# Patient Record
Sex: FEMALE | Race: BLACK OR AFRICAN AMERICAN | Employment: OTHER | URBAN - METROPOLITAN AREA
[De-identification: names, ages, dates, MRNs, and addresses within clinical notes are randomized per-mention and may not be internally consistent; named-entity substitution may affect disease eponyms.]

---

## 2017-01-24 ENCOUNTER — HOSPITAL ENCOUNTER (OUTPATIENT)
Dept: ONCOLOGY | Age: 52
Discharge: HOME | End: 2017-01-24

## 2017-01-24 ENCOUNTER — HOSPITAL ENCOUNTER (OUTPATIENT)
Dept: LAB | Age: 52
Discharge: HOME | End: 2017-01-24
Payer: MEDICARE

## 2017-01-24 ENCOUNTER — OFFICE VISIT (OUTPATIENT)
Dept: ONCOLOGY | Age: 52
End: 2017-01-24

## 2017-01-24 VITALS
HEART RATE: 112 BPM | SYSTOLIC BLOOD PRESSURE: 110 MMHG | HEIGHT: 71 IN | DIASTOLIC BLOOD PRESSURE: 78 MMHG | WEIGHT: 226 LBS | BODY MASS INDEX: 31.64 KG/M2 | TEMPERATURE: 98.9 F

## 2017-01-24 DIAGNOSIS — D75.839 THROMBOCYTOSIS: ICD-10-CM

## 2017-01-24 DIAGNOSIS — D72.829 LEUKOCYTOSIS, UNSPECIFIED TYPE: ICD-10-CM

## 2017-01-24 DIAGNOSIS — M06.9 RHEUMATOID ARTHRITIS INVOLVING MULTIPLE JOINTS (HCC): ICD-10-CM

## 2017-01-24 DIAGNOSIS — D75.839 THROMBOCYTOSIS: Primary | ICD-10-CM

## 2017-01-24 DIAGNOSIS — D64.9 CHRONIC ANEMIA: ICD-10-CM

## 2017-01-24 DIAGNOSIS — M79.7 FIBROMYALGIA: ICD-10-CM

## 2017-01-24 DIAGNOSIS — R52 PAIN: ICD-10-CM

## 2017-01-24 LAB
ALBUMIN SERPL BCP-MCNC: 3.7 G/DL (ref 3.4–5)
ALBUMIN/GLOB SERPL: 0.9 {RATIO} (ref 0.8–1.7)
ALP SERPL-CCNC: 106 U/L (ref 45–117)
ALT SERPL-CCNC: 24 U/L (ref 13–56)
ANION GAP BLD CALC-SCNC: 8 MMOL/L (ref 3–18)
AST SERPL W P-5'-P-CCNC: 7 U/L (ref 15–37)
BASO+EOS+MONOS # BLD AUTO: 0.5 K/UL (ref 0–2.3)
BASO+EOS+MONOS # BLD AUTO: 4 % (ref 0.1–17)
BILIRUB SERPL-MCNC: 0.2 MG/DL (ref 0.2–1)
BUN SERPL-MCNC: 12 MG/DL (ref 7–18)
BUN/CREAT SERPL: 15 (ref 12–20)
CALCIUM SERPL-MCNC: 9.6 MG/DL (ref 8.5–10.1)
CHLORIDE SERPL-SCNC: 104 MMOL/L (ref 100–108)
CO2 SERPL-SCNC: 27 MMOL/L (ref 21–32)
CREAT SERPL-MCNC: 0.82 MG/DL (ref 0.6–1.3)
DIFFERENTIAL METHOD BLD: NORMAL
ERYTHROCYTE [DISTWIDTH] IN BLOOD BY AUTOMATED COUNT: 13.1 % (ref 11.5–14.5)
FERRITIN SERPL-MCNC: 147 NG/ML (ref 8–388)
GLOBULIN SER CALC-MCNC: 4.1 G/DL (ref 2–4)
GLUCOSE SERPL-MCNC: 207 MG/DL (ref 74–99)
HCT VFR BLD AUTO: 39.6 % (ref 36–48)
HGB BLD-MCNC: 13.2 G/DL (ref 12–16)
IRON SATN MFR SERPL: 18 %
IRON SERPL-MCNC: 55 UG/DL (ref 50–175)
LYMPHOCYTES # BLD AUTO: 31 % (ref 14–44)
LYMPHOCYTES # BLD: 3.3 K/UL (ref 1.1–5.9)
MCH RBC QN AUTO: 27 PG (ref 25–35)
MCHC RBC AUTO-ENTMCNC: 33.3 G/DL (ref 31–37)
MCV RBC AUTO: 81 FL (ref 78–102)
NEUTS SEG # BLD: 6.9 K/UL (ref 1.8–9.5)
NEUTS SEG NFR BLD AUTO: 65 % (ref 40–70)
PLATELET # BLD AUTO: 432 K/UL (ref 140–440)
POTASSIUM SERPL-SCNC: 4.2 MMOL/L (ref 3.5–5.5)
PROT SERPL-MCNC: 7.8 G/DL (ref 6.4–8.2)
RBC # BLD AUTO: 4.89 M/UL (ref 4.1–5.1)
SODIUM SERPL-SCNC: 139 MMOL/L (ref 136–145)
TIBC SERPL-MCNC: 299 UG/DL (ref 250–450)
WBC # BLD AUTO: 10.7 K/UL (ref 4.5–13)

## 2017-01-24 PROCEDURE — 82728 ASSAY OF FERRITIN: CPT | Performed by: NURSE PRACTITIONER

## 2017-01-24 PROCEDURE — 80053 COMPREHEN METABOLIC PANEL: CPT | Performed by: NURSE PRACTITIONER

## 2017-01-24 PROCEDURE — 36415 COLL VENOUS BLD VENIPUNCTURE: CPT | Performed by: NURSE PRACTITIONER

## 2017-01-24 PROCEDURE — 83540 ASSAY OF IRON: CPT | Performed by: NURSE PRACTITIONER

## 2017-01-24 RX ORDER — OXYCODONE AND ACETAMINOPHEN 10; 325 MG/1; MG/1
1-2 TABLET ORAL
Qty: 240 TAB | Refills: 0 | Status: SHIPPED | OUTPATIENT
Start: 2017-01-24 | End: 2017-02-28 | Stop reason: SDUPTHER

## 2017-01-24 RX ORDER — ANAGRELIDE 0.5 MG/1
0.5 CAPSULE ORAL 2 TIMES DAILY
Qty: 60 CAP | Refills: 4 | Status: SHIPPED | OUTPATIENT
Start: 2017-01-24 | End: 2017-04-28 | Stop reason: SDUPTHER

## 2017-01-24 NOTE — MR AVS SNAPSHOT
Visit Information Date & Time Provider Department Dept. Phone Encounter #  
 1/24/2017 12:00 PM Izabella Saenz 69 Office [phone] [id number] Follow-up Instructions Return in about 6 weeks (around 3/7/2017). Your Appointments 3/7/2017 12:00 PM  
Office Visit with Fiona Vila MD  
[facility]) Appt Note: OV  
 [facility] [address] [facility] [address] [facility] [address]  
[phone]  
  
   
 [facility] [address] [place] Upcoming Health Maintenance Date Due Hepatitis C Screening 1965 Pneumococcal 19-64 Highest Risk (1 of 3 - PCV13) 12/19/1982 DTaP/Tdap/Td series (1 - Tdap) 12/19/1984 PAP AKA CERVICAL CYTOLOGY 12/19/1984 BREAST CANCER SCRN MAMMOGRAM 12/19/2013 FOBT Q 1 YEAR AGE 50-75 12/19/2013 Allergies as of 1/24/2017  Review Complete On: 12/15/2016 By: Edelmira Almodovar RN Severity Noted Reaction Type Reactions Levaquin [Levofloxacin] High  Systemic Anaphylaxis Pollen Extracts    Unable to Obtain Current Immunizations  Reviewed on 12/15/2016 Name Date Influenza Vaccine 10/21/2016, 11/3/2015, 11/10/2014, 10/25/2013 Influenza Vaccine Whole 9/10/2012 Not reviewed this visit You Were Diagnosed With   
  
 Codes Comments Thrombocytosis ([facility] [address]    -  Primary ICD-10-CM: D47.3 ICD-9-CM: 238.71 Leukocytosis, unspecified type     ICD-10-CM: D72.829 ICD-9-CM: 288.60 Rheumatoid arthritis involving multiple joints (HCC)     ICD-10-CM: M06.9 ICD-9-CM: 714.0 Chronic anemia     ICD-10-CM: D64.9 ICD-9-CM: 285.9 Fibromyalgia     ICD-10-CM: M79.7 ICD-9-CM: 729.1 Pain     ICD-10-CM: R52 ICD-9-CM: 780.96 Vitals BP Pulse Temp Height(growth percentile) Weight(growth percentile) BMI  
 110/78 (!) 112 98.9 °F (37.2 °C) 5' 11\" (1.803 m) 226 lb (102.5 kg) 31.52 kg/m2 OB Status Smoking Status Hysterectomy Former Smoker BMI and BSA Data Body Mass Index Body Surface Area  
 31.52 kg/m 2 2.27 m 2 Preferred Pharmacy Pharmacy Name Phone [facility] A[address]n [address]. [phone] Your Updated Medication List  
  
   
This list is accurate as of: 1/24/17 12:35 PM.  Always use your most recent med list.  
  
  
  
  
 albuterol 90 mcg/actuation inhaler Commonly known as:  PROAIR HFA Take 1 Puff by inhalation every six (6) hours as needed for Wheezing. amitriptyline 150 mg tablet Commonly known as:  ELAVIL Take 10 mg by mouth nightly. Indications: DEPRESSION  
  
 anagrelide 0.5 mg capsule Commonly known as:  Corry Jasbir Take 1 Cap by mouth two (2) times a day. Or as directed by a MD.  Indications: ESSENTIAL THROMBOCYTOSIS  
  
 atenolol 25 mg tablet Commonly known as:  TENORMIN Take 25 mg by mouth daily. Indications: HYPERTENSION  
  
 cholecalciferol (VITAMIN D3) 5,000 unit Tab tablet Commonly known as:  VITAMIN D3 Take 5,000 Units by mouth every seven (7) days. DULoxetine 20 mg capsule Commonly known as:  CYMBALTA Take 20 mg by mouth daily. folic acid 413 mcg tablet Take 400 mcg by mouth daily. furosemide 40 mg tablet Commonly known as:  LASIX Take  by mouth daily. haloperidol 5 mg tablet Commonly known as:  HALDOL Take 2.5 mg by mouth nightly. HYZAAR 50-12.5 mg per tablet Generic drug:  losartan-hydroCHLOROthiazide Take 1 Tab by mouth daily. KlonoPIN 2 mg tablet Generic drug:  clonazePAM  
Take 2 mg by mouth daily. Indications: PANIC DISORDER  
  
 LANTUS SC  
5 Units by SubCUTAneous route daily. metFORMIN 1,000 mg tablet Commonly known as:  GLUCOPHAGE Take 500 mg by mouth two (2) times a day. methotrexate 2.5 mg tablet Commonly known as:  Skip Shantal Take 5 mg by mouth daily. nitroglycerin 400 mcg/spray spray Commonly known as:  NITROLINGUAL  
1 [place] by SubLINGual route every five (5) minutes as needed. NovoLOG Mix 70-30 100 unit/mL (70-30) injection Generic drug:  insulin aspart protamine/insulin aspart 10 Units by SubCUTAneous route two (2) times a day. ondansetron hcl 4 mg tablet Commonly known as:  ZOFRAN (AS HYDROCHLORIDE) Take 1 Tab by mouth every eight (8) hours as needed for Nausea. oxyCODONE-acetaminophen  mg per tablet Commonly known as:  PERCOCET 10 Take 1-2 Tabs by mouth every six (6) hours as needed for Pain. Max Daily Amount: 8 Tabs. Indications: PAIN  
  
 PLAQUENIL 200 mg tablet Generic drug:  hydroxychloroquine Take 200 mg by mouth two (2) times a day. QUEtiapine 100 mg tablet Commonly known as:  SEROquel Take 25 mg by mouth. Takes 25 mg Q AM, and 100 mg, Q PM  
  
 REMICADE IV  
344 mg by IntraVENous route once as needed. remicade will be every 8 weeks Prescriptions Printed Refills  
 oxyCODONE-acetaminophen (PERCOCET 10)  mg per tablet 0 Sig: Take 1-2 Tabs by mouth every six (6) hours as needed for Pain. Max Daily Amount: 8 Tabs. Indications: PAIN Class: Print Route: Oral  
  
We Performed the Following COMPLETE CBC & AUTO DIFF WBC [23652 CPT(R)] Follow-up Instructions Return in about 6 weeks (around 3/7/2017). To-Do List   
 01/24/2017 Lab:  CBC WITH 3 PART DIFF   
  
 01/25/2017 Lab:  FERRITIN   
  
 01/25/2017 Lab:  IRON PROFILE   
  
 01/25/2017 Lab:  METABOLIC PANEL, COMPREHENSIVE   
  
 01/26/2017 10:00 AM  
  Appointment with [address] [place] [address] ([phone])  
  
 03/09/2017 10:00 AM  
  Appointment with [address] [place] [address] ([phone])  
  
 04/20/2017 10:00 AM  
  Appointment with [address] [place] [address] ([phone]) Introducing [address]! Bessy Scanlon introduces [employer] patient portal. Now you can access parts of your medical record, email your doctor's office, and request medication refills online. 1. In your internet browser, go to [email or web address] 2. Click on the First Time User? Click Here link in the Sign In box. You will see the New Member Sign Up page. 3. Enter your [employer] Access Code exactly as it appears below. You will not need to use this code after youve completed the sign-up process. If you do not sign up before the expiration date, you must request a new code. · [employer] Access Code: [email or web address] Expires: 3/13/2017  1:57 PM 
 
4. Enter the last four digits of your Social Security Number (xxxx) and Date of Birth (mm/dd/yyyy) as indicated and click Submit. You will be taken to the next sign-up page. 5. Create a Profig ID. This will be your Pro[employer] login ID and cannot be changed, so think of one that is secure and easy to remember. 6. Create a [employer] password. You can change your password at any time. 7. Enter your Password Reset Question and Answer. This can be used at a later time if you forget your password. 8. Enter your e-mail address. You will receive e-mail notification when new information is available in [address] 9. Click Sign Up. You can now view and download portions of your medical record. 10. Click the Download Summary menu link to download a portable copy of your medical information. If you have questions, please visit the Frequently Asked Questions section of the [employer] website. Remember, [employer] is NOT to be used for urgent needs. For medical emergencies, dial 911. Now available from your iPhone and Android! Please provide this summary of care documentation to your next provider. Your primary care clinician is listed as Jordy Perez. If you have any questions after today's visit, please call [phone].

## 2017-01-24 NOTE — PROGRESS NOTES
Hematology/Oncology  Progress Note    Name: Celia Murdock  Date: 2017  : 1965    PCP: Jeni Romero MD     Ms. Garry Nunez is a 46year old female who was seen for management of her rheumatoid arthritis, leukocytosis, and thrombocytosis. Current therapy: Remicade 4 mg/kg bodyweight ever 6 weeks intravenously    Subjective:     Ms. Garry Nunez is a 46year-old [facility] American woman who has severe rheumatoid arthritis. She receives Remicade every 6 weeks. She also suffers from fibromyalgia and had an elevated WBC count and platelet count of undefined etiology. Today she has no new complaints to report. Her pain is well controlled with her current pain medication regimen. She reports that the Remicade has provided a significant degree of relief from the severe rheumatoid arthritis symptoms. The patient is continuing to use her cane for mobility support. Her next treatment is scheduled for 2017. She denies any recent fevers or recurrent infections. The patient reports that her appetite has continued to improve. She also states her energy level is continuing to improve as well. At this point she is beginning to gain some weight due to the improved appetite.     Past Medical History   Diagnosis Date    Abdominal pain, unspecified site     Acute otitis media     Acute pharyngitis     Anxiety     Arthritis     Autoimmune disease ([facility] [address]     Back injury     Backache      herniated disc in lower back    Blurred vision     Chest pain, unspecified      abnormal EKG    Chronic airway obstruction, not elsewhere classified (HCC)     Chronic back pain     Chronic pain     COPD     Depression     Diabetes (HCC)     Dizziness     Dizziness and giddiness      possible orthostatic changes, vasovagal, autonomic dysfunction from diabetic neuropathy    Encounters for unspecified administrative purpose     Feeling anxious     Fibromyalgia     Headache(784.0)     Hemorrhoid     Herniated disc      at L5    Hypercholesterolemia     Hyperglycemia     Hypertension     Joint pain     Leukocytosis, unspecified     Major depressive disorder, single episode, mild (HCC)     Mental disorder      depression, anxiety, bipolar and PTSD    Neuropathy     Obesity, unspecified     Other chest pain     Phobic disorders     Rheumatoid arthritis (HCC)     Seasonal allergies     Shortness of breath      normal EF    Streptococcal sore throat     Type II or unspecified type diabetes mellitus with unspecified complication, uncontrolled     Unspecified hereditary and idiopathic peripheral neuropathy     Vitamin D deficiency      Past Surgical History   Procedure Laterality Date    Hx gyn  2005     partial Hysterectomy    Hx tubal ligation       1995    Hx cholecystectomy  1994    Hx other surgical  12-1-15     had ingrown toenail removed from big toe, both feet     Social History     Social History    Marital status:      Spouse name: N/A    Number of children: N/A    Years of education: N/A     Occupational History    Not on file.      Social History Main Topics    Smoking status: Former Smoker    Smokeless tobacco: Not on file    Alcohol use Yes      Comment: socially    Drug use: No    Sexual activity: Yes     Partners: Male     Birth control/ protection: Condom     Other Topics Concern    Not on file     Social History Narrative     Family History   Problem Relation Age of Onset    Diabetes Other     Alcohol abuse Mother     Arthritis-osteo Mother     Diabetes Mother     Elevated Lipids Mother     Headache Mother     Heart Disease Mother    [facility] Migraines Mother     Psychiatric Disorder Mother     Alcohol abuse Father    Osmel Nolan Father     Cancer Father     Headache Father     Heart Disease Father     Lung Disease Father     Migraines Father     Alcohol abuse Sister     Headache Sister     Diabetes Sister     Heart Disease Sister     Migraines Sister     Psychiatric Disorder Sister     Headache Brother     Diabetes Brother     Elevated Lipids Brother     Heart Disease Brother     Migraines Brother     Psychiatric Disorder Brother     Cancer Maternal Aunt     Alcohol abuse Maternal Aunt     Diabetes Maternal Aunt     Headache Maternal Aunt     Lung Disease Maternal Aunt     Migraines Maternal Aunt     Headache Maternal Uncle     Migraines Maternal Uncle     Stroke Maternal Uncle     Psychiatric Disorder Maternal Uncle     Headache Paternal Aunt     Migraines Paternal Aunt     Headache Paternal Uncle     Hypertension Paternal Uncle     Migraines Paternal Uncle     Stroke Paternal Uncle     Headache Maternal Grandmother     Migraines Maternal Grandmother     Stroke Maternal Grandmother     Headache Maternal Grandfather     Migraines Maternal Grandfather     Stroke Maternal Grandfather     Headache Paternal Grandmother     Hypertension Paternal Grandmother     Lung Disease Paternal Grandmother     Migraines Paternal Grandmother     Headache Paternal Grandfather     Cancer Paternal Grandfather     Migraines Paternal Grandfather      Current Outpatient Prescriptions   Medication Sig Dispense Refill    oxyCODONE-acetaminophen (PERCOCET 10)  mg per tablet Take 1-2 Tabs by mouth every six (6) hours as needed for Pain. Max Daily Amount: 8 Tabs. Indications: PAIN 240 Tab 0    insulin aspart protamine/insulin aspart (NOVOLOG MIX 70-30) 100 unit/mL (70-30) injection 10 Units by SubCUTAneous route two (2) times a day.  anagrelide (AGRYLIN) 0.5 mg capsule Take 1 Cap by mouth two (2) times a day. Or as directed by a MD.  Indications: ESSENTIAL THROMBOCYTOSIS 60 Cap 4    clonazePAM (KLONOPIN) 2 mg tablet Take 2 mg by mouth daily. Indications: PANIC DISORDER      folic acid 892 mcg tablet Take 400 mcg by mouth daily.  haloperidol (HALDOL) 5 mg tablet Take 2.5 mg by mouth nightly.       QUEtiapine (SEROQUEL) 100 mg tablet Take 25 mg by mouth. Takes 25 mg Q AM, and 100 mg, Q PM      DULoxetine (CYMBALTA) 20 mg capsule Take 20 mg by mouth daily.  albuterol (PROAIR HFA) 90 mcg/actuation inhaler Take 1 Puff by inhalation every six (6) hours as needed for Wheezing. 1 Inhaler 0    ondansetron hcl (ZOFRAN, AS HYDROCHLORIDE,) 4 mg tablet Take 1 Tab by mouth every eight (8) hours as needed for Nausea. 12 Tab 0    methotrexate (RHEUMATREX) 2.5 mg tablet Take 5 mg by mouth daily.  metFORMIN (GLUCOPHAGE) 1,000 mg tablet Take 500 mg by mouth two (2) times a day.  Cholecalciferol, Vitamin D3, 5,000 unit Tab Take 5,000 Units by mouth every seven (7) days.  atenolol (TENORMIN) 25 mg tablet Take 25 mg by mouth daily. Indications: HYPERTENSION      hydroxychloroquine (PLAQUENIL) 200 mg tablet Take 200 mg by mouth two (2) times a day.  furosemide (LASIX) 40 mg tablet Take  by mouth daily.  INFLIXIMAB (REMICADE IV) 344 mg by IntraVENous route once as needed. remicade will be every 8 weeks       amitriptyline (ELAVIL) 150 mg tablet Take 10 mg by mouth nightly. Indications: DEPRESSION      nitroglycerin (NITROLINGUAL) 0.4 mg/dose spray 1 Spray by SubLINGual route every five (5) minutes as needed.  losartan-hydrochlorothiazide (HYZAAR) 50-12.5 mg per tablet Take 1 Tab by mouth daily.  INSULIN GLARGINE,HUM. REC. ANLOG (LANTUS SC) 5 Units by SubCUTAneous route daily. Facility-Administered Medications Ordered in Other Visits   Medication Dose Route Frequency Provider Last Rate Last Dose    [START ON 1/26/2017] inFLIXimab (REMICADE) 400 mg in 0.9% sodium chloride 250 mL infusion  400 mg IntraVENous Samantha Bennett MD           Review of Systems  Constitutional: The patient has no acute distress or discomfort.   HEENT: The patient denies recent head trauma, eye pain, blurred vision,  hearing deficit, oropharyngeal mucosal pain or lesions, and the patient denies throat pain or discomfort. Lymphatics: The patient denies palpable peripheral lymphadenopathy. Hematologic: The patient denies having bruising, bleeding, or progressive fatigue. Respiratory: Patient denies having shortness of breath, cough, sputum production, fever, or dyspnea on exertion. Cardiovascular: The patient denies having leg pain, leg swelling, heart palpitations, chest permit, chest pain, or lightheadedness. The patient denies having dyspnea on exertion. Gastrointestinal: The patient denies having nausea, emesis, or diarrhea. The patient denies having any hematemesis or blood in the stool. Genitourinary: Patient denies having urinary urgency, frequency, or dysuria. The patient denies having blood in the urine. Psychological: The patient denies having symptoms of nervousness, anxiety, depression, or thoughts of harming himself some of this. Skin: Patient denies having skin rashes, skin, ulcerations, or unexplained itching or pruritus. Musculoskeletal: The patient denies muscle,bone, or joint pains at this time. Objective:     Visit Vitals    /78    Pulse (!) 112    Temp 98.9 °F (37.2 °C)    Ht 5' 11\" (1.803 m)    Wt 102.5 kg (226 lb)    BMI 31.52 kg/m2     ECOG PS=0 Pain score 2/10     Physical Exam:   Gen. Appearance: The patient is in no acute distress. Skin: There is no bruise or rash. HEENT: The exam is unremarkable. Neck: Supple without lymphadenopathy or thyromegaly. Lungs: Clear to auscultation and percussion; there are no wheezes or rhonchi. Heart: Regular rate and rhythm; there are no murmurs, gallops, or rubs. Abdomen: Bowel sounds are present and normal.  There is no guarding, tenderness, or hepatosplenomegaly. Extremities: There is no clubbing, cyanosis, or edema. Neurologic: There are no focal neurologic deficits. Lymphatics: There is no palpable peripheral lymphadenopathy. Musculoskeletal: The patient has full range of motion at all joints.  However, she complains of pain on ambulation due to the severe rheumatoid arthritis. There is  evidence of joint deformity or effusions in the hands and knees. There is no focal joint tenderness. She is using a cane for support and mobility. Psychological/psychiatric: There is no clinical evidence of anxiety, depression, or melancholy. Lab data:      Results for orders placed or performed during the hospital encounter of 01/24/17   CBC WITH 3 PART DIFF     Status: None   Result Value Ref Range Status    WBC 10.7 4.5 - 13.0 K/uL Final    RBC 4.89 4. 10 - 5.10 M/uL Final    HGB 13.2 12.0 - 16.0 g/dL Final    HCT 39.6 36 - 48 % Final    MCV 81.0 78 - 102 FL Final    MCH 27.0 25.0 - 35.0 PG Final    MCHC 33.3 31 - 37 g/dL Final    RDW 13.1 11.5 - 14.5 % Final    PLATELET 616 028 - 693 K/uL Final    NEUTROPHILS 65 40 - 70 % Final    MIXED CELLS 4 0.1 - 17 % Final    LYMPHOCYTES 31 14 - 44 % Final    ABS. NEUTROPHILS 6.9 1.8 - 9.5 K/UL Final    ABS. MIXED CELLS 0.5 0.0 - 2.3 K/uL Final    ABS. LYMPHOCYTES 3.3 1.1 - 5.9 K/UL Final     Comment: Test performed at [address]. Results Reviewed by Medical Director. DF AUTOMATED   Final           Assessment:     1. Thrombocytosis (HCC)    2. Leukocytosis, unspecified type    3. Rheumatoid arthritis involving multiple joints (HCC)    4. Chronic anemia    5. Fibromyalgia    6. Pain      Plan:   Leukocytosis: I have explained to the patient that the etiology of her leukocytosis remains undefined. A previous flow cytometry did not reveal any  evidence of an immunophenotypic abnormality such as an evolving chronic lymphoproliferative disorder or myeloproliferative disorder. The CBCs will continue to be monitored every 6 weeks. The CBC from today shows her WBC count is currently 10.7 The absolute neutrophil count is 6.9 with an absolute lymphocyte count of 3.3. Essential thrombocytosis: The current CBC shows that the platelet count is now 432,000.   The platelet count is being monitored every 6 weeks. The patient was previously started on anagrelide 0.5 mg one tablet twice daily. This medication will be continued. I have reenforced to the patient the importance of being complaint with the treatment plan. Rheumatoid arthritis: the patient will continue to receive Remicade as a primary treatment modality for her severe rheumatoid arthritis every 6 weeks. The dose of Remicade will be 344 mg given intravenously. Her next dose is scheduled for 1/26/2017. The patient has continued to take  Methotrexate 5 mg p.o. daily and Plaquenil 200 mg twice daily. Fibromyalgia: The patient  continues to have generalized pain at times related to her underlying fibromyalgia. At this time, her pain is well controlled. The patient receives her Percocet medication on a monthly basis as needed. I will renew this Rx today. Chronic anemia: I have explained to the patient the CBC from today shows her hemoglobin has remained normal at 13.2g/dL with a hematocrit of 39.6%. I have recommended that she continue to take ferrous sulfate 325 mg by mouth once daily. We will see the patient back in 6 weeks for a complete reassessment. Orders Placed This Encounter    COMPLETE CBC & AUTO DIFF WBC    InHouse CBC ([facility])     Standing Status:   Future     Number of Occurrences:   1     Standing Expiration Date:   9/26/8980    METABOLIC PANEL, COMPREHENSIVE     Standing Status:   Future     Standing Expiration Date:   1/25/2018    IRON PROFILE     Standing Status:   Future     Standing Expiration Date:   1/25/2018    FERRITIN     Standing Status:   Future     Standing Expiration Date:   1/25/2018    oxyCODONE-acetaminophen (PERCOCET 10)  mg per tablet     Sig: Take 1-2 Tabs by mouth every six (6) hours as needed for Pain. Max Daily Amount: 8 Tabs.  Indications: PAIN     Dispense:  240 Tab     Refill:  0       Fang Kumar NP  1/24/2017

## 2017-01-26 ENCOUNTER — HOSPITAL ENCOUNTER (OUTPATIENT)
Dept: INFUSION THERAPY | Age: 52
Discharge: HOME | End: 2017-01-26
Payer: MEDICARE

## 2017-01-26 VITALS
TEMPERATURE: 99.2 F | HEART RATE: 92 BPM | DIASTOLIC BLOOD PRESSURE: 75 MMHG | WEIGHT: 226 LBS | RESPIRATION RATE: 16 BRPM | SYSTOLIC BLOOD PRESSURE: 127 MMHG | BODY MASS INDEX: 31.64 KG/M2 | HEIGHT: 71 IN

## 2017-01-26 PROCEDURE — 96375 TX/PRO/DX INJ NEW DRUG ADDON: CPT

## 2017-01-26 PROCEDURE — 96415 CHEMO IV INFUSION ADDL HR: CPT

## 2017-01-26 PROCEDURE — 96413 CHEMO IV INFUSION 1 HR: CPT

## 2017-01-26 PROCEDURE — 74011250636 HC RX REV CODE- 250/636: Performed by: INTERNAL MEDICINE

## 2017-01-26 RX ORDER — SODIUM CHLORIDE 9 MG/ML
250 INJECTION, SOLUTION INTRAVENOUS ONCE
Status: COMPLETED | OUTPATIENT
Start: 2017-01-26 | End: 2017-01-26

## 2017-01-26 RX ORDER — DIPHENHYDRAMINE HYDROCHLORIDE 50 MG/ML
25 INJECTION, SOLUTION INTRAMUSCULAR; INTRAVENOUS ONCE
Status: COMPLETED | OUTPATIENT
Start: 2017-01-26 | End: 2017-01-26

## 2017-01-26 RX ORDER — SODIUM CHLORIDE 0.9 % (FLUSH) 0.9 %
10-40 SYRINGE (ML) INJECTION AS NEEDED
Status: DISCONTINUED | OUTPATIENT
Start: 2017-01-26 | End: 2017-01-30 | Stop reason: HOSPADM

## 2017-01-26 RX ADMIN — INFLIXIMAB 400 MG: 100 INJECTION, POWDER, LYOPHILIZED, FOR SOLUTION INTRAVENOUS at 11:30

## 2017-01-26 RX ADMIN — SODIUM CHLORIDE 250 ML: 9 INJECTION, SOLUTION INTRAVENOUS at 10:50

## 2017-01-26 RX ADMIN — Medication 10 ML: at 10:45

## 2017-01-26 RX ADMIN — DIPHENHYDRAMINE HYDROCHLORIDE 25 MG: 50 INJECTION INTRAMUSCULAR; INTRAVENOUS at 11:00

## 2017-01-26 NOTE — PROGRESS NOTES
[facility] Progress Note    Date: 2017    Name: Patric Clarke    MRN: [id number]         : 1965      Ms. Ally Paige arrived in the [facility] today, at 1030, in stable condition, here for Q 6 Week, IV Remicade Infusion. She was assessed and education was provided. Ms. Altaf Lehman vitals were reviewed. Visit Vitals    /78 (BP 1 Location: Right arm, BP Patient Position: At rest;Sitting)    Pulse 95    Temp 98.9 °F (37.2 °C)    Resp 16    Ht 5' 11\" (1.803 m)    Wt 102.5 kg (226 lb)    Breastfeeding No    BMI 31.52 kg/m2             Lab results were reviewed. (She had a CBC drawn during her office visit with Dr. Dandre Larkin, on Tuesday, 17, and the results were as follows) :             Collected: 2017 1200               2017 12:03 PM - [place], Lab In [facility]       Component Results      Component Value Flag Ref Range Units Status     WBC 10.7  4.5 - 13.0 K/uL Final     RBC 4.89  4. 10 - 5.10 M/uL Final     HGB 13.2  12.0 - 16.0 g/dL Final     HCT 39.6  36 - 48 % Final     MCV 81.0  78 - 102 FL Final     MCH 27.0  25.0 - 35.0 PG Final     MCHC 33.3  31 - 37 g/dL Final     RDW 13.1  11.5 - 14.5 % Final     PLATELET 387  907 - 257 K/uL Final     NEUTROPHILS 65  40 - 70 % Final     MIXED CELLS 4  0.1 - 17 % Final     LYMPHOCYTES 31  14 - 44 % Final     ABS. NEUTROPHILS 6.9  1.8 - 9.5 K/UL Final     ABS. MIXED CELLS 0.5  0.0 - 2.3 K/uL Final     ABS. LYMPHOCYTES 3.3  1.1 - 5.9 K/UL Final     Comment:     Test performed at [facility]. Results Reviewed by Medical Director.     DF AUTOMATED     Final             PIV was established in her dorsal right forearm, at 1045, without incident. Benadryl 25 mg IVP, was administered pre-Remicade, per order, and without incident. Remicade 400 mg IV (Dose rounded, per pharmacy), was administered per order, and without incident.          After completion of the IV Remicade, the PIV was flushed very well per protocol, and then removed, and guaze/bandaid was applied. Ms. Amilcar Maldonado tolerated well, and had no complaints. Ms. Amilcar Maldonado was discharged from [place] [address] in stable condition at [phone]. She is to return in 6 weeks, on Thursday, 3-9-17, at 1000,  for her next appointment, for her next dose of IV Remicade.      Yves Troy RN  January 26, 2017  11:12 AM

## 2017-02-12 PROCEDURE — 74011250636 HC RX REV CODE- 250/636

## 2017-02-12 RX ORDER — DIPHENHYDRAMINE HYDROCHLORIDE 50 MG/ML
INJECTION, SOLUTION INTRAMUSCULAR; INTRAVENOUS
Status: DISCONTINUED
Start: 2017-02-12 | End: 2017-01-28 | Stop reason: HOSPADM

## 2017-02-27 ENCOUNTER — HOSPITAL ENCOUNTER (OUTPATIENT)
Dept: LAB | Age: 52
Discharge: HOME | End: 2017-02-27
Payer: MEDICARE

## 2017-02-27 DIAGNOSIS — M06.9 ATROPHIC ARTHRITIS (HCC): ICD-10-CM

## 2017-02-27 DIAGNOSIS — R52 PAIN: ICD-10-CM

## 2017-02-27 DIAGNOSIS — M13.0 UNSPECIFIED POLYARTHROPATHY OR POLYARTHRITIS, SITE UNSPECIFIED: ICD-10-CM

## 2017-02-27 LAB
ALBUMIN SERPL BCP-MCNC: 3.5 G/DL (ref 3.4–5)
ALBUMIN/GLOB SERPL: 0.9 {RATIO} (ref 0.8–1.7)
ALP SERPL-CCNC: 97 U/L (ref 45–117)
ALT SERPL-CCNC: 25 U/L (ref 13–56)
ANION GAP BLD CALC-SCNC: 8 MMOL/L (ref 3–18)
APPEARANCE UR: CLEAR
AST SERPL W P-5'-P-CCNC: 15 U/L (ref 15–37)
BASOPHILS # BLD AUTO: 0 K/UL (ref 0–0.06)
BASOPHILS # BLD: 0 % (ref 0–2)
BILIRUB SERPL-MCNC: 0.3 MG/DL (ref 0.2–1)
BILIRUB UR QL: NEGATIVE
BUN SERPL-MCNC: 15 MG/DL (ref 7–18)
BUN/CREAT SERPL: 16 (ref 12–20)
CALCIUM SERPL-MCNC: 9.2 MG/DL (ref 8.5–10.1)
CHLORIDE SERPL-SCNC: 103 MMOL/L (ref 100–108)
CO2 SERPL-SCNC: 30 MMOL/L (ref 21–32)
COLOR UR: YELLOW
CREAT SERPL-MCNC: 0.91 MG/DL (ref 0.6–1.3)
CRP SERPL-MCNC: 1.1 MG/DL (ref 0–0.3)
DIFFERENTIAL METHOD BLD: ABNORMAL
EOSINOPHIL # BLD: 0.5 K/UL (ref 0–0.4)
EOSINOPHIL NFR BLD: 4 % (ref 0–5)
ERYTHROCYTE [DISTWIDTH] IN BLOOD BY AUTOMATED COUNT: 14.1 % (ref 11.6–14.5)
ERYTHROCYTE [SEDIMENTATION RATE] IN BLOOD: 28 MM/HR (ref 0–30)
GLOBULIN SER CALC-MCNC: 4.1 G/DL (ref 2–4)
GLUCOSE SERPL-MCNC: 154 MG/DL (ref 74–99)
GLUCOSE UR STRIP.AUTO-MCNC: NEGATIVE MG/DL
HCT VFR BLD AUTO: 40.3 % (ref 35–45)
HGB BLD-MCNC: 13.4 G/DL (ref 12–16)
HGB UR QL STRIP: NEGATIVE
KETONES UR QL STRIP.AUTO: NEGATIVE MG/DL
LEUKOCYTE ESTERASE UR QL STRIP.AUTO: NEGATIVE
LYMPHOCYTES # BLD AUTO: 26 % (ref 21–52)
LYMPHOCYTES # BLD: 3.2 K/UL (ref 0.9–3.6)
MCH RBC QN AUTO: 27.1 PG (ref 24–34)
MCHC RBC AUTO-ENTMCNC: 33.3 G/DL (ref 31–37)
MCV RBC AUTO: 81.4 FL (ref 74–97)
MONOCYTES # BLD: 0.8 K/UL (ref 0.05–1.2)
MONOCYTES NFR BLD AUTO: 6 % (ref 3–10)
NEUTS SEG # BLD: 8 K/UL (ref 1.8–8)
NEUTS SEG NFR BLD AUTO: 64 % (ref 40–73)
NITRITE UR QL STRIP.AUTO: NEGATIVE
PH UR STRIP: 5.5 [PH] (ref 5–8)
PLATELET # BLD AUTO: 468 K/UL (ref 135–420)
PMV BLD AUTO: 10.7 FL (ref 9.2–11.8)
POTASSIUM SERPL-SCNC: 4.2 MMOL/L (ref 3.5–5.5)
PROT SERPL-MCNC: 7.6 G/DL (ref 6.4–8.2)
PROT UR STRIP-MCNC: NEGATIVE MG/DL
RBC # BLD AUTO: 4.95 M/UL (ref 4.2–5.3)
SODIUM SERPL-SCNC: 141 MMOL/L (ref 136–145)
SP GR UR REFRACTOMETRY: 1.02 (ref 1–1.03)
UROBILINOGEN UR QL STRIP.AUTO: 0.2 EU/DL (ref 0.2–1)
WBC # BLD AUTO: 12.5 K/UL (ref 4.6–13.2)

## 2017-02-27 PROCEDURE — 36415 COLL VENOUS BLD VENIPUNCTURE: CPT | Performed by: SPECIALIST

## 2017-02-27 PROCEDURE — 81003 URINALYSIS AUTO W/O SCOPE: CPT | Performed by: SPECIALIST

## 2017-02-27 PROCEDURE — 85025 COMPLETE CBC W/AUTO DIFF WBC: CPT | Performed by: SPECIALIST

## 2017-02-27 PROCEDURE — 86140 C-REACTIVE PROTEIN: CPT | Performed by: SPECIALIST

## 2017-02-27 PROCEDURE — 85652 RBC SED RATE AUTOMATED: CPT | Performed by: SPECIALIST

## 2017-02-27 PROCEDURE — 80053 COMPREHEN METABOLIC PANEL: CPT | Performed by: SPECIALIST

## 2017-02-27 RX ORDER — OXYCODONE AND ACETAMINOPHEN 10; 325 MG/1; MG/1
1-2 TABLET ORAL
Qty: 240 TAB | Refills: 0 | Status: CANCELLED | OUTPATIENT
Start: 2017-02-27

## 2017-02-28 DIAGNOSIS — R52 PAIN: ICD-10-CM

## 2017-02-28 RX ORDER — OXYCODONE AND ACETAMINOPHEN 10; 325 MG/1; MG/1
1-2 TABLET ORAL
Qty: 240 TAB | Refills: 0 | Status: SHIPPED | OUTPATIENT
Start: 2017-02-28 | End: 2017-03-27 | Stop reason: SDUPTHER

## 2017-03-07 ENCOUNTER — HOSPITAL ENCOUNTER (OUTPATIENT)
Dept: LAB | Age: 52
Discharge: HOME | End: 2017-03-07
Payer: MEDICARE

## 2017-03-07 ENCOUNTER — HOSPITAL ENCOUNTER (OUTPATIENT)
Dept: ONCOLOGY | Age: 52
Discharge: HOME | End: 2017-03-07

## 2017-03-07 ENCOUNTER — OFFICE VISIT (OUTPATIENT)
Dept: ONCOLOGY | Age: 52
End: 2017-03-07

## 2017-03-07 VITALS
DIASTOLIC BLOOD PRESSURE: 73 MMHG | TEMPERATURE: 98.4 F | BODY MASS INDEX: 30.94 KG/M2 | SYSTOLIC BLOOD PRESSURE: 115 MMHG | HEART RATE: 86 BPM | WEIGHT: 221 LBS | HEIGHT: 71 IN

## 2017-03-07 DIAGNOSIS — M79.7 FIBROMYALGIA: ICD-10-CM

## 2017-03-07 DIAGNOSIS — D64.9 CHRONIC ANEMIA: ICD-10-CM

## 2017-03-07 DIAGNOSIS — D72.829 LEUKOCYTOSIS, UNSPECIFIED TYPE: ICD-10-CM

## 2017-03-07 DIAGNOSIS — D75.839 THROMBOCYTOSIS: Primary | ICD-10-CM

## 2017-03-07 DIAGNOSIS — D75.839 THROMBOCYTOSIS: ICD-10-CM

## 2017-03-07 DIAGNOSIS — D47.3 ESSENTIAL THROMBOCYTOSIS (HCC): ICD-10-CM

## 2017-03-07 DIAGNOSIS — M06.9 RHEUMATOID ARTHRITIS INVOLVING MULTIPLE JOINTS (HCC): ICD-10-CM

## 2017-03-07 LAB
ALBUMIN SERPL BCP-MCNC: 3.7 G/DL (ref 3.4–5)
ALBUMIN/GLOB SERPL: 0.9 {RATIO} (ref 0.8–1.7)
ALP SERPL-CCNC: 102 U/L (ref 45–117)
ALT SERPL-CCNC: 19 U/L (ref 13–56)
ANION GAP BLD CALC-SCNC: 10 MMOL/L (ref 3–18)
AST SERPL W P-5'-P-CCNC: 9 U/L (ref 15–37)
BASO+EOS+MONOS # BLD AUTO: 0.7 K/UL (ref 0–2.3)
BASO+EOS+MONOS # BLD AUTO: 6 % (ref 0.1–17)
BILIRUB SERPL-MCNC: 0.2 MG/DL (ref 0.2–1)
BUN SERPL-MCNC: 14 MG/DL (ref 7–18)
BUN/CREAT SERPL: 16 (ref 12–20)
CALCIUM SERPL-MCNC: 9.6 MG/DL (ref 8.5–10.1)
CHLORIDE SERPL-SCNC: 104 MMOL/L (ref 100–108)
CO2 SERPL-SCNC: 25 MMOL/L (ref 21–32)
CREAT SERPL-MCNC: 0.88 MG/DL (ref 0.6–1.3)
DIFFERENTIAL METHOD BLD: NORMAL
ERYTHROCYTE [DISTWIDTH] IN BLOOD BY AUTOMATED COUNT: 13.7 % (ref 11.5–14.5)
FERRITIN SERPL-MCNC: 129 NG/ML (ref 8–388)
GLOBULIN SER CALC-MCNC: 4.1 G/DL (ref 2–4)
GLUCOSE SERPL-MCNC: 130 MG/DL (ref 74–99)
HCT VFR BLD AUTO: 40.1 % (ref 36–48)
HGB BLD-MCNC: 13.3 G/DL (ref 12–16)
IRON SATN MFR SERPL: 13 %
IRON SERPL-MCNC: 40 UG/DL (ref 50–175)
LYMPHOCYTES # BLD AUTO: 26 % (ref 14–44)
LYMPHOCYTES # BLD: 3.2 K/UL (ref 1.1–5.9)
MCH RBC QN AUTO: 26.8 PG (ref 25–35)
MCHC RBC AUTO-ENTMCNC: 33.2 G/DL (ref 31–37)
MCV RBC AUTO: 80.7 FL (ref 78–102)
NEUTS SEG # BLD: 8.5 K/UL (ref 1.8–9.5)
NEUTS SEG NFR BLD AUTO: 69 % (ref 40–70)
PLATELET # BLD AUTO: 417 K/UL (ref 140–440)
POTASSIUM SERPL-SCNC: 4.2 MMOL/L (ref 3.5–5.5)
PROT SERPL-MCNC: 7.8 G/DL (ref 6.4–8.2)
RBC # BLD AUTO: 4.97 M/UL (ref 4.1–5.1)
SODIUM SERPL-SCNC: 139 MMOL/L (ref 136–145)
TIBC SERPL-MCNC: 318 UG/DL (ref 250–450)
WBC # BLD AUTO: 12.4 K/UL (ref 4.5–13)

## 2017-03-07 PROCEDURE — 36415 COLL VENOUS BLD VENIPUNCTURE: CPT | Performed by: INTERNAL MEDICINE

## 2017-03-07 PROCEDURE — 83540 ASSAY OF IRON: CPT | Performed by: INTERNAL MEDICINE

## 2017-03-07 PROCEDURE — 82728 ASSAY OF FERRITIN: CPT | Performed by: INTERNAL MEDICINE

## 2017-03-07 PROCEDURE — 80053 COMPREHEN METABOLIC PANEL: CPT | Performed by: INTERNAL MEDICINE

## 2017-03-07 NOTE — PROGRESS NOTES
Hematology/Oncology  Progress Note    Name: Tabitha Ek  Date: 3/7/2017  : 1965    PCP: Brody Recinos MD     Ms. Yvette Morin is a 46year old female who was seen for management of her rheumatoid arthritis, leukocytosis, and thrombocytosis. Current therapy: Remicade 4 mg/kg bodyweight ever 6 weeks intravenously    Subjective:     Ms. Yvette Morin is a 46year-old [facility] American woman who has severe rheumatoid arthritis. She receives Remicade every 6 weeks. She also suffers from fibromyalgia and had an elevated WBC count and platelet count of undefined etiology. Today she has no new complaints to report. Her pain is well controlled with her current pain medication regimen. She reports that the Remicade has provided a significant degree of relief from the severe rheumatoid arthritis symptoms. The patient is continuing to use her cane for mobility support. Her next treatment is scheduled for 3/9/2017. She denies any recent fevers or recurrent infections. The patient reports that her appetite has continued to improve. She also states her energy level is continuing to improve as well. At this point she is continuing to gain some weight due to the improved appetite.     Past Medical History:   Diagnosis Date    Abdominal pain, unspecified site     Acute otitis media     Acute pharyngitis     Anxiety     Arthritis     Autoimmune disease ([facility] [address]     Back injury     Backache     herniated disc in lower back    Blurred vision     Chest pain, unspecified     abnormal EKG    Chronic airway obstruction, not elsewhere classified (HCC)     Chronic back pain     Chronic pain     COPD     Depression     Diabetes (HCC)     Dizziness     Dizziness and giddiness     possible orthostatic changes, vasovagal, autonomic dysfunction from diabetic neuropathy    Encounters for unspecified administrative purpose     Feeling anxious     Fibromyalgia     Headache(784.0)     Hemorrhoid     Herniated disc     at L5    Hypercholesterolemia     Hyperglycemia     Hypertension     Joint pain     Leukocytosis, unspecified     Major depressive disorder, single episode, mild (HCC)     Mental disorder     depression, anxiety, bipolar and PTSD    Neuropathy     Obesity, unspecified     Other chest pain     Phobic disorders     Rheumatoid arthritis (HCC)     Seasonal allergies     Shortness of breath     normal EF    Streptococcal sore throat     Type II or unspecified type diabetes mellitus with unspecified complication, uncontrolled     Unspecified hereditary and idiopathic peripheral neuropathy     Vitamin D deficiency      Past Surgical History:   Procedure Laterality Date    HX CHOLECYSTECTOMY  1994    HX GYN  2005    partial Hysterectomy    HX OTHER SURGICAL  12-1-15    had ingrown toenail removed from big toe, both feet    HX TUBAL LIGATION      1995     Social History     Social History    Marital status:      Spouse name: N/A    Number of children: N/A    Years of education: N/A     Occupational History    Not on file.      Social History Main Topics    Smoking status: Former Smoker    Smokeless tobacco: Not on file    Alcohol use Yes      Comment: socially    Drug use: No    Sexual activity: Yes     Partners: Male     Birth control/ protection: Condom     Other Topics Concern    Not on file     Social History Narrative     Family History   Problem Relation Age of Onset    Diabetes Other     Alcohol abuse Mother     Arthritis-osteo Mother     Diabetes Mother     Elevated Lipids Mother     Headache Mother     Heart Disease Mother    Christopher Abiola Migraines Mother     Psychiatric Disorder Mother     Alcohol abuse Father    Lanis Putt Father     Cancer Father     Headache Father     Heart Disease Father     Lung Disease Father     Migraines Father     Alcohol abuse Sister     Headache Sister     Diabetes Sister     Heart Disease Sister     Migraines Sister    Christopher Abiola Psychiatric Disorder Sister     Headache Brother     Diabetes Brother     Elevated Lipids Brother     Heart Disease Brother     Migraines Brother     Psychiatric Disorder Brother     Cancer Maternal Aunt     Alcohol abuse Maternal Aunt     Diabetes Maternal Aunt     Headache Maternal Aunt     Lung Disease Maternal Aunt     Migraines Maternal Aunt     Headache Maternal Uncle     Migraines Maternal Uncle     Stroke Maternal Uncle     Psychiatric Disorder Maternal Uncle     Headache Paternal Aunt     Migraines Paternal Aunt     Headache Paternal Uncle     Hypertension Paternal Uncle     Migraines Paternal Uncle     Stroke Paternal Uncle     Headache Maternal Grandmother     Migraines Maternal Grandmother     Stroke Maternal Grandmother     Headache Maternal Grandfather     Migraines Maternal Grandfather     Stroke Maternal Grandfather     Headache Paternal Grandmother     Hypertension Paternal Grandmother     Lung Disease Paternal Grandmother     Migraines Paternal Grandmother     Headache Paternal Grandfather     Cancer Paternal Grandfather     Migraines Paternal Grandfather      Current Outpatient Prescriptions   Medication Sig Dispense Refill    oxyCODONE-acetaminophen (PERCOCET 10)  mg per tablet Take 1-2 Tabs by mouth every six (6) hours as needed for Pain. Max Daily Amount: 8 Tabs. Indications: Pain 240 Tab 0    anagrelide (AGRYLIN) 0.5 mg capsule Take 1 Cap by mouth two (2) times a day. Indications: ESSENTIAL THROMBOCYTOSIS 60 Cap 4    insulin aspart protamine/insulin aspart (NOVOLOG MIX 70-30) 100 unit/mL (70-30) injection 10 Units by SubCUTAneous route two (2) times a day.  clonazePAM (KLONOPIN) 2 mg tablet Take 2 mg by mouth daily. Indications: PANIC DISORDER      folic acid 882 mcg tablet Take 400 mcg by mouth daily.  haloperidol (HALDOL) 5 mg tablet Take 2.5 mg by mouth nightly.  QUEtiapine (SEROQUEL) 100 mg tablet Take 25 mg by mouth.  Takes 25 mg Q AM, and 100 mg, Q PM      DULoxetine (CYMBALTA) 20 mg capsule Take 20 mg by mouth daily.  albuterol (PROAIR HFA) 90 mcg/actuation inhaler Take 1 Puff by inhalation every six (6) hours as needed for Wheezing. 1 Inhaler 0    ondansetron hcl (ZOFRAN, AS HYDROCHLORIDE,) 4 mg tablet Take 1 Tab by mouth every eight (8) hours as needed for Nausea. 12 Tab 0    methotrexate (RHEUMATREX) 2.5 mg tablet Take 5 mg by mouth daily.  metFORMIN (GLUCOPHAGE) 1,000 mg tablet Take 500 mg by mouth two (2) times a day.  Cholecalciferol, Vitamin D3, 5,000 unit Tab Take 5,000 Units by mouth every seven (7) days.  atenolol (TENORMIN) 25 mg tablet Take 25 mg by mouth daily. Indications: HYPERTENSION      hydroxychloroquine (PLAQUENIL) 200 mg tablet Take 200 mg by mouth two (2) times a day.  furosemide (LASIX) 40 mg tablet Take  by mouth daily.  INFLIXIMAB (REMICADE IV) 344 mg by IntraVENous route once as needed. remicade will be every 8 weeks       amitriptyline (ELAVIL) 150 mg tablet Take 10 mg by mouth nightly. Indications: DEPRESSION      nitroglycerin (NITROLINGUAL) 0.4 mg/dose spray 1 Spray by SubLINGual route every five (5) minutes as needed.  losartan-hydrochlorothiazide (HYZAAR) 50-12.5 mg per tablet Take 1 Tab by mouth daily.  INSULIN GLARGINE,HUM. REC. ANLOG (LANTUS SC) 5 Units by SubCUTAneous route daily. Facility-Administered Medications Ordered in Other Visits   Medication Dose Route Frequency Provider Last Rate Last Dose    [START ON 3/9/2017] inFLIXimab (REMICADE) 400 mg in 0.9% sodium chloride 250 mL infusion  400 mg IntraVENous Zacarias Walker MD           Review of Systems  Constitutional: The patient has no acute distress or discomfort. HEENT: The patient denies recent head trauma, eye pain, blurred vision,  hearing deficit, oropharyngeal mucosal pain or lesions, and the patient denies throat pain or discomfort. Lymphatics:  The patient denies palpable peripheral lymphadenopathy. Hematologic: The patient denies having bruising, bleeding, or progressive fatigue. Respiratory: Patient denies having shortness of breath, cough, sputum production, fever, or dyspnea on exertion. Cardiovascular: The patient denies having leg pain, leg swelling, heart palpitations, chest permit, chest pain, or lightheadedness. The patient denies having dyspnea on exertion. Gastrointestinal: The patient denies having nausea, emesis, or diarrhea. The patient denies having any hematemesis or blood in the stool. Genitourinary: Patient denies having urinary urgency, frequency, or dysuria. The patient denies having blood in the urine. Psychological: The patient denies having symptoms of nervousness, anxiety, depression, or thoughts of harming himself some of this. Skin: Patient denies having skin rashes, skin, ulcerations, or unexplained itching or pruritus. Musculoskeletal: The patient denies muscle,bone, or joint pains at this time. Objective:     Visit Vitals    /73    Pulse 86    Temp 98.4 °F (36.9 °C)    Ht 5' 11\" (1.803 m)    Wt 100.2 kg (221 lb)    BMI 30.82 kg/m2     ECOG PS=0 Pain score 2/10     Physical Exam:   Gen. Appearance: The patient is in no acute distress. Skin: There is no bruise or rash. HEENT: The exam is unremarkable. Neck: Supple without lymphadenopathy or thyromegaly. Lungs: Clear to auscultation and percussion; there are no wheezes or rhonchi. Heart: Regular rate and rhythm; there are no murmurs, gallops, or rubs. Abdomen: Bowel sounds are present and normal.  There is no guarding, tenderness, or hepatosplenomegaly. Extremities: There is no clubbing, cyanosis, or edema. Neurologic: There are no focal neurologic deficits. Lymphatics: There is no palpable peripheral lymphadenopathy. Musculoskeletal: The patient has full range of motion at all joints.  However, she complains of pain on ambulation due to the severe rheumatoid arthritis. There is  evidence of joint deformity or effusions in the hands and knees. There is no focal joint tenderness. She is using a cane for support and mobility. Psychological/psychiatric: There is no clinical evidence of anxiety, depression, or melancholy. Lab data:      Results for orders placed or performed during the hospital encounter of 01/24/17   CBC WITH 3 PART DIFF     Status: None   Result Value Ref Range Status    WBC 10.7 4.5 - 13.0 K/uL Final    RBC 4.89 4. 10 - 5.10 M/uL Final    HGB 13.2 12.0 - 16.0 g/dL Final    HCT 39.6 36 - 48 % Final    MCV 81.0 78 - 102 FL Final    MCH 27.0 25.0 - 35.0 PG Final    MCHC 33.3 31 - 37 g/dL Final    RDW 13.1 11.5 - 14.5 % Final    PLATELET 235 353 - 140 K/uL Final    NEUTROPHILS 65 40 - 70 % Final    MIXED CELLS 4 0.1 - 17 % Final    LYMPHOCYTES 31 14 - 44 % Final    ABS. NEUTROPHILS 6.9 1.8 - 9.5 K/UL Final    ABS. MIXED CELLS 0.5 0.0 - 2.3 K/uL Final    ABS. LYMPHOCYTES 3.3 1.1 - 5.9 K/UL Final     Comment: Test performed at [place] [address] Location. Results Reviewed by Medical Director. DF AUTOMATED   Final           Assessment:     1. Thrombocytosis ([facility] [address]    2. Chronic anemia    3. Essential thrombocytosis ([facility] [address]    4. Leukocytosis, unspecified type    5. Rheumatoid arthritis involving multiple joints (HCC)    6. Fibromyalgia      Plan:   Leukocytosis (resolved): I have explained to the patient that the etiology of her leukocytosis remains undefined. A previous flow cytometry did not reveal any  evidence of an immunophenotypic abnormality such as an evolving chronic lymphoproliferative disorder or myeloproliferative disorder. The CBCs will continue to be monitored every 6 weeks. The CBC from today shows her WBC count is currently 12.4 the absolute neutrophil count is 8.5 with an absolute lymphocyte count of 3.2. Essential thrombocytosis/thrombocytosis: The current CBC shows that the platelet count is now 417,000.   The platelet count is being monitored every 6 weeks. The patient was previously started on anagrelide 0.5 mg one tablet twice daily. This medication will be continued. I have reenforced to the patient the importance of being complaint with the treatment plan. Rheumatoid arthritis: the patient will continue to receive Remicade as a primary treatment modality for her severe rheumatoid arthritis every 6 weeks. The dose of Remicade will be 344 mg given intravenously. Her next dose is scheduled for 3/9/2017. The patient has continued to take  Methotrexate 5 mg p.o. daily and Plaquenil 200 mg twice daily. Fibromyalgia: The patient  continues to have generalized pain at times related to her underlying fibromyalgia. At this time, her pain is well controlled. The patient receives her Percocet medication on a monthly basis as needed. Chronic anemia: I have explained to the patient the CBC from today shows her hemoglobin has remained normal at 13.2g/dL with a hematocrit of 39.6%. I have recommended that she continue to take ferrous sulfate 325 mg by mouth once daily. We will see the patient back in 6 weeks for a complete reassessment.     Orders Placed This Encounter    COMPLETE CBC & AUTO DIFF WBC    InHouse CBC ([place])     Standing Status:   Future     Number of Occurrences:   1     Standing Expiration Date:   4/75/6352    METABOLIC PANEL, COMPREHENSIVE     Standing Status:   Future     Standing Expiration Date:   3/8/2018    IRON PROFILE     Standing Status:   Future     Standing Expiration Date:   3/8/2018    FERRITIN     Standing Status:   Future     Standing Expiration Date:   3/8/2018       Olga Pearce MD  3/7/2017

## 2017-03-07 NOTE — MR AVS SNAPSHOT
Visit Information Date & Time Provider Department Dept. Phone Encounter #  
 3/7/2017 12:00 PM Marcelina Hurst [facility] [address] Office [phone] [id number] Follow-up Instructions Return in about 4 weeks (around 4/4/2017). Your Appointments 4/11/2017  1:00 PM  
Office Visit with MD Sebastian Ramon [address] [place] [address]) Appt Note: [address] [place] [address]  
[phone]  
  
   
 [address] Upcoming Health Maintenance Date Due Hepatitis C Screening 1965 Pneumococcal 19-64 Highest Risk (1 of 3 - PCV13) 12/19/1982 DTaP/Tdap/Td series (1 - Tdap) 12/19/1984 PAP AKA CERVICAL CYTOLOGY 12/19/1984 BREAST CANCER SCRN MAMMOGRAM 12/19/2013 FOBT Q 1 YEAR AGE 50-75 12/19/2013 Allergies as of 3/7/2017  Review Complete On: 1/26/2017 By: Willard Hardin RN Severity Noted Reaction Type Reactions Levaquin [Levofloxacin] High  Systemic Anaphylaxis Pollen Extracts    Unable to Obtain Current Immunizations  Reviewed on 1/26/2017 Name Date Influenza Vaccine 10/21/2016, 11/3/2015, 11/10/2014, 10/25/2013 Influenza Vaccine Whole 9/10/2012 Not reviewed this visit You Were Diagnosed With   
  
 Codes Comments Thrombocytosis ([facility] [address]    -  Primary ICD-10-CM: D47.3 ICD-9-CM: 238.71 Chronic anemia     ICD-10-CM: D64.9 ICD-9-CM: 285.9 Essential thrombocytosis (HCC)     ICD-10-CM: D47.3 ICD-9-CM: 238.71 Leukocytosis, unspecified type     ICD-10-CM: D72.829 ICD-9-CM: 288.60 Rheumatoid arthritis involving multiple joints (HCC)     ICD-10-CM: M06.9 ICD-9-CM: 714.0 Fibromyalgia     ICD-10-CM: M79.7 ICD-9-CM: 729.1 Vitals BP Pulse Temp Height(growth percentile) Weight(growth percentile) BMI  
 115/73 86 98.4 °F (36.9 °C) 5' 11\" (1.803 m) 221 lb (100.2 kg) 30.82 kg/m2 OB Status Smoking Status Hysterectomy Former Smoker BMI and BSA Data Body Mass Index Body Surface Area  
 30.82 kg/m 2 2.24 m 2 Preferred Pharmacy Pharmacy Name Phone [facility] [address] [phone] Your Updated Medication List  
  
   
This list is accurate as of: 3/7/17  1:28 PM.  Always use your most recent med list.  
  
  
  
  
 albuterol 90 mcg/actuation inhaler Commonly known as:  PROAIR HFA Take 1 Puff by inhalation every six (6) hours as needed for Wheezing. amitriptyline 150 mg tablet Commonly known as:  ELAVIL Take 10 mg by mouth nightly. Indications: DEPRESSION  
  
 anagrelide 0.5 mg capsule Commonly known as:  Lynne Hungarian Take 1 Cap by mouth two (2) times a day. Indications: ESSENTIAL THROMBOCYTOSIS  
  
 atenolol 25 mg tablet Commonly known as:  TENORMIN Take 25 mg by mouth daily. Indications: HYPERTENSION  
  
 cholecalciferol (VITAMIN D3) 5,000 unit Tab tablet Commonly known as:  VITAMIN D3 Take 5,000 Units by mouth every seven (7) days. DULoxetine 20 mg capsule Commonly known as:  CYMBALTA Take 20 mg by mouth daily. folic acid 906 mcg tablet Take 400 mcg by mouth daily. furosemide 40 mg tablet Commonly known as:  LASIX Take  by mouth daily. haloperidol 5 mg tablet Commonly known as:  HALDOL Take 2.5 mg by mouth nightly. HYZAAR 50-12.5 mg per tablet Generic drug:  losartan-hydroCHLOROthiazide Take 1 Tab by mouth daily. KlonoPIN 2 mg tablet Generic drug:  clonazePAM  
Take 2 mg by mouth daily. Indications: PANIC DISORDER  
  
 LANTUS SC  
5 Units by SubCUTAneous route daily. metFORMIN 1,000 mg tablet Commonly known as:  GLUCOPHAGE Take 500 mg by mouth two (2) times a day. methotrexate 2.5 mg tablet Commonly known as:  Corazon Shaver Take 5 mg by mouth daily. nitroglycerin 400 mcg/spray spray Commonly known as:  Pavel Orn 1 Spray by SubLINGual route every five (5) minutes as needed. NovoLOG Mix 70-30 100 unit/mL (70-30) injection Generic drug:  insulin aspart protamine/insulin aspart 10 Units by SubCUTAneous route two (2) times a day. ondansetron hcl 4 mg tablet Commonly known as:  ZOFRAN (AS HYDROCHLORIDE) Take 1 Tab by mouth every eight (8) hours as needed for Nausea. oxyCODONE-acetaminophen  mg per tablet Commonly known as:  PERCOCET 10 Take 1-2 Tabs by mouth every six (6) hours as needed for Pain. Max Daily Amount: 8 Tabs. Indications: Pain PLAQUENIL 200 mg tablet Generic drug:  hydroxychloroquine Take 200 mg by mouth two (2) times a day. QUEtiapine 100 mg tablet Commonly known as:  SEROquel Take 25 mg by mouth. Takes 25 mg Q AM, and 100 mg, Q PM  
  
 REMICADE IV  
344 mg by IntraVENous route once as needed. remicade will be every 8 weeks We Performed the Following COMPLETE CBC & AUTO DIFF WBC [70614 CPT(R)] Follow-up Instructions Return in about 4 weeks (around 4/4/2017). To-Do List   
 03/07/2017 Lab:  CBC WITH 3 PART DIFF   
  
 03/09/2017 10:00 AM  
  Appointment with [address] at [place] [address] ([phone])  
  
 04/20/2017 10:00 AM  
  Appointment with [address] at [place] [address] ([phone]) 06/01/2017 10:00 AM  
  Appointment with [address] at [place] [address] ([phone]) Please provide this summary of care documentation to your next provider. Your primary care clinician is listed as Didi Crowe. If you have any questions after today's visit, please call [phone].

## 2017-03-09 ENCOUNTER — HOSPITAL ENCOUNTER (OUTPATIENT)
Dept: INFUSION THERAPY | Age: 52
Discharge: HOME | End: 2017-03-09
Payer: MEDICARE

## 2017-03-09 VITALS
HEIGHT: 71 IN | TEMPERATURE: 98.8 F | RESPIRATION RATE: 16 BRPM | BODY MASS INDEX: 30.8 KG/M2 | DIASTOLIC BLOOD PRESSURE: 64 MMHG | SYSTOLIC BLOOD PRESSURE: 108 MMHG | WEIGHT: 220 LBS | HEART RATE: 93 BPM

## 2017-03-09 PROCEDURE — 96413 CHEMO IV INFUSION 1 HR: CPT

## 2017-03-09 PROCEDURE — 96415 CHEMO IV INFUSION ADDL HR: CPT

## 2017-03-09 PROCEDURE — 96375 TX/PRO/DX INJ NEW DRUG ADDON: CPT

## 2017-03-09 PROCEDURE — 74011250636 HC RX REV CODE- 250/636: Performed by: INTERNAL MEDICINE

## 2017-03-09 RX ORDER — DIPHENHYDRAMINE HYDROCHLORIDE 50 MG/ML
25 INJECTION, SOLUTION INTRAMUSCULAR; INTRAVENOUS ONCE
Status: COMPLETED | OUTPATIENT
Start: 2017-03-09 | End: 2017-03-09

## 2017-03-09 RX ORDER — SODIUM CHLORIDE 0.9 % (FLUSH) 0.9 %
10-40 SYRINGE (ML) INJECTION AS NEEDED
Status: DISPENSED | OUTPATIENT
Start: 2017-03-09 | End: 2017-03-09

## 2017-03-09 RX ORDER — SODIUM CHLORIDE 9 MG/ML
250 INJECTION, SOLUTION INTRAVENOUS ONCE
Status: COMPLETED | OUTPATIENT
Start: 2017-03-09 | End: 2017-03-09

## 2017-03-09 RX ADMIN — DIPHENHYDRAMINE HYDROCHLORIDE 25 MG: 50 INJECTION, SOLUTION INTRAMUSCULAR; INTRAVENOUS at 10:45

## 2017-03-09 RX ADMIN — SODIUM CHLORIDE 250 ML: 9 INJECTION, SOLUTION INTRAVENOUS at 10:40

## 2017-03-09 RX ADMIN — INFLIXIMAB 400 MG: 100 INJECTION, POWDER, LYOPHILIZED, FOR SOLUTION INTRAVENOUS at 11:15

## 2017-03-09 RX ADMIN — Medication 10 ML: at 10:30

## 2017-03-09 NOTE — PROGRESS NOTES
[facility] Progress Note    Date: 2017    Name: Myron Smith    MRN: [id number]         : 1965      Ms. Annalise Taylor arrived in the [facility] today, at 19 , in stable condition, here for Q 6 Week, IV Remicade Infusion. She was assessed and education was provided. Ms. Caty Ross vitals were reviewed. Visit Vitals    /69 (BP 1 Location: Left arm, BP Patient Position: At rest;Sitting)    Pulse 89    Temp 98.3 °F (36.8 °C)    Resp 16    Ht 5' 11\" (1.803 m)    Wt 99.8 kg (220 lb)    Breastfeeding No    BMI 30.68 kg/m2               Lab results were reviewed. (She had a CBC drawn on Tuesday, 3-7-17, during her office visit with Dr. Juanita Cano, and the results were as follows:)                 Notes Recorded by Tiffany Del Real NP on 3/7/2017 at 2:17 PM  Results reviewed and noted                  3/7/2017  1:37 PM - [place], Lab In [facility]       Component Results      Component Value Flag Ref Range Units Status     WBC 12.4  4.5 - 13.0 K/uL Final     RBC 4.97  4.10 - 5.10 M/uL Final     HGB 13.3  12.0 - 16.0 g/dL Final     HCT 40.1  36 - 48 % Final     MCV 80.7  78 - 102 FL Final     MCH 26.8  25.0 - 35.0 PG Final     MCHC 33.2  31 - 37 g/dL Final     RDW 13.7  11.5 - 14.5 % Final     PLATELET 625  312 - 281 K/uL Final     NEUTROPHILS 69  40 - 70 % Final     MIXED CELLS 6  0.1 - 17 % Final     LYMPHOCYTES 26  14 - 44 % Final     ABS. NEUTROPHILS 8.5  1.8 - 9.5 K/UL Final     ABS. MIXED CELLS 0.7  0.0 - 2.3 K/uL Final     ABS. LYMPHOCYTES 3.2  1.1 - 5.9 K/UL Final     Comment:     Test performed at [facility].  Results Reviewed by Medical Director.     DF AUTOMATED     Final       Lab and Collection      CBC WITH 3 PART DIFF (Order #[id number]) on 3/7/2017 - Lab and Collection Information       Result History      CBC WITH 3 PART DIFF (Order #[id number]) on 3/7/2017 - Order Result History Report       Specimen Information      Whole Blood                  PIV was established in her dorsal right forearm, at 1030, without incident. Benadryl 25 mg IV, was administered per order, and without incident, pre-Remicade. Remicade 400 mg IV (Dose was rounded per pharmacy), was administered via the PIV, per order, and without incident. After completion of the IV Remicade, the PIV was flushed very well per protocol, and then removed and gauze/bandaid applied. Ms. Cody Wilkinson tolerated well, and had no complaints. Ms. Cody Wilkinson was discharged from [place] [address] in stable condition at 1400. She is to return in 6 weeks, on Thursday, 4-20-17, at 1000,  for her next appointment, for her next IV Remicade Infusion.      Mikki Garibay RN  March 9, 2017  11:01 AM

## 2017-03-16 ENCOUNTER — HOSPITAL ENCOUNTER (EMERGENCY)
Age: 52
Discharge: HOME | End: 2017-03-16
Attending: EMERGENCY MEDICINE
Payer: MEDICARE

## 2017-03-16 ENCOUNTER — APPOINTMENT (OUTPATIENT)
Dept: GENERAL RADIOLOGY | Age: 52
End: 2017-03-16
Attending: EMERGENCY MEDICINE
Payer: MEDICARE

## 2017-03-16 VITALS
WEIGHT: 198 LBS | OXYGEN SATURATION: 100 % | SYSTOLIC BLOOD PRESSURE: 158 MMHG | BODY MASS INDEX: 27.62 KG/M2 | DIASTOLIC BLOOD PRESSURE: 75 MMHG | RESPIRATION RATE: 10 BRPM | HEART RATE: 96 BPM | TEMPERATURE: 98.2 F

## 2017-03-16 DIAGNOSIS — R73.9 HYPERGLYCEMIA: ICD-10-CM

## 2017-03-16 DIAGNOSIS — E86.0 DEHYDRATION: ICD-10-CM

## 2017-03-16 DIAGNOSIS — R55 NEAR SYNCOPE: Primary | ICD-10-CM

## 2017-03-16 LAB
ANION GAP BLD CALC-SCNC: 9 MMOL/L (ref 3–18)
ATRIAL RATE: 102 BPM
BASOPHILS # BLD AUTO: 0 K/UL (ref 0–0.1)
BASOPHILS # BLD: 0 % (ref 0–2)
BUN SERPL-MCNC: 18 MG/DL (ref 7–18)
BUN/CREAT SERPL: 17 (ref 12–20)
CALCIUM SERPL-MCNC: 8.5 MG/DL (ref 8.5–10.1)
CALCULATED P AXIS, ECG09: 41 DEGREES
CALCULATED R AXIS, ECG10: 34 DEGREES
CALCULATED T AXIS, ECG11: 15 DEGREES
CHLORIDE SERPL-SCNC: 103 MMOL/L (ref 100–108)
CO2 SERPL-SCNC: 28 MMOL/L (ref 21–32)
CREAT SERPL-MCNC: 1.04 MG/DL (ref 0.6–1.3)
DIAGNOSIS, 93000: NORMAL
DIFFERENTIAL METHOD BLD: ABNORMAL
EOSINOPHIL # BLD: 0.2 K/UL (ref 0–0.4)
EOSINOPHIL NFR BLD: 1 % (ref 0–5)
ERYTHROCYTE [DISTWIDTH] IN BLOOD BY AUTOMATED COUNT: 14.2 % (ref 11.6–14.5)
GLUCOSE SERPL-MCNC: 231 MG/DL (ref 74–99)
HCT VFR BLD AUTO: 41.5 % (ref 35–45)
HGB BLD-MCNC: 13.7 G/DL (ref 12–16)
LYMPHOCYTES # BLD AUTO: 25 % (ref 21–52)
LYMPHOCYTES # BLD: 2.7 K/UL (ref 0.9–3.6)
MCH RBC QN AUTO: 26.8 PG (ref 24–34)
MCHC RBC AUTO-ENTMCNC: 33 G/DL (ref 31–37)
MCV RBC AUTO: 81.2 FL (ref 74–97)
MONOCYTES # BLD: 1.3 K/UL (ref 0.05–1.2)
MONOCYTES NFR BLD AUTO: 12 % (ref 3–10)
NEUTS SEG # BLD: 6.8 K/UL (ref 1.8–8)
NEUTS SEG NFR BLD AUTO: 62 % (ref 40–73)
P-R INTERVAL, ECG05: 174 MS
PLATELET # BLD AUTO: 314 K/UL (ref 135–420)
PMV BLD AUTO: 9.8 FL (ref 9.2–11.8)
POTASSIUM SERPL-SCNC: 3.4 MMOL/L (ref 3.5–5.5)
Q-T INTERVAL, ECG07: 370 MS
QRS DURATION, ECG06: 96 MS
QTC CALCULATION (BEZET), ECG08: 482 MS
RBC # BLD AUTO: 5.11 M/UL (ref 4.2–5.3)
SODIUM SERPL-SCNC: 140 MMOL/L (ref 136–145)
VENTRICULAR RATE, ECG03: 102 BPM
WBC # BLD AUTO: 10.9 K/UL (ref 4.6–13.2)

## 2017-03-16 PROCEDURE — 85025 COMPLETE CBC W/AUTO DIFF WBC: CPT | Performed by: EMERGENCY MEDICINE

## 2017-03-16 PROCEDURE — 71020 XR CHEST PA LAT: CPT

## 2017-03-16 PROCEDURE — 80048 BASIC METABOLIC PNL TOTAL CA: CPT | Performed by: EMERGENCY MEDICINE

## 2017-03-16 PROCEDURE — 74011250636 HC RX REV CODE- 250/636: Performed by: EMERGENCY MEDICINE

## 2017-03-16 PROCEDURE — 93005 ELECTROCARDIOGRAM TRACING: CPT

## 2017-03-16 PROCEDURE — 96360 HYDRATION IV INFUSION INIT: CPT

## 2017-03-16 PROCEDURE — 99285 EMERGENCY DEPT VISIT HI MDM: CPT

## 2017-03-16 PROCEDURE — 96361 HYDRATE IV INFUSION ADD-ON: CPT

## 2017-03-16 RX ORDER — SODIUM CHLORIDE 9 MG/ML
1000 INJECTION, SOLUTION INTRAVENOUS ONCE
Status: COMPLETED | OUTPATIENT
Start: 2017-03-16 | End: 2017-03-16

## 2017-03-16 RX ADMIN — SODIUM CHLORIDE 1000 ML: 900 INJECTION, SOLUTION INTRAVENOUS at 07:22

## 2017-03-16 NOTE — DISCHARGE INSTRUCTIONS
Dehydration: Care Instructions  Your Care Instructions  Dehydration happens when your body loses too much fluid. This might happen when you do not drink enough water or you lose large amounts of fluids from your body because of diarrhea, vomiting, or sweating. Severe dehydration can be life-threatening. Water and minerals called electrolytes help put your body fluids back in balance. Learn the early signs of fluid loss, and drink more fluids to prevent dehydration. Follow-up care is a key part of your treatment and safety. Be sure to make and go to all appointments, and call your doctor if you are having problems. It's also a good idea to know your test results and keep a list of the medicines you take. How can you care for yourself at home? · To prevent dehydration, drink plenty of fluids, enough so that your urine is light yellow or clear like water. Choose water and other caffeine-free clear liquids until you feel better. If you have kidney, heart, or liver disease and have to limit fluids, talk with your doctor before you increase the amount of fluids you drink. · If you do not feel like eating or drinking, try taking small sips of water, sports drinks, or other rehydration drinks. · Get plenty of rest.  To prevent dehydration  · Add more fluids to your diet and daily routine, unless your doctor has told you not to. · During hot weather, drink more fluids. Drink even more fluids if you exercise a lot. Stay away from drinks with alcohol or caffeine. · Watch for the symptoms of dehydration. These include:  ¨ A dry, sticky mouth. ¨ Dark yellow urine, and not much of it. ¨ Dry and sunken eyes. ¨ Feeling very tired. · Learn what problems can lead to dehydration. These include:  ¨ Diarrhea, fever, and vomiting. ¨ Any illness with a fever, such as pneumonia or the flu. ¨ Activities that cause heavy sweating, such as endurance races and heavy outdoor work in hot or humid weather.   ¨ Alcohol or drug abuse or withdrawal.  ¨ Certain medicines, such as cold and allergy pills (antihistamines), diet pills (diuretics), and laxatives. ¨ Certain diseases, such as diabetes, cancer, and heart or kidney disease. When should you call for help? Call 911 anytime you think you may need emergency care. For example, call if:  · You passed out (lost consciousness). Call your doctor now or seek immediate medical care if:  · You are confused and cannot think clearly. · You are dizzy or lightheaded, or you feel like you may faint. · You have signs of needing more fluids. You have sunken eyes and a dry mouth, and you pass only a little dark urine. · You cannot keep fluids down. Watch closely for changes in your health, and be sure to contact your doctor if:  · You are not making tears. · Your skin is very dry and sags slowly back into place after you pinch it. · Your mouth and eyes are very dry. Where can you learn more? Go to http://swapnil-speedy.info/. Enter Y059 in the search box to learn more about \"Dehydration: Care Instructions. \"  Current as of: May 27, 2016  Content Version: 11.1  © 2297-2027 [employer]. Care instructions adapted under license by [employer] (which disclaims liability or warranty for this information). If you have questions about a medical condition or this instruction, always ask your healthcare professional. [place] [address] any warranty or liability for your use of this information. Lightheadedness or Faintness: Care Instructions  Your Care Instructions  Lightheadedness is a feeling that you are about to faint or \"pass out. \" You do not feel as if you or your surroundings are moving. It is different from vertigo, which is the feeling that you or things around you are spinning or tilting. Lightheadedness usually goes away or gets better when you lie down. If lightheadedness gets worse, it can lead to a fainting spell.   It is common to feel lightheaded from time to time. Lightheadedness usually is not caused by a serious problem. It often is caused by a short-lasting drop in blood pressure and blood flow to your head that occurs when you get up too quickly from a seated or lying position. Follow-up care is a key part of your treatment and safety. Be sure to make and go to all appointments, and call your doctor if you are having problems. It's also a good idea to know your test results and keep a list of the medicines you take. How can you care for yourself at home? · Lie down for 1 or 2 minutes when you feel lightheaded. After lying down, sit up slowly and remain sitting for 1 to 2 minutes before slowly standing up. · Avoid movements, positions, or activities that have made you lightheaded in the past.  · Get plenty of rest, especially if you have a cold or flu, which can cause lightheadedness. · Make sure you drink plenty of fluids, especially if you have a fever or have been sweating. · Do not drive or put yourself and others in danger while you feel lightheaded. When should you call for help? Call 911 anytime you think you may need emergency care. For example, call if:  · You have symptoms of a stroke. These may include:  ¨ Sudden numbness, tingling, weakness, or loss of movement in your face, arm, or leg, especially on only one side of your body. ¨ Sudden vision changes. ¨ Sudden trouble speaking. ¨ Sudden confusion or trouble understanding simple statements. ¨ Sudden problems with walking or balance. ¨ A sudden, severe headache that is different from past headaches. · You have symptoms of a heart attack. These may include:  ¨ Chest pain or pressure, or a strange feeling in the chest.  ¨ Sweating. ¨ Shortness of breath. ¨ Nausea or vomiting. ¨ Pain, pressure, or a strange feeling in the back, neck, jaw, or upper belly or in one or both shoulders or arms. ¨ Lightheadedness or sudden weakness.   ¨ A fast or irregular heartbeat. After you call 911, the  may tell you to chew 1 adult-strength or 2 to 4 low-dose aspirin. Wait for an ambulance. Do not try to drive yourself. Watch closely for changes in your health, and be sure to contact your doctor if:  · Your lightheadedness gets worse or does not get better with home care. Where can you learn more? Go to http://swapnil-speedy.info/. Enter B166 in the search box to learn more about \"Lightheadedness or Faintness: Care Instructions. \"  Current as of: May 27, 2016  Content Version: 11.1  © 5209-0678 [employer]. Care instructions adapted under license by [employer] (which disclaims liability or warranty for this information). If you have questions about a medical condition or this instruction, always ask your healthcare professional. [address] any warranty or liability for your use of this information.

## 2017-03-16 NOTE — ED PROVIDER NOTES
HPI Comments: 7:21 AM Ammy Velasquez is a 46 y.o. female with a history of HTN, DM, COPD, RA and Hypercholesterolemia who presents to the emergency department c/o an episode lightheadedness just prior to arrival in ED with associated symptoms including SOB, dizziness and \"disorientation\". Pt states that she began to feel lightheaded while walking from the bathroom this morning after waking up diaphoretic. She denies any previous episodes. The patient explains that she has been sick for the past 3 days with a \"cold\", symptoms including: fever, dry cough, sneezing and headache. Pt denies neck pain, abdominal pain, nausea, vomiting, diarrhea, appetite changes or any other symptoms at this time. No other concerns at this time. PCP: Doris Goins MD      The history is provided by the patient.         Past Medical History:   Diagnosis Date    Abdominal pain, unspecified site     Acute otitis media     Acute pharyngitis     Anxiety     Arthritis     Autoimmune disease ([facility] [address]     Back injury     Backache     herniated disc in lower back    Blurred vision     Chest pain, unspecified     abnormal EKG    Chronic airway obstruction, not elsewhere classified (HCC)     Chronic back pain     Chronic pain     COPD     Depression     Diabetes (HCC)     Dizziness     Dizziness and giddiness     possible orthostatic changes, vasovagal, autonomic dysfunction from diabetic neuropathy    Encounters for unspecified administrative purpose     Feeling anxious     Fibromyalgia     Headache(784.0)     Hemorrhoid     Herniated disc     at L5    Hypercholesterolemia     Hyperglycemia     Hypertension     Joint pain     Leukocytosis, unspecified     Major depressive disorder, single episode, mild (HCC)     Mental disorder     depression, anxiety, bipolar and PTSD    Neuropathy     Obesity, unspecified     Other chest pain     Phobic disorders     Rheumatoid arthritis (HCC)     Seasonal allergies  Shortness of breath     normal EF    Streptococcal sore throat     Type II or unspecified type diabetes mellitus with unspecified complication, uncontrolled     Unspecified hereditary and idiopathic peripheral neuropathy     Vitamin D deficiency        Past Surgical History:   Procedure Laterality Date    HX CHOLECYSTECTOMY  12    HX GYN  2005    partial Hysterectomy    HX OTHER SURGICAL  12-1-15    had ingrown toenail removed from big toe, both feet    HX TUBAL LIGATION      1995         Family History:   Problem Relation Age of Onset    Diabetes Other     Alcohol abuse Mother     Arthritis-osteo Mother     Diabetes Mother     Elevated Lipids Mother     Headache Mother     Heart Disease Mother    [facility] Migraines Mother     Psychiatric Disorder Mother     Alcohol abuse Father     Arthritis-osteo Father     Cancer Father     Headache Father     Heart Disease Father     Lung Disease Father    [facility] Migraines Father     Alcohol abuse Sister     Headache Sister     Diabetes Sister     Heart Disease Sister     Migraines Sister     Psychiatric Disorder Sister     Headache Brother     Diabetes Brother     Elevated Lipids Brother     Heart Disease Brother     Migraines Brother     Psychiatric Disorder Brother     Cancer Maternal Aunt     Alcohol abuse Maternal Aunt     Diabetes Maternal Aunt     Headache Maternal Aunt     Lung Disease Maternal Aunt     Migraines Maternal Aunt     Headache Maternal Uncle     Migraines Maternal Uncle     Stroke Maternal Uncle     Psychiatric Disorder Maternal Uncle     Headache Paternal Aunt     Migraines Paternal Aunt     Headache Paternal Uncle     Hypertension Paternal Uncle     Migraines Paternal Uncle     Stroke Paternal Uncle     Headache Maternal Grandmother     Migraines Maternal Grandmother     Stroke Maternal Grandmother     Headache Maternal Grandfather     Migraines Maternal Grandfather     Stroke Maternal Grandfather     Headache Paternal Grandmother     Hypertension Paternal Grandmother     Lung Disease Paternal Grandmother     Migraines Paternal Grandmother     Headache Paternal Grandfather     Cancer Paternal Grandfather     Migraines Paternal Grandfather        Social History     Social History    Marital status:      Spouse name: N/A    Number of children: N/A    Years of education: N/A     Occupational History    Not on file. Social History Main Topics    Smoking status: Former Smoker    Smokeless tobacco: Not on file    Alcohol use Yes      Comment: socially    Drug use: No    Sexual activity: Yes     Partners: Male     Birth control/ protection: Condom     Other Topics Concern    Not on file     Social History Narrative         ALLERGIES: Levaquin [levofloxacin] and Pollen extracts    Review of Systems   Constitutional: Positive for fever. Negative for appetite change and chills. HENT: Positive for sneezing. Negative for congestion. Eyes: Negative for visual disturbance. Respiratory: Positive for cough (non productive ). Negative for shortness of breath. Cardiovascular: Negative for chest pain. Gastrointestinal: Negative for abdominal pain, diarrhea, nausea and vomiting. Genitourinary: Negative for difficulty urinating and dysuria. Musculoskeletal: Negative for back pain and neck pain. Skin: Negative for rash. Neurological: Positive for headaches. Negative for weakness. All other systems reviewed and are negative. Vitals:    03/16/17 0730 03/16/17 0745 03/16/17 0800 03/16/17 0815   BP: 121/79 142/78 124/69 158/75   Pulse: 100 95 98 96   Resp: 18 13 14 10   Temp:       SpO2: 98% 100% 99% 100%   Weight:                Physical Exam   Constitutional: She is oriented to person, place, and time. She appears well-developed and well-nourished. HENT:   Head: Normocephalic and atraumatic. Tacky oral mucosa  Cracked lips   Neck: Normal range of motion. Neck supple.  No JVD present. No nuchal rigidity   Cardiovascular: Regular rhythm. Tachycardia present. Pulmonary/Chest: Effort normal and breath sounds normal. No respiratory distress. Abdominal: Soft. She exhibits no distension. There is no tenderness. There is no rebound and no guarding. Musculoskeletal: She exhibits no edema. No calf tenderness   Neurological: She is alert and oriented to person, place, and time. Skin: Skin is warm and dry. No erythema. Psychiatric: Judgment normal.        MDM  Number of Diagnoses or Management Options  Near syncope:   Diagnosis management comments: 47 y/o female presents with near syncope  sxs of cough, fatigue for a few days, subjective fevers and chills  Pt afebrile at this time, screen for pna  Possible flu, but sxs for 3 days so no indication for treatment so testing not indicated  Appears dry, will give NS bolus  No meningeal signs. Amount and/or Complexity of Data Reviewed  Clinical lab tests: ordered and reviewed  Tests in the radiology section of CPT®: ordered and reviewed  Tests in the medicine section of CPT®: ordered and reviewed      ED Course       Procedures    Vitals:  Patient Vitals for the past 12 hrs:   Temp Pulse Resp BP SpO2   03/16/17 0815 - 96 10 158/75 100 %   03/16/17 0800 - 98 14 124/69 99 %   03/16/17 0745 - 95 13 142/78 100 %   03/16/17 0730 - 100 18 121/79 98 %   03/16/17 0715 - (!) 105 17 113/61 99 %   03/16/17 0703 98.2 °F (36.8 °C) (!) 102 18 115/56 97 %   97 %. Percentage is within normal limits.        Medications ordered:   Medications   0.9% sodium chloride infusion 1,000 mL (1,000 mL IntraVENous New Bag 3/16/17 0722)         Lab findings:  Recent Results (from the past 12 hour(s))   EKG, 12 LEAD, INITIAL    Collection Time: 03/16/17  6:59 AM   Result Value Ref Range    Ventricular Rate 102 BPM    Atrial Rate 102 BPM    P-R Interval 174 ms    QRS Duration 96 ms    Q-T Interval 370 ms    QTC Calculation (Bezet) 482 ms    Calculated P Axis 41 degrees    Calculated R Axis 34 degrees    Calculated T Axis 15 degrees    Diagnosis       Sinus tachycardia  Inferior infarct (cited on or before 28-MAR-2012)  Abnormal ECG  When compared with ECG of 06-FEB-2016 09:40,  No significant change was found     CBC WITH AUTOMATED DIFF    Collection Time: 03/16/17  7:05 AM   Result Value Ref Range    WBC 10.9 4.6 - 13.2 K/uL    RBC 5.11 4.20 - 5.30 M/uL    HGB 13.7 12.0 - 16.0 g/dL    HCT 41.5 35.0 - 45.0 %    MCV 81.2 74.0 - 97.0 FL    MCH 26.8 24.0 - 34.0 PG    MCHC 33.0 31.0 - 37.0 g/dL    RDW 14.2 11.6 - 14.5 %    PLATELET 320 008 - 275 K/uL    MPV 9.8 9.2 - 11.8 FL    NEUTROPHILS 62 40 - 73 %    LYMPHOCYTES 25 21 - 52 %    MONOCYTES 12 (H) 3 - 10 %    EOSINOPHILS 1 0 - 5 %    BASOPHILS 0 0 - 2 %    ABS. NEUTROPHILS 6.8 1.8 - 8.0 K/UL    ABS. LYMPHOCYTES 2.7 0.9 - 3.6 K/UL    ABS. MONOCYTES 1.3 (H) 0.05 - 1.2 K/UL    ABS. EOSINOPHILS 0.2 0.0 - 0.4 K/UL    ABS. BASOPHILS 0.0 0.0 - 0.1 K/UL    DF AUTOMATED     METABOLIC PANEL, BASIC    Collection Time: 03/16/17  7:05 AM   Result Value Ref Range    Sodium 140 136 - 145 mmol/L    Potassium 3.4 (L) 3.5 - 5.5 mmol/L    Chloride 103 100 - 108 mmol/L    CO2 28 21 - 32 mmol/L    Anion gap 9 3.0 - 18 mmol/L    Glucose 231 (H) 74 - 99 mg/dL    BUN 18 7.0 - 18 MG/DL    Creatinine 1.04 0.6 - 1.3 MG/DL    BUN/Creatinine ratio 17 12 - 20      GFR est AA >60 >60 ml/min/1.73m2    GFR est non-AA 55 (L) >60 ml/min/1.73m2    Calcium 8.5 8.5 - 10.1 MG/DL       EKG interpretation by ED Physician:    659, rate 102, normal axis, no ST changes, ,     X-Ray, CT or other radiology findings or impressions:  XR CHEST PA LAT    (Results Pending)   no acute process      Progress notes, Consult notes or additional Procedure notes:  849 AM. Pt feeling better, sxs improved. Stable for dc home. Discussed return precautions, advised increased fluid intake. Disposition:  Diagnosis:   1. Near syncope    2. Hyperglycemia    3.  Dehydration Disposition: discharged      Scribe Attestation:     I, [address] [place] [address] for and in the presence of Concepcion Mejia DO March 16, 2017 at 8:49 AM     Physician Attestation:   I personally performed the services described in this documentation, reviewed and edited the documentation which was dictated to the scribe in my presence, and it accurately records my words and actions.  Concepcion Mejia DO  March 16, 2017   Signed by: Elliot Zamora March 16, 2017, 8:49 AM

## 2017-03-27 DIAGNOSIS — R52 PAIN: ICD-10-CM

## 2017-03-27 RX ORDER — OXYCODONE AND ACETAMINOPHEN 10; 325 MG/1; MG/1
1-2 TABLET ORAL
Qty: 240 TAB | Refills: 0 | Status: SHIPPED | OUTPATIENT
Start: 2017-03-27 | End: 2017-04-28 | Stop reason: SDUPTHER

## 2017-04-18 ENCOUNTER — HOSPITAL ENCOUNTER (OUTPATIENT)
Dept: ONCOLOGY | Age: 52
Discharge: HOME | End: 2017-04-18

## 2017-04-18 ENCOUNTER — OFFICE VISIT (OUTPATIENT)
Dept: ONCOLOGY | Age: 52
End: 2017-04-18

## 2017-04-18 VITALS
HEIGHT: 71 IN | HEART RATE: 87 BPM | WEIGHT: 221 LBS | DIASTOLIC BLOOD PRESSURE: 66 MMHG | BODY MASS INDEX: 30.94 KG/M2 | TEMPERATURE: 98.8 F | SYSTOLIC BLOOD PRESSURE: 108 MMHG

## 2017-04-18 DIAGNOSIS — D64.9 CHRONIC ANEMIA: ICD-10-CM

## 2017-04-18 DIAGNOSIS — M06.9 RHEUMATOID ARTHRITIS INVOLVING MULTIPLE JOINTS (HCC): ICD-10-CM

## 2017-04-18 DIAGNOSIS — D75.839 THROMBOCYTOSIS: ICD-10-CM

## 2017-04-18 DIAGNOSIS — D75.839 THROMBOCYTOSIS: Primary | ICD-10-CM

## 2017-04-18 DIAGNOSIS — D47.3 ESSENTIAL THROMBOCYTOSIS (HCC): ICD-10-CM

## 2017-04-18 LAB
BASOPHILS # BLD AUTO: 0 K/UL (ref 0–0.1)
BASOPHILS # BLD: 0 % (ref 0–2)
DIFFERENTIAL METHOD BLD: ABNORMAL
EOSINOPHIL # BLD: 0.5 K/UL (ref 0–0.4)
EOSINOPHIL NFR BLD: 4 % (ref 0–5)
ERYTHROCYTE [DISTWIDTH] IN BLOOD BY AUTOMATED COUNT: 15.4 % (ref 11.6–14.5)
HCT VFR BLD AUTO: 38.5 % (ref 35–45)
HGB BLD-MCNC: 12.8 G/DL (ref 12–16)
LYMPHOCYTES # BLD AUTO: 28 % (ref 21–52)
LYMPHOCYTES # BLD: 3.3 K/UL (ref 0.9–3.6)
MCH RBC QN AUTO: 27.1 PG (ref 24–34)
MCHC RBC AUTO-ENTMCNC: 33.2 G/DL (ref 31–37)
MCV RBC AUTO: 81.6 FL (ref 74–97)
MONOCYTES # BLD: 0.7 K/UL (ref 0.05–1.2)
MONOCYTES NFR BLD AUTO: 6 % (ref 3–10)
NEUTS SEG # BLD: 7.2 K/UL (ref 1.8–8)
NEUTS SEG NFR BLD AUTO: 62 % (ref 40–73)
PLATELET # BLD AUTO: 342 K/UL (ref 135–420)
PMV BLD AUTO: 10.3 FL (ref 9.2–11.8)
RBC # BLD AUTO: 4.72 M/UL (ref 4.2–5.3)
WBC # BLD AUTO: 11.7 K/UL (ref 4.6–13.2)

## 2017-04-18 NOTE — PROGRESS NOTES
Hematology/Oncology  Progress Note    Name: Jase Dopp  Date: 2017  : 1965    PCP: Shaun Molina MD     Ms. Ambar Yeager is a 46year old female who was seen for management of her rheumatoid arthritis, leukocytosis, and thrombocytosis. Current therapy: Remicade 4 mg/kg bodyweight ever 6 weeks intravenously    Subjective:     Ms. Ambar Yeager is a 46year-old [facility] [employer] woman who has severe rheumatoid arthritis. She receives Remicade every 6 weeks. She also suffers from fibromyalgia and had an elevated WBC count and platelet count of undefined etiology. Today she has no new complaints to report. Her pain is well controlled with her current pain medication regimen. She reports that the Remicade has provided a significant degree of relief from the severe rheumatoid arthritis symptoms. The patient is continuing to use her cane for mobility support. Her next treatment is scheduled for 2017. She denies any recent fevers or recurrent infections. The patient reports that her appetite has continued to improve. She also states her energy level is continuing to improve as well. At this point she is continuing to gain some weight due to the improved appetite.     Past Medical History:   Diagnosis Date    Abdominal pain, unspecified site     Acute otitis media     Acute pharyngitis     Anxiety     Arthritis     Autoimmune disease ([facility] [address]     Back injury     Backache     herniated disc in lower back    Blurred vision     Chest pain, unspecified     abnormal EKG    Chronic airway obstruction, not elsewhere classified (HCC)     Chronic back pain     Chronic pain     COPD     Depression     Diabetes (HCC)     Dizziness     Dizziness and giddiness     possible orthostatic changes, vasovagal, autonomic dysfunction from diabetic neuropathy    Encounters for unspecified administrative purpose     Feeling anxious     Fibromyalgia     Headache(784.0)     Hemorrhoid     Herniated disc     at L5    Hypercholesterolemia     Hyperglycemia     Hypertension     Joint pain     Leukocytosis, unspecified     Major depressive disorder, single episode, mild (HCC)     Mental disorder     depression, anxiety, bipolar and PTSD    Neuropathy     Obesity, unspecified     Other chest pain     Phobic disorders     Rheumatoid arthritis (HCC)     Seasonal allergies     Shortness of breath     normal EF    Streptococcal sore throat     Type II or unspecified type diabetes mellitus with unspecified complication, uncontrolled     Unspecified hereditary and idiopathic peripheral neuropathy     Vitamin D deficiency      Past Surgical History:   Procedure Laterality Date    HX CHOLECYSTECTOMY  1994    HX GYN  2005    partial Hysterectomy    HX OTHER SURGICAL  12-1-15    had ingrown toenail removed from big toe, both feet    HX TUBAL LIGATION      1995     Social History     Social History    Marital status:      Spouse name: N/A    Number of children: N/A    Years of education: N/A     Occupational History    Not on file.      Social History Main Topics    Smoking status: Former Smoker    Smokeless tobacco: Not on file    Alcohol use Yes      Comment: socially    Drug use: No    Sexual activity: Yes     Partners: Male     Birth control/ protection: Condom     Other Topics Concern    Not on file     Social History Narrative     Family History   Problem Relation Age of Onset    Diabetes Other     Alcohol abuse Mother     Arthritis-osteo Mother     Diabetes Mother     Elevated Lipids Mother     Headache Mother     Heart Disease Mother    Kahlil Mart Migraines Mother     Psychiatric Disorder Mother     Alcohol abuse Father    [place] D[place] Father     Cancer Father     Headache Father     Heart Disease Father     Lung Disease Father     Migraines Father     Alcohol abuse Sister     Headache Sister     Diabetes Sister     Heart Disease Sister     Migraines Sister    Kahlil Mart Psychiatric Disorder Sister     Headache Brother     Diabetes Brother     Elevated Lipids Brother     Heart Disease Brother     Migraines Brother     Psychiatric Disorder Brother     Cancer Maternal Aunt     Alcohol abuse Maternal Aunt     Diabetes Maternal Aunt     Headache Maternal Aunt     Lung Disease Maternal Aunt     Migraines Maternal Aunt     Headache Maternal Uncle     Migraines Maternal Uncle     Stroke Maternal Uncle     Psychiatric Disorder Maternal Uncle     Headache Paternal Aunt     Migraines Paternal Aunt     Headache Paternal Uncle     Hypertension Paternal Uncle     Migraines Paternal Uncle     Stroke Paternal Uncle     Headache Maternal Grandmother     Migraines Maternal Grandmother     Stroke Maternal Grandmother     Headache Maternal Grandfather     Migraines Maternal Grandfather     Stroke Maternal Grandfather     Headache Paternal Grandmother     Hypertension Paternal Grandmother     Lung Disease Paternal Grandmother     Migraines Paternal Grandmother     Headache Paternal Grandfather     Cancer Paternal Grandfather     Migraines Paternal Grandfather      Current Outpatient Prescriptions   Medication Sig Dispense Refill    oxyCODONE-acetaminophen (PERCOCET 10)  mg per tablet Take 1-2 Tabs by mouth every six (6) hours as needed for Pain. Max Daily Amount: 8 Tabs. Indications: Pain 240 Tab 0    anagrelide (AGRYLIN) 0.5 mg capsule Take 1 Cap by mouth two (2) times a day. Indications: ESSENTIAL THROMBOCYTOSIS 60 Cap 4    insulin aspart protamine/insulin aspart (NOVOLOG MIX 70-30) 100 unit/mL (70-30) injection 10 Units by SubCUTAneous route two (2) times a day.  clonazePAM (KLONOPIN) 2 mg tablet Take 2 mg by mouth daily. Indications: PANIC DISORDER      folic acid 389 mcg tablet Take 400 mcg by mouth daily.  haloperidol (HALDOL) 5 mg tablet Take 2.5 mg by mouth nightly.  QUEtiapine (SEROQUEL) 100 mg tablet Take 25 mg by mouth.  Takes 25 mg Q AM, and 100 mg, Q PM      DULoxetine (CYMBALTA) 20 mg capsule Take 20 mg by mouth daily.  albuterol (PROAIR HFA) 90 mcg/actuation inhaler Take 1 Puff by inhalation every six (6) hours as needed for Wheezing. 1 Inhaler 0    ondansetron hcl (ZOFRAN, AS HYDROCHLORIDE,) 4 mg tablet Take 1 Tab by mouth every eight (8) hours as needed for Nausea. 12 Tab 0    methotrexate (RHEUMATREX) 2.5 mg tablet Take 5 mg by mouth daily.  metFORMIN (GLUCOPHAGE) 1,000 mg tablet Take 500 mg by mouth two (2) times a day.  Cholecalciferol, Vitamin D3, 5,000 unit Tab Take 5,000 Units by mouth every seven (7) days.  atenolol (TENORMIN) 25 mg tablet Take 25 mg by mouth daily. Indications: HYPERTENSION      hydroxychloroquine (PLAQUENIL) 200 mg tablet Take 200 mg by mouth two (2) times a day.  furosemide (LASIX) 40 mg tablet Take  by mouth daily.  INFLIXIMAB (REMICADE IV) 344 mg by IntraVENous route once as needed. remicade will be every 8 weeks       amitriptyline (ELAVIL) 150 mg tablet Take 10 mg by mouth nightly. Indications: DEPRESSION      nitroglycerin (NITROLINGUAL) 0.4 mg/dose spray 1 Spray by SubLINGual route every five (5) minutes as needed.  losartan-hydrochlorothiazide (HYZAAR) 50-12.5 mg per tablet Take 1 Tab by mouth daily.  INSULIN GLARGINE,HUM. REC. ANLOG (LANTUS SC) 5 Units by SubCUTAneous route daily. Facility-Administered Medications Ordered in Other Visits   Medication Dose Route Frequency Provider Last Rate Last Dose    [START ON 4/20/2017] inFLIXimab (REMICADE) 400 mg in 0.9% sodium chloride 250 mL infusion  400 mg IntraVENous Letha Thomson MD           Review of Systems  Constitutional: The patient has no acute distress or discomfort. HEENT: The patient denies recent head trauma, eye pain, blurred vision,  hearing deficit, oropharyngeal mucosal pain or lesions, and the patient denies throat pain or discomfort. Lymphatics:  The patient denies palpable peripheral lymphadenopathy. Hematologic: The patient denies having bruising, bleeding, or progressive fatigue. Respiratory: Patient denies having shortness of breath, cough, sputum production, fever, or dyspnea on exertion. Cardiovascular: The patient denies having leg pain, leg swelling, heart palpitations, chest permit, chest pain, or lightheadedness. The patient denies having dyspnea on exertion. Gastrointestinal: The patient denies having nausea, emesis, or diarrhea. The patient denies having any hematemesis or blood in the stool. Genitourinary: Patient denies having urinary urgency, frequency, or dysuria. The patient denies having blood in the urine. Psychological: The patient denies having symptoms of nervousness, anxiety, depression, or thoughts of harming himself some of this. Skin: Patient denies having skin rashes, skin, ulcerations, or unexplained itching or pruritus. Musculoskeletal: The patient denies muscle,bone, or joint pains at this time. Objective:     Visit Vitals    /66    Pulse 87    Temp 98.8 °F (37.1 °C)    Ht 5' 11\" (1.803 m)    Wt 100.2 kg (221 lb)    BMI 30.82 kg/m2     ECOG PS=0 Pain score 2/10     Physical Exam:   Gen. Appearance: The patient is in no acute distress. Skin: There is no bruise or rash. HEENT: The exam is unremarkable. Neck: Supple without lymphadenopathy or thyromegaly. Lungs: Clear to auscultation and percussion; there are no wheezes or rhonchi. Heart: Regular rate and rhythm; there are no murmurs, gallops, or rubs. Abdomen: Bowel sounds are present and normal.  There is no guarding, tenderness, or hepatosplenomegaly. Extremities: There is no clubbing, cyanosis, or edema. Neurologic: There are no focal neurologic deficits. Lymphatics: There is no palpable peripheral lymphadenopathy. Musculoskeletal: The patient has full range of motion at all joints.  However, she complains of pain on ambulation due to the severe rheumatoid arthritis. There is  evidence of joint deformity or effusions in the hands and knees. There is no focal joint tenderness. She is using a cane for support and mobility. Psychological/psychiatric: There is no clinical evidence of anxiety, depression, or melancholy. Lab data:      Results for orders placed or performed during the hospital encounter of 03/07/17   CBC WITH 3 PART DIFF     Status: None   Result Value Ref Range Status    WBC 12.4 4.5 - 13.0 K/uL Final    RBC 4.97 4.10 - 5.10 M/uL Final    HGB 13.3 12.0 - 16.0 g/dL Final    HCT 40.1 36 - 48 % Final    MCV 80.7 78 - 102 FL Final    MCH 26.8 25.0 - 35.0 PG Final    MCHC 33.2 31 - 37 g/dL Final    RDW 13.7 11.5 - 14.5 % Final    PLATELET 594 689 - 472 K/uL Final    NEUTROPHILS 69 40 - 70 % Final    MIXED CELLS 6 0.1 - 17 % Final    LYMPHOCYTES 26 14 - 44 % Final    ABS. NEUTROPHILS 8.5 1.8 - 9.5 K/UL Final    ABS. MIXED CELLS 0.7 0.0 - 2.3 K/uL Final    ABS. LYMPHOCYTES 3.2 1.1 - 5.9 K/UL Final     Comment: Test performed at [place] [id number] Location. Results Reviewed by Medical Director. DF AUTOMATED   Final           Assessment:     1. Thrombocytosis ([address]    2. Essential thrombocytosis (HCC)    3. Rheumatoid arthritis involving multiple joints (HCC)    4. Chronic anemia      Plan:   Leukocytosis (resolved): I have explained to the patient that the etiology of her leukocytosis remains undefined. A previous flow cytometry did not reveal any  evidence of an immunophenotypic abnormality such as an evolving chronic lymphoproliferative disorder or myeloproliferative disorder. The CBCs will continue to be monitored every 6 weeks. The CBC from today shows her WBC count is currently 12; the absolute neutrophil count is 8.5 with an absolute lymphocyte count of 3.2. Essential thrombocytosis/thrombocytosis: The current CBC shows that the platelet count is now 327,000. The platelet count is being monitored every 6 weeks.   The patient was previously started on anagrelide 0.5 mg one tablet twice daily. This medication will be continued, at the current dose. I have reenforced to the patient the importance of being complaint with the treatment plan. Rheumatoid arthritis: the patient will continue to receive Remicade as a primary treatment modality for her severe rheumatoid arthritis every 6 weeks. The dose of Remicade will be 344 mg given intravenously. Her next dose is scheduled for 4/20/2017. The patient has continued to take  Methotrexate 5 mg p.o. daily and Plaquenil 200 mg twice daily. Fibromyalgia: The patient  continues to have generalized pain at times related to her underlying fibromyalgia. At this time, her pain is well controlled. The patient receives her Percocet medication on a monthly basis as needed. Chronic anemia: I have explained to the patient the CBC from today shows her hemoglobin has remained normal at 12.7 g/dL with hematocrit of 39%. I have recommended that she continue to take ferrous sulfate 325 mg by mouth once daily. We will see the patient back in 6 weeks for a complete reassessment.     Orders Placed This Encounter    COMPLETE CBC & AUTO DIFF WBC    InHouse CBC ([place])     Standing Status:   Future     Number of Occurrences:   1     Standing Expiration Date:   4/25/2017       Kyle Dillard MD  4/18/2017

## 2017-04-18 NOTE — PATIENT INSTRUCTIONS
Rheumatoid Arthritis: Care Instructions  Your Care Instructions    Arthritis is a common health problem in which the joints are inflamed. There are many types of arthritis. In rheumatoid arthritis, the body's own immune system attacks the joints. This causes pain, stiffness, and swelling in the joints, especially in the hands and feet. It can become hard to open jars, write, and do other daily tasks. Sometimes rheumatoid arthritis can also cause bumps to form under the skin. Over time, rheumatoid arthritis can damage and deform joints. Early treatment with medicines may reduce your chances of having a lasting disability. Follow-up care is a key part of your treatment and safety. Be sure to make and go to all appointments, and call your doctor if you are having problems. Its also a good idea to know your test results and keep a list of the medicines you take. How can you care for yourself at home? · If your doctor recommends it, get more exercise. Walking is a good choice. If your knees or ankles hurt, try riding a stationary bike or swimming. · Move each joint gently through its full range of motion once or twice a day. · Rest joints when they are sore or overworked. Short rest breaks may help more than staying in bed. · Reach and stay at a healthy weight. Regular exercise and a healthy diet will help you do this. Extra weight can strain the joints, especially the knees and hips, and make the pain worse. Losing even a few pounds may help. · Get enough calcium and vitamin D to help prevent osteoporosis, which causes thin bones. Talk to your doctor about how much you should take. · Protect your joints from injury. Do not overuse them. Try to limit or avoid activities that cause joint pain or swelling. Use special kitchen tools and other self-help devices as well as walkers, splints, or canes if needed. · Use heat to ease pain. Take warm showers or baths. Use hot packs or a heating pad set on low.  Sleep under a warm electric blanket. · Put ice or a cold pack on the area for 10 to 20 minutes at a time. Put a thin cloth between the ice and your skin. · Take pain medicines exactly as directed. ¨ If the doctor gave you a prescription medicine for pain, take it as prescribed. ¨ If you are not taking a prescription pain medicine, ask your doctor if you can take an over-the-counter medicine. · Take an active role in managing your condition. Set up a treatment plan with your doctor, and learn as much as you can about rheumatoid arthritis. This will help you control pain and stay active. When should you call for help? Call your doctor now or seek immediate medical care if:  · You have a fever or a rash along with joint pain. · You have joint pain that is so severe that you cannot use the joint at all. · You have sudden swelling, redness, or pain in one or more joints, and you do not know why. · You have back or neck pain along with weakness in your arms or legs. · You have a loss of bowel or bladder control. Watch closely for changes in your health, and be sure to contact your doctor if:  · You have joint pain that lasts for more than 6 weeks. · You have side effects from your arthritis medicines, such as stomach pain, nausea, heartburn, or dark and tarlike stools. Where can you learn more? Go to http://swapnil-speedy.info/. Enter K205 in the search box to learn more about \"Rheumatoid Arthritis: Care Instructions. \"  Current as of: October 31, 2016  Content Version: 11.2  © 2325-1691 [employer]. Care instructions adapted under license by [employer] (which disclaims liability or warranty for this information). If you have questions about a medical condition or this instruction, always ask your healthcare professional. [address] any warranty or liability for your use of this information.

## 2017-04-18 NOTE — MR AVS SNAPSHOT
Visit Information Date & Time Provider Department Dept. Phone Encounter #  
 4/18/2017  4:15 PM Izabella [facility] Office [phone] [id number] Follow-up Instructions Return in about 4 weeks (around 5/16/2017). Your Appointments 5/16/2017  3:15 PM  
Office Visit with Daphney Dinh MD  
Joellaly Chapin ([address] [facility]) Appt Note: OV  
 [facility] [address] [facility] [address] [facility] [address]  
[phone]  
  
   
 [facility] [address] [place] Upcoming Health Maintenance Date Due Hepatitis C Screening 1965 Pneumococcal 19-64 Highest Risk (1 of 3 - PCV13) 12/19/1982 DTaP/Tdap/Td series (1 - Tdap) 12/19/1984 PAP AKA CERVICAL CYTOLOGY 12/19/1984 BREAST CANCER SCRN MAMMOGRAM 12/19/2013 FOBT Q 1 YEAR AGE 50-75 12/19/2013 Allergies as of 4/18/2017  Review Complete On: 4/18/2017 By: Daphney Dinh MD  
  
 Severity Noted Reaction Type Reactions Levaquin [Levofloxacin] High  Systemic Anaphylaxis Pollen Extracts    Unable to Obtain Current Immunizations  Reviewed on 3/9/2017 Name Date Influenza Vaccine 10/21/2016, 11/3/2015, 11/10/2014, 10/25/2013 Influenza Vaccine Whole 9/10/2012 Not reviewed this visit You Were Diagnosed With   
  
 Codes Comments Thrombocytosis ([facility] [address]    -  Primary ICD-10-CM: D47.3 ICD-9-CM: 238.71 Essential thrombocytosis (HCC)     ICD-10-CM: D47.3 ICD-9-CM: 238.71 Rheumatoid arthritis involving multiple joints (HCC)     ICD-10-CM: M06.9 ICD-9-CM: 714.0 Chronic anemia     ICD-10-CM: D64.9 ICD-9-CM: 457. 9 Vitals BP Pulse Temp Height(growth percentile) Weight(growth percentile) BMI  
 108/66 87 98.8 °F (37.1 °C) 5' 11\" (1.803 m) 221 lb (100.2 kg) 30.82 kg/m2 OB Status Smoking Status Hysterectomy Former Smoker BMI and BSA Data  Body Mass Index Body Surface Area  
 30.82 kg/m 2 2.24 m 2  
  
  
 Preferred Pharmacy Pharmacy Name Phone [facility] [phone] Your Updated Medication List  
  
   
This list is accurate as of: 4/18/17  5:03 PM.  Always use your most recent med list.  
  
  
  
  
 albuterol 90 mcg/actuation inhaler Commonly known as:  PROAIR HFA Take 1 Puff by inhalation every six (6) hours as needed for Wheezing. amitriptyline 150 mg tablet Commonly known as:  ELAVIL Take 10 mg by mouth nightly. Indications: DEPRESSION  
  
 anagrelide 0.5 mg capsule Commonly known as:  Franceen Dominguez Take 1 Cap by mouth two (2) times a day. Indications: ESSENTIAL THROMBOCYTOSIS  
  
 atenolol 25 mg tablet Commonly known as:  TENORMIN Take 25 mg by mouth daily. Indications: HYPERTENSION  
  
 cholecalciferol (VITAMIN D3) 5,000 unit Tab tablet Commonly known as:  VITAMIN D3 Take 5,000 Units by mouth every seven (7) days. DULoxetine 20 mg capsule Commonly known as:  CYMBALTA Take 20 mg by mouth daily. folic acid 997 mcg tablet Take 400 mcg by mouth daily. furosemide 40 mg tablet Commonly known as:  LASIX Take  by mouth daily. haloperidol 5 mg tablet Commonly known as:  HALDOL Take 2.5 mg by mouth nightly. HYZAAR 50-12.5 mg per tablet Generic drug:  losartan-hydroCHLOROthiazide Take 1 Tab by mouth daily. KlonoPIN 2 mg tablet Generic drug:  clonazePAM  
Take 2 mg by mouth daily. Indications: PANIC DISORDER  
  
 LANTUS SC  
5 Units by SubCUTAneous route daily. metFORMIN 1,000 mg tablet Commonly known as:  GLUCOPHAGE Take 500 mg by mouth two (2) times a day. methotrexate 2.5 mg tablet Commonly known as:  Vicci Reek Take 5 mg by mouth daily. nitroglycerin 400 mcg/spray spray Commonly known as:  NITROLINGUAL  
1 [place] by SubLINGual route every five (5) minutes as needed. NovoLOG Mix 70-30 100 unit/mL (70-30) injection Generic drug:  insulin aspart protamine/insulin aspart 10 Units by SubCUTAneous route two (2) times a day. ondansetron hcl 4 mg tablet Commonly known as:  ZOFRAN (AS HYDROCHLORIDE) Take 1 Tab by mouth every eight (8) hours as needed for Nausea. oxyCODONE-acetaminophen  mg per tablet Commonly known as:  PERCOCET 10 Take 1-2 Tabs by mouth every six (6) hours as needed for Pain. Max Daily Amount: 8 Tabs. Indications: Pain PLAQUENIL 200 mg tablet Generic drug:  hydroxychloroquine Take 200 mg by mouth two (2) times a day. QUEtiapine 100 mg tablet Commonly known as:  SEROquel Take 25 mg by mouth. Takes 25 mg Q AM, and 100 mg, Q PM  
  
 REMICADE IV  
344 mg by IntraVENous route once as needed. remicade will be every 8 weeks We Performed the Following COMPLETE CBC & AUTO DIFF WBC [74298 CPT(R)] Follow-up Instructions Return in about 4 weeks (around 5/16/2017). To-Do List   
 04/18/2017 Lab:  CBC WITH 3 PART DIFF   
  
 04/20/2017 10:00 AM  
  [facility] [address] [facility] ([phone]) 06/01/2017 10:00 AM  
  Appointment with [address] at [place] [address] ([phone]) Patient Instructions Rheumatoid Arthritis: Care Instructions Your Care Instructions Arthritis is a common health problem in which the joints are inflamed. There are many types of arthritis. In rheumatoid arthritis, the body's own immune system attacks the joints. This causes pain, stiffness, and swelling in the joints, especially in the hands and feet. It can become hard to open jars, write, and do other daily tasks. Sometimes rheumatoid arthritis can also cause bumps to form under the skin. Over time, rheumatoid arthritis can damage and deform joints. Early treatment with medicines may reduce your chances of having a lasting disability. Follow-up care is a key part of your treatment and safety. Be sure to make and go to all appointments, and call your doctor if you are having problems. Its also a good idea to know your test results and keep a list of the medicines you take. How can you care for yourself at home? · If your doctor recommends it, get more exercise. Walking is a good choice. If your knees or ankles hurt, try riding a stationary bike or swimming. · Move each joint gently through its full range of motion once or twice a day. · Rest joints when they are sore or overworked. Short rest breaks may help more than staying in bed. · Reach and stay at a healthy weight. Regular exercise and a healthy diet will help you do this. Extra weight can strain the joints, especially the knees and hips, and make the pain worse. Losing even a few pounds may help. · Get enough calcium and vitamin D to help prevent osteoporosis, which causes thin bones. Talk to your doctor about how much you should take. · Protect your joints from injury. Do not overuse them. Try to limit or avoid activities that cause joint pain or swelling. Use special kitchen tools and other self-help devices as well as walkers, splints, or canes if needed. · Use heat to ease pain. Take warm showers or baths. Use hot packs or a heating pad set on low. Sleep under a warm electric blanket. · Put ice or a cold pack on the area for 10 to 20 minutes at a time. Put a thin cloth between the ice and your skin. · Take pain medicines exactly as directed. ¨ If the doctor gave you a prescription medicine for pain, take it as prescribed. ¨ If you are not taking a prescription pain medicine, ask your doctor if you can take an over-the-counter medicine. · Take an active role in managing your condition. Set up a treatment plan with your doctor, and learn as much as you can about rheumatoid arthritis. This will help you control pain and stay active. When should you call for help? Call your doctor now or seek immediate medical care if: 
· You have a fever or a rash along with joint pain. · You have joint pain that is so severe that you cannot use the joint at all. · You have sudden swelling, redness, or pain in one or more joints, and you do not know why. · You have back or neck pain along with weakness in your arms or legs. · You have a loss of bowel or bladder control. Watch closely for changes in your health, and be sure to contact your doctor if: 
· You have joint pain that lasts for more than 6 weeks. · You have side effects from your arthritis medicines, such as stomach pain, nausea, heartburn, or dark and tarlike stools. Where can you learn more? Go to http://swapnil-speedy.info/. Enter K205 in the search box to learn more about \"Rheumatoid Arthritis: Care Instructions. \" Current as of: October 31, 2016 Content Version: 11.2 © 7261-9547 [place]. Care instructions adapted under license by [employer]Â® (which disclaims liability or warranty for this information). If you have questions about a medical condition or this instruction, always ask your healthcare professional. [address] any warranty or liability for your use of this information. Please provide this summary of care documentation to your next provider. Your primary care clinician is listed as Gordon Shi. If you have any questions after today's visit, please call [phone].

## 2017-04-20 ENCOUNTER — HOSPITAL ENCOUNTER (OUTPATIENT)
Dept: INFUSION THERAPY | Age: 52
Discharge: HOME | End: 2017-04-20
Payer: MEDICARE

## 2017-04-20 VITALS
WEIGHT: 219 LBS | TEMPERATURE: 99.3 F | BODY MASS INDEX: 30.66 KG/M2 | DIASTOLIC BLOOD PRESSURE: 64 MMHG | HEIGHT: 71 IN | SYSTOLIC BLOOD PRESSURE: 110 MMHG | HEART RATE: 99 BPM | RESPIRATION RATE: 20 BRPM

## 2017-04-20 PROCEDURE — 96375 TX/PRO/DX INJ NEW DRUG ADDON: CPT

## 2017-04-20 PROCEDURE — 74011250636 HC RX REV CODE- 250/636: Performed by: INTERNAL MEDICINE

## 2017-04-20 PROCEDURE — 96415 CHEMO IV INFUSION ADDL HR: CPT

## 2017-04-20 PROCEDURE — 96413 CHEMO IV INFUSION 1 HR: CPT

## 2017-04-20 RX ORDER — SODIUM CHLORIDE 0.9 % (FLUSH) 0.9 %
10-40 SYRINGE (ML) INJECTION AS NEEDED
Status: DISCONTINUED | OUTPATIENT
Start: 2017-04-20 | End: 2017-04-24 | Stop reason: HOSPADM

## 2017-04-20 RX ORDER — SODIUM CHLORIDE 9 MG/ML
250 INJECTION, SOLUTION INTRAVENOUS ONCE
Status: COMPLETED | OUTPATIENT
Start: 2017-04-20 | End: 2017-04-20

## 2017-04-20 RX ORDER — DIPHENHYDRAMINE HYDROCHLORIDE 50 MG/ML
25 INJECTION, SOLUTION INTRAMUSCULAR; INTRAVENOUS ONCE
Status: COMPLETED | OUTPATIENT
Start: 2017-04-20 | End: 2017-04-20

## 2017-04-20 RX ADMIN — INFLIXIMAB 400 MG: 100 INJECTION, POWDER, LYOPHILIZED, FOR SOLUTION INTRAVENOUS at 11:40

## 2017-04-20 RX ADMIN — SODIUM CHLORIDE 250 ML: 9 INJECTION, SOLUTION INTRAVENOUS at 10:50

## 2017-04-20 RX ADMIN — Medication 10 ML: at 10:40

## 2017-04-20 RX ADMIN — DIPHENHYDRAMINE HYDROCHLORIDE 25 MG: 50 INJECTION, SOLUTION INTRAMUSCULAR; INTRAVENOUS at 10:55

## 2017-04-28 DIAGNOSIS — R52 PAIN: ICD-10-CM

## 2017-04-28 DIAGNOSIS — D75.839 THROMBOCYTOSIS: ICD-10-CM

## 2017-04-28 RX ORDER — OXYCODONE AND ACETAMINOPHEN 10; 325 MG/1; MG/1
1-2 TABLET ORAL
Qty: 240 TAB | Refills: 0 | OUTPATIENT
Start: 2017-04-28

## 2017-04-28 RX ORDER — OXYCODONE AND ACETAMINOPHEN 10; 325 MG/1; MG/1
1-2 TABLET ORAL
Qty: 240 TAB | Refills: 0 | Status: SHIPPED | OUTPATIENT
Start: 2017-04-28 | End: 2017-04-28 | Stop reason: SDUPTHER

## 2017-04-28 RX ORDER — OXYCODONE AND ACETAMINOPHEN 10; 325 MG/1; MG/1
1-2 TABLET ORAL
Qty: 240 TAB | Refills: 0 | Status: SHIPPED | OUTPATIENT
Start: 2017-04-28 | End: 2017-05-02

## 2017-04-28 RX ORDER — ANAGRELIDE 0.5 MG/1
0.5 CAPSULE ORAL 2 TIMES DAILY
Qty: 60 CAP | Refills: 4 | Status: SHIPPED | OUTPATIENT
Start: 2017-04-28 | End: 2017-06-09 | Stop reason: SDUPTHER

## 2017-05-02 ENCOUNTER — HOSPITAL ENCOUNTER (EMERGENCY)
Age: 52
Discharge: HOME | End: 2017-05-02
Attending: EMERGENCY MEDICINE
Payer: MEDICARE

## 2017-05-02 VITALS
HEIGHT: 71 IN | BODY MASS INDEX: 30.8 KG/M2 | WEIGHT: 220 LBS | RESPIRATION RATE: 16 BRPM | SYSTOLIC BLOOD PRESSURE: 128 MMHG | DIASTOLIC BLOOD PRESSURE: 87 MMHG | OXYGEN SATURATION: 99 % | HEART RATE: 96 BPM | TEMPERATURE: 98.3 F

## 2017-05-02 DIAGNOSIS — M54.50 ACUTE LEFT-SIDED LOW BACK PAIN WITHOUT SCIATICA: Primary | ICD-10-CM

## 2017-05-02 LAB
APPEARANCE UR: CLEAR
BACTERIA URNS QL MICRO: ABNORMAL /HPF
BILIRUB UR QL: NEGATIVE
COLOR UR: YELLOW
EPITH CASTS URNS QL MICRO: ABNORMAL /LPF (ref 0–5)
GLUCOSE UR STRIP.AUTO-MCNC: >1000 MG/DL
HGB UR QL STRIP: NEGATIVE
KETONES UR QL STRIP.AUTO: NEGATIVE MG/DL
LEUKOCYTE ESTERASE UR QL STRIP.AUTO: NEGATIVE
MUCOUS THREADS URNS QL MICRO: ABNORMAL /LPF
NITRITE UR QL STRIP.AUTO: NEGATIVE
PH UR STRIP: 5 [PH] (ref 5–8)
PROT UR STRIP-MCNC: ABNORMAL MG/DL
RBC #/AREA URNS HPF: NEGATIVE /HPF (ref 0–5)
SP GR UR REFRACTOMETRY: 1.02 (ref 1–1.03)
UROBILINOGEN UR QL STRIP.AUTO: 1 EU/DL (ref 0.2–1)
WBC URNS QL MICRO: ABNORMAL /HPF (ref 0–4)

## 2017-05-02 PROCEDURE — 99283 EMERGENCY DEPT VISIT LOW MDM: CPT

## 2017-05-02 PROCEDURE — 81001 URINALYSIS AUTO W/SCOPE: CPT | Performed by: PHYSICIAN ASSISTANT

## 2017-05-02 RX ORDER — IBUPROFEN 600 MG/1
600 TABLET ORAL
Qty: 21 TAB | Refills: 0 | Status: SHIPPED | OUTPATIENT
Start: 2017-05-02 | End: 2017-05-09

## 2017-05-02 RX ORDER — SULFAMETHOXAZOLE AND TRIMETHOPRIM 800; 160 MG/1; MG/1
1 TABLET ORAL 2 TIMES DAILY
Qty: 6 TAB | Refills: 0 | Status: SHIPPED | OUTPATIENT
Start: 2017-05-02 | End: 2017-05-05

## 2017-05-02 RX ORDER — METHOCARBAMOL 750 MG/1
750 TABLET, FILM COATED ORAL 3 TIMES DAILY
Qty: 15 TAB | Refills: 0 | Status: SHIPPED | OUTPATIENT
Start: 2017-05-02 | End: 2018-07-11

## 2017-05-02 NOTE — ED TRIAGE NOTES
Left flank pain and urinary urgency x 4 days. No history of kidney stones. Rating her pain a 10 but is texting on her phone.

## 2017-05-03 NOTE — ED PROVIDER NOTES
HPI Comments: 48yo female presents to ER complaining of left flank discomfort with mild urinary discomfort and hesitancy x 4 days. No fever/chills. No nausea or vomiting. Patient is a 46 y.o. female presenting with flank pain and urgency. Flank Pain    Pertinent negatives include no chest pain, no fever, no numbness, no abdominal pain, no dysuria, no pelvic pain and no weakness. Urgency   Pertinent negatives include no chest pain, no abdominal pain and no shortness of breath.         Past Medical History:   Diagnosis Date    Abdominal pain, unspecified site     Acute otitis media     Acute pharyngitis     Anxiety     Arthritis     Autoimmune disease ([facility] [address]     Back injury     Backache     herniated disc in lower back    Blurred vision     Chest pain, unspecified     abnormal EKG    Chronic airway obstruction, not elsewhere classified     Chronic back pain     Chronic pain     COPD     Depression     Diabetes (HCC)     Dizziness     Dizziness and giddiness     possible orthostatic changes, vasovagal, autonomic dysfunction from diabetic neuropathy    Encounters for unspecified administrative purpose     Feeling anxious     Fibromyalgia     Headache     Hemorrhoid     Herniated disc     at L5    Hypercholesterolemia     Hyperglycemia     Hypertension     Joint pain     Leukocytosis, unspecified     Major depressive disorder, single episode, mild (HCC)     Mental disorder     depression, anxiety, bipolar and PTSD    Neuropathy     Obesity, unspecified     Other chest pain     Phobic disorders     Rheumatoid arthritis (HCC)     Seasonal allergies     Shortness of breath     normal EF    Streptococcal sore throat     Type II or unspecified type diabetes mellitus with unspecified complication, uncontrolled     Unspecified hereditary and idiopathic peripheral neuropathy     Vitamin D deficiency        Past Surgical History:   Procedure Laterality Date    HX CHOLECYSTECTOMY [address]  2005    partial Hysterectomy    HX OTHER SURGICAL  12-1-15    had ingrown toenail removed from big toe, both feet    HX TUBAL LIGATION      1995         Family History:   Problem Relation Age of Onset    Diabetes Other     Alcohol abuse Mother     Arthritis-osteo Mother     Diabetes Mother     Elevated Lipids Mother     Headache Mother     Heart Disease Mother    [facility] Migra[facility] Mother     Psychiatric Disorder Mother     Alcohol abuse Father    [place] Father     Cancer Father     Headache Father     Heart Disease Father     Lung Disease Father    [facility] Migra[facility] Father     Alcohol abuse Sister     Headache Sister     Diabetes Sister     Heart Disease Sister     Migraines Sister     Psychiatric Disorder Sister     Headache Brother     Diabetes Brother     Elevated Lipids Brother     Heart Disease Brother     Migraines Brother     Psychiatric Disorder Brother     Cancer Maternal Aunt     Alcohol abuse Maternal Aunt     Diabetes Maternal Aunt     Headache Maternal Aunt     Lung Disease Maternal Aunt     Migraines Maternal Aunt     Headache Maternal Uncle     Migraines Maternal Uncle     Stroke Maternal Uncle     Psychiatric Disorder Maternal Uncle     Headache Paternal Aunt     Migraines Paternal Aunt     Headache Paternal Uncle     Hypertension Paternal Uncle     Migraines Paternal Uncle     Stroke Paternal Uncle     Headache Maternal Grandmother     Migraines Maternal Grandmother     Stroke Maternal Grandmother     Headache Maternal Grandfather     Migraines Maternal Grandfather     Stroke Maternal Grandfather     Headache Paternal Grandmother     Hypertension Paternal Grandmother     Lung Disease Paternal Grandmother     Migraines Paternal Grandmother     Headache Paternal Grandfather     Cancer Paternal Grandfather     Migraines Paternal Grandfather        Social History     Social History    Marital status:      Spouse name: N/A  Number of children: N/A    Years of education: N/A     Occupational History    Not on file. Social History Main Topics    Smoking status: Former Smoker    Smokeless tobacco: Not on file    Alcohol use Yes      Comment: socially    Drug use: No    Sexual activity: Yes     Partners: Male     Birth control/ protection: Condom     Other Topics Concern    Not on file     Social History Narrative         ALLERGIES: Levaquin [levofloxacin] and Pollen extracts    Review of Systems   Constitutional: Negative for activity change, appetite change, chills, fatigue and fever. HENT: Negative. Respiratory: Negative for cough and shortness of breath. Cardiovascular: Negative for chest pain. Gastrointestinal: Negative for abdominal pain, nausea and vomiting. Genitourinary: Positive for flank pain and urgency. Negative for dysuria, pelvic pain, vaginal bleeding, vaginal discharge and vaginal pain. Musculoskeletal: Positive for back pain. Negative for gait problem. Skin: Negative. Neurological: Negative. Negative for weakness and numbness. All other systems reviewed and are negative. Vitals:    05/02/17 1839   BP: 128/87   Pulse: (!) 116   Resp: 16   Temp: 98.3 °F (36.8 °C)   SpO2: 99%   Weight: 99.8 kg (220 lb)   Height: 5' 11\" (1.803 m)            Physical Exam   Constitutional: She appears well-developed. No distress. Obese   HENT:   Mouth/Throat: Oropharynx is clear and moist.   Eyes: No scleral icterus. Neck: Normal range of motion. Cardiovascular: Normal rate, regular rhythm and normal heart sounds. Pulmonary/Chest: Effort normal and breath sounds normal.   Abdominal: Soft. Bowel sounds are normal. She exhibits no distension. There is tenderness. There is CVA tenderness. There is no rebound and no guarding. Mild left CVA tenderness   Musculoskeletal: Normal range of motion. Left lumbar musculature mildly TTP.   Pain in left lumbar area with torso rotation, flexion and extension. Neurological: She is alert. Skin: Skin is warm and dry. She is not diaphoretic. Psychiatric: She has a normal mood and affect. Nursing note and vitals reviewed. MDM  Number of Diagnoses or Management Options  Diagnosis management comments: 48yo female with left lower back and flank discomfort also complain of urinary urgency and slight discomfort. Afebrile, vitals WNL. + CVA tenderness and left lumbar musculature TTP and pain reproduced with movement. UA is not all that impressive for UTI.    Rx for Motrin, Robaxin and Bactrim    ED Course       Procedures

## 2017-05-16 ENCOUNTER — OFFICE VISIT (OUTPATIENT)
Dept: ONCOLOGY | Age: 52
End: 2017-05-16

## 2017-05-16 ENCOUNTER — HOSPITAL ENCOUNTER (OUTPATIENT)
Dept: ONCOLOGY | Age: 52
Discharge: HOME | End: 2017-05-16

## 2017-05-16 ENCOUNTER — HOSPITAL ENCOUNTER (OUTPATIENT)
Dept: LAB | Age: 52
Discharge: HOME | End: 2017-05-16
Payer: MEDICARE

## 2017-05-16 VITALS
TEMPERATURE: 98.6 F | HEART RATE: 98 BPM | BODY MASS INDEX: 30.66 KG/M2 | HEIGHT: 71 IN | SYSTOLIC BLOOD PRESSURE: 125 MMHG | DIASTOLIC BLOOD PRESSURE: 77 MMHG | WEIGHT: 219 LBS

## 2017-05-16 DIAGNOSIS — D64.9 CHRONIC ANEMIA: ICD-10-CM

## 2017-05-16 DIAGNOSIS — D75.839 THROMBOCYTOSIS: ICD-10-CM

## 2017-05-16 DIAGNOSIS — M06.9 RHEUMATOID ARTHRITIS INVOLVING MULTIPLE JOINTS (HCC): ICD-10-CM

## 2017-05-16 DIAGNOSIS — M79.7 FIBROMYALGIA: ICD-10-CM

## 2017-05-16 DIAGNOSIS — D72.829 LEUKOCYTOSIS, UNSPECIFIED TYPE: ICD-10-CM

## 2017-05-16 DIAGNOSIS — D75.839 THROMBOCYTOSIS: Primary | ICD-10-CM

## 2017-05-16 DIAGNOSIS — D47.3 ESSENTIAL THROMBOCYTOSIS (HCC): ICD-10-CM

## 2017-05-16 LAB
ALBUMIN SERPL BCP-MCNC: 3.4 G/DL (ref 3.4–5)
ALBUMIN/GLOB SERPL: 0.8 {RATIO} (ref 0.8–1.7)
ALP SERPL-CCNC: 101 U/L (ref 45–117)
ALT SERPL-CCNC: 18 U/L (ref 13–56)
ANION GAP BLD CALC-SCNC: 10 MMOL/L (ref 3–18)
AST SERPL W P-5'-P-CCNC: 7 U/L (ref 15–37)
BASO+EOS+MONOS # BLD AUTO: 0.6 K/UL (ref 0–2.3)
BASO+EOS+MONOS # BLD AUTO: 5 % (ref 0.1–17)
BILIRUB SERPL-MCNC: 0.2 MG/DL (ref 0.2–1)
BUN SERPL-MCNC: 21 MG/DL (ref 7–18)
BUN/CREAT SERPL: 22 (ref 12–20)
CALCIUM SERPL-MCNC: 9.5 MG/DL (ref 8.5–10.1)
CHLORIDE SERPL-SCNC: 101 MMOL/L (ref 100–108)
CO2 SERPL-SCNC: 24 MMOL/L (ref 21–32)
CREAT SERPL-MCNC: 0.94 MG/DL (ref 0.6–1.3)
DIFFERENTIAL METHOD BLD: NORMAL
ERYTHROCYTE [DISTWIDTH] IN BLOOD BY AUTOMATED COUNT: 14.2 % (ref 11.5–14.5)
FERRITIN SERPL-MCNC: 144 NG/ML (ref 8–388)
GLOBULIN SER CALC-MCNC: 4.1 G/DL (ref 2–4)
GLUCOSE SERPL-MCNC: 312 MG/DL (ref 74–99)
HCT VFR BLD AUTO: 39.4 % (ref 36–48)
HGB BLD-MCNC: 12.9 G/DL (ref 12–16)
IRON SATN MFR SERPL: 22 %
IRON SERPL-MCNC: 63 UG/DL (ref 50–175)
LYMPHOCYTES # BLD AUTO: 29 % (ref 14–44)
LYMPHOCYTES # BLD: 3.4 K/UL (ref 1.1–5.9)
MCH RBC QN AUTO: 26.5 PG (ref 25–35)
MCHC RBC AUTO-ENTMCNC: 32.7 G/DL (ref 31–37)
MCV RBC AUTO: 80.9 FL (ref 78–102)
NEUTS SEG # BLD: 7.5 K/UL (ref 1.8–9.5)
NEUTS SEG NFR BLD AUTO: 65 % (ref 40–70)
PLATELET # BLD AUTO: 347 K/UL (ref 140–440)
POTASSIUM SERPL-SCNC: 4 MMOL/L (ref 3.5–5.5)
PROT SERPL-MCNC: 7.5 G/DL (ref 6.4–8.2)
RBC # BLD AUTO: 4.87 M/UL (ref 4.1–5.1)
SODIUM SERPL-SCNC: 135 MMOL/L (ref 136–145)
TIBC SERPL-MCNC: 286 UG/DL (ref 250–450)
WBC # BLD AUTO: 11.5 K/UL (ref 4.5–13)

## 2017-05-16 PROCEDURE — 36415 COLL VENOUS BLD VENIPUNCTURE: CPT | Performed by: INTERNAL MEDICINE

## 2017-05-16 PROCEDURE — 83540 ASSAY OF IRON: CPT | Performed by: INTERNAL MEDICINE

## 2017-05-16 PROCEDURE — 82728 ASSAY OF FERRITIN: CPT | Performed by: INTERNAL MEDICINE

## 2017-05-16 PROCEDURE — 80053 COMPREHEN METABOLIC PANEL: CPT | Performed by: INTERNAL MEDICINE

## 2017-05-16 NOTE — PATIENT INSTRUCTIONS
Rheumatoid Arthritis: Care Instructions  Your Care Instructions    Arthritis is a common health problem in which the joints are inflamed. There are many types of arthritis. In rheumatoid arthritis, the body's own immune system attacks the joints. This causes pain, stiffness, and swelling in the joints, especially in the hands and feet. It can become hard to open jars, write, and do other daily tasks. Sometimes rheumatoid arthritis can also cause bumps to form under the skin. Over time, rheumatoid arthritis can damage and deform joints. Early treatment with medicines may reduce your chances of having a lasting disability. Follow-up care is a key part of your treatment and safety. Be sure to make and go to all appointments, and call your doctor if you are having problems. Its also a good idea to know your test results and keep a list of the medicines you take. How can you care for yourself at home? · If your doctor recommends it, get more exercise. Walking is a good choice. If your knees or ankles hurt, try riding a stationary bike or swimming. · Move each joint gently through its full range of motion once or twice a day. · Rest joints when they are sore or overworked. Short rest breaks may help more than staying in bed. · Reach and stay at a healthy weight. Regular exercise and a healthy diet will help you do this. Extra weight can strain the joints, especially the knees and hips, and make the pain worse. Losing even a few pounds may help. · Get enough calcium and vitamin D to help prevent osteoporosis, which causes thin bones. Talk to your doctor about how much you should take. · Protect your joints from injury. Do not overuse them. Try to limit or avoid activities that cause joint pain or swelling. Use special kitchen tools and other self-help devices as well as walkers, splints, or canes if needed. · Use heat to ease pain. Take warm showers or baths. Use hot packs or a heating pad set on low.  Sleep under a warm electric blanket. · Put ice or a cold pack on the area for 10 to 20 minutes at a time. Put a thin cloth between the ice and your skin. · Take pain medicines exactly as directed. ¨ If the doctor gave you a prescription medicine for pain, take it as prescribed. ¨ If you are not taking a prescription pain medicine, ask your doctor if you can take an over-the-counter medicine. · Take an active role in managing your condition. Set up a treatment plan with your doctor, and learn as much as you can about rheumatoid arthritis. This will help you control pain and stay active. When should you call for help? Call your doctor now or seek immediate medical care if:  · You have a fever or a rash along with joint pain. · You have joint pain that is so severe that you cannot use the joint at all. · You have sudden swelling, redness, or pain in one or more joints, and you do not know why. · You have back or neck pain along with weakness in your arms or legs. · You have a loss of bowel or bladder control. Watch closely for changes in your health, and be sure to contact your doctor if:  · You have joint pain that lasts for more than 6 weeks. · You have side effects from your arthritis medicines, such as stomach pain, nausea, heartburn, or dark and tarlike stools. Where can you learn more? Go to http://swapnil-speedy.info/. Enter K205 in the search box to learn more about \"Rheumatoid Arthritis: Care Instructions. \"  Current as of: October 31, 2016  Content Version: 11.2  © 6720-3762 [employer]. Care instructions adapted under license by [employer] (which disclaims liability or warranty for this information). If you have questions about a medical condition or this instruction, always ask your healthcare professional. [address] any warranty or liability for your use of this information.        Anemia: Care Instructions  Your Care Instructions    Anemia is a low level of red blood cells, which carry oxygen throughout your body. Many things can cause anemia. Lack of iron is one of the most common causes. Your body needs iron to make hemoglobin, a substance in red blood cells that carries oxygen from the lungs to your body's cells. Without enough iron, the body produces fewer and smaller red blood cells. As a result, your body's cells do not get enough oxygen, and you feel tired and weak. And you may have trouble concentrating. Bleeding is the most common cause of a lack of iron. You may have heavy menstrual bleeding or bleeding caused by conditions such as ulcers, hemorrhoids, or cancer. Regular use of aspirin or other anti-inflammatory medicines (such as ibuprofen) also can cause bleeding in some people. A lack of iron in your diet also can cause anemia, especially at times when the body needs more iron, such as during pregnancy, infancy, and the teen years. Your doctor may have prescribed iron pills. It may take several months of treatment for your iron levels to return to normal. Your doctor also may suggest that you eat foods that are rich in iron, such as meat and beans. There are many other causes of anemia. It is not always due to a lack of iron. Finding the specific cause of your anemia will help your doctor find the right treatment for you. Follow-up care is a key part of your treatment and safety. Be sure to make and go to all appointments, and call your doctor if you are having problems. It's also a good idea to know your test results and keep a list of the medicines you take. How can you care for yourself at home? · Take your medicines exactly as prescribed. Call your doctor if you think you are having a problem with your medicine. · If your doctor recommends iron pills, take them as directed:  ¨ Try to take the pills on an empty stomach about 1 hour before or 2 hours after meals.  But you may need to take iron with food to avoid an upset stomach. ¨ Do not take antacids or drink milk or caffeine drinks (such as coffee, tea, or cola) at the same time or within 2 hours of the time that you take your iron. They can make it hard for your body to absorb the iron. ¨ Vitamin C (from food or supplements) helps your body absorb iron. Try taking iron pills with a glass of orange juice or some other food that is high in vitamin C, such as citrus fruits. ¨ Iron pills may cause stomach problems, such as heartburn, nausea, diarrhea, constipation, and cramps. Be sure to drink plenty of fluids, and include fruits, vegetables, and fiber in your diet each day. Iron pills often make your bowel movements dark or green. ¨ If you forget to take an iron pill, do not take a double dose of iron the next time you take a pill. ¨ Keep iron pills out of the reach of small children. An overdose of iron can be very dangerous. · Follow your doctor's advice about eating iron-rich foods. These include red meat, shellfish, poultry, eggs, beans, raisins, whole-grain bread, and leafy green vegetables. · Steam vegetables to help them keep their iron content. When should you call for help? Call 911 anytime you think you may need emergency care. For example, call if:  · You have symptoms of a heart attack. These may include:  ¨ Chest pain or pressure, or a strange feeling in the chest.  ¨ Sweating. ¨ Shortness of breath. ¨ Nausea or vomiting. ¨ Pain, pressure, or a strange feeling in the back, neck, jaw, or upper belly or in one or both shoulders or arms. ¨ Lightheadedness or sudden weakness. ¨ A fast or irregular heartbeat. After you call 911, the  may tell you to chew 1 adult-strength or 2 to 4 low-dose aspirin. Wait for an ambulance. Do not try to drive yourself. · You passed out (lost consciousness). Call your doctor now or seek immediate medical care if:  · You have new or increased shortness of breath.   · You are dizzy or lightheaded, or you feel like you may faint. · Your fatigue and weakness continue or get worse. · You have any abnormal bleeding, such as:  ¨ Nosebleeds. ¨ Vaginal bleeding that is different (heavier, more frequent, at a different time of the month) than what you are used to. ¨ Bloody or black stools, or rectal bleeding. ¨ Bloody or pink urine. Watch closely for changes in your health, and be sure to contact your doctor if:  · You do not get better as expected. Where can you learn more? Go to http://swapnli-speedy.info/. Enter R301 in the search box to learn more about \"Anemia: Care Instructions. \"  Current as of: October 13, 2016  Content Version: 11.2  © 7736-1037 [employer]. Care instructions adapted under license by [employer] (which disclaims liability or warranty for this information). If you have questions about a medical condition or this instruction, always ask your healthcare professional. [place] [address] any warranty or liability for your use of this information.

## 2017-05-16 NOTE — PROGRESS NOTES
Hematology/Oncology  Progress Note    Name: Courtney Dobson  Date: 2017  : 1965    PCP: Chris Stein MD     Ms. Maritza Cabrera is a 46year old female who was seen for management of her rheumatoid arthritis, leukocytosis, and thrombocytosis. Current therapy: Remicade 4 mg/kg bodyweight ever 6 weeks intravenously    Subjective:     Ms. Maritza Cabrera is a 46year-old [facility] American woman who has severe rheumatoid arthritis. She receives Remicade every 6 weeks. She also suffers from fibromyalgia and had an elevated WBC count and platelet count of undefined etiology. Today she has no new complaints to report. Her pain is well controlled with her current pain medication regimen. She reports that the Remicade has provided a significant degree of relief from the severe rheumatoid arthritis symptoms. The patient is continuing to use her cane for mobility support. Her next treatment is scheduled for 2017. She denies any recent fevers or recurrent infections. The patient reports that her appetite has continued to improve. She also states her energy level is continuing to improve as well. At this point she is continuing to gain some weight due to the improved appetite.     Past Medical History:   Diagnosis Date    Abdominal pain, unspecified site     Acute otitis media     Acute pharyngitis     Anxiety     Arthritis     Autoimmune disease ([facility] [address]     Back injury     Backache     herniated disc in lower back    Blurred vision     Chest pain, unspecified     abnormal EKG    Chronic airway obstruction, not elsewhere classified     Chronic back pain     Chronic pain     COPD     Depression     Diabetes (HCC)     Dizziness     Dizziness and giddiness     possible orthostatic changes, vasovagal, autonomic dysfunction from diabetic neuropathy    Encounters for unspecified administrative purpose     Feeling anxious     Fibromyalgia     Headache     Hemorrhoid     Herniated disc     at L5    Hypercholesterolemia     Hyperglycemia     Hypertension     Joint pain     Leukocytosis, unspecified     Major depressive disorder, single episode, mild (HCC)     Mental disorder     depression, anxiety, bipolar and PTSD    Neuropathy     Obesity, unspecified     Other chest pain     Phobic disorders     Rheumatoid arthritis (HCC)     Seasonal allergies     Shortness of breath     normal EF    Streptococcal sore throat     Type II or unspecified type diabetes mellitus with unspecified complication, uncontrolled     Unspecified hereditary and idiopathic peripheral neuropathy     Vitamin D deficiency      Past Surgical History:   Procedure Laterality Date    HX CHOLECYSTECTOMY  1994    HX GYN  2005    partial Hysterectomy    HX OTHER SURGICAL  12-1-15    had ingrown toenail removed from big toe, both feet    HX TUBAL LIGATION      1995     Social History     Social History    Marital status:      Spouse name: N/A    Number of children: N/A    Years of education: N/A     Occupational History    Not on file.      Social History Main Topics    Smoking status: Former Smoker    Smokeless tobacco: Not on file    Alcohol use Yes      Comment: socially    Drug use: No    Sexual activity: Yes     Partners: Male     Birth control/ protection: Condom     Other Topics Concern    Not on file     Social History Narrative     Family History   Problem Relation Age of Onset    Diabetes Other     Alcohol abuse Mother     Arthritis-osteo Mother     Diabetes Mother     Elevated Lipids Mother     Headache Mother     Heart Disease Mother    Juan Eans Migraines Mother     Psychiatric Disorder Mother     Alcohol abuse Father    Hyla Lav Father     Cancer Father     Headache Father     Heart Disease Father     Lung Disease Father     Migraines Father     Alcohol abuse Sister     Headache Sister     Diabetes Sister     Heart Disease Sister     Migraines Sister     Psychiatric Disorder Sister     Headache Brother     Diabetes Brother     Elevated Lipids Brother     Heart Disease Brother     Migraines Brother     Psychiatric Disorder Brother     Cancer Maternal Aunt     Alcohol abuse Maternal Aunt     Diabetes Maternal Aunt     Headache Maternal Aunt     Lung Disease Maternal Aunt     Migraines Maternal Aunt     Headache Maternal Uncle     Migraines Maternal Uncle     Stroke Maternal Uncle     Psychiatric Disorder Maternal Uncle     Headache Paternal Aunt     Migraines Paternal Aunt     Headache Paternal Uncle     Hypertension Paternal Uncle     Migraines Paternal Uncle     Stroke Paternal Uncle     Headache Maternal Grandmother     Migraines Maternal Grandmother     Stroke Maternal Grandmother     Headache Maternal Grandfather     Migraines Maternal Grandfather     Stroke Maternal Grandfather     Headache Paternal Grandmother     Hypertension Paternal Grandmother     Lung Disease Paternal Grandmother     Migraines Paternal Grandmother     Headache Paternal Grandfather     Cancer Paternal Grandfather     Migraines Paternal Grandfather      Current Outpatient Prescriptions   Medication Sig Dispense Refill    methocarbamol (ROBAXIN-750) 750 mg tablet Take 1 Tab by mouth three (3) times daily. 15 Tab 0    anagrelide (AGRYLIN) 0.5 mg capsule Take 1 Cap by mouth two (2) times a day. Indications: ESSENTIAL THROMBOCYTOSIS 60 Cap 4    insulin aspart protamine/insulin aspart (NOVOLOG MIX 70-30) 100 unit/mL (70-30) injection 10 Units by SubCUTAneous route two (2) times a day.  clonazePAM (KLONOPIN) 2 mg tablet Take 2 mg by mouth daily. Indications: PANIC DISORDER      folic acid 974 mcg tablet Take 400 mcg by mouth daily.  haloperidol (HALDOL) 5 mg tablet Take 2.5 mg by mouth nightly.  QUEtiapine (SEROQUEL) 100 mg tablet Take 25 mg by mouth. Takes 25 mg Q AM, and 100 mg, Q PM      DULoxetine (CYMBALTA) 20 mg capsule Take 20 mg by mouth daily.  albuterol (PROAIR HFA) 90 mcg/actuation inhaler Take 1 Puff by inhalation every six (6) hours as needed for Wheezing. 1 Inhaler 0    ondansetron hcl (ZOFRAN, AS HYDROCHLORIDE,) 4 mg tablet Take 1 Tab by mouth every eight (8) hours as needed for Nausea. 12 Tab 0    methotrexate (RHEUMATREX) 2.5 mg tablet Take 5 mg by mouth daily.  metFORMIN (GLUCOPHAGE) 1,000 mg tablet Take 500 mg by mouth two (2) times a day.  Cholecalciferol, Vitamin D3, 5,000 unit Tab Take 5,000 Units by mouth every seven (7) days.  atenolol (TENORMIN) 25 mg tablet Take 25 mg by mouth daily. Indications: HYPERTENSION      hydroxychloroquine (PLAQUENIL) 200 mg tablet Take 200 mg by mouth two (2) times a day.  furosemide (LASIX) 40 mg tablet Take  by mouth daily.  INFLIXIMAB (REMICADE IV) 344 mg by IntraVENous route once as needed. remicade will be every 8 weeks       amitriptyline (ELAVIL) 150 mg tablet Take 10 mg by mouth nightly. Indications: DEPRESSION      nitroglycerin (NITROLINGUAL) 0.4 mg/dose spray 1 Spray by SubLINGual route every five (5) minutes as needed.  losartan-hydrochlorothiazide (HYZAAR) 50-12.5 mg per tablet Take 1 Tab by mouth daily.  INSULIN GLARGINE,HUM. REC. ANLOG (LANTUS SC) 5 Units by SubCUTAneous route daily. Review of Systems  Constitutional: The patient has no acute distress or discomfort. HEENT: The patient denies recent head trauma, eye pain, blurred vision,  hearing deficit, oropharyngeal mucosal pain or lesions, and the patient denies throat pain or discomfort. Lymphatics: The patient denies palpable peripheral lymphadenopathy. Hematologic: The patient denies having bruising, bleeding, or progressive fatigue. Respiratory: Patient denies having shortness of breath, cough, sputum production, fever, or dyspnea on exertion. Cardiovascular: The patient denies having leg pain, leg swelling, heart palpitations, chest permit, chest pain, or lightheadedness. The patient denies having dyspnea on exertion. Gastrointestinal: The patient denies having nausea, emesis, or diarrhea. The patient denies having any hematemesis or blood in the stool. Genitourinary: Patient denies having urinary urgency, frequency, or dysuria. The patient denies having blood in the urine. Psychological: The patient denies having symptoms of nervousness, anxiety, depression, or thoughts of harming himself some of this. Skin: Patient denies having skin rashes, skin, ulcerations, or unexplained itching or pruritus. Musculoskeletal: The patient denies muscle,bone, or joint pains at this time. Objective:     Visit Vitals    /77    Pulse 98    Temp 98.6 °F (37 °C)    Ht 5' 11\" (1.803 m)    Wt 99.3 kg (219 lb)    BMI 30.54 kg/m2     ECOG PS=0 Pain score 2/10     Physical Exam:   Gen. Appearance: The patient is in no acute distress. Skin: There is no bruise or rash. HEENT: The exam is unremarkable. Neck: Supple without lymphadenopathy or thyromegaly. Lungs: Clear to auscultation and percussion; there are no wheezes or rhonchi. Heart: Regular rate and rhythm; there are no murmurs, gallops, or rubs. Abdomen: Bowel sounds are present and normal.  There is no guarding, tenderness, or hepatosplenomegaly. Extremities: There is no clubbing, cyanosis, or edema. Neurologic: There are no focal neurologic deficits. Lymphatics: There is no palpable peripheral lymphadenopathy. Musculoskeletal: The patient has full range of motion at all joints. However, she complains of pain on ambulation due to the severe rheumatoid arthritis. There is  evidence of joint deformity or effusions in the hands and knees. There is no focal joint tenderness. She is using a cane for support and mobility. Psychological/psychiatric: There is no clinical evidence of anxiety, depression, or melancholy.     Lab data:      Results for orders placed or performed during the hospital encounter of 05/16/17   CBC WITH 3 PART DIFF     Status: None   Result Value Ref Range Status    WBC 11.5 4.5 - 13.0 K/uL Final    RBC 4.87 4.10 - 5.10 M/uL Final    HGB 12.9 12.0 - 16.0 g/dL Final    HCT 39.4 36 - 48 % Final    MCV 80.9 78 - 102 FL Final    MCH 26.5 25.0 - 35.0 PG Final    MCHC 32.7 31 - 37 g/dL Final    RDW 14.2 11.5 - 14.5 % Final    PLATELET 515 655 - 581 K/uL Final    NEUTROPHILS 65 40 - 70 % Final    MIXED CELLS 5 0.1 - 17 % Final    LYMPHOCYTES 29 14 - 44 % Final    ABS. NEUTROPHILS 7.5 1.8 - 9.5 K/UL Final    ABS. MIXED CELLS 0.6 0.0 - 2.3 K/uL Final    ABS. LYMPHOCYTES 3.4 1.1 - 5.9 K/UL Final     Comment: Test performed at [address]. Results Reviewed by Medical Director. DF AUTOMATED   Final           Assessment:     1. Thrombocytosis ([facility] [address]    2. Rheumatoid arthritis involving multiple joints (HCC)    3. Essential thrombocytosis ([facility] [address]    4. Chronic anemia    5. Leukocytosis, unspecified type    6. Fibromyalgia      Plan:   Leukocytosis (resolved): I have explained to the patient that the etiology of her leukocytosis remains undefined. A previous flow cytometry did not reveal any  evidence of an immunophenotypic abnormality such as an evolving chronic lymphoproliferative disorder or myeloproliferative disorder. The CBCs will continue to be monitored every 6 weeks. The CBC from today shows her WBC count is currently 11.5 the absolute neutrophil count is 7.5 with an absolute lymphocyte count of 3.4. Essential thrombocytosis/thrombocytosis: The current CBC shows that the platelet count is now 347,000. The platelet count is being monitored every 6 weeks. The patient was previously started on anagrelide 0.5 mg one tablet twice daily. This medication will be continued, at the current dose. I have reenforced to the patient the importance of being complaint with the treatment plan.      Rheumatoid arthritis: the patient will continue to receive Remicade as a primary treatment modality for her severe rheumatoid arthritis every 6 weeks. The dose of Remicade will be 344 mg given intravenously. Her next dose is scheduled for 6/1/2017. The patient has continued to take  Methotrexate 5 mg p.o. daily and Plaquenil 200 mg twice daily. Fibromyalgia: The patient  continues to have generalized pain at times related to her underlying fibromyalgia. At this time, her pain is well controlled. The patient receives her Percocet medication on a monthly basis as needed. Chronic anemia: I have explained to the patient the CBC from today shows her hemoglobin has remained normal at 12.9 g/dL with hematocrit of 39.4%. I have recommended that she continue to take ferrous sulfate 325 mg by mouth once daily. We will see the patient back in 6 weeks for a complete reassessment.     Orders Placed This Encounter    COMPLETE CBC & AUTO DIFF WBC    InHouse CBC ([place])     Standing Status:   Future     Number of Occurrences:   1     Standing Expiration Date:   9/35/9204    METABOLIC PANEL, COMPREHENSIVE     Standing Status:   Future     Standing Expiration Date:   5/17/2018    IRON PROFILE     Standing Status:   Future     Standing Expiration Date:   5/17/2018    FERRITIN     Standing Status:   Future     Standing Expiration Date:   5/17/2018       Cheyanne Howell MD  5/16/2017

## 2017-05-16 NOTE — MR AVS SNAPSHOT
Visit Information Date & Time Provider Department Dept. Phone Encounter #  
 5/16/2017  3:15 PM Noman Caputo [address] Office [phone] [id number] Follow-up Instructions Return in about 4 weeks (around 6/13/2017). Your Appointments 6/13/2017  3:15 PM  
Office Visit with Noman Caputo MD  
Sebastian Chapin ([facility]) Appt Note: OV  
 [facility] [address] [place] [address]  
[phone]  
  
   
 [facility] [address] Upcoming Health Maintenance Date Due Hepatitis C Screening 1965 Pneumococcal 19-64 Highest Risk (1 of 3 - PCV13) 12/19/1982 DTaP/Tdap/Td series (1 - Tdap) 12/19/1984 PAP AKA CERVICAL CYTOLOGY 12/19/1984 BREAST CANCER SCRN MAMMOGRAM 12/19/2013 FOBT Q 1 YEAR AGE 50-75 12/19/2013 INFLUENZA AGE 9 TO ADULT 8/1/2017 Allergies as of 5/16/2017  Review Complete On: 5/16/2017 By: Noman Caputo MD  
  
 Severity Noted Reaction Type Reactions Levaquin [Levofloxacin] High  Systemic Anaphylaxis Pollen Extracts    Unable to Obtain Current Immunizations  Reviewed on 4/20/2017 Name Date Influenza Vaccine 10/21/2016, 11/3/2015, 11/10/2014, 10/25/2013 Influenza Vaccine Whole 9/10/2012 Not reviewed this visit You Were Diagnosed With   
  
 Codes Comments Thrombocytosis ([facility]    -  Primary ICD-10-CM: D47.3 ICD-9-CM: 238.71 Rheumatoid arthritis involving multiple joints (HCC)     ICD-10-CM: M06.9 ICD-9-CM: 714.0 Essential thrombocytosis (HCC)     ICD-10-CM: D47.3 ICD-9-CM: 238.71 Chronic anemia     ICD-10-CM: D64.9 ICD-9-CM: 285.9 Leukocytosis, unspecified type     ICD-10-CM: D72.829 ICD-9-CM: 288.60 Vitals BP Pulse Temp Height(growth percentile) Weight(growth percentile) BMI  
 125/77 98 98.6 °F (37 °C) 5' 11\" (1.803 m) 219 lb (99.3 kg) 30.54 kg/m2 OB Status Smoking Status Hysterectomy Former Smoker BMI and BSA Data Body Mass Index Body Surface Area 30.54 kg/m 2 2.23 m 2 Preferred Pharmacy Pharmacy Name Phone [facility] [address]. [phone] Your Updated Medication List  
  
   
This list is accurate as of: 5/16/17  3:59 PM.  Always use your most recent med list.  
  
  
  
  
 albuterol 90 mcg/actuation inhaler Commonly known as:  PROAIR HFA Take 1 Puff by inhalation every six (6) hours as needed for Wheezing. amitriptyline 150 mg tablet Commonly known as:  ELAVIL Take 10 mg by mouth nightly. Indications: DEPRESSION  
  
 anagrelide 0.5 mg capsule Commonly known as:  Francejonathan Dominguez Take 1 Cap by mouth two (2) times a day. Indications: ESSENTIAL THROMBOCYTOSIS  
  
 atenolol 25 mg tablet Commonly known as:  TENORMIN Take 25 mg by mouth daily. Indications: HYPERTENSION  
  
 cholecalciferol (VITAMIN D3) 5,000 unit Tab tablet Commonly known as:  VITAMIN D3 Take 5,000 Units by mouth every seven (7) days. DULoxetine 20 mg capsule Commonly known as:  CYMBALTA Take 20 mg by mouth daily. folic acid 094 mcg tablet Take 400 mcg by mouth daily. furosemide 40 mg tablet Commonly known as:  LASIX Take  by mouth daily. haloperidol 5 mg tablet Commonly known as:  HALDOL Take 2.5 mg by mouth nightly. HYZAAR 50-12.5 mg per tablet Generic drug:  losartan-hydroCHLOROthiazide Take 1 Tab by mouth daily. KlonoPIN 2 mg tablet Generic drug:  clonazePAM  
Take 2 mg by mouth daily. Indications: PANIC DISORDER  
  
 LANTUS SC  
5 Units by SubCUTAneous route daily. metFORMIN 1,000 mg tablet Commonly known as:  GLUCOPHAGE Take 500 mg by mouth two (2) times a day. methocarbamol 750 mg tablet Commonly known as:  ANDZYGQ-369 Take 1 Tab by mouth three (3) times daily. methotrexate 2.5 mg tablet Commonly known as:  Vicci Reek Take 5 mg by mouth daily. nitroglycerin 400 mcg/spray spray Commonly known as:  NITROLINGUAL  
1 [place] by SubLINGual route every five (5) minutes as needed. NovoLOG Mix 70-30 100 unit/mL (70-30) injection Generic drug:  insulin aspart protamine/insulin aspart 10 Units by SubCUTAneous route two (2) times a day. ondansetron hcl 4 mg tablet Commonly known as:  ZOFRAN (AS HYDROCHLORIDE) Take 1 Tab by mouth every eight (8) hours as needed for Nausea. PLAQUENIL 200 mg tablet Generic drug:  hydroxychloroquine Take 200 mg by mouth two (2) times a day. QUEtiapine 100 mg tablet Commonly known as:  SEROquel Take 25 mg by mouth. Takes 25 mg Q AM, and 100 mg, Q PM  
  
 REMICADE IV  
344 mg by IntraVENous route once as needed. remicade will be every 8 weeks We Performed the Following COMPLETE CBC & AUTO DIFF WBC [90562 CPT(R)] Follow-up Instructions Return in about 4 weeks (around 6/13/2017). To-Do List   
 05/16/2017 Lab:  CBC WITH 3 PART DIFF   
  
 06/01/2017 10:00 AM  
  Appointment with [address] at [facility] ([phone])  
  
 07/13/2017 10:00 AM  
  Appointment with [address] at [place] [address] ([phone]) Patient Instructions Rheumatoid Arthritis: Care Instructions Your Care Instructions Arthritis is a common health problem in which the joints are inflamed. There are many types of arthritis. In rheumatoid arthritis, the body's own immune system attacks the joints. This causes pain, stiffness, and swelling in the joints, especially in the hands and feet. It can become hard to open jars, write, and do other daily tasks. Sometimes rheumatoid arthritis can also cause bumps to form under the skin. Over time, rheumatoid arthritis can damage and deform joints. Early treatment with medicines may reduce your chances of having a lasting disability. Follow-up care is a key part of your treatment and safety. Be sure to make and go to all appointments, and call your doctor if you are having problems. Its also a good idea to know your test results and keep a list of the medicines you take. How can you care for yourself at home? · If your doctor recommends it, get more exercise. Walking is a good choice. If your knees or ankles hurt, try riding a stationary bike or swimming. · Move each joint gently through its full range of motion once or twice a day. · Rest joints when they are sore or overworked. Short rest breaks may help more than staying in bed. · Reach and stay at a healthy weight. Regular exercise and a healthy diet will help you do this. Extra weight can strain the joints, especially the knees and hips, and make the pain worse. Losing even a few pounds may help. · Get enough calcium and vitamin D to help prevent osteoporosis, which causes thin bones. Talk to your doctor about how much you should take. · Protect your joints from injury. Do not overuse them. Try to limit or avoid activities that cause joint pain or swelling. Use special kitchen tools and other self-help devices as well as walkers, splints, or canes if needed. · Use heat to ease pain. Take warm showers or baths. Use hot packs or a heating pad set on low. Sleep under a warm electric blanket. · Put ice or a cold pack on the area for 10 to 20 minutes at a time. Put a thin cloth between the ice and your skin. · Take pain medicines exactly as directed. ¨ If the doctor gave you a prescription medicine for pain, take it as prescribed. ¨ If you are not taking a prescription pain medicine, ask your doctor if you can take an over-the-counter medicine. · Take an active role in managing your condition. Set up a treatment plan with your doctor, and learn as much as you can about rheumatoid arthritis. This will help you control pain and stay active. When should you call for help? Call your doctor now or seek immediate medical care if: 
· You have a fever or a rash along with joint pain. · You have joint pain that is so severe that you cannot use the joint at all. · You have sudden swelling, redness, or pain in one or more joints, and you do not know why. · You have back or neck pain along with weakness in your arms or legs. · You have a loss of bowel or bladder control. Watch closely for changes in your health, and be sure to contact your doctor if: 
· You have joint pain that lasts for more than 6 weeks. · You have side effects from your arthritis medicines, such as stomach pain, nausea, heartburn, or dark and tarlike stools. Where can you learn more? Go to http://swapnil-speedy.info/. Enter K205 in the search box to learn more about \"Rheumatoid Arthritis: Care Instructions. \" Current as of: October 31, 2016 Content Version: 11.2 © 0717-5687 [employer]. Care instructions adapted under license by [employer] (which disclaims liability or warranty for this information). If you have questions about a medical condition or this instruction, always ask your healthcare professional. [place] [address] any warranty or liability for your use of this information. Anemia: Care Instructions Your Care Instructions Anemia is a low level of red blood cells, which carry oxygen throughout your body. Many things can cause anemia. Lack of iron is one of the most common causes. Your body needs iron to make hemoglobin, a substance in red blood cells that carries oxygen from the lungs to your body's cells. Without enough iron, the body produces fewer and smaller red blood cells. As a result, your body's cells do not get enough oxygen, and you feel tired and weak. And you may have trouble concentrating. Bleeding is the most common cause of a lack of iron.  You may have heavy menstrual bleeding or bleeding caused by conditions such as ulcers, hemorrhoids, or cancer. Regular use of aspirin or other anti-inflammatory medicines (such as ibuprofen) also can cause bleeding in some people. A lack of iron in your diet also can cause anemia, especially at times when the body needs more iron, such as during pregnancy, infancy, and the teen years. Your doctor may have prescribed iron pills. It may take several months of treatment for your iron levels to return to normal. Your doctor also may suggest that you eat foods that are rich in iron, such as meat and beans. There are many other causes of anemia. It is not always due to a lack of iron. Finding the specific cause of your anemia will help your doctor find the right treatment for you. Follow-up care is a key part of your treatment and safety. Be sure to make and go to all appointments, and call your doctor if you are having problems. It's also a good idea to know your test results and keep a list of the medicines you take. How can you care for yourself at home? · Take your medicines exactly as prescribed. Call your doctor if you think you are having a problem with your medicine. · If your doctor recommends iron pills, take them as directed: ¨ Try to take the pills on an empty stomach about 1 hour before or 2 hours after meals. But you may need to take iron with food to avoid an upset stomach. ¨ Do not take antacids or drink milk or caffeine drinks (such as coffee, tea, or cola) at the same time or within 2 hours of the time that you take your iron. They can make it hard for your body to absorb the iron. ¨ Vitamin C (from food or supplements) helps your body absorb iron. Try taking iron pills with a glass of orange juice or some other food that is high in vitamin C, such as citrus fruits. ¨ Iron pills may cause stomach problems, such as heartburn, nausea, diarrhea, constipation, and cramps.  Be sure to drink plenty of fluids, and include fruits, vegetables, and fiber in your diet each day. Iron pills often make your bowel movements dark or green. ¨ If you forget to take an iron pill, do not take a double dose of iron the next time you take a pill. ¨ Keep iron pills out of the reach of small children. An overdose of iron can be very dangerous. · Follow your doctor's advice about eating iron-rich foods. These include red meat, shellfish, poultry, eggs, beans, raisins, whole-grain bread, and leafy green vegetables. · Steam vegetables to help them keep their iron content. When should you call for help? Call 911 anytime you think you may need emergency care. For example, call if: 
· You have symptoms of a heart attack. These may include: ¨ Chest pain or pressure, or a strange feeling in the chest. 
¨ Sweating. ¨ Shortness of breath. ¨ Nausea or vomiting. ¨ Pain, pressure, or a strange feeling in the back, neck, jaw, or upper belly or in one or both shoulders or arms. ¨ Lightheadedness or sudden weakness. ¨ A fast or irregular heartbeat. After you call 911, the  may tell you to chew 1 adult-strength or 2 to 4 low-dose aspirin. Wait for an ambulance. Do not try to drive yourself. · You passed out (lost consciousness). Call your doctor now or seek immediate medical care if: 
· You have new or increased shortness of breath. · You are dizzy or lightheaded, or you feel like you may faint. · Your fatigue and weakness continue or get worse. · You have any abnormal bleeding, such as: 
¨ Nosebleeds. ¨ Vaginal bleeding that is different (heavier, more frequent, at a different time of the month) than what you are used to. ¨ Bloody or black stools, or rectal bleeding. ¨ Bloody or pink urine. Watch closely for changes in your health, and be sure to contact your doctor if: 
· You do not get better as expected. Where can you learn more? Go to http://robby.info/. Enter R301 in the search box to learn more about \"Anemia: Care Instructions. \" Current as of: October 13, 2016 Content Version: 11.2 © 9606-2861 [employer]. Care instructions adapted under license by [employer] (which disclaims liability or warranty for this information). If you have questions about a medical condition or this instruction, always ask your healthcare professional. [address] any warranty or liability for your use of this information. Please provide this summary of care documentation to your next provider. Your [facility] care clinician [facility]. If you have any questions after today's visit, please call [phone].

## 2017-05-17 ENCOUNTER — HOSPITAL ENCOUNTER (OUTPATIENT)
Dept: LAB | Age: 52
Discharge: HOME | End: 2017-05-17
Payer: MEDICARE

## 2017-05-17 DIAGNOSIS — N76.0 VAGINITIS AND VULVOVAGINITIS, UNSPECIFIED: ICD-10-CM

## 2017-05-17 DIAGNOSIS — M13.0 UNSPECIFIED POLYARTHROPATHY OR POLYARTHRITIS, SITE UNSPECIFIED: ICD-10-CM

## 2017-05-17 DIAGNOSIS — F33.1 MAJOR DEPRESSIVE DISORDER, RECURRENT EPISODE, MODERATE (HCC): ICD-10-CM

## 2017-05-17 DIAGNOSIS — E11.40 DIABETIC NEUROPATHY (HCC): ICD-10-CM

## 2017-05-17 DIAGNOSIS — E55.9 VITAMIN D DEFICIENCY: ICD-10-CM

## 2017-05-17 DIAGNOSIS — M06.9 ATROPHIC ARTHRITIS (HCC): ICD-10-CM

## 2017-05-17 DIAGNOSIS — E11.9 DIABETES MELLITUS (HCC): ICD-10-CM

## 2017-05-17 DIAGNOSIS — E78.00 PURE HYPERCHOLESTEROLEMIA: ICD-10-CM

## 2017-05-17 DIAGNOSIS — I10 ESSENTIAL HYPERTENSION, BENIGN: ICD-10-CM

## 2017-05-17 LAB
25(OH)D3 SERPL-MCNC: 17.4 NG/ML (ref 30–100)
ALBUMIN SERPL BCP-MCNC: 3.5 G/DL (ref 3.4–5)
ALBUMIN SERPL BCP-MCNC: 3.7 G/DL (ref 3.4–5)
ALBUMIN/GLOB SERPL: 0.8 {RATIO} (ref 0.8–1.7)
ALBUMIN/GLOB SERPL: 0.9 {RATIO} (ref 0.8–1.7)
ALP SERPL-CCNC: 100 U/L (ref 45–117)
ALP SERPL-CCNC: 101 U/L (ref 45–117)
ALT SERPL-CCNC: 18 U/L (ref 13–56)
ALT SERPL-CCNC: 19 U/L (ref 13–56)
ANION GAP BLD CALC-SCNC: 9 MMOL/L (ref 3–18)
ANION GAP BLD CALC-SCNC: 9 MMOL/L (ref 3–18)
APPEARANCE UR: CLEAR
AST SERPL W P-5'-P-CCNC: 7 U/L (ref 15–37)
AST SERPL W P-5'-P-CCNC: 7 U/L (ref 15–37)
BACTERIA URNS QL MICRO: ABNORMAL /HPF
BASOPHILS # BLD AUTO: 0 K/UL (ref 0–0.1)
BASOPHILS # BLD AUTO: 0 K/UL (ref 0–0.1)
BASOPHILS # BLD: 0 % (ref 0–2)
BASOPHILS # BLD: 0 % (ref 0–2)
BILIRUB SERPL-MCNC: 0.2 MG/DL (ref 0.2–1)
BILIRUB SERPL-MCNC: 0.3 MG/DL (ref 0.2–1)
BILIRUB UR QL: NEGATIVE
BUN SERPL-MCNC: 17 MG/DL (ref 7–18)
BUN SERPL-MCNC: 17 MG/DL (ref 7–18)
BUN/CREAT SERPL: 25 (ref 12–20)
BUN/CREAT SERPL: 25 (ref 12–20)
CALCIUM SERPL-MCNC: 10 MG/DL (ref 8.5–10.1)
CALCIUM SERPL-MCNC: 9.9 MG/DL (ref 8.5–10.1)
CHLORIDE SERPL-SCNC: 105 MMOL/L (ref 100–108)
CHLORIDE SERPL-SCNC: 105 MMOL/L (ref 100–108)
CHOLEST SERPL-MCNC: 160 MG/DL
CO2 SERPL-SCNC: 24 MMOL/L (ref 21–32)
CO2 SERPL-SCNC: 26 MMOL/L (ref 21–32)
COLOR UR: YELLOW
CREAT SERPL-MCNC: 0.69 MG/DL (ref 0.6–1.3)
CREAT SERPL-MCNC: 0.69 MG/DL (ref 0.6–1.3)
CRP SERPL-MCNC: 1.1 MG/DL (ref 0–0.3)
DIFFERENTIAL METHOD BLD: ABNORMAL
DIFFERENTIAL METHOD BLD: ABNORMAL
EOSINOPHIL # BLD: 0.4 K/UL (ref 0–0.4)
EOSINOPHIL # BLD: 0.5 K/UL (ref 0–0.4)
EOSINOPHIL NFR BLD: 4 % (ref 0–5)
EOSINOPHIL NFR BLD: 4 % (ref 0–5)
EPITH CASTS URNS QL MICRO: ABNORMAL /LPF (ref 0–5)
ERYTHROCYTE [DISTWIDTH] IN BLOOD BY AUTOMATED COUNT: 14.9 % (ref 11.6–14.5)
ERYTHROCYTE [DISTWIDTH] IN BLOOD BY AUTOMATED COUNT: 15 % (ref 11.6–14.5)
ERYTHROCYTE [SEDIMENTATION RATE] IN BLOOD: 40 MM/HR (ref 0–30)
EST. AVERAGE GLUCOSE BLD GHB EST-MCNC: 192 MG/DL
GLOBULIN SER CALC-MCNC: 4.2 G/DL (ref 2–4)
GLOBULIN SER CALC-MCNC: 4.4 G/DL (ref 2–4)
GLUCOSE SERPL-MCNC: 190 MG/DL (ref 74–99)
GLUCOSE SERPL-MCNC: 196 MG/DL (ref 74–99)
GLUCOSE UR STRIP.AUTO-MCNC: NEGATIVE MG/DL
HBA1C MFR BLD: 8.3 % (ref 4.2–5.6)
HCT VFR BLD AUTO: 39.3 % (ref 35–45)
HCT VFR BLD AUTO: 39.5 % (ref 35–45)
HDLC SERPL-MCNC: 41 MG/DL (ref 40–60)
HDLC SERPL: 3.9 {RATIO} (ref 0–5)
HGB BLD-MCNC: 13.3 G/DL (ref 12–16)
HGB BLD-MCNC: 13.3 G/DL (ref 12–16)
HGB UR QL STRIP: NEGATIVE
HYALINE CASTS URNS QL MICRO: ABNORMAL /LPF (ref 0–2)
KETONES UR QL STRIP.AUTO: NEGATIVE MG/DL
LDLC SERPL CALC-MCNC: 84.4 MG/DL (ref 0–100)
LEUKOCYTE ESTERASE UR QL STRIP.AUTO: NEGATIVE
LIPID PROFILE,FLP: ABNORMAL
LYMPHOCYTES # BLD AUTO: 30 % (ref 21–52)
LYMPHOCYTES # BLD AUTO: 30 % (ref 21–52)
LYMPHOCYTES # BLD: 3.3 K/UL (ref 0.9–3.6)
LYMPHOCYTES # BLD: 3.3 K/UL (ref 0.9–3.6)
MCH RBC QN AUTO: 27.2 PG (ref 24–34)
MCH RBC QN AUTO: 27.3 PG (ref 24–34)
MCHC RBC AUTO-ENTMCNC: 33.7 G/DL (ref 31–37)
MCHC RBC AUTO-ENTMCNC: 33.8 G/DL (ref 31–37)
MCV RBC AUTO: 80.5 FL (ref 74–97)
MCV RBC AUTO: 80.8 FL (ref 74–97)
MONOCYTES # BLD: 0.7 K/UL (ref 0.05–1.2)
MONOCYTES # BLD: 0.8 K/UL (ref 0.05–1.2)
MONOCYTES NFR BLD AUTO: 6 % (ref 3–10)
MONOCYTES NFR BLD AUTO: 7 % (ref 3–10)
MUCOUS THREADS URNS QL MICRO: ABNORMAL /LPF
NEUTS SEG # BLD: 6.4 K/UL (ref 1.8–8)
NEUTS SEG # BLD: 6.5 K/UL (ref 1.8–8)
NEUTS SEG NFR BLD AUTO: 59 % (ref 40–73)
NEUTS SEG NFR BLD AUTO: 60 % (ref 40–73)
NITRITE UR QL STRIP.AUTO: NEGATIVE
PH UR STRIP: 5 [PH] (ref 5–8)
PLATELET # BLD AUTO: 368 K/UL (ref 135–420)
PLATELET # BLD AUTO: 369 K/UL (ref 135–420)
PMV BLD AUTO: 10.1 FL (ref 9.2–11.8)
PMV BLD AUTO: 10.2 FL (ref 9.2–11.8)
POTASSIUM SERPL-SCNC: 4.2 MMOL/L (ref 3.5–5.5)
POTASSIUM SERPL-SCNC: 4.2 MMOL/L (ref 3.5–5.5)
PROT SERPL-MCNC: 7.9 G/DL (ref 6.4–8.2)
PROT SERPL-MCNC: 7.9 G/DL (ref 6.4–8.2)
PROT UR STRIP-MCNC: 30 MG/DL
RBC # BLD AUTO: 4.88 M/UL (ref 4.2–5.3)
RBC # BLD AUTO: 4.89 M/UL (ref 4.2–5.3)
RBC #/AREA URNS HPF: NEGATIVE /HPF (ref 0–5)
SODIUM SERPL-SCNC: 138 MMOL/L (ref 136–145)
SODIUM SERPL-SCNC: 140 MMOL/L (ref 136–145)
SP GR UR REFRACTOMETRY: 1.02 (ref 1–1.03)
T4 SERPL-MCNC: 9.9 UG/DL (ref 4.5–10.9)
TRIGL SERPL-MCNC: 173 MG/DL (ref ?–150)
TSH SERPL DL<=0.05 MIU/L-ACNC: 2.13 UIU/ML (ref 0.36–3.74)
UROBILINOGEN UR QL STRIP.AUTO: 1 EU/DL (ref 0.2–1)
VLDLC SERPL CALC-MCNC: 34.6 MG/DL
WBC # BLD AUTO: 10.9 K/UL (ref 4.6–13.2)
WBC # BLD AUTO: 11 K/UL (ref 4.6–13.2)
WBC URNS QL MICRO: ABNORMAL /HPF (ref 0–4)

## 2017-05-17 PROCEDURE — 80053 COMPREHEN METABOLIC PANEL: CPT | Performed by: INTERNAL MEDICINE

## 2017-05-17 PROCEDURE — 83036 HEMOGLOBIN GLYCOSYLATED A1C: CPT | Performed by: INTERNAL MEDICINE

## 2017-05-17 PROCEDURE — 36415 COLL VENOUS BLD VENIPUNCTURE: CPT | Performed by: INTERNAL MEDICINE

## 2017-05-17 PROCEDURE — 80061 LIPID PANEL: CPT | Performed by: INTERNAL MEDICINE

## 2017-05-17 PROCEDURE — 84436 ASSAY OF TOTAL THYROXINE: CPT | Performed by: INTERNAL MEDICINE

## 2017-05-17 PROCEDURE — 84443 ASSAY THYROID STIM HORMONE: CPT | Performed by: INTERNAL MEDICINE

## 2017-05-17 PROCEDURE — 82306 VITAMIN D 25 HYDROXY: CPT | Performed by: INTERNAL MEDICINE

## 2017-05-22 NOTE — TELEPHONE ENCOUNTER
PATIENT requested refill of PERCOCET 10/325, however I do not see this on her med list, so I am sending her request thru this message. 5/24/17 PATIENT called again to see if her refill is ready. It is not, she said please send another message, and asked if there was a problem. Adv her this med isn't on her med list, she said she's been taking this for a while.     Re-sending encounter

## 2017-05-25 RX ORDER — OXYCODONE AND ACETAMINOPHEN 10; 325 MG/1; MG/1
1 TABLET ORAL
Qty: 120 TAB | Refills: 0 | Status: SHIPPED | OUTPATIENT
Start: 2017-05-25 | End: 2017-05-25 | Stop reason: SDUPTHER

## 2017-05-25 RX ORDER — OXYCODONE AND ACETAMINOPHEN 10; 325 MG/1; MG/1
1 TABLET ORAL
Qty: 120 TAB | Refills: 0 | Status: SHIPPED | OUTPATIENT
Start: 2017-05-25 | End: 2017-05-26 | Stop reason: SDUPTHER

## 2017-05-26 RX ORDER — OXYCODONE AND ACETAMINOPHEN 10; 325 MG/1; MG/1
1 TABLET ORAL
Qty: 120 TAB | Refills: 0 | Status: SHIPPED | OUTPATIENT
Start: 2017-05-26 | End: 2017-06-26 | Stop reason: SDUPTHER

## 2017-05-30 RX ORDER — DIPHENHYDRAMINE HYDROCHLORIDE 50 MG/ML
25 INJECTION, SOLUTION INTRAMUSCULAR; INTRAVENOUS ONCE
Status: CANCELLED | OUTPATIENT
Start: 2017-06-01 | End: 2017-06-01

## 2017-05-30 NOTE — PROGRESS NOTES
Ms. Arnaud Adhikari called [facility] today, Tuesday, 5-30-17, and stated that she just came from her PCP's office (Dr. Josiane Silvestre), having been diagnosed with a bad sinus injection. She also stated that she had a fever this morning of 102, and Dr. Josiane Silvestre prescribed a Z-Pack for her to take. Dain Goldmann, NP, was made aware, and gave order to HOLD the Remicade Infusion that was scheduled for this Thursday, 6-1-17, and reschedule in 1 week. Ms. Arnaud Adhikari was therefore made aware, that her Remicade Infusion that was scheduled for Thursday, 6-1-17, has been cancelled, and rescheduled 1 week later per order, to Friday, 6-9-17, at 1100. Ms. Arnaud Adhikari verbalized understanding, and agreed to the rescheduled appointment. [facility] was also made aware.

## 2017-06-01 ENCOUNTER — HOSPITAL ENCOUNTER (OUTPATIENT)
Dept: INFUSION THERAPY | Age: 52
Discharge: HOME | End: 2017-06-01
Payer: MEDICARE

## 2017-06-06 ENCOUNTER — DOCUMENTATION ONLY (OUTPATIENT)
Dept: ONCOLOGY | Age: 52
End: 2017-06-06

## 2017-06-09 ENCOUNTER — DOCUMENTATION ONLY (OUTPATIENT)
Dept: ONCOLOGY | Age: 52
End: 2017-06-09

## 2017-06-09 ENCOUNTER — CLINICAL SUPPORT (OUTPATIENT)
Dept: ONCOLOGY | Age: 52
End: 2017-06-09

## 2017-06-09 ENCOUNTER — HOSPITAL ENCOUNTER (OUTPATIENT)
Dept: ONCOLOGY | Age: 52
Discharge: HOME | End: 2017-06-09

## 2017-06-09 ENCOUNTER — HOSPITAL ENCOUNTER (OUTPATIENT)
Dept: INFUSION THERAPY | Age: 52
Discharge: HOME | End: 2017-06-09
Payer: MEDICARE

## 2017-06-09 VITALS
SYSTOLIC BLOOD PRESSURE: 126 MMHG | HEIGHT: 71 IN | WEIGHT: 216 LBS | BODY MASS INDEX: 30.24 KG/M2 | DIASTOLIC BLOOD PRESSURE: 74 MMHG | TEMPERATURE: 99 F | RESPIRATION RATE: 20 BRPM | HEART RATE: 95 BPM

## 2017-06-09 DIAGNOSIS — M06.00 SERONEGATIVE RHEUMATOID ARTHRITIS (HCC): ICD-10-CM

## 2017-06-09 DIAGNOSIS — D75.839 THROMBOCYTOSIS: ICD-10-CM

## 2017-06-09 DIAGNOSIS — M06.00 SERONEGATIVE RHEUMATOID ARTHRITIS (HCC): Primary | ICD-10-CM

## 2017-06-09 LAB
BASO+EOS+MONOS # BLD AUTO: 0.4 K/UL (ref 0–2.3)
BASO+EOS+MONOS # BLD AUTO: 4 % (ref 0.1–17)
DIFFERENTIAL METHOD BLD: NORMAL
ERYTHROCYTE [DISTWIDTH] IN BLOOD BY AUTOMATED COUNT: 13.6 % (ref 11.5–14.5)
HCT VFR BLD AUTO: 38.1 % (ref 36–48)
HGB BLD-MCNC: 12.5 G/DL (ref 12–16)
LYMPHOCYTES # BLD AUTO: 33 % (ref 14–44)
LYMPHOCYTES # BLD: 3.5 K/UL (ref 1.1–5.9)
MCH RBC QN AUTO: 26.5 PG (ref 25–35)
MCHC RBC AUTO-ENTMCNC: 32.8 G/DL (ref 31–37)
MCV RBC AUTO: 80.7 FL (ref 78–102)
NEUTS SEG # BLD: 6.7 K/UL (ref 1.8–9.5)
NEUTS SEG NFR BLD AUTO: 63 % (ref 40–70)
PLATELET # BLD AUTO: 553 K/UL (ref 135–420)
RBC # BLD AUTO: 4.72 M/UL (ref 4.1–5.1)
WBC # BLD AUTO: 10.6 K/UL (ref 4.5–13)

## 2017-06-09 PROCEDURE — 74011250636 HC RX REV CODE- 250/636: Performed by: INTERNAL MEDICINE

## 2017-06-09 PROCEDURE — 99211 OFF/OP EST MAY X REQ PHY/QHP: CPT

## 2017-06-09 PROCEDURE — 96375 TX/PRO/DX INJ NEW DRUG ADDON: CPT

## 2017-06-09 PROCEDURE — 96415 CHEMO IV INFUSION ADDL HR: CPT

## 2017-06-09 PROCEDURE — 96413 CHEMO IV INFUSION 1 HR: CPT

## 2017-06-09 RX ORDER — SODIUM CHLORIDE 9 MG/ML
250 INJECTION, SOLUTION INTRAVENOUS ONCE
Status: COMPLETED | OUTPATIENT
Start: 2017-06-09 | End: 2017-06-09

## 2017-06-09 RX ORDER — SODIUM CHLORIDE 0.9 % (FLUSH) 0.9 %
10-40 SYRINGE (ML) INJECTION AS NEEDED
Status: DISCONTINUED | OUTPATIENT
Start: 2017-06-09 | End: 2017-06-13 | Stop reason: HOSPADM

## 2017-06-09 RX ORDER — DIPHENHYDRAMINE HYDROCHLORIDE 50 MG/ML
25 INJECTION, SOLUTION INTRAMUSCULAR; INTRAVENOUS ONCE
Status: COMPLETED | OUTPATIENT
Start: 2017-06-09 | End: 2017-06-09

## 2017-06-09 RX ORDER — ANAGRELIDE 0.5 MG/1
0.5 CAPSULE ORAL 2 TIMES DAILY
Qty: 60 CAP | Refills: 4 | OUTPATIENT
Start: 2017-06-09 | End: 2017-07-11 | Stop reason: SDUPTHER

## 2017-06-09 RX ADMIN — INFLIXIMAB 400 MG: 100 INJECTION, POWDER, LYOPHILIZED, FOR SOLUTION INTRAVENOUS at 13:00

## 2017-06-09 RX ADMIN — SODIUM CHLORIDE 250 ML: 9 INJECTION, SOLUTION INTRAVENOUS at 12:20

## 2017-06-09 RX ADMIN — Medication 10 ML: at 11:40

## 2017-06-09 RX ADMIN — DIPHENHYDRAMINE HYDROCHLORIDE 25 MG: 50 INJECTION, SOLUTION INTRAMUSCULAR; INTRAVENOUS at 12:22

## 2017-06-09 NOTE — PROGRESS NOTES
[facility] Progress Note    Date: 2017    Name: Miranda Snow    MRN: [id number]         : 1965      Ms. Pritesh Chandra arrived in the [facility] today, at [phone], in stable condition, here for Q 6 Week, Remicade Infusion. She was assessed and education was provided. Upon assessment today, Ms. Pritesh Chandra stated that she felt much better than she did last week, but still had a residual cough, productive with \"light greenish sputum\". Also, she was noted to have a low grade fever, as listed below. She also stated that she did complete the Z-Gurjit that was prescribed for her by her PCP. Lillian Ayala NP was made aware of Ms. Simmons's low grade fever today, and other stated findings, and an order was received from [place], to proceed with the Remicade Infusion today, as planned. Ms. López Hand vitals were reviewed. Visit Vitals    /74 (BP 1 Location: Left arm, BP Patient Position: At rest;Sitting)    Pulse (!) 106    Temp 100 °F (37.8 °C)    Resp 20    Ht 5' 11\" (1.803 m)    Wt 98 kg (216 lb)    Breastfeeding No    BMI 30.13 kg/m2             PIV was established in her dorsal left forearm, at 1140, without incident, and a CBC was drawn, per order. Lab results were obtained and reviewed, and the preliminary platelet count was noted to be 552,000. Recent Results (from the past 12 hour(s))   CBC WITH 3 PART DIFF    Collection Time: 17 11:40 AM   Result Value Ref Range    WBC 10.6 4.5 - 13.0 K/uL    RBC 4.72 4.10 - 5.10 M/uL    HGB 12.5 12.0 - 16.0 g/dL    HCT 38.1 36 - 48 %    MCV 80.7 78 - 102 FL    MCH 26.5 25.0 - 35.0 PG    MCHC 32.8 31 - 37 g/dL    RDW 13.6 11.5 - 14.5 %    NEUTROPHILS 63 40 - 70 %    MIXED CELLS 4 0.1 - 17 %    LYMPHOCYTES 33 14 - 44 %    ABS. NEUTROPHILS 6.7 1.8 - 9.5 K/UL    ABS. MIXED CELLS 0.4 0.0 - 2.3 K/uL    ABS.  LYMPHOCYTES 3.5 1.1 - 5.9 K/UL    DF AUTOMATED               Benadryl 25 mg IV, was administered pre-Remicade, per order, and without incident. Remicade 400 mg (4 mg/kg) IV  (Dose rounded per pharmacy), was administered per order, and without incident. After completion of the IV Remicade, the PIV was flushed very well per protocol, and then, the PIV was removed and gauze/bandaid was applied. Ms. Prashanth Brown tolerated well, and had no complaints. Ms. Prashanth Brown was discharged from [place] [address] in stable condition at 1530. Angely Forman She is to return in 6 weeks, on Friday, 7-21-17, at 1100,  for her next appointment, for her next dose of IV Remicade.      Adalberto Mills RN  June 9, 2017  12:02 PM

## 2017-06-13 ENCOUNTER — OFFICE VISIT (OUTPATIENT)
Dept: ONCOLOGY | Age: 52
End: 2017-06-13

## 2017-06-13 ENCOUNTER — HOSPITAL ENCOUNTER (OUTPATIENT)
Dept: LAB | Age: 52
Discharge: HOME | End: 2017-06-13
Payer: MEDICARE

## 2017-06-13 ENCOUNTER — HOSPITAL ENCOUNTER (OUTPATIENT)
Dept: ONCOLOGY | Age: 52
Discharge: HOME | End: 2017-06-13

## 2017-06-13 VITALS
DIASTOLIC BLOOD PRESSURE: 63 MMHG | HEIGHT: 71 IN | HEART RATE: 97 BPM | SYSTOLIC BLOOD PRESSURE: 111 MMHG | TEMPERATURE: 98.6 F | WEIGHT: 218.2 LBS | BODY MASS INDEX: 30.55 KG/M2

## 2017-06-13 DIAGNOSIS — D47.3 ESSENTIAL THROMBOCYTOSIS (HCC): Primary | ICD-10-CM

## 2017-06-13 DIAGNOSIS — D64.9 CHRONIC ANEMIA: ICD-10-CM

## 2017-06-13 DIAGNOSIS — D72.829 LEUKOCYTOSIS, UNSPECIFIED TYPE: ICD-10-CM

## 2017-06-13 DIAGNOSIS — D75.839 THROMBOCYTOSIS: ICD-10-CM

## 2017-06-13 DIAGNOSIS — M79.7 FIBROMYALGIA: ICD-10-CM

## 2017-06-13 DIAGNOSIS — M06.9 RHEUMATOID ARTHRITIS INVOLVING MULTIPLE JOINTS (HCC): ICD-10-CM

## 2017-06-13 LAB
ALBUMIN SERPL BCP-MCNC: 3.4 G/DL (ref 3.4–5)
ALBUMIN/GLOB SERPL: 0.9 {RATIO} (ref 0.8–1.7)
ALP SERPL-CCNC: 79 U/L (ref 45–117)
ALT SERPL-CCNC: 17 U/L (ref 13–56)
ANION GAP BLD CALC-SCNC: 8 MMOL/L (ref 3–18)
AST SERPL W P-5'-P-CCNC: 9 U/L (ref 15–37)
BASO+EOS+MONOS # BLD AUTO: 1 K/UL (ref 0–2.3)
BASO+EOS+MONOS # BLD AUTO: 10 % (ref 0.1–17)
BILIRUB SERPL-MCNC: 0.2 MG/DL (ref 0.2–1)
BUN SERPL-MCNC: 10 MG/DL (ref 7–18)
BUN/CREAT SERPL: 12 (ref 12–20)
CALCIUM SERPL-MCNC: 9.3 MG/DL (ref 8.5–10.1)
CHLORIDE SERPL-SCNC: 102 MMOL/L (ref 100–108)
CO2 SERPL-SCNC: 28 MMOL/L (ref 21–32)
CREAT SERPL-MCNC: 0.83 MG/DL (ref 0.6–1.3)
DIFFERENTIAL METHOD BLD: ABNORMAL
ERYTHROCYTE [DISTWIDTH] IN BLOOD BY AUTOMATED COUNT: 14.2 % (ref 11.5–14.5)
FERRITIN SERPL-MCNC: 145 NG/ML (ref 8–388)
GLOBULIN SER CALC-MCNC: 3.9 G/DL (ref 2–4)
GLUCOSE SERPL-MCNC: 185 MG/DL (ref 74–99)
HCT VFR BLD AUTO: 37.6 % (ref 36–48)
HGB BLD-MCNC: 12.3 G/DL (ref 12–16)
IRON SATN MFR SERPL: 19 %
IRON SERPL-MCNC: 49 UG/DL (ref 50–175)
LYMPHOCYTES # BLD AUTO: 32 % (ref 14–44)
LYMPHOCYTES # BLD: 3.2 K/UL (ref 1.1–5.9)
MCH RBC QN AUTO: 26.5 PG (ref 25–35)
MCHC RBC AUTO-ENTMCNC: 32.7 G/DL (ref 31–37)
MCV RBC AUTO: 81 FL (ref 78–102)
NEUTS SEG # BLD: 5.8 K/UL (ref 1.8–9.5)
NEUTS SEG NFR BLD AUTO: 57 % (ref 40–70)
PLATELET # BLD AUTO: 637 K/UL (ref 140–440)
POTASSIUM SERPL-SCNC: 4.1 MMOL/L (ref 3.5–5.5)
PROT SERPL-MCNC: 7.3 G/DL (ref 6.4–8.2)
RBC # BLD AUTO: 4.64 M/UL (ref 4.1–5.1)
SODIUM SERPL-SCNC: 138 MMOL/L (ref 136–145)
TIBC SERPL-MCNC: 256 UG/DL (ref 250–450)
WBC # BLD AUTO: 10 K/UL (ref 4.5–13)

## 2017-06-13 PROCEDURE — 80053 COMPREHEN METABOLIC PANEL: CPT | Performed by: INTERNAL MEDICINE

## 2017-06-13 PROCEDURE — 36415 COLL VENOUS BLD VENIPUNCTURE: CPT | Performed by: INTERNAL MEDICINE

## 2017-06-13 PROCEDURE — 82728 ASSAY OF FERRITIN: CPT | Performed by: INTERNAL MEDICINE

## 2017-06-13 PROCEDURE — 83540 ASSAY OF IRON: CPT | Performed by: INTERNAL MEDICINE

## 2017-06-13 NOTE — MR AVS SNAPSHOT
Visit Information Date & Time Provider Department Dept. Phone Encounter #  
 6/13/2017  3:15 PM Anum Ayala [address] Office [phone] [id number] Follow-up Instructions Return in about 4 weeks (around 7/11/2017). Your Appointments 7/11/2017  4:15 PM  
Office Visit with MD Sebastian Nichols Dari ([address]) Appt Note: OV  
 [place] [address] [facility] [address] [facility] [address]  
[phone]  
  
   
 [facility] [address] Upcoming Health Maintenance Date Due Hepatitis C Screening 1965 Pneumococcal 19-64 Highest Risk (1 of 3 - PCV13) 12/19/1982 DTaP/Tdap/Td series (1 - Tdap) 12/19/1984 PAP AKA CERVICAL CYTOLOGY 12/19/1984 BREAST CANCER SCRN MAMMOGRAM 12/19/2013 FOBT Q 1 YEAR AGE 50-75 12/19/2013 INFLUENZA AGE 9 TO ADULT 8/1/2017 Allergies as of 6/13/2017  Review Complete On: 6/9/2017 By: Yordan Kennedy, SHAKIRA Severity Noted Reaction Type Reactions Levaquin [Levofloxacin] High  Systemic Anaphylaxis Pollen Extracts    Unable to Obtain Current Immunizations  Reviewed on 6/9/2017 Name Date Influenza Vaccine 10/21/2016, 11/3/2015, 11/10/2014, 10/25/2013 Influenza Vaccine Whole 9/10/2012 Not reviewed this visit You Were Diagnosed With   
  
 Codes Comments [facility]    -  Primary ICD-10-CM: D47.3 ICD-9-CM: 238.71 Vitals BP Pulse Temp Height(growth percentile) Weight(growth percentile) BMI  
 111/63 97 98.6 °F (37 °C) 5' 11\" (1.803 m) 218 lb 3.2 oz (99 kg) 30.43 kg/m2 OB Status Smoking Status Hysterectomy Former Smoker BMI and BSA Data Body Mass Index Body Surface Area  
 30.43 kg/m 2 2.23 m 2 Preferred Pharmacy Pharmacy Name Phone [phone] - [address] [phone] Your Updated Medication List  
  
   
 This list is accurate as of: 6/13/17  4:13 PM.  Always use your most recent med list.  
  
  
  
  
 albuterol 90 mcg/actuation inhaler Commonly known as:  PROAIR HFA Take 1 Puff by inhalation every six (6) hours as needed for Wheezing. amitriptyline 150 mg tablet Commonly known as:  ELAVIL Take 10 mg by mouth nightly. Indications: DEPRESSION  
  
 anagrelide 0.5 mg capsule Commonly known as:  Alba Fragmin Take 1 Cap by mouth two (2) times a day. Indications: ESSENTIAL THROMBOCYTOSIS  
  
 atenolol 25 mg tablet Commonly known as:  TENORMIN Take 25 mg by mouth daily. Indications: HYPERTENSION  
  
 cholecalciferol (VITAMIN D3) 5,000 unit Tab tablet Commonly known as:  VITAMIN D3 Take 5,000 Units by mouth every seven (7) days. DULoxetine 20 mg capsule Commonly known as:  CYMBALTA Take 20 mg by mouth daily. folic acid 287 mcg tablet Take 400 mcg by mouth daily. furosemide 40 mg tablet Commonly known as:  LASIX Take  by mouth daily. haloperidol 5 mg tablet Commonly known as:  HALDOL Take 2.5 mg by mouth nightly. HYZAAR 50-12.5 mg per tablet Generic drug:  losartan-hydroCHLOROthiazide Take 1 Tab by mouth daily. KlonoPIN 2 mg tablet Generic drug:  clonazePAM  
Take 2 mg by mouth daily. Indications: PANIC DISORDER  
  
 LANTUS SC  
5 Units by SubCUTAneous route daily. metFORMIN 1,000 mg tablet Commonly known as:  GLUCOPHAGE Take 500 mg by mouth two (2) times a day. methocarbamol 750 mg tablet Commonly known as:  RBRSXQS-164 Take 1 Tab by mouth three (3) times daily. methotrexate 2.5 mg tablet Commonly known as:  Jinny Portillo Take 5 mg by mouth daily. nitroglycerin 400 mcg/spray spray Commonly known as:  NITROLINGUAL  
1 [place] by SubLINGual route every five (5) minutes as needed. NovoLOG Mix 70-30 100 unit/mL (70-30) injection Generic drug:  insulin aspart protamine/insulin aspart 10 Units by SubCUTAneous route two (2) times a day. ondansetron hcl 4 mg tablet Commonly known as:  ZOFRAN (AS HYDROCHLORIDE) Take 1 Tab by mouth every eight (8) hours as needed for Nausea. oxyCODONE-acetaminophen  mg per tablet Commonly known as:  PERCOCET 10 Take 1 Tab by mouth every six (6) hours as needed for Pain. Max Daily Amount: 4 Tabs. PLAQUENIL 200 mg tablet Generic drug:  hydroxychloroquine Take 200 mg by mouth two (2) times a day. QUEtiapine 100 mg tablet Commonly known as:  SEROquel Take 25 mg by mouth. Takes 25 mg Q AM, and 100 mg, Q PM  
  
 REMICADE IV  
344 mg by IntraVENous route once as needed. remicade will be every 8 weeks We Performed the Following COMPLETE CBC & AUTO DIFF WBC [53320 CPT(R)] Follow-up Instructions Return in about 4 weeks (around 7/11/2017). To-Do List   
 06/13/2017 Lab:  CBC WITH 3 PART DIFF   
  
 07/21/2017 11:00 AM  
  Appointment with [address] at [place] ([phone]) 09/01/2017 11:00 AM  
  Appoint[place] with [address] [place] [address] ([phone]) Patient Instructions Rheumatoid Arthritis: Care Instructions Your Care Instructions Arthritis is a common health problem in which the joints are inflamed. There are many types of arthritis. In rheumatoid arthritis, the body's own immune system attacks the joints. This causes pain, stiffness, and swelling in the joints, especially in the hands and feet. It can become hard to open jars, write, and do other daily tasks. Sometimes rheumatoid arthritis can also cause bumps to form under the skin. Over time, rheumatoid arthritis can damage and deform joints. Early treatment with medicines may reduce your chances of having a lasting disability. Follow-up care is a key part of your treatment and safety.  Be sure to make and go to all appointments, and call your doctor if you are having problems. Its also a good idea to know your test results and keep a list of the medicines you take. How can you care for yourself at home? · If your doctor recommends it, get more exercise. Walking is a good choice. If your knees or ankles hurt, try riding a stationary bike or swimming. · Move each joint gently through its full range of motion once or twice a day. · Rest joints when they are sore or overworked. Short rest breaks may help more than staying in bed. · Reach and stay at a healthy weight. Regular exercise and a healthy diet will help you do this. Extra weight can strain the joints, especially the knees and hips, and make the pain worse. Losing even a few pounds may help. · Get enough calcium and vitamin D to help prevent osteoporosis, which causes thin bones. Talk to your doctor about how much you should take. · Protect your joints from injury. Do not overuse them. Try to limit or avoid activities that cause joint pain or swelling. Use special kitchen tools and other self-help devices as well as walkers, splints, or canes if needed. · Use heat to ease pain. Take warm showers or baths. Use hot packs or a heating pad set on low. Sleep under a warm electric blanket. · Put ice or a cold pack on the area for 10 to 20 minutes at a time. Put a thin cloth between the ice and your skin. · Take pain medicines exactly as directed. ¨ If the doctor gave you a prescription medicine for pain, take it as prescribed. ¨ If you are not taking a prescription pain medicine, ask your doctor if you can take an over-the-counter medicine. · Take an active role in managing your condition. Set up a treatment plan with your doctor, and learn as much as you can about rheumatoid arthritis. This will help you control pain and stay active. When should you call for help? Call your doctor now or seek immediate medical care if: · You have a fever or a rash along with joint pain. · You have joint pain that is so severe that you cannot use the joint at all. · You have sudden swelling, redness, or pain in one or more joints, and you do not know why. · You have back or neck pain along with weakness in your arms or legs. · You have a loss of bowel or bladder control. Watch closely for changes in your health, and be sure to contact your doctor if: 
· You have joint pain that lasts for more than 6 weeks. · You have side effects from your arthritis medicines, such as stomach pain, nausea, heartburn, or dark and tarlike stools. Where can you learn more? Go to http://swapnil-speedy.info/. Enter K205 in the search box to learn more about \"Rheumatoid Arthritis: Care Instructions. \" Current as of: October 31, 2016 Content Version: 11.2 © 7102-0648 [employer]. Care instructions adapted under license by [employer] (which disclaims liability or warranty for this information). If you have questions about a medical condition or this instruction, always ask your healthcare professional. [place] [address] any warranty or liability for your use of this information. Anemia: Care Instructions Your Care Instructions Anemia is a low level of red blood cells, which carry oxygen throughout your body. Many things can cause anemia. Lack of iron is one of the most common causes. Your body needs iron to make hemoglobin, a substance in red blood cells that carries oxygen from the lungs to your body's cells. Without enough iron, the body produces fewer and smaller red blood cells. As a result, your body's cells do not get enough oxygen, and you feel tired and weak. And you may have trouble concentrating. Bleeding is the most common cause of a lack of iron.  You may have heavy menstrual bleeding or bleeding caused by conditions such as ulcers, hemorrhoids, or cancer. Regular use of aspirin or other anti-inflammatory medicines (such as ibuprofen) also can cause bleeding in some people. A lack of iron in your diet also can cause anemia, especially at times when the body needs more iron, such as during pregnancy, infancy, and the teen years. Your doctor may have prescribed iron pills. It may take several months of treatment for your iron levels to return to normal. Your doctor also may suggest that you eat foods that are rich in iron, such as meat and beans. There are many other causes of anemia. It is not always due to a lack of iron. Finding the specific cause of your anemia will help your doctor find the right treatment for you. Follow-up care is a key part of your treatment and safety. Be sure to make and go to all appointments, and call your doctor if you are having problems. It's also a good idea to know your test results and keep a list of the medicines you take. How can you care for yourself at home? · Take your medicines exactly as prescribed. Call your doctor if you think you are having a problem with your medicine. · If your doctor recommends iron pills, take them as directed: ¨ Try to take the pills on an empty stomach about 1 hour before or 2 hours after meals. But you may need to take iron with food to avoid an upset stomach. ¨ Do not take antacids or drink milk or caffeine drinks (such as coffee, tea, or cola) at the same time or within 2 hours of the time that you take your iron. They can make it hard for your body to absorb the iron. ¨ Vitamin C (from food or supplements) helps your body absorb iron. Try taking iron pills with a glass of orange juice or some other food that is high in vitamin C, such as citrus fruits. ¨ Iron pills may cause stomach problems, such as heartburn, nausea, diarrhea, constipation, and cramps. Be sure to drink plenty of fluids, and include fruits, vegetables, and fiber in your diet each day.  Iron pills often make your bowel movements dark or green. ¨ If you forget to take an iron pill, do not take a double dose of iron the next time you take a pill. ¨ Keep iron pills out of the reach of small children. An overdose of iron can be very dangerous. · Follow your doctor's advice about eating iron-rich foods. These include red meat, shellfish, poultry, eggs, beans, raisins, whole-grain bread, and leafy green vegetables. · Steam vegetables to help them keep their iron content. When should you call for help? Call 911 anytime you think you may need emergency care. For example, call if: 
· You have symptoms of a heart attack. These may include: ¨ Chest pain or pressure, or a strange feeling in the chest. 
¨ Sweating. ¨ Shortness of breath. ¨ Nausea or vomiting. ¨ Pain, pressure, or a strange feeling in the back, neck, jaw, or upper belly or in one or both shoulders or arms. ¨ Lightheadedness or sudden weakness. ¨ A fast or irregular heartbeat. After you call 911, the  may tell you to chew 1 adult-strength or 2 to 4 low-dose aspirin. Wait for an ambulance. Do not try to drive yourself. · You passed out (lost consciousness). Call your doctor now or seek immediate medical care if: 
· You have new or increased shortness of breath. · You are dizzy or lightheaded, or you feel like you may faint. · Your fatigue and weakness continue or get worse. · You have any abnormal bleeding, such as: 
¨ Nosebleeds. ¨ Vaginal bleeding that is different (heavier, more frequent, at a different time of the month) than what you are used to. ¨ Bloody or black stools, or rectal bleeding. ¨ Bloody or pink urine. Watch closely for changes in your health, and be sure to contact your doctor if: 
· You do not get better as expected. Where can you learn more? Go to http://swapnil-speedy.info/. Enter R301 in the search box to learn more about \"Anemia: Care Instructions. \" Current as of: October 13, 2016 Content Version: 11.2 © 2163-1282 [employer]. Care instructions adapted under license by [employer] (which disclaims liability or warranty for this information). If you have questions about a medical condition or this instruction, always ask your healthcare professional. [address] any warranty or liability for your use of this information. Please provide this summary of care documentation to your next provider. Your primary care clinician is listed as Chris Stein. If you have any questions after today's visit, please call [phone].

## 2017-06-13 NOTE — PATIENT INSTRUCTIONS
Rheumatoid Arthritis: Care Instructions  Your Care Instructions    Arthritis is a common health problem in which the joints are inflamed. There are many types of arthritis. In rheumatoid arthritis, the body's own immune system attacks the joints. This causes pain, stiffness, and swelling in the joints, especially in the hands and feet. It can become hard to open jars, write, and do other daily tasks. Sometimes rheumatoid arthritis can also cause bumps to form under the skin. Over time, rheumatoid arthritis can damage and deform joints. Early treatment with medicines may reduce your chances of having a lasting disability. Follow-up care is a key part of your treatment and safety. Be sure to make and go to all appointments, and call your doctor if you are having problems. Its also a good idea to know your test results and keep a list of the medicines you take. How can you care for yourself at home? · If your doctor recommends it, get more exercise. Walking is a good choice. If your knees or ankles hurt, try riding a stationary bike or swimming. · Move each joint gently through its full range of motion once or twice a day. · Rest joints when they are sore or overworked. Short rest breaks may help more than staying in bed. · Reach and stay at a healthy weight. Regular exercise and a healthy diet will help you do this. Extra weight can strain the joints, especially the knees and hips, and make the pain worse. Losing even a few pounds may help. · Get enough calcium and vitamin D to help prevent osteoporosis, which causes thin bones. Talk to your doctor about how much you should take. · Protect your joints from injury. Do not overuse them. Try to limit or avoid activities that cause joint pain or swelling. Use special kitchen tools and other self-help devices as well as walkers, splints, or canes if needed. · Use heat to ease pain. Take warm showers or baths. Use hot packs or a heating pad set on low.  Sleep under a warm electric blanket. · Put ice or a cold pack on the area for 10 to 20 minutes at a time. Put a thin cloth between the ice and your skin. · Take pain medicines exactly as directed. ¨ If the doctor gave you a prescription medicine for pain, take it as prescribed. ¨ If you are not taking a prescription pain medicine, ask your doctor if you can take an over-the-counter medicine. · Take an active role in managing your condition. Set up a treatment plan with your doctor, and learn as much as you can about rheumatoid arthritis. This will help you control pain and stay active. When should you call for help? Call your doctor now or seek immediate medical care if:  · You have a fever or a rash along with joint pain. · You have joint pain that is so severe that you cannot use the joint at all. · You have sudden swelling, redness, or pain in one or more joints, and you do not know why. · You have back or neck pain along with weakness in your arms or legs. · You have a loss of bowel or bladder control. Watch closely for changes in your health, and be sure to contact your doctor if:  · You have joint pain that lasts for more than 6 weeks. · You have side effects from your arthritis medicines, such as stomach pain, nausea, heartburn, or dark and tarlike stools. Where can you learn more? Go to http://swapnil-speedy.info/. Enter K205 in the search box to learn more about \"Rheumatoid Arthritis: Care Instructions. \"  Current as of: October 31, 2016  Content Version: 11.2  © 0915-4998 [employer]. Care instructions adapted under license by [employer] (which disclaims liability or warranty for this information). If you have questions about a medical condition or this instruction, always ask your healthcare professional. [address] any warranty or liability for your use of this information.        Anemia: Care Instructions  Your Care Instructions    Anemia is a low level of red blood cells, which carry oxygen throughout your body. Many things can cause anemia. Lack of iron is one of the most common causes. Your body needs iron to make hemoglobin, a substance in red blood cells that carries oxygen from the lungs to your body's cells. Without enough iron, the body produces fewer and smaller red blood cells. As a result, your body's cells do not get enough oxygen, and you feel tired and weak. And you may have trouble concentrating. Bleeding is the most common cause of a lack of iron. You may have heavy menstrual bleeding or bleeding caused by conditions such as ulcers, hemorrhoids, or cancer. Regular use of aspirin or other anti-inflammatory medicines (such as ibuprofen) also can cause bleeding in some people. A lack of iron in your diet also can cause anemia, especially at times when the body needs more iron, such as during pregnancy, infancy, and the teen years. Your doctor may have prescribed iron pills. It may take several months of treatment for your iron levels to return to normal. Your doctor also may suggest that you eat foods that are rich in iron, such as meat and beans. There are many other causes of anemia. It is not always due to a lack of iron. Finding the specific cause of your anemia will help your doctor find the right treatment for you. Follow-up care is a key part of your treatment and safety. Be sure to make and go to all appointments, and call your doctor if you are having problems. It's also a good idea to know your test results and keep a list of the medicines you take. How can you care for yourself at home? · Take your medicines exactly as prescribed. Call your doctor if you think you are having a problem with your medicine. · If your doctor recommends iron pills, take them as directed:  ¨ Try to take the pills on an empty stomach about 1 hour before or 2 hours after meals.  But you may need to take iron with food to avoid an upset stomach. ¨ Do not take antacids or drink milk or caffeine drinks (such as coffee, tea, or cola) at the same time or within 2 hours of the time that you take your iron. They can make it hard for your body to absorb the iron. ¨ Vitamin C (from food or supplements) helps your body absorb iron. Try taking iron pills with a glass of orange juice or some other food that is high in vitamin C, such as citrus fruits. ¨ Iron pills may cause stomach problems, such as heartburn, nausea, diarrhea, constipation, and cramps. Be sure to drink plenty of fluids, and include fruits, vegetables, and fiber in your diet each day. Iron pills often make your bowel movements dark or green. ¨ If you forget to take an iron pill, do not take a double dose of iron the next time you take a pill. ¨ Keep iron pills out of the reach of small children. An overdose of iron can be very dangerous. · Follow your doctor's advice about eating iron-rich foods. These include red meat, shellfish, poultry, eggs, beans, raisins, whole-grain bread, and leafy green vegetables. · Steam vegetables to help them keep their iron content. When should you call for help? Call 911 anytime you think you may need emergency care. For example, call if:  · You have symptoms of a heart attack. These may include:  ¨ Chest pain or pressure, or a strange feeling in the chest.  ¨ Sweating. ¨ Shortness of breath. ¨ Nausea or vomiting. ¨ Pain, pressure, or a strange feeling in the back, neck, jaw, or upper belly or in one or both shoulders or arms. ¨ Lightheadedness or sudden weakness. ¨ A fast or irregular heartbeat. After you call 911, the  may tell you to chew 1 adult-strength or 2 to 4 low-dose aspirin. Wait for an ambulance. Do not try to drive yourself. · You passed out (lost consciousness). Call your doctor now or seek immediate medical care if:  · You have new or increased shortness of breath.   · You are dizzy or lightheaded, or you feel like you may faint. · Your fatigue and weakness continue or get worse. · You have any abnormal bleeding, such as:  ¨ Nosebleeds. ¨ Vaginal bleeding that is different (heavier, more frequent, at a different time of the month) than what you are used to. ¨ Bloody or black stools, or rectal bleeding. ¨ Bloody or pink urine. Watch closely for changes in your health, and be sure to contact your doctor if:  · You do not get better as expected. Where can you learn more? Go to http://swapnil-speedy.info/. Enter R301 in the search box to learn more about \"Anemia: Care Instructions. \"  Current as of: October 13, 2016  Content Version: 11.2  © 0000-1390 [employer]. Care instructions adapted under license by [employer] (which disclaims liability or warranty for this information). If you have questions about a medical condition or this instruction, always ask your healthcare professional. [place] [address] any warranty or liability for your use of this information.

## 2017-06-13 NOTE — PROGRESS NOTES
Hematology/Oncology  Progress Note    Name: Jase Dopp  Date: 2016  : 1965    PCP: Shaun Molina MD     Ms. Ambar Yeager is a 46year old female who was seen for management of her rheumatoid arthritis, leukocytosis, and thrombocytosis. Current therapy: Remicade 4 mg/kg bodyweight ever 6 weeks intravenously    Subjective:     Ms. Ambar Yeager is a 46year-old [facility] American woman who has severe rheumatoid arthritis. She receives Remicade every 6 weeks. She also suffers from fibromyalgia and had an elevated WBC count and platelet count of undefined etiology. Today she has no new complaints to report. Her pain is well controlled with her current pain medication regimen. She reports that the Remicade has provided a significant degree of relief from the severe rheumatoid arthritis symptoms. The patient is continuing to use her cane for mobility support. Her next treatment is scheduled for 2017. She denies any recent fevers or recurrent infections. The patient reports that her appetite has continued to improve. She also states her energy level is continuing to improve as well. At this point she is continuing to gain some weight due to the improved appetite.     Past Medical History:   Diagnosis Date    Abdominal pain, unspecified site     Acute otitis media     Acute pharyngitis     Anxiety     Arthritis     Autoimmune disease ([facility] [address]     Back injury     Backache     herniated disc in lower back    Blurred vision     Chest pain, unspecified     abnormal EKG    Chronic airway obstruction, not elsewhere classified     Chronic back pain     Chronic pain     COPD     Depression     Diabetes (HCC)     Dizziness     Dizziness and giddiness     possible orthostatic changes, vasovagal, autonomic dysfunction from diabetic neuropathy    Encounters for unspecified administrative purpose     Feeling anxious     Fibromyalgia     Headache     Hemorrhoid     Herniated disc     at L5    Hypercholesterolemia     Hyperglycemia     Hypertension     Joint pain     Leukocytosis, unspecified     Major depressive disorder, single episode, mild (HCC)     Mental disorder     depression, anxiety, bipolar and PTSD    Neuropathy     Obesity, unspecified     Other chest pain     Phobic disorders     Rheumatoid arthritis (HCC)     Seasonal allergies     Shortness of breath     normal EF    Streptococcal sore throat     Type II or unspecified type diabetes mellitus with unspecified complication, uncontrolled     Unspecified hereditary and idiopathic peripheral neuropathy     Vitamin D deficiency      Past Surgical History:   Procedure Laterality Date    HX CHOLECYSTECTOMY  1994    HX GYN  2005    partial Hysterectomy    HX OTHER SURGICAL  12-1-15    had ingrown toenail removed from big toe, both feet    HX TUBAL LIGATION      1995     Social History     Social History    Marital status:      Spouse name: N/A    Number of children: N/A    Years of education: N/A     Occupational History    Not on file.      Social History Main Topics    Smoking status: Former Smoker    Smokeless tobacco: Not on file    Alcohol use Yes      Comment: socially    Drug use: No    Sexual activity: Yes     Partners: Male     Birth control/ protection: Condom     Other Topics Concern    Not on file     Social History Narrative     Family History   Problem Relation Age of Onset    Diabetes Other     Alcohol abuse Mother     Arthritis-osteo Mother     Diabetes Mother     Elevated Lipids Mother     Headache Mother     Heart Disease Mother    Pio Wilkinson Migraines Mother     Psychiatric Disorder Mother     Alcohol abuse Father    Dixon Pare Father     Cancer Father     Headache Father     Heart Disease Father     Lung Disease Father     Migraines Father     Alcohol abuse Sister     Headache Sister     Diabetes Sister     Heart Disease Sister     Migraines Sister     Psychiatric Disorder Sister     Headache Brother     Diabetes Brother     Elevated Lipids Brother     Heart Disease Brother     Migraines Brother     Psychiatric Disorder Brother     Cancer Maternal Aunt     Alcohol abuse Maternal Aunt     Diabetes Maternal Aunt     Headache Maternal Aunt     Lung Disease Maternal Aunt     Migraines Maternal Aunt     Headache Maternal Uncle     Migraines Maternal Uncle     Stroke Maternal Uncle     Psychiatric Disorder Maternal Uncle     Headache Paternal Aunt     Migraines Paternal Aunt     Headache Paternal Uncle     Hypertension Paternal Uncle     Migraines Paternal Uncle     Stroke Paternal Uncle     Headache Maternal Grandmother     Migraines Maternal Grandmother     Stroke Maternal Grandmother     Headache Maternal Grandfather     Migraines Maternal Grandfather     Stroke Maternal Grandfather     Headache Paternal Grandmother     Hypertension Paternal Grandmother     Lung Disease Paternal Grandmother     Migraines Paternal Grandmother     Headache Paternal Grandfather     Cancer Paternal Grandfather     Migraines Paternal Grandfather      Current Outpatient Prescriptions   Medication Sig Dispense Refill    anagrelide (AGRYLIN) 0.5 mg capsule Take 1 Cap by mouth two (2) times a day. Indications: ESSENTIAL THROMBOCYTOSIS 60 Cap 4    oxyCODONE-acetaminophen (PERCOCET 10)  mg per tablet Take 1 Tab by mouth every six (6) hours as needed for Pain. Max Daily Amount: 4 Tabs. 120 Tab 0    methocarbamol (ROBAXIN-750) 750 mg tablet Take 1 Tab by mouth three (3) times daily. 15 Tab 0    insulin aspart protamine/insulin aspart (NOVOLOG MIX 70-30) 100 unit/mL (70-30) injection 10 Units by SubCUTAneous route two (2) times a day.  clonazePAM (KLONOPIN) 2 mg tablet Take 2 mg by mouth daily. Indications: PANIC DISORDER      folic acid 795 mcg tablet Take 400 mcg by mouth daily.  haloperidol (HALDOL) 5 mg tablet Take 2.5 mg by mouth nightly.       QUEtiapine (SEROQUEL) 100 mg tablet Take 25 mg by mouth. Takes 25 mg Q AM, and 100 mg, Q PM      DULoxetine (CYMBALTA) 20 mg capsule Take 20 mg by mouth daily.  albuterol (PROAIR HFA) 90 mcg/actuation inhaler Take 1 Puff by inhalation every six (6) hours as needed for Wheezing. 1 Inhaler 0    ondansetron hcl (ZOFRAN, AS HYDROCHLORIDE,) 4 mg tablet Take 1 Tab by mouth every eight (8) hours as needed for Nausea. 12 Tab 0    methotrexate (RHEUMATREX) 2.5 mg tablet Take 5 mg by mouth daily.  metFORMIN (GLUCOPHAGE) 1,000 mg tablet Take 500 mg by mouth two (2) times a day.  Cholecalciferol, Vitamin D3, 5,000 unit Tab Take 5,000 Units by mouth every seven (7) days.  atenolol (TENORMIN) 25 mg tablet Take 25 mg by mouth daily. Indications: HYPERTENSION      hydroxychloroquine (PLAQUENIL) 200 mg tablet Take 200 mg by mouth two (2) times a day.  furosemide (LASIX) 40 mg tablet Take  by mouth daily.  INFLIXIMAB (REMICADE IV) 344 mg by IntraVENous route once as needed. remicade will be every 8 weeks       amitriptyline (ELAVIL) 150 mg tablet Take 10 mg by mouth nightly. Indications: DEPRESSION      nitroglycerin (NITROLINGUAL) 0.4 mg/dose spray 1 Spray by SubLINGual route every five (5) minutes as needed.  losartan-hydrochlorothiazide (HYZAAR) 50-12.5 mg per tablet Take 1 Tab by mouth daily.  INSULIN GLARGINE,HUM. REC. ANLOG (LANTUS SC) 5 Units by SubCUTAneous route daily. Review of Systems  Constitutional: The patient has no acute distress or discomfort. HEENT: The patient denies recent head trauma, eye pain, blurred vision,  hearing deficit, oropharyngeal mucosal pain or lesions, and the patient denies throat pain or discomfort. Lymphatics: The patient denies palpable peripheral lymphadenopathy. Hematologic: The patient denies having bruising, bleeding, or progressive fatigue.   Respiratory: Patient denies having shortness of breath, cough, sputum production, fever, or dyspnea on exertion. Cardiovascular: The patient denies having leg pain, leg swelling, heart palpitations, chest permit, chest pain, or lightheadedness. The patient denies having dyspnea on exertion. Gastrointestinal: The patient denies having nausea, emesis, or diarrhea. The patient denies having any hematemesis or blood in the stool. Genitourinary: Patient denies having urinary urgency, frequency, or dysuria. The patient denies having blood in the urine. Psychological: The patient denies having symptoms of nervousness, anxiety, depression, or thoughts of harming himself some of this. Skin: Patient denies having skin rashes, skin, ulcerations, or unexplained itching or pruritus. Musculoskeletal: The patient denies muscle,bone, or joint pains at this time. Objective:     Visit Vitals    /63    Pulse 97    Temp 98.6 °F (37 °C)    Ht 5' 11\" (1.803 m)    Wt 99 kg (218 lb 3.2 oz)    BMI 30.43 kg/m2     ECOG PS=0 Pain score 2/10     Physical Exam:   Gen. Appearance: The patient is in no acute distress. Skin: There is no bruise or rash. HEENT: The exam is unremarkable. Neck: Supple without lymphadenopathy or thyromegaly. Lungs: Clear to auscultation and percussion; there are no wheezes or rhonchi. Heart: Regular rate and rhythm; there are no murmurs, gallops, or rubs. Abdomen: Bowel sounds are present and normal.  There is no guarding, tenderness, or hepatosplenomegaly. Extremities: There is no clubbing, cyanosis, or edema. Neurologic: There are no focal neurologic deficits. Lymphatics: There is no palpable peripheral lymphadenopathy. Musculoskeletal: The patient has full range of motion at all joints. However, she complains of pain on ambulation due to the severe rheumatoid arthritis. There is  evidence of joint deformity or effusions in the hands and knees. There is no focal joint tenderness. She is using a cane for support and mobility.    Psychological/psychiatric: There is no clinical evidence of anxiety, depression, or melancholy. Lab data:      Results for orders placed or performed during the hospital encounter of 06/13/17   CBC WITH 3 PART DIFF     Status: Abnormal   Result Value Ref Range Status    WBC 10.0 4.5 - 13.0 K/uL Final    RBC 4.64 4.10 - 5.10 M/uL Final    HGB 12.3 12.0 - 16.0 g/dL Final    HCT 37.6 36 - 48 % Final    MCV 81.0 78 - 102 FL Final    MCH 26.5 25.0 - 35.0 PG Final    MCHC 32.7 31 - 37 g/dL Final    RDW 14.2 11.5 - 14.5 % Final    PLATELET 308 (H) 503 - 440 K/uL Final    NEUTROPHILS 57 40 - 70 % Final    MIXED CELLS 10 0.1 - 17 % Final    LYMPHOCYTES 32 14 - 44 % Final    ABS. NEUTROPHILS 5.8 1.8 - 9.5 K/UL Final    ABS. MIXED CELLS 1.0 0.0 - 2.3 K/uL Final    ABS. LYMPHOCYTES 3.2 1.1 - 5.9 K/UL Final     Comment: Test performed at [address] Results Reviewed by Medical Director. DF AUTOMATED   Final           Assessment:     1. Essential thrombocytosis ([facility] [address]    2. Thrombocytosis ([facility] [address]    3. Rheumatoid arthritis involving multiple joints (HCC)    4. Fibromyalgia    5. Chronic anemia    6. Leukocytosis, unspecified type      Plan:   Leukocytosis (resolved): I have explained to the patient that the etiology of her leukocytosis remains undefined. A previous flow cytometry did not reveal any  evidence of an immunophenotypic abnormality such as an evolving chronic lymphoproliferative disorder or myeloproliferative disorder. The CBCs will continue to be monitored every 6 weeks. The CBC from today shows her WBC count is currently 10, the absolute neutrophil count is 5.8 and the absolute lymphocyte count is 3.2. Essential thrombocytosis/thrombocytosis: The current CBC shows that the platelet count is now 637,000. The platelet count is being monitored every 6 weeks. The patient was previously started on anagrelide 0.5 mg one tablet twice daily. This medication will be continued, at the current dose.  I have reenforced to the patient the importance of being complaint with the treatment plan. Rheumatoid arthritis: the patient will continue to receive Remicade as a primary treatment modality for her severe rheumatoid arthritis every 6 weeks. The dose of Remicade will be 344 mg given intravenously. Her next dose is scheduled for 7/21/2017. The patient has continued to take  Methotrexate 5 mg p.o. daily and Plaquenil 200 mg twice daily. Fibromyalgia: The patient  continues to have generalized pain at times related to her underlying fibromyalgia. At this time, her pain is well controlled. The patient receives her Percocet medication on a monthly basis as needed. Chronic anemia: I have explained to the patient the CBC from today shows her hemoglobin has remained normal at 12.3 g/dL with hematocrit of 37.6% %. I have recommended that she continue to take ferrous sulfate 325 mg by mouth once daily. We will see the patient back in 6 weeks for a complete reassessment.     Orders Placed This Encounter    COMPLETE CBC & AUTO DIFF WBC    InHouse CBC ([employer])     Standing Status:   Future     Number of Occurrences:   1     Standing Expiration Date:   5/85/7977    METABOLIC PANEL, COMPREHENSIVE     Standing Status:   Future     Standing Expiration Date:   6/14/2018    IRON PROFILE     Standing Status:   Future     Standing Expiration Date:   6/14/2018    FERRITIN     Standing Status:   Future     Standing Expiration Date:   6/14/2018       Rosalind Velázquez MD  6/13/2016

## 2017-06-27 RX ORDER — OXYCODONE AND ACETAMINOPHEN 10; 325 MG/1; MG/1
1 TABLET ORAL
Qty: 60 TAB | Refills: 0 | Status: SHIPPED | OUTPATIENT
Start: 2017-06-27 | End: 2017-07-11 | Stop reason: SDUPTHER

## 2017-07-11 ENCOUNTER — OFFICE VISIT (OUTPATIENT)
Dept: ONCOLOGY | Age: 52
End: 2017-07-11

## 2017-07-11 ENCOUNTER — HOSPITAL ENCOUNTER (OUTPATIENT)
Dept: LAB | Age: 52
Discharge: HOME | End: 2017-07-11
Payer: MEDICARE

## 2017-07-11 ENCOUNTER — HOSPITAL ENCOUNTER (OUTPATIENT)
Dept: ONCOLOGY | Age: 52
Discharge: HOME | End: 2017-07-11

## 2017-07-11 VITALS
SYSTOLIC BLOOD PRESSURE: 99 MMHG | HEART RATE: 83 BPM | TEMPERATURE: 98.2 F | DIASTOLIC BLOOD PRESSURE: 69 MMHG | BODY MASS INDEX: 29.43 KG/M2 | WEIGHT: 211 LBS

## 2017-07-11 DIAGNOSIS — D64.9 CHRONIC ANEMIA: ICD-10-CM

## 2017-07-11 DIAGNOSIS — D72.829 LEUKOCYTOSIS, UNSPECIFIED TYPE: ICD-10-CM

## 2017-07-11 DIAGNOSIS — D47.3 ESSENTIAL THROMBOCYTOSIS (HCC): Primary | ICD-10-CM

## 2017-07-11 DIAGNOSIS — M06.9 RHEUMATOID ARTHRITIS INVOLVING MULTIPLE JOINTS (HCC): ICD-10-CM

## 2017-07-11 DIAGNOSIS — D47.3 ESSENTIAL THROMBOCYTOSIS (HCC): ICD-10-CM

## 2017-07-11 DIAGNOSIS — D75.839 THROMBOCYTOSIS: ICD-10-CM

## 2017-07-11 LAB
ALBUMIN SERPL BCP-MCNC: 3.5 G/DL (ref 3.4–5)
ALBUMIN/GLOB SERPL: 0.8 {RATIO} (ref 0.8–1.7)
ALP SERPL-CCNC: 93 U/L (ref 45–117)
ALT SERPL-CCNC: 17 U/L (ref 13–56)
ANION GAP BLD CALC-SCNC: 9 MMOL/L (ref 3–18)
AST SERPL W P-5'-P-CCNC: 8 U/L (ref 15–37)
BASOPHILS # BLD AUTO: 0 K/UL (ref 0–0.06)
BASOPHILS # BLD: 0 % (ref 0–2)
BILIRUB SERPL-MCNC: 0.3 MG/DL (ref 0.2–1)
BUN SERPL-MCNC: 14 MG/DL (ref 7–18)
BUN/CREAT SERPL: 19 (ref 12–20)
CALCIUM SERPL-MCNC: 9.5 MG/DL (ref 8.5–10.1)
CHLORIDE SERPL-SCNC: 103 MMOL/L (ref 100–108)
CO2 SERPL-SCNC: 27 MMOL/L (ref 21–32)
CREAT SERPL-MCNC: 0.75 MG/DL (ref 0.6–1.3)
DIFFERENTIAL METHOD BLD: ABNORMAL
EOSINOPHIL # BLD: 0.4 K/UL (ref 0–0.4)
EOSINOPHIL NFR BLD: 4 % (ref 0–5)
ERYTHROCYTE [DISTWIDTH] IN BLOOD BY AUTOMATED COUNT: 15.2 % (ref 11.6–14.5)
FERRITIN SERPL-MCNC: 122 NG/ML (ref 8–388)
GLOBULIN SER CALC-MCNC: 4.2 G/DL (ref 2–4)
GLUCOSE SERPL-MCNC: 88 MG/DL (ref 74–99)
HCT VFR BLD AUTO: 39.8 % (ref 35–45)
HGB BLD-MCNC: 13.3 G/DL (ref 12–16)
IRON SATN MFR SERPL: 17 %
IRON SERPL-MCNC: 51 UG/DL (ref 50–175)
LYMPHOCYTES # BLD AUTO: 34 % (ref 21–52)
LYMPHOCYTES # BLD: 4 K/UL (ref 0.9–3.6)
MCH RBC QN AUTO: 27.1 PG (ref 24–34)
MCHC RBC AUTO-ENTMCNC: 33.4 G/DL (ref 31–37)
MCV RBC AUTO: 81.1 FL (ref 74–97)
MONOCYTES # BLD: 0.8 K/UL (ref 0.05–1.2)
MONOCYTES NFR BLD AUTO: 7 % (ref 3–10)
NEUTS SEG # BLD: 6.5 K/UL (ref 1.8–8)
NEUTS SEG NFR BLD AUTO: 55 % (ref 40–73)
PLATELET # BLD AUTO: 352 K/UL (ref 135–420)
PMV BLD AUTO: 9.8 FL (ref 9.2–11.8)
POTASSIUM SERPL-SCNC: 4.3 MMOL/L (ref 3.5–5.5)
PROT SERPL-MCNC: 7.7 G/DL (ref 6.4–8.2)
RBC # BLD AUTO: 4.91 M/UL (ref 4.2–5.3)
SODIUM SERPL-SCNC: 139 MMOL/L (ref 136–145)
TIBC SERPL-MCNC: 305 UG/DL (ref 250–450)
WBC # BLD AUTO: 11.7 K/UL (ref 4.6–13.2)

## 2017-07-11 PROCEDURE — 36415 COLL VENOUS BLD VENIPUNCTURE: CPT | Performed by: INTERNAL MEDICINE

## 2017-07-11 PROCEDURE — 83540 ASSAY OF IRON: CPT | Performed by: INTERNAL MEDICINE

## 2017-07-11 PROCEDURE — 80053 COMPREHEN METABOLIC PANEL: CPT | Performed by: INTERNAL MEDICINE

## 2017-07-11 PROCEDURE — 82728 ASSAY OF FERRITIN: CPT | Performed by: INTERNAL MEDICINE

## 2017-07-11 RX ORDER — OXYCODONE AND ACETAMINOPHEN 10; 325 MG/1; MG/1
1 TABLET ORAL
Qty: 60 TAB | Refills: 0 | Status: SHIPPED | OUTPATIENT
Start: 2017-07-11 | End: 2017-07-21 | Stop reason: SDUPTHER

## 2017-07-11 RX ORDER — ANAGRELIDE 0.5 MG/1
0.5 CAPSULE ORAL 2 TIMES DAILY
Qty: 60 CAP | Refills: 6 | Status: SHIPPED | OUTPATIENT
Start: 2017-07-11 | End: 2017-11-14 | Stop reason: SDUPTHER

## 2017-07-11 NOTE — PROGRESS NOTES
Hematology/Oncology  Progress Note    Name: Camacho Fisher  Date: 2017  : 1965    PCP: Burgess Aníbal MD     Ms. Yony Tompkins is a 46year old female who was seen for management of her rheumatoid arthritis, leukocytosis, and thrombocytosis. Current therapy: Remicade 4 mg/kg bodyweight ever 6 weeks intravenously    Subjective:     Ms. Yony Tompkins is a 46year-old [facility] American woman who has severe rheumatoid arthritis. She receives Remicade every 6 weeks. She also suffers from fibromyalgia and had an elevated WBC count and platelet count of undefined etiology. Today she has no new complaints to report. Her pain is well controlled with her current pain medication regimen. She reports that the Remicade has provided a significant degree of relief from the severe rheumatoid arthritis symptoms. The patient is continuing to use her cane for mobility support. Her next treatment is scheduled for 2017. She denies any recent fevers or recurrent infections. The patient reports that her appetite has continued to improve. She also states her energy level is continuing to improve as well. At this point she is continuing to gain some weight due to the improved appetite.     Past Medical History:   Diagnosis Date    Abdominal pain, unspecified site     Acute otitis media     Acute pharyngitis     Anxiety     Arthritis     Autoimmune disease ([facility] [address]     Back injury     Backache     herniated disc in lower back    Blurred vision     Chest pain, unspecified     abnormal EKG    Chronic airway obstruction, not elsewhere classified     Chronic back pain     Chronic pain     COPD     Depression     Diabetes (HCC)     Dizziness     Dizziness and giddiness     possible orthostatic changes, vasovagal, autonomic dysfunction from diabetic neuropathy    Encounters for unspecified administrative purpose     Feeling anxious     Fibromyalgia     Headache     Hemorrhoid     Herniated disc     at L5    Hypercholesterolemia     Hyperglycemia     Hypertension     Joint pain     Leukocytosis, unspecified     Major depressive disorder, single episode, mild (HCC)     Mental disorder     depression, anxiety, bipolar and PTSD    Neuropathy     Obesity, unspecified     Other chest pain     Phobic disorders     Rheumatoid arthritis (HCC)     Seasonal allergies     Shortness of breath     normal EF    Streptococcal sore throat     Type II or unspecified type diabetes mellitus with unspecified complication, uncontrolled     Unspecified hereditary and idiopathic peripheral neuropathy     Vitamin D deficiency      Past Surgical History:   Procedure Laterality Date    HX CHOLECYSTECTOMY  1994    HX GYN  2005    partial Hysterectomy    HX OTHER SURGICAL  12-1-15    had ingrown toenail removed from big toe, both feet    HX TUBAL LIGATION      1995     Social History     Social History    Marital status:      Spouse name: N/A    Number of children: N/A    Years of education: N/A     Occupational History    Not on file.      Social History Main Topics    Smoking status: Former Smoker    Smokeless tobacco: Not on file    Alcohol use Yes      Comment: socially    Drug use: No    Sexual activity: Yes     Partners: Male     Birth control/ protection: Condom     Other Topics Concern    Not on file     Social History Narrative     Family History   Problem Relation Age of Onset    Diabetes Other     Alcohol abuse Mother     Arthritis-osteo Mother     Diabetes Mother     Elevated Lipids Mother     Headache Mother     Heart Disease Mother    Ernesto Mano Migraines Mother     Psychiatric Disorder Mother     Alcohol abuse Father    Therese Altman Father     Cancer Father     Headache Father     Heart Disease Father     Lung Disease Father     Migraines Father     Alcohol abuse Sister     Headache Sister     Diabetes Sister     Heart Disease Sister     Migraines Sister     Psychiatric Disorder Sister     Headache Brother     Diabetes Brother     Elevated Lipids Brother     Heart Disease Brother     Migraines Brother     Psychiatric Disorder Brother     Cancer Maternal Aunt     Alcohol abuse Maternal Aunt     Diabetes Maternal Aunt     Headache Maternal Aunt     Lung Disease Maternal Aunt     Migraines Maternal Aunt     Headache Maternal Uncle     Migraines Maternal Uncle     Stroke Maternal Uncle     Psychiatric Disorder Maternal Uncle     Headache Paternal Aunt     Migraines Paternal Aunt     Headache Paternal Uncle     Hypertension Paternal Uncle     Migraines Paternal Uncle     Stroke Paternal Uncle     Headache Maternal Grandmother     Migraines Maternal Grandmother     Stroke Maternal Grandmother     Headache Maternal Grandfather     Migraines Maternal Grandfather     Stroke Maternal Grandfather     Headache Paternal Grandmother     Hypertension Paternal Grandmother     Lung Disease Paternal Grandmother     Migraines Paternal Grandmother     Headache Paternal Grandfather     Cancer Paternal Grandfather     Migraines Paternal Grandfather      Current Outpatient Prescriptions   Medication Sig Dispense Refill    oxyCODONE-acetaminophen (PERCOCET 10)  mg per tablet Take 1 Tab by mouth every six (6) hours as needed for Pain for up to 15 days. Max Daily Amount: 4 Tabs. 60 Tab 0    anagrelide (AGRYLIN) 0.5 mg capsule Take 1 Cap by mouth two (2) times a day. Indications: ESSENTIAL THROMBOCYTOSIS 60 Cap 6    methocarbamol (ROBAXIN-750) 750 mg tablet Take 1 Tab by mouth three (3) times daily. 15 Tab 0    insulin aspart protamine/insulin aspart (NOVOLOG MIX 70-30) 100 unit/mL (70-30) injection 10 Units by SubCUTAneous route two (2) times a day.  clonazePAM (KLONOPIN) 2 mg tablet Take 2 mg by mouth daily. Indications: PANIC DISORDER      folic acid 264 mcg tablet Take 400 mcg by mouth daily.       haloperidol (HALDOL) 5 mg tablet Take 2.5 mg by mouth nightly.  QUEtiapine (SEROQUEL) 100 mg tablet Take 25 mg by mouth. Takes 25 mg Q AM, and 100 mg, Q PM      DULoxetine (CYMBALTA) 20 mg capsule Take 20 mg by mouth daily.  albuterol (PROAIR HFA) 90 mcg/actuation inhaler Take 1 Puff by inhalation every six (6) hours as needed for Wheezing. 1 Inhaler 0    ondansetron hcl (ZOFRAN, AS HYDROCHLORIDE,) 4 mg tablet Take 1 Tab by mouth every eight (8) hours as needed for Nausea. 12 Tab 0    methotrexate (RHEUMATREX) 2.5 mg tablet Take 5 mg by mouth daily.  metFORMIN (GLUCOPHAGE) 1,000 mg tablet Take 500 mg by mouth two (2) times a day.  Cholecalciferol, Vitamin D3, 5,000 unit Tab Take 5,000 Units by mouth every seven (7) days.  atenolol (TENORMIN) 25 mg tablet Take 25 mg by mouth daily. Indications: HYPERTENSION      hydroxychloroquine (PLAQUENIL) 200 mg tablet Take 200 mg by mouth two (2) times a day.  furosemide (LASIX) 40 mg tablet Take  by mouth daily.  INFLIXIMAB (REMICADE IV) 344 mg by IntraVENous route once as needed. remicade will be every 8 weeks       amitriptyline (ELAVIL) 150 mg tablet Take 10 mg by mouth nightly. Indications: DEPRESSION      nitroglycerin (NITROLINGUAL) 0.4 mg/dose spray 1 Spray by SubLINGual route every five (5) minutes as needed.  losartan-hydrochlorothiazide (HYZAAR) 50-12.5 mg per tablet Take 1 Tab by mouth daily.  INSULIN GLARGINE,HUM. REC. ANLOG (LANTUS SC) 5 Units by SubCUTAneous route daily. Review of Systems  Constitutional: The patient has no acute distress or discomfort. HEENT: The patient denies recent head trauma, eye pain, blurred vision,  hearing deficit, oropharyngeal mucosal pain or lesions, and the patient denies throat pain or discomfort. Lymphatics: The patient denies palpable peripheral lymphadenopathy. Hematologic: The patient denies having bruising, bleeding, or progressive fatigue.   Respiratory: Patient denies having shortness of breath, cough, sputum production, fever, or dyspnea on exertion. Cardiovascular: The patient denies having leg pain, leg swelling, heart palpitations, chest permit, chest pain, or lightheadedness. The patient denies having dyspnea on exertion. Gastrointestinal: The patient denies having nausea, emesis, or diarrhea. The patient denies having any hematemesis or blood in the stool. Genitourinary: Patient denies having urinary urgency, frequency, or dysuria. The patient denies having blood in the urine. Psychological: The patient denies having symptoms of nervousness, anxiety, depression, or thoughts of harming himself some of this. Skin: Patient denies having skin rashes, skin, ulcerations, or unexplained itching or pruritus. Musculoskeletal: The patient denies muscle,bone, or joint pains at this time. Objective: There were no vitals taken for this visit. ECOG PS=0 Pain score 2/10     Physical Exam:   Gen. Appearance: The patient is in no acute distress. Skin: There is no bruise or rash. HEENT: The exam is unremarkable. Neck: Supple without lymphadenopathy or thyromegaly. Lungs: Clear to auscultation and percussion; there are no wheezes or rhonchi. Heart: Regular rate and rhythm; there are no murmurs, gallops, or rubs. Abdomen: Bowel sounds are present and normal.  There is no guarding, tenderness, or hepatosplenomegaly. Extremities: There is no clubbing, cyanosis, or edema. Neurologic: There are no focal neurologic deficits. Lymphatics: There is no palpable peripheral lymphadenopathy. Musculoskeletal: The patient has full range of motion at all joints. However, she complains of pain on ambulation due to the severe rheumatoid arthritis. There is  evidence of joint deformity or effusions in the hands and knees. There is no focal joint tenderness. She is using a cane for support and mobility. Psychological/psychiatric: There is no clinical evidence of anxiety, depression, or melancholy.     Lab data:      Results for orders placed or performed during the hospital encounter of 06/13/17   CBC WITH 3 PART DIFF     Status: Abnormal   Result Value Ref Range Status    WBC 10.0 4.5 - 13.0 K/uL Final    RBC 4.64 4.10 - 5.10 M/uL Final    HGB 12.3 12.0 - 16.0 g/dL Final    HCT 37.6 36 - 48 % Final    MCV 81.0 78 - 102 FL Final    MCH 26.5 25.0 - 35.0 PG Final    MCHC 32.7 31 - 37 g/dL Final    RDW 14.2 11.5 - 14.5 % Final    PLATELET 516 (H) 056 - 440 K/uL Final    NEUTROPHILS 57 40 - 70 % Final    MIXED CELLS 10 0.1 - 17 % Final    LYMPHOCYTES 32 14 - 44 % Final    ABS. NEUTROPHILS 5.8 1.8 - 9.5 K/UL Final    ABS. MIXED CELLS 1.0 0.0 - 2.3 K/uL Final    ABS. LYMPHOCYTES 3.2 1.1 - 5.9 K/UL Final     Comment: Test performed at [address] Results Reviewed by Medical Director. DF AUTOMATED   Final           Assessment:     1. Essential thrombocytosis ([facility] [address]    2. Thrombocytosis ([facility] [address]    3. Rheumatoid arthritis involving multiple joints (HCC)    4. Chronic anemia    5. Leukocytosis, unspecified type      Plan:   Leukocytosis (resolved): I have explained to the patient that the etiology of her leukocytosis remains undefined. A previous flow cytometry did not reveal any  evidence of an immunophenotypic abnormality such as an evolving chronic lymphoproliferative disorder or myeloproliferative disorder. The CBCs will continue to be monitored every 6 weeks. The CBC from today shows her WBC count is currently 11, the absolute neutrophil count is 6.3 and the absolute lymphocyte count is 3.8. Essential thrombocytosis/thrombocytosis: The current CBC shows that the platelet count is now 366,000. The platelet count is being monitored every 6 weeks. The patient was previously started on anagrelide 0.5 mg one tablet twice daily. This medication will be continued, at the current dose. I have reenforced to the patient the importance of being complaint with the treatment plan.   A new prescription was provided with 6 refills. Rheumatoid arthritis: the patient will continue to receive Remicade as a primary treatment modality for her severe rheumatoid arthritis every 6 weeks. The dose of Remicade will be 344 mg given intravenously. Her next dose is scheduled for 7/21/2017. The patient has continued to take  Methotrexate 5 mg p.o. daily and Plaquenil 200 mg twice daily. Fibromyalgia: The patient  continues to have generalized pain at times related to her underlying fibromyalgia. At this time, her pain is well controlled. The patient receives her Percocet medication on a monthly basis as needed. Chronic anemia: I have explained to the patient the CBC from today shows her hemoglobin has remained normal at 13.3 g/dL with hematocrit of 40.5% %. I have recommended that she continue to take ferrous sulfate 325 mg by mouth once daily. We will see the patient back in 6 weeks for a complete reassessment. Orders Placed This Encounter    COMPLETE CBC & AUTO DIFF WBC    InHouse CBC ([employer])     Standing Status:   Future     Number of Occurrences:   1     Standing Expiration Date:   7/18/2017    FERRITIN     Standing Status:   Future     Standing Expiration Date:   7/12/2018    IRON PROFILE     Standing Status:   Future     Standing Expiration Date:   6/47/9709    METABOLIC PANEL, COMPREHENSIVE     Standing Status:   Future     Standing Expiration Date:   7/12/2018    oxyCODONE-acetaminophen (PERCOCET 10)  mg per tablet     Sig: Take 1 Tab by mouth every six (6) hours as needed for Pain for up to 15 days. Max Daily Amount: 4 Tabs. Dispense:  60 Tab     Refill:  0    anagrelide (AGRYLIN) 0.5 mg capsule     Sig: Take 1 Cap by mouth two (2) times a day.  Indications: ESSENTIAL THROMBOCYTOSIS     Dispense:  60 Cap     Refill:  6       Mimi Apodaca MD  7/11/2017

## 2017-07-11 NOTE — PATIENT INSTRUCTIONS
Anemia: Care Instructions  Your Care Instructions    Anemia is a low level of red blood cells, which carry oxygen throughout your body. Many things can cause anemia. Lack of iron is one of the most common causes. Your body needs iron to make hemoglobin, a substance in red blood cells that carries oxygen from the lungs to your body's cells. Without enough iron, the body produces fewer and smaller red blood cells. As a result, your body's cells do not get enough oxygen, and you feel tired and weak. And you may have trouble concentrating. Bleeding is the most common cause of a lack of iron. You may have heavy menstrual bleeding or bleeding caused by conditions such as ulcers, hemorrhoids, or cancer. Regular use of aspirin or other anti-inflammatory medicines (such as ibuprofen) also can cause bleeding in some people. A lack of iron in your diet also can cause anemia, especially at times when the body needs more iron, such as during pregnancy, infancy, and the teen years. Your doctor may have prescribed iron pills. It may take several months of treatment for your iron levels to return to normal. Your doctor also may suggest that you eat foods that are rich in iron, such as meat and beans. There are many other causes of anemia. It is not always due to a lack of iron. Finding the specific cause of your anemia will help your doctor find the right treatment for you. Follow-up care is a key part of your treatment and safety. Be sure to make and go to all appointments, and call your doctor if you are having problems. It's also a good idea to know your test results and keep a list of the medicines you take. How can you care for yourself at home? · Take your medicines exactly as prescribed. Call your doctor if you think you are having a problem with your medicine. · If your doctor recommends iron pills, take them as directed:  ¨ Try to take the pills on an empty stomach about 1 hour before or 2 hours after meals. But you may need to take iron with food to avoid an upset stomach. ¨ Do not take antacids or drink milk or caffeine drinks (such as coffee, tea, or cola) at the same time or within 2 hours of the time that you take your iron. They can make it hard for your body to absorb the iron. ¨ Vitamin C (from food or supplements) helps your body absorb iron. Try taking iron pills with a glass of orange juice or some other food that is high in vitamin C, such as citrus fruits. ¨ Iron pills may cause stomach problems, such as heartburn, nausea, diarrhea, constipation, and cramps. Be sure to drink plenty of fluids, and include fruits, vegetables, and fiber in your diet each day. Iron pills often make your bowel movements dark or green. ¨ If you forget to take an iron pill, do not take a double dose of iron the next time you take a pill. ¨ Keep iron pills out of the reach of small children. An overdose of iron can be very dangerous. · Follow your doctor's advice about eating iron-rich foods. These include red meat, shellfish, poultry, eggs, beans, raisins, whole-grain bread, and leafy green vegetables. · Steam vegetables to help them keep their iron content. When should you call for help? Call 911 anytime you think you may need emergency care. For example, call if:  · You have symptoms of a heart attack. These may include:  ¨ Chest pain or pressure, or a strange feeling in the chest.  ¨ Sweating. ¨ Shortness of breath. ¨ Nausea or vomiting. ¨ Pain, pressure, or a strange feeling in the back, neck, jaw, or upper belly or in one or both shoulders or arms. ¨ Lightheadedness or sudden weakness. ¨ A fast or irregular heartbeat. After you call 911, the  may tell you to chew 1 adult-strength or 2 to 4 low-dose aspirin. Wait for an ambulance. Do not try to drive yourself. · You passed out (lost consciousness).   Call your doctor now or seek immediate medical care if:  · You have new or increased shortness of breath. · You are dizzy or lightheaded, or you feel like you may faint. · Your fatigue and weakness continue or get worse. · You have any abnormal bleeding, such as:  ¨ Nosebleeds. ¨ Vaginal bleeding that is different (heavier, more frequent, at a different time of the month) than what you are used to. ¨ Bloody or black stools, or rectal bleeding. ¨ Bloody or pink urine. Watch closely for changes in your health, and be sure to contact your doctor if:  · You do not get better as expected. Where can you learn more? Go to http://swapnil-speedy.info/. Enter R301 in the search box to learn more about \"Anemia: Care Instructions. \"  Current as of: October 13, 2016  Content Version: 11.3  © 4656-2487 [employer]. Care instructions adapted under license by [employer] (which disclaims liability or warranty for this information). If you have questions about a medical condition or this instruction, always ask your healthcare professional. [place] [address] any warranty or liability for your use of this information. Rheumatoid Arthritis: Care Instructions  Your Care Instructions    Arthritis is a common health problem in which the joints are inflamed. There are many types of arthritis. In rheumatoid arthritis, the body's own immune system attacks the joints. This causes pain, stiffness, and swelling in the joints, especially in the hands and feet. It can become hard to open jars, write, and do other daily tasks. Sometimes rheumatoid arthritis can also cause bumps to form under the skin. Over time, rheumatoid arthritis can damage and deform joints. Early treatment with medicines may reduce your chances of having a lasting disability. Follow-up care is a key part of your treatment and safety. Be sure to make and go to all appointments, and call your doctor if you are having problems.  Its also a good idea to know your test results and keep a list of the medicines you take. How can you care for yourself at home? · If your doctor recommends it, get more exercise. Walking is a good choice. If your knees or ankles hurt, try riding a stationary bike or swimming. · Move each joint gently through its full range of motion once or twice a day. · Rest joints when they are sore or overworked. Short rest breaks may help more than staying in bed. · Reach and stay at a healthy weight. Regular exercise and a healthy diet will help you do this. Extra weight can strain the joints, especially the knees and hips, and make the pain worse. Losing even a few pounds may help. · Get enough calcium and vitamin D to help prevent osteoporosis, which causes thin bones. Talk to your doctor about how much you should take. · Protect your joints from injury. Do not overuse them. Try to limit or avoid activities that cause joint pain or swelling. Use special kitchen tools and other self-help devices as well as walkers, splints, or canes if needed. · Use heat to ease pain. Take warm showers or baths. Use hot packs or a heating pad set on low. Sleep under a warm electric blanket. · Put ice or a cold pack on the area for 10 to 20 minutes at a time. Put a thin cloth between the ice and your skin. · Take pain medicines exactly as directed. ¨ If the doctor gave you a prescription medicine for pain, take it as prescribed. ¨ If you are not taking a prescription pain medicine, ask your doctor if you can take an over-the-counter medicine. · Take an active role in managing your condition. Set up a treatment plan with your doctor, and learn as much as you can about rheumatoid arthritis. This will help you control pain and stay active. When should you call for help? Call your doctor now or seek immediate medical care if:  · You have a fever or a rash along with joint pain. · You have joint pain that is so severe that you cannot use the joint at all.   · You have sudden swelling, redness, or pain in one or more joints, and you do not know why. · You have back or neck pain along with weakness in your arms or legs. · You have a loss of bowel or bladder control. Watch closely for changes in your health, and be sure to contact your doctor if:  · You have joint pain that lasts for more than 6 weeks. · You have side effects from your arthritis medicines, such as stomach pain, nausea, heartburn, or dark and tarlike stools. Where can you learn more? Go to http://swapnil-speedy.info/. Enter K205 in the search box to learn more about \"Rheumatoid Arthritis: Care Instructions. \"  Current as of: October 31, 2016  Content Version: 11.3  © 8659-4118 [employer]. Care instructions adapted under license by [employer] (which disclaims liability or warranty for this information). If you have questions about a medical condition or this instruction, always ask your healthcare professional. [place] [address] any warranty or liability for your use of this information.

## 2017-07-11 NOTE — MR AVS SNAPSHOT
Visit Information Date & Time Provider Department Dept. Phone Encounter #  
 7/11/2017  1:30 PM Mehreen [facility] Office [phone] [id number] Follow-up Instructions Return in about 6 weeks (around 8/22/2017). Your Appointments 8/22/2017 12:00 PM  
Office Visit with MD Sebastian Fuller [address] [place] [address]) Appt Note: OV  
 [facility] 99[address] [facility] [address] [facility] [address]  
[phone]  
  
   
 [facility] [address] Upcoming Health Maintenance Date Due Hepatitis C Screening 1965 Pneumococcal 19-64 Highest Risk (1 of 3 - PCV13) 12/19/1982 DTaP/Tdap/Td series (1 - Tdap) 12/19/1984 PAP AKA CERVICAL CYTOLOGY 12/19/1984 BREAST CANCER SCRN MAMMOGRAM 12/19/2013 FOBT Q 1 YEAR AGE 50-75 12/19/2013 INFLUENZA AGE 9 TO ADULT 8/1/2017 Allergies as of 7/11/2017  Review Complete On: 6/9/2017 By: Adilene Linton, SHAKIRA Severity Noted Reaction Type Reactions Levaquin [Levofloxacin] High  Systemic Anaphylaxis Pollen Extracts    Unable to Obtain Current Immunizations  Reviewed on 6/9/2017 Name Date Influenza Vaccine 10/21/2016, 11/3/2015, 11/10/2014, 10/25/2013 Influenza Vaccine Whole 9/10/2012 Not reviewed this visit You Were Diagnosed With   
  
 Codes Comments Essential thrombocytosis (HCC)    -  Primary ICD-10-CM: D47.3 ICD-9-CM: 238.71 Thrombocytosis ([facility] [address]     ICD-10-CM: D47.3 ICD-9-CM: 238.71 Rheumatoid arthritis involving multiple joints (HCC)     ICD-10-CM: M06.9 ICD-9-CM: 714.0 Chronic anemia     ICD-10-CM: D64.9 ICD-9-CM: 285.9 Leukocytosis, unspecified type     ICD-10-CM: D72.829 ICD-9-CM: 288.60 Vitals OB Status Smoking Status Hysterectomy Former Smoker Preferred Pharmacy Pharmacy Name Phone [facility] [address] [phone] Your Updated Medication List  
  
   
This list is accurate as of: 7/11/17  2:34 PM.  Always use your most recent med list.  
  
  
  
  
 albuterol 90 mcg/actuation inhaler Commonly known as:  PROAIR HFA Take 1 Puff by inhalation every six (6) hours as needed for Wheezing. amitriptyline 150 mg tablet Commonly known as:  ELAVIL Take 10 mg by mouth nightly. Indications: DEPRESSION  
  
 anagrelide 0.5 mg capsule Commonly known as:  Anice [place] Take 1 Cap by mouth two (2) times a day. Indications: ESSENTIAL THROMBOCYTOSIS  
  
 atenolol 25 mg tablet Commonly known as:  TENORMIN Take 25 mg by mouth daily. Indications: HYPERTENSION  
  
 cholecalciferol (VITAMIN D3) 5,000 unit Tab tablet Commonly known as:  VITAMIN D3 Take 5,000 Units by mouth every seven (7) days. DULoxetine 20 mg capsule Commonly known as:  CYMBALTA Take 20 mg by mouth daily. folic acid 732 mcg tablet Take 400 mcg by mouth daily. furosemide 40 mg tablet Commonly known as:  LASIX Take  by mouth daily. haloperidol 5 mg tablet Commonly known as:  HALDOL Take 2.5 mg by mouth nightly. HYZAAR 50-12.5 mg per tablet Generic drug:  losartan-hydroCHLOROthiazide Take 1 Tab by mouth daily. KlonoPIN 2 mg tablet Generic drug:  clonazePAM  
Take 2 mg by mouth daily. Indications: PANIC DISORDER  
  
 LANTUS SC  
5 Units by SubCUTAneous route daily. metFORMIN 1,000 mg tablet Commonly known as:  GLUCOPHAGE Take 500 mg by mouth two (2) times a day. methocarbamol 750 mg tablet Commonly known as:  [id number] Take 1 Tab by mouth three (3) times daily. methotrexate 2.5 mg tablet Commonly known as:  Thelbert Dux Take 5 mg by mouth daily. nitroglycerin 400 mcg/spray spray Commonly known as:  NITROLINGUAL  
1 [place] by SubLINGual route every five (5) minutes as needed. NovoLOG Mix 70-30 100 unit/mL (70-30) injection Generic drug:  insulin aspart protamine/insulin aspart 10 Units by SubCUTAneous route two (2) times a day. ondansetron hcl 4 mg tablet Commonly known as:  ZOFRAN (AS HYDROCHLORIDE) Take 1 Tab by mouth every eight (8) hours as needed for Nausea. oxyCODONE-acetaminophen  mg per tablet Commonly known as:  PERCOCET 10 Take 1 Tab by mouth every six (6) hours as needed for Pain for up to 15 days. Max Daily Amount: 4 Tabs. PLAQUENIL 200 mg tablet Generic drug:  hydroxychloroquine Take 200 mg by mouth two (2) times a day. QUEtiapine 100 mg tablet Commonly known as:  SEROquel Take 25 mg by mouth. Takes 25 mg Q AM, and 100 mg, Q PM  
  
 REMICADE IV  
344 mg by IntraVENous route once as needed. remicade will be every 8 weeks Prescriptions Printed Refills  
 oxyCODONE-acetaminophen (PERCOCET 10)  mg per tablet 0 Sig: Take 1 Tab by mouth every six (6) hours as needed for Pain for up to 15 days. Max Daily Amount: 4 Tabs. Class: Print Route: Oral  
 anagrelide (AGRYLIN) 0.5 mg capsule 6 Sig: Take 1 Cap by mouth two (2) times a day. Indications: ESSENTIAL THROMBOCYTOSIS Class: Print Route: Oral  
  
We Performed the Following COMPLETE CBC & AUTO DIFF WBC [92408 CPT(R)] Follow-up Instructions Return in about 6 weeks (around 8/22/2017). To-Do List   
 07/11/2017 Lab:  CBC WITH 3 PART DIFF   
  
 07/11/2017 Lab:  FERRITIN   
  
 07/11/2017 Lab:  IRON PROFILE   
  
 07/11/2017 Lab:  METABOLIC PANEL, COMPREHENSIVE   
  
 07/21/2017 11:00 AM  
  [facility] [address] [facility] ([phone]) 09/01/2017 11:00 AM  
  App[place] with [address] [place] [address] ([phone]) Patient Instructions Anemia: Care Instructions Your Care Instructions Anemia is a low level of red blood cells, which carry oxygen throughout your body. Many things can cause anemia. Lack of iron is one of the most common causes. Your body needs iron to make hemoglobin, a substance in red blood cells that carries oxygen from the lungs to your body's cells. Without enough iron, the body produces fewer and smaller red blood cells. As a result, your body's cells do not get enough oxygen, and you feel tired and weak. And you may have trouble concentrating. Bleeding is the most common cause of a lack of iron. You may have heavy menstrual bleeding or bleeding caused by conditions such as ulcers, hemorrhoids, or cancer. Regular use of aspirin or other anti-inflammatory medicines (such as ibuprofen) also can cause bleeding in some people. A lack of iron in your diet also can cause anemia, especially at times when the body needs more iron, such as during pregnancy, infancy, and the teen years. Your doctor may have prescribed iron pills. It may take several months of treatment for your iron levels to return to normal. Your doctor also may suggest that you eat foods that are rich in iron, such as meat and beans. There are many other causes of anemia. It is not always due to a lack of iron. Finding the specific cause of your anemia will help your doctor find the right treatment for you. Follow-up care is a key part of your treatment and safety. Be sure to make and go to all appointments, and call your doctor if you are having problems. It's also a good idea to know your test results and keep a list of the medicines you take. How can you care for yourself at home? · Take your medicines exactly as prescribed. Call your doctor if you think you are having a problem with your medicine. · If your doctor recommends iron pills, take them as directed: ¨ Try to take the pills on an empty stomach about 1 hour before or 2 hours after meals. But you may need to take iron with food to avoid an upset stomach. ¨ Do not take antacids or drink milk or caffeine drinks (such as coffee, tea, or cola) at the same time or within 2 hours of the time that you take your iron. They can make it hard for your body to absorb the iron. ¨ Vitamin C (from food or supplements) helps your body absorb iron. Try taking iron pills with a glass of orange juice or some other food that is high in vitamin C, such as citrus fruits. ¨ Iron pills may cause stomach problems, such as heartburn, nausea, diarrhea, constipation, and cramps. Be sure to drink plenty of fluids, and include fruits, vegetables, and fiber in your diet each day. Iron pills often make your bowel movements dark or green. ¨ If you forget to take an iron pill, do not take a double dose of iron the next time you take a pill. ¨ Keep iron pills out of the reach of small children. An overdose of iron can be very dangerous. · Follow your doctor's advice about eating iron-rich foods. These include red meat, shellfish, poultry, eggs, beans, raisins, whole-grain bread, and leafy green vegetables. · Steam vegetables to help them keep their iron content. When should you call for help? Call 911 anytime you think you may need emergency care. For example, call if: 
· You have symptoms of a heart attack. These may include: ¨ Chest pain or pressure, or a strange feeling in the chest. 
¨ Sweating. ¨ Shortness of breath. ¨ Nausea or vomiting. ¨ Pain, pressure, or a strange feeling in the back, neck, jaw, or upper belly or in one or both shoulders or arms. ¨ Lightheadedness or sudden weakness. ¨ A fast or irregular heartbeat. After you call 911, the  may tell you to chew 1 adult-strength or 2 to 4 low-dose aspirin. Wait for an ambulance. Do not try to drive yourself. · You passed out (lost consciousness). Call your doctor now or seek immediate medical care if: 
· You have new or increased shortness of breath. · You are dizzy or lightheaded, or you feel like you may faint. · Your fatigue and weakness continue or get worse. · You have any abnormal bleeding, such as: 
¨ Nosebleeds. ¨ Vaginal bleeding that is different (heavier, more frequent, at a different time of the month) than what you are used to. ¨ Bloody or black stools, or rectal bleeding. ¨ Bloody or pink urine. Watch closely for changes in your health, and be sure to contact your doctor if: 
· You do not get better as expected. Where can you learn more? Go to http://swapnil-speedy.info/. Enter R301 in the search box to learn more about \"Anemia: Care Instructions. \" Current as of: October 13, 2016 Content Version: 11.3 © 2258-3855 [employer]. Care instructions adapted under license by [employer] (which disclaims liability or warranty for this information). If you have questions about a medical condition or this instruction, always ask your healthcare professional. [place] [address] any warranty or liability for your use of this information. Rheumatoid Arthritis: Care Instructions Your Care Instructions Arthritis is a common health problem in which the joints are inflamed. There are many types of arthritis. In rheumatoid arthritis, the body's own immune system attacks the joints. This causes pain, stiffness, and swelling in the joints, especially in the hands and feet. It can become hard to open jars, write, and do other daily tasks. Sometimes rheumatoid arthritis can also cause bumps to form under the skin. Over time, rheumatoid arthritis can damage and deform joints. Early treatment with medicines may reduce your chances of having a lasting disability. Follow-up care is a key part of your treatment and safety. Be sure to make and go to all appointments, and call your doctor if you are having problems. Its also a good idea to know your test results and keep a list of the medicines you take. How can you care for yourself at home? · If your doctor recommends it, get more exercise. Walking is a good choice. If your knees or ankles hurt, try riding a stationary bike or swimming. · Move each joint gently through its full range of motion once or twice a day. · Rest joints when they are sore or overworked. Short rest breaks may help more than staying in bed. · Reach and stay at a healthy weight. Regular exercise and a healthy diet will help you do this. Extra weight can strain the joints, especially the knees and hips, and make the pain worse. Losing even a few pounds may help. · Get enough calcium and vitamin D to help prevent osteoporosis, which causes thin bones. Talk to your doctor about how much you should take. · Protect your joints from injury. Do not overuse them. Try to limit or avoid activities that cause joint pain or swelling. Use special kitchen tools and other self-help devices as well as walkers, splints, or canes if needed. · Use heat to ease pain. Take warm showers or baths. Use hot packs or a heating pad set on low. Sleep under a warm electric blanket. · Put ice or a cold pack on the area for 10 to 20 minutes at a time. Put a thin cloth between the ice and your skin. · Take pain medicines exactly as directed. ¨ If the doctor gave you a prescription medicine for pain, take it as prescribed. ¨ If you are not taking a prescription pain medicine, ask your doctor if you can take an over-the-counter medicine. · Take an active role in managing your condition. Set up a treatment plan with your doctor, and learn as much as you can about rheumatoid arthritis. This will help you control pain and stay active. When should you call for help? Call your doctor now or seek immediate medical care if: 
· You have a fever or a rash along with joint pain. · You have joint pain that is so severe that you cannot use the joint at all. · You have sudden swelling, redness, or pain in one or more joints, and you do not know why. · You have back or neck pain along with weakness in your arms or legs. · You have a loss of bowel or bladder control. Watch closely for changes in your health, and be sure to contact your doctor if: 
· You have joint pain that lasts for more than 6 weeks. · You have side effects from your arthritis medicines, such as stomach pain, nausea, heartburn, or dark and tarlike stools. Where can you learn more? Go to http://swapnil-speedy.info/. Enter K205 in the search box to learn more about \"Rheumatoid Arthritis: Care Instructions. \" Current as of: October 31, 2016 Content Version: 11.3 © 7666-5763 [employer]. Care instructions adapted under license by [employer] (which disclaims liability or warranty for this information). If you have questions about a medical condition or this instruction, always ask your healthcare professional. [address] any warranty or liability for your use of this information. Please provide this summary of care documentation to your next provider. Your primary care clinician is listed as Tabatha Gunn. If you have any questions after today's visit, please call [phone].

## 2017-07-13 ENCOUNTER — APPOINTMENT (OUTPATIENT)
Dept: INFUSION THERAPY | Age: 52
End: 2017-07-13
Payer: MEDICARE

## 2017-07-21 ENCOUNTER — HOSPITAL ENCOUNTER (OUTPATIENT)
Dept: INFUSION THERAPY | Age: 52
Discharge: HOME | End: 2017-07-21
Payer: MEDICARE

## 2017-07-21 ENCOUNTER — CLINICAL SUPPORT (OUTPATIENT)
Dept: ONCOLOGY | Age: 52
End: 2017-07-21

## 2017-07-21 ENCOUNTER — HOSPITAL ENCOUNTER (OUTPATIENT)
Dept: ONCOLOGY | Age: 52
Discharge: HOME | End: 2017-07-21

## 2017-07-21 VITALS
BODY MASS INDEX: 30.24 KG/M2 | SYSTOLIC BLOOD PRESSURE: 103 MMHG | WEIGHT: 216 LBS | HEART RATE: 83 BPM | DIASTOLIC BLOOD PRESSURE: 61 MMHG | TEMPERATURE: 98.5 F | HEIGHT: 71 IN | RESPIRATION RATE: 16 BRPM

## 2017-07-21 DIAGNOSIS — M06.00 SERONEGATIVE RHEUMATOID ARTHRITIS (HCC): ICD-10-CM

## 2017-07-21 DIAGNOSIS — M06.00 SERONEGATIVE RHEUMATOID ARTHRITIS (HCC): Primary | ICD-10-CM

## 2017-07-21 LAB
BASO+EOS+MONOS # BLD AUTO: 0.5 K/UL (ref 0–2.3)
BASO+EOS+MONOS # BLD AUTO: 5 % (ref 0.1–17)
DIFFERENTIAL METHOD BLD: NORMAL
ERYTHROCYTE [DISTWIDTH] IN BLOOD BY AUTOMATED COUNT: 14.3 % (ref 11.5–14.5)
HCT VFR BLD AUTO: 37.9 % (ref 36–48)
HGB BLD-MCNC: 12.4 G/DL (ref 12–16)
LYMPHOCYTES # BLD AUTO: 33 % (ref 14–44)
LYMPHOCYTES # BLD: 3.4 K/UL (ref 1.1–5.9)
MCH RBC QN AUTO: 26.8 PG (ref 25–35)
MCHC RBC AUTO-ENTMCNC: 32.7 G/DL (ref 31–37)
MCV RBC AUTO: 82 FL (ref 78–102)
NEUTS SEG # BLD: 6.4 K/UL (ref 1.8–9.5)
NEUTS SEG NFR BLD AUTO: 63 % (ref 40–70)
PLATELET # BLD AUTO: 398 K/UL (ref 140–440)
RBC # BLD AUTO: 4.62 M/UL (ref 4.1–5.1)
WBC # BLD AUTO: 10.3 K/UL (ref 4.5–13)

## 2017-07-21 PROCEDURE — 96413 CHEMO IV INFUSION 1 HR: CPT

## 2017-07-21 PROCEDURE — 96375 TX/PRO/DX INJ NEW DRUG ADDON: CPT

## 2017-07-21 PROCEDURE — 96415 CHEMO IV INFUSION ADDL HR: CPT

## 2017-07-21 PROCEDURE — 74011250636 HC RX REV CODE- 250/636: Performed by: INTERNAL MEDICINE

## 2017-07-21 RX ORDER — SODIUM CHLORIDE 0.9 % (FLUSH) 0.9 %
10-40 SYRINGE (ML) INJECTION AS NEEDED
Status: DISCONTINUED | OUTPATIENT
Start: 2017-07-21 | End: 2017-07-25 | Stop reason: HOSPADM

## 2017-07-21 RX ORDER — DIPHENHYDRAMINE HYDROCHLORIDE 50 MG/ML
25 INJECTION, SOLUTION INTRAMUSCULAR; INTRAVENOUS ONCE
Status: COMPLETED | OUTPATIENT
Start: 2017-07-21 | End: 2017-07-21

## 2017-07-21 RX ORDER — OXYCODONE AND ACETAMINOPHEN 10; 325 MG/1; MG/1
1 TABLET ORAL
Qty: 60 TAB | Refills: 0 | Status: SHIPPED | OUTPATIENT
Start: 2017-07-21 | End: 2017-08-08 | Stop reason: SDUPTHER

## 2017-07-21 RX ORDER — SODIUM CHLORIDE 9 MG/ML
250 INJECTION, SOLUTION INTRAVENOUS ONCE
Status: COMPLETED | OUTPATIENT
Start: 2017-07-21 | End: 2017-07-21

## 2017-07-21 RX ADMIN — INFLIXIMAB 400 MG: 100 INJECTION, POWDER, LYOPHILIZED, FOR SOLUTION INTRAVENOUS at 12:50

## 2017-07-21 RX ADMIN — Medication 10 ML: at 11:40

## 2017-07-21 RX ADMIN — SODIUM CHLORIDE 250 ML: 0.9 INJECTION, SOLUTION INTRAVENOUS at 12:15

## 2017-07-21 RX ADMIN — DIPHENHYDRAMINE HYDROCHLORIDE 25 MG: 50 INJECTION INTRAMUSCULAR; INTRAVENOUS at 12:18

## 2017-07-21 NOTE — PROGRESS NOTES
[address] \[facility]\"" Progress Note    Date: 2017    Name: Sarthak Sosa    MRN: [id number]         : 1965      Ms. Nayely Negrete arrived in the [facility] today, at [phone], in stable condition, here for Q 6 Week, IV Remicade Infusion. She was assessed and education was provided. Ms. Linares Arms vitals were reviewed. Visit Vitals    /71 (BP 1 Location: Left arm, BP Patient Position: At rest;Sitting)    Pulse 85    Temp 99.3 °F (37.4 °C)    Resp 16    Ht 5' 11\" (1.803 m)    Wt 98 kg (216 lb)    Breastfeeding No    BMI 30.13 kg/m2           PIV was established in her dorsal left forearm, at 1140, and a CBC was drawn, per order, and without incident. Lab results were obtained and reviewed. Recent Results (from the past 12 hour(s))   CBC WITH 3 PART DIFF    Collection Time: 17 11:40 AM   Result Value Ref Range    WBC 10.3 4.5 - 13.0 K/uL    RBC 4.62 4.10 - 5.10 M/uL    HGB 12.4 12.0 - 16.0 g/dL    HCT 37.9 36 - 48 %    MCV 82.0 78 - 102 FL    MCH 26.8 25.0 - 35.0 PG    MCHC 32.7 31 - 37 g/dL    RDW 14.3 11.5 - 14.5 %    PLATELET 839 379 - 347 K/uL    NEUTROPHILS 63 40 - 70 %    MIXED CELLS 5 0.1 - 17 %    LYMPHOCYTES 33 14 - 44 %    ABS. NEUTROPHILS 6.4 1.8 - 9.5 K/UL    ABS. MIXED CELLS 0.5 0.0 - 2.3 K/uL    ABS. LYMPHOCYTES 3.4 1.1 - 5.9 K/UL    DF AUTOMATED               Benadryl 25 mg IV, was administered pre-Remicade, per order, and without incident.            Remicade 400 mg (4 mg/kg) IV  (Dose rounded per pharmacy), was administered per order, and without incident.            After completion of the IV Remicade, the PIV was flushed very well per protocol, and then, the PIV was removed and gauze/bandaid was applied. Ms. Nayely Penelope tolerated well, and had no complaints. Ms. Nayely Negrete was discharged from [place] [address] in stable condition at 1525. Kosta Mcintosh  She is to return in 7 weeks, on Friday, 17, at 1000,  for her next appointment, for her next IV Remicade Infusion.  (Please note that this appointment is 1 week delayed, per patient request, due to another personal obligation the previous week.)    Salvatore Steinberg RN  July 21, 2017  12:01 PM

## 2017-08-08 ENCOUNTER — TELEPHONE (OUTPATIENT)
Dept: ONCOLOGY | Age: 52
End: 2017-08-08

## 2017-08-08 RX ORDER — OXYCODONE AND ACETAMINOPHEN 10; 325 MG/1; MG/1
1 TABLET ORAL
Qty: 60 TAB | Refills: 0 | Status: SHIPPED | OUTPATIENT
Start: 2017-08-08 | End: 2017-08-22 | Stop reason: SDUPTHER

## 2017-08-08 NOTE — TELEPHONE ENCOUNTER
PATIENT called to see if her OXYCODONE  refill was completed,  Told her the prescription is not ready yet.   Please call her if there is any problem, I noted the med has been crossed off current med list.

## 2017-08-14 ENCOUNTER — HOSPITAL ENCOUNTER (OUTPATIENT)
Dept: LAB | Age: 52
Discharge: HOME | End: 2017-08-14
Payer: MEDICARE

## 2017-08-14 DIAGNOSIS — M13.0 POLYARTHROPATHY: ICD-10-CM

## 2017-08-14 DIAGNOSIS — M06.9 ATROPHIC ARTHRITIS (HCC): ICD-10-CM

## 2017-08-14 LAB
ALBUMIN SERPL BCP-MCNC: 3.5 G/DL (ref 3.4–5)
ALBUMIN/GLOB SERPL: 0.9 {RATIO} (ref 0.8–1.7)
ALP SERPL-CCNC: 129 U/L (ref 45–117)
ALT SERPL-CCNC: 19 U/L (ref 13–56)
ANION GAP BLD CALC-SCNC: 7 MMOL/L (ref 3–18)
APPEARANCE UR: CLEAR
AST SERPL W P-5'-P-CCNC: 7 U/L (ref 15–37)
BACTERIA URNS QL MICRO: ABNORMAL /HPF
BASOPHILS # BLD AUTO: 0 K/UL (ref 0–0.06)
BASOPHILS # BLD: 0 % (ref 0–2)
BILIRUB SERPL-MCNC: 0.2 MG/DL (ref 0.2–1)
BILIRUB UR QL: NEGATIVE
BUN SERPL-MCNC: 15 MG/DL (ref 7–18)
BUN/CREAT SERPL: 19 (ref 12–20)
CALCIUM SERPL-MCNC: 8.9 MG/DL (ref 8.5–10.1)
CHLORIDE SERPL-SCNC: 104 MMOL/L (ref 100–108)
CO2 SERPL-SCNC: 26 MMOL/L (ref 21–32)
COLOR UR: YELLOW
CREAT SERPL-MCNC: 0.8 MG/DL (ref 0.6–1.3)
CRP SERPL-MCNC: 1.4 MG/DL (ref 0–0.3)
DIFFERENTIAL METHOD BLD: ABNORMAL
EOSINOPHIL # BLD: 0.6 K/UL (ref 0–0.4)
EOSINOPHIL NFR BLD: 6 % (ref 0–5)
EPITH CASTS URNS QL MICRO: ABNORMAL /LPF (ref 0–5)
ERYTHROCYTE [DISTWIDTH] IN BLOOD BY AUTOMATED COUNT: 14.7 % (ref 11.6–14.5)
ERYTHROCYTE [SEDIMENTATION RATE] IN BLOOD: 27 MM/HR (ref 0–30)
GLOBULIN SER CALC-MCNC: 4 G/DL (ref 2–4)
GLUCOSE SERPL-MCNC: 220 MG/DL (ref 74–99)
GLUCOSE UR STRIP.AUTO-MCNC: 100 MG/DL
HCT VFR BLD AUTO: 40.6 % (ref 35–45)
HGB BLD-MCNC: 13.2 G/DL (ref 12–16)
HGB UR QL STRIP: NEGATIVE
KETONES UR QL STRIP.AUTO: NEGATIVE MG/DL
LEUKOCYTE ESTERASE UR QL STRIP.AUTO: NEGATIVE
LYMPHOCYTES # BLD AUTO: 29 % (ref 21–52)
LYMPHOCYTES # BLD: 2.9 K/UL (ref 0.9–3.6)
MCH RBC QN AUTO: 26.5 PG (ref 24–34)
MCHC RBC AUTO-ENTMCNC: 32.5 G/DL (ref 31–37)
MCV RBC AUTO: 81.5 FL (ref 74–97)
MONOCYTES # BLD: 0.7 K/UL (ref 0.05–1.2)
MONOCYTES NFR BLD AUTO: 7 % (ref 3–10)
NEUTS SEG # BLD: 5.6 K/UL (ref 1.8–8)
NEUTS SEG NFR BLD AUTO: 58 % (ref 40–73)
NITRITE UR QL STRIP.AUTO: NEGATIVE
PH UR STRIP: 5 [PH] (ref 5–8)
PLATELET # BLD AUTO: 315 K/UL (ref 135–420)
PMV BLD AUTO: 9.7 FL (ref 9.2–11.8)
POTASSIUM SERPL-SCNC: 4.2 MMOL/L (ref 3.5–5.5)
PROT SERPL-MCNC: 7.5 G/DL (ref 6.4–8.2)
PROT UR STRIP-MCNC: 30 MG/DL
RBC # BLD AUTO: 4.98 M/UL (ref 4.2–5.3)
SODIUM SERPL-SCNC: 137 MMOL/L (ref 136–145)
SP GR UR REFRACTOMETRY: 1.02 (ref 1–1.03)
UROBILINOGEN UR QL STRIP.AUTO: 0.2 EU/DL (ref 0.2–1)
WBC # BLD AUTO: 9.8 K/UL (ref 4.6–13.2)
WBC URNS QL MICRO: ABNORMAL /HPF (ref 0–4)

## 2017-08-14 PROCEDURE — 86140 C-REACTIVE PROTEIN: CPT | Performed by: SPECIALIST

## 2017-08-14 PROCEDURE — 85025 COMPLETE CBC W/AUTO DIFF WBC: CPT | Performed by: SPECIALIST

## 2017-08-14 PROCEDURE — 85652 RBC SED RATE AUTOMATED: CPT | Performed by: SPECIALIST

## 2017-08-14 PROCEDURE — 36415 COLL VENOUS BLD VENIPUNCTURE: CPT | Performed by: SPECIALIST

## 2017-08-14 PROCEDURE — 81001 URINALYSIS AUTO W/SCOPE: CPT | Performed by: SPECIALIST

## 2017-08-14 PROCEDURE — 80053 COMPREHEN METABOLIC PANEL: CPT | Performed by: SPECIALIST

## 2017-08-17 ENCOUNTER — HOSPITAL ENCOUNTER (EMERGENCY)
Age: 52
Discharge: HOME | End: 2017-08-17
Attending: EMERGENCY MEDICINE
Payer: MEDICARE

## 2017-08-17 ENCOUNTER — APPOINTMENT (OUTPATIENT)
Dept: GENERAL RADIOLOGY | Age: 52
End: 2017-08-17
Attending: EMERGENCY MEDICINE
Payer: MEDICARE

## 2017-08-17 VITALS
OXYGEN SATURATION: 99 % | TEMPERATURE: 98.2 F | HEART RATE: 94 BPM | HEIGHT: 71 IN | BODY MASS INDEX: 29.26 KG/M2 | WEIGHT: 209 LBS | RESPIRATION RATE: 18 BRPM

## 2017-08-17 DIAGNOSIS — J20.9 ACUTE BRONCHITIS, UNSPECIFIED ORGANISM: Primary | ICD-10-CM

## 2017-08-17 DIAGNOSIS — L25.9 CONTACT DERMATITIS AND OTHER ECZEMA, DUE TO UNSPECIFIED CAUSE: ICD-10-CM

## 2017-08-17 DIAGNOSIS — J44.1 COPD EXACERBATION (HCC): ICD-10-CM

## 2017-08-17 LAB — GLUCOSE BLD STRIP.AUTO-MCNC: 272 MG/DL (ref 70–110)

## 2017-08-17 PROCEDURE — 93005 ELECTROCARDIOGRAM TRACING: CPT

## 2017-08-17 PROCEDURE — 74011250637 HC RX REV CODE- 250/637: Performed by: EMERGENCY MEDICINE

## 2017-08-17 PROCEDURE — 74011636637 HC RX REV CODE- 636/637: Performed by: EMERGENCY MEDICINE

## 2017-08-17 PROCEDURE — 94640 AIRWAY INHALATION TREATMENT: CPT

## 2017-08-17 PROCEDURE — 99283 EMERGENCY DEPT VISIT LOW MDM: CPT

## 2017-08-17 PROCEDURE — 77030029684 HC NEB SM VOL KT MONA -A

## 2017-08-17 PROCEDURE — 74011000250 HC RX REV CODE- 250: Performed by: EMERGENCY MEDICINE

## 2017-08-17 PROCEDURE — 71020 XR CHEST PA LAT: CPT

## 2017-08-17 PROCEDURE — A9270 NON-COVERED ITEM OR SERVICE: HCPCS | Performed by: EMERGENCY MEDICINE

## 2017-08-17 PROCEDURE — 82962 GLUCOSE BLOOD TEST: CPT

## 2017-08-17 RX ORDER — IPRATROPIUM BROMIDE AND ALBUTEROL SULFATE 2.5; .5 MG/3ML; MG/3ML
3 SOLUTION RESPIRATORY (INHALATION)
Status: COMPLETED | OUTPATIENT
Start: 2017-08-17 | End: 2017-08-17

## 2017-08-17 RX ORDER — AZITHROMYCIN 250 MG/1
500 TABLET, FILM COATED ORAL DAILY
Status: DISCONTINUED | OUTPATIENT
Start: 2017-08-17 | End: 2017-08-17 | Stop reason: HOSPADM

## 2017-08-17 RX ORDER — PREDNISONE 20 MG/1
20 TABLET ORAL DAILY
Qty: 3 TAB | Refills: 0 | Status: SHIPPED | OUTPATIENT
Start: 2017-08-17 | End: 2017-08-20

## 2017-08-17 RX ORDER — MAG HYDROX/ALUMINUM HYD/SIMETH 200-200-20
SUSPENSION, ORAL (FINAL DOSE FORM) ORAL 2 TIMES DAILY
Qty: 30 G | Refills: 0 | Status: SHIPPED | OUTPATIENT
Start: 2017-08-17 | End: 2017-08-22

## 2017-08-17 RX ORDER — PREDNISONE 20 MG/1
40 TABLET ORAL
Status: COMPLETED | OUTPATIENT
Start: 2017-08-17 | End: 2017-08-17

## 2017-08-17 RX ORDER — AZITHROMYCIN 250 MG/1
250 TABLET, FILM COATED ORAL DAILY
Qty: 4 TAB | Refills: 0 | Status: SHIPPED | OUTPATIENT
Start: 2017-08-17 | End: 2017-08-21

## 2017-08-17 RX ADMIN — IPRATROPIUM BROMIDE AND ALBUTEROL SULFATE 3 ML: .5; 3 SOLUTION RESPIRATORY (INHALATION) at 08:00

## 2017-08-17 RX ADMIN — AZITHROMYCIN 500 MG: 250 TABLET, FILM COATED ORAL at 08:15

## 2017-08-17 RX ADMIN — PREDNISONE 40 MG: 20 TABLET ORAL at 08:15

## 2017-08-17 NOTE — ED PROVIDER NOTES
HPI Comments: 7:42 AM Sima Cheatham is a 46 y.o. female with a history of COPD, diabetes, and HTN presents to ED c/o SOB onset 2 days ago. Pt states that she has a \"pressure feeling\" around her chest area when coughing. Associated symptoms include wheezing and cough. Pt states that she has a lot of congestion in her chest, coughing up more sputum, and the wheezing has worsened which causes her \"ribs to hurt\". Pt states that she had a nebulizer treatment and what may have triggered the SOB was going from outside in the heat to inside in the cool air. Pt states that the symptoms started with a nasal drip and spread through her throat and down to her chest. Pt has a history of COPD and states that she was never hospitalized for the COPD however she was seen at the ED for a COPD flare up. Pt is on a two new medications: Rheumatrex and Insulin. Pt also c/o a rash on her right hand that started at the same time she had the other symptoms. The hand rash is pruritic. Pt states that she was recently outdoors and recently washing a lot of dishes. Denies fever, LE edema, and any other symptoms at the moment. The history is provided by the patient.         Past Medical History:   Diagnosis Date    Abdominal pain, unspecified site     Acute otitis media     Acute pharyngitis     Anxiety     Arthritis     Autoimmune disease ([facility] [address]     Back injury     Backache     herniated disc in lower back    Blurred vision     Chest pain, unspecified     abnormal EKG    Chronic airway obstruction, not elsewhere classified     Chronic back pain     Chronic pain     COPD     Depression     Diabetes (HCC)     Dizziness     Dizziness and giddiness     possible orthostatic changes, vasovagal, autonomic dysfunction from diabetic neuropathy    Encounters for unspecified administrative purpose     Feeling anxious     Fibromyalgia     Headache     Hemorrhoid     Herniated disc     at L5    Hypercholesterolemia     Hyperglycemia     Hypertension     Joint pain     Leukocytosis, unspecified     Major depressive disorder, single episode, mild (HCC)     Mental disorder     depression, anxiety, bipolar and PTSD    Neuropathy ([facility] [address]     Obesity, unspecified     Other chest pain     Phobic disorders     Rheumatoid arthritis ([facility] [address]     Seasonal allergies     Shortness of breath     normal EF    Streptococcal sore throat     Type II or unspecified type diabetes mellitus with unspecified complication, uncontrolled     Unspecified hereditary and idiopathic peripheral neuropathy     Vitamin D deficiency        Past Surgical History:   Procedure Laterality Date    HX CHOLECYSTECTOMY  1994    HX GYN  2005    partial Hysterectomy    HX OTHER SURGICAL  12-1-15    had ingrown toenail removed from big toe, both feet    HX TUBAL LIGATION      1995         Family History:   Problem Relation Age of Onset    Diabetes Other     Alcohol abuse Mother     Arthritis-osteo Mother     Diabetes Mother     Elevated Lipids Mother     Headache Mother     Heart Disease Mother     Migraines Mother     Psychiatric Disorder Mother     Alcohol abuse Father     Arthritis-osteo Father     Cancer Father     Headache Father     Heart Disease Father     Lung Disease Father     Migraines Father     Alcohol abuse Sister     Headache Sister     Diabetes Sister     Heart Disease Sister     Migraines Sister     Psychiatric Disorder Sister     Headache Brother     Diabetes Brother     Elevated Lipids Brother     Heart Disease Brother     Migraines Brother     Psychiatric Disorder Brother     Cancer Maternal Aunt     Alcohol abuse Maternal Aunt     Diabetes Maternal Aunt     Headache Maternal Aunt     Lung Disease Maternal Aunt     Migraines Maternal Aunt     Headache Maternal Uncle     Migraines Maternal Uncle     Stroke Maternal Uncle     Psychiatric Disorder Maternal Uncle     Headache Paternal Aunt     Migraines Paternal Aunt     Headache Paternal Uncle     Hypertension Paternal Uncle     Migraines Paternal Uncle     Stroke Paternal Uncle     Headache Maternal Grandmother     Migraines Maternal Grandmother     Stroke Maternal Grandmother     Headache Maternal Grandfather     Migraines Maternal Grandfather     Stroke Maternal Grandfather     Headache Paternal Grandmother     Hypertension Paternal Grandmother     Lung Disease Paternal Grandmother     Migraines Paternal Grandmother     Headache Paternal Grandfather     Cancer Paternal Grandfather     Migraines Paternal Grandfather        Social History     Social History    Marital status:      Spouse name: N/A    Number of children: N/A    Years of education: N/A     Occupational History    Not on file. Social History Main Topics    Smoking status: Former Smoker    Smokeless tobacco: Never Used    Alcohol use Yes      Comment: socially    Drug use: No    Sexual activity: Yes     Partners: Male     Birth control/ protection: Condom     Other Topics Concern    Not on file     Social History Narrative         ALLERGIES: Levaquin [levofloxacin] and Pollen extracts    Review of Systems   Constitutional: Negative for chills, fatigue and fever. HENT: Negative for congestion, ear pain and sore throat. Eyes: Negative for pain, redness and itching. Respiratory: Positive for cough, shortness of breath and wheezing. Negative for chest tightness. Cardiovascular: Negative for chest pain, palpitations and leg swelling. Gastrointestinal: Negative for abdominal pain, diarrhea, nausea and vomiting. Genitourinary: Negative for dysuria, flank pain, hematuria and pelvic pain. Musculoskeletal: Negative for arthralgias, back pain, joint swelling and myalgias. Skin: Positive for rash. Negative for color change and pallor. Neurological: Negative for dizziness, weakness and headaches. Hematological: Negative for adenopathy.  Does not bruise/bleed easily. Vitals:    08/17/17 0740   Pulse: 94   Resp: 18   Temp: 98.2 °F (36.8 °C)   SpO2: 99%   Weight: 94.8 kg (209 lb)   Height: 5' 10.5\" (1.791 m)            Physical Exam   Constitutional: No distress. HENT:   Head: Normocephalic and atraumatic. Mouth/Throat: Oropharynx is clear and moist.   Eyes: Conjunctivae and EOM are normal. Pupils are equal, round, and reactive to light. Neck: Normal range of motion. Neck supple. Cardiovascular: Normal rate, regular rhythm and normal heart sounds. No murmur heard. Pulmonary/Chest: Effort normal and breath sounds normal. She has no wheezes. She has no rales. Abdominal: Soft. Bowel sounds are normal. She exhibits no distension. There is no tenderness. Musculoskeletal: Normal range of motion. She exhibits no edema or deformity. Lymphadenopathy:     She has no cervical adenopathy. Neurological: She is alert. She exhibits normal muscle tone. Coordination normal.   Skin: Skin is warm and dry. Rash noted. She is not diaphoretic. There is erythema. Positive for an erythemas blanching flat rash on the right hand and forearm     Psychiatric: She has a normal mood and affect. Her behavior is normal.        MDM  Number of Diagnoses or Management Options  Diagnosis management comments: CXR clear, speaking in full sentences, O2 % on RA. No signs of sepsis/pneumonia. Consistent with acute bronchitis. Will rx short course of low dose steroid, abx, f/u with primary care. Advised to monitor glucose. Also has localized dermatitis of right hand.     ED Course       Procedures           Vitals:  Patient Vitals for the past 12 hrs:   Temp Pulse Resp SpO2   08/17/17 0740 98.2 °F (36.8 °C) 94 18 99 %       Medications ordered:   Medications   albuterol-ipratropium (DUO-NEB) 2.5 MG-0.5 MG/3 ML (not administered)         Lab findings:  Recent Results (from the past 12 hour(s))   EKG, 12 LEAD, INITIAL    Collection Time: 08/17/17  7:57 AM   Result Value Ref Range    Ventricular Rate 90 BPM    Atrial Rate 90 BPM    P-R Interval 184 ms    QRS Duration 92 ms    Q-T Interval 384 ms    QTC Calculation (Bezet) 469 ms    Calculated P Axis 38 degrees    Calculated R Axis 2 degrees    Calculated T Axis 8 degrees    Diagnosis       Normal sinus rhythm  Inferior infarct (cited on or before 28-MAR-2012)  Abnormal ECG  When compared with ECG of 16-MAR-2017 06:59,  No significant change was found     GLUCOSE, POC    Collection Time: 08/17/17  7:59 AM   Result Value Ref Range    Glucose (POC) 272 (H) 70 - 110 mg/dL       EKG interpretation by ED Physician:    7:57 AM- NSR, 90 bpm, inferior t-wave flattening, no significant changes compared to March 2017. X-Ray, CT or other radiology findings or impressions:  Xr Chest Pa Lat  IMPRESSION:  No radiographic evidence for acute cardiopulmonary process. .       Progress notes, Consult notes or additional Procedure notes:     Reevaluation of patient:   I have reassessed the patient. Patient is feeling improved    Disposition:  Diagnosis:   1. Acute bronchitis, unspecified organism    2. COPD exacerbation ([facility] [address]    3. Contact dermatitis and other eczema, due to unspecified cause        Disposition: discharge home    Follow-up Information     None           Patient's Medications   Start Taking    No medications on file   Continue Taking    ALBUTEROL (PROAIR HFA) 90 MCG/ACTUATION INHALER    Take 1 Puff by inhalation every six (6) hours as needed for Wheezing. AMITRIPTYLINE (ELAVIL) 150 MG TABLET    Take 10 mg by mouth nightly. Indications: DEPRESSION    ANAGRELIDE (AGRYLIN) 0.5 MG CAPSULE    Take 1 Cap by mouth two (2) times a day. Indications: ESSENTIAL THROMBOCYTOSIS    ATENOLOL (TENORMIN) 25 MG TABLET    Take 25 mg by mouth daily. Indications: HYPERTENSION    CHOLECALCIFEROL, VITAMIN D3, 5,000 UNIT TAB    Take 5,000 Units by mouth every seven (7) days. CLONAZEPAM (KLONOPIN) 2 MG TABLET    Take 2 mg by mouth daily. Indications: PANIC DISORDER    DULOXETINE (CYMBALTA) 20 MG CAPSULE    Take 20 mg by mouth daily. FOLIC ACID 035 MCG TABLET    Take 400 mcg by mouth daily. FUROSEMIDE (LASIX) 40 MG TABLET    Take  by mouth daily. HALOPERIDOL (HALDOL) 5 MG TABLET    Take 2.5 mg by mouth nightly. HYDROXYCHLOROQUINE (PLAQUENIL) 200 MG TABLET    Take 200 mg by mouth two (2) times a day. INFLIXIMAB (REMICADE IV)    344 mg by IntraVENous route once as needed. remicade will be every 8 weeks     INSULIN ASPART PROTAMINE/INSULIN ASPART (NOVOLOG MIX 70-30) 100 UNIT/ML (70-30) INJECTION    10 Units by SubCUTAneous route two (2) times a day. INSULIN GLARGINE,HUM. REC. ANLOG (LANTUS SC)    5 Units by SubCUTAneous route daily. LOSARTAN-HYDROCHLOROTHIAZIDE (HYZAAR) 50-12.5 MG PER TABLET    Take 1 Tab by mouth daily. METFORMIN (GLUCOPHAGE) 1,000 MG TABLET    Take 500 mg by mouth two (2) times a day. METHOCARBAMOL (ROBAXIN-750) 750 MG TABLET    Take 1 Tab by mouth three (3) times daily. METHOTREXATE (RHEUMATREX) 2.5 MG TABLET    Take 5 mg by mouth daily. NITROGLYCERIN (NITROLINGUAL) 0.4 MG/DOSE SPRAY    1 [place] by SubLINGual route every five (5) minutes as needed. ONDANSETRON HCL (ZOFRAN, AS HYDROCHLORIDE,) 4 MG TABLET    Take 1 Tab by mouth every eight (8) hours as needed for Nausea. OXYCODONE-ACETAMINOPHEN (PERCOCET 10)  MG PER TABLET    Take 1 Tab by mouth every six (6) hours as needed for Pain for up to 15 days. Max Daily Amount: 4 Tabs. QUETIAPINE (SEROQUEL) 100 MG TABLET    Take 25 mg by mouth.  Takes 25 mg Q AM, and 100 mg, Q PM   These Medications have changed    No medications on file   Stop Taking    No medications on file         Scribe Attestation      Ramila Carrion (Aj) acting as a scribe for and in the presence of Caty Curtis MD      August 17, 2017 at 8:11 AM       Provider Attestation:      I personally performed the services described in the documentation, reviewed the documentation, as recorded by the scribe in my presence, and it accurately and completely records my words and actions.  August 17, 2017 at 8:11 AM - Alysa Lenz MD

## 2017-08-17 NOTE — DISCHARGE INSTRUCTIONS
IF YOU HAVE NEW OR WORSENING SYMPTOMS, TROUBLE BREATHING, FEVER, OR ANY OTHER WORRYING SIGNS THEN RETURN TO THE ER RIGHT AWAY. Bronchitis: Care Instructions  Your Care Instructions    Bronchitis is inflammation of the bronchial tubes, which carry air to the lungs. The tubes swell and produce mucus, or phlegm. The mucus and inflamed bronchial tubes make you cough. You may have trouble breathing. Most cases of bronchitis are caused by viruses like those that cause colds. Antibiotics usually do not help and they may be harmful. Bronchitis usually develops rapidly and lasts about 2 to 3 weeks in otherwise healthy people. Follow-up care is a key part of your treatment and safety. Be sure to make and go to all appointments, and call your doctor if you are having problems. It's also a good idea to know your test results and keep a list of the medicines you take. How can you care for yourself at home? · Take all medicines exactly as prescribed. Call your doctor if you think you are having a problem with your medicine. · Get some extra rest.  · Take an over-the-counter pain medicine, such as acetaminophen (Tylenol), ibuprofen (Advil, Motrin), or naproxen (Aleve) to reduce fever and relieve body aches. Read and follow all instructions on the label. · Do not take two or more pain medicines at the same time unless the doctor told you to. Many pain medicines have acetaminophen, which is Tylenol. Too much acetaminophen (Tylenol) can be harmful. · Take an over-the-counter cough medicine that contains dextromethorphan to help quiet a dry, hacking cough so that you can sleep. Avoid cough medicines that have more than one active ingredient. Read and follow all instructions on the label. · Breathe moist air from a humidifier, hot shower, or sink filled with hot water. The heat and moisture will thin mucus so you can cough it out. · Do not smoke. Smoking can make bronchitis worse.  If you need help quitting, talk to your doctor about stop-smoking programs and medicines. These can increase your chances of quitting for good. When should you call for help? Call 911 anytime you think you may need emergency care. For example, call if:  · You have severe trouble breathing. Call your doctor now or seek immediate medical care if:  · You have new or worse trouble breathing. · You cough up dark brown or bloody mucus (sputum). · You have a new or higher fever. · You have a new rash. Watch closely for changes in your health, and be sure to contact your doctor if:  · You cough more deeply or more often, especially if you notice more mucus or a change in the color of your mucus. · You are not getting better as expected. Where can you learn more? Go to http://swapnil-speedy.info/. Enter H333 in the search box to learn more about \"Bronchitis: Care Instructions. \"  Current as of: March 25, 2017  Content Version: 11.3  © 9417-3055 [employer]. Care instructions adapted under license by [employer] (which disclaims liability or warranty for this information). If you have questions about a medical condition or this instruction, always ask your healthcare professional. [place] [address] any warranty or liability for your use of this information. Chronic Obstructive Pulmonary Disease (COPD): Care Instructions  Your Care Instructions    Chronic obstructive pulmonary disease (COPD) is a general term for a group of lung diseases, including emphysema and chronic bronchitis. People with COPD have decreased airflow in and out of the lungs, which makes it hard to breathe. The airways also can get clogged with thick mucus. Cigarette smoking is a major cause of COPD. Although there is no cure for COPD, you can slow its progress. Following your treatment plan and taking care of yourself can help you feel better and live longer. Follow-up care is a key part of your treatment and safety. Be sure to make and go to all appointments, and call your doctor if you are having problems. It's also a good idea to know your test results and keep a list of the medicines you take. How can you care for yourself at home? Staying healthy  · Do not smoke. This is the most important step you can take to prevent more damage to your lungs. If you need help quitting, talk to your doctor about stop-smoking programs and medicines. These can increase your chances of quitting for good. · Avoid colds and flu. Get a pneumococcal vaccine shot. If you have had one before, ask your doctor whether you need a second dose. Get the flu vaccine every fall. If you must be around people with colds or the flu, wash your hands often. · Avoid secondhand smoke, air pollution, and high altitudes. Also avoid cold, dry air and hot, humid air. Stay at home with your windows closed when air pollution is bad. Medicines and oxygen therapy  · Take your medicines exactly as prescribed. Call your doctor if you think you are having a problem with your medicine. · You may be taking medicines such as:  ¨ Bronchodilators. These help open your airways and make breathing easier. Bronchodilators are either short-acting (work for 6 to 9 hours) or long-acting (work for 24 hours). You inhale most bronchodilators, so they start to act quickly. Always carry your quick-relief inhaler with you in case you need it while you are away from home. ¨ Corticosteroids (prednisone, budesonide). These reduce airway inflammation. They come in pill or inhaled form. You must take these medicines every day for them to work well. · A spacer may help you get more inhaled medicine to your lungs. Ask your doctor or pharmacist if a spacer is right for you. If it is, ask how to use it properly. · Do not take any vitamins, over-the-counter medicine, or herbal products without talking to your doctor first.  · If your doctor prescribed antibiotics, take them as directed.  Do not stop taking them just because you feel better. You need to take the full course of antibiotics. · Oxygen therapy boosts the amount of oxygen in your blood and helps you breathe easier. Use the flow rate your doctor has recommended, and do not change it without talking to your doctor first.  Activity  · Get regular exercise. Walking is an easy way to get exercise. Start out slowly, and walk a little more each day. · Pay attention to your breathing. You are exercising too hard if you cannot talk while you are exercising. · Take short rest breaks when doing household chores and other activities. · Learn breathing methods--such as breathing through pursed lips--to help you become less short of breath. · If your doctor has not set you up with a pulmonary rehabilitation program, talk to him or her about whether rehab is right for you. Rehab includes exercise programs, education about your disease and how to manage it, help with diet and other changes, and emotional support. Diet  · Eat regular, healthy meals. Use bronchodilators about 1 hour before you eat to make it easier to eat. Eat several small meals instead of three large ones. Drink beverages at the end of the meal. Avoid foods that are hard to chew. · Eat foods that contain protein so that you do not lose muscle mass. · Talk with your doctor if you gain too much weight or if you lose weight without trying. Mental health  · Talk to your family, friends, or a therapist about your feelings. It is normal to feel frightened, angry, hopeless, helpless, and even guilty. Talking openly about bad feelings can help you cope. If these feelings last, talk to your doctor. When should you call for help? Call 911 anytime you think you may need emergency care. For example, call if:  · You have severe trouble breathing. Call your doctor now or seek immediate medical care if:  · You have new or worse trouble breathing. · You cough up blood.   · You have a fever. Watch closely for changes in your health, and be sure to contact your doctor if:  · You cough more deeply or more often, especially if you notice more mucus or a change in the color of your mucus. · You have new or worse swelling in your legs or belly. · You are not getting better as expected. Where can you learn more? Go to http://swapnil-speedy.info/. Silvia Rios in the search box to learn more about \"Chronic Obstructive Pulmonary Disease (COPD): Care Instructions. \"  Current as of: March 25, 2017  Content Version: 11.3  © 7244-9225 [employer]. Care instructions adapted under license by "[employer] (which disclaims liability or warranty for this information). If you have questions about a medical condition or this instruction, always ask your healthcare professional. [address] any warranty or liability for your use of this information. Dermatitis: Care Instructions  Your Care Instructions  Dermatitis is the general name used for any rash or inflammation of the skin. Different kinds of dermatitis cause different kinds of rashes. Common causes of a rash include new medicines, plants (such as poison oak or poison ivy), heat, and stress. Certain illnesses can also cause a rash. An allergic reaction to something that touches your skin, such as latex, nickel, or poison ivy, is called contact dermatitis. Contact dermatitis may also be caused by something that irritates the skin, such as bleach, a chemical, or soap. These types of rashes cannot be spread from person to person. How long your rash will last depends on what caused it. Rashes may last a few days or months. Follow-up care is a key part of your treatment and safety. Be sure to make and go to all appointments, and call your doctor if you are having problems. It's also a good idea to know your test results and keep a list of the medicines you take.   How can you care for yourself at home? · Do not scratch the rash. Cut your nails short, and file them smooth. Or wear gloves if this helps keep you from scratching. · Wash the area with water only. Pat dry. · Put cold, wet cloths on the rash to reduce itching. · Keep cool, and stay out of the sun. · Leave the rash open to the air as much as possible. · If the rash itches, use hydrocortisone cream. Follow the directions on the label. Calamine lotion may help for plant rashes. · Take an over-the-counter antihistamine, such as diphenhydramine (Benadryl) or loratadine (Claritin), to help calm the itching. Read and follow all instructions on the label. · If your doctor prescribed a cream, use it as directed. If your doctor prescribed medicine, take it exactly as directed. When should you call for help? Call your doctor now or seek immediate medical care if:  · You have symptoms of infection, such as:  ¨ Increased pain, swelling, warmth, or redness. ¨ Red streaks leading from the area. ¨ Pus draining from the area. ¨ A fever. · You have joint pain along with the rash. Watch closely for changes in your health, and be sure to contact your doctor if:  · Your rash is changing or getting worse. · You are not getting better as expected. Where can you learn more? Go to http://swapnil-speedy.info/. Enter ([phone] 1744 [id number] in the search box to learn more about \"Dermatitis: Care Instructions. \"  Current as of: October 13, 2016  Content Version: 11.3  © 0976-4538 [employer]. Care instructions adapted under license by [employer] (which disclaims liability or warranty for this information). If you have questions about a medical condition or this instruction, always ask your healthcare professional. [address] any warranty or liability for your use of this information.

## 2017-08-17 NOTE — ED NOTES
Gaby Jung is a 46 y.o. female that was discharged in good condition. The patients diagnosis, condition and treatment were explained to  patient and aftercare instructions were given. The patient verbalized understanding. Patient armband removed and shredded.

## 2017-08-18 LAB
ATRIAL RATE: 90 BPM
CALCULATED P AXIS, ECG09: 38 DEGREES
CALCULATED R AXIS, ECG10: 2 DEGREES
CALCULATED T AXIS, ECG11: 8 DEGREES
DIAGNOSIS, 93000: NORMAL
P-R INTERVAL, ECG05: 184 MS
Q-T INTERVAL, ECG07: 384 MS
QRS DURATION, ECG06: 92 MS
QTC CALCULATION (BEZET), ECG08: 469 MS
VENTRICULAR RATE, ECG03: 90 BPM

## 2017-08-18 RX ORDER — OXYCODONE AND ACETAMINOPHEN 10; 325 MG/1; MG/1
1 TABLET ORAL
Qty: 60 TAB | Refills: 0 | OUTPATIENT
Start: 2017-08-18 | End: 2017-09-02

## 2017-08-22 ENCOUNTER — HOSPITAL ENCOUNTER (OUTPATIENT)
Dept: LAB | Age: 52
Discharge: HOME | End: 2017-08-22
Payer: MEDICARE

## 2017-08-22 ENCOUNTER — OFFICE VISIT (OUTPATIENT)
Dept: ONCOLOGY | Age: 52
End: 2017-08-22

## 2017-08-22 ENCOUNTER — HOSPITAL ENCOUNTER (OUTPATIENT)
Dept: ONCOLOGY | Age: 52
Discharge: HOME | End: 2017-08-22

## 2017-08-22 VITALS
TEMPERATURE: 98.8 F | BODY MASS INDEX: 30.87 KG/M2 | HEART RATE: 116 BPM | DIASTOLIC BLOOD PRESSURE: 79 MMHG | WEIGHT: 218.2 LBS | SYSTOLIC BLOOD PRESSURE: 137 MMHG

## 2017-08-22 DIAGNOSIS — D72.829 LEUKOCYTOSIS, UNSPECIFIED TYPE: ICD-10-CM

## 2017-08-22 DIAGNOSIS — M79.7 FIBROMYALGIA: ICD-10-CM

## 2017-08-22 DIAGNOSIS — D47.3 ESSENTIAL THROMBOCYTOSIS (HCC): Primary | ICD-10-CM

## 2017-08-22 DIAGNOSIS — D64.9 CHRONIC ANEMIA: ICD-10-CM

## 2017-08-22 DIAGNOSIS — D47.3 ESSENTIAL THROMBOCYTOSIS (HCC): ICD-10-CM

## 2017-08-22 DIAGNOSIS — G89.4 CHRONIC PAIN SYNDROME: ICD-10-CM

## 2017-08-22 DIAGNOSIS — M06.9 RHEUMATOID ARTHRITIS INVOLVING MULTIPLE JOINTS (HCC): ICD-10-CM

## 2017-08-22 LAB
ALBUMIN SERPL-MCNC: 3.5 G/DL (ref 3.4–5)
ALBUMIN/GLOB SERPL: 0.9 {RATIO} (ref 0.8–1.7)
ALP SERPL-CCNC: 94 U/L (ref 45–117)
ALT SERPL-CCNC: 18 U/L (ref 13–56)
ANION GAP SERPL CALC-SCNC: 10 MMOL/L (ref 3–18)
AST SERPL-CCNC: 8 U/L (ref 15–37)
BASO+EOS+MONOS # BLD AUTO: 1.1 K/UL (ref 0–2.3)
BASO+EOS+MONOS # BLD AUTO: 9 % (ref 0.1–17)
BILIRUB SERPL-MCNC: 0.4 MG/DL (ref 0.2–1)
BUN SERPL-MCNC: 12 MG/DL (ref 7–18)
BUN/CREAT SERPL: 16 (ref 12–20)
CALCIUM SERPL-MCNC: 9.1 MG/DL (ref 8.5–10.1)
CHLORIDE SERPL-SCNC: 103 MMOL/L (ref 100–108)
CO2 SERPL-SCNC: 26 MMOL/L (ref 21–32)
CREAT SERPL-MCNC: 0.76 MG/DL (ref 0.6–1.3)
DIFFERENTIAL METHOD BLD: ABNORMAL
ERYTHROCYTE [DISTWIDTH] IN BLOOD BY AUTOMATED COUNT: 13.9 % (ref 11.5–14.5)
FERRITIN SERPL-MCNC: 130 NG/ML (ref 8–388)
GLOBULIN SER CALC-MCNC: 4 G/DL (ref 2–4)
GLUCOSE SERPL-MCNC: 144 MG/DL (ref 74–99)
HCT VFR BLD AUTO: 41.1 % (ref 36–48)
HGB BLD-MCNC: 13.5 G/DL (ref 12–16)
IRON SATN MFR SERPL: 19 %
IRON SERPL-MCNC: 52 UG/DL (ref 50–175)
LYMPHOCYTES # BLD: 4.3 K/UL (ref 1.1–5.9)
LYMPHOCYTES NFR BLD: 32 % (ref 14–44)
MCH RBC QN AUTO: 26.4 PG (ref 25–35)
MCHC RBC AUTO-ENTMCNC: 32.8 G/DL (ref 31–37)
MCV RBC AUTO: 80.4 FL (ref 78–102)
NEUTS SEG # BLD: 7.9 K/UL (ref 1.8–9.5)
NEUTS SEG NFR BLD: 59 % (ref 40–70)
PLATELET # BLD AUTO: 352 K/UL (ref 140–440)
POTASSIUM SERPL-SCNC: 3.7 MMOL/L (ref 3.5–5.5)
PROT SERPL-MCNC: 7.5 G/DL (ref 6.4–8.2)
RBC # BLD AUTO: 5.11 M/UL (ref 4.1–5.1)
SODIUM SERPL-SCNC: 139 MMOL/L (ref 136–145)
TIBC SERPL-MCNC: 272 UG/DL (ref 250–450)
WBC # BLD AUTO: 13.3 K/UL (ref 4.5–13)

## 2017-08-22 PROCEDURE — 82728 ASSAY OF FERRITIN: CPT | Performed by: INTERNAL MEDICINE

## 2017-08-22 PROCEDURE — 36415 COLL VENOUS BLD VENIPUNCTURE: CPT | Performed by: INTERNAL MEDICINE

## 2017-08-22 PROCEDURE — 80053 COMPREHEN METABOLIC PANEL: CPT | Performed by: INTERNAL MEDICINE

## 2017-08-22 PROCEDURE — 83540 ASSAY OF IRON: CPT | Performed by: INTERNAL MEDICINE

## 2017-08-22 RX ORDER — OXYCODONE AND ACETAMINOPHEN 10; 325 MG/1; MG/1
1 TABLET ORAL
Qty: 60 TAB | Refills: 0 | Status: SHIPPED | OUTPATIENT
Start: 2017-08-22 | End: 2017-09-05 | Stop reason: SDUPTHER

## 2017-08-22 NOTE — PATIENT INSTRUCTIONS
Anemia: Care Instructions  Your Care Instructions    Anemia is a low level of red blood cells, which carry oxygen throughout your body. Many things can cause anemia. Lack of iron is one of the most common causes. Your body needs iron to make hemoglobin, a substance in red blood cells that carries oxygen from the lungs to your body's cells. Without enough iron, the body produces fewer and smaller red blood cells. As a result, your body's cells do not get enough oxygen, and you feel tired and weak. And you may have trouble concentrating. Bleeding is the most common cause of a lack of iron. You may have heavy menstrual bleeding or bleeding caused by conditions such as ulcers, hemorrhoids, or cancer. Regular use of aspirin or other anti-inflammatory medicines (such as ibuprofen) also can cause bleeding in some people. A lack of iron in your diet also can cause anemia, especially at times when the body needs more iron, such as during pregnancy, infancy, and the teen years. Your doctor may have prescribed iron pills. It may take several months of treatment for your iron levels to return to normal. Your doctor also may suggest that you eat foods that are rich in iron, such as meat and beans. There are many other causes of anemia. It is not always due to a lack of iron. Finding the specific cause of your anemia will help your doctor find the right treatment for you. Follow-up care is a key part of your treatment and safety. Be sure to make and go to all appointments, and call your doctor if you are having problems. It's also a good idea to know your test results and keep a list of the medicines you take. How can you care for yourself at home? · Take your medicines exactly as prescribed. Call your doctor if you think you are having a problem with your medicine. · If your doctor recommends iron pills, take them as directed:  ¨ Try to take the pills on an empty stomach about 1 hour before or 2 hours after meals. But you may need to take iron with food to avoid an upset stomach. ¨ Do not take antacids or drink milk or caffeine drinks (such as coffee, tea, or cola) at the same time or within 2 hours of the time that you take your iron. They can make it hard for your body to absorb the iron. ¨ Vitamin C (from food or supplements) helps your body absorb iron. Try taking iron pills with a glass of orange juice or some other food that is high in vitamin C, such as citrus fruits. ¨ Iron pills may cause stomach problems, such as heartburn, nausea, diarrhea, constipation, and cramps. Be sure to drink plenty of fluids, and include fruits, vegetables, and fiber in your diet each day. Iron pills often make your bowel movements dark or green. ¨ If you forget to take an iron pill, do not take a double dose of iron the next time you take a pill. ¨ Keep iron pills out of the reach of small children. An overdose of iron can be very dangerous. · Follow your doctor's advice about eating iron-rich foods. These include red meat, shellfish, poultry, eggs, beans, raisins, whole-grain bread, and leafy green vegetables. · Steam vegetables to help them keep their iron content. When should you call for help? Call 911 anytime you think you may need emergency care. For example, call if:  · You have symptoms of a heart attack. These may include:  ¨ Chest pain or pressure, or a strange feeling in the chest.  ¨ Sweating. ¨ Shortness of breath. ¨ Nausea or vomiting. ¨ Pain, pressure, or a strange feeling in the back, neck, jaw, or upper belly or in one or both shoulders or arms. ¨ Lightheadedness or sudden weakness. ¨ A fast or irregular heartbeat. After you call 911, the  may tell you to chew 1 adult-strength or 2 to 4 low-dose aspirin. Wait for an ambulance. Do not try to drive yourself. · You passed out (lost consciousness).   Call your doctor now or seek immediate medical care if:  · You have new or increased shortness of breath. · You are dizzy or lightheaded, or you feel like you may faint. · Your fatigue and weakness continue or get worse. · You have any abnormal bleeding, such as:  ¨ Nosebleeds. ¨ Vaginal bleeding that is different (heavier, more frequent, at a different time of the month) than what you are used to. ¨ Bloody or black stools, or rectal bleeding. ¨ Bloody or pink urine. Watch closely for changes in your health, and be sure to contact your doctor if:  · You do not get better as expected. Where can you learn more? Go to http://swapnil-speedy.info/. Enter R301 in the search box to learn more about \"Anemia: Care Instructions. \"  Current as of: October 13, 2016  Content Version: 11.3  © 0298-9667 [employer]. Care instructions adapted under license by [employer] (which disclaims liability or warranty for this information). If you have questions about a medical condition or this instruction, always ask your healthcare professional. [place] [address] any warranty or liability for your use of this information. Rheumatoid Arthritis: Care Instructions  Your Care Instructions    Arthritis is a common health problem in which the joints are inflamed. There are many types of arthritis. In rheumatoid arthritis, the body's own immune system attacks the joints. This causes pain, stiffness, and swelling in the joints, especially in the hands and feet. It can become hard to open jars, write, and do other daily tasks. Sometimes rheumatoid arthritis can also cause bumps to form under the skin. Over time, rheumatoid arthritis can damage and deform joints. Early treatment with medicines may reduce your chances of having a lasting disability. Follow-up care is a key part of your treatment and safety. Be sure to make and go to all appointments, and call your doctor if you are having problems.  Its also a good idea to know your test results and keep a list of the medicines you take. How can you care for yourself at home? · If your doctor recommends it, get more exercise. Walking is a good choice. If your knees or ankles hurt, try riding a stationary bike or swimming. · Move each joint gently through its full range of motion once or twice a day. · Rest joints when they are sore or overworked. Short rest breaks may help more than staying in bed. · Reach and stay at a healthy weight. Regular exercise and a healthy diet will help you do this. Extra weight can strain the joints, especially the knees and hips, and make the pain worse. Losing even a few pounds may help. · Get enough calcium and vitamin D to help prevent osteoporosis, which causes thin bones. Talk to your doctor about how much you should take. · Protect your joints from injury. Do not overuse them. Try to limit or avoid activities that cause joint pain or swelling. Use special kitchen tools and other self-help devices as well as walkers, splints, or canes if needed. · Use heat to ease pain. Take warm showers or baths. Use hot packs or a heating pad set on low. Sleep under a warm electric blanket. · Put ice or a cold pack on the area for 10 to 20 minutes at a time. Put a thin cloth between the ice and your skin. · Take pain medicines exactly as directed. ¨ If the doctor gave you a prescription medicine for pain, take it as prescribed. ¨ If you are not taking a prescription pain medicine, ask your doctor if you can take an over-the-counter medicine. · Take an active role in managing your condition. Set up a treatment plan with your doctor, and learn as much as you can about rheumatoid arthritis. This will help you control pain and stay active. When should you call for help? Call your doctor now or seek immediate medical care if:  · You have a fever or a rash along with joint pain. · You have joint pain that is so severe that you cannot use the joint at all.   · You have sudden swelling, redness, or pain in one or more joints, and you do not know why. · You have back or neck pain along with weakness in your arms or legs. · You have a loss of bowel or bladder control. Watch closely for changes in your health, and be sure to contact your doctor if:  · You have joint pain that lasts for more than 6 weeks. · You have side effects from your arthritis medicines, such as stomach pain, nausea, heartburn, or dark and tarlike stools. Where can you learn more? Go to http://swapnil-speedy.info/. Enter K205 in the search box to learn more about \"Rheumatoid Arthritis: Care Instructions. \"  Current as of: October 31, 2016  Content Version: 11.3  © 5761-4596 [employer]. Care instructions adapted under license by [employer] (which disclaims liability or warranty for this information). If you have questions about a medical condition or this instruction, always ask your healthcare professional. [place] [address] any warranty or liability for your use of this information.

## 2017-08-22 NOTE — PROGRESS NOTES
Hematology/Oncology  Progress Note    Name: Elizabeth Cohen  Date: 2017  : 1965    PCP: Derek Mclain MD     Ms. Ibeth Hastings is a 46year old female who was seen for management of her rheumatoid arthritis, leukocytosis, and thrombocytosis. Current therapy: Remicade 4 mg/kg bodyweight ever 6 weeks intravenously    Subjective:     Ms. Ibeth Hastings is a 46year-old [facility] American woman who has severe rheumatoid arthritis. She receives Remicade every 6 weeks. She also suffers from fibromyalgia and had an elevated WBC count and platelet count of undefined etiology. Today she has no new complaints to report. Her pain is well controlled with her current pain medication regimen. She reports that the Remicade has provided a significant degree of relief from the severe rheumatoid arthritis symptoms. The patient is continuing to use her cane for mobility support. Her next treatment is scheduled for 2017. She denies any recent fevers or recurrent infections. The patient reports that her appetite has continued to improve. She also states her energy level is continuing to improve as well. Since her last clinic visit she did have a COPD exacerbation and was treated with Proventil nebulizer therapy in the emergency room. Today she reports that her breathing has significantly improved. She has no new complaints or concerns to report, at this time.        Past Medical History:   Diagnosis Date    Abdominal pain, unspecified site     Acute otitis media     Acute pharyngitis     Anxiety     Arthritis     Autoimmune disease ([facility] [address]     Back injury     Backache     herniated disc in lower back    Blurred vision     Chest pain, unspecified     abnormal EKG    Chronic airway obstruction, not elsewhere classified     Chronic back pain     Chronic pain     COPD     Depression     Diabetes (HCC)     Dizziness     Dizziness and giddiness     possible orthostatic changes, vasovagal, autonomic dysfunction from diabetic neuropathy    Encounters for unspecified administrative purpose     Feeling anxious     Fibromyalgia     Headache     Hemorrhoid     Herniated disc     at L5    Hypercholesterolemia     Hyperglycemia     Hypertension     Joint pain     Leukocytosis, unspecified     Major depressive disorder, single episode, mild (HCC)     Mental disorder     depression, anxiety, bipolar and PTSD    Neuropathy (HCC)     Obesity, unspecified     Other chest pain     Phobic disorders     Rheumatoid arthritis (HCC)     Seasonal allergies     Shortness of breath     normal EF    Streptococcal sore throat     Type II or unspecified type diabetes mellitus with unspecified complication, uncontrolled     Unspecified hereditary and idiopathic peripheral neuropathy     Vitamin D deficiency      Past Surgical History:   Procedure Laterality Date    HX CHOLECYSTECTOMY  1994    HX GYN  2005    partial Hysterectomy    HX OTHER SURGICAL  12-1-15    had ingrown toenail removed from big toe, both feet    HX TUBAL LIGATION      1995     Social History     Social History    Marital status:      Spouse name: N/A    Number of children: N/A    Years of education: N/A     Occupational History    Not on file.      Social History Main Topics    Smoking status: Former Smoker    Smokeless tobacco: Never Used    Alcohol use Yes      Comment: socially    Drug use: No    Sexual activity: Yes     Partners: Male     Birth control/ protection: Condom     Other Topics Concern    Not on file     Social History Narrative     Family History   Problem Relation Age of Onset    Diabetes Other     Alcohol abuse Mother     Arthritis-osteo Mother     Diabetes Mother     Elevated Lipids Mother     Headache Mother     Heart Disease Mother    [facility] Migraines Mother     Psychiatric Disorder Mother     Alcohol abuse Father     Arthritis-osteo Father     Cancer Father     Headache Father     Heart Disease Father     Lung Disease Father     Migraines Father     Alcohol abuse Sister     Headache Sister     Diabetes Sister     Heart Disease Sister     Migraines Sister     Psychiatric Disorder Sister     Headache Brother     Diabetes Brother     Elevated Lipids Brother     Heart Disease Brother     Migraines Brother     Psychiatric Disorder Brother     Cancer Maternal Aunt     Alcohol abuse Maternal Aunt     Diabetes Maternal Aunt     Headache Maternal Aunt     Lung Disease Maternal Aunt     Migraines Maternal Aunt     Headache Maternal Uncle     Migraines Maternal Uncle     Stroke Maternal Uncle     Psychiatric Disorder Maternal Uncle     Headache Paternal Aunt     Migraines Paternal Aunt     Headache Paternal Uncle     Hypertension Paternal Uncle     Migraines Paternal Uncle     Stroke Paternal Uncle     Headache Maternal Grandmother     Migraines Maternal Grandmother     Stroke Maternal Grandmother     Headache Maternal Grandfather     Migraines Maternal Grandfather     Stroke Maternal Grandfather     Headache Paternal Grandmother     Hypertension Paternal Grandmother     Lung Disease Paternal Grandmother     Migraines Paternal Grandmother     Headache Paternal Grandfather     Cancer Paternal Grandfather     Migraines Paternal Grandfather      Current Outpatient Prescriptions   Medication Sig Dispense Refill    hydrocortisone (HYCORT) 1 % ointment Apply  to affected area two (2) times a day for 5 days. use thin layer 30 g 0    oxyCODONE-acetaminophen (PERCOCET 10)  mg per tablet Take 1 Tab by mouth every six (6) hours as needed for Pain for up to 15 days. Max Daily Amount: 4 Tabs. 60 Tab 0    anagrelide (AGRYLIN) 0.5 mg capsule Take 1 Cap by mouth two (2) times a day. Indications: ESSENTIAL THROMBOCYTOSIS 60 Cap 6    methocarbamol (ROBAXIN-750) 750 mg tablet Take 1 Tab by mouth three (3) times daily.  15 Tab 0    insulin aspart protamine/insulin aspart (NOVOLOG MIX 70-30) 100 unit/mL (70-30) injection 10 Units by SubCUTAneous route two (2) times a day.  clonazePAM (KLONOPIN) 2 mg tablet Take 2 mg by mouth daily. Indications: PANIC DISORDER      folic acid 242 mcg tablet Take 400 mcg by mouth daily.  haloperidol (HALDOL) 5 mg tablet Take 2.5 mg by mouth nightly.  QUEtiapine (SEROQUEL) 100 mg tablet Take 25 mg by mouth. Takes 25 mg Q AM, and 100 mg, Q PM      DULoxetine (CYMBALTA) 20 mg capsule Take 20 mg by mouth daily.  albuterol (PROAIR HFA) 90 mcg/actuation inhaler Take 1 Puff by inhalation every six (6) hours as needed for Wheezing. 1 Inhaler 0    ondansetron hcl (ZOFRAN, AS HYDROCHLORIDE,) 4 mg tablet Take 1 Tab by mouth every eight (8) hours as needed for Nausea. 12 Tab 0    methotrexate (RHEUMATREX) 2.5 mg tablet Take 5 mg by mouth daily.  metFORMIN (GLUCOPHAGE) 1,000 mg tablet Take 500 mg by mouth two (2) times a day.  Cholecalciferol, Vitamin D3, 5,000 unit Tab Take 5,000 Units by mouth every seven (7) days.  atenolol (TENORMIN) 25 mg tablet Take 25 mg by mouth daily. Indications: HYPERTENSION      hydroxychloroquine (PLAQUENIL) 200 mg tablet Take 200 mg by mouth two (2) times a day.  furosemide (LASIX) 40 mg tablet Take  by mouth daily.  INFLIXIMAB (REMICADE IV) 344 mg by IntraVENous route once as needed. remicade will be every 8 weeks       amitriptyline (ELAVIL) 150 mg tablet Take 10 mg by mouth nightly. Indications: DEPRESSION      nitroglycerin (NITROLINGUAL) 0.4 mg/dose spray 1 Spray by SubLINGual route every five (5) minutes as needed.  losartan-hydrochlorothiazide (HYZAAR) 50-12.5 mg per tablet Take 1 Tab by mouth daily.  INSULIN GLARGINE,HUM. REC. ANLOG (LANTUS SC) 5 Units by SubCUTAneous route daily. Review of Systems  Constitutional: The patient has no acute distress or discomfort.   HEENT: The patient denies recent head trauma, eye pain, blurred vision,  hearing deficit, oropharyngeal mucosal pain or lesions, and the patient denies throat pain or discomfort. Lymphatics: The patient denies palpable peripheral lymphadenopathy. Hematologic: The patient denies having bruising, bleeding, or progressive fatigue. Respiratory: Patient denies having shortness of breath, cough, sputum production, fever, or dyspnea on exertion. Cardiovascular: The patient denies having leg pain, leg swelling, heart palpitations, chest permit, chest pain, or lightheadedness. The patient denies having dyspnea on exertion. Gastrointestinal: The patient denies having nausea, emesis, or diarrhea. The patient denies having any hematemesis or blood in the stool. Genitourinary: Patient denies having urinary urgency, frequency, or dysuria. The patient denies having blood in the urine. Psychological: The patient denies having symptoms of nervousness, anxiety, depression, or thoughts of harming himself some of this. Skin: Patient denies having skin rashes, skin, ulcerations, or unexplained itching or pruritus. Musculoskeletal: The patient denies muscle,bone, or joint pains at this time. Objective: There were no vitals taken for this visit. ECOG PS=0 Pain score 2/10     Physical Exam:   Gen. Appearance: The patient is in no acute distress. Skin: There is no bruise or rash. HEENT: The exam is unremarkable. Neck: Supple without lymphadenopathy or thyromegaly. Lungs: Clear to auscultation and percussion; there are no wheezes or rhonchi. Heart: Regular rate and rhythm; there are no murmurs, gallops, or rubs. Abdomen: Bowel sounds are present and normal.  There is no guarding, tenderness, or hepatosplenomegaly. Extremities: There is no clubbing, cyanosis, or edema. Neurologic: There are no focal neurologic deficits. Lymphatics: There is no palpable peripheral lymphadenopathy. Musculoskeletal: The patient has full range of motion at all joints.  However, she complains of pain on ambulation due to the severe rheumatoid arthritis. There is  evidence of joint deformity or effusions in the hands and knees. There is no focal joint tenderness. She is using a cane for support and mobility. Psychological/psychiatric: There is no clinical evidence of anxiety, depression, or melancholy. Lab data:      Results for orders placed or performed during the hospital encounter of 07/21/17   CBC WITH 3 PART DIFF     Status: None   Result Value Ref Range Status    WBC 10.3 4.5 - 13.0 K/uL Final    RBC 4.62 4.10 - 5.10 M/uL Final    HGB 12.4 12.0 - 16.0 g/dL Final    HCT 37.9 36 - 48 % Final    MCV 82.0 78 - 102 FL Final    MCH 26.8 25.0 - 35.0 PG Final    MCHC 32.7 31 - 37 g/dL Final    RDW 14.3 11.5 - 14.5 % Final    PLATELET 573 316 - 657 K/uL Final    NEUTROPHILS 63 40 - 70 % Final    MIXED CELLS 5 0.1 - 17 % Final    LYMPHOCYTES 33 14 - 44 % Final    ABS. NEUTROPHILS 6.4 1.8 - 9.5 K/UL Final    ABS. MIXED CELLS 0.5 0.0 - 2.3 K/uL Final    ABS. LYMPHOCYTES 3.4 1.1 - 5.9 K/UL Final     Comment: Test performed at [address] Results Reviewed by Medical Director. DF AUTOMATED   Final           Assessment:     1. Essential thrombocytosis ([facility] [address]    2. Rheumatoid arthritis involving multiple joints (HCC)    3. Chronic anemia    4. Fibromyalgia    5. Leukocytosis, unspecified type    6. Chronic pain syndrome      Plan:   Leukocytosis (recurrent and persisting): I have explained to the patient that the etiology of her leukocytosis remains undefined. A previous flow cytometry did not reveal any  evidence of an immunophenotypic abnormality such as an evolving chronic lymphoproliferative disorder or myeloproliferative disorder. The CBCs will continue to be monitored every 6 weeks. The CBC from today shows her WBC count is currently 13.3, the absolute neutrophil count is 7.9 and the absolute lymphocyte count is 4.3.     Essential thrombocytosis/thrombocytosis: The current CBC shows that the platelet count is now 352,000. The platelet count is being monitored every 6 weeks. The patient was previously started on anagrelide 0.5 mg one tablet twice daily. This medication will be continued, at the current dose. I have reenforced to the patient the importance of being complaint with the treatment plan. A new prescription was provided with 6 refills. Rheumatoid arthritis: the patient will continue to receive Remicade as a primary treatment modality for her severe rheumatoid arthritis every 6 weeks. The dose of Remicade will be 344 mg given intravenously. Her next dose is scheduled for 8/31/2017. The patient has continued to take  Methotrexate 5 mg p.o. daily and Plaquenil 200 mg twice daily. Fibromyalgia: The patient  continues to have generalized pain at times related to her underlying fibromyalgia. At this time, her pain is well controlled. The patient receives her Percocet medication on a monthly basis as needed. A new prescription for the Percocet was provided at this time. Chronic anemia: I have explained to the patient the CBC from today shows her hemoglobin has remained normal at 13.3 g/dL with hematocrit of 40.5% %. I have recommended that she continue to take ferrous sulfate 325 mg by mouth once daily. We will see the patient back in 6 weeks for a complete reassessment.     Orders Placed This Encounter    COMPLETE CBC & AUTO DIFF WBC    InHouse CBC ([facility]o Clases)     Standing Status:   Future     Number of Occurrences:   1     Standing Expiration Date:   7/11/4305    METABOLIC PANEL, COMPREHENSIVE     Standing Status:   Future     Standing Expiration Date:   8/23/2018    IRON PROFILE     Standing Status:   Future     Standing Expiration Date:   8/23/2018    FERRITIN     Standing Status:   Future     Standing Expiration Date:   8/23/2018       Dustin Ga MD  8/22/2017

## 2017-08-22 NOTE — MR AVS SNAPSHOT
Visit Information Date & Time Provider Department Dept. Phone Encounter #  
 8/22/2017 12:00 PM Maria M [facility]emay[facility] Office [phone] [id number] Follow-up Instructions Return in about 6 weeks (around 10/3/2017). Your Appointments 10/3/2017 11:15 AM  
Office Visit with MD Sebastian Lebron [address] [place] [address] [facility] App[facility]: OV  
 [facility] [address] [address]  
[phone]  
  
   
 [facility] [address] Upcoming Health Maintenance Date Due Hepatitis C Screening 1965 Pneumococcal 19-64 Highest Risk (1 of 3 - PCV13) 12/19/1982 DTaP/Tdap/Td series (1 - Tdap) 12/19/1984 PAP AKA CERVICAL CYTOLOGY 12/19/1984 BREAST CANCER SCRN MAMMOGRAM 12/19/2013 FOBT Q 1 YEAR AGE 50-75 12/19/2013 INFLUENZA AGE 9 TO ADULT 8/1/2017 Allergies as of 8/22/2017  Review Complete On: 8/17/2017 By: Orin Becker, SHAKIRA Severity Noted Reaction Type Reactions Levaquin [Levofloxacin] High  Systemic Anaphylaxis Pollen Extracts    Unable to Obtain Current Immunizations  Reviewed on 7/21/2017 Name Date Influenza Vaccine 10/21/2016, 11/3/2015, 11/10/2014, 10/25/2013 Influenza Vaccine Whole 9/10/2012 Not reviewed this visit You Were Diagnosed With   
  
 Codes Comments Essential thrombocytosis (HCC)    -  Primary ICD-10-CM: D47.3 ICD-9-CM: 238.71 Rheumatoid arthritis involving multiple joints (HCC)     ICD-10-CM: M06.9 ICD-9-CM: 714.0 Chronic anemia     ICD-10-CM: D64.9 ICD-9-CM: 285.9 Fibromyalgia     ICD-10-CM: M79.7 ICD-9-CM: 729.1 Leukocytosis, unspecified type     ICD-10-CM: D72.829 ICD-9-CM: 288.60 Chronic pain syndrome     ICD-10-CM: G89.4 ICD-9-CM: 338. 4 Vitals OB Status Smoking Status Hysterectomy Former Smoker Preferred Pharmacy Pharmacy Name Phone [facility] - [address] [phone] Your Updated Medication List  
  
   
This list is accurate as of: 8/22/17 12:20 PM.  Always use your most recent med list.  
  
  
  
  
 albuterol 90 mcg/actuation inhaler Commonly known as:  PROAIR HFA Take 1 Puff by inhalation every six (6) hours as needed for Wheezing. amitriptyline 150 mg tablet Commonly known as:  ELAVIL Take 10 mg by mouth nightly. Indications: DEPRESSION  
  
 anagrelide 0.5 mg capsule Commonly known as:  Birtha Rise Take 1 Cap by mouth two (2) times a day. Indications: ESSENTIAL THROMBOCYTOSIS  
  
 atenolol 25 mg tablet Commonly known as:  TENORMIN Take 25 mg by mouth daily. Indications: HYPERTENSION  
  
 cholecalciferol (VITAMIN D3) 5,000 unit Tab tablet Commonly known as:  VITAMIN D3 Take 5,000 Units by mouth every seven (7) days. DULoxetine 20 mg capsule Commonly known as:  CYMBALTA Take 20 mg by mouth daily. folic acid 501 mcg tablet Take 400 mcg by mouth daily. furosemide 40 mg tablet Commonly known as:  LASIX Take  by mouth daily. haloperidol 5 mg tablet Commonly known as:  HALDOL Take 2.5 mg by mouth nightly. hydrocortisone 1 % ointment Commonly known as:  HYCORT Apply  to affected area two (2) times a day for 5 days. use thin layer HYZAAR 50-12.5 mg per tablet Generic drug:  losartan-hydroCHLOROthiazide Take 1 Tab by mouth daily. KlonoPIN 2 mg tablet Generic drug:  clonazePAM  
Take 2 mg by mouth daily. Indications: PANIC DISORDER  
  
 LANTUS SC  
5 Units by SubCUTAneous route daily. metFORMIN 1,000 mg tablet Commonly known as:  GLUCOPHAGE Take 500 mg by mouth two (2) times a day. methocarbamol 750 mg tablet Commonly known as:  MZVUGAN-142 Take 1 Tab by mouth three (3) times daily. methotrexate 2.5 mg tablet Commonly known as:  [place] Take 5 mg by mouth daily. nitroglycerin 400 mcg/spray spray Commonly known as:  NITROLINGUAL  
1 [place] by SubLINGual route every five (5) minutes as needed. NovoLOG Mix 70-30 100 unit/mL (70-30) injection Generic drug:  insulin aspart protamine/insulin aspart 10 Units by SubCUTAneous route two (2) times a day. ondansetron hcl 4 mg tablet Commonly known as:  ZOFRAN (AS HYDROCHLORIDE) Take 1 Tab by mouth every eight (8) hours as needed for Nausea. oxyCODONE-acetaminophen  mg per tablet Commonly known as:  PERCOCET 10 Take 1 Tab by mouth every six (6) hours as needed for Pain for up to 15 days. Max Daily Amount: 4 Tabs. PLAQUENIL 200 mg tablet Generic drug:  hydroxychloroquine Take 200 mg by mouth two (2) times a day. QUEtiapine 100 mg tablet Commonly known as:  SEROquel Take 25 mg by mouth. Takes 25 mg Q AM, and 100 mg, Q PM  
  
 REMICADE IV  
344 mg by IntraVENous route once as needed. remicade will be every 8 weeks Prescriptions Printed Refills  
 oxyCODONE-acetaminophen (PERCOCET 10)  mg per tablet 0 Sig: Take 1 Tab by mouth every six (6) hours as needed for Pain for up to 15 days. Max Daily Amount: 4 Tabs. Class: Print Route: Oral  
  
We Performed the Following COMPLETE CBC & AUTO DIFF WBC [51177 CPT(R)] Follow-up Instructions Return in about 6 weeks (around 10/3/2017). To-Do List   
 08/22/2017 Lab:  CBC WITH 3 PART DIFF   
  
 09/08/2017 10:00 AM  
  Appointment with [address] [place] [address] ([phone])  
  
 10/20/2017 10:00 AM  
  Appointment with [address] [place] [address] ([phone]) 12/01/2017 10:00 AM  
  Appointment with [address] [place] [address] ([phone]) Patient Instructions Anemia: Care Instructions Your Care Instructions Anemia is a low level of red blood cells, which carry oxygen throughout your body. Many things can cause anemia. Lack of iron is one of the most common causes. Your body needs iron to make hemoglobin, a substance in red blood cells that carries oxygen from the lungs to your body's cells. Without enough iron, the body produces fewer and smaller red blood cells. As a result, your body's cells do not get enough oxygen, and you feel tired and weak. And you may have trouble concentrating. Bleeding is the most common cause of a lack of iron. You may have heavy menstrual bleeding or bleeding caused by conditions such as ulcers, hemorrhoids, or cancer. Regular use of aspirin or other anti-inflammatory medicines (such as ibuprofen) also can cause bleeding in some people. A lack of iron in your diet also can cause anemia, especially at times when the body needs more iron, such as during pregnancy, infancy, and the teen years. Your doctor may have prescribed iron pills. It may take several months of treatment for your iron levels to return to normal. Your doctor also may suggest that you eat foods that are rich in iron, such as meat and beans. There are many other causes of anemia. It is not always due to a lack of iron. Finding the specific cause of your anemia will help your doctor find the right treatment for you. Follow-up care is a key part of your treatment and safety. Be sure to make and go to all appointments, and call your doctor if you are having problems. It's also a good idea to know your test results and keep a list of the medicines you take. How can you care for yourself at home? · Take your medicines exactly as prescribed. Call your doctor if you think you are having a problem with your medicine. · If your doctor recommends iron pills, take them as directed: ¨ Try to take the pills on an empty stomach about 1 hour before or 2 hours after meals. But you may need to take iron with food to avoid an upset stomach. ¨ Do not take antacids or drink milk or caffeine drinks (such as coffee, tea, or cola) at the same time or within 2 hours of the time that you take your iron. They can make it hard for your body to absorb the iron. ¨ Vitamin C (from food or supplements) helps your body absorb iron. Try taking iron pills with a glass of orange juice or some other food that is high in vitamin C, such as citrus fruits. ¨ Iron pills may cause stomach problems, such as heartburn, nausea, diarrhea, constipation, and cramps. Be sure to drink plenty of fluids, and include fruits, vegetables, and fiber in your diet each day. Iron pills often make your bowel movements dark or green. ¨ If you forget to take an iron pill, do not take a double dose of iron the next time you take a pill. ¨ Keep iron pills out of the reach of small children. An overdose of iron can be very dangerous. · Follow your doctor's advice about eating iron-rich foods. These include red meat, shellfish, poultry, eggs, beans, raisins, whole-grain bread, and leafy green vegetables. · Steam vegetables to help them keep their iron content. When should you call for help? Call 911 anytime you think you may need emergency care. For example, call if: 
· You have symptoms of a heart attack. These may include: ¨ Chest pain or pressure, or a strange feeling in the chest. 
¨ Sweating. ¨ Shortness of breath. ¨ Nausea or vomiting. ¨ Pain, pressure, or a strange feeling in the back, neck, jaw, or upper belly or in one or both shoulders or arms. ¨ Lightheadedness or sudden weakness. ¨ A fast or irregular heartbeat. After you call 911, the  may tell you to chew 1 adult-strength or 2 to 4 low-dose aspirin. Wait for an ambulance. Do not try to drive yourself. · You passed out (lost consciousness). Call your doctor now or seek immediate medical care if: 
· You have new or increased shortness of breath. · You are dizzy or lightheaded, or you feel like you may faint. · Your fatigue and weakness continue or get worse. · You have any abnormal bleeding, such as: 
¨ Nosebleeds. ¨ Vaginal bleeding that is different (heavier, more frequent, at a different time of the month) than what you are used to. ¨ Bloody or black stools, or rectal bleeding. ¨ Bloody or pink urine. Watch closely for changes in your health, and be sure to contact your doctor if: 
· You do not get better as expected. Where can you learn more? Go to http://swapnil-speedy.info/. Enter R301 in the search box to learn more about \"Anemia: Care Instructions. \" Current as of: October 13, 2016 Content Version: 11.3 © 1879-1680 [employer]. Care instructions adapted under license by [employer] (which disclaims liability or warranty for this information). If you have questions about a medical condition or this instruction, always ask your healthcare professional. [place] [address] any warranty or liability for your use of this information. Rheumatoid Arthritis: Care Instructions Your Care Instructions Arthritis is a common health problem in which the joints are inflamed. There are many types of arthritis. In rheumatoid arthritis, the body's own immune system attacks the joints. This causes pain, stiffness, and swelling in the joints, especially in the hands and feet. It can become hard to open jars, write, and do other daily tasks. Sometimes rheumatoid arthritis can also cause bumps to form under the skin. Over time, rheumatoid arthritis can damage and deform joints. Early treatment with medicines may reduce your chances of having a lasting disability. Follow-up care is a key part of your treatment and safety. Be sure to make and go to all appointments, and call your doctor if you are having problems. Its also a good idea to know your test results and keep a list of the medicines you take. How can you care for yourself at home? · If your doctor recommends it, get more exercise. Walking is a good choice. If your knees or ankles hurt, try riding a stationary bike or swimming. · Move each joint gently through its full range of motion once or twice a day. · Rest joints when they are sore or overworked. Short rest breaks may help more than staying in bed. · Reach and stay at a healthy weight. Regular exercise and a healthy diet will help you do this. Extra weight can strain the joints, especially the knees and hips, and make the pain worse. Losing even a few pounds may help. · Get enough calcium and vitamin D to help prevent osteoporosis, which causes thin bones. Talk to your doctor about how much you should take. · Protect your joints from injury. Do not overuse them. Try to limit or avoid activities that cause joint pain or swelling. Use special kitchen tools and other self-help devices as well as walkers, splints, or canes if needed. · Use heat to ease pain. Take warm showers or baths. Use hot packs or a heating pad set on low. Sleep under a warm electric blanket. · Put ice or a cold pack on the area for 10 to 20 minutes at a time. Put a thin cloth between the ice and your skin. · Take pain medicines exactly as directed. ¨ If the doctor gave you a prescription medicine for pain, take it as prescribed. ¨ If you are not taking a prescription pain medicine, ask your doctor if you can take an over-the-counter medicine. · Take an active role in managing your condition. Set up a treatment plan with your doctor, and learn as much as you can about rheumatoid arthritis. This will help you control pain and stay active. When should you call for help? Call your doctor now or seek immediate medical care if: 
· You have a fever or a rash along with joint pain. · You have joint pain that is so severe that you cannot use the joint at all. · You have sudden swelling, redness, or pain in one or more joints, and you do not know why. · You have back or neck pain along with weakness in your arms or legs. · You have a loss of bowel or bladder control. Watch closely for changes in your health, and be sure to contact your doctor if: 
· You have joint pain that lasts for more than 6 weeks. · You have side effects from your arthritis medicines, such as stomach pain, nausea, heartburn, or dark and tarlike stools. Where can you learn more? Go to http://swapnil-speedy.info/. Enter K205 in the search box to learn more about \"Rheumatoid Arthritis: Care Instructions. \" Current as of: October 31, 2016 Content Version: 11.3 © 1391-8271 [employer]. Care instructions adapted under license by [employer] (which disclaims liability or warranty for this information). If you have questions about a medical condition or this instruction, always ask your healthcare professional. [address] any warranty or liability for your use of this information. Please provide this summary of care documentation to your next provider. Your primary care clinician is listed as Maryjane Kingston. If you have any questions after today's visit, please call [phone].

## 2017-08-29 ENCOUNTER — APPOINTMENT (OUTPATIENT)
Dept: INFUSION THERAPY | Age: 52
End: 2017-08-29
Payer: MEDICARE

## 2017-08-31 ENCOUNTER — HOSPITAL ENCOUNTER (OUTPATIENT)
Dept: INFUSION THERAPY | Age: 52
Discharge: HOME | End: 2017-08-31

## 2017-08-31 ENCOUNTER — HOSPITAL ENCOUNTER (OUTPATIENT)
Dept: LAB | Age: 52
Discharge: HOME | End: 2017-08-31
Payer: MEDICARE

## 2017-08-31 LAB
RHEUMATOID FACT SER QL LA: POSITIVE
RHEUMATOID FACT TITR SER LA: ABNORMAL {TITER}

## 2017-08-31 PROCEDURE — 36415 COLL VENOUS BLD VENIPUNCTURE: CPT | Performed by: SPECIALIST

## 2017-08-31 PROCEDURE — 86431 RHEUMATOID FACTOR QUANT: CPT | Performed by: SPECIALIST

## 2017-08-31 PROCEDURE — 80074 ACUTE HEPATITIS PANEL: CPT | Performed by: SPECIALIST

## 2017-08-31 PROCEDURE — 83520 IMMUNOASSAY QUANT NOS NONAB: CPT | Performed by: SPECIALIST

## 2017-08-31 PROCEDURE — 86480 TB TEST CELL IMMUN MEASURE: CPT | Performed by: SPECIALIST

## 2017-08-31 PROCEDURE — 86430 RHEUMATOID FACTOR TEST QUAL: CPT | Performed by: SPECIALIST

## 2017-08-31 PROCEDURE — 82164 ANGIOTENSIN I ENZYME TEST: CPT | Performed by: SPECIALIST

## 2017-08-31 PROCEDURE — 86200 CCP ANTIBODY: CPT | Performed by: SPECIALIST

## 2017-09-01 ENCOUNTER — APPOINTMENT (OUTPATIENT)
Dept: INFUSION THERAPY | Age: 52
End: 2017-09-01
Payer: MEDICARE

## 2017-09-01 LAB
ACE SERPL-CCNC: 38 U/L (ref 14–82)
CCP IGA+IGG SERPL IA-ACNC: 194 UNITS (ref 0–19)
HAV IGM SER QL: NEGATIVE
HBV CORE IGM SER QL: POSITIVE
HBV SURFACE AG SER QL: <0.1 INDEX
HBV SURFACE AG SER QL: NEGATIVE
HCV AB SER IA-ACNC: 0.09 INDEX
HCV AB SERPL QL IA: NEGATIVE
HCV COMMENT,HCGAC: ABNORMAL
SP1: ABNORMAL
SP2: ABNORMAL
SP3: ABNORMAL

## 2017-09-04 LAB
ANNOTATION COMMENT IMP: NORMAL
C-ANCA TITR SER IF: NORMAL TITER
M TB IFN-G CD4+ BCKGRND COR BLD-ACNC: <0 IU/ML
M TB IFN-G CD4+ T-CELLS BLD-ACNC: 0.05 IU/ML
M TB TUBERC IFN-G BLD QL: NEGATIVE
M TB TUBERC IGNF/MITOGEN IGNF CONTROL: 7.36 IU/ML
MYELOPEROXIDASE AB SER IA-ACNC: <9 U/ML (ref 0–9)
P-ANCA ATYPICAL TITR SER IF: NORMAL TITER
P-ANCA TITR SER IF: NORMAL TITER
PROTEINASE3 AB SER IA-ACNC: <3.5 U/ML (ref 0–3.5)
QUANTIFERON NIL VALUE: 0.06 IU/ML
SERVICE CMNT-IMP: NORMAL

## 2017-09-05 RX ORDER — DIPHENHYDRAMINE HYDROCHLORIDE 50 MG/ML
25 INJECTION, SOLUTION INTRAMUSCULAR; INTRAVENOUS ONCE
Status: CANCELLED | OUTPATIENT
Start: 2017-09-08 | End: 2017-09-08

## 2017-09-05 RX ORDER — OXYCODONE AND ACETAMINOPHEN 10; 325 MG/1; MG/1
1 TABLET ORAL
Qty: 60 TAB | Refills: 0 | Status: SHIPPED | OUTPATIENT
Start: 2017-09-05 | End: 2017-09-18 | Stop reason: SDUPTHER

## 2017-09-08 ENCOUNTER — HOSPITAL ENCOUNTER (OUTPATIENT)
Dept: ONCOLOGY | Age: 52
Discharge: HOME | End: 2017-09-08

## 2017-09-08 ENCOUNTER — CLINICAL SUPPORT (OUTPATIENT)
Dept: ONCOLOGY | Age: 52
End: 2017-09-08

## 2017-09-08 ENCOUNTER — HOSPITAL ENCOUNTER (OUTPATIENT)
Dept: INFUSION THERAPY | Age: 52
Discharge: HOME | End: 2017-09-08
Payer: MEDICARE

## 2017-09-08 VITALS
HEIGHT: 71 IN | TEMPERATURE: 98.5 F | RESPIRATION RATE: 16 BRPM | WEIGHT: 217 LBS | HEART RATE: 107 BPM | DIASTOLIC BLOOD PRESSURE: 73 MMHG | SYSTOLIC BLOOD PRESSURE: 112 MMHG | BODY MASS INDEX: 30.38 KG/M2

## 2017-09-08 DIAGNOSIS — M06.00 SERONEGATIVE RHEUMATOID ARTHRITIS (HCC): ICD-10-CM

## 2017-09-08 DIAGNOSIS — M06.00 SERONEGATIVE RHEUMATOID ARTHRITIS (HCC): Primary | ICD-10-CM

## 2017-09-08 LAB
BASO+EOS+MONOS # BLD AUTO: 0.4 K/UL (ref 0–2.3)
BASO+EOS+MONOS # BLD AUTO: 4 % (ref 0.1–17)
DIFFERENTIAL METHOD BLD: NORMAL
ERYTHROCYTE [DISTWIDTH] IN BLOOD BY AUTOMATED COUNT: 13.9 % (ref 11.5–14.5)
HCT VFR BLD AUTO: 38.3 % (ref 36–48)
HGB BLD-MCNC: 12.7 G/DL (ref 12–16)
LYMPHOCYTES # BLD: 3 K/UL (ref 1.1–5.9)
LYMPHOCYTES NFR BLD: 27 % (ref 14–44)
MCH RBC QN AUTO: 26.7 PG (ref 25–35)
MCHC RBC AUTO-ENTMCNC: 33.2 G/DL (ref 31–37)
MCV RBC AUTO: 80.5 FL (ref 78–102)
NEUTS SEG # BLD: 7.6 K/UL (ref 1.8–9.5)
NEUTS SEG NFR BLD: 70 % (ref 40–70)
PLATELET # BLD AUTO: 323 K/UL (ref 140–440)
RBC # BLD AUTO: 4.76 M/UL (ref 4.1–5.1)
WBC # BLD AUTO: 11 K/UL (ref 4.5–13)

## 2017-09-08 PROCEDURE — 96375 TX/PRO/DX INJ NEW DRUG ADDON: CPT

## 2017-09-08 PROCEDURE — 74011250636 HC RX REV CODE- 250/636: Performed by: INTERNAL MEDICINE

## 2017-09-08 PROCEDURE — 96413 CHEMO IV INFUSION 1 HR: CPT

## 2017-09-08 PROCEDURE — 96415 CHEMO IV INFUSION ADDL HR: CPT

## 2017-09-08 PROCEDURE — 99211 OFF/OP EST MAY X REQ PHY/QHP: CPT

## 2017-09-08 RX ORDER — SODIUM CHLORIDE 0.9 % (FLUSH) 0.9 %
10-40 SYRINGE (ML) INJECTION AS NEEDED
Status: DISCONTINUED | OUTPATIENT
Start: 2017-09-08 | End: 2017-09-12 | Stop reason: HOSPADM

## 2017-09-08 RX ORDER — ALBUTEROL SULFATE 2.5 MG/.5ML
SOLUTION RESPIRATORY (INHALATION) ONCE
COMMUNITY
End: 2019-07-11

## 2017-09-08 RX ORDER — SODIUM CHLORIDE 9 MG/ML
250 INJECTION, SOLUTION INTRAVENOUS ONCE
Status: COMPLETED | OUTPATIENT
Start: 2017-09-08 | End: 2017-09-08

## 2017-09-08 RX ORDER — DIPHENHYDRAMINE HYDROCHLORIDE 50 MG/ML
25 INJECTION, SOLUTION INTRAMUSCULAR; INTRAVENOUS ONCE
Status: COMPLETED | OUTPATIENT
Start: 2017-09-08 | End: 2017-09-08

## 2017-09-08 RX ADMIN — SODIUM CHLORIDE 250 ML: 0.9 INJECTION, SOLUTION INTRAVENOUS at 11:05

## 2017-09-08 RX ADMIN — DIPHENHYDRAMINE HYDROCHLORIDE 25 MG: 50 INJECTION, SOLUTION INTRAMUSCULAR; INTRAVENOUS at 11:12

## 2017-09-08 RX ADMIN — Medication 10 ML: at 10:30

## 2017-09-08 RX ADMIN — INFLIXIMAB 400 MG: 100 INJECTION, POWDER, LYOPHILIZED, FOR SOLUTION INTRAVENOUS at 11:50

## 2017-09-08 NOTE — PROGRESS NOTES
[facility] Progress Note    Date: 2017    Name: Brenden Cool    MRN: [id number]         : 1965      Ms. Mason Vargas arrived in the [facility] today, at 19 , in stable condition, here for Q 6 Week, IV Remicade Infusion. She was assessed and education was provided. Upon assessment today, Ms. Mason Vargas stated that she had just had a \"bronchitis flare up\" about 2 weeks ago, and still had a residual productive cough, as a result. She also stated that she had completed a Z-Pack that was prescribed for her, and had been using an Albuterol Nebulizer at home, and was feeling much better now. Ms. Nadia Manriquez vitals were reviewed. Visit Vitals    /74 (BP 1 Location: Left arm, BP Patient Position: At rest;Sitting)    Pulse (!) 128    Temp 100 °F (37.8 °C)    Resp 20    Ht 5' 11\" (1.803 m)    Wt 98.4 kg (217 lb)    Breastfeeding No    BMI 30.27 kg/m2           PIV was established in her dorsal left forearm at 1030, without incident, and a CBC was drawn, per order. Lab results were obtained and reviewed. Recent Results (from the past 12 hour(s))   CBC WITH 3 PART DIFF    Collection Time: 17 10:30 AM   Result Value Ref Range    WBC 11.0 4.5 - 13.0 K/uL    RBC 4.76 4.10 - 5.10 M/uL    HGB 12.7 12.0 - 16.0 g/dL    HCT 38.3 36 - 48 %    MCV 80.5 78 - 102 FL    MCH 26.7 25.0 - 35.0 PG    MCHC 33.2 31 - 37 g/dL    RDW 13.9 11.5 - 14.5 %    PLATELET 725 277 - 589 K/uL    NEUTROPHILS 70 40 - 70 %    MIXED CELLS 4 0.1 - 17 %    LYMPHOCYTES 27 14 - 44 %    ABS. NEUTROPHILS 7.6 1.8 - 9.5 K/UL    ABS. MIXED CELLS 0.4 0.0 - 2.3 K/uL    ABS. LYMPHOCYTES 3.0 1.1 - 5.9 K/UL    DF AUTOMATED           The stable CBC results listed above, as well as the elevated heart rate, and low grade fever listed above, and the above stated complaints, were all reported to Dr. Faisal Chang. And, an order was received from Dr. Faisal Chang, to continue with the Remicade Infusion today, as planned.        Benadryl 25 mg IV, was administered pre-Remicade, per order, and without incident.               Remicade 400 mg (4 mg/kg) IV  (Dose rounded per pharmacy), was administered per order, and without incident.               After completion of the IV Remicade, the PIV was flushed very well per protocol, and then, the PIV was removed and gauze/bandaid was applied. Ms. Pritesh Chandra tolerated well, and had no complaints. Ms. Pritesh Chandra was discharged from [place] [address] in stable condition at 24. [facility] She is to return in 6 weeks, on Friday, 10-20-17,  at 1000,  for her next appointment, for her next dose of IV Remicade.      Roro Palacios RN  September 8, 2017  10:57 AM

## 2017-09-19 RX ORDER — OXYCODONE AND ACETAMINOPHEN 10; 325 MG/1; MG/1
1 TABLET ORAL
Qty: 60 TAB | Refills: 0 | Status: SHIPPED | OUTPATIENT
Start: 2017-09-19 | End: 2017-10-03 | Stop reason: SDUPTHER

## 2017-10-03 ENCOUNTER — HOSPITAL ENCOUNTER (OUTPATIENT)
Dept: LAB | Age: 52
Discharge: HOME | End: 2017-10-03
Payer: MEDICARE

## 2017-10-03 ENCOUNTER — HOSPITAL ENCOUNTER (OUTPATIENT)
Dept: ONCOLOGY | Age: 52
Discharge: HOME | End: 2017-10-03

## 2017-10-03 ENCOUNTER — OFFICE VISIT (OUTPATIENT)
Dept: ONCOLOGY | Age: 52
End: 2017-10-03

## 2017-10-03 VITALS
WEIGHT: 218.8 LBS | SYSTOLIC BLOOD PRESSURE: 125 MMHG | BODY MASS INDEX: 30.52 KG/M2 | DIASTOLIC BLOOD PRESSURE: 73 MMHG | TEMPERATURE: 98.5 F | HEART RATE: 104 BPM

## 2017-10-03 DIAGNOSIS — D47.3 ESSENTIAL THROMBOCYTOSIS (HCC): ICD-10-CM

## 2017-10-03 DIAGNOSIS — M06.00 RHEUMATOID ARTHRITIS WITH NEGATIVE RHEUMATOID FACTOR, INVOLVING UNSPECIFIED SITE (HCC): ICD-10-CM

## 2017-10-03 DIAGNOSIS — D64.9 ANEMIA, UNSPECIFIED TYPE: ICD-10-CM

## 2017-10-03 DIAGNOSIS — M79.7 FIBROMYALGIA: ICD-10-CM

## 2017-10-03 DIAGNOSIS — G89.4 CHRONIC PAIN SYNDROME: ICD-10-CM

## 2017-10-03 DIAGNOSIS — D47.3 ESSENTIAL THROMBOCYTOSIS (HCC): Primary | ICD-10-CM

## 2017-10-03 DIAGNOSIS — D72.829 LEUKOCYTOSIS, UNSPECIFIED TYPE: ICD-10-CM

## 2017-10-03 LAB
ALBUMIN SERPL-MCNC: 3.7 G/DL (ref 3.4–5)
ALBUMIN/GLOB SERPL: 0.9 {RATIO} (ref 0.8–1.7)
ALP SERPL-CCNC: 105 U/L (ref 45–117)
ALT SERPL-CCNC: 18 U/L (ref 13–56)
ANION GAP SERPL CALC-SCNC: 9 MMOL/L (ref 3–18)
AST SERPL-CCNC: 12 U/L (ref 15–37)
BASO+EOS+MONOS # BLD AUTO: 0.8 K/UL (ref 0–2.3)
BASO+EOS+MONOS # BLD AUTO: 9 % (ref 0.1–17)
BILIRUB SERPL-MCNC: 0.2 MG/DL (ref 0.2–1)
BUN SERPL-MCNC: 11 MG/DL (ref 7–18)
BUN/CREAT SERPL: 15 (ref 12–20)
CALCIUM SERPL-MCNC: 10.1 MG/DL (ref 8.5–10.1)
CHLORIDE SERPL-SCNC: 105 MMOL/L (ref 100–108)
CO2 SERPL-SCNC: 24 MMOL/L (ref 21–32)
CREAT SERPL-MCNC: 0.75 MG/DL (ref 0.6–1.3)
DIFFERENTIAL METHOD BLD: ABNORMAL
ERYTHROCYTE [DISTWIDTH] IN BLOOD BY AUTOMATED COUNT: 14 % (ref 11.5–14.5)
FERRITIN SERPL-MCNC: 120 NG/ML (ref 8–388)
GLOBULIN SER CALC-MCNC: 4.2 G/DL (ref 2–4)
GLUCOSE SERPL-MCNC: 156 MG/DL (ref 74–99)
HCT VFR BLD AUTO: 42.3 % (ref 36–48)
HGB BLD-MCNC: 13.9 G/DL (ref 12–16)
IRON SATN MFR SERPL: 17 %
IRON SERPL-MCNC: 52 UG/DL (ref 50–175)
LYMPHOCYTES # BLD: 3.4 K/UL (ref 1.1–5.9)
LYMPHOCYTES NFR BLD: 36 % (ref 14–44)
MCH RBC QN AUTO: 26.7 PG (ref 25–35)
MCHC RBC AUTO-ENTMCNC: 32.9 G/DL (ref 31–37)
MCV RBC AUTO: 81.3 FL (ref 78–102)
NEUTS SEG # BLD: 5.3 K/UL (ref 1.8–9.5)
NEUTS SEG NFR BLD: 56 % (ref 40–70)
PLATELET # BLD AUTO: 399 K/UL (ref 140–440)
POTASSIUM SERPL-SCNC: 4.1 MMOL/L (ref 3.5–5.5)
PROT SERPL-MCNC: 7.9 G/DL (ref 6.4–8.2)
RBC # BLD AUTO: 5.2 M/UL (ref 4.1–5.1)
SODIUM SERPL-SCNC: 138 MMOL/L (ref 136–145)
TIBC SERPL-MCNC: 315 UG/DL (ref 250–450)
WBC # BLD AUTO: 9.5 K/UL (ref 4.5–13)

## 2017-10-03 PROCEDURE — 80053 COMPREHEN METABOLIC PANEL: CPT | Performed by: NURSE PRACTITIONER

## 2017-10-03 PROCEDURE — 36415 COLL VENOUS BLD VENIPUNCTURE: CPT | Performed by: NURSE PRACTITIONER

## 2017-10-03 PROCEDURE — 83540 ASSAY OF IRON: CPT | Performed by: NURSE PRACTITIONER

## 2017-10-03 PROCEDURE — 82728 ASSAY OF FERRITIN: CPT | Performed by: NURSE PRACTITIONER

## 2017-10-03 RX ORDER — OXYCODONE AND ACETAMINOPHEN 10; 325 MG/1; MG/1
1 TABLET ORAL
Qty: 60 TAB | Refills: 0 | Status: SHIPPED | OUTPATIENT
Start: 2017-10-03 | End: 2017-10-17 | Stop reason: SDUPTHER

## 2017-10-03 NOTE — MR AVS SNAPSHOT
Visit Information Date & Time Provider Department Dept. Phone Encounter #  
 10/3/2017 11:15 AM [facility] [address] Office [phone] [id number] Your Appointments 11/14/2017 11:30 AM  
Office Visit with MD Sebastian Luu [address] [place] [address] [facility]) Appt Note: OV  
 [facility] [address] [place] [address]  
[phone]  
  
   
 [facility] [address] Upcoming Health Maintenance Date Due Pneumococcal 19-64 Highest Risk (1 of 3 - PCV13) 12/19/1982 DTaP/Tdap/Td series (1 - Tdap) 12/19/1984 PAP AKA CERVICAL CYTOLOGY 12/19/1984 BREAST CANCER SCRN MAMMOGRAM 12/19/2013 FOBT Q 1 YEAR AGE 50-75 12/19/2013 INFLUENZA AGE 9 TO ADULT 8/1/2017 Allergies as of 10/3/2017  Review Complete On: 9/8/2017 By: Camila Walker RN Severity Noted Reaction Type Reactions Levaquin [Levofloxacin] High  Systemic Anaphylaxis Pollen Extracts    Unable to Obtain Current Immunizations  Reviewed on 9/8/2017 Name Date Influenza Vaccine 10/21/2016, 11/3/2015, 11/10/2014, 10/25/2013 Influenza Vaccine Whole 9/10/2012 Not reviewed this visit You Were Diagnosed With   
  
 Codes Comments Essential thrombocytosis (HCC)    -  Primary ICD-10-CM: D47.3 ICD-9-CM: 238.71 Rheumatoid arthritis with negative rheumatoid factor, involving unspecified site ([facility]     ICD-10-CM: M06.00 
ICD-9-CM: 714.0 Chronic pain syndrome     ICD-10-CM: G89.4 ICD-9-CM: 338.4 Fibromyalgia     ICD-10-CM: M79.7 ICD-9-CM: 729.1 Leukocytosis, unspecified type     ICD-10-CM: D72.829 ICD-9-CM: 288.60 Anemia, unspecified type     ICD-10-CM: D64.9 ICD-9-CM: 874. 9 Vitals BP Pulse Temp Weight(growth percentile) BMI OB Status 125/73 (BP 1 Location: Left arm, BP Patient Position: Sitting) (!) 104 98.5 °F (36.9 °C) (Oral) 218 lb 12.8 oz (99.2 kg) 30.52 kg/m2 Hysterectomy Smoking Status Former Smoker BMI and BSA Data Body Mass Index Body Surface Area 30.52 kg/m 2 2.23 m 2 Preferred Pharmacy Pharmacy Name Phone [facility] [address]. [phone] Your Updated Medication List  
  
   
This list is accurate as of: 10/3/17 12:27 PM.  Always use your most recent med list.  
  
  
  
  
 * albuterol sulfate 2.5 mg/0.5 mL Nebu nebulizer solution Commonly known as:  PROVENTIL;VENTOLIN  
by Nebulization route once. Is now using the actual nebulizer machine, for current bronchitis episode (Aug/Sept. 2017) * albuterol 90 mcg/actuation inhaler Commonly known as:  PROAIR HFA Take 1 Puff by inhalation every six (6) hours as needed for Wheezing. amitriptyline 150 mg tablet Commonly known as:  ELAVIL Take 10 mg by mouth nightly. Indications: DEPRESSION  
  
 anagrelide 0.5 mg capsule Commonly known as:  Laura Cohens Take 1 Cap by mouth two (2) times a day. Indications: ESSENTIAL THROMBOCYTOSIS  
  
 atenolol 25 mg tablet Commonly known as:  TENORMIN Take 25 mg by mouth daily. Indications: HYPERTENSION  
  
 cholecalciferol (VITAMIN D3) 5,000 unit Tab tablet Commonly known as:  VITAMIN D3 Take 5,000 Units by mouth every seven (7) days. DULoxetine 20 mg capsule Commonly known as:  CYMBALTA Take 20 mg by mouth daily. folic acid 338 mcg tablet Take 400 mcg by mouth daily. furosemide 40 mg tablet Commonly known as:  LASIX Take  by mouth daily. haloperidol 5 mg tablet Commonly known as:  HALDOL Take 2.5 mg by mouth nightly. HYZAAR 50-12.5 mg per tablet Generic drug:  losartan-hydroCHLOROthiazide Take 1 Tab by mouth daily. KlonoPIN 2 mg tablet Generic drug:  clonazePAM  
Take 2 mg by mouth daily. Indications: PANIC DISORDER  
  
 LANTUS SC  
5 Units by SubCUTAneous route daily. metFORMIN 1,000 mg tablet Commonly known as:  GLUCOPHAGE Take 500 mg by mouth two (2) times a day. methocarbamol 750 mg tablet Commonly known as:  IFTFDLD-973 Take 1 Tab by mouth three (3) times daily. methotrexate 2.5 mg tablet Commonly known as:  Bello [place] Take 5 mg by mouth daily. nitroglycerin 400 mcg/spray spray Commonly known as:  NITROLINGUAL  
1 [place] by SubLINGual route every five (5) minutes as needed. NovoLOG Mix 70-30 100 unit/mL (70-30) injection Generic drug:  insulin aspart protamine/insulin aspart 10 Units by SubCUTAneous route two (2) times a day. ondansetron hcl 4 mg tablet Commonly known as:  ZOFRAN (AS HYDROCHLORIDE) Take 1 Tab by mouth every eight (8) hours as needed for Nausea. oxyCODONE-acetaminophen  mg per tablet Commonly known as:  PERCOCET 10 Take 1 Tab by mouth every six (6) hours as needed for Pain for up to 15 days. Max Daily Amount: 4 Tabs. PLAQUENIL 200 mg tablet Generic drug:  hydroxychloroquine Take 200 mg by mouth two (2) times a day. QUEtiapine 100 mg tablet Commonly known as:  SEROquel Take 25 mg by mouth. Takes 25 mg Q AM, and 100 mg, Q PM  
  
 REMICADE IV  
344 mg by IntraVENous route once as needed. remicade will be every 8 weeks * Notice: This list has 2 medication(s) that are the same as other medications prescribed for you. Read the directions carefully, and ask your doctor or other care provider to review them with you. Prescriptions Printed Refills  
 oxyCODONE-acetaminophen (PERCOCET 10)  mg per tablet 0 Sig: Take 1 Tab by mouth every six (6) hours as needed for Pain for up to 15 days. Max Daily Amount: 4 Tabs. Class: Print Route: Oral  
  
We Performed the Following COMPLETE CBC & AUTO DIFF WBC [76437 CPT(R)] To-Do List   
 10/03/2017 Lab:  CBC WITH 3 PART DIFF   
  
 10/04/2017 Lab:  FERRITIN   
  
 10/04/2017   Lab:  IRON PROFILE   
  
 10/04/2017 Lab:  METABOLIC PANEL, COMPREHENSIVE   
  
 10/20/2017 10:00 AM  
  Appointment with [address] [facility] [address] at [place] [address] ([phone]) 12/01/2017 10:00 AM  
  Appointment with [facility] [address] at [place] [address] ([phone]) Patient Instructions Complete Blood Count (CBC): About This Test 
What is it? A complete blood count (CBC) is a blood test that gives important information about your blood cells, especially red blood cells, white blood cells, and platelets. Why is this test done? A CBC may be done as part of a regular physical exam. There are many other reasons that a doctor may want this blood test, including to: · Find the cause of symptoms such as fatigue, weakness, fever, bruising, or weight loss. · Find anemia or an infection. · See how much blood has been lost if there is bleeding. · Diagnose diseases of the blood, such as leukemia or polycythemia. How can you prepare for the test? 
You do not need to do anything before having this test. 
What happens during the test? 
The health professional taking a sample of your blood will: · Wrap an elastic band around your upper arm. This makes the veins below the band larger so it is easier to put a needle into the vein. · Clean the needle site with alcohol. · Put the needle into the vein. · Attach a tube to the needle to fill it with blood. · Remove the band from your arm when enough blood is collected. · Put a gauze pad or cotton ball over the needle site as the needle is removed. · Put pressure on the site and then put on a bandage. If this blood test is done on a baby, a heel stick may be done instead of a blood draw from a vein. What happens after the test? 
· You will probably be able to go home right away. · You can go back to your usual activities right away. Follow-up care is a key part of your treatment and safety.  Be sure to make and go to all appointments, and call your doctor if you are having problems. It's also a good idea to keep a list of the medicines you take. Ask your doctor when you can expect to have your test results. Where can you learn more? Go to http://swapnil-speedy.info/. Enter Y242 in the search box to learn more about \"Complete Blood Count (CBC): About This Test.\" Current as of: October 14, 2016 Content Version: 11.3 © 0827-7565 [employer]. Care instructions adapted under license by [employer] (which disclaims liability or warranty for this information). If you have questions about a medical condition or this instruction, always ask your healthcare professional. [address] any warranty or liability for your use of this information. Please provide this summary of care documentation to your next provider. Your primary care clinician is listed as [employer]. If you have any questions after today's visit, please call [phone].

## 2017-10-03 NOTE — PROGRESS NOTES
Hematology/Oncology  Progress Note    Name: Dee Baptiste  Date: 10/3/2017  : 1965    PCP: Alaina Mejia MD     Ms. Cyndi Matthews is a 46year old female who was seen for management of her rheumatoid arthritis, leukocytosis, and thrombocytosis. Current therapy: Remicade 4 mg/kg bodyweight ever 6 weeks intravenously    Subjective:     Ms. Cyndi Matthews is a 46year-old [facility] American woman who has severe rheumatoid arthritis. She receives Remicade every 6 weeks. She also suffers from fibromyalgia and had an elevated WBC count and platelet count of undefined etiology. Today she has no new complaints to report. She continues to complain of back pain. She is using a narcotic based regimen for pain control. She states this provides some relief, but the patient is gradually worsening. She reports that the Remicade has provided a significant degree of relief from the severe rheumatoid arthritis symptoms. The patient is continuing to use her cane for mobility support. She denies any recent fevers or recurrent infections. The patient reports that her appetite has continued to improve. She also states her energy level is continuing to improve as well. She has no new complaints or concerns to report, at this time.        Past Medical History:   Diagnosis Date    Abdominal pain, unspecified site     Acute otitis media     Acute pharyngitis     Anxiety     Arthritis     Autoimmune disease ([address]     Back injury     Backache     herniated disc in lower back    Blurred vision     Chest pain, unspecified     abnormal EKG    Chronic airway obstruction, not elsewhere classified     Chronic back pain     Chronic pain     COPD     Depression     Diabetes (HCC)     Dizziness     Dizziness and giddiness     possible orthostatic changes, vasovagal, autonomic dysfunction from diabetic neuropathy    Encounters for unspecified administrative purpose     Feeling anxious     Fibromyalgia     Headache  Hemorrhoid     Herniated disc     at L5    Hypercholesterolemia     Hyperglycemia     Hypertension     Joint pain     Leukocytosis, unspecified     Major depressive disorder, single episode, mild (HCC)     Mental disorder     depression, anxiety, bipolar and PTSD    Neuropathy     Obesity, unspecified     Other chest pain     Phobic disorders     Rheumatoid arthritis (HCC)     Seasonal allergies     Shortness of breath     normal EF    Streptococcal sore throat     Type II or unspecified type diabetes mellitus with unspecified complication, uncontrolled     Unspecified hereditary and idiopathic peripheral neuropathy     Vitamin D deficiency      Past Surgical History:   Procedure Laterality Date    HX CHOLECYSTECTOMY  1994    HX GYN  2005    partial Hysterectomy    HX OTHER SURGICAL  12-1-15    had ingrown toenail removed from big toe, both feet    HX TUBAL LIGATION      1995     Social History     Social History    Marital status:      Spouse name: N/A    Number of children: N/A    Years of education: N/A     Occupational History    Not on file.      Social History Main Topics    Smoking status: Former Smoker    Smokeless tobacco: Never Used    Alcohol use Yes      Comment: socially    Drug use: No    Sexual activity: Yes     Partners: Male     Birth control/ protection: Condom     Other Topics Concern    Not on file     Social History Narrative     Family History   Problem Relation Age of Onset    Diabetes Other     Alcohol abuse Mother     Arthritis-osteo Mother     Diabetes Mother     Elevated Lipids Mother     Headache Mother     Heart Disease Mother    Stephen Spell Migraines Mother     Psychiatric Disorder Mother     Alcohol abuse Father    Delta Gin Father     Cancer Father     Headache Father     Heart Disease Father     Lung Disease Father     Migraines Father     Alcohol abuse Sister     Headache Sister     Diabetes Sister     Heart Disease Sister  Migraines Sister     Psychiatric Disorder Sister     Headache Brother     Diabetes Brother     Elevated Lipids Brother     Heart Disease Brother     Migraines Brother     Psychiatric Disorder Brother     Cancer Maternal Aunt     Alcohol abuse Maternal Aunt     Diabetes Maternal Aunt     Headache Maternal Aunt     Lung Disease Maternal Aunt     Migraines Maternal Aunt     Headache Maternal Uncle     Migraines Maternal Uncle     Stroke Maternal Uncle     Psychiatric Disorder Maternal Uncle     Headache Paternal Aunt     Migraines Paternal Aunt     Headache Paternal Uncle     Hypertension Paternal Uncle     Migraines Paternal Uncle     Stroke Paternal Uncle     Headache Maternal Grandmother     Migraines Maternal Grandmother     Stroke Maternal Grandmother     Headache Maternal Grandfather     Migraines Maternal Grandfather     Stroke Maternal Grandfather     Headache Paternal Grandmother     Hypertension Paternal Grandmother     Lung Disease Paternal Grandmother     Migraines Paternal Grandmother     Headache Paternal Grandfather     Cancer Paternal Grandfather     Migraines Paternal Grandfather      Current Outpatient Prescriptions   Medication Sig Dispense Refill    oxyCODONE-acetaminophen (PERCOCET 10)  mg per tablet Take 1 Tab by mouth every six (6) hours as needed for Pain for up to 15 days. Max Daily Amount: 4 Tabs. 60 Tab 0    albuterol sulfate (PROVENTIL;VENTOLIN) 2.5 mg/0.5 mL nebu nebulizer solution by Nebulization route once. Is now using the actual nebulizer machine, for current bronchitis episode (Aug/Sept. 2017)      anagrelide (AGRYLIN) 0.5 mg capsule Take 1 Cap by mouth two (2) times a day. Indications: ESSENTIAL THROMBOCYTOSIS 60 Cap 6    methocarbamol (ROBAXIN-750) 750 mg tablet Take 1 Tab by mouth three (3) times daily.  15 Tab 0    insulin aspart protamine/insulin aspart (NOVOLOG MIX 70-30) 100 unit/mL (70-30) injection 10 Units by SubCUTAneous route two (2) times a day.  clonazePAM (KLONOPIN) 2 mg tablet Take 2 mg by mouth daily. Indications: PANIC DISORDER      folic acid 807 mcg tablet Take 400 mcg by mouth daily.  haloperidol (HALDOL) 5 mg tablet Take 2.5 mg by mouth nightly.  QUEtiapine (SEROQUEL) 100 mg tablet Take 25 mg by mouth. Takes 25 mg Q AM, and 100 mg, Q PM      DULoxetine (CYMBALTA) 20 mg capsule Take 20 mg by mouth daily.  albuterol (PROAIR HFA) 90 mcg/actuation inhaler Take 1 Puff by inhalation every six (6) hours as needed for Wheezing. 1 Inhaler 0    ondansetron hcl (ZOFRAN, AS HYDROCHLORIDE,) 4 mg tablet Take 1 Tab by mouth every eight (8) hours as needed for Nausea. 12 Tab 0    methotrexate (RHEUMATREX) 2.5 mg tablet Take 5 mg by mouth daily.  metFORMIN (GLUCOPHAGE) 1,000 mg tablet Take 500 mg by mouth two (2) times a day.  Cholecalciferol, Vitamin D3, 5,000 unit Tab Take 5,000 Units by mouth every seven (7) days.  atenolol (TENORMIN) 25 mg tablet Take 25 mg by mouth daily. Indications: HYPERTENSION      hydroxychloroquine (PLAQUENIL) 200 mg tablet Take 200 mg by mouth two (2) times a day.  furosemide (LASIX) 40 mg tablet Take  by mouth daily.  INFLIXIMAB (REMICADE IV) 344 mg by IntraVENous route once as needed. remicade will be every 8 weeks       amitriptyline (ELAVIL) 150 mg tablet Take 10 mg by mouth nightly. Indications: DEPRESSION      nitroglycerin (NITROLINGUAL) 0.4 mg/dose spray 1 Spray by SubLINGual route every five (5) minutes as needed.  losartan-hydrochlorothiazide (HYZAAR) 50-12.5 mg per tablet Take 1 Tab by mouth daily.  INSULIN GLARGINE,HUM. REC. ANLOG (LANTUS SC) 5 Units by SubCUTAneous route daily. Review of Systems  Constitutional: The patient has no acute distress or discomfort.   HEENT: The patient denies recent head trauma, eye pain, blurred vision,  hearing deficit, oropharyngeal mucosal pain or lesions, and the patient denies throat pain or discomfort. Lymphatics: The patient denies palpable peripheral lymphadenopathy. Hematologic: The patient denies having bruising, bleeding, or progressive fatigue. Respiratory: Patient denies having shortness of breath, cough, sputum production, fever, or dyspnea on exertion. Cardiovascular: The patient denies having leg pain, leg swelling, heart palpitations, chest permit, chest pain, or lightheadedness. The patient denies having dyspnea on exertion. Gastrointestinal: The patient denies having nausea, emesis, or diarrhea. The patient denies having any hematemesis or blood in the stool. Genitourinary: Patient denies having urinary urgency, frequency, or dysuria. The patient denies having blood in the urine. Psychological: The patient denies having symptoms of nervousness, anxiety, depression, or thoughts of harming himself some of this. Skin: Patient denies having skin rashes, skin, ulcerations, or unexplained itching or pruritus. Musculoskeletal: The patient complains of generalized back pain. Objective:     Visit Vitals    /73 (BP 1 Location: Left arm, BP Patient Position: Sitting)    Pulse (!) 104    Temp 98.5 °F (36.9 °C) (Oral)    Wt 99.2 kg (218 lb 12.8 oz)    BMI 30.52 kg/m2     ECOG PS=0 Pain score 4-5/10     Physical Exam:   Gen. Appearance: The patient is in no acute distress. Skin: There is no bruise or rash. HEENT: The exam is unremarkable. Neck: Supple without lymphadenopathy or thyromegaly. Lungs: Clear to auscultation and percussion; there are no wheezes or rhonchi. Heart: Regular rate and rhythm; there are no murmurs, gallops, or rubs. Abdomen: Bowel sounds are present and normal.  There is no guarding, tenderness, or hepatosplenomegaly. Extremities: There is no clubbing, cyanosis, or edema. Neurologic: There are no focal neurologic deficits. Lymphatics: There is no palpable peripheral lymphadenopathy.  Musculoskeletal: The patient has full range of motion at all joints. However, she complains of pain on ambulation due to the severe rheumatoid arthritis. There is  evidence of joint deformity or effusions in the hands and knees. There is no focal joint tenderness. She is using a cane for support and mobility. Psychological/psychiatric: There is no clinical evidence of anxiety, depression, or melancholy. Lab data:      Results for orders placed or performed during the hospital encounter of 10/03/17   CBC WITH 3 PART DIFF     Status: Abnormal   Result Value Ref Range Status    WBC 9.5 4.5 - 13.0 K/uL Final    RBC 5.20 (H) 4.10 - 5.10 M/uL Final    HGB 13.9 12.0 - 16.0 g/dL Final    HCT 42.3 36 - 48 % Final    MCV 81.3 78 - 102 FL Final    MCH 26.7 25.0 - 35.0 PG Final    MCHC 32.9 31 - 37 g/dL Final    RDW 14.0 11.5 - 14.5 % Final    PLATELET 272 344 - 141 K/uL Final    NEUTROPHILS 56 40 - 70 % Final    MIXED CELLS 9 0.1 - 17 % Final    LYMPHOCYTES 36 14 - 44 % Final    ABS. NEUTROPHILS 5.3 1.8 - 9.5 K/UL Final    ABS. MIXED CELLS 0.8 0.0 - 2.3 K/uL Final    ABS. LYMPHOCYTES 3.4 1.1 - 5.9 K/UL Final     Comment: Test performed at [address] Results Reviewed by Medical Director. DF AUTOMATED   Final           Assessment:     1. Essential thrombocytosis ([facility]    2. Rheumatoid arthritis with negative rheumatoid factor, involving unspecified site ([facility]    3. Chronic pain syndrome    4. Fibromyalgia    5. Leukocytosis, unspecified type    6. Anemia, unspecified type      Plan:   Leukocytosis (recurrent and persisting): I have explained to the patient that the etiology of her leukocytosis remains undefined. A previous flow cytometry did not reveal any  evidence of an immunophenotypic abnormality such as an evolving chronic lymphoproliferative disorder or myeloproliferative disorder. The CBCs will continue to be monitored every 6 weeks.   The CBC from today shows her WBC count is currently 9.5, the absolute neutrophil count is 5.3 and the absolute lymphocyte count is 3.4. Essential thrombocytosis/thrombocytosis: The current CBC shows that the platelet count is now 399,000. The platelet count is being monitored every 6 weeks. The patient was previously started on anagrelide 0.5 mg one tablet twice daily. This medication will be continued, at the current dose. I have reenforced to the patient the importance of being complaint with the treatment plan. Rheumatoid arthritis: the patient will continue to receive Remicade as a primary treatment modality for her severe rheumatoid arthritis every 6 weeks. The dose of Remicade will be 344 mg given intravenously. The patient has continued to take  Methotrexate 5 mg p.o. daily and Plaquenil 200 mg twice daily. Fibromyalgia: The patient  continues to have generalized pain at times related to her underlying fibromyalgia. The patient receives her Percocet medication on a monthly basis as needed. A new prescription for the Percocet was provided at this time. Chronic anemia: I have explained to the patient the CBC from today shows her hemoglobin has remained normal at 13.9 g/dL with hematocrit of 42.3% %. I have recommended that she continue to take ferrous sulfate 325 mg by mouth once daily. We will see the patient back in 6 weeks for a complete reassessment.     Orders Placed This Encounter    COMPLETE CBC & AUTO DIFF WBC    InHouse CBC ([facility])     Standing Status:   Future     Number of Occurrences:   1     Standing Expiration Date:   34/09/0619    METABOLIC PANEL, COMPREHENSIVE     Standing Status:   Future     Number of Occurrences:   1     Standing Expiration Date:   10/4/2018    IRON PROFILE     Standing Status:   Future     Number of Occurrences:   1     Standing Expiration Date:   10/4/2018    FERRITIN     Standing Status:   Future     Number of Occurrences:   1     Standing Expiration Date:   10/4/2018    oxyCODONE-acetaminophen (PERCOCET 10)  mg per tablet     Sig: Take 1 Tab by mouth every six (6) hours as needed for Pain for up to 15 days. Max Daily Amount: 4 Tabs.      Dispense:  60 Tab     Refill:  0       Mayda Avendaño MD  10/3/2017

## 2017-10-03 NOTE — PATIENT INSTRUCTIONS
Complete Blood Count (CBC): About This Test  What is it? A complete blood count (CBC) is a blood test that gives important information about your blood cells, especially red blood cells, white blood cells, and platelets. Why is this test done? A CBC may be done as part of a regular physical exam. There are many other reasons that a doctor may want this blood test, including to:  · Find the cause of symptoms such as fatigue, weakness, fever, bruising, or weight loss. · Find anemia or an infection. · See how much blood has been lost if there is bleeding. · Diagnose diseases of the blood, such as leukemia or polycythemia. How can you prepare for the test?  You do not need to do anything before having this test.  What happens during the test?  The health professional taking a sample of your blood will:  · Wrap an elastic band around your upper arm. This makes the veins below the band larger so it is easier to put a needle into the vein. · Clean the needle site with alcohol. · Put the needle into the vein. · Attach a tube to the needle to fill it with blood. · Remove the band from your arm when enough blood is collected. · Put a gauze pad or cotton ball over the needle site as the needle is removed. · Put pressure on the site and then put on a bandage. If this blood test is done on a baby, a heel stick may be done instead of a blood draw from a vein. What happens after the test?  · You will probably be able to go home right away. · You can go back to your usual activities right away. Follow-up care is a key part of your treatment and safety. Be sure to make and go to all appointments, and call your doctor if you are having problems. It's also a good idea to keep a list of the medicines you take. Ask your doctor when you can expect to have your test results. Where can you learn more? Go to http://swapnil-speedy.info/.   Enter E990 in the search box to learn more about \"Complete Blood Count (CBC): About This Test.\"  Current as of: October 14, 2016  Content Version: 11.3  © 5811-4433 [employer]. Care instructions adapted under license by [employer] (which disclaims liability or warranty for this information). If you have questions about a medical condition or this instruction, always ask your healthcare professional. [place] [address] any warranty or liability for your use of this information.

## 2017-10-16 DIAGNOSIS — D75.839 THROMBOCYTOSIS: ICD-10-CM

## 2017-10-16 RX ORDER — OXYCODONE AND ACETAMINOPHEN 10; 325 MG/1; MG/1
1 TABLET ORAL
Qty: 60 TAB | Refills: 0 | Status: CANCELLED | OUTPATIENT
Start: 2017-10-16 | End: 2017-10-31

## 2017-10-17 RX ORDER — ATENOLOL 50 MG/1
TABLET ORAL
COMMUNITY
Start: 2017-09-22 | End: 2018-05-04 | Stop reason: CLARIF

## 2017-10-17 RX ORDER — AMITRIPTYLINE HYDROCHLORIDE 25 MG/1
25 TABLET, FILM COATED ORAL
COMMUNITY
Start: 2017-08-22 | End: 2020-01-24

## 2017-10-17 RX ORDER — DULOXETIN HYDROCHLORIDE 30 MG/1
CAPSULE, DELAYED RELEASE ORAL 2 TIMES DAILY
COMMUNITY
Start: 2017-09-27 | End: 2020-01-24

## 2017-10-17 RX ORDER — METFORMIN HYDROCHLORIDE 500 MG/1
TABLET ORAL
COMMUNITY
Start: 2017-08-22 | End: 2018-05-04 | Stop reason: CLARIF

## 2017-10-17 RX ORDER — OXYCODONE AND ACETAMINOPHEN 10; 325 MG/1; MG/1
1 TABLET ORAL
Qty: 60 TAB | Refills: 0 | Status: SHIPPED | OUTPATIENT
Start: 2017-10-17 | End: 2017-10-31 | Stop reason: SDUPTHER

## 2017-10-17 RX ORDER — GABAPENTIN 300 MG/1
300 CAPSULE ORAL 2 TIMES DAILY
COMMUNITY
Start: 2017-08-22 | End: 2019-08-10

## 2017-10-17 RX ORDER — QUETIAPINE FUMARATE 25 MG/1
25 TABLET, FILM COATED ORAL 2 TIMES DAILY
COMMUNITY
Start: 2017-09-22 | End: 2020-01-24

## 2017-10-17 RX ORDER — CLOTRIMAZOLE AND BETAMETHASONE DIPROPIONATE 10; .64 MG/G; MG/G
CREAM TOPICAL 2 TIMES DAILY
COMMUNITY
Start: 2017-09-21 | End: 2020-01-24

## 2017-10-17 RX ORDER — INSULIN GLARGINE 100 [IU]/ML
INJECTION, SOLUTION SUBCUTANEOUS
COMMUNITY
Start: 2017-08-28 | End: 2018-07-11 | Stop reason: SDUPTHER

## 2017-10-20 ENCOUNTER — CLINICAL SUPPORT (OUTPATIENT)
Dept: ONCOLOGY | Age: 52
End: 2017-10-20

## 2017-10-20 ENCOUNTER — HOSPITAL ENCOUNTER (OUTPATIENT)
Dept: INFUSION THERAPY | Age: 52
Discharge: HOME | End: 2017-10-20
Payer: MEDICARE

## 2017-10-20 ENCOUNTER — HOSPITAL ENCOUNTER (OUTPATIENT)
Dept: ONCOLOGY | Age: 52
Discharge: HOME | End: 2017-10-20

## 2017-10-20 VITALS
SYSTOLIC BLOOD PRESSURE: 138 MMHG | HEIGHT: 71 IN | WEIGHT: 220 LBS | HEART RATE: 98 BPM | DIASTOLIC BLOOD PRESSURE: 86 MMHG | TEMPERATURE: 99.2 F | BODY MASS INDEX: 30.8 KG/M2 | RESPIRATION RATE: 16 BRPM

## 2017-10-20 DIAGNOSIS — M06.00 SERONEGATIVE RHEUMATOID ARTHRITIS (HCC): ICD-10-CM

## 2017-10-20 DIAGNOSIS — M06.00 SERONEGATIVE RHEUMATOID ARTHRITIS (HCC): Primary | ICD-10-CM

## 2017-10-20 LAB
BASO+EOS+MONOS # BLD AUTO: 1.2 K/UL (ref 0–2.3)
BASO+EOS+MONOS # BLD AUTO: 11 % (ref 0.1–17)
DIFFERENTIAL METHOD BLD: NORMAL
ERYTHROCYTE [DISTWIDTH] IN BLOOD BY AUTOMATED COUNT: 13.5 % (ref 11.5–14.5)
HCT VFR BLD AUTO: 39.1 % (ref 36–48)
HGB BLD-MCNC: 13 G/DL (ref 12–16)
LYMPHOCYTES # BLD: 2.4 K/UL (ref 1.1–5.9)
LYMPHOCYTES NFR BLD: 22 % (ref 14–44)
MCH RBC QN AUTO: 27.3 PG (ref 25–35)
MCHC RBC AUTO-ENTMCNC: 33.2 G/DL (ref 31–37)
MCV RBC AUTO: 82 FL (ref 78–102)
NEUTS SEG # BLD: 7.5 K/UL (ref 1.8–9.5)
NEUTS SEG NFR BLD: 67 % (ref 40–70)
PLATELET # BLD AUTO: 365 K/UL (ref 140–440)
RBC # BLD AUTO: 4.77 M/UL (ref 4.1–5.1)
WBC # BLD AUTO: 11.1 K/UL (ref 4.5–13)

## 2017-10-20 PROCEDURE — 96413 CHEMO IV INFUSION 1 HR: CPT

## 2017-10-20 PROCEDURE — 96415 CHEMO IV INFUSION ADDL HR: CPT

## 2017-10-20 PROCEDURE — 96375 TX/PRO/DX INJ NEW DRUG ADDON: CPT

## 2017-10-20 PROCEDURE — 74011250636 HC RX REV CODE- 250/636: Performed by: INTERNAL MEDICINE

## 2017-10-20 RX ORDER — DIPHENHYDRAMINE HYDROCHLORIDE 50 MG/ML
25 INJECTION, SOLUTION INTRAMUSCULAR; INTRAVENOUS ONCE
Status: COMPLETED | OUTPATIENT
Start: 2017-10-20 | End: 2017-10-20

## 2017-10-20 RX ORDER — SODIUM CHLORIDE 0.9 % (FLUSH) 0.9 %
10-40 SYRINGE (ML) INJECTION AS NEEDED
Status: DISCONTINUED | OUTPATIENT
Start: 2017-10-20 | End: 2017-10-24 | Stop reason: HOSPADM

## 2017-10-20 RX ORDER — SODIUM CHLORIDE 9 MG/ML
250 INJECTION, SOLUTION INTRAVENOUS CONTINUOUS
Status: DISPENSED | OUTPATIENT
Start: 2017-10-20 | End: 2017-10-21

## 2017-10-20 RX ADMIN — Medication 10 ML: at 11:14

## 2017-10-20 RX ADMIN — Medication 10 ML: at 14:34

## 2017-10-20 RX ADMIN — SODIUM CHLORIDE 250 ML: 900 INJECTION, SOLUTION INTRAVENOUS at 11:29

## 2017-10-20 RX ADMIN — INFLIXIMAB 400 MG: 100 INJECTION, POWDER, LYOPHILIZED, FOR SOLUTION INTRAVENOUS at 12:00

## 2017-10-20 RX ADMIN — DIPHENHYDRAMINE HYDROCHLORIDE 25 MG: 50 INJECTION, SOLUTION INTRAMUSCULAR; INTRAVENOUS at 11:30

## 2017-10-20 NOTE — PROGRESS NOTES
[facility] Progress Note    Date: 2017    Name: Andrew Olson    MRN: [id number]         : 1965      Ms. Santi Ewing was assessed and education was provided. Ms. Ermelinda Dexter vitals were reviewed and patient was observed for 5 minutes prior to treatment. Visit Vitals    /86 (BP 1 Location: Right arm, BP Patient Position: At rest;Sitting)    Pulse 98    Temp 99.2 °F (37.3 °C)    Resp 16    Ht 5' 11\" (1.803 m)    Wt 99.8 kg (220 lb)    Breastfeeding No    BMI 30.68 kg/m2       Lab results were obtained and reviewed. Recent Results (from the past 12 hour(s))   CBC WITH 3 PART DIFF    Collection Time: 10/20/17 11:15 AM   Result Value Ref Range    WBC 11.1 4.5 - 13.0 K/uL    RBC 4.77 4.10 - 5.10 M/uL    HGB 13.0 12.0 - 16.0 g/dL    HCT 39.1 36 - 48 %    MCV 82.0 78 - 102 FL    MCH 27.3 25.0 - 35.0 PG    MCHC 33.2 31 - 37 g/dL    RDW 13.5 11.5 - 14.5 %    PLATELET 960 777 - 828 K/uL    NEUTROPHILS 67 40 - 70 %    MIXED CELLS 11 0.1 - 17 %    LYMPHOCYTES 22 14 - 44 %    ABS. NEUTROPHILS 7.5 1.8 - 9.5 K/UL    ABS. MIXED CELLS 1.2 0.0 - 2.3 K/uL    ABS. LYMPHOCYTES 2.4 1.1 - 5.9 K/UL    DF AUTOMATED         Pre-medication benadryl 25 mg IVP given. Remicade 400 mg IV given. Ms. Santi Ewing tolerated the infusion, and had no complaints. Patient armband removed and shredded. Ms. Santi Ewing was discharged from [place] [address] in stable condition at 1440. She is to return on 17 at 1000 for her next appointment for Remicade Q 6 weeks.     Magy Jones RN  2017  3:32 PM

## 2017-10-30 ENCOUNTER — TELEPHONE (OUTPATIENT)
Dept: ONCOLOGY | Age: 52
End: 2017-10-30

## 2017-10-31 RX ORDER — OXYCODONE AND ACETAMINOPHEN 10; 325 MG/1; MG/1
1 TABLET ORAL
Qty: 60 TAB | Refills: 0 | Status: SHIPPED | OUTPATIENT
Start: 2017-10-31 | End: 2017-11-14 | Stop reason: SDUPTHER

## 2017-11-14 ENCOUNTER — OFFICE VISIT (OUTPATIENT)
Dept: ONCOLOGY | Age: 52
End: 2017-11-14

## 2017-11-14 ENCOUNTER — HOSPITAL ENCOUNTER (OUTPATIENT)
Dept: ONCOLOGY | Age: 52
Discharge: HOME | End: 2017-11-14

## 2017-11-14 ENCOUNTER — HOSPITAL ENCOUNTER (OUTPATIENT)
Dept: LAB | Age: 52
Discharge: HOME | End: 2017-11-14
Payer: MEDICARE

## 2017-11-14 VITALS
SYSTOLIC BLOOD PRESSURE: 142 MMHG | BODY MASS INDEX: 31.74 KG/M2 | TEMPERATURE: 98.5 F | DIASTOLIC BLOOD PRESSURE: 74 MMHG | HEART RATE: 116 BPM | WEIGHT: 227.6 LBS

## 2017-11-14 DIAGNOSIS — D47.3 ESSENTIAL THROMBOCYTOSIS (HCC): ICD-10-CM

## 2017-11-14 DIAGNOSIS — D64.9 CHRONIC ANEMIA: ICD-10-CM

## 2017-11-14 DIAGNOSIS — D75.839 THROMBOCYTOSIS: ICD-10-CM

## 2017-11-14 DIAGNOSIS — M06.9 RHEUMATOID ARTHRITIS INVOLVING MULTIPLE JOINTS (HCC): ICD-10-CM

## 2017-11-14 DIAGNOSIS — M79.7 FIBROMYALGIA: ICD-10-CM

## 2017-11-14 DIAGNOSIS — D47.3 ESSENTIAL THROMBOCYTOSIS (HCC): Primary | ICD-10-CM

## 2017-11-14 DIAGNOSIS — G89.4 CHRONIC PAIN SYNDROME: ICD-10-CM

## 2017-11-14 LAB
ALBUMIN SERPL-MCNC: 3.4 G/DL (ref 3.4–5)
ALBUMIN/GLOB SERPL: 0.9 {RATIO} (ref 0.8–1.7)
ALP SERPL-CCNC: 112 U/L (ref 45–117)
ALT SERPL-CCNC: 22 U/L (ref 13–56)
ANION GAP SERPL CALC-SCNC: 8 MMOL/L (ref 3–18)
AST SERPL-CCNC: 9 U/L (ref 15–37)
BASO+EOS+MONOS # BLD AUTO: 1 K/UL (ref 0–2.3)
BASO+EOS+MONOS # BLD AUTO: 9 % (ref 0.1–17)
BILIRUB SERPL-MCNC: 0.2 MG/DL (ref 0.2–1)
BUN SERPL-MCNC: 13 MG/DL (ref 7–18)
BUN/CREAT SERPL: 14 (ref 12–20)
CALCIUM SERPL-MCNC: 9.3 MG/DL (ref 8.5–10.1)
CHLORIDE SERPL-SCNC: 104 MMOL/L (ref 100–108)
CO2 SERPL-SCNC: 24 MMOL/L (ref 21–32)
CREAT SERPL-MCNC: 0.95 MG/DL (ref 0.6–1.3)
DIFFERENTIAL METHOD BLD: NORMAL
ERYTHROCYTE [DISTWIDTH] IN BLOOD BY AUTOMATED COUNT: 14.2 % (ref 11.5–14.5)
FERRITIN SERPL-MCNC: 129 NG/ML (ref 8–388)
GLOBULIN SER CALC-MCNC: 4 G/DL (ref 2–4)
GLUCOSE SERPL-MCNC: 248 MG/DL (ref 74–99)
HCT VFR BLD AUTO: 38.3 % (ref 36–48)
HGB BLD-MCNC: 12.8 G/DL (ref 12–16)
IRON SATN MFR SERPL: 16 %
IRON SERPL-MCNC: 48 UG/DL (ref 50–175)
LYMPHOCYTES # BLD: 3.3 K/UL (ref 1.1–5.9)
LYMPHOCYTES NFR BLD: 31 % (ref 14–44)
MCH RBC QN AUTO: 27.4 PG (ref 25–35)
MCHC RBC AUTO-ENTMCNC: 33.4 G/DL (ref 31–37)
MCV RBC AUTO: 81.8 FL (ref 78–102)
NEUTS SEG # BLD: 6.5 K/UL (ref 1.8–9.5)
NEUTS SEG NFR BLD: 60 % (ref 40–70)
PLATELET # BLD AUTO: 310 K/UL (ref 140–440)
POTASSIUM SERPL-SCNC: 3.9 MMOL/L (ref 3.5–5.5)
PROT SERPL-MCNC: 7.4 G/DL (ref 6.4–8.2)
RBC # BLD AUTO: 4.68 M/UL (ref 4.1–5.1)
SODIUM SERPL-SCNC: 136 MMOL/L (ref 136–145)
TIBC SERPL-MCNC: 291 UG/DL (ref 250–450)
WBC # BLD AUTO: 10.8 K/UL (ref 4.5–13)

## 2017-11-14 PROCEDURE — 83540 ASSAY OF IRON: CPT | Performed by: INTERNAL MEDICINE

## 2017-11-14 PROCEDURE — 36415 COLL VENOUS BLD VENIPUNCTURE: CPT | Performed by: INTERNAL MEDICINE

## 2017-11-14 PROCEDURE — 80053 COMPREHEN METABOLIC PANEL: CPT | Performed by: INTERNAL MEDICINE

## 2017-11-14 PROCEDURE — 82728 ASSAY OF FERRITIN: CPT | Performed by: INTERNAL MEDICINE

## 2017-11-14 RX ORDER — ANAGRELIDE 0.5 MG/1
0.5 CAPSULE ORAL 2 TIMES DAILY
Qty: 60 CAP | Refills: 6 | Status: SHIPPED | OUTPATIENT
Start: 2017-11-14 | End: 2018-05-01 | Stop reason: SDUPTHER

## 2017-11-14 RX ORDER — OXYCODONE AND ACETAMINOPHEN 10; 325 MG/1; MG/1
1 TABLET ORAL
Qty: 60 TAB | Refills: 0 | Status: SHIPPED | OUTPATIENT
Start: 2017-11-14 | End: 2017-11-28 | Stop reason: SDUPTHER

## 2017-11-14 NOTE — PROGRESS NOTES
Hematology/Oncology  Progress Note    Name: Kayode Castro  Date: 2017  : 1965    PCP: Pastor Jason MD     Ms. Jennifer Aguilar is a 46year old female who was seen for management of her rheumatoid arthritis, leukocytosis, and thrombocytosis. Current therapy: Remicade 4 mg/kg bodyweight ever 6 weeks intravenously    Subjective:     Ms. Jennifer Aguilar is a 46year-old [facility] American woman who has severe rheumatoid arthritis. She receives Remicade every 6 weeks. She also suffers from fibromyalgia and had an elevated WBC count and platelet count of undefined etiology. Today she has no new complaints to report. She continues to complain of back pain. She is using a narcotic based regimen for pain control. She states this provides some relief, but the patient is gradually worsening. She reports that the Remicade has provided a significant degree of relief from the severe rheumatoid arthritis symptoms. The patient is continuing to use her cane for mobility support. She denies any recent fevers or recurrent infections. The patient reports that her appetite has continued to improve. She also states her energy level is continuing to improve as well. She has no new complaints or concerns to report, at this time.        Past Medical History:   Diagnosis Date    Abdominal pain, unspecified site     Acute otitis media     Acute pharyngitis     Anxiety     Arthritis     Autoimmune disease ([address]     Back injury     Backache     herniated disc in lower back    Blurred vision     Chest pain, unspecified     abnormal EKG    Chronic airway obstruction, not elsewhere classified     Chronic back pain     Chronic pain     COPD     Depression     Diabetes (HCC)     Dizziness     Dizziness and giddiness     possible orthostatic changes, vasovagal, autonomic dysfunction from diabetic neuropathy    Encounters for unspecified administrative purpose     Feeling anxious     Fibromyalgia     Headache(784.0)     Hemorrhoid     Herniated disc     at L5    Hypercholesterolemia     Hyperglycemia     Hypertension     Joint pain     Leukocytosis, unspecified     Major depressive disorder, single episode, mild (HCC)     Mental disorder     depression, anxiety, bipolar and PTSD    Neuropathy     Obesity, unspecified     Other chest pain     Phobic disorders     Rheumatoid arthritis (HCC)     Seasonal allergies     Shortness of breath     normal EF    Streptococcal sore throat     Type II or unspecified type diabetes mellitus with unspecified complication, uncontrolled     Unspecified hereditary and idiopathic peripheral neuropathy     Vitamin D deficiency      Past Surgical History:   Procedure Laterality Date    HX CHOLECYSTECTOMY  1994    HX GYN  2005    partial Hysterectomy    HX OTHER SURGICAL  12-1-15    had ingrown toenail removed from big toe, both feet    HX TUBAL LIGATION      1995     Social History     Social History    Marital status:      Spouse name: N/A    Number of children: N/A    Years of education: N/A     Occupational History    Not on file.      Social History Main Topics    Smoking status: Former Smoker    Smokeless tobacco: Never Used    Alcohol use Yes      Comment: socially    Drug use: No    Sexual activity: Yes     Partners: Male     Birth control/ protection: Condom     Other Topics Concern    Not on file     Social History Narrative     Family History   Problem Relation Age of Onset    Diabetes Other     Alcohol abuse Mother     Arthritis-osteo Mother     Diabetes Mother     Elevated Lipids Mother     Headache Mother     Heart Disease Mother    [facility] Migraines Mother     Psychiatric Disorder Mother     Alcohol abuse Father    [place] Kat Father     Cancer Father     Headache Father     Heart Disease Father     Lung Disease Father     Migraines Father     Alcohol abuse Sister     Headache Sister     Diabetes Sister    [facility] Heart Disease Sister     Migraines Sister     Psychiatric Disorder Sister     Headache Brother     Diabetes Brother     Elevated Lipids Brother     Heart Disease Brother     Migraines Brother     Psychiatric Disorder Brother     Cancer Maternal Aunt     Alcohol abuse Maternal Aunt     Diabetes Maternal Aunt     Headache Maternal Aunt     Lung Disease Maternal Aunt     Migraines Maternal Aunt     Headache Maternal Uncle     Migraines Maternal Uncle     Stroke Maternal Uncle     Psychiatric Disorder Maternal Uncle     Headache Paternal Aunt     Migraines Paternal Aunt     Headache Paternal Uncle     Hypertension Paternal Uncle     Migraines Paternal Uncle     Stroke Paternal Uncle     Headache Maternal Grandmother     Migraines Maternal Grandmother     Stroke Maternal Grandmother     Headache Maternal Grandfather     Migraines Maternal Grandfather     Stroke Maternal Grandfather     Headache Paternal Grandmother     Hypertension Paternal Grandmother     Lung Disease Paternal Grandmother     Migraines Paternal Grandmother     Headache Paternal Grandfather     Cancer Paternal Grandfather     Migraines Paternal Grandfather      Current Outpatient Prescriptions   Medication Sig Dispense Refill    anagrelide (AGRYLIN) 0.5 mg capsule Take 1 Cap by mouth two (2) times a day. Indications: ESSENTIAL THROMBOCYTOSIS 60 Cap 6    oxyCODONE-acetaminophen (PERCOCET 10)  mg per tablet Take 1 Tab by mouth every six (6) hours as needed for Pain for up to 15 days. Max Daily Amount: 4 Tabs.  60 Tab 0    amitriptyline (ELAVIL) 25 mg tablet       atenolol (TENORMIN) 50 mg tablet       clotrimazole-betamethasone (LOTRISONE) topical cream       DULoxetine (CYMBALTA) 30 mg capsule       gabapentin (NEURONTIN) 300 mg capsule       LANTUS 100 unit/mL injection       metFORMIN (GLUCOPHAGE) 500 mg tablet       QUEtiapine (SEROQUEL) 25 mg tablet       BD INSULIN SYRINGE ULTRA-FINE 1 mL 31 gauge x 15/64\" syrg       albuterol sulfate (PROVENTIL;VENTOLIN) 2.5 mg/0.5 mL nebu nebulizer solution by Nebulization route once. Is now using the actual nebulizer machine, for current bronchitis episode (Aug/Sept. 2017)      methocarbamol (ROBAXIN-750) 750 mg tablet Take 1 Tab by mouth three (3) times daily. 15 Tab 0    insulin aspart protamine/insulin aspart (NOVOLOG MIX 70-30) 100 unit/mL (70-30) injection 10 Units by SubCUTAneous route two (2) times a day.  clonazePAM (KLONOPIN) 2 mg tablet Take 2 mg by mouth daily. Indications: PANIC DISORDER      folic acid 514 mcg tablet Take 400 mcg by mouth daily.  haloperidol (HALDOL) 5 mg tablet Take 2.5 mg by mouth nightly.  QUEtiapine (SEROQUEL) 100 mg tablet Take 25 mg by mouth. Takes 25 mg Q AM, and 100 mg, Q PM      DULoxetine (CYMBALTA) 20 mg capsule Take 20 mg by mouth daily.  albuterol (PROAIR HFA) 90 mcg/actuation inhaler Take 1 Puff by inhalation every six (6) hours as needed for Wheezing. 1 Inhaler 0    ondansetron hcl (ZOFRAN, AS HYDROCHLORIDE,) 4 mg tablet Take 1 Tab by mouth every eight (8) hours as needed for Nausea. 12 Tab 0    methotrexate (RHEUMATREX) 2.5 mg tablet Take 5 mg by mouth daily.  metFORMIN (GLUCOPHAGE) 1,000 mg tablet Take 500 mg by mouth two (2) times a day.  Cholecalciferol, Vitamin D3, 5,000 unit Tab Take 5,000 Units by mouth every seven (7) days.  atenolol (TENORMIN) 25 mg tablet Take 25 mg by mouth daily. Indications: HYPERTENSION      hydroxychloroquine (PLAQUENIL) 200 mg tablet Take 200 mg by mouth two (2) times a day.  furosemide (LASIX) 40 mg tablet Take  by mouth daily.  INFLIXIMAB (REMICADE IV) 344 mg by IntraVENous route once as needed. remicade will be every 8 weeks       amitriptyline (ELAVIL) 150 mg tablet Take 10 mg by mouth nightly.     Indications: DEPRESSION      nitroglycerin (NITROLINGUAL) 0.4 mg/dose spray 1 Spray by SubLINGual route every five (5) minutes as needed.  losartan-hydrochlorothiazide (HYZAAR) 50-12.5 mg per tablet Take 1 Tab by mouth daily.  INSULIN GLARGINE,HUM. REC. ANLOG (LANTUS SC) 5 Units by SubCUTAneous route daily. Review of Systems  Constitutional: The patient has no acute distress or discomfort. HEENT: The patient denies recent head trauma, eye pain, blurred vision,  hearing deficit, oropharyngeal mucosal pain or lesions, and the patient denies throat pain or discomfort. Lymphatics: The patient denies palpable peripheral lymphadenopathy. Hematologic: The patient denies having bruising, bleeding, or progressive fatigue. Respiratory: Patient denies having shortness of breath, cough, sputum production, fever, or dyspnea on exertion. Cardiovascular: The patient denies having leg pain, leg swelling, heart palpitations, chest permit, chest pain, or lightheadedness. The patient denies having dyspnea on exertion. Gastrointestinal: The patient denies having nausea, emesis, or diarrhea. The patient denies having any hematemesis or blood in the stool. Genitourinary: Patient denies having urinary urgency, frequency, or dysuria. The patient denies having blood in the urine. Psychological: The patient denies having symptoms of nervousness, anxiety, depression, or thoughts of harming himself some of this. Skin: Patient denies having skin rashes, skin, ulcerations, or unexplained itching or pruritus. Musculoskeletal: The patient complains of generalized back pain. Objective:     Visit Vitals    /74 (BP 1 Location: Left arm, BP Patient Position: Sitting)    Pulse (!) 116    Temp 98.5 °F (36.9 °C) (Oral)    Wt 103.2 kg (227 lb 9.6 oz)    BMI 31.74 kg/m2     ECOG PS=0 Pain score 4-5/10     Physical Exam:   Gen. Appearance: The patient is in no acute distress. Skin: There is no bruise or rash. HEENT: The exam is unremarkable. Neck: Supple without lymphadenopathy or thyromegaly.   Lungs: Clear to auscultation and percussion; there are no wheezes or rhonchi. Heart: Regular rate and rhythm; there are no murmurs, gallops, or rubs. Abdomen: Bowel sounds are present and normal.  There is no guarding, tenderness, or hepatosplenomegaly. Extremities: There is no clubbing, cyanosis, or edema. Neurologic: There are no focal neurologic deficits. Lymphatics: There is no palpable peripheral lymphadenopathy. Musculoskeletal: The patient has full range of motion at all joints. However, she complains of pain on ambulation due to the severe rheumatoid arthritis. There is  evidence of joint deformity or effusions in the hands and knees. There is no focal joint tenderness. She is using a cane for support and mobility. Psychological/psychiatric: There is no clinical evidence of anxiety, depression, or melancholy. Lab data:      Results for orders placed or performed during the hospital encounter of 11/14/17   CBC WITH 3 PART DIFF     Status: None   Result Value Ref Range Status    WBC 10.8 4.5 - 13.0 K/uL Final    RBC 4.68 4.10 - 5.10 M/uL Final    HGB 12.8 12.0 - 16.0 g/dL Final    HCT 38.3 36 - 48 % Final    MCV 81.8 78 - 102 FL Final    MCH 27.4 25.0 - 35.0 PG Final    MCHC 33.4 31 - 37 g/dL Final    RDW 14.2 11.5 - 14.5 % Final    PLATELET 273 420 - 464 K/uL Final    NEUTROPHILS 60 40 - 70 % Final    MIXED CELLS 9 0.1 - 17 % Final    LYMPHOCYTES 31 14 - 44 % Final    ABS. NEUTROPHILS 6.5 1.8 - 9.5 K/UL Final    ABS. MIXED CELLS 1.0 0.0 - 2.3 K/uL Final    ABS. LYMPHOCYTES 3.3 1.1 - 5.9 K/UL Final     Comment: Test performed at [place] [address] Location. Results Reviewed by Medical Director. DF AUTOMATED   Final           Assessment:     1. Essential thrombocytosis ([address]    2. Thrombocytosis ([address]    3. Chronic pain syndrome    4. Rheumatoid arthritis involving multiple joints (HCC)    5. Chronic anemia    6. Fibromyalgia      Plan:   Leukocytosis (recurrent and persisting):  I have explained to the patient that the etiology of her leukocytosis remains undefined. A previous flow cytometry did not reveal any  evidence of an immunophenotypic abnormality such as an evolving chronic lymphoproliferative disorder or myeloproliferative disorder. The CBCs will continue to be monitored every 6 weeks. The CBC from today shows her WBC count is currently 10.8, the absolute neutrophil count is 6.5 and the absolute lymphocyte count was 3.3. Essential thrombocytosis/thrombocytosis: The current CBC shows that the platelet count is now 310,000. The platelet count is being monitored every 6 weeks. The patient was previously started on anagrelide 0.5 mg one tablet twice daily. This medication will be continued, at the current dose. I have reenforced to the patient the importance of being complaint with the treatment plan. Rheumatoid arthritis: the patient will continue to receive Remicade as a primary treatment modality for her severe rheumatoid arthritis every 6 weeks. The dose of Remicade will be 344 mg given intravenously. The patient has continued to take  Methotrexate 5 mg p.o. daily and Plaquenil 200 mg twice daily. Fibromyalgia: The patient  continues to have generalized pain at times related to her underlying fibromyalgia. The patient receives her Percocet medication on a monthly basis as needed. A new prescription for the Percocet was provided at this time. Chronic anemia: I have explained to the patient the CBC from today shows her hemoglobin has remained normal at 12.8 g/dL with hematocrit of 30.3%. I have recommended that she continue to take ferrous sulfate 325 mg by mouth once daily. We will see the patient back in 6 weeks for a complete reassessment.     Orders Placed This Encounter    COMPLETE CBC & AUTO DIFF WBC    InHouse CBC ([email or web address])     Standing Status:   Future     Number of Occurrences:   1     Standing Expiration Date:   63/59/0197    METABOLIC PANEL, COMPREHENSIVE     Standing Status:   Future Standing Expiration Date:   11/15/2018    IRON PROFILE     Standing Status:   Future     Standing Expiration Date:   11/15/2018    FERRITIN     Standing Status:   Future     Standing Expiration Date:   11/15/2018       Dalila Banuelos MD  11/14/2017

## 2017-11-14 NOTE — MR AVS SNAPSHOT
Visit Information Date & Time Provider Department Dept. Phone Encounter #  
 11/14/2017 11:30 AM [facility] Office [phone] [id number] Follow-up Instructions Return in about 6 weeks (around 12/26/2017). Upcoming Health Maintenance Date Due Pneumococcal 19-64 Highest Risk (1 of 3 - PCV13) 12/19/1982 DTaP/Tdap/Td series (1 - Tdap) 12/19/1984 PAP AKA CERVICAL CYTOLOGY 12/19/1984 FOBT Q 1 YEAR AGE 50-75 12/19/2013 BREAST CANCER SCRN MAMMOGRAM 8/14/2019 Allergies as of 11/14/2017  Review Complete On: 10/20/2017 By: Suzanne Ann RN Severity Noted Reaction Type Reactions Levaquin [Levofloxacin] High  Systemic Anaphylaxis Pollen Extracts    Unable to Obtain Current Immunizations  Reviewed on 10/20/2017 Name Date Influenza Vaccine 9/22/2017, 10/21/2016, 11/3/2015, 11/10/2014, 10/25/2013 Influenza Vaccine Whole 9/10/2012 Not reviewed this visit You Were Diagnosed With   
  
 Codes Comments Essential thrombocytosis (HCC)    -  Primary ICD-10-CM: D47.3 ICD-9-CM: 238.71 Thrombocytosis ([address]     ICD-10-CM: D47.3 ICD-9-CM: 238.71 Chronic pain syndrome     ICD-10-CM: G89.4 ICD-9-CM: 338. 4 Rheumatoid arthritis involving multiple joints (HCC)     ICD-10-CM: M06.9 ICD-9-CM: 714.0 Chronic anemia     ICD-10-CM: D64.9 ICD-9-CM: 285.9 Fibromyalgia     ICD-10-CM: M79.7 ICD-9-CM: 729.1 Vitals BP Pulse Temp Weight(growth percentile) BMI OB Status 142/74 (BP 1 Location: Left arm, BP Patient Position: Sitting) (!) 116 98.5 °F (36.9 °C) (Oral) 227 lb 9.6 oz (103.2 kg) 31.74 kg/m2 Hysterectomy Smoking Status Former Smoker BMI and BSA Data Body Mass Index Body Surface Area 31.74 kg/m 2 2.27 m 2 Preferred Pharmacy Pharmacy Name Phone [facility] [phone] [phone] Your Updated Medication List  
  
   
This list is accurate as of: 11/14/17 11:57 AM.  Always use your most recent med list.  
  
  
  
  
 * albuterol sulfate 2.5 mg/0.5 mL Nebu nebulizer solution Commonly known as:  PROVENTIL;VENTOLIN  
by Nebulization route once. Is now using the actual nebulizer machine, for current bronchitis episode (Aug/Sept. 2017) * albuterol 90 mcg/actuation inhaler Commonly known as:  PROAIR HFA Take 1 Puff by inhalation every six (6) hours as needed for Wheezing. * amitriptyline 150 mg tablet Commonly known as:  ELAVIL Take 10 mg by mouth nightly. Indications: DEPRESSION  
  
 * amitriptyline 25 mg tablet Commonly known as:  ELAVIL  
  
 anagrelide 0.5 mg capsule Commonly known as:  Patsi Skill Take 1 Cap by mouth two (2) times a day. Indications: ESSENTIAL THROMBOCYTOSIS  
  
 * atenolol 25 mg tablet Commonly known as:  TENORMIN Take 25 mg by mouth daily. Indications: HYPERTENSION  
  
 * atenolol 50 mg tablet Commonly known as:  TENORMIN  
  
 BD INSULIN SYRINGE ULTRA-FINE 1 mL 31 gauge x 15/64\" Syrg Generic drug:  insulin syringe-needle U-100  
  
 cholecalciferol (VITAMIN D3) 5,000 unit Tab tablet Commonly known as:  VITAMIN D3 Take 5,000 Units by mouth every seven (7) days. clotrimazole-betamethasone topical cream  
Commonly known as:  LOTRISONE  
  
 * DULoxetine 20 mg capsule Commonly known as:  CYMBALTA Take 20 mg by mouth daily. * DULoxetine 30 mg capsule Commonly known as:  CYMBALTA  
  
 folic acid 570 mcg tablet Take 400 mcg by mouth daily. furosemide 40 mg tablet Commonly known as:  LASIX Take  by mouth daily. gabapentin 300 mg capsule Commonly known as:  NEURONTIN  
  
 haloperidol 5 mg tablet Commonly known as:  HALDOL Take 2.5 mg by mouth nightly. HYZAAR 50-12.5 mg per tablet Generic drug:  losartan-hydroCHLOROthiazide Take 1 Tab by mouth daily. KlonoPIN 2 mg tablet Generic drug:  clonazePAM  
Take 2 mg by mouth daily. Indications: PANIC DISORDER  
  
 * LANTUS SC  
5 Units by SubCUTAneous route daily. * LANTUS 100 unit/mL injection Generic drug:  insulin glargine * metFORMIN 1,000 mg tablet Commonly known as:  GLUCOPHAGE Take 500 mg by mouth two (2) times a day. * metFORMIN 500 mg tablet Commonly known as:  GLUCOPHAGE  
  
 methocarbamol 750 mg tablet Commonly known as:  CTUKWUA-184 Take 1 Tab by mouth three (3) times daily. methotrexate 2.5 mg tablet Commonly known as:  Godfrey Manger Take 5 mg by mouth daily. nitroglycerin 400 mcg/spray spray Commonly known as:  NITROLINGUAL  
1 [place] by SubLINGual route every five (5) minutes as needed. NovoLOG Mix 70-30 100 unit/mL (70-30) injection Generic drug:  insulin aspart protamine/insulin aspart 10 Units by SubCUTAneous route two (2) times a day. ondansetron hcl 4 mg tablet Commonly known as:  ZOFRAN (AS HYDROCHLORIDE) Take 1 Tab by mouth every eight (8) hours as needed for Nausea. oxyCODONE-acetaminophen  mg per tablet Commonly known as:  PERCOCET 10 Take 1 Tab by mouth every six (6) hours as needed for Pain for up to 15 days. Max Daily Amount: 4 Tabs. PLAQUENIL 200 mg tablet Generic drug:  hydroxychloroquine Take 200 mg by mouth two (2) times a day. * QUEtiapine 100 mg tablet Commonly known as:  SEROquel Take 25 mg by mouth. Takes 25 mg Q AM, and 100 mg, Q PM  
  
 * QUEtiapine 25 mg tablet Commonly known as:  SEROquel REMICADE IV  
344 mg by IntraVENous route once as needed. remicade will be every 8 weeks * Notice: This list has 14 medication(s) that are the same as other medications prescribed for you. Read the directions carefully, and ask your doctor or other care provider to review them with you. We Performed the Following COMPLETE CBC & AUTO DIFF WBC [50549 CPT(R)] Follow-up Instructions Return in about 6 weeks (around 12/26/2017). To-Do List   
 11/14/2017 Lab:  CBC WITH 3 PART DIFF   
  
 12/01/2017 10:00 AM  
  Appointment with [address] at [facility] ([phone])  
  
 01/12/2018 10:00 AM  
  Appointment with [address] at [place] [address] ([phone]) Patient Instructions Anemia: Care Instructions Your Care Instructions Anemia is a low level of red blood cells, which carry oxygen throughout your body. Many things can cause anemia. Lack of iron is one of the most common causes. Your body needs iron to make hemoglobin, a substance in red blood cells that carries oxygen from the lungs to your body's cells. Without enough iron, the body produces fewer and smaller red blood cells. As a result, your body's cells do not get enough oxygen, and you feel tired and weak. And you may have trouble concentrating. Bleeding is the most common cause of a lack of iron. You may have heavy menstrual bleeding or bleeding caused by conditions such as ulcers, hemorrhoids, or cancer. Regular use of aspirin or other anti-inflammatory medicines (such as ibuprofen) also can cause bleeding in some people. A lack of iron in your diet also can cause anemia, especially at times when the body needs more iron, such as during pregnancy, infancy, and the teen years. Your doctor may have prescribed iron pills. It may take several months of treatment for your iron levels to return to normal. Your doctor also may suggest that you eat foods that are rich in iron, such as meat and beans. There are many other causes of anemia. It is not always due to a lack of iron. Finding the specific cause of your anemia will help your doctor find the right treatment for you. Follow-up care is a key part of your treatment and safety.  Be sure to make and go to all appointments, and call your doctor if you are having problems. It's also a good idea to know your test results and keep a list of the medicines you take. How can you care for yourself at home? · Take your medicines exactly as prescribed. Call your doctor if you think you are having a problem with your medicine. · If your doctor recommends iron pills, take them as directed: ¨ Try to take the pills on an empty stomach about 1 hour before or 2 hours after meals. But you may need to take iron with food to avoid an upset stomach. ¨ Do not take antacids or drink milk or caffeine drinks (such as coffee, tea, or cola) at the same time or within 2 hours of the time that you take your iron. They can make it hard for your body to absorb the iron. ¨ Vitamin C (from food or supplements) helps your body absorb iron. Try taking iron pills with a glass of orange juice or some other food that is high in vitamin C, such as citrus fruits. ¨ Iron pills may cause stomach problems, such as heartburn, nausea, diarrhea, constipation, and cramps. Be sure to drink plenty of fluids, and include fruits, vegetables, and fiber in your diet each day. Iron pills often make your bowel movements dark or green. ¨ If you forget to take an iron pill, do not take a double dose of iron the next time you take a pill. ¨ Keep iron pills out of the reach of small children. An overdose of iron can be very dangerous. · Follow your doctor's advice about eating iron-rich foods. These include red meat, shellfish, poultry, eggs, beans, raisins, whole-grain bread, and leafy green vegetables. · Steam vegetables to help them keep their iron content. When should you call for help? Call 911 anytime you think you may need emergency care. For example, call if: 
? · You have symptoms of a heart attack. These may include: ¨ Chest pain or pressure, or a strange feeling in the chest. 
¨ Sweating. ¨ Shortness of breath. ¨ Nausea or vomiting. ¨ Pain, pressure, or a strange feeling in the back, neck, jaw, or upper belly or in one or both shoulders or arms. ¨ Lightheadedness or sudden weakness. ¨ A fast or irregular heartbeat. After you call 911, the  may tell you to chew 1 adult-strength or 2 to 4 low-dose aspirin. Wait for an ambulance. Do not try to drive yourself. ? · You passed out (lost consciousness). ?Call your doctor now or seek immediate medical care if: 
? · You have new or increased shortness of breath. ? · You are dizzy or lightheaded, or you feel like you may faint. ? · Your fatigue and weakness continue or get worse. ? · You have any abnormal bleeding, such as: 
¨ Nosebleeds. ¨ Vaginal bleeding that is different (heavier, more frequent, at a different time of the month) than what you are used to. ¨ Bloody or black stools, or rectal bleeding. ¨ Bloody or pink urine. ? Watch closely for changes in your health, and be sure to contact your doctor if: 
? · You do not get better as expected. Where can you learn more? Go to http://swapnil-speedy.info/. Enter R301 in the search box to learn more about \"Anemia: Care Instructions. \" Current as of: October 13, 2016 Content Version: 11.4 © 3807-4485 [employer]. Care instructions adapted under license by [employer] (which disclaims liability or warranty for this information). If you have questions about a medical condition or this instruction, always ask your healthcare professional. [place] [address] any warranty or liability for your use of this information. Please provide this summary of care documentation to your next provider. Your primary care clinician is listed as Shanika Bueno. If you have any questions after today's visit, please call [phone].

## 2017-11-14 NOTE — PATIENT INSTRUCTIONS
Anemia: Care Instructions  Your Care Instructions    Anemia is a low level of red blood cells, which carry oxygen throughout your body. Many things can cause anemia. Lack of iron is one of the most common causes. Your body needs iron to make hemoglobin, a substance in red blood cells that carries oxygen from the lungs to your body's cells. Without enough iron, the body produces fewer and smaller red blood cells. As a result, your body's cells do not get enough oxygen, and you feel tired and weak. And you may have trouble concentrating. Bleeding is the most common cause of a lack of iron. You may have heavy menstrual bleeding or bleeding caused by conditions such as ulcers, hemorrhoids, or cancer. Regular use of aspirin or other anti-inflammatory medicines (such as ibuprofen) also can cause bleeding in some people. A lack of iron in your diet also can cause anemia, especially at times when the body needs more iron, such as during pregnancy, infancy, and the teen years. Your doctor may have prescribed iron pills. It may take several months of treatment for your iron levels to return to normal. Your doctor also may suggest that you eat foods that are rich in iron, such as meat and beans. There are many other causes of anemia. It is not always due to a lack of iron. Finding the specific cause of your anemia will help your doctor find the right treatment for you. Follow-up care is a key part of your treatment and safety. Be sure to make and go to all appointments, and call your doctor if you are having problems. It's also a good idea to know your test results and keep a list of the medicines you take. How can you care for yourself at home? · Take your medicines exactly as prescribed. Call your doctor if you think you are having a problem with your medicine. · If your doctor recommends iron pills, take them as directed:  ¨ Try to take the pills on an empty stomach about 1 hour before or 2 hours after meals. But you may need to take iron with food to avoid an upset stomach. ¨ Do not take antacids or drink milk or caffeine drinks (such as coffee, tea, or cola) at the same time or within 2 hours of the time that you take your iron. They can make it hard for your body to absorb the iron. ¨ Vitamin C (from food or supplements) helps your body absorb iron. Try taking iron pills with a glass of orange juice or some other food that is high in vitamin C, such as citrus fruits. ¨ Iron pills may cause stomach problems, such as heartburn, nausea, diarrhea, constipation, and cramps. Be sure to drink plenty of fluids, and include fruits, vegetables, and fiber in your diet each day. Iron pills often make your bowel movements dark or green. ¨ If you forget to take an iron pill, do not take a double dose of iron the next time you take a pill. ¨ Keep iron pills out of the reach of small children. An overdose of iron can be very dangerous. · Follow your doctor's advice about eating iron-rich foods. These include red meat, shellfish, poultry, eggs, beans, raisins, whole-grain bread, and leafy green vegetables. · Steam vegetables to help them keep their iron content. When should you call for help? Call 911 anytime you think you may need emergency care. For example, call if:  ? · You have symptoms of a heart attack. These may include:  ¨ Chest pain or pressure, or a strange feeling in the chest.  ¨ Sweating. ¨ Shortness of breath. ¨ Nausea or vomiting. ¨ Pain, pressure, or a strange feeling in the back, neck, jaw, or upper belly or in one or both shoulders or arms. ¨ Lightheadedness or sudden weakness. ¨ A fast or irregular heartbeat. After you call 911, the  may tell you to chew 1 adult-strength or 2 to 4 low-dose aspirin. Wait for an ambulance. Do not try to drive yourself. ? · You passed out (lost consciousness).    ?Call your doctor now or seek immediate medical care if:  ? · You have new or increased shortness of breath. ? · You are dizzy or lightheaded, or you feel like you may faint. ? · Your fatigue and weakness continue or get worse. ? · You have any abnormal bleeding, such as:  ¨ Nosebleeds. ¨ Vaginal bleeding that is different (heavier, more frequent, at a different time of the month) than what you are used to. ¨ Bloody or black stools, or rectal bleeding. ¨ Bloody or pink urine. ? Watch closely for changes in your health, and be sure to contact your doctor if:  ? · You do not get better as expected. Where can you learn more? Go to http://swapnil-speedy.info/. Enter R301 in the search box to learn more about \"Anemia: Care Instructions. \"  Current as of: October 13, 2016  Content Version: 11.4  © 4936-0020 [employer]. Care instructions adapted under license by [employer] (which disclaims liability or warranty for this information). If you have questions about a medical condition or this instruction, always ask your healthcare professional. [place] [address] any warranty or liability for your use of this information.

## 2017-11-27 RX ORDER — OXYCODONE AND ACETAMINOPHEN 10; 325 MG/1; MG/1
1 TABLET ORAL
Qty: 60 TAB | Refills: 0 | Status: CANCELLED | OUTPATIENT
Start: 2017-11-27 | End: 2017-12-12

## 2017-11-28 RX ORDER — OXYCODONE AND ACETAMINOPHEN 10; 325 MG/1; MG/1
1 TABLET ORAL
Qty: 60 TAB | Refills: 0 | Status: SHIPPED | OUTPATIENT
Start: 2017-11-28 | End: 2017-12-14 | Stop reason: SDUPTHER

## 2017-11-29 RX ORDER — SODIUM CHLORIDE 9 MG/ML
250 INJECTION, SOLUTION INTRAVENOUS CONTINUOUS
Status: CANCELLED | OUTPATIENT
Start: 2017-12-01 | End: 2017-12-02

## 2017-11-29 RX ORDER — DIPHENHYDRAMINE HYDROCHLORIDE 50 MG/ML
25 INJECTION, SOLUTION INTRAMUSCULAR; INTRAVENOUS ONCE
Status: CANCELLED | OUTPATIENT
Start: 2017-12-01 | End: 2017-12-01

## 2017-11-30 ENCOUNTER — HOSPITAL ENCOUNTER (EMERGENCY)
Age: 52
Discharge: HOME | End: 2017-11-30
Attending: EMERGENCY MEDICINE | Admitting: EMERGENCY MEDICINE
Payer: MEDICARE

## 2017-11-30 VITALS
RESPIRATION RATE: 16 BRPM | HEART RATE: 90 BPM | DIASTOLIC BLOOD PRESSURE: 77 MMHG | WEIGHT: 223 LBS | TEMPERATURE: 98.1 F | SYSTOLIC BLOOD PRESSURE: 128 MMHG | OXYGEN SATURATION: 100 % | BODY MASS INDEX: 31.1 KG/M2

## 2017-11-30 DIAGNOSIS — L02.91 ABSCESS: Primary | ICD-10-CM

## 2017-11-30 PROCEDURE — 99282 EMERGENCY DEPT VISIT SF MDM: CPT

## 2017-11-30 PROCEDURE — 75810000289 HC I&D ABSCESS SIMP/COMP/MULT

## 2017-11-30 RX ORDER — MICONAZOLE NITRATE 1200MG-2%
1 KIT VAGINAL
Qty: 1 KIT | Refills: 0 | Status: SHIPPED | OUTPATIENT
Start: 2017-11-30 | End: 2017-11-30

## 2017-11-30 RX ORDER — CLINDAMYCIN HYDROCHLORIDE 300 MG/1
300 CAPSULE ORAL 4 TIMES DAILY
Qty: 28 CAP | Refills: 0 | Status: SHIPPED | OUTPATIENT
Start: 2017-11-30 | End: 2017-12-07

## 2017-11-30 RX ORDER — IBUPROFEN 400 MG/1
400 TABLET ORAL
Qty: 20 TAB | Refills: 0 | Status: SHIPPED | OUTPATIENT
Start: 2017-11-30 | End: 2018-07-11

## 2017-11-30 NOTE — DISCHARGE INSTRUCTIONS

## 2017-11-30 NOTE — PROGRESS NOTES
[facility], and stated that she had just left [facility] ER, and wanted to know if she would still be able to get her Remicade Infusion tomorrow. . She stated that she woke up this morning, with severe pain in her left arm, and was diagnosed with an abscess. She stated the abscess was drained and packed in the ER, and she was discharged home with pain medication and a prescription for 10 days of antibiotics. Chayito Gibbs NP was made aware, and gave order to HOLD the Remicade for at least 2 weeks. Therefore, Ms. Manjinder Stafford was made aware, that her Remicade Infusion that was scheduled for tomorrow, 12-1-17, was cancelled and rescheduled to Tuesday, 12-19-17, at 1000, and she agreed to the appointment. Yolanda Daniel [place] [address] pharmacist was also made aware.

## 2017-11-30 NOTE — ED PROVIDER NOTES
Patient is a 46 y.o. female presenting with abscess. The history is provided by the patient. Abscess   This is a new problem. Episode onset: 4 days ago. The problem occurs constantly. The problem has been gradually worsening. Pertinent negatives include no chest pain, no abdominal pain, no headaches and no shortness of breath. Exacerbated by: Palpation. Nothing relieves the symptoms. She has tried a warm compress (Percocet ) for the symptoms. The treatment provided no relief. Located in left axilla. Denies any drainage from site. Has h/o similar. Denies fever, chills, N/V. Reports BG at home has been in her normal range.      Past Medical History:   Diagnosis Date    Abdominal pain, unspecified site     Acute otitis media     Acute pharyngitis     Anxiety     Arthritis     Autoimmune disease ([facility] [address]     Back injury     Backache     herniated disc in lower back    Blurred vision     Chest pain, unspecified     abnormal EKG    Chronic airway obstruction, not elsewhere classified     Chronic back pain     Chronic pain     COPD     Depression     Diabetes (HCC)     Dizziness     Dizziness and giddiness     possible orthostatic changes, vasovagal, autonomic dysfunction from diabetic neuropathy    Encounters for unspecified administrative purpose     Feeling anxious     Fibromyalgia     Headache(784.0)     Hemorrhoid     Herniated disc     at L5    Hypercholesterolemia     Hyperglycemia     Hypertension     Joint pain     Leukocytosis, unspecified     Major depressive disorder, single episode, mild (HCC)     Mental disorder     depression, anxiety, bipolar and PTSD    Neuropathy     Obesity, unspecified     Other chest pain     Phobic disorders     Rheumatoid arthritis (HCC)     Seasonal allergies     Shortness of breath     normal EF    Streptococcal sore throat     Type II or unspecified type diabetes mellitus with unspecified complication, uncontrolled     Unspecified hereditary and idiopathic peripheral neuropathy     Vitamin D deficiency        Past Surgical History:   Procedure Laterality Date    HX CHOLECYSTECTOMY  12    HX GYN  2005    partial Hysterectomy    HX OTHER SURGICAL  12-1-15    had ingrown toenail removed from big toe, both feet    HX TUBAL LIGATION      1995         Family History:   Problem Relation Age of Onset    Diabetes Other     Alcohol abuse Mother     Arthritis-osteo Mother     Diabetes Mother     Elevated Lipids Mother     Headache Mother     Heart Disease Mother    [facility] Migraines Mother     Psychiatric Disorder Mother     Alcohol abuse Father     Arthritis-osteo Father     Cancer Father     Headache Father     Heart Disease Father     Lung Disease Father    [facility] Migraines Father     Alcohol abuse Sister     Headache Sister     Diabetes Sister     Heart Disease Sister     Migraines Sister     Psychiatric Disorder Sister     Headache Brother     Diabetes Brother     Elevated Lipids Brother     Heart Disease Brother     Migraines Brother     Psychiatric Disorder Brother     Cancer Maternal Aunt     Alcohol abuse Maternal Aunt     Diabetes Maternal Aunt     Headache Maternal Aunt     Lung Disease Maternal Aunt     Migraines Maternal Aunt     Headache Maternal Uncle     Migraines Maternal Uncle     Stroke Maternal Uncle     Psychiatric Disorder Maternal Uncle     Headache Paternal Aunt     Migraines Paternal Aunt     Headache Paternal Uncle     Hypertension Paternal Uncle     Migraines Paternal Uncle     Stroke Paternal Uncle     Headache Maternal Grandmother     Migraines Maternal Grandmother     Stroke Maternal Grandmother     Headache Maternal Grandfather     Migraines Maternal Grandfather     Stroke Maternal Grandfather     Headache Paternal Grandmother     Hypertension Paternal Grandmother     Lung Disease Paternal Grandmother     Migraines Paternal Grandmother     Headache Leon Olivares Cancer Paternal Grandfather     Migraines Paternal Grandfather        Social History     Social History    Marital status:      Spouse name: N/A    Number of children: N/A    Years of education: N/A     Occupational History    Not on file. Social History Main Topics    Smoking status: Former Smoker    Smokeless tobacco: Never Used    Alcohol use Yes      Comment: socially    Drug use: No    Sexual activity: Yes     Partners: Male     Birth control/ protection: Condom     Other Topics Concern    Not on file     Social History Narrative         ALLERGIES: Levaquin [levofloxacin] and Pollen extracts    Review of Systems   Constitutional: Negative for chills and fever. HENT: Negative for ear pain, rhinorrhea and sore throat. Eyes: Negative. Respiratory: Negative for cough and shortness of breath. Cardiovascular: Negative for chest pain. Gastrointestinal: Negative for abdominal pain, constipation, diarrhea, nausea and vomiting. Endocrine: Negative. Genitourinary: Negative for dysuria. Musculoskeletal: Negative for neck pain and neck stiffness. Skin: Negative for wound. Abscess in left axilla   Neurological: Negative for dizziness, light-headedness and headaches. Hematological: Negative. Psychiatric/Behavioral: Negative. Vitals:    11/30/17 0632   BP: 128/77   Pulse: 90   Resp: 16   Temp: 98.1 °F (36.7 °C)   SpO2: 100%   Weight: 101.2 kg (223 lb)            Physical Exam   Constitutional: She is oriented to person, place, and time. She appears well-developed and well-nourished. HENT:   Head: Normocephalic and atraumatic. Right Ear: Tympanic membrane, external ear and ear canal normal.   Left Ear: Tympanic membrane, external ear and ear canal normal.   Nose: Nose normal.   Mouth/Throat: Oropharynx is clear and moist and mucous membranes are normal.   Eyes: Conjunctivae and EOM are normal.   Neck: Normal range of motion. Neck supple.    Cardiovascular: Normal rate, regular rhythm, normal heart sounds and intact distal pulses. Exam reveals no gallop and no friction rub. No murmur heard. Pulmonary/Chest: Effort normal and breath sounds normal. No respiratory distress. She has no wheezes. She has no rales. Abdominal: Soft. There is no tenderness. Musculoskeletal: Normal range of motion. Lymphadenopathy:     She has no cervical adenopathy. Neurological: She is alert and oriented to person, place, and time. Skin: Skin is warm and dry. Psychiatric: She has a normal mood and affect. Her behavior is normal. Judgment and thought content normal.   Nursing note and vitals reviewed. MDM  Number of Diagnoses or Management Options  Abscess: new and requires workup  Diagnosis management comments: DDx: eczema/allergic dermatitis/contact dermatitis, erysipelas, bug bites, abscess, cellulitis, viral exanthem, scabies, Scarlet fever rash, Fifth disease, measles, rubella, rubeola, skin eruption associated with life-threatening condition    ED COURSE AND MEDICAL DECISION MAKING:    The patient does not appear toxic from the abscess, with no signs of sepsis by presentation, physical examination, nor by vital signs. IMPRESSION AND MEDICAL DECISION MAKING:  Based upon the patient's presentation with noted HPI and PE, along with the work up done in the emergency department, I believe that the patient is having noted abscess. Will treat with antibiotics which include coverage for possible MRSA. The patient appears nontoxic at time of discharge. DIAGNOSIS:  1. Abscess, status post incision and drainage in emergency department. SPECIFIC PATIENT INSTRUCTIONS FROM THE PHYSICIAN WHO TREATED YOU IN THE ER TODAY:  1. Return if any concerns or worsening of condition(s)  2. Keflex and Bactrim DS as prescribed until finished. 3. Take your home percocet for pain not controlled by over the counter ibuprofen.    4. IF the abscess was packed, keep in the gauze until follow up as noted. 5. If the abscess WAS packed, then return to the emergency department in 48 hours for reevaluation, packing removal, and possible repacking of your abscess. Pt results have been reviewed with them. They have been counseled regarding diagnosis, treatment, and plan. Pt verbally conveys understanding and agreement of the signs, symptoms, diagnosis, treatment and prognosis and additionally agrees to follow up as discussed. Pt also agrees with the care-plan and conveys that all of their questions have been answered. I have also provided discharge instructions for them that include: educational information regarding their diagnosis and treatment, and list of reasons why they would want to return to the ED prior to their follow-up appointment, should their condition change. Ansley Sanz PA-C 7:17 AM        Amount and/or Complexity of Data Reviewed  Review and summarize past medical records: yes  Discuss the patient with other providers: yes    Risk of Complications, Morbidity, and/or Mortality  Presenting problems: low  Diagnostic procedures: low  Management options: low    Patient Progress  Patient progress: stable    ED Course       I&D Abcess Complex  Date/Time: 11/30/2017 7:20 AM  Performed by: Flaco Rey Authorized by: Flaco Rey     Consent:     Consent obtained:  Verbal and written    Consent given by:  Patient    Risks discussed:  Bleeding, incomplete drainage and infection    Alternatives discussed:  No treatment and referral  Location:     Type:  Abscess    Size:  4 cm    Location:  Upper extremity    Upper extremity location: Left axilla. Pre-procedure details:     Skin preparation:  Betadine  Anesthesia (see MAR for exact dosages):      Anesthesia method:  Local infiltration    Local anesthetic:  Lidocaine 1% w/o epi  Procedure type:     Complexity:  Complex  Procedure details:     Incision types:  Single straight    Incision depth:  Dermal    Scalpel blade: 11    Wound management:  Probed and deloculated and irrigated with saline    Drainage:  Purulent    Drainage amount:  Copious    Wound treatment:  Wound left open    Packing materials:  1/2 in gauze    Amount 1/2\":   4 cm  Post-procedure details:     Patient tolerance of procedure: Tolerated well, no immediate complications          Diagnosis:   1. Abscess          Disposition: Discharge to home. Follow-up Information     Follow up With Details Comments Contact Info    [facility] EMERGENCY DEPT Go in 2 days As needed, If symptoms worsen, For wound re-check [address]    Dereck France MD Go in 2 days  [address] [facility] [address] [facility] Hospital[facility] [address]            Patient's Medications   Start Taking    CLINDAMYCIN (CLEOCIN) 300 MG CAPSULE    Take 1 Cap by mouth four (4) times daily for 7 days. IBUPROFEN (MOTRIN) 400 MG TABLET    Take 1 Tab by mouth every six (6) hours as needed for Pain. MICONAZOLE NITRATE (MONISTAT 1 COMBO PACK) KIT    Insert 1 Each into vagina now for 1 dose. Continue Taking    ALBUTEROL (PROAIR HFA) 90 MCG/ACTUATION INHALER    Take 1 Puff by inhalation every six (6) hours as needed for Wheezing. ALBUTEROL SULFATE (PROVENTIL;VENTOLIN) 2.5 MG/0.5 ML NEBU NEBULIZER SOLUTION    by Nebulization route once. Is now using the actual nebulizer machine, for current bronchitis episode (Aug/Sept. 2017)    AMITRIPTYLINE (ELAVIL) 150 MG TABLET    Take 10 mg by mouth nightly. Indications: DEPRESSION    AMITRIPTYLINE (ELAVIL) 25 MG TABLET        ANAGRELIDE (AGRYLIN) 0.5 MG CAPSULE    Take 1 Cap by mouth two (2) times a day. Indications: ESSENTIAL THROMBOCYTOSIS    ATENOLOL (TENORMIN) 25 MG TABLET    Take 25 mg by mouth daily.     Indications: HYPERTENSION    ATENOLOL (TENORMIN) 50 MG TABLET        BD INSULIN SYRINGE ULTRA-FINE 1 ML 31 GAUGE X 15/64\" SYRG        CHOLECALCIFEROL, VITAMIN D3, 5,000 UNIT TAB    Take 5,000 Units by mouth every seven (7) days.    CLONAZEPAM (KLONOPIN) 2 MG TABLET    Take 2 mg by mouth daily. Indications: PANIC DISORDER    CLOTRIMAZOLE-BETAMETHASONE (LOTRISONE) TOPICAL CREAM        DULOXETINE (CYMBALTA) 20 MG CAPSULE    Take 20 mg by mouth daily. DULOXETINE (CYMBALTA) 30 MG CAPSULE        FOLIC ACID 251 MCG TABLET    Take 400 mcg by mouth daily. FUROSEMIDE (LASIX) 40 MG TABLET    Take  by mouth daily. GABAPENTIN (NEURONTIN) 300 MG CAPSULE        HALOPERIDOL (HALDOL) 5 MG TABLET    Take 2.5 mg by mouth nightly. HYDROXYCHLOROQUINE (PLAQUENIL) 200 MG TABLET    Take 200 mg by mouth two (2) times a day. INFLIXIMAB (REMICADE IV)    344 mg by IntraVENous route once as needed. remicade will be every 8 weeks     INSULIN ASPART PROTAMINE/INSULIN ASPART (NOVOLOG MIX 70-30) 100 UNIT/ML (70-30) INJECTION    10 Units by SubCUTAneous route two (2) times a day. INSULIN GLARGINE,HUM. REC. ANLOG (LANTUS SC)    5 Units by SubCUTAneous route daily. LANTUS 100 UNIT/ML INJECTION        LOSARTAN-HYDROCHLOROTHIAZIDE (HYZAAR) 50-12.5 MG PER TABLET    Take 1 Tab by mouth daily. METFORMIN (GLUCOPHAGE) 1,000 MG TABLET    Take 500 mg by mouth two (2) times a day. METFORMIN (GLUCOPHAGE) 500 MG TABLET        METHOCARBAMOL (ROBAXIN-750) 750 MG TABLET    Take 1 Tab by mouth three (3) times daily. METHOTREXATE (RHEUMATREX) 2.5 MG TABLET    Take 5 mg by mouth daily. NITROGLYCERIN (NITROLINGUAL) 0.4 MG/DOSE SPRAY    1 [place] by SubLINGual route every five (5) minutes as needed. ONDANSETRON HCL (ZOFRAN, AS HYDROCHLORIDE,) 4 MG TABLET    Take 1 Tab by mouth every eight (8) hours as needed for Nausea. OXYCODONE-ACETAMINOPHEN (PERCOCET 10)  MG PER TABLET    Take 1 Tab by mouth every six (6) hours as needed for Pain for up to 15 days. Max Daily Amount: 4 Tabs. QUETIAPINE (SEROQUEL) 100 MG TABLET    Take 25 mg by mouth.  Takes 25 mg Q AM, and 100 mg, Q PM    QUETIAPINE (SEROQUEL) 25 MG TABLET       These Medications have changed    No medications on file   Stop Taking    No medications on file

## 2017-12-01 ENCOUNTER — HOSPITAL ENCOUNTER (OUTPATIENT)
Dept: INFUSION THERAPY | Age: 52
Discharge: HOME | End: 2017-12-01
Payer: MEDICARE

## 2017-12-02 ENCOUNTER — HOSPITAL ENCOUNTER (EMERGENCY)
Age: 52
Discharge: HOME | End: 2017-12-02
Attending: EMERGENCY MEDICINE
Payer: MEDICARE

## 2017-12-02 VITALS
HEIGHT: 71 IN | OXYGEN SATURATION: 100 % | BODY MASS INDEX: 31.22 KG/M2 | HEART RATE: 62 BPM | WEIGHT: 223 LBS | RESPIRATION RATE: 18 BRPM | DIASTOLIC BLOOD PRESSURE: 85 MMHG | TEMPERATURE: 98.6 F | SYSTOLIC BLOOD PRESSURE: 140 MMHG

## 2017-12-02 DIAGNOSIS — Z51.89 WOUND CHECK, ABSCESS: Primary | ICD-10-CM

## 2017-12-02 PROCEDURE — 75810000275 HC EMERGENCY DEPT VISIT NO LEVEL OF CARE

## 2017-12-02 NOTE — ED PROVIDER NOTES
HPI Comments: Pt presents for left axillary abscess wound check and packing removal.  Has changed dressing herself; feels as if the wound may still be draining but overall much improved. Denies fever. The history is provided by the patient and the spouse.         Past Medical History:   Diagnosis Date    Abdominal pain, unspecified site     Acute otitis media     Acute pharyngitis     Anxiety     Arthritis     Autoimmune disease ([facility] [address]     Back injury     Backache     herniated disc in lower back    Blurred vision     Chest pain, unspecified     abnormal EKG    Chronic airway obstruction, not elsewhere classified     Chronic back pain     Chronic pain     COPD     Depression     Diabetes (HCC)     Dizziness     Dizziness and giddiness     possible orthostatic changes, vasovagal, autonomic dysfunction from diabetic neuropathy    Encounters for unspecified administrative purpose     Feeling anxious     Fibromyalgia     Headache(784.0)     Hemorrhoid     Herniated disc     at L5    Hypercholesterolemia     Hyperglycemia     Hypertension     Joint pain     Leukocytosis, unspecified     Major depressive disorder, single episode, mild (HCC)     Mental disorder     depression, anxiety, bipolar and PTSD    Neuropathy     Obesity, unspecified     Other chest pain     Phobic disorders     Rheumatoid arthritis (HCC)     Seasonal allergies     Shortness of breath     normal EF    Streptococcal sore throat     Type II or unspecified type diabetes mellitus with unspecified complication, uncontrolled     Unspecified hereditary and idiopathic peripheral neuropathy     Vitamin D deficiency        Past Surgical History:   Procedure Laterality Date    HX CHOLECYSTECTOMY  1994    HX GYN  2005    partial Hysterectomy    HX OTHER SURGICAL  12-1-15    had ingrown toenail removed from big toe, both feet    HX TUBAL LIGATION      1995         Family History:   Problem Relation Age of Onset    Diabetes Other     Alcohol abuse Mother     Arthritis-osteo Mother     Diabetes Mother     Elevated Lipids Mother     Headache Mother     Heart Disease [facility]    [facility] Migraines Mother     Psychiatric Disorder Mother     Alcohol abuse Father     Arthritis-osteo Father     Cancer Father     Headache Father     Heart Disease Father     Lung Disease Father     Migraines Father     Alcohol abuse Sister     Headache Sister     Diabetes Sister     Heart Disease Sister     Migraines Sister     Psychiatric Disorder Sister     Headache Brother     Diabetes Brother     Elevated Lipids Brother     Heart Disease Brother     Migraines Brother     Psychiatric Disorder Brother     Cancer Maternal Aunt     Alcohol abuse Maternal Aunt     Diabetes Maternal Aunt     Headache Maternal Aunt     Lung Disease Maternal Aunt     Migraines Maternal Aunt     Headache Maternal Uncle     Migraines Maternal Uncle     Stroke Maternal Uncle     Psychiatric Disorder Maternal Uncle     Headache Paternal Aunt     Migraines Paternal Aunt     Headache Paternal Uncle     Hypertension Paternal Uncle     Migraines Paternal Uncle     Stroke Paternal Uncle     Headache Maternal Grandmother     Migraines Maternal Grandmother     Stroke Maternal Grandmother     Headache Maternal Grandfather     Migraines Maternal Grandfather     Stroke Maternal Grandfather     Headache Paternal Grandmother     Hypertension Paternal Grandmother     Lung Disease Paternal Grandmother     Migraines Paternal Grandmother     Headache Paternal Grandfather     Cancer Paternal Grandfather     Migraines Paternal Grandfather        Social History     Social History    Marital status:      Spouse name: N/A    Number of children: N/A    Years of education: N/A     Occupational History    Not on file.      Social History Main Topics    Smoking status: Former Smoker    Smokeless tobacco: Never Used    Alcohol use Yes Comment: socially    Drug use: No    Sexual activity: Yes     Partners: Male     Birth control/ protection: Condom     Other Topics Concern    Not on file     Social History Narrative         ALLERGIES: Levaquin [levofloxacin] and Pollen extracts    Review of Systems   Constitutional: Negative for fever. Skin: Positive for wound. Vitals:    12/02/17 0709   BP: 140/85   Pulse: 62   Resp: 18   Temp: 98.6 °F (37 °C)   SpO2: 100%   Weight: 101.2 kg (223 lb)   Height: 5' 11\" (1.803 m)            Physical Exam   Constitutional: She is oriented to person, place, and time. She appears well-developed. HENT:   Head: Normocephalic and atraumatic. Eyes: Pupils are equal, round, and reactive to light. Neck: No JVD present. No tracheal deviation present. No thyromegaly present. Cardiovascular: Normal rate, regular rhythm and normal heart sounds. Exam reveals no gallop and no friction rub. No murmur heard. Pulmonary/Chest: Effort normal and breath sounds normal. No stridor. No respiratory distress. She has no wheezes. She has no rales. She exhibits no tenderness. Abdominal: Soft. She exhibits no distension and no mass. There is no tenderness. There is no rebound and no guarding. Musculoskeletal: She exhibits no edema or tenderness. Lymphadenopathy:     She has no cervical adenopathy. Neurological: She is alert and oriented to person, place, and time. Skin: Skin is warm and dry. No rash noted. No erythema. No pallor. Left axilla: open abscess packing removed. No active discharge w/manipulation. Minimally tender. No redness or streaking. Psychiatric: She has a normal mood and affect. Her behavior is normal. Thought content normal.   Nursing note and vitals reviewed. MDM  Number of Diagnoses or Management Options  Wound check, abscess:   Diagnosis management comments: Packing removed and bandaid given. F/up with PCP as needed. Healing well.     ED Course       Procedures      7:29 AM  Diagnosis:   1. Wound check, abscess          Disposition: home    Follow-up Information     Follow up With Details Comments Contact Info    MD Jp Schedule an appointment as soon as possible for a visit in 3 days For wound re-check as needed [address] [place] [address] [address] [place] [address]      [facility] EM[facility]GENC[facility]PT  If symptoms worsen return immediately [address]  [phone]          Patient's Medications   Start Taking    No medications on file   Continue Taking    ALBUTEROL (PROAIR HFA) 90 MCG/ACTUATION INHALER    Take 1 Puff by inhalation every six (6) hours as needed for Wheezing. ALBUTEROL SULFATE (PROVENTIL;VENTOLIN) 2.5 MG/0.5 ML NEBU NEBULIZER SOLUTION    by Nebulization route once. Is now using the actual nebulizer machine, for current bronchitis episode (Aug/Sept. 2017)    AMITRIPTYLINE (ELAVIL) 150 MG TABLET    Take 10 mg by mouth nightly. Indications: DEPRESSION    AMITRIPTYLINE (ELAVIL) 25 MG TABLET        ANAGRELIDE (AGRYLIN) 0.5 MG CAPSULE    Take 1 Cap by mouth two (2) times a day. Indications: ESSENTIAL THROMBOCYTOSIS    ATENOLOL (TENORMIN) 25 MG TABLET    Take 25 mg by mouth daily. Indications: HYPERTENSION    ATENOLOL (TENORMIN) 50 MG TABLET        BD INSULIN SYRINGE ULTRA-FINE 1 ML 31 GAUGE X 15/64\" SYRG        CHOLECALCIFEROL, VITAMIN D3, 5,000 UNIT TAB    Take 5,000 Units by mouth every seven (7) days. CLINDAMYCIN (CLEOCIN) 300 MG CAPSULE    Take 1 Cap by mouth four (4) times daily for 7 days. CLONAZEPAM (KLONOPIN) 2 MG TABLET    Take 2 mg by mouth daily. Indications: PANIC DISORDER    CLOTRIMAZOLE-BETAMETHASONE (LOTRISONE) TOPICAL CREAM        DULOXETINE (CYMBALTA) 20 MG CAPSULE    Take 20 mg by mouth daily. DULOXETINE (CYMBALTA) 30 MG CAPSULE        FOLIC ACID 631 MCG TABLET    Take 400 mcg by mouth daily. FUROSEMIDE (LASIX) 40 MG TABLET    Take  by mouth daily.       GABAPENTIN (NEURONTIN) 300 MG CAPSULE HALOPERIDOL (HALDOL) 5 MG TABLET    Take 2.5 mg by mouth nightly. HYDROXYCHLOROQUINE (PLAQUENIL) 200 MG TABLET    Take 200 mg by mouth two (2) times a day. IBUPROFEN (MOTRIN) 400 MG TABLET    Take 1 Tab by mouth every six (6) hours as needed for Pain. INFLIXIMAB (REMICADE IV)    344 mg by IntraVENous route once as needed. remicade will be every 8 weeks     INSULIN ASPART PROTAMINE/INSULIN ASPART (NOVOLOG MIX 70-30) 100 UNIT/ML (70-30) INJECTION    10 Units by SubCUTAneous route two (2) times a day. INSULIN GLARGINE,HUM. REC. ANLOG (LANTUS SC)    5 Units by SubCUTAneous route daily. LANTUS 100 UNIT/ML INJECTION        LOSARTAN-HYDROCHLOROTHIAZIDE (HYZAAR) 50-12.5 MG PER TABLET    Take 1 Tab by mouth daily. METFORMIN (GLUCOPHAGE) 1,000 MG TABLET    Take 500 mg by mouth two (2) times a day. METFORMIN (GLUCOPHAGE) 500 MG TABLET        METHOCARBAMOL (ROBAXIN-750) 750 MG TABLET    Take 1 Tab by mouth three (3) times daily. METHOTREXATE (RHEUMATREX) 2.5 MG TABLET    Take 5 mg by mouth daily. NITROGLYCERIN (NITROLINGUAL) 0.4 MG/DOSE SPRAY    1 [place] by SubLINGual route every five (5) minutes as needed. ONDANSETRON HCL (ZOFRAN, AS HYDROCHLORIDE,) 4 MG TABLET    Take 1 Tab by mouth every eight (8) hours as needed for Nausea. OXYCODONE-ACETAMINOPHEN (PERCOCET 10)  MG PER TABLET    Take 1 Tab by mouth every six (6) hours as needed for Pain for up to 15 days. Max Daily Amount: 4 Tabs. QUETIAPINE (SEROQUEL) 100 MG TABLET    Take 25 mg by mouth.  Takes 25 mg Q AM, and 100 mg, Q PM    QUETIAPINE (SEROQUEL) 25 MG TABLET       These Medications have changed    No medications on file   Stop Taking    No medications on file

## 2017-12-13 RX ORDER — OXYCODONE AND ACETAMINOPHEN 10; 325 MG/1; MG/1
1 TABLET ORAL
Qty: 60 TAB | Refills: 0 | Status: CANCELLED | OUTPATIENT
Start: 2017-12-13 | End: 2017-12-28

## 2017-12-14 RX ORDER — OXYCODONE AND ACETAMINOPHEN 10; 325 MG/1; MG/1
1 TABLET ORAL
Qty: 60 TAB | Refills: 0 | Status: SHIPPED | OUTPATIENT
Start: 2017-12-14 | End: 2017-12-26 | Stop reason: SDUPTHER

## 2017-12-19 ENCOUNTER — CLINICAL SUPPORT (OUTPATIENT)
Dept: ONCOLOGY | Age: 52
End: 2017-12-19

## 2017-12-19 ENCOUNTER — HOSPITAL ENCOUNTER (OUTPATIENT)
Dept: INFUSION THERAPY | Age: 52
Discharge: HOME | End: 2017-12-19
Payer: MEDICARE

## 2017-12-19 ENCOUNTER — HOSPITAL ENCOUNTER (OUTPATIENT)
Dept: ONCOLOGY | Age: 52
Discharge: HOME | End: 2017-12-19

## 2017-12-19 VITALS
HEART RATE: 89 BPM | WEIGHT: 223 LBS | DIASTOLIC BLOOD PRESSURE: 64 MMHG | TEMPERATURE: 99.1 F | SYSTOLIC BLOOD PRESSURE: 100 MMHG | HEIGHT: 71 IN | RESPIRATION RATE: 20 BRPM | BODY MASS INDEX: 31.22 KG/M2

## 2017-12-19 DIAGNOSIS — M06.9 RHEUMATOID ARTHRITIS, INVOLVING UNSPECIFIED SITE, UNSPECIFIED RHEUMATOID FACTOR PRESENCE: ICD-10-CM

## 2017-12-19 DIAGNOSIS — M06.9 RHEUMATOID ARTHRITIS, INVOLVING UNSPECIFIED SITE, UNSPECIFIED RHEUMATOID FACTOR PRESENCE: Primary | ICD-10-CM

## 2017-12-19 LAB
BASO+EOS+MONOS # BLD AUTO: 0.5 K/UL (ref 0–2.3)
BASO+EOS+MONOS # BLD AUTO: 5 % (ref 0.1–17)
DIFFERENTIAL METHOD BLD: ABNORMAL
ERYTHROCYTE [DISTWIDTH] IN BLOOD BY AUTOMATED COUNT: 13.9 % (ref 11.5–14.5)
HCT VFR BLD AUTO: 39.1 % (ref 36–48)
HGB BLD-MCNC: 13 G/DL (ref 12–16)
LYMPHOCYTES # BLD: 3.6 K/UL (ref 1.1–5.9)
LYMPHOCYTES NFR BLD: 33 % (ref 14–44)
MCH RBC QN AUTO: 27.3 PG (ref 25–35)
MCHC RBC AUTO-ENTMCNC: 33.2 G/DL (ref 31–37)
MCV RBC AUTO: 82 FL (ref 78–102)
NEUTS SEG # BLD: 6.8 K/UL (ref 1.8–9.5)
NEUTS SEG NFR BLD: 62 % (ref 40–70)
PLATELET # BLD AUTO: 468 K/UL (ref 140–440)
RBC # BLD AUTO: 4.77 M/UL (ref 4.1–5.1)
WBC # BLD AUTO: 10.9 K/UL (ref 4.5–13)

## 2017-12-19 PROCEDURE — 96375 TX/PRO/DX INJ NEW DRUG ADDON: CPT

## 2017-12-19 PROCEDURE — 96415 CHEMO IV INFUSION ADDL HR: CPT

## 2017-12-19 PROCEDURE — 74011000258 HC RX REV CODE- 258: Performed by: INTERNAL MEDICINE

## 2017-12-19 PROCEDURE — 74011250636 HC RX REV CODE- 250/636: Performed by: INTERNAL MEDICINE

## 2017-12-19 PROCEDURE — 96413 CHEMO IV INFUSION 1 HR: CPT

## 2017-12-19 RX ORDER — DIPHENHYDRAMINE HYDROCHLORIDE 50 MG/ML
25 INJECTION, SOLUTION INTRAMUSCULAR; INTRAVENOUS ONCE
Status: COMPLETED | OUTPATIENT
Start: 2017-12-19 | End: 2017-12-19

## 2017-12-19 RX ORDER — SODIUM CHLORIDE 9 MG/ML
100 INJECTION, SOLUTION INTRAVENOUS ONCE
Status: COMPLETED | OUTPATIENT
Start: 2017-12-19 | End: 2017-12-19

## 2017-12-19 RX ORDER — SODIUM CHLORIDE 0.9 % (FLUSH) 0.9 %
10-40 SYRINGE (ML) INJECTION AS NEEDED
Status: DISCONTINUED | OUTPATIENT
Start: 2017-12-19 | End: 2017-12-23 | Stop reason: HOSPADM

## 2017-12-19 RX ADMIN — INFLIXIMAB 400 MG: 100 INJECTION, POWDER, LYOPHILIZED, FOR SOLUTION INTRAVENOUS at 11:45

## 2017-12-19 RX ADMIN — SODIUM CHLORIDE 100 ML: 900 INJECTION, SOLUTION INTRAVENOUS at 11:05

## 2017-12-19 RX ADMIN — DIPHENHYDRAMINE HYDROCHLORIDE 25 MG: 50 INJECTION, SOLUTION INTRAMUSCULAR; INTRAVENOUS at 11:10

## 2017-12-19 RX ADMIN — Medication 10 ML: at 10:35

## 2017-12-19 NOTE — PROGRESS NOTES
[facility] Progress Note    Date: 2017    Name: Justin Dumont    MRN: [id number]         : 1965      Ms. Oly Flores arrived in the [facility] today, at 1020, in stable condition, here for Q 6 Week, IV Remicade Infusion. She was assessed and education was provided. Ms. Bethany Gordillo vitals were reviewed. Visit Vitals    /66 (BP 1 Location: Left arm, BP Patient Position: At rest)    Pulse 87    Temp 97.8 °F (36.6 °C)    Resp 20    Ht 5' 11\" (1.803 m)    Wt 101.2 kg (223 lb)    Breastfeeding No    BMI 31.1 kg/m2           PIV was established in her dorsal left forearm at 1035, without incident, and blood for a CBC was drawn, per order. Lab results were obtained and reviewed. Recent Results (from the past 12 hour(s))   CBC WITH 3 PART DIFF    Collection Time: 17 10:35 AM   Result Value Ref Range    WBC 10.9 4.5 - 13.0 K/uL    RBC 4.77 4.10 - 5.10 M/uL    HGB 13.0 12.0 - 16.0 g/dL    HCT 39.1 36 - 48 %    MCV 82.0 78 - 102 FL    MCH 27.3 25.0 - 35.0 PG    MCHC 33.2 31 - 37 g/dL    RDW 13.9 11.5 - 14.5 %    PLATELET 363 (H) 997 - 440 K/uL    NEUTROPHILS 62 40 - 70 %    MIXED CELLS 5 0.1 - 17 %    LYMPHOCYTES 33 14 - 44 %    ABS. NEUTROPHILS 6.8 1.8 - 9.5 K/UL    ABS. MIXED CELLS 0.5 0.0 - 2.3 K/uL    ABS. LYMPHOCYTES 3.6 1.1 - 5.9 K/UL    DF AUTOMATED               Benadryl 25 mg IV, was administered pre-Remicade, per order, and without incident.               Remicade 400 mg (4 mg/kg) IV  (Dose rounded per pharmacy), was administered per order, and without incident.               After completion of the IV Remicade, the PIV was flushed very well per protocol, and then, the PIV was removed and gauze/bandaid was applied. Ms. Oly Flores tolerated well, and had no complaints. Ms. Oly Flores was discharged from [place] [id number] in stable condition at 26.  She is to return in 6 weeks, on 18,  at 1000, for her next appointment, for her next dose of IV Remicade.      Tino Epstein RN  December 19, 2017  11:02 AM

## 2017-12-26 ENCOUNTER — OFFICE VISIT (OUTPATIENT)
Dept: ONCOLOGY | Age: 52
End: 2017-12-26

## 2017-12-26 ENCOUNTER — HOSPITAL ENCOUNTER (OUTPATIENT)
Dept: ONCOLOGY | Age: 52
Discharge: HOME | End: 2017-12-26

## 2017-12-26 ENCOUNTER — HOSPITAL ENCOUNTER (OUTPATIENT)
Dept: LAB | Age: 52
Discharge: HOME | End: 2017-12-26
Payer: MEDICARE

## 2017-12-26 VITALS
HEART RATE: 91 BPM | DIASTOLIC BLOOD PRESSURE: 78 MMHG | WEIGHT: 224 LBS | BODY MASS INDEX: 31.24 KG/M2 | SYSTOLIC BLOOD PRESSURE: 125 MMHG | TEMPERATURE: 98.6 F

## 2017-12-26 DIAGNOSIS — D47.3 ESSENTIAL THROMBOCYTOSIS (HCC): Primary | ICD-10-CM

## 2017-12-26 DIAGNOSIS — G89.4 CHRONIC PAIN SYNDROME: ICD-10-CM

## 2017-12-26 DIAGNOSIS — D64.9 CHRONIC ANEMIA: ICD-10-CM

## 2017-12-26 DIAGNOSIS — D47.3 ESSENTIAL THROMBOCYTOSIS (HCC): ICD-10-CM

## 2017-12-26 DIAGNOSIS — M79.7 FIBROMYALGIA: ICD-10-CM

## 2017-12-26 DIAGNOSIS — M06.9 RHEUMATOID ARTHRITIS INVOLVING MULTIPLE JOINTS (HCC): ICD-10-CM

## 2017-12-26 DIAGNOSIS — D75.839 THROMBOCYTOSIS: ICD-10-CM

## 2017-12-26 LAB
ALBUMIN SERPL-MCNC: 3.3 G/DL (ref 3.4–5)
ALBUMIN/GLOB SERPL: 0.9 {RATIO} (ref 0.8–1.7)
ALP SERPL-CCNC: 111 U/L (ref 45–117)
ALT SERPL-CCNC: 21 U/L (ref 13–56)
ANION GAP SERPL CALC-SCNC: 10 MMOL/L (ref 3–18)
AST SERPL-CCNC: 9 U/L (ref 15–37)
BASO+EOS+MONOS # BLD AUTO: 0.8 K/UL (ref 0–2.3)
BASO+EOS+MONOS # BLD AUTO: 9 % (ref 0.1–17)
BILIRUB SERPL-MCNC: 0.3 MG/DL (ref 0.2–1)
BUN SERPL-MCNC: 14 MG/DL (ref 7–18)
BUN/CREAT SERPL: 17 (ref 12–20)
CALCIUM SERPL-MCNC: 9.1 MG/DL (ref 8.5–10.1)
CHLORIDE SERPL-SCNC: 101 MMOL/L (ref 100–108)
CO2 SERPL-SCNC: 25 MMOL/L (ref 21–32)
CREAT SERPL-MCNC: 0.83 MG/DL (ref 0.6–1.3)
DIFFERENTIAL METHOD BLD: NORMAL
ERYTHROCYTE [DISTWIDTH] IN BLOOD BY AUTOMATED COUNT: 13.6 % (ref 11.5–14.5)
FERRITIN SERPL-MCNC: 137 NG/ML (ref 8–388)
GLOBULIN SER CALC-MCNC: 3.6 G/DL (ref 2–4)
GLUCOSE SERPL-MCNC: 446 MG/DL (ref 74–99)
HCT VFR BLD AUTO: 38.3 % (ref 36–48)
HGB BLD-MCNC: 12.8 G/DL (ref 12–16)
IRON SATN MFR SERPL: 30 %
IRON SERPL-MCNC: 81 UG/DL (ref 50–175)
LYMPHOCYTES # BLD: 2.9 K/UL (ref 1.1–5.9)
LYMPHOCYTES NFR BLD: 32 % (ref 14–44)
MCH RBC QN AUTO: 27.5 PG (ref 25–35)
MCHC RBC AUTO-ENTMCNC: 33.4 G/DL (ref 31–37)
MCV RBC AUTO: 82.2 FL (ref 78–102)
NEUTS SEG # BLD: 5.5 K/UL (ref 1.8–9.5)
NEUTS SEG NFR BLD: 59 % (ref 40–70)
PLATELET # BLD AUTO: 377 K/UL (ref 140–440)
POTASSIUM SERPL-SCNC: 4 MMOL/L (ref 3.5–5.5)
PROT SERPL-MCNC: 6.9 G/DL (ref 6.4–8.2)
RBC # BLD AUTO: 4.66 M/UL (ref 4.1–5.1)
SODIUM SERPL-SCNC: 136 MMOL/L (ref 136–145)
TIBC SERPL-MCNC: 268 UG/DL (ref 250–450)
WBC # BLD AUTO: 9.2 K/UL (ref 4.5–13)

## 2017-12-26 PROCEDURE — 80053 COMPREHEN METABOLIC PANEL: CPT | Performed by: INTERNAL MEDICINE

## 2017-12-26 PROCEDURE — 82728 ASSAY OF FERRITIN: CPT | Performed by: INTERNAL MEDICINE

## 2017-12-26 PROCEDURE — 83540 ASSAY OF IRON: CPT | Performed by: INTERNAL MEDICINE

## 2017-12-26 PROCEDURE — 36415 COLL VENOUS BLD VENIPUNCTURE: CPT | Performed by: INTERNAL MEDICINE

## 2017-12-26 RX ORDER — OXYCODONE AND ACETAMINOPHEN 10; 325 MG/1; MG/1
1 TABLET ORAL
Qty: 60 TAB | Refills: 0 | Status: SHIPPED | OUTPATIENT
Start: 2017-12-26 | End: 2018-01-08 | Stop reason: SDUPTHER

## 2017-12-26 NOTE — PATIENT INSTRUCTIONS
Rheumatoid Arthritis: Care Instructions  Your Care Instructions    Arthritis is a common health problem in which the joints are inflamed. There are many types of arthritis. In rheumatoid arthritis, the body's own immune system attacks the joints. This causes pain, stiffness, and swelling in the joints, especially in the hands and feet. It can become hard to open jars, write, and do other daily tasks. Sometimes rheumatoid arthritis can also cause bumps to form under the skin. Over time, rheumatoid arthritis can damage and deform joints. Early treatment with medicines may reduce your chances of having a lasting disability. Follow-up care is a key part of your treatment and safety. Be sure to make and go to all appointments, and call your doctor if you are having problems. It's also a good idea to know your test results and keep a list of the medicines you take. How can you care for yourself at home? · If your doctor recommends it, get more exercise. Walking is a good choice. If your knees or ankles hurt, try riding a stationary bike or swimming. · Move each joint gently through its full range of motion once or twice a day. · Rest joints when they are sore or overworked. Short rest breaks may help more than staying in bed. · Reach and stay at a healthy weight. Regular exercise and a healthy diet will help you do this. Extra weight can strain the joints, especially the knees and hips, and make the pain worse. Losing even a few pounds may help. · Get enough calcium and vitamin D to help prevent osteoporosis, which causes thin bones. Talk to your doctor about how much you should take. · Protect your joints from injury. Do not overuse them. Try to limit or avoid activities that cause joint pain or swelling. Use special kitchen tools and other self-help devices as well as walkers, splints, or canes if needed. · Use heat to ease pain. Take warm showers or baths. Use hot packs or a heating pad set on low.  Sleep under a warm electric blanket. · Put ice or a cold pack on the area for 10 to 20 minutes at a time. Put a thin cloth between the ice and your skin. · Take pain medicines exactly as directed. ¨ If the doctor gave you a prescription medicine for pain, take it as prescribed. ¨ If you are not taking a prescription pain medicine, ask your doctor if you can take an over-the-counter medicine. · Take an active role in managing your condition. Set up a treatment plan with your doctor, and learn as much as you can about rheumatoid arthritis. This will help you control pain and stay active. When should you call for help? Call your doctor now or seek immediate medical care if:  ? · You have a fever or a rash along with joint pain. ? · You have joint pain that is so severe that you cannot use the joint at all. ? · You have sudden swelling, redness, or pain in one or more joints, and you do not know why.   ? · You have back or neck pain along with weakness in your arms or legs. ? · You have a loss of bowel or bladder control. ? Watch closely for changes in your health, and be sure to contact your doctor if:  ? · You have joint pain that lasts for more than 6 weeks. ? · You have side effects from your arthritis medicines, such as stomach pain, nausea, heartburn, or dark and tarlike stools. Where can you learn more? Go to http://swapnil-speedy.info/. Enter K205 in the search box to learn more about \"Rheumatoid Arthritis: Care Instructions. \"  Current as of: October 31, 2016  Content Version: 11.4  © 3150-6536 [employer]. Care instructions adapted under license by [employer] (which disclaims liability or warranty for this information). If you have questions about a medical condition or this instruction, always ask your healthcare professional. [place] [address] any warranty or liability for your use of this information.        Anemia: Care Instructions  Your Care Instructions    Anemia is a low level of red blood cells, which carry oxygen throughout your body. Many things can cause anemia. Lack of iron is one of the most common causes. Your body needs iron to make hemoglobin, a substance in red blood cells that carries oxygen from the lungs to your body's cells. Without enough iron, the body produces fewer and smaller red blood cells. As a result, your body's cells do not get enough oxygen, and you feel tired and weak. And you may have trouble concentrating. Bleeding is the most common cause of a lack of iron. You may have heavy menstrual bleeding or bleeding caused by conditions such as ulcers, hemorrhoids, or cancer. Regular use of aspirin or other anti-inflammatory medicines (such as ibuprofen) also can cause bleeding in some people. A lack of iron in your diet also can cause anemia, especially at times when the body needs more iron, such as during pregnancy, infancy, and the teen years. Your doctor may have prescribed iron pills. It may take several months of treatment for your iron levels to return to normal. Your doctor also may suggest that you eat foods that are rich in iron, such as meat and beans. There are many other causes of anemia. It is not always due to a lack of iron. Finding the specific cause of your anemia will help your doctor find the right treatment for you. Follow-up care is a key part of your treatment and safety. Be sure to make and go to all appointments, and call your doctor if you are having problems. It's also a good idea to know your test results and keep a list of the medicines you take. How can you care for yourself at home? · Take your medicines exactly as prescribed. Call your doctor if you think you are having a problem with your medicine. · If your doctor recommends iron pills, take them as directed:  ¨ Try to take the pills on an empty stomach about 1 hour before or 2 hours after meals.  But you may need to take iron with food to avoid an upset stomach. ¨ Do not take antacids or drink milk or caffeine drinks (such as coffee, tea, or cola) at the same time or within 2 hours of the time that you take your iron. They can make it hard for your body to absorb the iron. ¨ Vitamin C (from food or supplements) helps your body absorb iron. Try taking iron pills with a glass of orange juice or some other food that is high in vitamin C, such as citrus fruits. ¨ Iron pills may cause stomach problems, such as heartburn, nausea, diarrhea, constipation, and cramps. Be sure to drink plenty of fluids, and include fruits, vegetables, and fiber in your diet each day. Iron pills often make your bowel movements dark or green. ¨ If you forget to take an iron pill, do not take a double dose of iron the next time you take a pill. ¨ Keep iron pills out of the reach of small children. An overdose of iron can be very dangerous. · Follow your doctor's advice about eating iron-rich foods. These include red meat, shellfish, poultry, eggs, beans, raisins, whole-grain bread, and leafy green vegetables. · Steam vegetables to help them keep their iron content. When should you call for help? Call 911 anytime you think you may need emergency care. For example, call if:  ? · You have symptoms of a heart attack. These may include:  ¨ Chest pain or pressure, or a strange feeling in the chest.  ¨ Sweating. ¨ Shortness of breath. ¨ Nausea or vomiting. ¨ Pain, pressure, or a strange feeling in the back, neck, jaw, or upper belly or in one or both shoulders or arms. ¨ Lightheadedness or sudden weakness. ¨ A fast or irregular heartbeat. After you call 911, the  may tell you to chew 1 adult-strength or 2 to 4 low-dose aspirin. Wait for an ambulance. Do not try to drive yourself. ? · You passed out (lost consciousness). ?Call your doctor now or seek immediate medical care if:  ? · You have new or increased shortness of breath.    ? · You are dizzy or lightheaded, or you feel like you may faint. ? · Your fatigue and weakness continue or get worse. ? · You have any abnormal bleeding, such as:  ¨ Nosebleeds. ¨ Vaginal bleeding that is different (heavier, more frequent, at a different time of the month) than what you are used to. ¨ Bloody or black stools, or rectal bleeding. ¨ Bloody or pink urine. ? Watch closely for changes in your health, and be sure to contact your doctor if:  ? · You do not get better as expected. Where can you learn more? Go to http://swapnil-speedy.info/. Enter R301 in the search box to learn more about \"Anemia: Care Instructions. \"  Current as of: October 13, 2016  Content Version: 11.4  © 2414-9159 [employer]. Care instructions adapted under license by [employer] (which disclaims liability or warranty for this information). If you have questions about a medical condition or this instruction, always ask your healthcare professional. [address] any warranty or liability for your use of this information.

## 2017-12-26 NOTE — PROGRESS NOTES
Hematology/Oncology  Progress Note    Name: Saranya King  Date: 2017  : 1965    PCP: Julio Sherwood MD     Ms. Fernanda Lynch is a 45year old female who was seen for management of her rheumatoid arthritis, leukocytosis, and thrombocytosis. Current therapy: Remicade 4 mg/kg bodyweight ever 6 weeks intravenously    Subjective:     Ms. Fernanda Lynch is a 45year-old [facility] American woman who has severe rheumatoid arthritis. She receives Remicade every 6 weeks. She also suffers from fibromyalgia and had an elevated WBC count and platelet count of undefined etiology. Today she has no new complaints to report. She continues to complain of back pain. She is using a narcotic based regimen for pain control. She states this provides some relief, but the pain is gradually worsening. She reports that the Remicade has provided a significant degree of relief from the severe rheumatoid arthritis symptoms. The patient is continuing to use her cane for mobility support. She denies any recent fevers or recurrent infections. The patient reports that her appetite has continued to improve. She also states her energy level is continuing to improve as well. She has no new complaints or concerns to report, at this time.        Past Medical History:   Diagnosis Date    Abdominal pain, unspecified site     Acute otitis media     Acute pharyngitis     Anxiety     Arthritis     Autoimmune disease ([facility] [address]     Back injury     Backache     herniated disc in lower back    Blurred vision     Chest pain, unspecified     abnormal EKG    Chronic airway obstruction, not elsewhere classified     Chronic back pain     Chronic pain     COPD     Depression     Diabetes (HCC)     Dizziness     Dizziness and giddiness     possible orthostatic changes, vasovagal, autonomic dysfunction from diabetic neuropathy    Encounters for unspecified administrative purpose     Feeling anxious     Fibromyalgia     Headache(784.0)     Hemorrhoid     Herniated disc     at L5    Hypercholesterolemia     Hyperglycemia     Hypertension     Joint pain     Leukocytosis, unspecified     Major depressive disorder, single episode, mild (HCC)     Mental disorder     depression, anxiety, bipolar and PTSD    Neuropathy     Obesity, unspecified     Other chest pain     Phobic disorders     Rheumatoid arthritis (HCC)     Seasonal allergies     Shortness of breath     normal EF    Streptococcal sore throat     Type II or unspecified type diabetes mellitus with unspecified complication, uncontrolled     Unspecified hereditary and idiopathic peripheral neuropathy     Vitamin D deficiency      Past Surgical History:   Procedure Laterality Date    HX CHOLECYSTECTOMY  1994    HX GYN  2005    partial Hysterectomy    HX OTHER SURGICAL  12-1-15    had ingrown toenail removed from big toe, both feet    HX TUBAL LIGATION      1995     Social History     Social History    Marital status:      Spouse name: N/A    Number of children: N/A    Years of education: N/A     Occupational History    Not on file.      Social History Main Topics    Smoking status: Former Smoker    Smokeless tobacco: Never Used    Alcohol use Yes      Comment: socially    Drug use: No    Sexual activity: Yes     Partners: Male     Birth control/ protection: Condom     Other Topics Concern    Not on file     Social History Narrative     Family History   Problem Relation Age of Onset    Diabetes Other     Alcohol abuse Mother     Arthritis-osteo Mother     Diabetes Mother     Elevated Lipids Mother     Headache Mother     Heart Disease Mother    Ludlow Shaker Migraines Mother     Psychiatric Disorder Mother     Alcohol abuse Father    Derek Ryder Father     Cancer Father     Headache Father     Heart Disease Father     Lung Disease Father     Migraines Father     Alcohol abuse Sister     Headache Sister     Diabetes Sister    Ludlow Shaker Heart Disease Sister     Migraines Sister     Psychiatric Disorder Sister     Headache Brother     Diabetes Brother     Elevated Lipids Brother     Heart Disease Brother     Migraines Brother     Psychiatric Disorder Brother     Cancer Maternal Aunt     Alcohol abuse Maternal Aunt     Diabetes Maternal Aunt     Headache Maternal Aunt     Lung Disease Maternal Aunt     Migraines Maternal Aunt     Headache Maternal Uncle     Migraines Maternal Uncle     Stroke Maternal Uncle     Psychiatric Disorder Maternal Uncle     Headache Paternal Aunt     Migraines Paternal Aunt     Headache Paternal Uncle     Hypertension Paternal Uncle     Migraines Paternal Uncle     Stroke Paternal Uncle     Headache Maternal Grandmother     Migraines Maternal Grandmother     Stroke Maternal Grandmother     Headache Maternal Grandfather     Migraines Maternal Grandfather     Stroke Maternal Grandfather     Headache Paternal Grandmother     Hypertension Paternal Grandmother     Lung Disease Paternal Grandmother     Migraines Paternal Grandmother     Headache Paternal Grandfather     Cancer Paternal Grandfather     Migraines Paternal Grandfather      Current Outpatient Prescriptions   Medication Sig Dispense Refill    oxyCODONE-acetaminophen (PERCOCET 10)  mg per tablet Take 1 Tab by mouth every six (6) hours as needed for Pain for up to 15 days. Max Daily Amount: 4 Tabs. 60 Tab 0    ibuprofen (MOTRIN) 400 mg tablet Take 1 Tab by mouth every six (6) hours as needed for Pain. 20 Tab 0    anagrelide (AGRYLIN) 0.5 mg capsule Take 1 Cap by mouth two (2) times a day.  Indications: ESSENTIAL THROMBOCYTOSIS 60 Cap 6    amitriptyline (ELAVIL) 25 mg tablet       atenolol (TENORMIN) 50 mg tablet       clotrimazole-betamethasone (LOTRISONE) topical cream       DULoxetine (CYMBALTA) 30 mg capsule       gabapentin (NEURONTIN) 300 mg capsule       LANTUS 100 unit/mL injection       metFORMIN (GLUCOPHAGE) 500 mg tablet       QUEtiapine (SEROQUEL) 25 mg tablet       BD INSULIN SYRINGE ULTRA-FINE 1 mL 31 gauge x 15/64\" syrg       albuterol sulfate (PROVENTIL;VENTOLIN) 2.5 mg/0.5 mL nebu nebulizer solution by Nebulization route once. Is now using the actual nebulizer machine, for current bronchitis episode (Aug/Sept. 2017)      methocarbamol (ROBAXIN-750) 750 mg tablet Take 1 Tab by mouth three (3) times daily. 15 Tab 0    insulin aspart protamine/insulin aspart (NOVOLOG MIX 70-30) 100 unit/mL (70-30) injection 10 Units by SubCUTAneous route two (2) times a day.  clonazePAM (KLONOPIN) 2 mg tablet Take 2 mg by mouth daily. Indications: PANIC DISORDER      folic acid 390 mcg tablet Take 400 mcg by mouth daily.  haloperidol (HALDOL) 5 mg tablet Take 2.5 mg by mouth nightly.  QUEtiapine (SEROQUEL) 100 mg tablet Take 25 mg by mouth. Takes 25 mg Q AM, and 100 mg, Q PM      DULoxetine (CYMBALTA) 20 mg capsule Take 20 mg by mouth daily.  albuterol (PROAIR HFA) 90 mcg/actuation inhaler Take 1 Puff by inhalation every six (6) hours as needed for Wheezing. 1 Inhaler 0    ondansetron hcl (ZOFRAN, AS HYDROCHLORIDE,) 4 mg tablet Take 1 Tab by mouth every eight (8) hours as needed for Nausea. 12 Tab 0    methotrexate (RHEUMATREX) 2.5 mg tablet Take 5 mg by mouth daily.  metFORMIN (GLUCOPHAGE) 1,000 mg tablet Take 500 mg by mouth two (2) times a day.  Cholecalciferol, Vitamin D3, 5,000 unit Tab Take 5,000 Units by mouth every seven (7) days.  atenolol (TENORMIN) 25 mg tablet Take 25 mg by mouth daily. Indications: HYPERTENSION      hydroxychloroquine (PLAQUENIL) 200 mg tablet Take 200 mg by mouth two (2) times a day.  furosemide (LASIX) 40 mg tablet Take  by mouth daily.  INFLIXIMAB (REMICADE IV) 344 mg by IntraVENous route once as needed. remicade will be every 8 weeks       amitriptyline (ELAVIL) 150 mg tablet Take 10 mg by mouth nightly.     Indications: DEPRESSION      nitroglycerin (NITROLINGUAL) 0.4 mg/dose spray 1 Spray by SubLINGual route every five (5) minutes as needed.  losartan-hydrochlorothiazide (HYZAAR) 50-12.5 mg per tablet Take 1 Tab by mouth daily.  INSULIN GLARGINE,HUM. REC. ANLOG (LANTUS SC) 5 Units by SubCUTAneous route daily. Review of Systems  Constitutional: The patient has no acute distress or discomfort. HEENT: The patient denies recent head trauma, eye pain, blurred vision,  hearing deficit, oropharyngeal mucosal pain or lesions, and the patient denies throat pain or discomfort. Lymphatics: The patient denies palpable peripheral lymphadenopathy. Hematologic: The patient denies having bruising, bleeding, or progressive fatigue. Respiratory: Patient denies having shortness of breath, cough, sputum production, fever, or dyspnea on exertion. Cardiovascular: The patient denies having leg pain, leg swelling, heart palpitations, chest permit, chest pain, or lightheadedness. The patient denies having dyspnea on exertion. Gastrointestinal: The patient denies having nausea, emesis, or diarrhea. The patient denies having any hematemesis or blood in the stool. Genitourinary: Patient denies having urinary urgency, frequency, or dysuria. The patient denies having blood in the urine. Psychological: The patient denies having symptoms of nervousness, anxiety, depression, or thoughts of harming himself some of this. Skin: Patient denies having skin rashes, skin, ulcerations, or unexplained itching or pruritus. Musculoskeletal: The patient complains of generalized back pain. Objective:     Visit Vitals    /78    Pulse 91    Temp 98.6 °F (37 °C)    Wt 101.6 kg (224 lb)    BMI 31.24 kg/m2     ECOG PS=0 Pain score 4-5/10     Physical Exam:   Gen. Appearance: The patient is in no acute distress. Skin: There is no bruise or rash. HEENT: The exam is unremarkable. Neck: Supple without lymphadenopathy or thyromegaly.   Lungs: Clear to auscultation and percussion; there are no wheezes or rhonchi. Heart: Regular rate and rhythm; there are no murmurs, gallops, or rubs. Abdomen: Bowel sounds are present and normal.  There is no guarding, tenderness, or hepatosplenomegaly. Extremities: There is no clubbing, cyanosis, or edema. Neurologic: There are no focal neurologic deficits. Lymphatics: There is no palpable peripheral lymphadenopathy. Musculoskeletal: The patient has full range of motion at all joints. However, she complains of pain on ambulation due to the severe rheumatoid arthritis. There is  evidence of joint deformity or effusions in the hands and knees. There is no focal joint tenderness. She is using a cane for support and mobility. Psychological/psychiatric: There is no clinical evidence of anxiety, depression, or melancholy. Lab data:      Results for orders placed or performed during the hospital encounter of 12/26/17   CBC WITH 3 PART DIFF     Status: None   Result Value Ref Range Status    WBC 9.2 4.5 - 13.0 K/uL Final    RBC 4.66 4.10 - 5.10 M/uL Final    HGB 12.8 12.0 - 16.0 g/dL Final    HCT 38.3 36 - 48 % Final    MCV 82.2 78 - 102 FL Final    MCH 27.5 25.0 - 35.0 PG Final    MCHC 33.4 31 - 37 g/dL Final    RDW 13.6 11.5 - 14.5 % Final    PLATELET 507 026 - 987 K/uL Final    NEUTROPHILS 59 40 - 70 % Final    MIXED CELLS 9 0.1 - 17 % Final    LYMPHOCYTES 32 14 - 44 % Final    ABS. NEUTROPHILS 5.5 1.8 - 9.5 K/UL Final    ABS. MIXED CELLS 0.8 0.0 - 2.3 K/uL Final    ABS. LYMPHOCYTES 2.9 1.1 - 5.9 K/UL Final     Comment: Test performed at [address]. Results Reviewed by Medical Director. DF AUTOMATED   Final           Assessment:     1. Essential thrombocytosis ([address]    2. Rheumatoid arthritis involving multiple joints (HCC)    3. Chronic anemia    4. Fibromyalgia    5. Thrombocytosis ([address]    6. Chronic pain syndrome      Plan:   Leukocytosis (recurrent and persisting):  I have explained to the patient that the etiology of her leukocytosis remains undefined. A previous flow cytometry did not reveal any  evidence of an immunophenotypic abnormality such as an evolving chronic lymphoproliferative disorder or myeloproliferative disorder. The CBCs will continue to be monitored every 6 weeks. The CBC from today shows her WBC count is currently normal at 9.2 with the absolute neutrophil count of 5.5 and the absolute lymphocyte count of 2.9. Essential thrombocytosis/thrombocytosis: The current CBC shows that the platelet count is now 377,000. The platelet count is being monitored every 6 weeks. The patient was previously started on anagrelide 0.5 mg one tablet twice daily. This medication will be continued, at the current dose. I have reenforced to the patient the importance of being complaint with the treatment plan. Rheumatoid arthritis: the patient will continue to receive Remicade as a primary treatment modality for her severe rheumatoid arthritis every 6 weeks. The dose of Remicade will be 344 mg given intravenously. The patient has continued to take  Methotrexate 5 mg p.o. daily and Plaquenil 200 mg twice daily. Fibromyalgia: The patient  continues to have generalized pain at times related to her underlying fibromyalgia. The patient receives her Percocet medication on a monthly basis as needed. A new prescription for the Percocet was provided at this time. Chronic anemia: I have explained to the patient the CBC from today shows her hemoglobin has remained normal at 12.8 g/dL with hematocrit of 38.3 %. I have recommended that she continue to take ferrous sulfate 325 mg by mouth once daily. We will see the patient back in 6 weeks for a complete reassessment.     Orders Placed This Encounter    COMPLETE CBC & AUTO DIFF WBC    InHouse CBC ([place])     Standing Status:   Future     Number of Occurrences:   1     Standing Expiration Date:   5/5/0228    METABOLIC PANEL, COMPREHENSIVE Standing Status:   Future     Standing Expiration Date:   12/27/2018    IRON PROFILE     Standing Status:   Future     Standing Expiration Date:   12/27/2018    FERRITIN     Standing Status:   Future     Standing Expiration Date:   12/27/2018    oxyCODONE-acetaminophen (PERCOCET 10)  mg per tablet     Sig: Take 1 Tab by mouth every six (6) hours as needed for Pain for up to 15 days. Max Daily Amount: 4 Tabs.      Dispense:  60 Tab     Refill:  0       Medardo Andrew MD  12/26/2017

## 2017-12-26 NOTE — MR AVS SNAPSHOT
Visit Information Date & Time Provider Department Dept. Phone Encounter #  
 12/26/2017  3:30 PM Nisa Phan [facility] Office [phone] [id number] Follow-up Instructions Return in about 6 weeks (around 2/6/2018). Your Appointments 12/26/2017  3:30 PM  
Office Visit with MD Sebastian Echeverria [facility]) Appt Note: 6 wk fu; CHANGED TO A LATER TIME  
 [facility] [address] [facility] [address]  
[phone]  
  
   
 [facility] [address] [facility] [address]  
  
    
 2/6/2018 10:45 AM  
Office Visit with MD Sebastian Echeverria ([facility]) Appt Note: OV  
 [facility] [address] [facility] [address] [facility] [address]  
[phone] Upcoming Health Maintenance Date Due Pneumococcal 19-64 Highest Risk (1 of 3 - PCV13) 12/19/1982 DTaP/Tdap/Td series (1 - Tdap) 12/19/1984 PAP AKA CERVICAL CYTOLOGY 12/19/1984 FOBT Q 1 YEAR AGE 50-75 12/19/2013 Allergies as of 12/26/2017  Review Complete On: 12/19/2017 By: Mykel Doll RN Severity Noted Reaction Type Reactions Levaquin [Levofloxacin] High  Systemic Anaphylaxis Pollen Extracts    Unable to Obtain Current Immunizations  Reviewed on 12/19/2017 Name Date Influenza Vaccine 9/22/2017, 10/21/2016, 11/3/2015, 11/10/2014, 10/25/2013 Influenza Vaccine Whole 9/10/2012 Not reviewed this visit You Were Diagnosed With   
  
 Codes Comments Essential thrombocytosis (HCC)    -  Primary ICD-10-CM: D47.3 ICD-9-CM: 238.71 Rheumatoid arthritis involving multiple joints (HCC)     ICD-10-CM: M06.9 ICD-9-CM: 714.0 Chronic anemia     ICD-10-CM: D64.9 ICD-9-CM: 285.9 Fibromyalgia     ICD-10-CM: M79.7 ICD-9-CM: 729.1 Thrombocytosis ([address]     ICD-10-CM: D47.3 ICD-9-CM: 238.71 Chronic pain syndrome     ICD-10-CM: G89.4 ICD-9-CM: 338. 4 Vitals BP Pulse Temp Weight(growth percentile) BMI OB Status 125/78 91 98.6 °F (37 °C) 224 lb (101.6 kg) 31.24 kg/m2 Hysterectomy Smoking Status Former Smoker BMI and BSA Data Body Mass Index Body Surface Area  
 31.24 kg/m 2 2.26 m 2 Preferred Pharmacy Pharmacy Name Phone [facility] [address]. [phone] Your Updated Medication List  
  
   
This list is accurate as of: 12/26/17 12:19 PM.  Always use your most recent med list.  
  
  
  
  
 * albuterol sulfate 2.5 mg/0.5 mL Nebu nebulizer solution Commonly known as:  PROVENTIL;VENTOLIN  
by Nebulization route once. Is now using the actual nebulizer machine, for current bronchitis episode (Aug/Sept. 2017) * albuterol 90 mcg/actuation inhaler Commonly known as:  PROAIR HFA Take 1 Puff by inhalation every six (6) hours as needed for Wheezing. * amitriptyline 150 mg tablet Commonly known as:  ELAVIL Take 10 mg by mouth nightly. Indications: DEPRESSION  
  
 * amitriptyline 25 mg tablet Commonly known as:  ELAVIL  
  
 anagrelide 0.5 mg capsule Commonly known as:  Pili Flirt Take 1 Cap by mouth two (2) times a day. Indications: ESSENTIAL THROMBOCYTOSIS  
  
 * atenolol 25 mg tablet Commonly known as:  TENORMIN Take 25 mg by mouth daily. Indications: HYPERTENSION  
  
 * atenolol 50 mg tablet Commonly known as:  TENORMIN  
  
 BD INSULIN SYRINGE ULTRA-FINE 1 mL 31 gauge x 15/64\" Syrg Generic drug:  insulin syringe-needle U-100  
  
 cholecalciferol (VITAMIN D3) 5,000 unit Tab tablet Commonly known as:  VITAMIN D3 Take 5,000 Units by mouth every seven (7) days. clotrimazole-betamethasone topical cream  
Commonly known as:  LOTRISONE  
  
 * DULoxetine 20 mg capsule Commonly known as:  CYMBALTA Take 20 mg by mouth daily. * DULoxetine 30 mg capsule Commonly known as:  CYMBALTA  
  
 folic acid 786 mcg tablet Take 400 mcg by mouth daily. furosemide 40 mg tablet Commonly known as:  LASIX Take  by mouth daily. gabapentin 300 mg capsule Commonly known as:  NEURONTIN  
  
 haloperidol 5 mg tablet Commonly known as:  HALDOL Take 2.5 mg by mouth nightly. HYZAAR 50-12.5 mg per tablet Generic drug:  losartan-hydroCHLOROthiazide Take 1 Tab by mouth daily. ibuprofen 400 mg tablet Commonly known as:  MOTRIN Take 1 Tab by mouth every six (6) hours as needed for Pain. KlonoPIN 2 mg tablet Generic drug:  clonazePAM  
Take 2 mg by mouth daily. Indications: PANIC DISORDER  
  
 * LANTUS SC  
5 Units by SubCUTAneous route daily. * LANTUS 100 unit/mL injection Generic drug:  insulin glargine * metFORMIN 1,000 mg tablet Commonly known as:  GLUCOPHAGE Take 500 mg by mouth two (2) times a day. * metFORMIN 500 mg tablet Commonly known as:  GLUCOPHAGE  
  
 methocarbamol 750 mg tablet Commonly known as:  KYAWJNM-008 Take 1 Tab by mouth three (3) times daily. methotrexate 2.5 mg tablet Commonly known as:  Forestine Candy Take 5 mg by mouth daily. nitroglycerin 400 mcg/spray spray Commonly known as:  NITROLINGUAL  
1 Garrett by SubLINGual route every five (5) minutes as needed. NovoLOG Mix 70-30 100 unit/mL (70-30) injection Generic drug:  insulin aspart protamine/insulin aspart 10 Units by SubCUTAneous route two (2) times a day. ondansetron hcl 4 mg tablet Commonly known as:  ZOFRAN (AS HYDROCHLORIDE) Take 1 Tab by mouth every eight (8) hours as needed for Nausea. oxyCODONE-acetaminophen  mg per tablet Commonly known as:  PERCOCET 10 Take 1 Tab by mouth every six (6) hours as needed for Pain for up to 15 days. Max Daily Amount: 4 Tabs. PLAQUENIL 200 mg tablet Generic drug:  hydroxychloroquine Take 200 mg by mouth two (2) times a day. * QUEtiapine 100 mg tablet Commonly known as:  SEROquel Take 25 mg by mouth. Takes 25 mg Q AM, and 100 mg, Q PM  
  
 * QUEtiapine 25 mg tablet Commonly known as:  SEROquel REMICADE IV  
344 mg by IntraVENous route once as needed. remicade will be every 8 weeks * Notice: This list has 14 medication(s) that are the same as other medications prescribed for you. Read the directions carefully, and ask your doctor or other care provider to review them with you. Prescriptions Printed Refills  
 oxyCODONE-acetaminophen (PERCOCET 10)  mg per tablet 0 Sig: Take 1 Tab by mouth every six (6) hours as needed for Pain for up to 15 days. Max Daily Amount: 4 Tabs. Class: Print Route: Oral  
  
We Performed the Following COMPLETE CBC & AUTO DIFF WBC [27097 CPT(R)] Follow-up Instructions Return in about 6 weeks (around 2/6/2018). To-Do List   
 12/26/2017 Lab:  CBC WITH 3 PART DIFF   
  
 01/30/2018 10:00 AM  
  Appointment with [address] at [place] ([phone])  
  
 03/13/2018 10:00 AM  
  Appointment with [address] at [place] [address] ([phone]) Patient Instructions Rheumatoid Arthritis: Care Instructions Your Care Instructions Arthritis is a common health problem in which the joints are inflamed. There are many types of arthritis. In rheumatoid arthritis, the body's own immune system attacks the joints. This causes pain, stiffness, and swelling in the joints, especially in the hands and feet. It can become hard to open jars, write, and do other daily tasks. Sometimes rheumatoid arthritis can also cause bumps to form under the skin. Over time, rheumatoid arthritis can damage and deform joints. Early treatment with medicines may reduce your chances of having a lasting disability. Follow-up care is a key part of your treatment and safety.  Be sure to make and go to all appointments, and call your doctor if you are having problems. It's also a good idea to know your test results and keep a list of the medicines you take. How can you care for yourself at home? · If your doctor recommends it, get more exercise. Walking is a good choice. If your knees or ankles hurt, try riding a stationary bike or swimming. · Move each joint gently through its full range of motion once or twice a day. · Rest joints when they are sore or overworked. Short rest breaks may help more than staying in bed. · Reach and stay at a healthy weight. Regular exercise and a healthy diet will help you do this. Extra weight can strain the joints, especially the knees and hips, and make the pain worse. Losing even a few pounds may help. · Get enough calcium and vitamin D to help prevent osteoporosis, which causes thin bones. Talk to your doctor about how much you should take. · Protect your joints from injury. Do not overuse them. Try to limit or avoid activities that cause joint pain or swelling. Use special kitchen tools and other self-help devices as well as walkers, splints, or canes if needed. · Use heat to ease pain. Take warm showers or baths. Use hot packs or a heating pad set on low. Sleep under a warm electric blanket. · Put ice or a cold pack on the area for 10 to 20 minutes at a time. Put a thin cloth between the ice and your skin. · Take pain medicines exactly as directed. ¨ If the doctor gave you a prescription medicine for pain, take it as prescribed. ¨ If you are not taking a prescription pain medicine, ask your doctor if you can take an over-the-counter medicine. · Take an active role in managing your condition. Set up a treatment plan with your doctor, and learn as much as you can about rheumatoid arthritis. This will help you control pain and stay active. When should you call for help? Call your doctor now or seek immediate medical care if: 
? · You have a fever or a rash along with joint pain. ? · You have joint pain that is so severe that you cannot use the joint at all. ? · You have sudden swelling, redness, or pain in one or more joints, and you do not know why.  
? · You have back or neck pain along with weakness in your arms or legs. ? · You have a loss of bowel or bladder control. ? Watch closely for changes in your health, and be sure to contact your doctor if: 
? · You have joint pain that lasts for more than 6 weeks. ? · You have side effects from your arthritis medicines, such as stomach pain, nausea, heartburn, or dark and tarlike stools. Where can you learn more? Go to http://swapnil-speedy.info/. Enter K205 in the search box to learn more about \"Rheumatoid Arthritis: Care Instructions. \" Current as of: October 31, 2016 Content Version: 11.4 © 2810-2200 [employer]. Care instructions adapted under license by [employer] (which disclaims liability or warranty for this information). If you have questions about a medical condition or this instruction, always ask your healthcare professional. [place] [address] any warranty or liability for your use of this information. Anemia: Care Instructions Your Care Instructions Anemia is a low level of red blood cells, which carry oxygen throughout your body. Many things can cause anemia. Lack of iron is one of the most common causes. Your body needs iron to make hemoglobin, a substance in red blood cells that carries oxygen from the lungs to your body's cells. Without enough iron, the body produces fewer and smaller red blood cells. As a result, your body's cells do not get enough oxygen, and you feel tired and weak. And you may have trouble concentrating. Bleeding is the most common cause of a lack of iron. You may have heavy menstrual bleeding or bleeding caused by conditions such as ulcers, hemorrhoids, or cancer.  Regular use of aspirin or other anti-inflammatory medicines (such as ibuprofen) also can cause bleeding in some people. A lack of iron in your diet also can cause anemia, especially at times when the body needs more iron, such as during pregnancy, infancy, and the teen years. Your doctor may have prescribed iron pills. It may take several months of treatment for your iron levels to return to normal. Your doctor also may suggest that you eat foods that are rich in iron, such as meat and beans. There are many other causes of anemia. It is not always due to a lack of iron. Finding the specific cause of your anemia will help your doctor find the right treatment for you. Follow-up care is a key part of your treatment and safety. Be sure to make and go to all appointments, and call your doctor if you are having problems. It's also a good idea to know your test results and keep a list of the medicines you take. How can you care for yourself at home? · Take your medicines exactly as prescribed. Call your doctor if you think you are having a problem with your medicine. · If your doctor recommends iron pills, take them as directed: ¨ Try to take the pills on an empty stomach about 1 hour before or 2 hours after meals. But you may need to take iron with food to avoid an upset stomach. ¨ Do not take antacids or drink milk or caffeine drinks (such as coffee, tea, or cola) at the same time or within 2 hours of the time that you take your iron. They can make it hard for your body to absorb the iron. ¨ Vitamin C (from food or supplements) helps your body absorb iron. Try taking iron pills with a glass of orange juice or some other food that is high in vitamin C, such as citrus fruits. ¨ Iron pills may cause stomach problems, such as heartburn, nausea, diarrhea, constipation, and cramps. Be sure to drink plenty of fluids, and include fruits, vegetables, and fiber in your diet each day. Iron pills often make your bowel movements dark or green. ¨ If you forget to take an iron pill, do not take a double dose of iron the next time you take a pill. ¨ Keep iron pills out of the reach of small children. An overdose of iron can be very dangerous. · Follow your doctor's advice about eating iron-rich foods. These include red meat, shellfish, poultry, eggs, beans, raisins, whole-grain bread, and leafy green vegetables. · Steam vegetables to help them keep their iron content. When should you call for help? Call 911 anytime you think you may need emergency care. For example, call if: 
? · You have symptoms of a heart attack. These may include: ¨ Chest pain or pressure, or a strange feeling in the chest. 
¨ Sweating. ¨ Shortness of breath. ¨ Nausea or vomiting. ¨ Pain, pressure, or a strange feeling in the back, neck, jaw, or upper belly or in one or both shoulders or arms. ¨ Lightheadedness or sudden weakness. ¨ A fast or irregular heartbeat. After you call 911, the  may tell you to chew 1 adult-strength or 2 to 4 low-dose aspirin. Wait for an ambulance. Do not try to drive yourself. ? · You passed out (lost consciousness). ?Call your doctor now or seek immediate medical care if: 
? · You have new or increased shortness of breath. ? · You are dizzy or lightheaded, or you feel like you may faint. ? · Your fatigue and weakness continue or get worse. ? · You have any abnormal bleeding, such as: 
¨ Nosebleeds. ¨ Vaginal bleeding that is different (heavier, more frequent, at a different time of the month) than what you are used to. ¨ Bloody or black stools, or rectal bleeding. ¨ Bloody or pink urine. ? Watch closely for changes in your health, and be sure to contact your doctor if: 
? · You do not get better as expected. Where can you learn more? Go to http://swapnil-speedy.info/. Enter R301 in the search box to learn more about \"Anemia: Care Instructions. \" Current as of: October 13, 2016 Content Version: 11.4 © 2965-1111 [employer]. Care instructions adapted under license by [employer] (which disclaims liability or warranty for this information). If you have questions about a medical condition or this instruction, always ask your healthcare professional. [address] any warranty or liability for your use of this information. Please provide this summary of care documentation to your next provider. Your primary care clinician is listed as [employer]. If you have any questions after today's visit, please call [phone].

## 2018-01-08 DIAGNOSIS — G89.4 CHRONIC PAIN SYNDROME: ICD-10-CM

## 2018-01-09 RX ORDER — OXYCODONE AND ACETAMINOPHEN 10; 325 MG/1; MG/1
1 TABLET ORAL
Qty: 60 TAB | Refills: 0 | Status: SHIPPED | OUTPATIENT
Start: 2018-01-09 | End: 2018-01-22 | Stop reason: SDUPTHER

## 2018-01-12 ENCOUNTER — APPOINTMENT (OUTPATIENT)
Dept: INFUSION THERAPY | Age: 53
End: 2018-01-12
Payer: MEDICARE

## 2018-01-22 DIAGNOSIS — G89.4 CHRONIC PAIN SYNDROME: ICD-10-CM

## 2018-01-23 RX ORDER — OXYCODONE AND ACETAMINOPHEN 10; 325 MG/1; MG/1
1 TABLET ORAL
Qty: 60 TAB | Refills: 0 | Status: SHIPPED | OUTPATIENT
Start: 2018-01-23 | End: 2018-02-06 | Stop reason: SDUPTHER

## 2018-01-30 ENCOUNTER — HOSPITAL ENCOUNTER (OUTPATIENT)
Dept: ONCOLOGY | Age: 53
Discharge: HOME | End: 2018-01-30

## 2018-01-30 ENCOUNTER — HOSPITAL ENCOUNTER (OUTPATIENT)
Dept: INFUSION THERAPY | Age: 53
Discharge: HOME | End: 2018-01-30
Payer: MEDICARE

## 2018-01-30 ENCOUNTER — CLINICAL SUPPORT (OUTPATIENT)
Dept: ONCOLOGY | Age: 53
End: 2018-01-30

## 2018-01-30 ENCOUNTER — HOSPITAL ENCOUNTER (OUTPATIENT)
Dept: LAB | Age: 53
Discharge: HOME | End: 2018-01-30
Payer: MEDICARE

## 2018-01-30 VITALS
TEMPERATURE: 98.9 F | DIASTOLIC BLOOD PRESSURE: 68 MMHG | HEART RATE: 111 BPM | WEIGHT: 224 LBS | SYSTOLIC BLOOD PRESSURE: 114 MMHG | BODY MASS INDEX: 31.36 KG/M2 | HEIGHT: 71 IN | RESPIRATION RATE: 20 BRPM

## 2018-01-30 DIAGNOSIS — M06.09 RHEUMATOID ARTHRITIS OF MULTIPLE SITES WITHOUT RHEUMATOID FACTOR (HCC): Primary | ICD-10-CM

## 2018-01-30 DIAGNOSIS — M06.09 RHEUMATOID ARTHRITIS OF MULTIPLE SITES WITHOUT RHEUMATOID FACTOR (HCC): ICD-10-CM

## 2018-01-30 LAB
ALBUMIN SERPL-MCNC: 3.5 G/DL (ref 3.4–5)
ALBUMIN/GLOB SERPL: 0.9 {RATIO} (ref 0.8–1.7)
ALP SERPL-CCNC: 109 U/L (ref 45–117)
ALT SERPL-CCNC: 21 U/L (ref 13–56)
ANION GAP SERPL CALC-SCNC: 10 MMOL/L (ref 3–18)
AST SERPL-CCNC: 8 U/L (ref 15–37)
BASO+EOS+MONOS # BLD AUTO: 0.7 K/UL (ref 0–2.3)
BASO+EOS+MONOS # BLD AUTO: 7 % (ref 0.1–17)
BILIRUB SERPL-MCNC: 0.3 MG/DL (ref 0.2–1)
BUN SERPL-MCNC: 20 MG/DL (ref 7–18)
BUN/CREAT SERPL: 21 (ref 12–20)
CALCIUM SERPL-MCNC: 9.7 MG/DL (ref 8.5–10.1)
CHLORIDE SERPL-SCNC: 105 MMOL/L (ref 100–108)
CO2 SERPL-SCNC: 26 MMOL/L (ref 21–32)
CREAT SERPL-MCNC: 0.96 MG/DL (ref 0.6–1.3)
CRP SERPL-MCNC: 1.4 MG/DL (ref 0–0.3)
DIFFERENTIAL METHOD BLD: NORMAL
ERYTHROCYTE [DISTWIDTH] IN BLOOD BY AUTOMATED COUNT: 13.3 % (ref 11.5–14.5)
ERYTHROCYTE [SEDIMENTATION RATE] IN BLOOD: 30 MM/HR (ref 0–30)
GLOBULIN SER CALC-MCNC: 3.9 G/DL (ref 2–4)
GLUCOSE SERPL-MCNC: 196 MG/DL (ref 74–99)
HCT VFR BLD AUTO: 40.5 % (ref 36–48)
HGB BLD-MCNC: 13.3 G/DL (ref 12–16)
LYMPHOCYTES # BLD: 3.3 K/UL (ref 1.1–5.9)
LYMPHOCYTES NFR BLD: 30 % (ref 14–44)
MCH RBC QN AUTO: 27.2 PG (ref 25–35)
MCHC RBC AUTO-ENTMCNC: 32.8 G/DL (ref 31–37)
MCV RBC AUTO: 82.8 FL (ref 78–102)
NEUTS SEG # BLD: 7 K/UL (ref 1.8–9.5)
NEUTS SEG NFR BLD: 63 % (ref 40–70)
PLATELET # BLD AUTO: 382 K/UL (ref 140–440)
POTASSIUM SERPL-SCNC: 3.9 MMOL/L (ref 3.5–5.5)
PROT SERPL-MCNC: 7.4 G/DL (ref 6.4–8.2)
RBC # BLD AUTO: 4.89 M/UL (ref 4.1–5.1)
SODIUM SERPL-SCNC: 141 MMOL/L (ref 136–145)
WBC # BLD AUTO: 11 K/UL (ref 4.5–13)

## 2018-01-30 PROCEDURE — 85652 RBC SED RATE AUTOMATED: CPT | Performed by: INTERNAL MEDICINE

## 2018-01-30 PROCEDURE — 96415 CHEMO IV INFUSION ADDL HR: CPT

## 2018-01-30 PROCEDURE — 96375 TX/PRO/DX INJ NEW DRUG ADDON: CPT

## 2018-01-30 PROCEDURE — 96413 CHEMO IV INFUSION 1 HR: CPT

## 2018-01-30 PROCEDURE — 74011250636 HC RX REV CODE- 250/636: Performed by: INTERNAL MEDICINE

## 2018-01-30 PROCEDURE — 74011000258 HC RX REV CODE- 258: Performed by: INTERNAL MEDICINE

## 2018-01-30 PROCEDURE — 86140 C-REACTIVE PROTEIN: CPT | Performed by: INTERNAL MEDICINE

## 2018-01-30 PROCEDURE — 80053 COMPREHEN METABOLIC PANEL: CPT | Performed by: INTERNAL MEDICINE

## 2018-01-30 RX ORDER — DIPHENHYDRAMINE HYDROCHLORIDE 50 MG/ML
50 INJECTION, SOLUTION INTRAMUSCULAR; INTRAVENOUS
Status: ACTIVE | OUTPATIENT
Start: 2018-01-30 | End: 2018-01-30

## 2018-01-30 RX ORDER — SODIUM CHLORIDE 9 MG/ML
100 INJECTION, SOLUTION INTRAVENOUS ONCE
Status: COMPLETED | OUTPATIENT
Start: 2018-01-30 | End: 2018-01-30

## 2018-01-30 RX ORDER — DIPHENHYDRAMINE HYDROCHLORIDE 50 MG/ML
25 INJECTION, SOLUTION INTRAMUSCULAR; INTRAVENOUS ONCE
Status: COMPLETED | OUTPATIENT
Start: 2018-01-30 | End: 2018-01-30

## 2018-01-30 RX ORDER — SODIUM CHLORIDE 0.9 % (FLUSH) 0.9 %
10-40 SYRINGE (ML) INJECTION AS NEEDED
Status: DISCONTINUED | OUTPATIENT
Start: 2018-01-30 | End: 2018-02-03 | Stop reason: HOSPADM

## 2018-01-30 RX ORDER — ACETAMINOPHEN 325 MG/1
650 TABLET ORAL
Status: ACTIVE | OUTPATIENT
Start: 2018-01-30 | End: 2018-01-30

## 2018-01-30 RX ADMIN — Medication 10 ML: at 10:30

## 2018-01-30 RX ADMIN — SODIUM CHLORIDE 100 ML: 900 INJECTION, SOLUTION INTRAVENOUS at 11:15

## 2018-01-30 RX ADMIN — DIPHENHYDRAMINE HYDROCHLORIDE 25 MG: 50 INJECTION, SOLUTION INTRAMUSCULAR; INTRAVENOUS at 11:22

## 2018-01-30 RX ADMIN — INFLIXIMAB 400 MG: 100 INJECTION, POWDER, LYOPHILIZED, FOR SOLUTION INTRAVENOUS at 12:00

## 2018-01-30 NOTE — PROGRESS NOTES
[facility] Progress Note    Date: 2018    Name: Jackie Corral    MRN: [id number]         : 1965      Ms. Jenifer Joya arrived in the [facility] today, at 1010, in stable condition, here for Q 6 Week, IV Remicade Infusion. She was assessed and education was provided. Ms. Nuria Sinclair vitals were reviewed. Visit Vitals    /74 (BP 1 Location: Left arm, BP Patient Position: At rest;Sitting)    Pulse (!) 113    Temp 98.3 °F (36.8 °C)    Resp 20    Ht 5' 11\" (1.803 m)    Wt 101.6 kg (224 lb)    Breastfeeding No    BMI 31.24 kg/m2         PIV was established in her dorsal left forearm, at 1030, and blood for a CBC, CMP, ESR, and CRP, was drawn, per order. (The CBC was processed in house, and the remaining labs were sent out to be processed. )          Lab results were obtained and reviewed. Recent Results (from the past 12 hour(s))   CBC WITH 3 PART DIFF    Collection Time: 18 10:30 AM   Result Value Ref Range    WBC 11.0 4.5 - 13.0 K/uL    RBC 4.89 4. 10 - 5.10 M/uL    HGB 13.3 12.0 - 16.0 g/dL    HCT 40.5 36 - 48 %    MCV 82.8 78 - 102 FL    MCH 27.2 25.0 - 35.0 PG    MCHC 32.8 31 - 37 g/dL    RDW 13.3 11.5 - 14.5 %    PLATELET 036 414 - 585 K/uL    NEUTROPHILS 63 40 - 70 %    MIXED CELLS 7 0.1 - 17 %    LYMPHOCYTES 30 14 - 44 %    ABS. NEUTROPHILS 7.0 1.8 - 9.5 K/UL    ABS. MIXED CELLS 0.7 0.0 - 2.3 K/uL    ABS. LYMPHOCYTES 3.3 1.1 - 5.9 K/UL    DF AUTOMATED             Pre-medication consisting of IV Benadryl 25 mg, was administered per order, and without incident. Remicade 400 mg IV (4 mg/kg) Maintenance Dose, was administered over 2 hours, per order, and without incident. After the completion of the IV Remicade, Ms. Jenifer Joya was monitored for 30 minutes post Remicade, per order, and also without incident. After the completion of the 30 minute monitoring period, the PIV was removed and gauze/bandaid was applied.               Ms. Jenifer Joya tolerated well, and had no complaints. Ms. Sravani Watt was discharged from [place] [address] in stable condition at 1435. Rosaura Frizzle She is to return in 6 weeks, on Tuesday, 3-13-18,  at 1000,  for her next appointment, for her next IV Dose of IV Remicade.      Herman Cruz RN  January 30, 2018  11:45 AM

## 2018-02-06 ENCOUNTER — HOSPITAL ENCOUNTER (OUTPATIENT)
Dept: ONCOLOGY | Age: 53
Discharge: HOME | End: 2018-02-06

## 2018-02-06 ENCOUNTER — OFFICE VISIT (OUTPATIENT)
Dept: ONCOLOGY | Age: 53
End: 2018-02-06

## 2018-02-06 ENCOUNTER — HOSPITAL ENCOUNTER (OUTPATIENT)
Dept: LAB | Age: 53
Discharge: HOME | End: 2018-02-06
Payer: MEDICARE

## 2018-02-06 VITALS
TEMPERATURE: 98.3 F | WEIGHT: 229.2 LBS | DIASTOLIC BLOOD PRESSURE: 79 MMHG | SYSTOLIC BLOOD PRESSURE: 142 MMHG | HEART RATE: 90 BPM | HEIGHT: 71 IN | BODY MASS INDEX: 32.09 KG/M2

## 2018-02-06 DIAGNOSIS — M06.9 RHEUMATOID ARTHRITIS, INVOLVING UNSPECIFIED SITE, UNSPECIFIED RHEUMATOID FACTOR PRESENCE: ICD-10-CM

## 2018-02-06 DIAGNOSIS — M79.7 FIBROMYALGIA: ICD-10-CM

## 2018-02-06 DIAGNOSIS — D47.3 ESSENTIAL THROMBOCYTOSIS (HCC): Primary | ICD-10-CM

## 2018-02-06 DIAGNOSIS — D64.9 CHRONIC ANEMIA: ICD-10-CM

## 2018-02-06 DIAGNOSIS — D47.3 ESSENTIAL THROMBOCYTOSIS (HCC): ICD-10-CM

## 2018-02-06 DIAGNOSIS — G89.4 CHRONIC PAIN SYNDROME: ICD-10-CM

## 2018-02-06 LAB
ALBUMIN SERPL-MCNC: 3.7 G/DL (ref 3.4–5)
ALBUMIN/GLOB SERPL: 0.9 {RATIO} (ref 0.8–1.7)
ALP SERPL-CCNC: 96 U/L (ref 45–117)
ALT SERPL-CCNC: 22 U/L (ref 13–56)
ANION GAP SERPL CALC-SCNC: 7 MMOL/L (ref 3–18)
AST SERPL-CCNC: 9 U/L (ref 15–37)
BASO+EOS+MONOS # BLD AUTO: 0.2 K/UL (ref 0–2.3)
BASO+EOS+MONOS # BLD AUTO: 3 % (ref 0.1–17)
BILIRUB SERPL-MCNC: 0.2 MG/DL (ref 0.2–1)
BUN SERPL-MCNC: 15 MG/DL (ref 7–18)
BUN/CREAT SERPL: 23 (ref 12–20)
CALCIUM SERPL-MCNC: 9.8 MG/DL (ref 8.5–10.1)
CHLORIDE SERPL-SCNC: 106 MMOL/L (ref 100–108)
CO2 SERPL-SCNC: 26 MMOL/L (ref 21–32)
CREAT SERPL-MCNC: 0.66 MG/DL (ref 0.6–1.3)
DIFFERENTIAL METHOD BLD: NORMAL
ERYTHROCYTE [DISTWIDTH] IN BLOOD BY AUTOMATED COUNT: 13.4 % (ref 11.5–14.5)
GLOBULIN SER CALC-MCNC: 3.9 G/DL (ref 2–4)
GLUCOSE SERPL-MCNC: 138 MG/DL (ref 74–99)
HCT VFR BLD AUTO: 38.8 % (ref 36–48)
HGB BLD-MCNC: 12.9 G/DL (ref 12–16)
LYMPHOCYTES # BLD: 4.1 K/UL (ref 1.1–5.9)
LYMPHOCYTES NFR BLD: 43 % (ref 14–44)
MCH RBC QN AUTO: 27.6 PG (ref 25–35)
MCHC RBC AUTO-ENTMCNC: 33.2 G/DL (ref 31–37)
MCV RBC AUTO: 83.1 FL (ref 78–102)
NEUTS SEG # BLD: 5.3 K/UL (ref 1.8–9.5)
NEUTS SEG NFR BLD: 54 % (ref 40–70)
PLATELET # BLD AUTO: 413 K/UL (ref 140–440)
POTASSIUM SERPL-SCNC: 4.1 MMOL/L (ref 3.5–5.5)
PROT SERPL-MCNC: 7.6 G/DL (ref 6.4–8.2)
RBC # BLD AUTO: 4.67 M/UL (ref 4.1–5.1)
SODIUM SERPL-SCNC: 139 MMOL/L (ref 136–145)
WBC # BLD AUTO: 9.6 K/UL (ref 4.5–13)

## 2018-02-06 PROCEDURE — 36415 COLL VENOUS BLD VENIPUNCTURE: CPT | Performed by: INTERNAL MEDICINE

## 2018-02-06 PROCEDURE — 80053 COMPREHEN METABOLIC PANEL: CPT | Performed by: INTERNAL MEDICINE

## 2018-02-06 RX ORDER — OXYCODONE AND ACETAMINOPHEN 10; 325 MG/1; MG/1
1 TABLET ORAL
Qty: 60 TAB | Refills: 0 | Status: SHIPPED | OUTPATIENT
Start: 2018-02-06 | End: 2018-02-19 | Stop reason: SDUPTHER

## 2018-02-06 NOTE — PROGRESS NOTES
Hematology/Oncology  Progress Note    Name: Froilan Ohm  Date: 2018  : 1965    PCP: Carmne Hall MD     Ms. Ty Patrick is a 45year old female who was seen for management of her rheumatoid arthritis, leukocytosis, and thrombocytosis. Current therapy: Remicade 4 mg/kg bodyweight ever 6 weeks intravenously    Subjective:     Ms. Ty Patrick is a 45year-old [facility] American woman who has severe rheumatoid arthritis. She receives Remicade every 6 weeks. She also suffers from fibromyalgia and had an elevated WBC count and platelet count of undefined etiology. Today she has no new complaints to report. She continues to complain of back pain. She is using a narcotic based regimen for pain control. She states this provides some relief, but the pain is gradually worsening. She reports that the Remicade has provided a significant degree of relief from the severe rheumatoid arthritis symptoms. The patient is continuing to use her cane for mobility support. She denies any recent fevers or recurrent infections. The patient reports that her appetite has continued to improve. She also states her energy level is continuing to improve as well. She has no new complaints or concerns to report, at this time.        Past Medical History:   Diagnosis Date    Abdominal pain, unspecified site     Acute otitis media     Acute pharyngitis     Anxiety     Arthritis     Autoimmune disease ([address]     Back injury     Backache     herniated disc in lower back    Blurred vision     Chest pain, unspecified     abnormal EKG    Chronic airway obstruction, not elsewhere classified     Chronic back pain     Chronic pain     COPD     Depression     Diabetes (HCC)     Dizziness     Dizziness and giddiness     possible orthostatic changes, vasovagal, autonomic dysfunction from diabetic neuropathy    Encounters for unspecified administrative purpose     Feeling anxious     Fibromyalgia     Headache(784.0)     Hemorrhoid     Herniated disc     at L5    Hypercholesterolemia     Hyperglycemia     Hypertension     Joint pain     Leukocytosis, unspecified     Major depressive disorder, single episode, mild (HCC)     Mental disorder     depression, anxiety, bipolar and PTSD    Neuropathy     Obesity, unspecified     Other chest pain     Phobic disorders     Rheumatoid arthritis (HCC)     Seasonal allergies     Shortness of breath     normal EF    Streptococcal sore throat     Type II or unspecified type diabetes mellitus with unspecified complication, uncontrolled     Unspecified hereditary and idiopathic peripheral neuropathy     Vitamin D deficiency      Past Surgical History:   Procedure Laterality Date    HX CHOLECYSTECTOMY  1994    HX GYN  2005    partial Hysterectomy    HX OTHER SURGICAL  12-1-15    had ingrown toenail removed from big toe, both feet    HX TUBAL LIGATION      1995     Social History     Social History    Marital status:      Spouse name: N/A    Number of children: N/A    Years of education: N/A     Occupational History    Not on file.      Social History Main Topics    Smoking status: Former Smoker    Smokeless tobacco: Never Used    Alcohol use Yes      Comment: socially    Drug use: No    Sexual activity: Yes     Partners: Male     Birth control/ protection: Condom     Other Topics Concern    Not on file     Social History Narrative     Family History   Problem Relation Age of Onset    Diabetes Other     Alcohol abuse Mother     Arthritis-osteo Mother     Diabetes Mother     Elevated Lipids Mother     Headache Mother     Heart Disease Mother    [facility] Migraines Mother     Psychiatric Disorder Mother     Alcohol abuse Father    [place]  Father     Cancer Father     Headache Father     Heart Disease Father     Lung Disease Father     Migraines Father     Alcohol abuse Sister     Headache Sister     Diabetes Sister    [facility] Heart Disease Sister     Migraines Sister     Psychiatric Disorder Sister     Headache Brother     Diabetes Brother     Elevated Lipids Brother     Heart Disease Brother     Migraines Brother     Psychiatric Disorder Brother     Cancer Maternal Aunt     Alcohol abuse Maternal Aunt     Diabetes Maternal Aunt     Headache Maternal Aunt     Lung Disease Maternal Aunt     Migraines Maternal Aunt     Headache Maternal Uncle     Migraines Maternal Uncle     Stroke Maternal Uncle     Psychiatric Disorder Maternal Uncle     Headache Paternal Aunt     Migraines Paternal Aunt     Headache Paternal Uncle     Hypertension Paternal Uncle     Migraines Paternal Uncle     Stroke Paternal Uncle     Headache Maternal Grandmother     Migraines Maternal Grandmother     Stroke Maternal Grandmother     Headache Maternal Grandfather     Migraines Maternal Grandfather     Stroke Maternal Grandfather     Headache Paternal Grandmother     Hypertension Paternal Grandmother     Lung Disease Paternal Grandmother     Migraines Paternal Grandmother     Headache Paternal Grandfather     Cancer Paternal Grandfather     Migraines Paternal Grandfather      Current Outpatient Prescriptions   Medication Sig Dispense Refill    oxyCODONE-acetaminophen (PERCOCET 10)  mg per tablet Take 1 Tab by mouth every six (6) hours as needed for Pain for up to 15 days. Max Daily Amount: 4 Tabs. 60 Tab 0    ibuprofen (MOTRIN) 400 mg tablet Take 1 Tab by mouth every six (6) hours as needed for Pain. 20 Tab 0    anagrelide (AGRYLIN) 0.5 mg capsule Take 1 Cap by mouth two (2) times a day.  Indications: ESSENTIAL THROMBOCYTOSIS 60 Cap 6    amitriptyline (ELAVIL) 25 mg tablet       atenolol (TENORMIN) 50 mg tablet       clotrimazole-betamethasone (LOTRISONE) topical cream       DULoxetine (CYMBALTA) 30 mg capsule       gabapentin (NEURONTIN) 300 mg capsule       LANTUS 100 unit/mL injection       metFORMIN (GLUCOPHAGE) 500 mg tablet       QUEtiapine (SEROQUEL) 25 mg tablet       BD INSULIN SYRINGE ULTRA-FINE 1 mL 31 gauge x 15/64\" syrg       albuterol sulfate (PROVENTIL;VENTOLIN) 2.5 mg/0.5 mL nebu nebulizer solution by Nebulization route once. Is now using the actual nebulizer machine, for current bronchitis episode (Aug/Sept. 2017)      methocarbamol (ROBAXIN-750) 750 mg tablet Take 1 Tab by mouth three (3) times daily. 15 Tab 0    insulin aspart protamine/insulin aspart (NOVOLOG MIX 70-30) 100 unit/mL (70-30) injection 10 Units by SubCUTAneous route two (2) times a day.  clonazePAM (KLONOPIN) 2 mg tablet Take 2 mg by mouth daily. Indications: PANIC DISORDER      folic acid 047 mcg tablet Take 400 mcg by mouth daily.  haloperidol (HALDOL) 5 mg tablet Take 2.5 mg by mouth nightly.  QUEtiapine (SEROQUEL) 100 mg tablet Take 25 mg by mouth. Takes 25 mg Q AM, and 100 mg, Q PM      DULoxetine (CYMBALTA) 20 mg capsule Take 20 mg by mouth daily.  albuterol (PROAIR HFA) 90 mcg/actuation inhaler Take 1 Puff by inhalation every six (6) hours as needed for Wheezing. 1 Inhaler 0    ondansetron hcl (ZOFRAN, AS HYDROCHLORIDE,) 4 mg tablet Take 1 Tab by mouth every eight (8) hours as needed for Nausea. 12 Tab 0    methotrexate (RHEUMATREX) 2.5 mg tablet Take 5 mg by mouth daily.  metFORMIN (GLUCOPHAGE) 1,000 mg tablet Take 500 mg by mouth two (2) times a day.  Cholecalciferol, Vitamin D3, 5,000 unit Tab Take 5,000 Units by mouth every seven (7) days.  atenolol (TENORMIN) 25 mg tablet Take 25 mg by mouth daily. Indications: HYPERTENSION      hydroxychloroquine (PLAQUENIL) 200 mg tablet Take 200 mg by mouth two (2) times a day.  furosemide (LASIX) 40 mg tablet Take  by mouth daily.  INFLIXIMAB (REMICADE IV) 344 mg by IntraVENous route once as needed. remicade will be every 8 weeks       amitriptyline (ELAVIL) 150 mg tablet Take 10 mg by mouth nightly.     Indications: DEPRESSION      nitroglycerin (NITROLINGUAL) 0.4 mg/dose spray 1 Spray by SubLINGual route every five (5) minutes as needed.  losartan-hydrochlorothiazide (HYZAAR) 50-12.5 mg per tablet Take 1 Tab by mouth daily.  INSULIN GLARGINE,HUM. REC. ANLOG (LANTUS SC) 5 Units by SubCUTAneous route daily. Review of Systems  Constitutional: The patient has no acute distress or discomfort. HEENT: The patient denies recent head trauma, eye pain, blurred vision,  hearing deficit, oropharyngeal mucosal pain or lesions, and the patient denies throat pain or discomfort. Lymphatics: The patient denies palpable peripheral lymphadenopathy. Hematologic: The patient denies having bruising, bleeding, or progressive fatigue. Respiratory: Patient denies having shortness of breath, cough, sputum production, fever, or dyspnea on exertion. Cardiovascular: The patient denies having leg pain, leg swelling, heart palpitations, chest permit, chest pain, or lightheadedness. The patient denies having dyspnea on exertion. Gastrointestinal: The patient denies having nausea, emesis, or diarrhea. The patient denies having any hematemesis or blood in the stool. Genitourinary: Patient denies having urinary urgency, frequency, or dysuria. The patient denies having blood in the urine. Psychological: The patient denies having symptoms of nervousness, anxiety, depression, or thoughts of harming himself some of this. Skin: Patient denies having skin rashes, skin, ulcerations, or unexplained itching or pruritus. Musculoskeletal: The patient complains of generalized back pain. Objective:     Visit Vitals    /79 (BP 1 Location: Right arm)    Pulse 90    Temp 98.3 °F (36.8 °C)    Ht 5' 11\" (1.803 m)    Wt 104 kg (229 lb 3.2 oz)    BMI 31.97 kg/m2     ECOG PS=0 Pain score 4-5/10     Physical Exam:   Gen. Appearance: The patient is in no acute distress. Skin: There is no bruise or rash. HEENT: The exam is unremarkable.   Neck: Supple without lymphadenopathy or thyromegaly. Lungs: Clear to auscultation and percussion; there are no wheezes or rhonchi. Heart: Regular rate and rhythm; there are no murmurs, gallops, or rubs. Abdomen: Bowel sounds are present and normal.  There is no guarding, tenderness, or hepatosplenomegaly. Extremities: There is no clubbing, cyanosis, or edema. Neurologic: There are no focal neurologic deficits. Lymphatics: There is no palpable peripheral lymphadenopathy. Musculoskeletal: The patient has full range of motion at all joints. However, she complains of pain on ambulation due to the severe rheumatoid arthritis. There is  evidence of joint deformity or effusions in the hands and knees. There is no focal joint tenderness. She is using a cane for support and mobility. Psychological/psychiatric: There is no clinical evidence of anxiety, depression, or melancholy. Lab data:      Results for orders placed or performed during the hospital encounter of 02/06/18   CBC WITH 3 PART DIFF     Status: None   Result Value Ref Range Status    WBC 9.6 4.5 - 13.0 K/uL Final    RBC 4.67 4.10 - 5.10 M/uL Final    HGB 12.9 12.0 - 16.0 g/dL Final    HCT 38.8 36 - 48 % Final    MCV 83.1 78 - 102 FL Final    MCH 27.6 25.0 - 35.0 PG Final    MCHC 33.2 31 - 37 g/dL Final    RDW 13.4 11.5 - 14.5 % Final    PLATELET 214 569 - 902 K/uL Final    NEUTROPHILS 54 40 - 70 % Final    MIXED CELLS 3 0.1 - 17 % Final    LYMPHOCYTES 43 14 - 44 % Final    ABS. NEUTROPHILS 5.3 1.8 - 9.5 K/UL Final    ABS. MIXED CELLS 0.2 0.0 - 2.3 K/uL Final    ABS. LYMPHOCYTES 4.1 1.1 - 5.9 K/UL Final     Comment: Test performed at [place] [id number] Location. Results Reviewed by Medical Director. DF AUTOMATED   Final           Assessment:     1. Essential thrombocytosis ([facility] [address]    2. Rheumatoid arthritis, involving unspecified site, unspecified rheumatoid factor presence ([facility] [address]    3. Chronic pain syndrome    4. Chronic anemia    5.  Fibromyalgia Plan:   Leukocytosis (recurrent and persisting): I have explained to the patient that the etiology of her leukocytosis remains undefined. A previous flow cytometry did not reveal any  evidence of an immunophenotypic abnormality such as an evolving chronic lymphoproliferative disorder or myeloproliferative disorder. The CBCs will continue to be monitored every 6 weeks. The CBC from today shows her WBC count is currently normal at 9.6 with the absolute neutrophil count of 5.3 and the absolute lymphocyte count of 4.1. Essential thrombocytosis/thrombocytosis: The current CBC shows that the platelet count is now 413,000. The platelet count is being monitored every 6 weeks. The patient was previously started on anagrelide 0.5 mg one tablet twice daily. This medication will be continued, at the current dose. I have reenforced to the patient the importance of being complaint with the treatment plan. Rheumatoid arthritis: the patient will continue to receive Remicade as a primary treatment modality for her severe rheumatoid arthritis every 6 weeks. The dose of Remicade will be 344 mg given intravenously. The patient has continued to take  Methotrexate 5 mg p.o. daily and Plaquenil 200 mg twice daily. Fibromyalgia: The patient  continues to have generalized pain at times related to her underlying fibromyalgia. The patient receives her Percocet medication on a monthly basis as needed. A new prescription for the Percocet was provided at this time. Chronic anemia: I have explained to the patient the CBC from today shows her hemoglobin has remained normal at 12.9 g/dL with hematocrit of 38.8 %. I have recommended that she continue to take ferrous sulfate 325 mg by mouth once daily. We will see the patient back in 6 weeks for a complete reassessment.     Orders Placed This Encounter    COMPLETE CBC & AUTO DIFF WBC    InHouse CBC ([facility])     Standing Status:   Future     Number of Occurrences:   1 Standing Expiration Date:   2/15/6087    METABOLIC PANEL, COMPREHENSIVE     Standing Status:   Future     Standing Expiration Date:   2/7/2019    oxyCODONE-acetaminophen (PERCOCET 10)  mg per tablet     Sig: Take 1 Tab by mouth every six (6) hours as needed for Pain for up to 15 days. Max Daily Amount: 4 Tabs.      Dispense:  60 Tab     Refill:  0       Delvin Perez MD  2/6/2018

## 2018-02-06 NOTE — PATIENT INSTRUCTIONS
Anemia: Care Instructions  Your Care Instructions    Anemia is a low level of red blood cells, which carry oxygen throughout your body. Many things can cause anemia. Lack of iron is one of the most common causes. Your body needs iron to make hemoglobin, a substance in red blood cells that carries oxygen from the lungs to your body's cells. Without enough iron, the body produces fewer and smaller red blood cells. As a result, your body's cells do not get enough oxygen, and you feel tired and weak. And you may have trouble concentrating. Bleeding is the most common cause of a lack of iron. You may have heavy menstrual bleeding or bleeding caused by conditions such as ulcers, hemorrhoids, or cancer. Regular use of aspirin or other anti-inflammatory medicines (such as ibuprofen) also can cause bleeding in some people. A lack of iron in your diet also can cause anemia, especially at times when the body needs more iron, such as during pregnancy, infancy, and the teen years. Your doctor may have prescribed iron pills. It may take several months of treatment for your iron levels to return to normal. Your doctor also may suggest that you eat foods that are rich in iron, such as meat and beans. There are many other causes of anemia. It is not always due to a lack of iron. Finding the specific cause of your anemia will help your doctor find the right treatment for you. Follow-up care is a key part of your treatment and safety. Be sure to make and go to all appointments, and call your doctor if you are having problems. It's also a good idea to know your test results and keep a list of the medicines you take. How can you care for yourself at home? · Take your medicines exactly as prescribed. Call your doctor if you think you are having a problem with your medicine. · If your doctor recommends iron pills, take them as directed:  ¨ Try to take the pills on an empty stomach about 1 hour before or 2 hours after meals. But you may need to take iron with food to avoid an upset stomach. ¨ Do not take antacids or drink milk or caffeine drinks (such as coffee, tea, or cola) at the same time or within 2 hours of the time that you take your iron. They can make it hard for your body to absorb the iron. ¨ Vitamin C (from food or supplements) helps your body absorb iron. Try taking iron pills with a glass of orange juice or some other food that is high in vitamin C, such as citrus fruits. ¨ Iron pills may cause stomach problems, such as heartburn, nausea, diarrhea, constipation, and cramps. Be sure to drink plenty of fluids, and include fruits, vegetables, and fiber in your diet each day. Iron pills often make your bowel movements dark or green. ¨ If you forget to take an iron pill, do not take a double dose of iron the next time you take a pill. ¨ Keep iron pills out of the reach of small children. An overdose of iron can be very dangerous. · Follow your doctor's advice about eating iron-rich foods. These include red meat, shellfish, poultry, eggs, beans, raisins, whole-grain bread, and leafy green vegetables. · Steam vegetables to help them keep their iron content. When should you call for help? Call 911 anytime you think you may need emergency care. For example, call if:  ? · You have symptoms of a heart attack. These may include:  ¨ Chest pain or pressure, or a strange feeling in the chest.  ¨ Sweating. ¨ Shortness of breath. ¨ Nausea or vomiting. ¨ Pain, pressure, or a strange feeling in the back, neck, jaw, or upper belly or in one or both shoulders or arms. ¨ Lightheadedness or sudden weakness. ¨ A fast or irregular heartbeat. After you call 911, the  may tell you to chew 1 adult-strength or 2 to 4 low-dose aspirin. Wait for an ambulance. Do not try to drive yourself. ? · You passed out (lost consciousness).    ?Call your doctor now or seek immediate medical care if:  ? · You have new or increased shortness of breath. ? · You are dizzy or lightheaded, or you feel like you may faint. ? · Your fatigue and weakness continue or get worse. ? · You have any abnormal bleeding, such as:  ¨ Nosebleeds. ¨ Vaginal bleeding that is different (heavier, more frequent, at a different time of the month) than what you are used to. ¨ Bloody or black stools, or rectal bleeding. ¨ Bloody or pink urine. ? Watch closely for changes in your health, and be sure to contact your doctor if:  ? · You do not get better as expected. Where can you learn more? Go to http://swapnil-speedy.info/. Enter R301 in the search box to learn more about \"Anemia: Care Instructions. \"  Current as of: October 13, 2016  Content Version: 11.4  © 1520-2579 [employer]. Care instructions adapted under license by [employer] (which disclaims liability or warranty for this information). If you have questions about a medical condition or this instruction, always ask your healthcare professional. [place] [address] any warranty or liability for your use of this information.

## 2018-02-06 NOTE — MR AVS SNAPSHOT
[address] [place] [address] [address] [place] [address] 
[phone] Patient: Nile Chow MRN: [id number] :1965 Visit Information Date & Time Provider Department Dept. Phone Encounter #  
 2018 10:45 AM [facility] Office [phone] Follow-up Instructions Return in about 6 weeks (around 3/20/2018). Upcoming Health Maintenance Date Due Pneumococcal 19-64 Highest Risk (1 of 3 - PCV13) 1982 DTaP/Tdap/Td series (1 - Tdap) 1984 PAP AKA CERVICAL CYTOLOGY 1984 FOBT Q 1 YEAR AGE 50-75 2013 BREAST CANCER SCRN MAMMOGRAM 2019 Allergies as of 2018  Review Complete On: 2018 By: Reza Jaimes MD  
  
 Severity Noted Reaction Type Reactions Levaquin [Levofloxacin] High  Systemic Anaphylaxis Pollen Extracts    Unable to Obtain Current Immunizations  Reviewed on 2018 Name Date Influenza Vaccine 2017, 10/21/2016, 11/3/2015, 11/10/2014, 10/25/2013 Influenza Vaccine Whole 9/10/2012 Not reviewed this visit You Were Diagnosed With   
  
 Codes Comments Essential thrombocytosis (HCC)    -  Primary ICD-10-CM: D47.3 ICD-9-CM: 238.71 Rheumatoid arthritis, involving unspecified site, unspecified rheumatoid factor presence ([facility].)     ICD-10-CM: M06.9 ICD-9-CM: 714.0 Chronic pain syndrome     ICD-10-CM: G89.4 ICD-9-CM: 338. 4 Chronic anemia     ICD-10-CM: D64.9 ICD-9-CM: 285.9 Fibromyalgia     ICD-10-CM: M79.7 ICD-9-CM: 729.1 Vitals BP Pulse Temp Height(growth percentile) Weight(growth percentile) BMI  
 142/79 (BP 1 Location: Right arm) 90 98.3 °F (36.8 °C) 5' 11\" (1.803 m) 229 lb 3.2 oz (104 kg) 31.97 kg/m2 OB Status Smoking Status Hysterectomy Former Smoker BMI and BSA Data  Body Mass Index Body Surface Area  
 31.97 kg/m 2 2.28 m 2  
  
  
 Preferred Pharmacy Pharmacy Name Phone [facility] [address] [place] [address]. [phone] Your Updated Medication List  
  
   
This list is accurate as of: 2/6/18 11:41 AM.  Always use your most recent med list.  
  
  
  
  
 * albuterol sulfate 2.5 mg/0.5 mL Nebu nebulizer solution Commonly known as:  PROVENTIL;VENTOLIN  
by Nebulization route once. Is now using the actual nebulizer machine, for current bronchitis episode (Aug/Sept. 2017) * albuterol 90 mcg/actuation inhaler Commonly known as:  PROAIR HFA Take 1 Puff by inhalation every six (6) hours as needed for Wheezing. * amitriptyline 150 mg tablet Commonly known as:  ELAVIL Take 10 mg by mouth nightly. Indications: DEPRESSION  
  
 * amitriptyline 25 mg tablet Commonly known as:  ELAVIL  
  
 anagrelide 0.5 mg capsule Commonly known as:  Colan Drown Take 1 Cap by mouth two (2) times a day. Indications: ESSENTIAL THROMBOCYTOSIS  
  
 * atenolol 25 mg tablet Commonly known as:  TENORMIN Take 25 mg by mouth daily. Indications: HYPERTENSION  
  
 * atenolol 50 mg tablet Commonly known as:  TENORMIN  
  
 BD INSULIN SYRINGE ULTRA-FINE 1 mL 31 gauge x 15/64\" Syrg Generic drug:  insulin syringe-needle U-100  
  
 cholecalciferol (VITAMIN D3) 5,000 unit Tab tablet Commonly known as:  VITAMIN D3 Take 5,000 Units by mouth every seven (7) days. clotrimazole-betamethasone topical cream  
Commonly known as:  LOTRISONE  
  
 * DULoxetine 20 mg capsule Commonly known as:  CYMBALTA Take 20 mg by mouth daily. * DULoxetine 30 mg capsule Commonly known as:  CYMBALTA  
  
 folic acid 698 mcg tablet Take 400 mcg by mouth daily. furosemide 40 mg tablet Commonly known as:  LASIX Take  by mouth daily. gabapentin 300 mg capsule Commonly known as:  NEURONTIN  
  
 haloperidol 5 mg tablet Commonly known as:  HALDOL Take 2.5 mg by mouth nightly. HYZAAR 50-12.5 mg per tablet Generic drug:  losartan-hydroCHLOROthiazide Take 1 Tab by mouth daily. ibuprofen 400 mg tablet Commonly known as:  MOTRIN Take 1 Tab by mouth every six (6) hours as needed for Pain. KlonoPIN 2 mg tablet Generic drug:  clonazePAM  
Take 2 mg by mouth daily. Indications: PANIC DISORDER  
  
 * LANTUS SC  
5 Units by SubCUTAneous route daily. * LANTUS 100 unit/mL injection Generic drug:  insulin glargine * metFORMIN 1,000 mg tablet Commonly known as:  GLUCOPHAGE Take 500 mg by mouth two (2) times a day. * metFORMIN 500 mg tablet Commonly known as:  GLUCOPHAGE  
  
 methocarbamol 750 mg tablet Commonly known as:  [id number]-536 Take 1 Tab by mouth three (3) times daily. methotrexate 2.5 mg tablet Commonly known as:  Vince Tete Take 5 mg by mouth daily. nitroglycerin 400 mcg/spray spray Commonly known as:  NITROLINGUAL  
1 [place] by SubLINGual route every five (5) minutes as needed. NovoLOG Mix 70-30 100 unit/mL (70-30) injection Generic drug:  insulin aspart protamine/insulin aspart 10 Units by SubCUTAneous route two (2) times a day. ondansetron hcl 4 mg tablet Commonly known as:  ZOFRAN (AS HYDROCHLORIDE) Take 1 Tab by mouth every eight (8) hours as needed for Nausea. oxyCODONE-acetaminophen  mg per tablet Commonly known as:  PERCOCET 10 Take 1 Tab by mouth every six (6) hours as needed for Pain for up to 15 days. Max Daily Amount: 4 Tabs. PLAQUENIL 200 mg tablet Generic drug:  hydroxychloroquine Take 200 mg by mouth two (2) times a day. * QUEtiapine 100 mg tablet Commonly known as:  SEROquel Take 25 mg by mouth. Takes 25 mg Q AM, and 100 mg, Q PM  
  
 * QUEtiapine 25 mg tablet Commonly known as:  SEROquel REMICADE IV  
344 mg by IntraVENous route once as needed. remicade will be every 8 weeks * Notice: This list has 14 medication(s) that are the same as other medications prescribed for you. Read the directions carefully, and ask your doctor or other care provider to review them with you. Prescriptions Printed Refills  
 oxyCODONE-acetaminophen (PERCOCET 10)  mg per tablet 0 Sig: Take 1 Tab by mouth every six (6) hours as needed for Pain for up to 15 days. Max Daily Amount: 4 Tabs. Class: Print Route: Oral  
  
We Performed the Following COMPLETE CBC & AUTO DIFF WBC [88427 CPT(R)] Follow-up Instructions Return in about 6 weeks (around 3/20/2018). To-Do List   
 02/06/2018 Lab:  CBC WITH 3 PART DIFF   
  
 03/13/2018 10:00 AM  
  Appointment with [address] at [facility] ([phone])  
  
 04/24/2018 10:00 AM  
  Appointment with [address] [place] [address] ([phone]) Patient Instructions Anemia: Care Instructions Your Care Instructions Anemia is a low level of red blood cells, which carry oxygen throughout your body. Many things can cause anemia. Lack of iron is one of the most common causes. Your body needs iron to make hemoglobin, a substance in red blood cells that carries oxygen from the lungs to your body's cells. Without enough iron, the body produces fewer and smaller red blood cells. As a result, your body's cells do not get enough oxygen, and you feel tired and weak. And you may have trouble concentrating. Bleeding is the most common cause of a lack of iron. You may have heavy menstrual bleeding or bleeding caused by conditions such as ulcers, hemorrhoids, or cancer. Regular use of aspirin or other anti-inflammatory medicines (such as ibuprofen) also can cause bleeding in some people. A lack of iron in your diet also can cause anemia, especially at times when the body needs more iron, such as during pregnancy, infancy, and the teen years. Your doctor may have prescribed iron pills. It may take several months of treatment for your iron levels to return to normal. Your doctor also may suggest that you eat foods that are rich in iron, such as meat and beans. There are many other causes of anemia. It is not always due to a lack of iron. Finding the specific cause of your anemia will help your doctor find the right treatment for you. Follow-up care is a key part of your treatment and safety. Be sure to make and go to all appointments, and call your doctor if you are having problems. It's also a good idea to know your test results and keep a list of the medicines you take. How can you care for yourself at home? · Take your medicines exactly as prescribed. Call your doctor if you think you are having a problem with your medicine. · If your doctor recommends iron pills, take them as directed: ¨ Try to take the pills on an empty stomach about 1 hour before or 2 hours after meals. But you may need to take iron with food to avoid an upset stomach. ¨ Do not take antacids or drink milk or caffeine drinks (such as coffee, tea, or cola) at the same time or within 2 hours of the time that you take your iron. They can make it hard for your body to absorb the iron. ¨ Vitamin C (from food or supplements) helps your body absorb iron. Try taking iron pills with a glass of orange juice or some other food that is high in vitamin C, such as citrus fruits. ¨ Iron pills may cause stomach problems, such as heartburn, nausea, diarrhea, constipation, and cramps. Be sure to drink plenty of fluids, and include fruits, vegetables, and fiber in your diet each day. Iron pills often make your bowel movements dark or green. ¨ If you forget to take an iron pill, do not take a double dose of iron the next time you take a pill. ¨ Keep iron pills out of the reach of small children. An overdose of iron can be very dangerous. · Follow your doctor's advice about eating iron-rich foods. These include red meat, shellfish, poultry, eggs, beans, raisins, whole-grain bread, and leafy green vegetables. · Steam vegetables to help them keep their iron content. When should you call for help? Call 911 anytime you think you may need emergency care. For example, call if: 
? · You have symptoms of a heart attack. These may include: ¨ Chest pain or pressure, or a strange feeling in the chest. 
¨ Sweating. ¨ Shortness of breath. ¨ Nausea or vomiting. ¨ Pain, pressure, or a strange feeling in the back, neck, jaw, or upper belly or in one or both shoulders or arms. ¨ Lightheadedness or sudden weakness. ¨ A fast or irregular heartbeat. After you call 911, the  may tell you to chew 1 adult-strength or 2 to 4 low-dose aspirin. Wait for an ambulance. Do not try to drive yourself. ? · You passed out (lost consciousness). ?Call your doctor now or seek immediate medical care if: 
? · You have new or increased shortness of breath. ? · You are dizzy or lightheaded, or you feel like you may faint. ? · Your fatigue and weakness continue or get worse. ? · You have any abnormal bleeding, such as: 
¨ Nosebleeds. ¨ Vaginal bleeding that is different (heavier, more frequent, at a different time of the month) than what you are used to. ¨ Bloody or black stools, or rectal bleeding. ¨ Bloody or pink urine. ? Watch closely for changes in your health, and be sure to contact your doctor if: 
? · You do not get better as expected. Where can you learn more? Go to http://swapnil-speedy.info/. Enter R301 in the search box to learn more about \"Anemia: Care Instructions. \" Current as of: October 13, 2016 Content Version: 11.4 © 3315-8797 [employer].  Care instructions adapted under license by [employer] (which disclaims liability or warranty for this information). If you have questions about a medical condition or this instruction, always ask your healthcare professional. [address] any warranty or liability for your use of this information. Please provide this summary of care documentation to your next provider. Your primary care clinician is listed as [employer]. If you have any questions after today's visit, please call [phone].

## 2018-02-19 DIAGNOSIS — G89.4 CHRONIC PAIN SYNDROME: ICD-10-CM

## 2018-02-19 DIAGNOSIS — M06.9 RHEUMATOID ARTHRITIS, INVOLVING UNSPECIFIED SITE, UNSPECIFIED RHEUMATOID FACTOR PRESENCE: ICD-10-CM

## 2018-02-19 PROCEDURE — 75810000275 HC EMERGENCY DEPT VISIT NO LEVEL OF CARE

## 2018-02-20 ENCOUNTER — HOSPITAL ENCOUNTER (EMERGENCY)
Age: 53
Discharge: LEFT BEFORE DISCHARGE | End: 2018-02-20
Attending: EMERGENCY MEDICINE
Payer: MEDICARE

## 2018-02-20 RX ORDER — OXYCODONE AND ACETAMINOPHEN 10; 325 MG/1; MG/1
1 TABLET ORAL
Qty: 60 TAB | Refills: 0 | Status: SHIPPED | OUTPATIENT
Start: 2018-02-20 | End: 2018-03-05 | Stop reason: SDUPTHER

## 2018-03-05 DIAGNOSIS — G89.4 CHRONIC PAIN SYNDROME: ICD-10-CM

## 2018-03-05 DIAGNOSIS — M06.9 RHEUMATOID ARTHRITIS, INVOLVING UNSPECIFIED SITE, UNSPECIFIED RHEUMATOID FACTOR PRESENCE: ICD-10-CM

## 2018-03-06 RX ORDER — OXYCODONE AND ACETAMINOPHEN 10; 325 MG/1; MG/1
1 TABLET ORAL
Qty: 60 TAB | Refills: 0 | Status: SHIPPED | OUTPATIENT
Start: 2018-03-06 | End: 2018-03-20 | Stop reason: SDUPTHER

## 2018-03-13 ENCOUNTER — HOSPITAL ENCOUNTER (OUTPATIENT)
Dept: ONCOLOGY | Age: 53
Discharge: HOME | End: 2018-03-13

## 2018-03-13 ENCOUNTER — HOSPITAL ENCOUNTER (OUTPATIENT)
Dept: LAB | Age: 53
Discharge: HOME | End: 2018-03-13
Payer: MEDICARE

## 2018-03-13 ENCOUNTER — CLINICAL SUPPORT (OUTPATIENT)
Dept: ONCOLOGY | Age: 53
End: 2018-03-13

## 2018-03-13 ENCOUNTER — HOSPITAL ENCOUNTER (OUTPATIENT)
Dept: INFUSION THERAPY | Age: 53
Discharge: HOME | End: 2018-03-13
Payer: MEDICARE

## 2018-03-13 VITALS
HEIGHT: 71 IN | SYSTOLIC BLOOD PRESSURE: 115 MMHG | DIASTOLIC BLOOD PRESSURE: 69 MMHG | RESPIRATION RATE: 20 BRPM | TEMPERATURE: 98.9 F | BODY MASS INDEX: 31.22 KG/M2 | HEART RATE: 87 BPM | WEIGHT: 223 LBS

## 2018-03-13 DIAGNOSIS — M06.09 RHEUMATOID ARTHRITIS OF MULTIPLE SITES WITHOUT RHEUMATOID FACTOR (HCC): ICD-10-CM

## 2018-03-13 DIAGNOSIS — M06.09 RHEUMATOID ARTHRITIS OF MULTIPLE SITES WITHOUT RHEUMATOID FACTOR (HCC): Primary | ICD-10-CM

## 2018-03-13 LAB
ALBUMIN SERPL-MCNC: 3.6 G/DL (ref 3.4–5)
ALBUMIN/GLOB SERPL: 1 {RATIO} (ref 0.8–1.7)
ALP SERPL-CCNC: 93 U/L (ref 45–117)
ALT SERPL-CCNC: 19 U/L (ref 13–56)
ANION GAP SERPL CALC-SCNC: 7 MMOL/L (ref 3–18)
AST SERPL-CCNC: 6 U/L (ref 15–37)
BASO+EOS+MONOS # BLD AUTO: 0.7 K/UL (ref 0–2.3)
BASO+EOS+MONOS # BLD AUTO: 6 % (ref 0.1–17)
BILIRUB SERPL-MCNC: 0.3 MG/DL (ref 0.2–1)
BUN SERPL-MCNC: 16 MG/DL (ref 7–18)
BUN/CREAT SERPL: 18 (ref 12–20)
CALCIUM SERPL-MCNC: 9.1 MG/DL (ref 8.5–10.1)
CHLORIDE SERPL-SCNC: 107 MMOL/L (ref 100–108)
CO2 SERPL-SCNC: 27 MMOL/L (ref 21–32)
CREAT SERPL-MCNC: 0.9 MG/DL (ref 0.6–1.3)
CRP SERPL-MCNC: 0.9 MG/DL (ref 0–0.3)
DIFFERENTIAL METHOD BLD: ABNORMAL
ERYTHROCYTE [DISTWIDTH] IN BLOOD BY AUTOMATED COUNT: 12.7 % (ref 11.5–14.5)
GLOBULIN SER CALC-MCNC: 3.7 G/DL (ref 2–4)
GLUCOSE SERPL-MCNC: 188 MG/DL (ref 74–99)
HCT VFR BLD AUTO: 40.9 % (ref 36–48)
HGB BLD-MCNC: 13.4 G/DL (ref 12–16)
LYMPHOCYTES # BLD: 3.4 K/UL (ref 1.1–5.9)
LYMPHOCYTES NFR BLD: 30 % (ref 14–44)
MCH RBC QN AUTO: 27.2 PG (ref 25–35)
MCHC RBC AUTO-ENTMCNC: 32.8 G/DL (ref 31–37)
MCV RBC AUTO: 83.1 FL (ref 78–102)
NEUTS SEG # BLD: 7 K/UL (ref 1.8–9.5)
NEUTS SEG NFR BLD: 64 % (ref 40–70)
PLATELET # BLD AUTO: 462 K/UL (ref 140–440)
POTASSIUM SERPL-SCNC: 3.9 MMOL/L (ref 3.5–5.5)
PROT SERPL-MCNC: 7.3 G/DL (ref 6.4–8.2)
RBC # BLD AUTO: 4.92 M/UL (ref 4.1–5.1)
SODIUM SERPL-SCNC: 141 MMOL/L (ref 136–145)
WBC # BLD AUTO: 11.1 K/UL (ref 4.5–13)

## 2018-03-13 PROCEDURE — 74011000258 HC RX REV CODE- 258: Performed by: INTERNAL MEDICINE

## 2018-03-13 PROCEDURE — 86140 C-REACTIVE PROTEIN: CPT | Performed by: INTERNAL MEDICINE

## 2018-03-13 PROCEDURE — 74011250636 HC RX REV CODE- 250/636: Performed by: INTERNAL MEDICINE

## 2018-03-13 PROCEDURE — 96413 CHEMO IV INFUSION 1 HR: CPT

## 2018-03-13 PROCEDURE — 96375 TX/PRO/DX INJ NEW DRUG ADDON: CPT

## 2018-03-13 PROCEDURE — 80053 COMPREHEN METABOLIC PANEL: CPT | Performed by: INTERNAL MEDICINE

## 2018-03-13 PROCEDURE — 96415 CHEMO IV INFUSION ADDL HR: CPT

## 2018-03-13 RX ORDER — SODIUM CHLORIDE 9 MG/ML
100 INJECTION, SOLUTION INTRAVENOUS ONCE
Status: COMPLETED | OUTPATIENT
Start: 2018-03-13 | End: 2018-03-13

## 2018-03-13 RX ORDER — DIPHENHYDRAMINE HYDROCHLORIDE 50 MG/ML
25 INJECTION, SOLUTION INTRAMUSCULAR; INTRAVENOUS ONCE
Status: COMPLETED | OUTPATIENT
Start: 2018-03-13 | End: 2018-03-13

## 2018-03-13 RX ORDER — SODIUM CHLORIDE 0.9 % (FLUSH) 0.9 %
10-40 SYRINGE (ML) INJECTION AS NEEDED
Status: DISCONTINUED | OUTPATIENT
Start: 2018-03-13 | End: 2018-03-17 | Stop reason: HOSPADM

## 2018-03-13 RX ORDER — ACETAMINOPHEN 325 MG/1
650 TABLET ORAL
Status: ACTIVE | OUTPATIENT
Start: 2018-03-13 | End: 2018-03-13

## 2018-03-13 RX ORDER — DIPHENHYDRAMINE HYDROCHLORIDE 50 MG/ML
50 INJECTION, SOLUTION INTRAMUSCULAR; INTRAVENOUS
Status: ACTIVE | OUTPATIENT
Start: 2018-03-13 | End: 2018-03-13

## 2018-03-13 RX ADMIN — Medication 10 ML: at 10:30

## 2018-03-13 RX ADMIN — DIPHENHYDRAMINE HYDROCHLORIDE 25 MG: 50 INJECTION, SOLUTION INTRAMUSCULAR; INTRAVENOUS at 11:02

## 2018-03-13 RX ADMIN — SODIUM CHLORIDE 100 ML: 900 INJECTION, SOLUTION INTRAVENOUS at 11:00

## 2018-03-13 RX ADMIN — INFLIXIMAB 400 MG: 100 INJECTION, POWDER, LYOPHILIZED, FOR SOLUTION INTRAVENOUS at 11:45

## 2018-03-13 NOTE — PROGRESS NOTES
[facility] Progress Note    Date: 2018    Name: Javed Medina    MRN: [id number]         : 1965      Ms. Tim Ricardo arrived in the [facility] today at [address], in stable condition, here for Q 6 Week, IV Remicade Infusion. She was assessed and education was provided. Ms. Ann Marie Shen vitals were reviewed. Visit Vitals    /72 (BP 1 Location: Left arm, BP Patient Position: At rest;Sitting)    Pulse 93    Temp 97.2 °F (36.2 °C)    Resp 20    Ht 5' 11\" (1.803 m)    Wt 101.2 kg (223 lb)    Breastfeeding No    BMI 31.1 kg/m2         PIV was established in her dorsal left forearm at 1030, without incident, and blood was drawn for a CBC, CMP, ESR, & CRP (2 lavender top tubes & 2 SST tubes), per order. (The CBC was processed in house, and the CMP, ESR, & CRP, were sent out to be processed. )        Lab results were obtained and reviewed, and the CBC results from today listed below, as well as the most recent CMP results from 18, were all noted to be satisfactory for treatment today. Recent Results (from the past 12 hour(s))   CBC WITH 3 PART DIFF    Collection Time: 18 10:30 AM   Result Value Ref Range    WBC 11.1 4.5 - 13.0 K/uL    RBC 4.92 4.10 - 5.10 M/uL    HGB 13.4 12.0 - 16.0 g/dL    HCT 40.9 36 - 48 %    MCV 83.1 78 - 102 FL    MCH 27.2 25.0 - 35.0 PG    MCHC 32.8 31 - 37 g/dL    RDW 12.7 11.5 - 14.5 %    PLATELET 277 (H) 488 - 440 K/uL    NEUTROPHILS 64 40 - 70 %    MIXED CELLS 6 0.1 - 17 %    LYMPHOCYTES 30 14 - 44 %    ABS. NEUTROPHILS 7.0 1.8 - 9.5 K/UL    ABS. MIXED CELLS 0.7 0.0 - 2.3 K/uL    ABS.  LYMPHOCYTES 3.4 1.1 - 5.9 K/UL    DF AUTOMATED             Pre-medication consisting of IV Benadryl 25 mg, was administered per order, and without incident.            Remicade 400 mg IV (4 mg/kg) Maintenance Dose, was administered over 2 hours, per order, and without incident.            After the completion of the IV Remicade, Ms. Tim Ricardo was monitored for 30 minutes post Remicade, per order, and also without incident.         After the completion of the 30 minute monitoring period, the PIV was removed and gauze/bandaid was applied. Ms. Willian Rosas tolerated well, and had no complaints. Ms. Willian Rosas was discharged from [place] [address] in stable condition at 1425. [facility] She is to return in 6 weeks, on Tuesday, 4-24-18,  at 1000,  for her next appointment, for her next dose of IV Remicade.      Ron Hodge RN  March 13, 2018  10:52 AM

## 2018-03-14 ENCOUNTER — TELEPHONE (OUTPATIENT)
Dept: ONCOLOGY | Age: 53
End: 2018-03-14

## 2018-03-14 NOTE — TELEPHONE ENCOUNTER
Faxed over req to [facility] labs to have added on to the cbc collected [phone] @ [facility] lab

## 2018-03-14 NOTE — TELEPHONE ENCOUNTER
Pia @ [facility] lab called to say the sample is missing, orders rcvd but no sample.   [facility] call [phone]

## 2018-03-20 ENCOUNTER — OFFICE VISIT (OUTPATIENT)
Dept: ONCOLOGY | Age: 53
End: 2018-03-20

## 2018-03-20 ENCOUNTER — HOSPITAL ENCOUNTER (OUTPATIENT)
Dept: LAB | Age: 53
Discharge: HOME | End: 2018-03-20
Payer: MEDICARE

## 2018-03-20 ENCOUNTER — HOSPITAL ENCOUNTER (OUTPATIENT)
Dept: ONCOLOGY | Age: 53
Discharge: HOME | End: 2018-03-20

## 2018-03-20 VITALS
BODY MASS INDEX: 30.96 KG/M2 | DIASTOLIC BLOOD PRESSURE: 73 MMHG | WEIGHT: 222 LBS | TEMPERATURE: 98.6 F | SYSTOLIC BLOOD PRESSURE: 120 MMHG | HEART RATE: 84 BPM

## 2018-03-20 DIAGNOSIS — D47.3 ESSENTIAL THROMBOCYTOSIS (HCC): Primary | ICD-10-CM

## 2018-03-20 DIAGNOSIS — D75.839 THROMBOCYTOSIS: ICD-10-CM

## 2018-03-20 DIAGNOSIS — M79.7 FIBROMYALGIA: ICD-10-CM

## 2018-03-20 DIAGNOSIS — D64.9 CHRONIC ANEMIA: ICD-10-CM

## 2018-03-20 DIAGNOSIS — M06.9 RHEUMATOID ARTHRITIS, INVOLVING UNSPECIFIED SITE, UNSPECIFIED RHEUMATOID FACTOR PRESENCE: ICD-10-CM

## 2018-03-20 DIAGNOSIS — D47.3 ESSENTIAL THROMBOCYTOSIS (HCC): ICD-10-CM

## 2018-03-20 DIAGNOSIS — M06.9 RHEUMATOID ARTHRITIS OF FOOT, UNSPECIFIED LATERALITY, UNSPECIFIED RHEUMATOID FACTOR PRESENCE: ICD-10-CM

## 2018-03-20 DIAGNOSIS — G89.4 CHRONIC PAIN SYNDROME: ICD-10-CM

## 2018-03-20 LAB
ALBUMIN SERPL-MCNC: 3.6 G/DL (ref 3.4–5)
ALBUMIN/GLOB SERPL: 0.9 {RATIO} (ref 0.8–1.7)
ALP SERPL-CCNC: 95 U/L (ref 45–117)
ALT SERPL-CCNC: 20 U/L (ref 13–56)
ANION GAP SERPL CALC-SCNC: 8 MMOL/L (ref 3–18)
AST SERPL-CCNC: 8 U/L (ref 15–37)
BASO+EOS+MONOS # BLD AUTO: 0.8 K/UL (ref 0–2.3)
BASO+EOS+MONOS # BLD AUTO: 9 % (ref 0.1–17)
BILIRUB SERPL-MCNC: 0.2 MG/DL (ref 0.2–1)
BUN SERPL-MCNC: 16 MG/DL (ref 7–18)
BUN/CREAT SERPL: 20 (ref 12–20)
CALCIUM SERPL-MCNC: 9.3 MG/DL (ref 8.5–10.1)
CHLORIDE SERPL-SCNC: 105 MMOL/L (ref 100–108)
CO2 SERPL-SCNC: 25 MMOL/L (ref 21–32)
CREAT SERPL-MCNC: 0.82 MG/DL (ref 0.6–1.3)
DIFFERENTIAL METHOD BLD: NORMAL
ERYTHROCYTE [DISTWIDTH] IN BLOOD BY AUTOMATED COUNT: 13.3 % (ref 11.5–14.5)
FERRITIN SERPL-MCNC: 106 NG/ML (ref 8–388)
GLOBULIN SER CALC-MCNC: 4 G/DL (ref 2–4)
GLUCOSE SERPL-MCNC: 240 MG/DL (ref 74–99)
HCT VFR BLD AUTO: 41.7 % (ref 36–48)
HGB BLD-MCNC: 13.7 G/DL (ref 12–16)
IRON SATN MFR SERPL: 13 %
IRON SERPL-MCNC: 40 UG/DL (ref 50–175)
LYMPHOCYTES # BLD: 3.5 K/UL (ref 1.1–5.9)
LYMPHOCYTES NFR BLD: 36 % (ref 14–44)
MCH RBC QN AUTO: 27.2 PG (ref 25–35)
MCHC RBC AUTO-ENTMCNC: 32.9 G/DL (ref 31–37)
MCV RBC AUTO: 82.7 FL (ref 78–102)
NEUTS SEG # BLD: 5.5 K/UL (ref 1.8–9.5)
NEUTS SEG NFR BLD: 56 % (ref 40–70)
PLATELET # BLD AUTO: 433 K/UL (ref 140–440)
POTASSIUM SERPL-SCNC: 4.3 MMOL/L (ref 3.5–5.5)
PROT SERPL-MCNC: 7.6 G/DL (ref 6.4–8.2)
RBC # BLD AUTO: 5.04 M/UL (ref 4.1–5.1)
SODIUM SERPL-SCNC: 138 MMOL/L (ref 136–145)
TIBC SERPL-MCNC: 309 UG/DL (ref 250–450)
WBC # BLD AUTO: 9.8 K/UL (ref 4.5–13)

## 2018-03-20 PROCEDURE — 36415 COLL VENOUS BLD VENIPUNCTURE: CPT | Performed by: INTERNAL MEDICINE

## 2018-03-20 PROCEDURE — 83550 IRON BINDING TEST: CPT | Performed by: INTERNAL MEDICINE

## 2018-03-20 PROCEDURE — 80053 COMPREHEN METABOLIC PANEL: CPT | Performed by: INTERNAL MEDICINE

## 2018-03-20 PROCEDURE — 82728 ASSAY OF FERRITIN: CPT | Performed by: INTERNAL MEDICINE

## 2018-03-20 RX ORDER — OXYCODONE AND ACETAMINOPHEN 10; 325 MG/1; MG/1
1 TABLET ORAL
Qty: 60 TAB | Refills: 0 | Status: SHIPPED | OUTPATIENT
Start: 2018-03-20 | End: 2018-04-02 | Stop reason: SDUPTHER

## 2018-03-20 NOTE — PROGRESS NOTES
Hematology/Oncology  Progress Note    Name: Pastora Jones  Date: 3/20/2018  : 1965    PCP: Stacey Parmar MD     Ms. Vinh Harmon is a 45year old female who was seen for management of her rheumatoid arthritis, leukocytosis, and thrombocytosis. Current therapy: Remicade 4 mg/kg bodyweight ever 6 weeks intravenously    Subjective:     Ms. Vinh Harmon is a 45year-old [facility] American woman who has severe rheumatoid arthritis. She receives Remicade every 6 weeks. She also suffers from fibromyalgia and had an elevated WBC count and platelet count of undefined etiology. Today she has no new complaints to report. She continues to complain of back pain. She is using a narcotic based regimen for pain control. She states this provides some relief, but the pain is gradually worsening. She reports that the Remicade has provided a significant degree of relief from the severe rheumatoid arthritis symptoms. The patient is continuing to use her cane for mobility support. She denies any recent fevers or recurrent infections. The patient reports that her appetite has continued to improve. She also states her energy level is continuing to improve as well. She has no new complaints or concerns to report, at this time.        Past Medical History:   Diagnosis Date    Abdominal pain, unspecified site     Acute otitis media     Acute pharyngitis     Anxiety     Arthritis     Autoimmune disease ([address]     Back injury     Backache     herniated disc in lower back    Blurred vision     Chest pain, unspecified     abnormal EKG    Chronic airway obstruction, not elsewhere classified     Chronic back pain     Chronic pain     COPD     Depression     Diabetes (HCC)     Dizziness     Dizziness and giddiness     possible orthostatic changes, vasovagal, autonomic dysfunction from diabetic neuropathy    Encounters for unspecified administrative purpose     Feeling anxious     Fibromyalgia     Headache(784.0)     Hemorrhoid     Herniated disc     at L5    Hypercholesterolemia     Hyperglycemia     Hypertension     Joint pain     Leukocytosis, unspecified     Major depressive disorder, single episode, mild (HCC)     Mental disorder     depression, anxiety, bipolar and PTSD    Neuropathy     Obesity, unspecified     Other chest pain     Phobic disorders     Rheumatoid arthritis (HCC)     Seasonal allergies     Shortness of breath     normal EF    Streptococcal sore throat     Type II or unspecified type diabetes mellitus with unspecified complication, uncontrolled     Unspecified hereditary and idiopathic peripheral neuropathy     Vitamin D deficiency      Past Surgical History:   Procedure Laterality Date    HX CHOLECYSTECTOMY  1994    HX GYN  2005    partial Hysterectomy    HX OTHER SURGICAL  12-1-15    had ingrown toenail removed from big toe, both feet    HX TUBAL LIGATION      1995     Social History     Social History    Marital status:      Spouse name: N/A    Number of children: N/A    Years of education: N/A     Occupational History    Not on file.      Social History Main Topics    Smoking status: Former Smoker    Smokeless tobacco: Never Used    Alcohol use Yes      Comment: socially    Drug use: No    Sexual activity: Yes     Partners: Male     Birth control/ protection: Condom     Other Topics Concern    Not on file     Social History Narrative     Family History   Problem Relation Age of Onset    Diabetes Other     Alcohol abuse Mother     Arthritis-osteo Mother     Diabetes Mother     Elevated Lipids Mother     Headache Mother     Heart Disease Mother    [facility] Migraines Mother     Psychiatric Disorder Mother     Alcohol abuse Father    Stefano Kida Father     Cancer Father     Headache Father     Heart Disease Father     Lung Disease Father     Migraines Father     Alcohol abuse Sister     Headache Sister     Diabetes Sister    [facility] Heart Disease Sister     Migraines Sister     Psychiatric Disorder Sister     Headache Brother     Diabetes Brother     Elevated Lipids Brother     Heart Disease Brother     Migraines Brother     Psychiatric Disorder Brother     Cancer Maternal Aunt     Alcohol abuse Maternal Aunt     Diabetes Maternal Aunt     Headache Maternal Aunt     Lung Disease Maternal Aunt     Migraines Maternal Aunt     Headache Maternal Uncle     Migraines Maternal Uncle     Stroke Maternal Uncle     Psychiatric Disorder Maternal Uncle     Headache Paternal Aunt     Migraines Paternal Aunt     Headache Paternal Uncle     Hypertension Paternal Uncle     Migraines Paternal Uncle     Stroke Paternal Uncle     Headache Maternal Grandmother     Migraines Maternal Grandmother     Stroke Maternal Grandmother     Headache Maternal Grandfather     Migraines Maternal Grandfather     Stroke Maternal Grandfather     Headache Paternal Grandmother     Hypertension Paternal Grandmother     Lung Disease Paternal Grandmother     Migraines Paternal Grandmother     Headache Paternal Grandfather     Cancer Paternal Grandfather     Migraines Paternal Grandfather      Current Outpatient Prescriptions   Medication Sig Dispense Refill    oxyCODONE-acetaminophen (PERCOCET 10)  mg per tablet Take 1 Tab by mouth every six (6) hours as needed for Pain for up to 15 days. Max Daily Amount: 4 Tabs. 60 Tab 0    ibuprofen (MOTRIN) 400 mg tablet Take 1 Tab by mouth every six (6) hours as needed for Pain. 20 Tab 0    anagrelide (AGRYLIN) 0.5 mg capsule Take 1 Cap by mouth two (2) times a day.  Indications: ESSENTIAL THROMBOCYTOSIS 60 Cap 6    amitriptyline (ELAVIL) 25 mg tablet       atenolol (TENORMIN) 50 mg tablet       clotrimazole-betamethasone (LOTRISONE) topical cream       DULoxetine (CYMBALTA) 30 mg capsule       gabapentin (NEURONTIN) 300 mg capsule       LANTUS 100 unit/mL injection       metFORMIN (GLUCOPHAGE) 500 mg tablet       QUEtiapine (SEROQUEL) 25 mg tablet       BD INSULIN SYRINGE ULTRA-FINE 1 mL 31 gauge x 15/64\" syrg       albuterol sulfate (PROVENTIL;VENTOLIN) 2.5 mg/0.5 mL nebu nebulizer solution by Nebulization route once. Is now using the actual nebulizer machine, for current bronchitis episode (Aug/Sept. 2017)      methocarbamol (ROBAXIN-750) 750 mg tablet Take 1 Tab by mouth three (3) times daily. 15 Tab 0    insulin aspart protamine/insulin aspart (NOVOLOG MIX 70-30) 100 unit/mL (70-30) injection 10 Units by SubCUTAneous route two (2) times a day.  clonazePAM (KLONOPIN) 2 mg tablet Take 2 mg by mouth daily. Indications: PANIC DISORDER      folic acid 849 mcg tablet Take 400 mcg by mouth daily.  haloperidol (HALDOL) 5 mg tablet Take 2.5 mg by mouth nightly.  QUEtiapine (SEROQUEL) 100 mg tablet Take 25 mg by mouth. Takes 25 mg Q AM, and 100 mg, Q PM      DULoxetine (CYMBALTA) 20 mg capsule Take 20 mg by mouth daily.  albuterol (PROAIR HFA) 90 mcg/actuation inhaler Take 1 Puff by inhalation every six (6) hours as needed for Wheezing. 1 Inhaler 0    ondansetron hcl (ZOFRAN, AS HYDROCHLORIDE,) 4 mg tablet Take 1 Tab by mouth every eight (8) hours as needed for Nausea. 12 Tab 0    methotrexate (RHEUMATREX) 2.5 mg tablet Take 5 mg by mouth daily.  metFORMIN (GLUCOPHAGE) 1,000 mg tablet Take 500 mg by mouth two (2) times a day.  Cholecalciferol, Vitamin D3, 5,000 unit Tab Take 5,000 Units by mouth every seven (7) days.  atenolol (TENORMIN) 25 mg tablet Take 25 mg by mouth daily. Indications: HYPERTENSION      hydroxychloroquine (PLAQUENIL) 200 mg tablet Take 200 mg by mouth two (2) times a day.  furosemide (LASIX) 40 mg tablet Take  by mouth daily.  INFLIXIMAB (REMICADE IV) 344 mg by IntraVENous route once as needed. remicade will be every 8 weeks       amitriptyline (ELAVIL) 150 mg tablet Take 10 mg by mouth nightly.     Indications: DEPRESSION      nitroglycerin (NITROLINGUAL) 0.4 mg/dose spray 1 Spray by SubLINGual route every five (5) minutes as needed.  losartan-hydrochlorothiazide (HYZAAR) 50-12.5 mg per tablet Take 1 Tab by mouth daily.  INSULIN GLARGINE,HUM. REC. ANLOG (LANTUS SC) 5 Units by SubCUTAneous route daily. Review of Systems  Constitutional: The patient has no acute distress or discomfort. HEENT: The patient denies recent head trauma, eye pain, blurred vision,  hearing deficit, oropharyngeal mucosal pain or lesions, and the patient denies throat pain or discomfort. Lymphatics: The patient denies palpable peripheral lymphadenopathy. Hematologic: The patient denies having bruising, bleeding, or progressive fatigue. Respiratory: Patient denies having shortness of breath, cough, sputum production, fever, or dyspnea on exertion. Cardiovascular: The patient denies having leg pain, leg swelling, heart palpitations, chest permit, chest pain, or lightheadedness. The patient denies having dyspnea on exertion. Gastrointestinal: The patient denies having nausea, emesis, or diarrhea. The patient denies having any hematemesis or blood in the stool. Genitourinary: Patient denies having urinary urgency, frequency, or dysuria. The patient denies having blood in the urine. Psychological: The patient denies having symptoms of nervousness, anxiety, depression, or thoughts of harming himself some of this. Skin: Patient denies having skin rashes, skin, ulcerations, or unexplained itching or pruritus. Musculoskeletal: The patient complains of generalized back pain. Objective:     Visit Vitals    /73    Pulse 84    Temp 98.6 °F (37 °C) (Oral)    Wt 100.7 kg (222 lb)    BMI 30.96 kg/m2     ECOG PS=0 Pain score 4-5/10     Physical Exam:   Gen. Appearance: The patient is in no acute distress. Skin: There is no bruise or rash. HEENT: The exam is unremarkable. Neck: Supple without lymphadenopathy or thyromegaly.   Lungs: Clear to auscultation and percussion; there are no wheezes or rhonchi. Heart: Regular rate and rhythm; there are no murmurs, gallops, or rubs. Abdomen: Bowel sounds are present and normal.  There is no guarding, tenderness, or hepatosplenomegaly. Extremities: There is no clubbing, cyanosis, or edema. Neurologic: There are no focal neurologic deficits. Lymphatics: There is no palpable peripheral lymphadenopathy. Musculoskeletal: The patient has full range of motion at all joints. However, she complains of pain on ambulation due to the severe rheumatoid arthritis. There is  evidence of joint deformity or effusions in the hands and knees. There is no focal joint tenderness. She is using a cane for support and mobility. Psychological/psychiatric: There is no clinical evidence of anxiety, depression, or melancholy. Lab data:      Results for orders placed or performed during the hospital encounter of 03/20/18   CBC WITH 3 PART DIFF     Status: None   Result Value Ref Range Status    WBC 9.8 4.5 - 13.0 K/uL Final    RBC 5.04 4.10 - 5.10 M/uL Final    HGB 13.7 12.0 - 16.0 g/dL Final    HCT 41.7 36 - 48 % Final    MCV 82.7 78 - 102 FL Final    MCH 27.2 25.0 - 35.0 PG Final    MCHC 32.9 31 - 37 g/dL Final    RDW 13.3 11.5 - 14.5 % Final    PLATELET 519 097 - 990 K/uL Final    NEUTROPHILS 56 40 - 70 % Final    MIXED CELLS 9 0.1 - 17 % Final    LYMPHOCYTES 36 14 - 44 % Final    ABS. NEUTROPHILS 5.5 1.8 - 9.5 K/UL Final    ABS. MIXED CELLS 0.8 0.0 - 2.3 K/uL Final    ABS. LYMPHOCYTES 3.5 1.1 - 5.9 K/UL Final     Comment: Test performed at [place] [address] Location. Results Reviewed by Medical Director. DF AUTOMATED   Final           Assessment:     1. Essential thrombocytosis ([address]    2. Rheumatoid arthritis of foot, unspecified laterality, unspecified rheumatoid factor presence ([address]    3. Chronic pain syndrome    4. Thrombocytosis ([address]    5. Chronic anemia    6.  Fibromyalgia      Plan:   Leukocytosis (recurrent and persisting): I have explained to the patient that the etiology of her leukocytosis remains undefined. A previous flow cytometry did not reveal any  evidence of an immunophenotypic abnormality such as an evolving chronic lymphoproliferative disorder or myeloproliferative disorder. The CBCs will continue to be monitored every 6 weeks. The CBC from today shows her WBC count is currently normal at 9.8 with an absolute neutrophil count of 5.5 and absolute lymphocyte count of 3.5. Essential thrombocytosis/thrombocytosis: The current CBC shows that the platelet count is now 433,000. The platelet count is being monitored every 6 weeks. The patient was previously started on anagrelide 0.5 mg one tablet twice daily. This medication will be continued, at the current dose. I have reenforced to the patient the importance of being complaint with the treatment plan. Rheumatoid arthritis: the patient will continue to receive Remicade as a primary treatment modality for her severe rheumatoid arthritis every 6 weeks. The dose of Remicade will be 344 mg given intravenously. The patient has continued to take  Methotrexate 5 mg p.o. daily and Plaquenil 200 mg twice daily. Fibromyalgia/chronic pain syndrome: The patient  continues to have generalized pain at times related to her underlying fibromyalgia. The patient receives her Percocet medication on a monthly basis as needed. A new prescription for the Percocet was provided at this time. Chronic anemia: I have explained to the patient the CBC from today shows her hemoglobin has remained normal at 13.7 g/dL with hematocrit of 41.7 %. I have recommended that she continue to take ferrous sulfate 325 mg by mouth once daily. We will see the patient back in 6 weeks for a complete reassessment.     Orders Placed This Encounter    COMPLETE CBC & AUTO DIFF WBC    InHouse CBC ([facility])     Standing Status:   Future     Number of Occurrences:   1 Standing Expiration Date:   8/92/0730    METABOLIC PANEL, COMPREHENSIVE     Standing Status:   Future     Standing Expiration Date:   3/21/2019    IRON PROFILE     Standing Status:   Future     Standing Expiration Date:   3/21/2019    FERRITIN     Standing Status:   Future     Standing Expiration Date:   3/21/2019       Reza Jaimes MD  2/6/2018

## 2018-03-20 NOTE — PATIENT INSTRUCTIONS
Anemia: Care Instructions  Your Care Instructions    Anemia is a low level of red blood cells, which carry oxygen throughout your body. Many things can cause anemia. Lack of iron is one of the most common causes. Your body needs iron to make hemoglobin, a substance in red blood cells that carries oxygen from the lungs to your body's cells. Without enough iron, the body produces fewer and smaller red blood cells. As a result, your body's cells do not get enough oxygen, and you feel tired and weak. And you may have trouble concentrating. Bleeding is the most common cause of a lack of iron. You may have heavy menstrual bleeding or bleeding caused by conditions such as ulcers, hemorrhoids, or cancer. Regular use of aspirin or other anti-inflammatory medicines (such as ibuprofen) also can cause bleeding in some people. A lack of iron in your diet also can cause anemia, especially at times when the body needs more iron, such as during pregnancy, infancy, and the teen years. Your doctor may have prescribed iron pills. It may take several months of treatment for your iron levels to return to normal. Your doctor also may suggest that you eat foods that are rich in iron, such as meat and beans. There are many other causes of anemia. It is not always due to a lack of iron. Finding the specific cause of your anemia will help your doctor find the right treatment for you. Follow-up care is a key part of your treatment and safety. Be sure to make and go to all appointments, and call your doctor if you are having problems. It's also a good idea to know your test results and keep a list of the medicines you take. How can you care for yourself at home? · Take your medicines exactly as prescribed. Call your doctor if you think you are having a problem with your medicine. · If your doctor recommends iron pills, take them as directed:  ¨ Try to take the pills on an empty stomach about 1 hour before or 2 hours after meals. But you may need to take iron with food to avoid an upset stomach. ¨ Do not take antacids or drink milk or caffeine drinks (such as coffee, tea, or cola) at the same time or within 2 hours of the time that you take your iron. They can make it hard for your body to absorb the iron. ¨ Vitamin C (from food or supplements) helps your body absorb iron. Try taking iron pills with a glass of orange juice or some other food that is high in vitamin C, such as citrus fruits. ¨ Iron pills may cause stomach problems, such as heartburn, nausea, diarrhea, constipation, and cramps. Be sure to drink plenty of fluids, and include fruits, vegetables, and fiber in your diet each day. Iron pills often make your bowel movements dark or green. ¨ If you forget to take an iron pill, do not take a double dose of iron the next time you take a pill. ¨ Keep iron pills out of the reach of small children. An overdose of iron can be very dangerous. · Follow your doctor's advice about eating iron-rich foods. These include red meat, shellfish, poultry, eggs, beans, raisins, whole-grain bread, and leafy green vegetables. · Steam vegetables to help them keep their iron content. When should you call for help? Call 911 anytime you think you may need emergency care. For example, call if:  ? · You have symptoms of a heart attack. These may include:  ¨ Chest pain or pressure, or a strange feeling in the chest.  ¨ Sweating. ¨ Shortness of breath. ¨ Nausea or vomiting. ¨ Pain, pressure, or a strange feeling in the back, neck, jaw, or upper belly or in one or both shoulders or arms. ¨ Lightheadedness or sudden weakness. ¨ A fast or irregular heartbeat. After you call 911, the  may tell you to chew 1 adult-strength or 2 to 4 low-dose aspirin. Wait for an ambulance. Do not try to drive yourself. ? · You passed out (lost consciousness).    ?Call your doctor now or seek immediate medical care if:  ? · You have new or increased shortness of breath. ? · You are dizzy or lightheaded, or you feel like you may faint. ? · Your fatigue and weakness continue or get worse. ? · You have any abnormal bleeding, such as:  ¨ Nosebleeds. ¨ Vaginal bleeding that is different (heavier, more frequent, at a different time of the month) than what you are used to. ¨ Bloody or black stools, or rectal bleeding. ¨ Bloody or pink urine. ? Watch closely for changes in your health, and be sure to contact your doctor if:  ? · You do not get better as expected. Where can you learn more? Go to http://swapnil-speedy.info/. Enter R301 in the search box to learn more about \"Anemia: Care Instructions. \"  Current as of: October 13, 2016  Content Version: 11.4  © 5272-7814 [employer]. Care instructions adapted under license by [employer] (which disclaims liability or warranty for this information). If you have questions about a medical condition or this instruction, always ask your healthcare professional. [place] [address] any warranty or liability for your use of this information.

## 2018-03-20 NOTE — MR AVS SNAPSHOT
[address] [place] [address] [address] [facility] [id number] [phone] Patient: Octavio Babcock MRN: [id number] :1965 Visit Information Date & Time Provider Department Dept. Phone Encounter #  
 3/20/2018 10:30 AM Neo Stephen MD [facility] Office [phone] [id number] Follow-up Instructions Return in about 6 weeks (around 2018). Your Appointments 2018 11:15 AM  
Office Visit with MD Joel Arellanolaly [address] [place] [address] [facility]: OV  
 [facility] [address] [facility] [id number] [phone]  
  
   
 [address] Upcoming Health Maintenance Date Due Pneumococcal 19-64 Highest Risk (1 of 3 - PCV13) 1982 DTaP/Tdap/Td series (1 - Tdap) 1984 PAP AKA CERVICAL CYTOLOGY 1984 FOBT Q 1 YEAR AGE 50-75 2013 MEDICARE YEARLY EXAM 3/14/2018 BREAST CANCER SCRN MAMMOGRAM 2019 Allergies as of 3/20/2018  Review Complete On: 3/20/2018 By: Neo Stephen MD  
  
 Severity Noted Reaction Type Reactions Levaquin [Levofloxacin] High  Systemic Anaphylaxis Pollen Extracts    Unable to Obtain Current Immunizations  Reviewed on 2018 Name Date Influenza Vaccine 2017, 10/21/2016, 11/3/2015, 11/10/2014, 10/25/2013 Influenza Vaccine Whole 9/10/2012 Not reviewed this visit You Were Diagnosed With   
  
 Codes Comments Essential thrombocytosis (HCC)    -  Primary ICD-10-CM: D47.3 ICD-9-CM: 238.71 Rheumatoid arthritis of foot, unspecified laterality, unspecified rheumatoid factor presence ([address]     ICD-10-CM: M06.9 ICD-9-CM: 714.0 Chronic pain syndrome     ICD-10-CM: G89.4 ICD-9-CM: 338. 4 Thrombocytosis ([address]     ICD-10-CM: D47.3 ICD-9-CM: 238.71 Chronic anemia     ICD-10-CM: D64.9 ICD-9-CM: 285.9 Fibromyalgia     ICD-10-CM: M79.7 ICD-9-CM: 729.1 Rheumatoid arthritis, involving unspecified site, unspecified [facility] factor [facility]     ICD-10-CM: M06.9 ICD-9-CM: 714.0 Vitals BP Pulse Temp Weight(growth percentile) BMI OB Status 120/73 84 98.6 °F (37 °C) (Oral) 222 lb (100.7 kg) 30.96 kg/m2 Hysterectomy Smoking Status Former Smoker BMI and BSA Data Body Mass Index Body Surface Area 30.96 kg/m 2 2.25 m 2 Preferred Pharmacy Pharmacy Name Phone [facility] [address] [place] [address]. [phone] Your Updated Medication List  
  
   
This list is accurate as of 3/20/18 11:44 AM.  Always use your most recent med list.  
  
  
  
  
 * albuterol sulfate 2.5 mg/0.5 mL Nebu nebulizer solution Commonly known as:  PROVENTIL;VENTOLIN  
by Nebulization route once. Is now using the actual nebulizer machine, for current bronchitis episode (Aug/Sept. 2017) * albuterol 90 mcg/actuation inhaler Commonly known as:  PROAIR HFA Take 1 Puff by inhalation every six (6) hours as needed for Wheezing. * amitriptyline 150 mg tablet Commonly known as:  ELAVIL Take 10 mg by mouth nightly. Indications: DEPRESSION  
  
 * amitriptyline 25 mg tablet Commonly known as:  ELAVIL  
  
 anagrelide 0.5 mg capsule Commonly known as:  Jennifer Ours Take 1 Cap by mouth two (2) times a day. Indications: ESSENTIAL THROMBOCYTOSIS  
  
 * atenolol 25 mg tablet Commonly known as:  TENORMIN Take 25 mg by mouth daily. Indications: HYPERTENSION  
  
 * atenolol 50 mg tablet Commonly known as:  TENORMIN  
  
 BD INSULIN SYRINGE ULTRA-FINE 1 mL 31 gauge x 15/64\" Syrg Generic drug:  insulin syringe-needle U-100  
  
 cholecalciferol (VITAMIN D3) 5,000 unit Tab tablet Commonly known as:  VITAMIN D3 Take 5,000 Units by mouth every seven (7) days.   
  
 clotrimazole-betamethasone topical cream  
Commonly known as:  Wendi Pilling  
  
 * DULoxetine 20 mg capsule Commonly known as:  CYMBALTA Take 20 mg by mouth daily. * DULoxetine 30 mg capsule Commonly known as:  CYMBALTA  
  
 folic acid 672 mcg tablet Take 400 mcg by mouth daily. furosemide 40 mg tablet Commonly known as:  LASIX Take  by mouth daily. gabapentin 300 mg capsule Commonly known as:  NEURONTIN  
  
 haloperidol 5 mg tablet Commonly known as:  HALDOL Take 2.5 mg by mouth nightly. HYZAAR 50-12.5 mg per tablet Generic drug:  losartan-hydroCHLOROthiazide Take 1 Tab by mouth daily. ibuprofen 400 mg tablet Commonly known as:  MOTRIN Take 1 Tab by mouth every six (6) hours as needed for Pain. KlonoPIN 2 mg tablet Generic drug:  clonazePAM  
Take 2 mg by mouth daily. Indications: PANIC DISORDER  
  
 * LANTUS SC  
5 Units by SubCUTAneous route daily. * LANTUS U-100 INSULIN 100 unit/mL injection Generic drug:  insulin glargine * metFORMIN 1,000 mg tablet Commonly known as:  GLUCOPHAGE Take 500 mg by mouth two (2) times a day. * metFORMIN 500 mg tablet Commonly known as:  GLUCOPHAGE  
  
 methocarbamol 750 mg tablet Commonly known as:  ZCNTFFO-239 Take 1 Tab by mouth three (3) times daily. methotrexate 2.5 mg tablet Commonly known as:  Amy Crass Take 5 mg by mouth daily. nitroglycerin 400 mcg/spray spray Commonly known as:  NITROLINGUAL  
1 [place] by SubLINGual route every five (5) minutes as needed. NovoLOG Mix 70-30 U-100 Insuln 100 unit/mL (70-30) injection Generic drug:  insulin aspart protamine/insulin aspart 10 Units by SubCUTAneous route two (2) times a day. ondansetron hcl 4 mg tablet Commonly known as:  ZOFRAN (AS HYDROCHLORIDE) Take 1 Tab by mouth every eight (8) hours as needed for Nausea. oxyCODONE-acetaminophen  mg per tablet Commonly known as:  PERCOCET 10 Take 1 Tab by mouth every six (6) hours as needed for Pain for up to 15 days. Max Daily Amount: 4 Tabs. PLAQUENIL 200 mg tablet Generic drug:  hydroxychloroquine Take 200 mg by mouth two (2) times a day. * QUEtiapine 100 mg tablet Commonly known as:  SEROquel Take 25 mg by mouth. Takes 25 mg Q AM, and 100 mg, Q PM  
  
 * QUEtiapine 25 mg tablet Commonly known as:  SEROquel REMICADE IV  
344 mg by IntraVENous route once as needed. remicade will be every 8 weeks * Notice: This list has 14 medication(s) that are the same as other medications prescribed for you. Read the directions carefully, and ask your doctor or other care provider to review them with you. Prescriptions Printed Refills  
 oxyCODONE-acetaminophen (PERCOCET 10)  mg per tablet 0 Sig: Take 1 Tab by mouth every six (6) hours as needed for Pain for up to 15 days. Max Daily Amount: 4 Tabs. Class: Print Route: Oral  
  
We Performed the Following COMPLETE CBC & AUTO DIFF WBC [62172 CPT(R)] Follow-up Instructions Return in about 6 weeks (around 5/1/2018). To-Do List   
 03/20/2018 Lab:  CBC WITH 3 PART DIFF   
  
 04/24/2018 10:00 AM  
  Appointment with [address] at [place] [address] ([phone]) 06/05/2018 10:00 AM  
  Appointment with [address] at [place] [address] ([phone]) Patient Instructions Anemia: Care Instructions Your Care Instructions Anemia is a low level of red blood cells, which carry oxygen throughout your body. Many things can cause anemia. Lack of iron is one of the most common causes. Your body needs iron to make hemoglobin, a substance in red blood cells that carries oxygen from the lungs to your body's cells. Without enough iron, the body produces fewer and smaller red blood cells. As a result, your body's cells do not get enough oxygen, and you feel tired and weak. And you may have trouble concentrating. Bleeding is the most common cause of a lack of iron. You may have heavy menstrual bleeding or bleeding caused by conditions such as ulcers, hemorrhoids, or cancer. Regular use of aspirin or other anti-inflammatory medicines (such as ibuprofen) also can cause bleeding in some people. A lack of iron in your diet also can cause anemia, especially at times when the body needs more iron, such as during pregnancy, infancy, and the teen years. Your doctor may have prescribed iron pills. It may take several months of treatment for your iron levels to return to normal. Your doctor also may suggest that you eat foods that are rich in iron, such as meat and beans. There are many other causes of anemia. It is not always due to a lack of iron. Finding the specific cause of your anemia will help your doctor find the right treatment for you. Follow-up care is a key part of your treatment and safety. Be sure to make and go to all appointments, and call your doctor if you are having problems. It's also a good idea to know your test results and keep a list of the medicines you take. How can you care for yourself at home? · Take your medicines exactly as prescribed. Call your doctor if you think you are having a problem with your medicine. · If your doctor recommends iron pills, take them as directed: ¨ Try to take the pills on an empty stomach about 1 hour before or 2 hours after meals. But you may need to take iron with food to avoid an upset stomach. ¨ Do not take antacids or drink milk or caffeine drinks (such as coffee, tea, or cola) at the same time or within 2 hours of the time that you take your iron. They can make it hard for your body to absorb the iron. ¨ Vitamin C (from food or supplements) helps your body absorb iron. Try taking iron pills with a glass of orange juice or some other food that is high in vitamin C, such as citrus fruits.  
¨ Iron pills may cause stomach problems, such as heartburn, nausea, diarrhea, constipation, and cramps. Be sure to drink plenty of fluids, and include fruits, vegetables, and fiber in your diet each day. Iron pills often make your bowel movements dark or green. ¨ If you forget to take an iron pill, do not take a double dose of iron the next time you take a pill. ¨ Keep iron pills out of the reach of small children. An overdose of iron can be very dangerous. · Follow your doctor's advice about eating iron-rich foods. These include red meat, shellfish, poultry, eggs, beans, raisins, whole-grain bread, and leafy green vegetables. · Steam vegetables to help them keep their iron content. When should you call for help? Call 911 anytime you think you may need emergency care. For example, call if: 
? · You have symptoms of a heart attack. These may include: ¨ Chest pain or pressure, or a strange feeling in the chest. 
¨ Sweating. ¨ Shortness of breath. ¨ Nausea or vomiting. ¨ Pain, pressure, or a strange feeling in the back, neck, jaw, or upper belly or in one or both shoulders or arms. ¨ Lightheadedness or sudden weakness. ¨ A fast or irregular heartbeat. After you call 911, the  may tell you to chew 1 adult-strength or 2 to 4 low-dose aspirin. Wait for an ambulance. Do not try to drive yourself. ? · You passed out (lost consciousness). ?Call your doctor now or seek immediate medical care if: 
? · You have new or increased shortness of breath. ? · You are dizzy or lightheaded, or you feel like you may faint. ? · Your fatigue and weakness continue or get worse. ? · You have any abnormal bleeding, such as: 
¨ Nosebleeds. ¨ Vaginal bleeding that is different (heavier, more frequent, at a different time of the month) than what you are used to. ¨ Bloody or black stools, or rectal bleeding. ¨ Bloody or pink urine. ? Watch closely for changes in your health, and be sure to contact your doctor if: 
? · You do not get better as expected. Where can you learn more? Go to http://swapnil-speedy.info/. Enter R301 in the search box to learn more about \"Anemia: Care Instructions. \" Current as of: October 13, 2016 Content Version: 11.4 © 3189-1867 [employer]. Care instructions adapted under license by [employer] (which disclaims liability or warranty for this information). If you have questions about a medical condition or this instruction, always ask your healthcare professional. [address] any warranty or liability for your use of this information. Introducing [facility] HE[facility]! Dear Hola Hatch: Thank you for requesting a [employer] account. Our records indicate that you have previously registered for a [employer] account but its currently inactive. Please call our [employer] support line at [phone]. Additional Information If you have questions, please visit the Frequently Asked Questions section of the [employer] website at [email or web address] Remember, [employer]cut is NOT to be used for urgent needs. For medical emergencies, dial 911. Now available from your iPhone and Android! Please provide this summary of care documentation to your next provider. Your primary care clinician is listed as Jp. If you have any questions after today's visit, please call [phone].

## 2018-03-28 ENCOUNTER — HOSPITAL ENCOUNTER (OUTPATIENT)
Dept: LAB | Age: 53
Discharge: HOME | End: 2018-03-28
Payer: MEDICARE

## 2018-03-28 DIAGNOSIS — M13.0 POLYARTHROPATHY: ICD-10-CM

## 2018-03-28 DIAGNOSIS — M06.9: ICD-10-CM

## 2018-03-28 LAB
ALBUMIN SERPL-MCNC: 3.6 G/DL (ref 3.4–5)
ALBUMIN/GLOB SERPL: 1 {RATIO} (ref 0.8–1.7)
ALP SERPL-CCNC: 90 U/L (ref 45–117)
ALT SERPL-CCNC: 24 U/L (ref 13–56)
ANION GAP SERPL CALC-SCNC: 7 MMOL/L (ref 3–18)
APPEARANCE UR: CLEAR
AST SERPL-CCNC: 8 U/L (ref 15–37)
BACTERIA URNS QL MICRO: ABNORMAL /HPF
BASOPHILS # BLD: 0 K/UL (ref 0–0.06)
BASOPHILS NFR BLD: 0 % (ref 0–2)
BILIRUB SERPL-MCNC: 0.3 MG/DL (ref 0.2–1)
BILIRUB UR QL: NEGATIVE
BUN SERPL-MCNC: 16 MG/DL (ref 7–18)
BUN/CREAT SERPL: 24 (ref 12–20)
CALCIUM SERPL-MCNC: 9.6 MG/DL (ref 8.5–10.1)
CHLORIDE SERPL-SCNC: 104 MMOL/L (ref 100–108)
CO2 SERPL-SCNC: 28 MMOL/L (ref 21–32)
COLOR UR: YELLOW
CREAT SERPL-MCNC: 0.67 MG/DL (ref 0.6–1.3)
CRP SERPL-MCNC: 1 MG/DL (ref 0–0.3)
DIFFERENTIAL METHOD BLD: NORMAL
EOSINOPHIL # BLD: 0.4 K/UL (ref 0–0.4)
EOSINOPHIL NFR BLD: 4 % (ref 0–5)
EPITH CASTS URNS QL MICRO: ABNORMAL /LPF (ref 0–5)
ERYTHROCYTE [DISTWIDTH] IN BLOOD BY AUTOMATED COUNT: 13.9 % (ref 11.6–14.5)
ERYTHROCYTE [SEDIMENTATION RATE] IN BLOOD: 42 MM/HR (ref 0–30)
GLOBULIN SER CALC-MCNC: 3.6 G/DL (ref 2–4)
GLUCOSE SERPL-MCNC: 166 MG/DL (ref 74–99)
GLUCOSE UR STRIP.AUTO-MCNC: NEGATIVE MG/DL
HCT VFR BLD AUTO: 39.6 % (ref 35–45)
HGB BLD-MCNC: 13.3 G/DL (ref 12–16)
HGB UR QL STRIP: NEGATIVE
HYALINE CASTS URNS QL MICRO: ABNORMAL /LPF (ref 0–2)
KETONES UR QL STRIP.AUTO: NEGATIVE MG/DL
LEUKOCYTE ESTERASE UR QL STRIP.AUTO: NEGATIVE
LYMPHOCYTES # BLD: 3 K/UL (ref 0.9–3.6)
LYMPHOCYTES NFR BLD: 31 % (ref 21–52)
MCH RBC QN AUTO: 27.4 PG (ref 24–34)
MCHC RBC AUTO-ENTMCNC: 33.6 G/DL (ref 31–37)
MCV RBC AUTO: 81.6 FL (ref 74–97)
MONOCYTES # BLD: 0.7 K/UL (ref 0.05–1.2)
MONOCYTES NFR BLD: 8 % (ref 3–10)
NEUTS SEG # BLD: 5.5 K/UL (ref 1.8–8)
NEUTS SEG NFR BLD: 57 % (ref 40–73)
NITRITE UR QL STRIP.AUTO: NEGATIVE
PH UR STRIP: 5 [PH] (ref 5–8)
PLATELET # BLD AUTO: 387 K/UL (ref 135–420)
PMV BLD AUTO: 9.7 FL (ref 9.2–11.8)
POTASSIUM SERPL-SCNC: 4 MMOL/L (ref 3.5–5.5)
PROT SERPL-MCNC: 7.2 G/DL (ref 6.4–8.2)
PROT UR STRIP-MCNC: 30 MG/DL
RBC # BLD AUTO: 4.85 M/UL (ref 4.2–5.3)
RBC #/AREA URNS HPF: ABNORMAL /HPF (ref 0–5)
SODIUM SERPL-SCNC: 139 MMOL/L (ref 136–145)
SP GR UR REFRACTOMETRY: 1.02 (ref 1–1.03)
UROBILINOGEN UR QL STRIP.AUTO: 0.2 EU/DL (ref 0.2–1)
WBC # BLD AUTO: 9.7 K/UL (ref 4.6–13.2)
WBC URNS QL MICRO: ABNORMAL /HPF (ref 0–4)

## 2018-03-28 PROCEDURE — 86140 C-REACTIVE PROTEIN: CPT | Performed by: SPECIALIST

## 2018-03-28 PROCEDURE — 36415 COLL VENOUS BLD VENIPUNCTURE: CPT | Performed by: SPECIALIST

## 2018-03-28 PROCEDURE — 85652 RBC SED RATE AUTOMATED: CPT | Performed by: SPECIALIST

## 2018-03-28 PROCEDURE — 85025 COMPLETE CBC W/AUTO DIFF WBC: CPT | Performed by: SPECIALIST

## 2018-03-28 PROCEDURE — 80053 COMPREHEN METABOLIC PANEL: CPT | Performed by: SPECIALIST

## 2018-03-28 PROCEDURE — 81001 URINALYSIS AUTO W/SCOPE: CPT | Performed by: SPECIALIST

## 2018-04-02 DIAGNOSIS — M06.9 RHEUMATOID ARTHRITIS, INVOLVING UNSPECIFIED SITE, UNSPECIFIED RHEUMATOID FACTOR PRESENCE: ICD-10-CM

## 2018-04-02 DIAGNOSIS — G89.4 CHRONIC PAIN SYNDROME: ICD-10-CM

## 2018-04-02 RX ORDER — OXYCODONE AND ACETAMINOPHEN 10; 325 MG/1; MG/1
1 TABLET ORAL
Qty: 60 TAB | Refills: 0 | Status: SHIPPED | OUTPATIENT
Start: 2018-04-02 | End: 2018-04-17 | Stop reason: SDUPTHER

## 2018-04-16 ENCOUNTER — HOSPITAL ENCOUNTER (EMERGENCY)
Age: 53
Discharge: STILL ADMITTED OR ADMINISTRATIVE | End: 2018-04-16
Attending: EMERGENCY MEDICINE
Payer: MEDICARE

## 2018-04-16 PROCEDURE — 75810000275 HC EMERGENCY DEPT VISIT NO LEVEL OF CARE

## 2018-04-17 DIAGNOSIS — G89.4 CHRONIC PAIN SYNDROME: ICD-10-CM

## 2018-04-17 DIAGNOSIS — M06.9 RHEUMATOID ARTHRITIS, INVOLVING UNSPECIFIED SITE, UNSPECIFIED RHEUMATOID FACTOR PRESENCE: ICD-10-CM

## 2018-04-17 RX ORDER — OXYCODONE AND ACETAMINOPHEN 10; 325 MG/1; MG/1
1 TABLET ORAL
Qty: 60 TAB | Refills: 0 | Status: SHIPPED | OUTPATIENT
Start: 2018-04-17 | End: 2018-05-01 | Stop reason: SDUPTHER

## 2018-04-24 ENCOUNTER — HOSPITAL ENCOUNTER (OUTPATIENT)
Dept: LAB | Age: 53
Discharge: HOME | End: 2018-04-24
Payer: MEDICARE

## 2018-04-24 ENCOUNTER — CLINICAL SUPPORT (OUTPATIENT)
Dept: ONCOLOGY | Age: 53
End: 2018-04-24

## 2018-04-24 ENCOUNTER — HOSPITAL ENCOUNTER (OUTPATIENT)
Dept: INFUSION THERAPY | Age: 53
Discharge: HOME | End: 2018-04-24
Payer: MEDICARE

## 2018-04-24 ENCOUNTER — HOSPITAL ENCOUNTER (OUTPATIENT)
Dept: ONCOLOGY | Age: 53
Discharge: HOME | End: 2018-04-24

## 2018-04-24 VITALS
RESPIRATION RATE: 20 BRPM | TEMPERATURE: 98.7 F | HEART RATE: 101 BPM | HEIGHT: 71 IN | WEIGHT: 219 LBS | SYSTOLIC BLOOD PRESSURE: 122 MMHG | BODY MASS INDEX: 30.66 KG/M2 | DIASTOLIC BLOOD PRESSURE: 70 MMHG

## 2018-04-24 DIAGNOSIS — M06.09 RHEUMATOID ARTHRITIS OF MULTIPLE SITES WITHOUT RHEUMATOID FACTOR (HCC): ICD-10-CM

## 2018-04-24 DIAGNOSIS — M06.09 RHEUMATOID ARTHRITIS OF MULTIPLE SITES WITHOUT RHEUMATOID FACTOR (HCC): Primary | ICD-10-CM

## 2018-04-24 LAB
ALBUMIN SERPL-MCNC: 3.6 G/DL (ref 3.4–5)
ALBUMIN/GLOB SERPL: 1 {RATIO} (ref 0.8–1.7)
ALP SERPL-CCNC: 92 U/L (ref 45–117)
ALT SERPL-CCNC: 17 U/L (ref 13–56)
ANION GAP SERPL CALC-SCNC: 8 MMOL/L (ref 3–18)
AST SERPL-CCNC: 10 U/L (ref 15–37)
BASO+EOS+MONOS # BLD AUTO: 0.4 K/UL (ref 0–2.3)
BASO+EOS+MONOS # BLD AUTO: 3 % (ref 0.1–17)
BILIRUB SERPL-MCNC: 0.2 MG/DL (ref 0.2–1)
BUN SERPL-MCNC: 14 MG/DL (ref 7–18)
BUN/CREAT SERPL: 21 (ref 12–20)
CALCIUM SERPL-MCNC: 9.7 MG/DL (ref 8.5–10.1)
CHLORIDE SERPL-SCNC: 104 MMOL/L (ref 100–108)
CO2 SERPL-SCNC: 26 MMOL/L (ref 21–32)
CREAT SERPL-MCNC: 0.67 MG/DL (ref 0.6–1.3)
CRP SERPL-MCNC: 1.3 MG/DL (ref 0–0.3)
DIFFERENTIAL METHOD BLD: NORMAL
ERYTHROCYTE [DISTWIDTH] IN BLOOD BY AUTOMATED COUNT: 13.2 % (ref 11.5–14.5)
ERYTHROCYTE [SEDIMENTATION RATE] IN BLOOD: 36 MM/HR (ref 0–30)
GLOBULIN SER CALC-MCNC: 3.7 G/DL (ref 2–4)
GLUCOSE SERPL-MCNC: 172 MG/DL (ref 74–99)
HCT VFR BLD AUTO: 40.2 % (ref 36–48)
HGB BLD-MCNC: 13.2 G/DL (ref 12–16)
LYMPHOCYTES # BLD: 3.2 K/UL (ref 1.1–5.9)
LYMPHOCYTES NFR BLD: 29 % (ref 14–44)
MCH RBC QN AUTO: 27 PG (ref 25–35)
MCHC RBC AUTO-ENTMCNC: 32.8 G/DL (ref 31–37)
MCV RBC AUTO: 82.4 FL (ref 78–102)
NEUTS SEG # BLD: 7.5 K/UL (ref 1.8–9.5)
NEUTS SEG NFR BLD: 68 % (ref 40–70)
PLATELET # BLD AUTO: 378 K/UL (ref 140–440)
POTASSIUM SERPL-SCNC: 4.1 MMOL/L (ref 3.5–5.5)
PROT SERPL-MCNC: 7.3 G/DL (ref 6.4–8.2)
RBC # BLD AUTO: 4.88 M/UL (ref 4.1–5.1)
SODIUM SERPL-SCNC: 138 MMOL/L (ref 136–145)
WBC # BLD AUTO: 11.1 K/UL (ref 4.5–13)

## 2018-04-24 PROCEDURE — 74011250636 HC RX REV CODE- 250/636: Performed by: INTERNAL MEDICINE

## 2018-04-24 PROCEDURE — 85652 RBC SED RATE AUTOMATED: CPT | Performed by: INTERNAL MEDICINE

## 2018-04-24 PROCEDURE — 96375 TX/PRO/DX INJ NEW DRUG ADDON: CPT

## 2018-04-24 PROCEDURE — 86140 C-REACTIVE PROTEIN: CPT | Performed by: INTERNAL MEDICINE

## 2018-04-24 PROCEDURE — 80053 COMPREHEN METABOLIC PANEL: CPT | Performed by: INTERNAL MEDICINE

## 2018-04-24 PROCEDURE — 99211 OFF/OP EST MAY X REQ PHY/QHP: CPT

## 2018-04-24 PROCEDURE — 96415 CHEMO IV INFUSION ADDL HR: CPT

## 2018-04-24 PROCEDURE — 96413 CHEMO IV INFUSION 1 HR: CPT

## 2018-04-24 RX ORDER — SODIUM CHLORIDE 0.9 % (FLUSH) 0.9 %
10-40 SYRINGE (ML) INJECTION AS NEEDED
Status: DISCONTINUED | OUTPATIENT
Start: 2018-04-24 | End: 2018-04-28 | Stop reason: HOSPADM

## 2018-04-24 RX ORDER — ACETAMINOPHEN 325 MG/1
650 TABLET ORAL
Status: ACTIVE | OUTPATIENT
Start: 2018-04-24 | End: 2018-04-24

## 2018-04-24 RX ORDER — DIPHENHYDRAMINE HYDROCHLORIDE 50 MG/ML
25 INJECTION, SOLUTION INTRAMUSCULAR; INTRAVENOUS ONCE
Status: COMPLETED | OUTPATIENT
Start: 2018-04-24 | End: 2018-04-24

## 2018-04-24 RX ORDER — SODIUM CHLORIDE 9 MG/ML
250 INJECTION, SOLUTION INTRAVENOUS CONTINUOUS
Status: DISPENSED | OUTPATIENT
Start: 2018-04-24 | End: 2018-04-25

## 2018-04-24 RX ORDER — DIPHENHYDRAMINE HYDROCHLORIDE 50 MG/ML
50 INJECTION, SOLUTION INTRAMUSCULAR; INTRAVENOUS
Status: ACTIVE | OUTPATIENT
Start: 2018-04-24 | End: 2018-04-24

## 2018-04-24 RX ADMIN — Medication 10 ML: at 10:40

## 2018-04-24 RX ADMIN — DIPHENHYDRAMINE HYDROCHLORIDE 25 MG: 50 INJECTION, SOLUTION INTRAMUSCULAR; INTRAVENOUS at 11:15

## 2018-04-24 RX ADMIN — INFLIXIMAB 400 MG: 100 INJECTION, POWDER, LYOPHILIZED, FOR SOLUTION INTRAVENOUS at 12:00

## 2018-04-24 RX ADMIN — SODIUM CHLORIDE 250 ML: 900 INJECTION, SOLUTION INTRAVENOUS at 11:10

## 2018-04-24 NOTE — PROGRESS NOTES
[address] [facility] Progress Note    Date: 2018    Name: Tigre Jorgensen    MRN: [id number]         : 1965      Ms. Steven [facility]eehan [facility] at [facility] , in stable condition, here for Q 6 Week, IV Remicade (Infliximab)  Infusion. She was assessed and education was provided. Upon assessment today, Ms. Steven Meehan complained of having very painful boils under both her left arm & her right breast. Dr. Yoshi Nava was made aware of her complaints of the boils, and of her low grade fever of 99.3 today. Order was received from Dr. Yoshi Nava, to proceed with the Remicade infusion today, as planned. Ms. Shane Re vitals were reviewed. Visit Vitals    /78 (BP 1 Location: Left arm, BP Patient Position: At rest;Sitting)    Pulse (!) 103    Temp 99.3 °F (37.4 °C)    Resp 20    Ht 5' 11\" (1.803 m)    Wt 99.3 kg (219 lb)    Breastfeeding No    BMI 30.54 kg/m2         PIV was established in her dorsal left forearm at 1040, without incident, and blood was drawn for a CBC, CMP, ESR, & CRP, per order. (The CBC was processed in house, and the remaining labs were sent out to be processed.)        Lab results were obtained and reviewed, and the CBC results listed below, were noted to be satisfactory for treatment today. Recent Results (from the past 12 hour(s))   CBC WITH 3 PART DIFF    Collection Time: 18 10:40 AM   Result Value Ref Range    WBC 11.1 4.5 - 13.0 K/uL    RBC 4.88 4.10 - 5.10 M/uL    HGB 13.2 12.0 - 16.0 g/dL    HCT 40.2 36 - 48 %    MCV 82.4 78 - 102 FL    MCH 27.0 25.0 - 35.0 PG    MCHC 32.8 31 - 37 g/dL    RDW 13.2 11.5 - 14.5 %    PLATELET 959 478 - 429 K/uL    NEUTROPHILS 68 40 - 70 %    MIXED CELLS 3 0.1 - 17 %    LYMPHOCYTES 29 14 - 44 %    ABS. NEUTROPHILS 7.5 1.8 - 9.5 K/UL    ABS. MIXED CELLS 0.4 0.0 - 2.3 K/uL    ABS. LYMPHOCYTES 3.2 1.1 - 5.9 K/UL    DF AUTOMATED             Pre-medication consisting of IV Benadryl 25 mg, was administered per order, and without incident.             Remicade 400 mg IV (4 mg/kg) Maintenance Dose, was administered over 2 hours, per order, and without incident.               After the completion of the IV Remicade, Ms. Amilcar Maldonado was monitored for 30 minutes post Remicade, per order, and also without incident.           After the completion of the 30 minute monitoring period, the PIV was removed and gauze/bandaid was applied. Ms. Amilcar Maldonado tolerated well, and had no complaints. Ms. Amilcar Maldonado was discharged from [place] [address] in stable condition at [phone]. Margarette Ask She is to return in 6 weeks, on Tuesday, 6-5-18, at 1000, for her next appointment, for her next Remicade Infusion.      Ankush Ribera RN  April 24, 2018  11:01 AM

## 2018-04-30 ENCOUNTER — HOSPITAL ENCOUNTER (OUTPATIENT)
Dept: LAB | Age: 53
Discharge: HOME | End: 2018-04-30
Payer: MEDICARE

## 2018-04-30 DIAGNOSIS — E11.9 DIABETES MELLITUS (HCC): ICD-10-CM

## 2018-04-30 DIAGNOSIS — I10 ESSENTIAL HYPERTENSION, MALIGNANT: ICD-10-CM

## 2018-04-30 DIAGNOSIS — E55.9 AVITAMINOSIS D: ICD-10-CM

## 2018-04-30 DIAGNOSIS — R07.9 CHEST PAIN, UNSPECIFIED: ICD-10-CM

## 2018-04-30 DIAGNOSIS — F33.1 MAJOR DEPRESSIVE DISORDER, RECURRENT EPISODE, MODERATE (HCC): ICD-10-CM

## 2018-04-30 DIAGNOSIS — E78.00 PURE HYPERCHOLESTEROLEMIA: ICD-10-CM

## 2018-04-30 LAB
ALBUMIN SERPL-MCNC: 3.5 G/DL (ref 3.4–5)
ALBUMIN/GLOB SERPL: 0.9 {RATIO} (ref 0.8–1.7)
ALP SERPL-CCNC: 92 U/L (ref 45–117)
ALT SERPL-CCNC: 21 U/L (ref 13–56)
ANION GAP SERPL CALC-SCNC: 4 MMOL/L (ref 3–18)
AST SERPL-CCNC: 6 U/L (ref 15–37)
BASOPHILS # BLD: 0 K/UL (ref 0–0.06)
BASOPHILS NFR BLD: 0 % (ref 0–2)
BILIRUB SERPL-MCNC: 0.1 MG/DL (ref 0.2–1)
BUN SERPL-MCNC: 14 MG/DL (ref 7–18)
BUN/CREAT SERPL: 18 (ref 12–20)
CALCIUM SERPL-MCNC: 9.1 MG/DL (ref 8.5–10.1)
CHLORIDE SERPL-SCNC: 105 MMOL/L (ref 100–108)
CHOLEST SERPL-MCNC: 180 MG/DL
CO2 SERPL-SCNC: 30 MMOL/L (ref 21–32)
CREAT SERPL-MCNC: 0.8 MG/DL (ref 0.6–1.3)
CREAT UR-MCNC: 105 MG/DL (ref 30–125)
DIFFERENTIAL METHOD BLD: ABNORMAL
EOSINOPHIL # BLD: 0.3 K/UL (ref 0–0.4)
EOSINOPHIL NFR BLD: 3 % (ref 0–5)
ERYTHROCYTE [DISTWIDTH] IN BLOOD BY AUTOMATED COUNT: 13.6 % (ref 11.6–14.5)
GLOBULIN SER CALC-MCNC: 4 G/DL (ref 2–4)
GLUCOSE SERPL-MCNC: 212 MG/DL (ref 74–99)
HBA1C MFR BLD: 8.7 % (ref 4.2–5.6)
HCT VFR BLD AUTO: 38.8 % (ref 35–45)
HDLC SERPL-MCNC: 38 MG/DL (ref 40–60)
HDLC SERPL: 4.7 {RATIO} (ref 0–5)
HGB BLD-MCNC: 13.3 G/DL (ref 12–16)
LDLC SERPL CALC-MCNC: 84.4 MG/DL (ref 0–100)
LIPID PROFILE,FLP: ABNORMAL
LYMPHOCYTES # BLD: 3.8 K/UL (ref 0.9–3.6)
LYMPHOCYTES NFR BLD: 35 % (ref 21–52)
MCH RBC QN AUTO: 27.4 PG (ref 24–34)
MCHC RBC AUTO-ENTMCNC: 34.3 G/DL (ref 31–37)
MCV RBC AUTO: 80 FL (ref 74–97)
MICROALBUMIN UR-MCNC: 24.8 MG/DL (ref 0–3)
MICROALBUMIN/CREAT UR-RTO: 236 MG/G (ref 0–30)
MONOCYTES # BLD: 0.6 K/UL (ref 0.05–1.2)
MONOCYTES NFR BLD: 5 % (ref 3–10)
NEUTS SEG # BLD: 6.1 K/UL (ref 1.8–8)
NEUTS SEG NFR BLD: 57 % (ref 40–73)
PLATELET # BLD AUTO: 404 K/UL (ref 135–420)
PMV BLD AUTO: 9.3 FL (ref 9.2–11.8)
POTASSIUM SERPL-SCNC: 4.1 MMOL/L (ref 3.5–5.5)
PROT SERPL-MCNC: 7.5 G/DL (ref 6.4–8.2)
RBC # BLD AUTO: 4.85 M/UL (ref 4.2–5.3)
SODIUM SERPL-SCNC: 139 MMOL/L (ref 136–145)
T4 SERPL-MCNC: 11.9 UG/DL (ref 4.7–13.3)
TRIGL SERPL-MCNC: 288 MG/DL (ref ?–150)
TSH SERPL DL<=0.05 MIU/L-ACNC: 2.67 UIU/ML (ref 0.36–3.74)
VLDLC SERPL CALC-MCNC: 57.6 MG/DL
WBC # BLD AUTO: 10.7 K/UL (ref 4.6–13.2)

## 2018-04-30 PROCEDURE — 80053 COMPREHEN METABOLIC PANEL: CPT | Performed by: INTERNAL MEDICINE

## 2018-04-30 PROCEDURE — 83540 ASSAY OF IRON: CPT | Performed by: INTERNAL MEDICINE

## 2018-04-30 PROCEDURE — 87389 HIV-1 AG W/HIV-1&-2 AB AG IA: CPT

## 2018-04-30 PROCEDURE — 82043 UR ALBUMIN QUANTITATIVE: CPT | Performed by: INTERNAL MEDICINE

## 2018-04-30 PROCEDURE — 85025 COMPLETE CBC W/AUTO DIFF WBC: CPT | Performed by: INTERNAL MEDICINE

## 2018-04-30 PROCEDURE — 36415 COLL VENOUS BLD VENIPUNCTURE: CPT | Performed by: INTERNAL MEDICINE

## 2018-04-30 PROCEDURE — 82728 ASSAY OF FERRITIN: CPT | Performed by: INTERNAL MEDICINE

## 2018-04-30 PROCEDURE — 83036 HEMOGLOBIN GLYCOSYLATED A1C: CPT | Performed by: INTERNAL MEDICINE

## 2018-04-30 PROCEDURE — 84443 ASSAY THYROID STIM HORMONE: CPT | Performed by: INTERNAL MEDICINE

## 2018-04-30 PROCEDURE — 80061 LIPID PANEL: CPT | Performed by: INTERNAL MEDICINE

## 2018-04-30 PROCEDURE — 84436 ASSAY OF TOTAL THYROXINE: CPT | Performed by: INTERNAL MEDICINE

## 2018-05-01 ENCOUNTER — OFFICE VISIT (OUTPATIENT)
Dept: ONCOLOGY | Age: 53
End: 2018-05-01

## 2018-05-01 ENCOUNTER — HOSPITAL ENCOUNTER (OUTPATIENT)
Dept: ONCOLOGY | Age: 53
Discharge: HOME | End: 2018-05-01

## 2018-05-01 VITALS
BODY MASS INDEX: 30.4 KG/M2 | TEMPERATURE: 98.5 F | SYSTOLIC BLOOD PRESSURE: 132 MMHG | WEIGHT: 218 LBS | HEART RATE: 102 BPM | DIASTOLIC BLOOD PRESSURE: 81 MMHG

## 2018-05-01 DIAGNOSIS — D47.3 ESSENTIAL THROMBOCYTOSIS (HCC): ICD-10-CM

## 2018-05-01 DIAGNOSIS — D47.3 ESSENTIAL THROMBOCYTOSIS (HCC): Primary | ICD-10-CM

## 2018-05-01 DIAGNOSIS — G89.4 CHRONIC PAIN SYNDROME: ICD-10-CM

## 2018-05-01 DIAGNOSIS — D64.9 CHRONIC ANEMIA: ICD-10-CM

## 2018-05-01 DIAGNOSIS — M06.9 RHEUMATOID ARTHRITIS, INVOLVING UNSPECIFIED SITE, UNSPECIFIED RHEUMATOID FACTOR PRESENCE: ICD-10-CM

## 2018-05-01 DIAGNOSIS — D75.839 THROMBOCYTOSIS: ICD-10-CM

## 2018-05-01 DIAGNOSIS — M79.7 FIBROMYALGIA: ICD-10-CM

## 2018-05-01 LAB
FERRITIN SERPL-MCNC: 110 NG/ML (ref 8–388)
IRON SATN MFR SERPL: 13 %
IRON SERPL-MCNC: 37 UG/DL (ref 50–175)
TIBC SERPL-MCNC: 293 UG/DL (ref 250–450)

## 2018-05-01 RX ORDER — OXYCODONE AND ACETAMINOPHEN 10; 325 MG/1; MG/1
1 TABLET ORAL
Qty: 60 TAB | Refills: 0 | Status: CANCELLED | OUTPATIENT
Start: 2018-05-01 | End: 2018-05-16

## 2018-05-01 RX ORDER — OXYCODONE AND ACETAMINOPHEN 10; 325 MG/1; MG/1
1 TABLET ORAL
Qty: 60 TAB | Refills: 0 | Status: SHIPPED | OUTPATIENT
Start: 2018-05-01 | End: 2018-05-14 | Stop reason: SDUPTHER

## 2018-05-01 NOTE — PATIENT INSTRUCTIONS
Anemia: Care Instructions  Your Care Instructions    Anemia is a low level of red blood cells, which carry oxygen throughout your body. Many things can cause anemia. Lack of iron is one of the most common causes. Your body needs iron to make hemoglobin, a substance in red blood cells that carries oxygen from the lungs to your body's cells. Without enough iron, the body produces fewer and smaller red blood cells. As a result, your body's cells do not get enough oxygen, and you feel tired and weak. And you may have trouble concentrating. Bleeding is the most common cause of a lack of iron. You may have heavy menstrual bleeding or bleeding caused by conditions such as ulcers, hemorrhoids, or cancer. Regular use of aspirin or other anti-inflammatory medicines (such as ibuprofen) also can cause bleeding in some people. A lack of iron in your diet also can cause anemia, especially at times when the body needs more iron, such as during pregnancy, infancy, and the teen years. Your doctor may have prescribed iron pills. It may take several months of treatment for your iron levels to return to normal. Your doctor also may suggest that you eat foods that are rich in iron, such as meat and beans. There are many other causes of anemia. It is not always due to a lack of iron. Finding the specific cause of your anemia will help your doctor find the right treatment for you. Follow-up care is a key part of your treatment and safety. Be sure to make and go to all appointments, and call your doctor if you are having problems. It's also a good idea to know your test results and keep a list of the medicines you take. How can you care for yourself at home? · Take your medicines exactly as prescribed. Call your doctor if you think you are having a problem with your medicine. · If your doctor recommends iron pills, take them as directed:  ¨ Try to take the pills on an empty stomach about 1 hour before or 2 hours after meals. But you may need to take iron with food to avoid an upset stomach. ¨ Do not take antacids or drink milk or caffeine drinks (such as coffee, tea, or cola) at the same time or within 2 hours of the time that you take your iron. They can make it hard for your body to absorb the iron. ¨ Vitamin C (from food or supplements) helps your body absorb iron. Try taking iron pills with a glass of orange juice or some other food that is high in vitamin C, such as citrus fruits. ¨ Iron pills may cause stomach problems, such as heartburn, nausea, diarrhea, constipation, and cramps. Be sure to drink plenty of fluids, and include fruits, vegetables, and fiber in your diet each day. Iron pills often make your bowel movements dark or green. ¨ If you forget to take an iron pill, do not take a double dose of iron the next time you take a pill. ¨ Keep iron pills out of the reach of small children. An overdose of iron can be very dangerous. · Follow your doctor's advice about eating iron-rich foods. These include red meat, shellfish, poultry, eggs, beans, raisins, whole-grain bread, and leafy green vegetables. · Steam vegetables to help them keep their iron content. When should you call for help? Call 911 anytime you think you may need emergency care. For example, call if:  ? · You have symptoms of a heart attack. These may include:  ¨ Chest pain or pressure, or a strange feeling in the chest.  ¨ Sweating. ¨ Shortness of breath. ¨ Nausea or vomiting. ¨ Pain, pressure, or a strange feeling in the back, neck, jaw, or upper belly or in one or both shoulders or arms. ¨ Lightheadedness or sudden weakness. ¨ A fast or irregular heartbeat. After you call 911, the  may tell you to chew 1 adult-strength or 2 to 4 low-dose aspirin. Wait for an ambulance. Do not try to drive yourself. ? · You passed out (lost consciousness).    ?Call your doctor now or seek immediate medical care if:  ? · You have new or increased shortness of breath. ? · You are dizzy or lightheaded, or you feel like you may faint. ? · Your fatigue and weakness continue or get worse. ? · You have any abnormal bleeding, such as:  ¨ Nosebleeds. ¨ Vaginal bleeding that is different (heavier, more frequent, at a different time of the month) than what you are used to. ¨ Bloody or black stools, or rectal bleeding. ¨ Bloody or pink urine. ? Watch closely for changes in your health, and be sure to contact your doctor if:  ? · You do not get better as expected. Where can you learn more? Go to http://swapnil-speedy.info/. Enter R301 in the search box to learn more about \"Anemia: Care Instructions. \"  Current as of: October 13, 2016  Content Version: 11.4  © 0395-4412 [email or web address]. Care instructions adapted under license by [employer] (which disclaims liability or warranty for this information). If you have questions about a medical condition or this instruction, always ask your healthcare professional. [place] [address] any warranty or liability for your use of this information.

## 2018-05-01 NOTE — MR AVS SNAPSHOT
[address] [facility] [address] [facility] [address] 
[phone] Patient: Moriah Carlos MRN: [id number] :1965 Visit Information Date & Time Provider Department Dept. Phone Encounter #  
 2018 11:15 AM Izabella Palumbo [facility] Office [phone] [id number] Follow-up Instructions Return in about 6 weeks (around 2018). Your Appointments 2018 12:15 PM  
New Patient with Edwina Alonso MD  
[facility]) Appt Note: per Siri Vincent for arrthymia, order in cc  
 [address] [facility], [address] [facility] [id number] [address] [facility], [address] [place] [address] 2018 11:30 AM  
Office Visit with Lor Chacon MD  
[address] Appt Note: OV  
 [facility] [address] [facility] [address]  
[phone]  
  
   
 [facility] [address] Upcoming Health Maintenance Date Due  
 FOOT EXAM Q1 1973 EYE EXAM RETINAL OR DILATED Q1 1973 Pneumococcal 19-64 Highest Risk (1 of 3 - PCV13) 1982 DTaP/Tdap/Td series (1 - Tdap) 1984 PAP AKA CERVICAL CYTOLOGY 1984 FOBT Q 1 YEAR AGE 50-75 2013 MEDICARE YEARLY EXAM 3/14/2018 Influenza Age 5 to Adult 2018 HEMOGLOBIN A1C Q6M 10/30/2018 MICROALBUMIN Q1 2019 LIPID PANEL Q1 2019 BREAST CANCER SCRN MAMMOGRAM 2019 Allergies as of 2018  Review Complete On: 2018 By: Lor Chacon MD  
  
 Severity Noted Reaction Type Reactions Levaquin [Levofloxacin] High  Systemic Anaphylaxis Pollen Extracts    Unable to Obtain Current Immunizations  Reviewed on 2018 Name Date Influenza Vaccine 2017, 10/21/2016, 11/3/2015, 11/10/2014, 10/25/2013 Influenza Vaccine Whole 9/10/2012 Not reviewed this visit You Were Diagnosed With   
  
 Codes Comments Essential thrombocytosis (HCC)    -  Primary ICD-10-CM: D47.3 ICD-9-CM: 238.71 Chronic pain syndrome     ICD-10-CM: G89.4 ICD-9-CM: 338. 4 Rheumatoid arthritis, involving unspecified site, unspecified rheumatoid factor presence ([facility]     ICD-10-CM: M06.9 ICD-9-CM: 714.0 Chronic anemia     ICD-10-CM: D64.9 ICD-9-CM: 285.9 Fibromyalgia     ICD-10-CM: M79.7 ICD-9-CM: 729.1 Vitals BP Pulse Temp Weight(growth percentile) BMI OB Status 132/81 (!) 102 98.5 °F (36.9 °C) (Oral) 218 lb (98.9 kg) 30.4 kg/m2 Hysterectomy Smoking Status Former Smoker Vitals History BMI and BSA Data Body Mass Index Body Surface Area  
 30.4 kg/m 2 2.23 m 2 Preferred Pharmacy Pharmacy Name Phone [facility] [address] [place] [address]. [phone] Your Updated Medication List  
  
   
This list is accurate as of 5/1/18 12:13 PM.  Always use your most recent med list.  
  
  
  
  
 * albuterol sulfate 2.5 mg/0.5 mL Nebu nebulizer solution Commonly known as:  PROVENTIL;VENTOLIN  
by Nebulization route once. Is now using the actual nebulizer machine, for current bronchitis episode (Aug/Sept. 2017) * albuterol 90 mcg/actuation inhaler Commonly known as:  PROAIR HFA Take 1 Puff by inhalation every six (6) hours as needed for Wheezing. * amitriptyline 150 mg tablet Commonly known as:  ELAVIL Take 10 mg by mouth nightly. Indications: DEPRESSION  
  
 * amitriptyline 25 mg tablet Commonly known as:  ELAVIL  
  
 anagrelide 0.5 mg capsule Commonly known as:  Holly Many Take 1 Cap by mouth two (2) times a day. Indications: ESSENTIAL THROMBOCYTOSIS  
  
 * atenolol 25 mg tablet Commonly known as:  TENORMIN Take 25 mg by mouth daily. Indications: HYPERTENSION  
  
 * atenolol 50 mg tablet Commonly known as:  TENORMIN  
  
 BD INSULIN SYRINGE ULTRA-FINE 1 mL 31 gauge x 15/64\" Syrg Generic drug:  insulin syringe-needle U-100  
  
 cholecalciferol (VITAMIN D3) 5,000 unit Tab tablet Commonly known as:  VITAMIN D3 Take 5,000 Units by mouth every seven (7) days. clotrimazole-betamethasone topical cream  
Commonly known as:  LOTRISONE  
  
 * DULoxetine 20 mg capsule Commonly known as:  CYMBALTA Take 20 mg by mouth daily. * DULoxetine 30 mg capsule Commonly known as:  CYMBALTA  
  
 folic acid 413 mcg tablet Take 400 mcg by mouth daily. furosemide 40 mg tablet Commonly known as:  LASIX Take  by mouth daily. gabapentin 300 mg capsule Commonly known as:  NEURONTIN  
  
 haloperidol 5 mg tablet Commonly known as:  HALDOL Take 2.5 mg by mouth nightly. HYZAAR 50-12.5 mg per tablet Generic drug:  losartan-hydroCHLOROthiazide Take 1 Tab by mouth daily. ibuprofen 400 mg tablet Commonly known as:  MOTRIN Take 1 Tab by mouth every six (6) hours as needed for Pain. KlonoPIN 2 mg tablet Generic drug:  clonazePAM  
Take 2 mg by mouth daily. Indications: PANIC DISORDER  
  
 * LANTUS SC  
5 Units by SubCUTAneous route daily. * LANTUS U-100 INSULIN 100 unit/mL injection Generic drug:  insulin glargine * metFORMIN 1,000 mg tablet Commonly known as:  GLUCOPHAGE Take 500 mg by mouth two (2) times a day. * metFORMIN 500 mg tablet Commonly known as:  GLUCOPHAGE  
  
 methocarbamol 750 mg tablet Commonly known as:  [id number]-646 Take 1 Tab by mouth three (3) times daily. methotrexate 2.5 mg tablet Commonly known as:  Barb Creamer Take 5 mg by mouth daily. nitroglycerin 400 mcg/spray spray Commonly known as:  NITROLINGUAL  
1 Park by SubLINGual route every five (5) minutes as needed. NovoLOG Mix 70-30 U-100 Insuln 100 unit/mL (70-30) injection Generic drug:  insulin aspart protamine/insulin aspart 10 Units by SubCUTAneous route two (2) times a day. ondansetron hcl 4 mg tablet Commonly known as:  Daksha Maxin Take 1 Tab by mouth every eight (8) hours as needed for Nausea. oxyCODONE-acetaminophen  mg per tablet Commonly known as:  PERCOCET 10 Take 1 Tab by mouth every six (6) hours as needed for Pain for up to 15 days. Max Daily Amount: 4 Tabs. PLAQUENIL 200 mg tablet Generic drug:  hydroxychloroquine Take 200 mg by mouth two (2) times a day. * QUEtiapine 100 mg tablet Commonly known as:  SEROquel Take 25 mg by mouth. Takes 25 mg Q AM, and 100 mg, Q PM  
  
 * QUEtiapine 25 mg tablet Commonly known as:  SEROquel REMICADE IV  
344 mg by IntraVENous route once as needed. remicade will be every 8 weeks * Notice: This list has 14 medication(s) that are the same as other medications prescribed for you. Read the directions carefully, and ask your doctor or other care provider to review them with you. Prescriptions Printed Refills  
 oxyCODONE-acetaminophen (PERCOCET 10)  mg per tablet 0 Sig: Take 1 Tab by mouth every six (6) hours as needed for Pain for up to 15 days. Max Daily Amount: 4 Tabs. Class: Print Route: Oral  
  
We Performed the Following COMPLETE CBC & AUTO DIFF WBC [76812 CPT(R)] Follow-up Instructions Return in about 6 weeks (around 6/12/2018). To-Do List   
 05/01/2018 Lab:  CBC WITH 3 PART DIFF   
  
 05/01/2018 Lab:  FERRITIN   
  
 05/01/2018 Lab:  IRON PROFILE   
  
 05/01/2018 Lab:  METABOLIC PANEL, COMPREHENSIVE   
  
 06/05/2018 10:00 AM  
  Appointment with [address] at [facility] ([phone])  
  
 07/17/2018 10:00 AM  
  [place] [address] [place] [address] ([phone]) Patient Instructions Anemia: Care Instructions Your Care Instructions Anemia is a low level of red blood cells, which carry oxygen throughout your body. Many things can cause anemia. Lack of iron is one of the most common causes. Your body needs iron to make hemoglobin, a substance in red blood cells that carries oxygen from the lungs to your body's cells. Without enough iron, the body produces fewer and smaller red blood cells. As a result, your body's cells do not get enough oxygen, and you feel tired and weak. And you may have trouble concentrating. Bleeding is the most common cause of a lack of iron. You may have heavy menstrual bleeding or bleeding caused by conditions such as ulcers, hemorrhoids, or cancer. Regular use of aspirin or other anti-inflammatory medicines (such as ibuprofen) also can cause bleeding in some people. A lack of iron in your diet also can cause anemia, especially at times when the body needs more iron, such as during pregnancy, infancy, and the teen years. Your doctor may have prescribed iron pills. It may take several months of treatment for your iron levels to return to normal. Your doctor also may suggest that you eat foods that are rich in iron, such as meat and beans. There are many other causes of anemia. It is not always due to a lack of iron. Finding the specific cause of your anemia will help your doctor find the right treatment for you. Follow-up care is a key part of your treatment and safety. Be sure to make and go to all appointments, and call your doctor if you are having problems. It's also a good idea to know your test results and keep a list of the medicines you take. How can you care for yourself at home? · Take your medicines exactly as prescribed. Call your doctor if you think you are having a problem with your medicine. · If your doctor recommends iron pills, take them as directed: ¨ Try to take the pills on an empty stomach about 1 hour before or 2 hours after meals.  But you may need to take iron with food to avoid an upset stomach. ¨ Do not take antacids or drink milk or caffeine drinks (such as coffee, tea, or cola) at the same time or within 2 hours of the time that you take your iron. They can make it hard for your body to absorb the iron. ¨ Vitamin C (from food or supplements) helps your body absorb iron. Try taking iron pills with a glass of orange juice or some other food that is high in vitamin C, such as citrus fruits. ¨ Iron pills may cause stomach problems, such as heartburn, nausea, diarrhea, constipation, and cramps. Be sure to drink plenty of fluids, and include fruits, vegetables, and fiber in your diet each day. Iron pills often make your bowel movements dark or green. ¨ If you forget to take an iron pill, do not take a double dose of iron the next time you take a pill. ¨ Keep iron pills out of the reach of small children. An overdose of iron can be very dangerous. · Follow your doctor's advice about eating iron-rich foods. These include red meat, shellfish, poultry, eggs, beans, raisins, whole-grain bread, and leafy green vegetables. · Steam vegetables to help them keep their iron content. When should you call for help? Call 911 anytime you think you may need emergency care. For example, call if: 
? · You have symptoms of a heart attack. These may include: ¨ Chest pain or pressure, or a strange feeling in the chest. 
¨ Sweating. ¨ Shortness of breath. ¨ Nausea or vomiting. ¨ Pain, pressure, or a strange feeling in the back, neck, jaw, or upper belly or in one or both shoulders or arms. ¨ Lightheadedness or sudden weakness. ¨ A fast or irregular heartbeat. After you call 911, the  may tell you to chew 1 adult-strength or 2 to 4 low-dose aspirin. Wait for an ambulance. Do not try to drive yourself. ? · You passed out (lost consciousness). ?Call your doctor now or seek immediate medical care if: 
? · You have new or increased shortness of breath. ? · You are dizzy or lightheaded, or you feel like you may faint. ? · Your fatigue and weakness continue or get worse. ? · You have any abnormal bleeding, such as: 
¨ Nosebleeds. ¨ Vaginal bleeding that is different (heavier, more frequent, at a different time of the month) than what you are used to. ¨ Bloody or black stools, or rectal bleeding. ¨ Bloody or pink urine. ? Watch closely for changes in your health, and be sure to contact your doctor if: 
? · You do not get better as expected. Where can you learn more? Go to http://swapnil-speedy.info/. Enter R301 in the search box to learn more about \"Anemia: Care Instructions. \" Current as of: October 13, 2016 Content Version: 11.4 © 9722-8283 [employer]. Care instructions adapted under license by [employer] (which disclaims liability or warranty for this information). If you have questions about a medical condition or this instruction, always ask your healthcare professional. [address] any warranty or liability for your use of this information. Please provide this summary of care documentation to your next provider. Your primary care clinician is listed as Larry Gilmore. If you have any questions after today's visit, please call [phone].

## 2018-05-01 NOTE — PROGRESS NOTES
Hematology/Oncology  Progress Note    Name: Chastity Crockett  Date: 2018  : 1965    PCP: Félix Blue MD     Ms. Jolene Garcia is a 45year old female who was seen for management of her rheumatoid arthritis, leukocytosis, and thrombocytosis. Current therapy: Remicade 4 mg/kg bodyweight ever 6 weeks intravenously    Subjective:     Ms. Jolene Garcia is a 45year-old [facility] American woman who has severe rheumatoid arthritis. She receives Remicade every 6 weeks. She also suffers from fibromyalgia and had an elevated WBC count and platelet count of undefined etiology. Today she has no new complaints to report. She continues to complain of back pain. She is using a narcotic based regimen for pain control. She states this provides some relief, but the pain is gradually worsening. She reports that the Remicade has provided a significant degree of relief from the severe rheumatoid arthritis symptoms. The patient is continuing to use her cane for mobility support. She denies any recent fevers or recurrent infections. The patient reports that her appetite has continued to improve. She also states her energy level is continuing to improve as well. She has no new complaints or concerns to report, at this time.        Past Medical History:   Diagnosis Date    Abdominal pain, unspecified site     Acute otitis media     Acute pharyngitis     Anxiety     Arthritis     Autoimmune disease ([address]     Back injury     Backache     herniated disc in lower back    Blurred vision     Chest pain, unspecified     abnormal EKG    Chronic airway obstruction, not elsewhere classified     Chronic back pain     Chronic pain     COPD     Depression     Diabetes (HCC)     Dizziness     Dizziness and giddiness     possible orthostatic changes, vasovagal, autonomic dysfunction from diabetic neuropathy    Encounters for unspecified administrative purpose     Feeling anxious     Fibromyalgia     Headache(784.0)     Hemorrhoid     Herniated disc     at L5    Hypercholesterolemia     Hyperglycemia     Hypertension     Joint pain     Leukocytosis, unspecified     Major depressive disorder, single episode, mild (HCC)     Mental disorder     depression, anxiety, bipolar and PTSD    Neuropathy     Obesity, unspecified     Other chest pain     Phobic disorders     Rheumatoid arthritis (HCC)     Seasonal allergies     Shortness of breath     normal EF    Streptococcal sore throat     Type II or unspecified type diabetes mellitus with unspecified complication, uncontrolled     Unspecified hereditary and idiopathic peripheral neuropathy     Vitamin D deficiency      Past Surgical History:   Procedure Laterality Date    HX CHOLECYSTECTOMY  1994    HX GYN  2005    partial Hysterectomy    HX OTHER SURGICAL  12-1-15    had ingrown toenail removed from big toe, both feet    HX TUBAL LIGATION      1995     Social History     Social History    Marital status:      Spouse name: N/A    Number of children: N/A    Years of education: N/A     Occupational History    Not on file.      Social History Main Topics    Smoking status: Former Smoker    Smokeless tobacco: Never Used    Alcohol use Yes      Comment: socially    Drug use: No    Sexual activity: Yes     Partners: Male     Birth control/ protection: Condom     Other Topics Concern    Not on file     Social History Narrative     Family History   Problem Relation Age of Onset    Diabetes Other     Alcohol abuse Mother     Arthritis-osteo Mother     Diabetes Mother     Elevated Lipids Mother     Headache Mother     Heart Disease Mother    Yamila He Migraines Mother     Psychiatric Disorder Mother     Alcohol abuse Father    Suze Mamadou Father     Cancer Father     Headache Father     Heart Disease Father     Lung Disease Father     Migraines Father     Alcohol abuse Sister     Headache Sister     Diabetes Sister    Yamila He Heart Disease Sister     Migraines Sister     Psychiatric Disorder Sister     Headache Brother     Diabetes Brother     Elevated Lipids Brother     Heart Disease Brother     Migraines Brother     Psychiatric Disorder Brother     Cancer Maternal Aunt     Alcohol abuse Maternal Aunt     Diabetes Maternal Aunt     Headache Maternal Aunt     Lung Disease Maternal Aunt     Migraines Maternal Aunt     Headache Maternal Uncle     Migraines Maternal Uncle     Stroke Maternal Uncle     Psychiatric Disorder Maternal Uncle     Headache Paternal Aunt     Migraines Paternal Aunt     Headache Paternal Uncle     Hypertension Paternal Uncle     Migraines Paternal Uncle     Stroke Paternal Uncle     Headache Maternal Grandmother     Migraines Maternal Grandmother     Stroke Maternal Grandmother     Headache Maternal Grandfather     Migraines Maternal Grandfather     Stroke Maternal Grandfather     Headache Paternal Grandmother     Hypertension Paternal Grandmother     Lung Disease Paternal Grandmother     Migraines Paternal Grandmother     Headache Paternal Grandfather     Cancer Paternal Grandfather     Migraines Paternal Grandfather      Current Outpatient Prescriptions   Medication Sig Dispense Refill    oxyCODONE-acetaminophen (PERCOCET 10)  mg per tablet Take 1 Tab by mouth every six (6) hours as needed for Pain for up to 15 days. Max Daily Amount: 4 Tabs. 60 Tab 0    ibuprofen (MOTRIN) 400 mg tablet Take 1 Tab by mouth every six (6) hours as needed for Pain. 20 Tab 0    anagrelide (AGRYLIN) 0.5 mg capsule Take 1 Cap by mouth two (2) times a day.  Indications: ESSENTIAL THROMBOCYTOSIS 60 Cap 6    amitriptyline (ELAVIL) 25 mg tablet       atenolol (TENORMIN) 50 mg tablet       clotrimazole-betamethasone (LOTRISONE) topical cream       DULoxetine (CYMBALTA) 30 mg capsule       gabapentin (NEURONTIN) 300 mg capsule       LANTUS 100 unit/mL injection       metFORMIN (GLUCOPHAGE) 500 mg tablet       QUEtiapine (SEROQUEL) 25 mg tablet       BD INSULIN SYRINGE ULTRA-FINE 1 mL 31 gauge x 15/64\" syrg       albuterol sulfate (PROVENTIL;VENTOLIN) 2.5 mg/0.5 mL nebu nebulizer solution by Nebulization route once. Is now using the actual nebulizer machine, for current bronchitis episode (Aug/Sept. 2017)      methocarbamol (ROBAXIN-750) 750 mg tablet Take 1 Tab by mouth three (3) times daily. 15 Tab 0    insulin aspart protamine/insulin aspart (NOVOLOG MIX 70-30) 100 unit/mL (70-30) injection 10 Units by SubCUTAneous route two (2) times a day.  clonazePAM (KLONOPIN) 2 mg tablet Take 2 mg by mouth daily. Indications: PANIC DISORDER      folic acid 557 mcg tablet Take 400 mcg by mouth daily.  haloperidol (HALDOL) 5 mg tablet Take 2.5 mg by mouth nightly.  QUEtiapine (SEROQUEL) 100 mg tablet Take 25 mg by mouth. Takes 25 mg Q AM, and 100 mg, Q PM      DULoxetine (CYMBALTA) 20 mg capsule Take 20 mg by mouth daily.  albuterol (PROAIR HFA) 90 mcg/actuation inhaler Take 1 Puff by inhalation every six (6) hours as needed for Wheezing. 1 Inhaler 0    ondansetron hcl (ZOFRAN, AS HYDROCHLORIDE,) 4 mg tablet Take 1 Tab by mouth every eight (8) hours as needed for Nausea. 12 Tab 0    methotrexate (RHEUMATREX) 2.5 mg tablet Take 5 mg by mouth daily.  metFORMIN (GLUCOPHAGE) 1,000 mg tablet Take 500 mg by mouth two (2) times a day.  Cholecalciferol, Vitamin D3, 5,000 unit Tab Take 5,000 Units by mouth every seven (7) days.  atenolol (TENORMIN) 25 mg tablet Take 25 mg by mouth daily. Indications: HYPERTENSION      hydroxychloroquine (PLAQUENIL) 200 mg tablet Take 200 mg by mouth two (2) times a day.  furosemide (LASIX) 40 mg tablet Take  by mouth daily.  INFLIXIMAB (REMICADE IV) 344 mg by IntraVENous route once as needed. remicade will be every 8 weeks       amitriptyline (ELAVIL) 150 mg tablet Take 10 mg by mouth nightly.     Indications: DEPRESSION      nitroglycerin (NITROLINGUAL) 0.4 mg/dose spray 1 Spray by SubLINGual route every five (5) minutes as needed.  losartan-hydrochlorothiazide (HYZAAR) 50-12.5 mg per tablet Take 1 Tab by mouth daily.  INSULIN GLARGINE,HUM. REC. ANLOG (LANTUS SC) 5 Units by SubCUTAneous route daily. Review of Systems  Constitutional: The patient has no acute distress or discomfort. HEENT: The patient denies recent head trauma, eye pain, blurred vision,  hearing deficit, oropharyngeal mucosal pain or lesions, and the patient denies throat pain or discomfort. Lymphatics: The patient denies palpable peripheral lymphadenopathy. Hematologic: The patient denies having bruising, bleeding, or progressive fatigue. Respiratory: Patient denies having shortness of breath, cough, sputum production, fever, or dyspnea on exertion. Cardiovascular: The patient denies having leg pain, leg swelling, heart palpitations, chest permit, chest pain, or lightheadedness. The patient denies having dyspnea on exertion. Gastrointestinal: The patient denies having nausea, emesis, or diarrhea. The patient denies having any hematemesis or blood in the stool. Genitourinary: Patient denies having urinary urgency, frequency, or dysuria. The patient denies having blood in the urine. Psychological: The patient denies having symptoms of nervousness, anxiety, depression, or thoughts of harming himself some of this. Skin: Patient denies having skin rashes, skin, ulcerations, or unexplained itching or pruritus. Musculoskeletal: The patient complains of generalized back pain. Objective:     Visit Vitals    /81    Pulse (!) 102    Temp 98.5 °F (36.9 °C) (Oral)    Wt 98.9 kg (218 lb)    BMI 30.4 kg/m2     ECOG PS=0 Pain score 4-5/10     Physical Exam:   Gen. Appearance: The patient is in no acute distress. Skin: There is no bruise or rash. HEENT: The exam is unremarkable. Neck: Supple without lymphadenopathy or thyromegaly.   Lungs: Clear to auscultation and percussion; there are no wheezes or rhonchi. Heart: Regular rate and rhythm; there are no murmurs, gallops, or rubs. Abdomen: Bowel sounds are present and normal.  There is no guarding, tenderness, or hepatosplenomegaly. Extremities: There is no clubbing, cyanosis, or edema. Neurologic: There are no focal neurologic deficits. Lymphatics: There is no palpable peripheral lymphadenopathy. Musculoskeletal: The patient has full range of motion at all joints. However, she complains of pain on ambulation due to the severe rheumatoid arthritis. There is  evidence of joint deformity or effusions in the hands and knees. There is no focal joint tenderness. She is using a cane for support and mobility. Psychological/psychiatric: There is no clinical evidence of anxiety, depression, or melancholy. Lab data:      Results for orders placed or performed during the hospital encounter of 04/24/18   CBC WITH 3 PART DIFF     Status: None   Result Value Ref Range Status    WBC 11.1 4.5 - 13.0 K/uL Final    RBC 4.88 4.10 - 5.10 M/uL Final    HGB 13.2 12.0 - 16.0 g/dL Final    HCT 40.2 36 - 48 % Final    MCV 82.4 78 - 102 FL Final    MCH 27.0 25.0 - 35.0 PG Final    MCHC 32.8 31 - 37 g/dL Final    RDW 13.2 11.5 - 14.5 % Final    PLATELET 364 933 - 888 K/uL Final    NEUTROPHILS 68 40 - 70 % Final    MIXED CELLS 3 0.1 - 17 % Final    LYMPHOCYTES 29 14 - 44 % Final    ABS. NEUTROPHILS 7.5 1.8 - 9.5 K/UL Final    ABS. MIXED CELLS 0.4 0.0 - 2.3 K/uL Final    ABS. LYMPHOCYTES 3.2 1.1 - 5.9 K/UL Final     Comment: Test performed at [place] [address] Location. Results Reviewed by Medical Director. DF AUTOMATED   Final           Assessment:     1. Essential thrombocytosis ([address]    2. Chronic pain syndrome    3. Rheumatoid arthritis, involving unspecified site, unspecified rheumatoid factor presence ([address]      Plan:   Leukocytosis (recurrent and persisting):  I have explained to the patient that the etiology of her leukocytosis remains undefined. A previous flow cytometry did not reveal any  evidence of an immunophenotypic abnormality such as an evolving chronic lymphoproliferative disorder or myeloproliferative disorder. The CBCs will continue to be monitored every 6 weeks. The CBC from today shows her WBC count is currently normal at 10.7 with an absolute neutrophil count of 6.1 and absolute lymphocyte count of 3.8. Essential thrombocytosis/thrombocytosis: The current CBC shows that the platelet count is now 404,000. The platelet count is being monitored every 6 weeks. The patient was previously started on anagrelide 0.5 mg one tablet twice daily. This medication will be continued, at the current dose. I have reenforced to the patient the importance of being complaint with the treatment plan. Rheumatoid arthritis: the patient will continue to receive Remicade as a primary treatment modality for her severe rheumatoid arthritis every 6 weeks. The dose of Remicade will be 344 mg given intravenously. The patient has continued to take  Methotrexate 5 mg p.o. daily and Plaquenil 200 mg twice daily. Fibromyalgia/chronic pain syndrome: The patient  continues to have generalized pain at times related to her underlying fibromyalgia. The patient receives her Percocet medication on a monthly basis as needed. A new prescription for the Percocet was provided at this time. Chronic anemia: I have explained to the patient the CBC from today shows her hemoglobin has remained normal at 13.3 g/dL with hematocrit of 38.8 %. I have recommended that she continue to take ferrous sulfate 325 mg by mouth once daily. We will see the patient back in 6 weeks for a complete reassessment.     Orders Placed This Encounter    COMPLETE CBC & AUTO DIFF WBC    InHouse CBC ([employer])     Standing Status:   Future     Number of Occurrences:   1     Standing Expiration Date:   5/8/2018       Daren Quiroga MD  5/1/2018

## 2018-05-04 ENCOUNTER — OFFICE VISIT (OUTPATIENT)
Dept: CARDIOLOGY CLINIC | Age: 53
End: 2018-05-04

## 2018-05-04 VITALS
HEIGHT: 70 IN | WEIGHT: 219 LBS | SYSTOLIC BLOOD PRESSURE: 116 MMHG | DIASTOLIC BLOOD PRESSURE: 65 MMHG | HEART RATE: 98 BPM | BODY MASS INDEX: 31.35 KG/M2 | OXYGEN SATURATION: 97 %

## 2018-05-04 DIAGNOSIS — R00.2 PALPITATION: ICD-10-CM

## 2018-05-04 DIAGNOSIS — M06.9 RHEUMATOID ARTHRITIS OF FOOT, UNSPECIFIED LATERALITY, UNSPECIFIED RHEUMATOID FACTOR PRESENCE: ICD-10-CM

## 2018-05-04 DIAGNOSIS — Z79.4 TYPE 2 DIABETES MELLITUS WITHOUT COMPLICATION, WITH LONG-TERM CURRENT USE OF INSULIN (HCC): ICD-10-CM

## 2018-05-04 DIAGNOSIS — E11.9 TYPE 2 DIABETES MELLITUS WITHOUT COMPLICATION, WITH LONG-TERM CURRENT USE OF INSULIN (HCC): ICD-10-CM

## 2018-05-04 DIAGNOSIS — R07.9 CHEST PAIN, UNSPECIFIED TYPE: Primary | ICD-10-CM

## 2018-05-04 DIAGNOSIS — D47.3 ESSENTIAL THROMBOCYTOSIS (HCC): ICD-10-CM

## 2018-05-04 DIAGNOSIS — F17.200 SMOKING: ICD-10-CM

## 2018-05-04 LAB
Lab: NORMAL
REFERENCE LAB,REFLB: NORMAL
TEST DESCRIPTION:,ATST: NORMAL

## 2018-05-04 NOTE — LETTER
Brandie Laron 1965 5/4/2018 Dear MD Jp 
 
I had the pleasure of evaluating  Ms. Sinclair Born in office today. Below are the relevant portions of my assessment and plan of care. ICD-10-CM ICD-9-CM 1. Chest pain, unspecified type R07.9 786.50 AMB POC EKG ROUTINE W/ 12 LEADS, INTER & REP  
   2D ECHO COMPLETE ADULT (TTE)  
   ECG,PT DEMAND EVENT,PRESYMPT MEMORY LOOP  
   SCHEDULE NUCLEAR STUDY  
 atypical 
/ angina,pericarditis,chest wall 2. Palpitation R00.2 785. 1 2D ECHO COMPLETE ADULT (TTE)  
   ECG,PT DEMAND EVENT,PRESYMPT MEMORY LOOP  
   SCHEDULE NUCLEAR STUDY ? arrhythmia 3. Rheumatoid arthritis of foot, unspecified laterality, unspecified rheumatoid factor presence ([facility] M06.9 714.0   
 on treatment 4. Essential thrombocytosis (HCC) D47.3 238.71   
 on treatment 5. Smoking F17.200 305.1   
 discussed cessation 6. Type 2 diabetes mellitus without complication, with long-term current use of insulin (HCC) E11.9 250.00   
 Z79.4 V58.67 Current Outpatient Prescriptions Medication Sig Dispense Refill  oxyCODONE-acetaminophen (PERCOCET 10)  mg per tablet Take 1 Tab by mouth every six (6) hours as needed for Pain for up to 15 days. Max Daily Amount: 4 Tabs. 60 Tab 0  
 anagrelide (AGRYLIN) 0.5 mg capsule Take 1 Cap by mouth two (2) times a day. Indications: ESSENTIAL THROMBOCYTOSIS 60 Cap 6  
 amitriptyline (ELAVIL) 25 mg tablet  clotrimazole-betamethasone (LOTRISONE) topical cream     
 DULoxetine (CYMBALTA) 30 mg capsule  gabapentin (NEURONTIN) 300 mg capsule  LANTUS 100 unit/mL injection  QUEtiapine (SEROQUEL) 25 mg tablet  BD INSULIN SYRINGE ULTRA-FINE 1 mL 31 gauge x 15/64\" syrg  albuterol sulfate (PROVENTIL;VENTOLIN) 2.5 mg/0.5 mL nebu nebulizer solution by Nebulization route once. Is now using the actual nebulizer machine, for current bronchitis episode (Aug/Sept. 2017)  insulin aspart protamine/insulin aspart (NOVOLOG MIX 70-30) 100 unit/mL (70-30) injection 10 Units by SubCUTAneous route two (2) times a day.  clonazePAM (KLONOPIN) 2 mg tablet Take 2 mg by mouth daily. Indications: PANIC DISORDER    
 folic acid 291 mcg tablet Take 400 mcg by mouth daily.  albuterol (PROAIR HFA) 90 mcg/actuation inhaler Take 1 Puff by inhalation every six (6) hours as needed for Wheezing. 1 Inhaler 0  
 ondansetron hcl (ZOFRAN, AS HYDROCHLORIDE,) 4 mg tablet Take 1 Tab by mouth every eight (8) hours as needed for Nausea. 12 Tab 0  
 methotrexate (RHEUMATREX) 2.5 mg tablet Take 5 mg by mouth daily.  metFORMIN (GLUCOPHAGE) 1,000 mg tablet Take 500 mg by mouth two (2) times a day.  Cholecalciferol, Vitamin D3, 5,000 unit Tab Take 5,000 Units by mouth every seven (7) days.  atenolol (TENORMIN) 25 mg tablet Take 25 mg by mouth daily. Indications: HYPERTENSION  hydroxychloroquine (PLAQUENIL) 200 mg tablet Take 200 mg by mouth two (2) times a day.  furosemide (LASIX) 40 mg tablet Take  by mouth daily.  INFLIXIMAB (REMICADE IV) 344 mg by IntraVENous route once as needed. remicade will be every 8 weeks  nitroglycerin (NITROLINGUAL) 0.4 mg/dose spray 1 Spray by SubLINGual route every five (5) minutes as needed.  losartan-hydrochlorothiazide (HYZAAR) 50-12.5 mg per tablet Take 1 Tab by mouth daily.  INSULIN GLARGINE,HUM. REC. ANLOG (LANTUS SC) 5 Units by SubCUTAneous route daily.  ibuprofen (MOTRIN) 400 mg tablet Take 1 Tab by mouth every six (6) hours as needed for Pain. 20 Tab 0  
 methocarbamol (ROBAXIN-750) 750 mg tablet Take 1 Tab by mouth three (3) times daily. 15 Tab 0  
 haloperidol (HALDOL) 5 mg tablet Take 2.5 mg by mouth nightly. Orders Placed This Encounter  ECG,PT DEMAND EVENT,PRESYMPT MEMORY LOOP  
 SCHEDULE NUCLEAR STUDY Exercise stress test  
  Standing Status:   Future Standing Expiration Date:   5/4/2019  2D ECHO COMPLETE ADULT (TTE) Standing Status:   Future Standing Expiration Date:   10/31/2018 Order Specific Question:   Reason for Exam: Answer:   see diagnosis  AMB POC EKG ROUTINE W/ 12 LEADS, INTER & REP Order Specific Question:   Reason for Exam: Answer:   chest pain If you have questions, please do not hesitate to call me. I look forward to following Ms. Chrissy Chambers along with you. Sincerely, Sharmaine Akhtar MD

## 2018-05-04 NOTE — PROGRESS NOTES
HISTORY OF PRESENT ILLNESS  Meg Degroot is a 45 y.o. female. New Patient   The history is provided by the patient. Associated symptoms include chest pain and shortness of breath. Pertinent negatives include no abdominal pain and no headaches. Chest Pain    The history is provided by the patient. This is a new problem. The current episode started more than 1 week ago. The problem has been resolved. The problem occurs daily. The pain is associated with exertion and rest. The pain is present in the substernal region and left side. The pain is mild. The quality of the pain is described as pressure-like. The pain does not radiate. Associated symptoms include palpitations and shortness of breath. Pertinent negatives include no abdominal pain, no claudication, no cough, no dizziness, no fever, no headaches, no hemoptysis, no nausea, no orthopnea, no PND, no sputum production, no vomiting and no weakness. Palpitations    The history is provided by the patient. This is a new problem. The current episode started more than 1 week ago. The problem has not changed since onset. The problem occurs every several days. The problem is associated with nothing. Associated symptoms include chest pain and shortness of breath. Pertinent negatives include no fever, no claudication, no orthopnea, no PND, no abdominal pain, no nausea, no vomiting, no headaches, no dizziness, no weakness, no cough, no hemoptysis and no sputum production. Shortness of Breath   The history is provided by the patient. This is a recurrent problem. The problem occurs intermittently. The problem has not changed since onset. Associated symptoms include chest pain. Pertinent negatives include no fever, no headaches, no cough, no sputum production, no hemoptysis, no wheezing, no PND, no orthopnea, no vomiting, no abdominal pain, no rash, no leg swelling and no claudication. The problem's precipitants include exercise.        Review of Systems Constitutional: Negative for chills and fever. HENT: Negative for nosebleeds. Eyes: Negative for blurred vision and double vision. Respiratory: Positive for shortness of breath. Negative for cough, hemoptysis, sputum production and wheezing. Cardiovascular: Positive for chest pain and palpitations. Negative for orthopnea, claudication, leg swelling and PND. Gastrointestinal: Negative for abdominal pain, heartburn, nausea and vomiting. Musculoskeletal: Negative for myalgias. Skin: Negative for rash. Neurological: Negative for dizziness, weakness and headaches. Endo/Heme/Allergies: Does not bruise/bleed easily.      Family History   Problem Relation Age of Onset    Diabetes Other     Alcohol abuse Mother    [facility] Arthritis-osteo Mother     Diabetes Mother     Elevated Lipids Mother     Headache Mother     Heart Disease Mother    [facility] Migraines Mother     Psychiatric Disorder Mother     Alcohol abuse Father    Suze Mamadou Father     Cancer Father     Headache Father     Heart Disease Father     Lung Disease Father     Migraines Father     Heart Surgery Father     Alcohol abuse Sister     Headache Sister     Diabetes Sister     Heart Disease Sister     Migraines Sister     Psychiatric Disorder Sister     Headache Brother     Diabetes Brother     Elevated Lipids Brother     Heart Disease Brother     Migraines Brother     Psychiatric Disorder Brother     Coronary Artery Disease Brother     Cancer Maternal Aunt     Alcohol abuse Maternal Aunt     Diabetes Maternal Aunt     Headache Maternal Aunt     Lung Disease Maternal Aunt     Migraines Maternal Aunt     Headache Maternal Uncle     Migraines Maternal Uncle     Stroke Maternal Uncle     Psychiatric Disorder Maternal Uncle     Headache Paternal Aunt     Migraines Paternal Aunt     Headache Paternal Uncle     Hypertension Paternal Uncle     Migraines Paternal Uncle     Stroke Paternal Uncle     Headache Maternal Grandmother     Migraines Maternal Grandmother     Stroke Maternal Grandmother     Headache Maternal Grandfather     Migraines Maternal Grandfather     Stroke Maternal Grandfather     Headache Paternal Grandmother     Hypertension Paternal Grandmother     Lung Disease Paternal Grandmother     Migraines Paternal Grandmother     Headache Paternal Grandfather     Cancer Paternal Grandfather     Migraines Paternal Grandfather        Past Medical History:   Diagnosis Date    Abdominal pain, unspecified site     Acute otitis media     Acute pharyngitis     Anxiety     Arthritis     Autoimmune disease ([address]     Back injury     Backache     herniated disc in lower back    Blurred vision     Chest pain 5/4/2018    Chest pain, unspecified     abnormal EKG    Chronic airway obstruction, not elsewhere classified     Chronic back pain     Chronic pain     COPD     Depression     Diabetes ([address]     Dizziness     Dizziness and giddiness     possible orthostatic changes, vasovagal, autonomic dysfunction from diabetic neuropathy    Encounters for unspecified administrative purpose     Essential thrombocytosis ([address] 1/6/2016    Feeling anxious     Fibromyalgia     Headache(784.0)     Hemorrhoid     Herniated disc     at L5    Hypercholesterolemia     Hyperglycemia     Hypertension     Joint pain     Leukocytosis, unspecified     Major depressive disorder, single episode, mild ([address]     Mental disorder     depression, anxiety, bipolar and PTSD    Neuropathy     Obesity, unspecified     Other chest pain     Palpitation 5/4/2018    ?  arrhythmia    Phobic disorders     Rheumatoid arthritis (HCC)     Rheumatoid arthritis of foot ([address] 5/4/2018    on treatment    Seasonal allergies     Shortness of breath     normal EF    Smoking 5/4/2018    discussed cessation    Streptococcal sore throat     Type 2 diabetes mellitus without complication, with long-term current use of insulin ([facility] [address] [facility] 5/4/2018    Type II or unspecified type diabetes mellitus with unspecified complication, uncontrolled     Unspecified hereditary and idiopathic peripheral neuropathy     Vitamin D deficiency        Past Surgical History:   Procedure Laterality Date    HX CHOLECYSTECTOMY  1994    HX GYN  2005    partial Hysterectomy    HX OTHER SURGICAL  12-1-15    had ingrown toenail removed from big toe, both feet    HX TUBAL LIGATION      1995       Social History   Substance Use Topics    Smoking status: Former Smoker    Smokeless tobacco: Never Used      Comment: 5 cigarettes day    Alcohol use Yes      Comment: socially       Allergies   Allergen Reactions    Levaquin [Levofloxacin] Anaphylaxis    Pollen Extracts Unable to Obtain       Prior to Admission medications    Medication Sig Start Date End Date Taking? Authorizing Provider   oxyCODONE-acetaminophen (PERCOCET 10)  mg per tablet Take 1 Tab by mouth every six (6) hours as needed for Pain for up to 15 days. Max Daily Amount: 4 Tabs. 5/1/18 5/16/18 Yes Lor Chacon MD   anagrelide (AGRYLIN) 0.5 mg capsule Take 1 Cap by mouth two (2) times a day. Indications: ESSENTIAL THROMBOCYTOSIS 11/14/17  Yes Lor Chacon MD   amitriptyline (ELAVIL) 25 mg tablet  8/22/17  Yes Historical Provider   clotrimazole-betamethasone (LOTRISONE) topical cream  9/21/17  Yes Historical Provider   DULoxetine (CYMBALTA) 30 mg capsule  9/27/17  Yes Historical Provider   gabapentin (NEURONTIN) 300 mg capsule  8/22/17  Yes Historical Provider   LANTUS 100 unit/mL injection  8/28/17  Yes Historical Provider   QUEtiapine (SEROQUEL) 25 mg tablet  9/22/17  Yes Historical Provider   BD INSULIN SYRINGE ULTRA-FINE 1 mL 31 gauge x 15/64\" syrg  9/22/17  Yes Historical Provider   albuterol sulfate (PROVENTIL;VENTOLIN) 2.5 mg/0.5 mL nebu nebulizer solution by Nebulization route once.  Is now using the actual nebulizer machine, for current bronchitis episode (Aug/Sept. 2017)   Yes Historical Provider   insulin aspart protamine/insulin aspart (NOVOLOG MIX 70-30) 100 unit/mL (70-30) injection 10 Units by SubCUTAneous route two (2) times a day. Yes Historical Provider   clonazePAM (KLONOPIN) 2 mg tablet Take 2 mg by mouth daily. Indications: PANIC DISORDER   Yes Historical Provider   folic acid 938 mcg tablet Take 400 mcg by mouth daily. Yes Historical Provider   albuterol (PROAIR HFA) 90 mcg/actuation inhaler Take 1 Puff by inhalation every six (6) hours as needed for Wheezing. 2/6/16  Yes Rhys Wilder DO   ondansetron hcl (ZOFRAN, AS HYDROCHLORIDE,) 4 mg tablet Take 1 Tab by mouth every eight (8) hours as needed for Nausea. 12/9/15  Yes Rhys Wilder DO   methotrexate (RHEUMATREX) 2.5 mg tablet Take 5 mg by mouth daily. Yes Historical Provider   metFORMIN (GLUCOPHAGE) 1,000 mg tablet Take 500 mg by mouth two (2) times a day. Yes Historical Provider   Cholecalciferol, Vitamin D3, 5,000 unit Tab Take 5,000 Units by mouth every seven (7) days. Yes Lelia Palomares MD   atenolol (TENORMIN) 25 mg tablet Take 25 mg by mouth daily. Indications: HYPERTENSION   Yes Historical Provider   hydroxychloroquine (PLAQUENIL) 200 mg tablet Take 200 mg by mouth two (2) times a day. Yes Historical Provider   furosemide (LASIX) 40 mg tablet Take  by mouth daily. Yes Historical Provider   INFLIXIMAB (REMICADE IV) 344 mg by IntraVENous route once as needed. remicade will be every 8 weeks    Yes Historical Provider   nitroglycerin (NITROLINGUAL) 0.4 mg/dose spray 1 Spray by SubLINGual route every five (5) minutes as needed. Yes Historical Provider   losartan-hydrochlorothiazide (HYZAAR) 50-12.5 mg per tablet Take 1 Tab by mouth daily. Yes Historical Provider   INSULIN GLARGINE,HUM. REC. ANLOG (LANTUS SC) 5 Units by SubCUTAneous route daily. Yes Historical Provider   ibuprofen (MOTRIN) 400 mg tablet Take 1 Tab by mouth every six (6) hours as needed for Pain.  11/30/17   Demetra Sandoval PA-C methocarbamol (ROBAXIN-750) 750 mg tablet Take 1 Tab by mouth three (3) times daily. 5/2/17   MALACHI Mckinney   haloperidol (HALDOL) 5 mg tablet Take 2.5 mg by mouth nightly. Historical Provider         Visit Vitals    /65    Pulse 98    Ht 5' 10\" (1.778 m)    Wt 99.3 kg (219 lb)    SpO2 97%    BMI 31.42 kg/m2         Physical Exam   Constitutional: She is oriented to person, place, and time. She appears well-developed and well-nourished. HENT:   Head: Normocephalic and atraumatic. Eyes: Conjunctivae are normal.   Neck: Neck supple. No JVD present. No tracheal deviation present. No thyromegaly present. Cardiovascular: Normal rate and regular rhythm. PMI is not displaced. Exam reveals no gallop, no S3 and no decreased pulses. No murmur heard. Pulmonary/Chest: No respiratory distress. She has no wheezes. She has no rales. She exhibits no tenderness. Abdominal: Soft. There is no tenderness. Musculoskeletal: She exhibits no edema. Neurological: She is alert and oriented to person, place, and time. Skin: Skin is warm. Psychiatric: She has a normal mood and affect. Ms. Ashvin Fowler has a reminder for a \"due or due soon\" health maintenance. I have asked that she contact her primary care provider for follow-up on this health maintenance. CARDIOLOGY STUDIES 5/1/2011   EKG Result possible INF MI   Myocardial Perfusion Scan Result nl scan, EF 55%   Echocardiogram - Complete Result EF 75%   Some recent data might be hidden     5/2018  sr,iwmi    Assessment         ICD-10-CM ICD-9-CM    1. Chest pain, unspecified type R07.9 786.50 AMB POC EKG ROUTINE W/ 12 LEADS, INTER & REP      2D ECHO COMPLETE ADULT (TTE)      ECG,PT DEMAND EVENT,PRESYMPT MEMORY LOOP      SCHEDULE NUCLEAR STUDY    atypical  / angina,pericarditis,chest wall   2. Palpitation R00.2 785. 1 2D ECHO COMPLETE ADULT (TTE)      ECG,PT DEMAND EVENT,PRESYMPT MEMORY LOOP      SCHEDULE NUCLEAR STUDY    ? arrhythmia   3. Rheumatoid arthritis of foot, unspecified laterality, unspecified rheumatoid factor presence (HCC) M06.9 714.0     on treatment   4. Essential thrombocytosis (HCC) D47.3 238.71     on treatment   5. Smoking F17.200 305.1     discussed cessation   6. Type 2 diabetes mellitus without complication, with long-term current use of insulin (HCC) E11.9 250.00     Z79.4 V58.67        Medications Discontinued During This Encounter   Medication Reason    amitriptyline (ELAVIL) 150 mg tablet Formulary Change    metFORMIN (GLUCOPHAGE) 500 mg tablet Formulary Change    QUEtiapine (SEROQUEL) 100 mg tablet Formulary Change    DULoxetine (CYMBALTA) 20 mg capsule Duplicate Order    atenolol (TENORMIN) 50 mg tablet Formulary Change       Orders Placed This Encounter    ECG,PT DEMAND EVENT,PRESYMPT MEMORY LOOP    SCHEDULE NUCLEAR STUDY     Exercise stress test     Standing Status:   Future     Standing Expiration Date:   5/4/2019    2D ECHO COMPLETE ADULT (TTE)     Standing Status:   Future     Standing Expiration Date:   10/31/2018     Order Specific Question:   Reason for Exam:     Answer:   see diagnosis    AMB POC EKG ROUTINE W/ 12 LEADS, INTER & REP     Order Specific Question:   Reason for Exam:     Answer:   chest pain       Follow-up Disposition:  Return for F/u after tests.

## 2018-05-04 NOTE — PROGRESS NOTES
1. Have you been to the ER, urgent care clinic since your last visit? Hospitalized since your last visit? Yes MV for chest pain but wasnt seen    2. Have you seen or consulted any other health care providers outside of the [address] [place] [address] since your last visit? Include any pap smears or colon screening.  Yes, pcp

## 2018-05-04 NOTE — MR AVS SNAPSHOT
[address] [facility] [address] 
 
 
 [address], [address] [facility] [address] 
[phone] Patient: Savannah Colon MRN: [id number] :[id number] Visit Information Date & Time Provider Department Dept. Phone Encounter #  
 2018 12:15 PM Jim Gleason MD [facility] [address] [place] [address] [id number] Follow-up Instructions Return for F/u after tests. Your Appointments 2018  9:00 AM  
PROCEDURE with [facility] [place] ([facility]) Appt Note: boggs with same day echo if possible [address], [address] [facility] [address]  
[address] [facility] [address] [place] [address] 2018 11:30 AM  
Office Visit with Maria M Montemayor MD  
[facility] Appt Note: OV  
 [facility] [address] [facility] [address]  
[phone]  
  
   
 [facility] [address] 6/15/2018  9:15 AM  
Office Visit with Jim Gleason MD  
[facility]) Appt Note: post nuc/echo/event [address], [address] [facility] [address]  
[address], [address] [facility] [address] [place] [address] Upcoming Health Maintenance Date Due  
 FOOT EXAM Q1 1973 EYE EXAM RETINAL OR DILATED Q1 1973 Pneumococcal 19-64 Highest Risk (1 of 3 - PCV13) 1982 DTaP/Tdap/Td series (1 - Tdap) 1984 PAP AKA CERVICAL CYTOLOGY 1984 FOBT Q 1 YEAR AGE 50-75 2013 MEDICARE YEARLY EXAM 3/14/2018 Influenza Age 5 to Adult 2018 HEMOGLOBIN A1C Q6M 10/30/2018 MICROALBUMIN Q1 2019 LIPID PANEL Q1 2019 BREAST CANCER SCRN MAMMOGRAM 2019 Allergies as of 2018  Review Complete On: 2018 By: Jim Gleason MD  
  
 Severity Noted Reaction Type Reactions Levaquin [Levofloxacin] High  Systemic Anaphylaxis Pollen Extracts    Unable to Obtain Current Immunizations  Reviewed on 4/24/2018 Name Date Influenza Vaccine 9/22/2017, 10/21/2016, 11/3/2015, 11/10/2014, 10/25/2013 Influenza Vaccine Whole 9/10/2012 Not reviewed this visit You Were Diagnosed With   
  
 Codes Comments Chest pain, unspecified type    -  Primary ICD-10-CM: R07.9 ICD-9-CM: 786.50 atypical 
/ angina,pericarditis,chest wall Palpitation     ICD-10-CM: R00.2 ICD-9-CM: 785.1 ? arrhythmia Rheumatoid arthritis of foot, unspecified laterality, unspecified rheumatoid factor presence ([facility]     ICD-10-CM: M06.9 ICD-9-CM: 714.0 on treatment Essential thrombocytosis (HCC)     ICD-10-CM: D47.3 ICD-9-CM: 238.71 on treatment Smoking     ICD-10-CM: F17.200 ICD-9-CM: 305.1 discussed cessation Type 2 diabetes mellitus without complication, with long-term current use of insulin (HCC)     ICD-10-CM: E11.9, Z79.4 ICD-9-CM: 250.00, V58.67 Vitals BP Pulse Height(growth percentile) Weight(growth percentile) SpO2 BMI  
 116/65 98 5' 10\" (1.778 m) 219 lb (99.3 kg) 97% 31.42 kg/m2 OB Status Smoking Status Hysterectomy Former Smoker Vitals History BMI and BSA Data Body Mass Index Body Surface Area  
 31.42 kg/m 2 2.21 m 2 Preferred Pharmacy Pharmacy Name [address] [place] [address] [phone] Your Updated Medication List  
  
   
This list is accurate as of 5/4/18  1:01 PM.  Always use your most recent med list.  
  
  
  
  
 * albuterol sulfate 2.5 mg/0.5 mL Nebu nebulizer solution Commonly known as:  PROVENTIL;VENTOLIN  
by Nebulization route once. Is now using the actual nebulizer machine, for current bronchitis episode (Aug/Sept. 2017) * albuterol 90 mcg/actuation inhaler Commonly known as:  PROAIR HFA Take 1 Puff by inhalation every six (6) hours as needed for Wheezing. amitriptyline 25 mg tablet Commonly known as:  ELAVIL  
  
 anagrelide 0.5 mg capsule Commonly known as:  Bula Blacksmith Take 1 Cap by mouth two (2) times a day. Indications: ESSENTIAL THROMBOCYTOSIS  
  
 atenolol 25 mg tablet Commonly known as:  TENORMIN Take 25 mg by mouth daily. Indications: HYPERTENSION  
  
 BD INSULIN SYRINGE ULTRA-FINE 1 mL 31 gauge x 15/64\" Syrg Generic drug:  insulin syringe-needle U-100  
  
 cholecalciferol (VITAMIN D3) 5,000 unit Tab tablet Commonly known as:  VITAMIN D3 Take 5,000 Units by mouth every seven (7) days. clotrimazole-betamethasone topical cream  
Commonly known as:  Miguel Sand DULoxetine 30 mg capsule Commonly known as:  CYMBALTA  
  
 folic acid 670 mcg tablet Take 400 mcg by mouth daily. furosemide 40 mg tablet Commonly known as:  LASIX Take  by mouth daily. gabapentin 300 mg capsule Commonly known as:  NEURONTIN  
  
 haloperidol 5 mg tablet Commonly known as:  HALDOL Take 2.5 mg by mouth nightly. HYZAAR 50-12.5 mg per tablet Generic drug:  losartan-hydroCHLOROthiazide Take 1 Tab by mouth daily. ibuprofen 400 mg tablet Commonly known as:  MOTRIN Take 1 Tab by mouth every six (6) hours as needed for Pain. KlonoPIN 2 mg tablet Generic drug:  clonazePAM  
Take 2 mg by mouth daily. Indications: PANIC DISORDER  
  
 * LANTUS SC  
5 Units by SubCUTAneous route daily. * LANTUS U-100 INSULIN 100 unit/mL injection Generic drug:  insulin glargine  
  
 metFORMIN 1,000 mg tablet Commonly known as:  GLUCOPHAGE Take 500 mg by mouth two (2) times a day. methocarbamol 750 mg tablet Commonly known as:  [id number] Take 1 Tab by mouth three (3) times daily. methotrexate 2.5 mg tablet Commonly known as:  Marletta Frame Take 5 mg by mouth daily. nitroglycerin 400 mcg/spray spray Commonly known as:  NITROLINGUAL  
1 [place] by SubLINGual route every five (5) minutes as needed. NovoLOG Mix 70-30 U-100 Insuln 100 unit/mL (70-30) injection Generic drug:  insulin aspart protamine/insulin aspart 10 Units by SubCUTAneous route two (2) times a day. ondansetron hcl 4 mg tablet Commonly known as:  Kittery Appl Take 1 Tab by mouth every eight (8) hours as needed for Nausea. oxyCODONE-acetaminophen  mg per tablet Commonly known as:  PERCOCET 10 Take 1 Tab by mouth every six (6) hours as needed for Pain for up to 15 days. Max Daily Amount: 4 Tabs. PLAQUENIL 200 mg tablet Generic drug:  hydroxychloroquine Take 200 mg by mouth two (2) times a day. QUEtiapine 25 mg tablet Commonly known as:  SEROquel REMICADE IV  
344 mg by IntraVENous route once as needed. remicade will be every 8 weeks * Notice: This list has 4 medication(s) that are the same as other medications prescribed for you. Read the directions carefully, and ask your doctor or other care provider to review them with you. We Performed the Following AMB POC EKG ROUTINE W/ 12 LEADS, INTER & REP [28285 CPT(R)]   
 ECG,PT DEMAND EVENT,PRESYMPT MEMORY LOOP [81515 CPT(R)] Follow-up Instructions Return for F/u after tests. To-Do List   
 05/07/2018 Cardiac Services:  2D ECHO COMPLETE ADULT (TTE)   
  
 05/11/2018 Nursing:  SCHEDULE NUCLEAR STUDY   
  
 06/05/2018 10:00 AM  
  Appointment with [address] at [place] [address] ([phone])  
  
 07/17/2018 10:00 AM  
  Appointment with [address] at [place] [address] ([phone]) Please provide this summary of care documentation to your next provider. Your primary care clinician is listed as Jp. If you have any questions after today's visit, please call [phone].

## 2018-05-14 DIAGNOSIS — M06.9 RHEUMATOID ARTHRITIS, INVOLVING UNSPECIFIED SITE, UNSPECIFIED RHEUMATOID FACTOR PRESENCE: ICD-10-CM

## 2018-05-14 DIAGNOSIS — G89.4 CHRONIC PAIN SYNDROME: ICD-10-CM

## 2018-05-15 RX ORDER — OXYCODONE AND ACETAMINOPHEN 10; 325 MG/1; MG/1
1 TABLET ORAL
Qty: 60 TAB | Refills: 0 | Status: SHIPPED | OUTPATIENT
Start: 2018-05-15 | End: 2018-05-29 | Stop reason: SDUPTHER

## 2018-05-29 DIAGNOSIS — M06.9 RHEUMATOID ARTHRITIS, INVOLVING UNSPECIFIED SITE, UNSPECIFIED RHEUMATOID FACTOR PRESENCE: ICD-10-CM

## 2018-05-29 DIAGNOSIS — G89.4 CHRONIC PAIN SYNDROME: ICD-10-CM

## 2018-05-29 RX ORDER — OXYCODONE AND ACETAMINOPHEN 10; 325 MG/1; MG/1
1 TABLET ORAL
Qty: 60 TAB | Refills: 0 | Status: SHIPPED | OUTPATIENT
Start: 2018-05-29 | End: 2018-06-12 | Stop reason: SDUPTHER

## 2018-05-29 RX ORDER — ANAGRELIDE 0.5 MG/1
0.5 CAPSULE ORAL 2 TIMES DAILY
Qty: 60 CAP | Refills: 6 | Status: SHIPPED | OUTPATIENT
Start: 2018-05-29 | End: 2018-10-16 | Stop reason: SDUPTHER

## 2018-05-30 ENCOUNTER — OFFICE VISIT (OUTPATIENT)
Dept: CARDIOLOGY CLINIC | Age: 53
End: 2018-05-30

## 2018-05-30 DIAGNOSIS — R00.2 PALPITATION: ICD-10-CM

## 2018-05-30 DIAGNOSIS — R07.9 CHEST PAIN, UNSPECIFIED TYPE: ICD-10-CM

## 2018-05-30 DIAGNOSIS — R07.9 CHEST PAIN, UNSPECIFIED TYPE: Primary | ICD-10-CM

## 2018-05-30 PROBLEM — E11.21 TYPE 2 DIABETES WITH NEPHROPATHY (HCC): Status: ACTIVE | Noted: 2018-05-30

## 2018-05-30 NOTE — PROGRESS NOTES
[facility]  [address], [facility], [address] [place] [address], [place], [address] [place] [address]  [phone] [facility] 72 [phone]      Name: Harley Chatterjee         MRN#: [id number]      YOB: 1965     Gender: female Ht:5'10 \" Wt:219 lbs            Date of Rest/Stress Images: 5/30/2018   Referring Provider: Teri Cordero MD  Ordering Provider: Pili Chacon. Kylah Joseph MD, [facility] [place]  Technologist: Aneudy Aguilar. Juana MARIN, C.N.M.T. Diagnosis:   1. Chest pain, unspecified type    2. Palpitation          Rest/Stress Myoview SPECT Myocardial Perfusion Imaging with  Exercise / Lexiscan Stress and Gated SPECT Imaging      PROCEDURE:      Myocardial perfusion imaging was performed at rest approximately 30 mins following the intravenous injection,(Right hand ) of 12.1 mCi of Tc99m Myoview for evaluation of myocardial function and perfusion at rest.     Baseline Data:    Baseline EKG reveals sinus rhythm, possible old inferior MI. Baseline heart rate is 78. Baseline blood pressure is 128/76. Procedure: The patient was exercised initially using the standard Heath protocol for 4 minutes, 25 seconds. The patient had shortness of breath and exercise was stopped because of that. No chest pain was noted. Heart rate increased from baseline to a heart rate of 109, achieving 66% of the maximum predicted heart rate. Blood pressure during exercise increased appropriately to 138/82. In view of inadequate heart rate response, 0.4 mg of IV Lexiscan was injected. Post Lexiscan, peak heart rate was 103 and peak blood pressure was 168/98. Electrocardiogram showed no significant ST-T changes during the procedure. Rare PVCs were noted. The patient had chest tightness after Lexiscan infusion and received 100 mg of IV Aminophylline. No significant ST-T changes were noted during the procedure. Rare PVCs were noted. Diagnosis:   1.  Inconclusive EKG portion of exercise stress test at 66% of the maximum predicted heart rate. 2. Kasia Walton was used to complete the protocol. 3. Nuclear imaging report to follow. Pharmacological:  Patient was injected with . 4 mg/mL with Lexiscan intravenously over a period 10 to 20 sec. After pharmacologic stress, the patient was injected intravenously with 39.0 mCi of Tc99m Myoview. Gating post stress tomographic imaging performed approximately 45 minutes post tracer injection. The data was reconstructed in the short, horizontal long and vertical long axis views and tomographic slices were generated. NUCLEAR IMAGING:   Findings:   1. Stress images reveal normal Myoview distrubution in all the LV segments in short axis, vertical and horizontal long axis views. 2. Resting images have a normal uptake. 3. Gated images reveal normal wall motion and the ejection fraction is calculated to be 57%. Conclusion:   1. Normal perfusion scan. 2. Normal wall motion and ejection fraction is calculated at 57%. 3. No evidence of significant fixed or reversible defect suggesting ischemia or myocardial infarction noted from this nuclear study. 4. Low risk scan. Thank you for the referral.    E-signed and Interpreting Physician:    Abdi Wren.  Cally Dixon MD, [facility]     Date of interpretation: 5/30/2018  Date of final report: 5/30/2018

## 2018-06-05 ENCOUNTER — CLINICAL SUPPORT (OUTPATIENT)
Dept: ONCOLOGY | Age: 53
End: 2018-06-05

## 2018-06-05 ENCOUNTER — HOSPITAL ENCOUNTER (OUTPATIENT)
Dept: LAB | Age: 53
Discharge: HOME | End: 2018-06-05
Payer: MEDICARE

## 2018-06-05 ENCOUNTER — HOSPITAL ENCOUNTER (OUTPATIENT)
Dept: INFUSION THERAPY | Age: 53
Discharge: HOME | End: 2018-06-05
Payer: MEDICARE

## 2018-06-05 ENCOUNTER — HOSPITAL ENCOUNTER (OUTPATIENT)
Dept: ONCOLOGY | Age: 53
Discharge: HOME | End: 2018-06-05

## 2018-06-05 VITALS
RESPIRATION RATE: 20 BRPM | HEART RATE: 85 BPM | DIASTOLIC BLOOD PRESSURE: 66 MMHG | TEMPERATURE: 99.4 F | SYSTOLIC BLOOD PRESSURE: 109 MMHG | WEIGHT: 217 LBS | HEIGHT: 71 IN | BODY MASS INDEX: 30.38 KG/M2

## 2018-06-05 DIAGNOSIS — M06.09 RHEUMATOID ARTHRITIS OF MULTIPLE SITES WITHOUT RHEUMATOID FACTOR (HCC): Primary | ICD-10-CM

## 2018-06-05 DIAGNOSIS — M06.09 RHEUMATOID ARTHRITIS OF MULTIPLE SITES WITHOUT RHEUMATOID FACTOR (HCC): ICD-10-CM

## 2018-06-05 LAB
ALBUMIN SERPL-MCNC: 3.6 G/DL (ref 3.4–5)
ALBUMIN/GLOB SERPL: 1 {RATIO} (ref 0.8–1.7)
ALP SERPL-CCNC: 83 U/L (ref 45–117)
ALT SERPL-CCNC: 20 U/L (ref 13–56)
ANION GAP SERPL CALC-SCNC: 8 MMOL/L (ref 3–18)
AST SERPL-CCNC: 10 U/L (ref 15–37)
BASO+EOS+MONOS # BLD AUTO: 0.3 K/UL (ref 0–2.3)
BASO+EOS+MONOS # BLD AUTO: 3 % (ref 0.1–17)
BILIRUB SERPL-MCNC: 0.3 MG/DL (ref 0.2–1)
BUN SERPL-MCNC: 13 MG/DL (ref 7–18)
BUN/CREAT SERPL: 16 (ref 12–20)
CALCIUM SERPL-MCNC: 9 MG/DL (ref 8.5–10.1)
CHLORIDE SERPL-SCNC: 106 MMOL/L (ref 100–108)
CO2 SERPL-SCNC: 26 MMOL/L (ref 21–32)
CREAT SERPL-MCNC: 0.81 MG/DL (ref 0.6–1.3)
CRP SERPL-MCNC: 1.4 MG/DL (ref 0–0.3)
DIFFERENTIAL METHOD BLD: ABNORMAL
ERYTHROCYTE [DISTWIDTH] IN BLOOD BY AUTOMATED COUNT: 13.9 % (ref 11.5–14.5)
ERYTHROCYTE [SEDIMENTATION RATE] IN BLOOD: 47 MM/HR (ref 0–30)
GLOBULIN SER CALC-MCNC: 3.7 G/DL (ref 2–4)
GLUCOSE SERPL-MCNC: 161 MG/DL (ref 74–99)
HCT VFR BLD AUTO: 38.9 % (ref 36–48)
HGB BLD-MCNC: 12.8 G/DL (ref 12–16)
LYMPHOCYTES # BLD: 3 K/UL (ref 1.1–5.9)
LYMPHOCYTES NFR BLD: 30 % (ref 14–44)
MCH RBC QN AUTO: 27.1 PG (ref 25–35)
MCHC RBC AUTO-ENTMCNC: 32.9 G/DL (ref 31–37)
MCV RBC AUTO: 82.4 FL (ref 78–102)
NEUTS SEG # BLD: 6.6 K/UL (ref 1.8–9.5)
NEUTS SEG NFR BLD: 66 % (ref 40–70)
PLATELET # BLD AUTO: 453 K/UL (ref 140–440)
POTASSIUM SERPL-SCNC: 4.1 MMOL/L (ref 3.5–5.5)
PROT SERPL-MCNC: 7.3 G/DL (ref 6.4–8.2)
RBC # BLD AUTO: 4.72 M/UL (ref 4.1–5.1)
SODIUM SERPL-SCNC: 140 MMOL/L (ref 136–145)
WBC # BLD AUTO: 9.9 K/UL (ref 4.5–13)

## 2018-06-05 PROCEDURE — 85652 RBC SED RATE AUTOMATED: CPT | Performed by: INTERNAL MEDICINE

## 2018-06-05 PROCEDURE — 86140 C-REACTIVE PROTEIN: CPT | Performed by: INTERNAL MEDICINE

## 2018-06-05 PROCEDURE — 74011250636 HC RX REV CODE- 250/636: Performed by: INTERNAL MEDICINE

## 2018-06-05 PROCEDURE — 80053 COMPREHEN METABOLIC PANEL: CPT | Performed by: INTERNAL MEDICINE

## 2018-06-05 PROCEDURE — 96415 CHEMO IV INFUSION ADDL HR: CPT

## 2018-06-05 PROCEDURE — 96375 TX/PRO/DX INJ NEW DRUG ADDON: CPT

## 2018-06-05 PROCEDURE — 96413 CHEMO IV INFUSION 1 HR: CPT

## 2018-06-05 RX ORDER — SODIUM CHLORIDE 0.9 % (FLUSH) 0.9 %
10-40 SYRINGE (ML) INJECTION AS NEEDED
Status: DISCONTINUED | OUTPATIENT
Start: 2018-06-05 | End: 2018-06-09 | Stop reason: HOSPADM

## 2018-06-05 RX ORDER — DIPHENHYDRAMINE HYDROCHLORIDE 50 MG/ML
50 INJECTION, SOLUTION INTRAMUSCULAR; INTRAVENOUS
Status: ACTIVE | OUTPATIENT
Start: 2018-06-05 | End: 2018-06-05

## 2018-06-05 RX ORDER — SODIUM CHLORIDE 9 MG/ML
250 INJECTION, SOLUTION INTRAVENOUS ONCE
Status: COMPLETED | OUTPATIENT
Start: 2018-06-05 | End: 2018-06-05

## 2018-06-05 RX ORDER — DIPHENHYDRAMINE HYDROCHLORIDE 50 MG/ML
25 INJECTION, SOLUTION INTRAMUSCULAR; INTRAVENOUS ONCE
Status: COMPLETED | OUTPATIENT
Start: 2018-06-05 | End: 2018-06-05

## 2018-06-05 RX ORDER — ACETAMINOPHEN 325 MG/1
650 TABLET ORAL
Status: ACTIVE | OUTPATIENT
Start: 2018-06-05 | End: 2018-06-05

## 2018-06-05 RX ADMIN — DIPHENHYDRAMINE HYDROCHLORIDE 25 MG: 50 INJECTION INTRAMUSCULAR; INTRAVENOUS at 11:25

## 2018-06-05 RX ADMIN — Medication 10 ML: at 10:50

## 2018-06-05 RX ADMIN — SODIUM CHLORIDE 250 ML: 900 INJECTION, SOLUTION INTRAVENOUS at 11:15

## 2018-06-05 RX ADMIN — INFLIXIMAB 400 MG: 100 INJECTION, POWDER, LYOPHILIZED, FOR SOLUTION INTRAVENOUS at 12:00

## 2018-06-05 NOTE — PROGRESS NOTES
[facility] Progress Note    Date: 2018    Name: Sarthak Sosa    MRN: [id number]         : 1965      Ms. Nayely Negrete arrived in the [facility] today at 1030, in stable condition, here for Q 6 Week, IV Remicade Infusion. She was assessed and education was provided. Ms. Linares Arms vitals were reviewed. Visit Vitals    /71 (BP 1 Location: Left arm, BP Patient Position: At rest;Sitting)    Pulse 80    Temp 98.7 °F (37.1 °C)    Resp 20    Ht 5' 11\" (1.803 m)    Wt 98.4 kg (217 lb)    Breastfeeding No    BMI 30.27 kg/m2         PIV was established in her dorsal left forearm at 1050, without incident, and blood was drawn for a CBC, CMP, ESR, & CRP (2 lavender top tubes & 2 SST tubes), per order. (The CBC was processed in house, and the CMP, ESR, & CRP, were sent out to be processed. )        Lab results were obtained and reviewed, and the CBC results from today listed below, as well as the most recent CMP results from 18, were all noted to be satisfactory for treatment today. Recent Results (from the past 12 hour(s))   CBC WITH 3 PART DIFF    Collection Time: 18 10:50 AM   Result Value Ref Range    WBC 9.9 4.5 - 13.0 K/uL    RBC 4.72 4.10 - 5.10 M/uL    HGB 12.8 12.0 - 16.0 g/dL    HCT 38.9 36 - 48 %    MCV 82.4 78 - 102 FL    MCH 27.1 25.0 - 35.0 PG    MCHC 32.9 31 - 37 g/dL    RDW 13.9 11.5 - 14.5 %    PLATELET 878 (H) 240 - 440 K/uL    NEUTROPHILS 66 40 - 70 %    MIXED CELLS 3 0.1 - 17 %    LYMPHOCYTES 30 14 - 44 %    ABS. NEUTROPHILS 6.6 1.8 - 9.5 K/UL    ABS. MIXED CELLS 0.3 0.0 - 2.3 K/uL    ABS.  LYMPHOCYTES 3.0 1.1 - 5.9 K/UL    DF AUTOMATED             Pre-medication consisting of IV Benadryl 25 mg, was administered per order, and without incident.            Remicade 400 mg IV (4 mg/kg) Maintenance Dose, was administered over 2 hours, per order, and without incident.            After the completion of the IV Remicade, Ms. Nayely Negrete was monitored for 30 minutes post Remicade, per order, and also without incident.         After the completion of the 30 minute monitoring period, the PIV was removed and gauze/bandaid was applied. Ms. Benji Harrington tolerated well, and had no complaints. Ms. Benji Harrington was discharged from [place] [address] in stable condition at 1435. Kareen Dwyer She is to return in 6 weeks, on Tuesday, 7-17-18,  at 1000,  for her next appointment, for her next dose of IV Remicade.      Saumel Clarke RN  June 5, 2018  10:52 AM

## 2018-06-12 ENCOUNTER — OFFICE VISIT (OUTPATIENT)
Dept: ONCOLOGY | Age: 53
End: 2018-06-12

## 2018-06-12 ENCOUNTER — HOSPITAL ENCOUNTER (OUTPATIENT)
Dept: ONCOLOGY | Age: 53
Discharge: HOME | End: 2018-06-12

## 2018-06-12 ENCOUNTER — HOSPITAL ENCOUNTER (OUTPATIENT)
Dept: LAB | Age: 53
Discharge: HOME | End: 2018-06-12
Payer: MEDICARE

## 2018-06-12 VITALS
SYSTOLIC BLOOD PRESSURE: 114 MMHG | HEART RATE: 102 BPM | DIASTOLIC BLOOD PRESSURE: 79 MMHG | BODY MASS INDEX: 29.71 KG/M2 | TEMPERATURE: 98.6 F | WEIGHT: 213 LBS

## 2018-06-12 DIAGNOSIS — M06.9 RHEUMATOID ARTHRITIS, INVOLVING UNSPECIFIED SITE, UNSPECIFIED RHEUMATOID FACTOR PRESENCE: ICD-10-CM

## 2018-06-12 DIAGNOSIS — D47.3 ESSENTIAL THROMBOCYTOSIS (HCC): Primary | ICD-10-CM

## 2018-06-12 DIAGNOSIS — D64.9 CHRONIC ANEMIA: ICD-10-CM

## 2018-06-12 DIAGNOSIS — M79.7 FIBROMYALGIA: ICD-10-CM

## 2018-06-12 DIAGNOSIS — M06.9 RHEUMATOID ARTHRITIS OF FOOT, UNSPECIFIED LATERALITY, UNSPECIFIED RHEUMATOID FACTOR PRESENCE: ICD-10-CM

## 2018-06-12 DIAGNOSIS — G89.4 CHRONIC PAIN SYNDROME: ICD-10-CM

## 2018-06-12 DIAGNOSIS — D72.829 LEUKOCYTOSIS, UNSPECIFIED TYPE: ICD-10-CM

## 2018-06-12 DIAGNOSIS — D47.3 ESSENTIAL THROMBOCYTOSIS (HCC): ICD-10-CM

## 2018-06-12 LAB
ALBUMIN SERPL-MCNC: 4 G/DL (ref 3.4–5)
ALBUMIN/GLOB SERPL: 1 {RATIO} (ref 0.8–1.7)
ALP SERPL-CCNC: 87 U/L (ref 45–117)
ALT SERPL-CCNC: 19 U/L (ref 13–56)
ANION GAP SERPL CALC-SCNC: 10 MMOL/L (ref 3–18)
AST SERPL-CCNC: 8 U/L (ref 15–37)
BASOPHILS # BLD: 0 K/UL (ref 0–0.06)
BASOPHILS NFR BLD: 0 % (ref 0–2)
BILIRUB SERPL-MCNC: 0.8 MG/DL (ref 0.2–1)
BUN SERPL-MCNC: 17 MG/DL (ref 7–18)
BUN/CREAT SERPL: 24 (ref 12–20)
CALCIUM SERPL-MCNC: 9.9 MG/DL (ref 8.5–10.1)
CHLORIDE SERPL-SCNC: 105 MMOL/L (ref 100–108)
CO2 SERPL-SCNC: 25 MMOL/L (ref 21–32)
CREAT SERPL-MCNC: 0.7 MG/DL (ref 0.6–1.3)
DIFFERENTIAL METHOD BLD: ABNORMAL
EOSINOPHIL # BLD: 0.2 K/UL (ref 0–0.4)
EOSINOPHIL NFR BLD: 2 % (ref 0–5)
ERYTHROCYTE [DISTWIDTH] IN BLOOD BY AUTOMATED COUNT: 14.1 % (ref 11.6–14.5)
FERRITIN SERPL-MCNC: 152 NG/ML (ref 8–388)
GLOBULIN SER CALC-MCNC: 4 G/DL (ref 2–4)
GLUCOSE SERPL-MCNC: 162 MG/DL (ref 74–99)
HCT VFR BLD AUTO: 41.5 % (ref 35–45)
HGB BLD-MCNC: 14.1 G/DL (ref 12–16)
IRON SATN MFR SERPL: 19 %
IRON SERPL-MCNC: 66 UG/DL (ref 50–175)
LYMPHOCYTES # BLD: 3.2 K/UL (ref 0.9–3.6)
LYMPHOCYTES NFR BLD: 30 % (ref 21–52)
MCH RBC QN AUTO: 27.7 PG (ref 24–34)
MCHC RBC AUTO-ENTMCNC: 34 G/DL (ref 31–37)
MCV RBC AUTO: 81.5 FL (ref 74–97)
MONOCYTES # BLD: 0.7 K/UL (ref 0.05–1.2)
MONOCYTES NFR BLD: 7 % (ref 3–10)
NEUTS SEG # BLD: 6.4 K/UL (ref 1.8–8)
NEUTS SEG NFR BLD: 61 % (ref 40–73)
PLATELET # BLD AUTO: 425 K/UL (ref 135–420)
PMV BLD AUTO: 9.8 FL (ref 9.2–11.8)
POTASSIUM SERPL-SCNC: 4.1 MMOL/L (ref 3.5–5.5)
PROT SERPL-MCNC: 8 G/DL (ref 6.4–8.2)
RBC # BLD AUTO: 5.09 M/UL (ref 4.2–5.3)
SODIUM SERPL-SCNC: 140 MMOL/L (ref 136–145)
TIBC SERPL-MCNC: 346 UG/DL (ref 250–450)
WBC # BLD AUTO: 10.6 K/UL (ref 4.6–13.2)

## 2018-06-12 PROCEDURE — 83540 ASSAY OF IRON: CPT | Performed by: INTERNAL MEDICINE

## 2018-06-12 PROCEDURE — 82728 ASSAY OF FERRITIN: CPT | Performed by: INTERNAL MEDICINE

## 2018-06-12 PROCEDURE — 36415 COLL VENOUS BLD VENIPUNCTURE: CPT | Performed by: INTERNAL MEDICINE

## 2018-06-12 PROCEDURE — 80053 COMPREHEN METABOLIC PANEL: CPT | Performed by: INTERNAL MEDICINE

## 2018-06-12 RX ORDER — OXYCODONE AND ACETAMINOPHEN 10; 325 MG/1; MG/1
1 TABLET ORAL
Qty: 60 TAB | Refills: 0 | Status: SHIPPED | OUTPATIENT
Start: 2018-06-12 | End: 2018-06-26 | Stop reason: SDUPTHER

## 2018-06-12 NOTE — MR AVS SNAPSHOT
[address] [facility] [address] 
 
 
 [facility] [address] [facility] [address] 
[phone] Patient: Brandie Larry MRN: [id number] :1965 Visit Information Date & Time Provider Department Dept. Phone Encounter #  
 2018 11:30 AM Christina Young MD  [facility]  [phone] [id number] Your Appointments 2018  9:15 AM  
Office Visit with Hina Danielson MD  
[facility]) Appt Note: post nuc/echo/event; post nuc/echo/event [address] [facility], [address] [facility] [address]  
[address], [address] [address]  
  
    
 2018 11:30 AM  
Office Visit with MD Ana Peña [facility]  [address] [facility]) Appt Note: OV  
 [facility] [address] [facility] [address]  
[phone]  
  
   
 [facility] [address] Upcoming Health Maintenance Date Due  
 FOOT EXAM Q1 1973 EYE EXAM RETINAL OR DILATED Q1 1973 Pneumococcal 19-64 Highest Risk (1 of 3 - PCV13) 1982 DTaP/Tdap/Td series (1 - Tdap) 1984 PAP AKA CERVICAL CYTOLOGY 1984 FOBT Q 1 YEAR AGE 50-75 2013 MEDICARE YEARLY EXAM 3/14/2018 Influenza Age 5 to Adult 2018 HEMOGLOBIN A1C Q6M 10/30/2018 MICROALBUMIN Q1 2019 LIPID PANEL Q1 2019 BREAST CANCER SCRN MAMMOGRAM 2019 Allergies as of 2018  Review Complete On: 2018 By: Christina Young MD  
  
 Severity Noted Reaction Type Reactions Levaquin [Levofloxacin] High  Systemic Anaphylaxis Pollen Extracts    Unable to Obtain Current Immunizations  Reviewed on 2018 Name Date Influenza Vaccine 2017, 10/21/2016, 11/3/2015, 11/10/2014, 10/25/2013 Influenza Vaccine Whole 9/10/2012 Not reviewed this visit You Were Diagnosed With   
  
 Codes Comments Essential thrombocytosis (HCC)    -  Primary ICD-10-CM: D47.3 ICD-9-CM: 238.71 Rheumatoid arthritis of foot, unspecified laterality, unspecified rheumatoid factor presence ([facility].)     ICD-10-CM: M06.9 ICD-9-CM: 714.0 Chronic pain syndrome     ICD-10-CM: G89.4 ICD-9-CM: 338. 4 Rheumatoid arthritis, involving unspecified site, unspecified rheumatoid factor presence ([facility] 75.)     ICD-10-CM: M06.9 ICD-9-CM: 714.0 Leukocytosis, unspecified type     ICD-10-CM: D72.829 ICD-9-CM: 288.60 Chronic anemia     ICD-10-CM: D64.9 ICD-9-CM: 285.9 Fibromyalgia     ICD-10-CM: M79.7 ICD-9-CM: 729.1 Vitals BP Pulse Temp Weight(growth percentile) BMI OB Status 114/79 (!) 102 98.6 °F (37 °C) (Oral) 213 lb (96.6 kg) 29.71 kg/m2 Hysterectomy Smoking Status Former Smoker BMI and BSA Data Body Mass Index Body Surface Area  
 29.71 kg/m 2 2.2 m 2 Preferred Pharmacy Pharmacy Name [facility], [address] [place] [address],[address] [phone] Your Updated Medication List  
  
   
This list is accurate as of 6/12/18 12:50 PM.  Always use your most recent med list.  
  
  
  
  
 * albuterol sulfate 2.5 mg/0.5 mL Nebu nebulizer solution Commonly known as:  PROVENTIL;VENTOLIN  
by Nebulization route once. Is now using the actual nebulizer machine, for current bronchitis episode (Aug/Sept. 2017) * albuterol 90 mcg/actuation inhaler Commonly known as:  PROAIR HFA Take 1 Puff by inhalation every six (6) hours as needed for Wheezing. amitriptyline 25 mg tablet Commonly known as:  ELAVIL  
  
 anagrelide 0.5 mg capsule Commonly known as:  Lylia Innocent Take 1 Cap by mouth two (2) times a day. Indications: ESSENTIAL THROMBOCYTOSIS  
  
 atenolol 25 mg tablet Commonly known as:  TENORMIN Take 25 mg by mouth daily.     Indications: HYPERTENSION  
  
 BD INSULIN SYRINGE ULTRA-FINE 1 mL 31 gauge x 15/64\" Syrg Generic drug:  insulin syringe-needle U-100  
  
 cholecalciferol (VITAMIN D3) 5,000 unit Tab tablet Commonly known as:  VITAMIN D3 Take 5,000 Units by mouth every seven (7) days. clotrimazole-betamethasone topical cream  
Commonly known as:  Zoraida Leonardo DULoxetine 30 mg capsule Commonly known as:  CYMBALTA  
  
 folic acid 347 mcg tablet Take 400 mcg by mouth daily. furosemide 40 mg tablet Commonly known as:  LASIX Take  by mouth daily. gabapentin 300 mg capsule Commonly known as:  NEURONTIN  
  
 haloperidol 5 mg tablet Commonly known as:  HALDOL Take 2.5 mg by mouth nightly. HYZAAR 50-12.5 mg per tablet Generic drug:  losartan-hydroCHLOROthiazide Take 1 Tab by mouth daily. ibuprofen 400 mg tablet Commonly known as:  MOTRIN Take 1 Tab by mouth every six (6) hours as needed for Pain. KlonoPIN 2 mg tablet Generic drug:  clonazePAM  
Take 2 mg by mouth daily. Indications: PANIC DISORDER  
  
 * LANTUS SC  
5 Units by SubCUTAneous route daily. * LANTUS U-100 INSULIN 100 unit/mL injection Generic drug:  insulin glargine  
  
 metFORMIN 1,000 mg tablet Commonly known as:  GLUCOPHAGE Take 500 mg by mouth two (2) times a day. methocarbamol 750 mg tablet Commonly known as:  QYFPTDV-144 Take 1 Tab by mouth three (3) times daily. methotrexate 2.5 mg tablet Commonly known as:  Elgin Braver Take 5 mg by mouth daily. nitroglycerin 400 mcg/spray spray Commonly known as:  NITROLINGUAL  
1 [place] by SubLINGual route every five (5) minutes as needed. NovoLOG Mix 70-30 U-100 Insuln 100 unit/mL (70-30) injection Generic drug:  insulin aspart protamine/insulin aspart 10 Units by SubCUTAneous route two (2) times a day. ondansetron hcl 4 mg tablet Commonly known as:  Aj Prophet Take 1 Tab by mouth every eight (8) hours as needed for Nausea. oxyCODONE-acetaminophen  mg per tablet Commonly known as:  PERCOCET 10 Take 1 Tab by mouth every six (6) hours as needed for Pain for up to 15 days. Max Daily Amount: 4 Tabs. PLAQUENIL 200 mg tablet Generic drug:  hydroxychloroquine Take 200 mg by mouth two (2) times a day. QUEtiapine 25 mg tablet Commonly known as:  SEROquel REMICADE IV  
344 mg by IntraVENous route once as needed. remicade will be every 8 weeks * Notice: This list has 4 medication(s) that are the same as other medications prescribed for you. Read the directions carefully, and ask your doctor or other care provider to review them with you. Prescriptions Printed Refills  
 oxyCODONE-acetaminophen (PERCOCET 10)  mg per tablet 0 Sig: Take 1 Tab by mouth every six (6) hours as needed for Pain for up to 15 days. Max Daily Amount: 4 Tabs. Class: Print Route: Oral  
  
We Performed the Following COMPLETE CBC & AUTO DIFF WBC [10078 CPT(R)] To-Do List   
 06/12/2018 Lab:  CBC WITH 3 PART DIFF   
  
 07/17/2018 10:00 AM  
  Appointment with [address] [facility] ([phone])  
  
 08/28/2018 10:00 AM  
  Appointment with [address] at [place] [address] ([phone]) Please provide this summary of care documentation to your next provider. Your primary care clinician is listed as Jt Graves. If you have any questions after today's visit, please call [phone].

## 2018-06-12 NOTE — PROGRESS NOTES
Hematology/Oncology  Progress Note    Name: Eveline Palumbo  Date: 2018  : 1965    PCP: Luis Beauchamp MD     Ms. Jocelyn Berkowitz is a 45year old female who was seen for management of her rheumatoid arthritis, leukocytosis, and thrombocytosis. Current therapy: Remicade 4 mg/kg bodyweight ever 6 weeks intravenously    Subjective:     Ms. Jocelyn Berkowitz is a 45year-old [facility] American woman who has severe rheumatoid arthritis. She receives Remicade every 6 weeks. She also suffers from fibromyalgia and had an elevated WBC count and platelet count of undefined etiology. Today she has no new complaints to report. She continues to complain of back pain. She is using a narcotic based regimen for pain control. She states this provides some relief, but the pain is gradually worsening. She reports that the Remicade has provided a significant degree of relief from the severe rheumatoid arthritis symptoms. The patient is continuing to use her cane for mobility support. She denies any recent fevers or recurrent infections. The patient reports that her appetite has continued to improve. She also states her energy level is continuing to improve as well. She has no new complaints or concerns to report, at this time. She is requesting a new prescription for Percocet.        Past Medical History:   Diagnosis Date    Abdominal pain, unspecified site     Acute otitis media     Acute pharyngitis     Anxiety     Arthritis     Autoimmune disease ([facility] [address] [facility]     Back injury     Backache     herniated disc in lower back    Blurred vision     Chest pain 2018    Chest pain, unspecified     abnormal EKG    Chronic airway obstruction, not elsewhere classified     Chronic back pain     Chronic pain     COPD     Depression     Diabetes (HCC)     Dizziness     Dizziness and giddiness     possible orthostatic changes, vasovagal, autonomic dysfunction from diabetic neuropathy    Encounters for unspecified administrative purpose     Essential thrombocytosis ([facility] [address] 1/6/2016    Feeling anxious     Fibromyalgia     Headache(784.0)     Hemorrhoid     Herniated disc     at L5    Hypercholesterolemia     Hyperglycemia     Hypertension     Joint pain     Leukocytosis, unspecified     Major depressive disorder, single episode, mild (HCC)     Mental disorder     depression, anxiety, bipolar and PTSD    Neuropathy     Obesity, unspecified     Other chest pain     Palpitation 5/4/2018    ? arrhythmia    Phobic disorders     Rheumatoid arthritis (HCC)     Rheumatoid arthritis of foot ([facility] [address] 5/4/2018    on treatment    Seasonal allergies     Shortness of breath     normal EF    Smoking 5/4/2018    discussed cessation    Streptococcal sore throat     Type 2 diabetes mellitus without complication, with long-term current use of insulin ([facility] [address] 5/4/2018    Type II or unspecified type diabetes mellitus with unspecified complication, uncontrolled     Unspecified hereditary and idiopathic peripheral neuropathy     Vitamin D deficiency      Past Surgical History:   Procedure Laterality Date    HX CHOLECYSTECTOMY  1994    HX GYN  2005    partial Hysterectomy    HX OTHER SURGICAL  12-1-15    had ingrown toenail removed from big toe, both feet    HX TUBAL LIGATION      1995     Social History     Social History    Marital status:      Spouse name: N/A    Number of children: N/A    Years of education: N/A     Occupational History    Not on file.      Social History Main Topics    Smoking status: Former Smoker    Smokeless tobacco: Never Used      Comment: 5 cigarettes day    Alcohol use Yes      Comment: socially    Drug use: No    Sexual activity: Yes     Partners: Male     Birth control/ protection: Condom     Other Topics Concern    Not on file     Social History Narrative     Family History   Problem Relation Age of Onset    Diabetes Other     Alcohol abuse Mother    24 \[facility] [place] [facility]\"" Arthritis-osteo Mother  Diabetes Mother     Elevated Lipids Mother     Headache Mother     Heart Disease Mother     Migraines Mother     Psychiatric Disorder Mother     Alcohol abuse Father    Yvone Brunner Father     Cancer Father     Headache Father     Heart Disease Father     Lung Disease Father     Migraines Father     Heart Surgery Father     Alcohol abuse Sister     Headache Sister     Diabetes Sister     Heart Disease Sister     Migraines Sister     Psychiatric Disorder Sister     Headache Brother     Diabetes Brother     Elevated Lipids Brother     Heart Disease Brother     Migraines Brother     Psychiatric Disorder Brother     Coronary Artery Disease Brother     Cancer Maternal Aunt     Alcohol abuse Maternal Aunt     Diabetes Maternal Aunt     Headache Maternal Aunt     Lung Disease Maternal Aunt     Migraines Maternal Aunt     Headache Maternal Uncle     Migraines Maternal Uncle     Stroke Maternal Uncle     Psychiatric Disorder Maternal Uncle     Headache Paternal Aunt     Migraines Paternal Aunt     Headache Paternal Uncle     Hypertension Paternal Uncle     Migraines Paternal Uncle     Stroke Paternal Uncle     Headache Maternal Grandmother     Migraines Maternal Grandmother     Stroke Maternal Grandmother     Headache Maternal Grandfather     Migraines Maternal Grandfather     Stroke Maternal Grandfather     Headache Paternal Grandmother     Hypertension Paternal Grandmother     Lung Disease Paternal Grandmother     Migraines Paternal Grandmother     Headache Paternal Grandfather     Cancer Paternal Grandfather     Migraines Paternal Grandfather      Current Outpatient Prescriptions   Medication Sig Dispense Refill    oxyCODONE-acetaminophen (PERCOCET 10)  mg per tablet Take 1 Tab by mouth every six (6) hours as needed for Pain for up to 15 days. Max Daily Amount: 4 Tabs.  60 Tab 0    anagrelide (AGRYLIN) 0.5 mg capsule Take 1 Cap by mouth two (2) times a day. Indications: ESSENTIAL THROMBOCYTOSIS 60 Cap 6    ibuprofen (MOTRIN) 400 mg tablet Take 1 Tab by mouth every six (6) hours as needed for Pain. 20 Tab 0    amitriptyline (ELAVIL) 25 mg tablet       clotrimazole-betamethasone (LOTRISONE) topical cream       DULoxetine (CYMBALTA) 30 mg capsule       gabapentin (NEURONTIN) 300 mg capsule       LANTUS 100 unit/mL injection       QUEtiapine (SEROQUEL) 25 mg tablet       BD INSULIN SYRINGE ULTRA-FINE 1 mL 31 gauge x 15/64\" syrg       albuterol sulfate (PROVENTIL;VENTOLIN) 2.5 mg/0.5 mL nebu nebulizer solution by Nebulization route once. Is now using the actual nebulizer machine, for current bronchitis episode (Aug/Sept. 2017)      methocarbamol (ROBAXIN-750) 750 mg tablet Take 1 Tab by mouth three (3) times daily. 15 Tab 0    insulin aspart protamine/insulin aspart (NOVOLOG MIX 70-30) 100 unit/mL (70-30) injection 10 Units by SubCUTAneous route two (2) times a day.  clonazePAM (KLONOPIN) 2 mg tablet Take 2 mg by mouth daily. Indications: PANIC DISORDER      folic acid 509 mcg tablet Take 400 mcg by mouth daily.  haloperidol (HALDOL) 5 mg tablet Take 2.5 mg by mouth nightly.  albuterol (PROAIR HFA) 90 mcg/actuation inhaler Take 1 Puff by inhalation every six (6) hours as needed for Wheezing. 1 Inhaler 0    ondansetron hcl (ZOFRAN, AS HYDROCHLORIDE,) 4 mg tablet Take 1 Tab by mouth every eight (8) hours as needed for Nausea. 12 Tab 0    methotrexate (RHEUMATREX) 2.5 mg tablet Take 5 mg by mouth daily.  metFORMIN (GLUCOPHAGE) 1,000 mg tablet Take 500 mg by mouth two (2) times a day.  Cholecalciferol, Vitamin D3, 5,000 unit Tab Take 5,000 Units by mouth every seven (7) days.  atenolol (TENORMIN) 25 mg tablet Take 25 mg by mouth daily. Indications: HYPERTENSION      hydroxychloroquine (PLAQUENIL) 200 mg tablet Take 200 mg by mouth two (2) times a day.  furosemide (LASIX) 40 mg tablet Take  by mouth daily.         INFLIXIMAB (REMICADE IV) 344 mg by IntraVENous route once as needed. remicade will be every 8 weeks       nitroglycerin (NITROLINGUAL) 0.4 mg/dose spray 1 Spray by SubLINGual route every five (5) minutes as needed.  losartan-hydrochlorothiazide (HYZAAR) 50-12.5 mg per tablet Take 1 Tab by mouth daily.  INSULIN GLARGINE,HUM. REC. ANLOG (LANTUS SC) 5 Units by SubCUTAneous route daily. Review of Systems  Constitutional: The patient has no acute distress or discomfort. HEENT: The patient denies recent head trauma, eye pain, blurred vision,  hearing deficit, oropharyngeal mucosal pain or lesions, and the patient denies throat pain or discomfort. Lymphatics: The patient denies palpable peripheral lymphadenopathy. Hematologic: The patient denies having bruising, bleeding, or progressive fatigue. Respiratory: Patient denies having shortness of breath, cough, sputum production, fever, or dyspnea on exertion. Cardiovascular: The patient denies having leg pain, leg swelling, heart palpitations, chest permit, chest pain, or lightheadedness. The patient denies having dyspnea on exertion. Gastrointestinal: The patient denies having nausea, emesis, or diarrhea. The patient denies having any hematemesis or blood in the stool. Genitourinary: Patient denies having urinary urgency, frequency, or dysuria. The patient denies having blood in the urine. Psychological: The patient denies having symptoms of nervousness, anxiety, depression, or thoughts of harming himself some of this. Skin: Patient denies having skin rashes, skin, ulcerations, or unexplained itching or pruritus. Musculoskeletal: The patient complains of generalized back pain. Objective:     Visit Vitals    /79    Pulse (!) 102    Temp 98.6 °F (37 °C) (Oral)    Wt 96.6 kg (213 lb)    BMI 29.71 kg/m2     ECOG PS=0 Pain score 4-5/10     Physical Exam:   Gen. Appearance: The patient is in no acute distress.   Skin: There is no bruise or rash.  HEENT: The exam is unremarkable. Neck: Supple without lymphadenopathy or thyromegaly. Lungs: Clear to auscultation and percussion; there are no wheezes or rhonchi. Heart: Regular rate and rhythm; there are no murmurs, gallops, or rubs. Abdomen: Bowel sounds are present and normal.  There is no guarding, tenderness, or hepatosplenomegaly. Extremities: There is no clubbing, cyanosis, or edema. Neurologic: There are no focal neurologic deficits. Lymphatics: There is no palpable peripheral lymphadenopathy. Musculoskeletal: The patient has full range of motion at all joints. However, she complains of pain on ambulation due to the severe rheumatoid arthritis. There is  evidence of joint deformity or effusions in the hands and knees. There is no focal joint tenderness. She is using a cane for support and mobility. Psychological/psychiatric: There is no clinical evidence of anxiety, depression, or melancholy. Lab data:      Results for orders placed or performed during the hospital encounter of 06/05/18   CBC WITH 3 PART DIFF     Status: Abnormal   Result Value Ref Range Status    WBC 9.9 4.5 - 13.0 K/uL Final    RBC 4.72 4.10 - 5.10 M/uL Final    HGB 12.8 12.0 - 16.0 g/dL Final    HCT 38.9 36 - 48 % Final    MCV 82.4 78 - 102 FL Final    MCH 27.1 25.0 - 35.0 PG Final    MCHC 32.9 31 - 37 g/dL Final    RDW 13.9 11.5 - 14.5 % Final    PLATELET 673 (H) 235 - 440 K/uL Final    NEUTROPHILS 66 40 - 70 % Final    MIXED CELLS 3 0.1 - 17 % Final    LYMPHOCYTES 30 14 - 44 % Final    ABS. NEUTROPHILS 6.6 1.8 - 9.5 K/UL Final    ABS. MIXED CELLS 0.3 0.0 - 2.3 K/uL Final    ABS. LYMPHOCYTES 3.0 1.1 - 5.9 K/UL Final     Comment: Test performed at [place] [id number] Location. Results Reviewed by Medical Director. DF AUTOMATED   Final           Assessment:     1. Essential thrombocytosis ([facility] [address]    2. Rheumatoid arthritis of foot, unspecified laterality, unspecified rheumatoid factor presence ([facility] [address]    3. Chronic pain syndrome    4. Rheumatoid arthritis, involving unspecified site, unspecified rheumatoid factor presence (HCC)    5. Leukocytosis, unspecified type    6. Chronic anemia    7. Fibromyalgia      Plan:   Leukocytosis (recurrent and persisting): I have explained to the patient that the etiology of her leukocytosis remains undefined. A previous flow cytometry did not reveal any  evidence of an immunophenotypic abnormality such as an evolving chronic lymphoproliferative disorder or myeloproliferative disorder. The CBCs will continue to be monitored every 6 weeks. The CBC from today shows her WBC count is currently normal at 12 with an absolute neutrophil count of 7.0 and absolute lymphocyte count of 4.1. Essential thrombocytosis/thrombocytosis: The current CBC shows that the platelet count is now 248,000. The platelet count is being monitored every 6 weeks. The patient was previously started on anagrelide 0.5 mg one tablet twice daily. This medication will be continued, at the current dose. I have reenforced to the patient the importance of being complaint with the treatment plan. Rheumatoid arthritis: the patient will continue to receive Remicade as a primary treatment modality for her severe rheumatoid arthritis every 6 weeks. The dose of Remicade will be 344 mg given intravenously. The patient has continued to take  Methotrexate 5 mg p.o. daily and Plaquenil 200 mg twice daily. Fibromyalgia/chronic pain syndrome: The patient  continues to have generalized pain at times related to her underlying fibromyalgia. The patient receives her Percocet medication on a monthly basis as needed. A new prescription for the Percocet was provided at this time. Chronic anemia: I have explained to the patient the CBC from today shows her hemoglobin has remained normal at 13.3 g/dL with hematocrit of 38.8 %. I have recommended that she continue to take ferrous sulfate 325 mg by mouth once daily.     We will see the patient back in 6 weeks for a complete reassessment. Orders Placed This Encounter    COMPLETE CBC & AUTO DIFF WBC    InHouse CBC ([place])     Standing Status:   Future     Number of Occurrences:   1     Standing Expiration Date:   0/03/4577    METABOLIC PANEL, COMPREHENSIVE     Standing Status:   Future     Standing Expiration Date:   6/13/2019    IRON PROFILE     Standing Status:   Future     Standing Expiration Date:   6/13/2019    FERRITIN     Standing Status:   Future     Standing Expiration Date:   6/13/2019    oxyCODONE-acetaminophen (PERCOCET 10)  mg per tablet     Sig: Take 1 Tab by mouth every six (6) hours as needed for Pain for up to 15 days. Max Daily Amount: 4 Tabs.      Dispense:  60 Tab     Refill:  0       Thompson White MD  6/12/2018

## 2018-06-25 DIAGNOSIS — G89.4 CHRONIC PAIN SYNDROME: ICD-10-CM

## 2018-06-25 DIAGNOSIS — M06.9 RHEUMATOID ARTHRITIS, INVOLVING UNSPECIFIED SITE, UNSPECIFIED RHEUMATOID FACTOR PRESENCE: ICD-10-CM

## 2018-06-26 RX ORDER — OXYCODONE AND ACETAMINOPHEN 10; 325 MG/1; MG/1
1 TABLET ORAL
Qty: 60 TAB | Refills: 0 | Status: CANCELLED | OUTPATIENT
Start: 2018-06-26 | End: 2018-07-11

## 2018-07-09 DIAGNOSIS — G89.4 CHRONIC PAIN SYNDROME: ICD-10-CM

## 2018-07-09 DIAGNOSIS — M06.9 RHEUMATOID ARTHRITIS, INVOLVING UNSPECIFIED SITE, UNSPECIFIED RHEUMATOID FACTOR PRESENCE: ICD-10-CM

## 2018-07-10 RX ORDER — OXYCODONE AND ACETAMINOPHEN 10; 325 MG/1; MG/1
1 TABLET ORAL
Qty: 60 TAB | Refills: 0 | Status: SHIPPED | OUTPATIENT
Start: 2018-07-10 | End: 2018-07-24 | Stop reason: SDUPTHER

## 2018-07-11 ENCOUNTER — OFFICE VISIT (OUTPATIENT)
Dept: CARDIOLOGY CLINIC | Age: 53
End: 2018-07-11

## 2018-07-11 VITALS
WEIGHT: 215 LBS | DIASTOLIC BLOOD PRESSURE: 68 MMHG | HEART RATE: 107 BPM | HEIGHT: 70 IN | SYSTOLIC BLOOD PRESSURE: 118 MMHG | BODY MASS INDEX: 30.78 KG/M2

## 2018-07-11 DIAGNOSIS — M06.9 RHEUMATOID ARTHRITIS INVOLVING MULTIPLE JOINTS (HCC): ICD-10-CM

## 2018-07-11 DIAGNOSIS — R00.2 PALPITATION: ICD-10-CM

## 2018-07-11 DIAGNOSIS — R07.9 CHEST PAIN, UNSPECIFIED TYPE: Primary | ICD-10-CM

## 2018-07-11 NOTE — PROGRESS NOTES
Patient didn't bring medications, verbally reviewed    1. Have you been to the ER, urgent care clinic since your last visit? Hospitalized since your last visit? No    2. Have you seen or consulted any other health care providers outside of the [address] [place] [address] since your last visit? Include any pap smears or colon screening.  Yes Where: PCP Routine/ Rheumatology Routine

## 2018-07-11 NOTE — PROGRESS NOTES
HISTORY OF PRESENT ILLNESS  Genna Pineda is a 45 y.o. female. HPI Comments: Patient is here for follow up of diagnostic tests. Results will be discussed. Palpitations    The history is provided by the patient. This is a new problem. The current episode started more than 1 week ago. The problem has not changed since onset. The problem occurs every several days. The problem is associated with nothing. Associated symptoms include chest pain and shortness of breath. Pertinent negatives include no fever, no claudication, no orthopnea, no PND, no abdominal pain, no nausea, no vomiting, no headaches, no dizziness, no weakness, no cough, no hemoptysis and no sputum production. Chest Pain (Angina)    The history is provided by the patient. This is a new problem. The current episode started more than 1 week ago. The problem has been resolved. The problem occurs daily. The pain is associated with exertion and rest. The pain is present in the substernal region and left side. The pain is mild. The quality of the pain is described as pressure-like. The pain does not radiate. Associated symptoms include palpitations and shortness of breath. Pertinent negatives include no abdominal pain, no claudication, no cough, no dizziness, no fever, no headaches, no hemoptysis, no nausea, no orthopnea, no PND, no sputum production, no vomiting and no weakness. Shortness of Breath   The history is provided by the patient. This is a recurrent problem. The problem occurs intermittently. The problem has not changed since onset. Associated symptoms include chest pain. Pertinent negatives include no fever, no headaches, no cough, no sputum production, no hemoptysis, no wheezing, no PND, no orthopnea, no vomiting, no abdominal pain, no rash, no leg swelling and no claudication. The problem's precipitants include exercise. Review of Systems   Constitutional: Negative for chills and fever. HENT: Negative for nosebleeds.     Eyes: Negative for blurred vision and double vision. Respiratory: Positive for shortness of breath. Negative for cough, hemoptysis, sputum production and wheezing. Cardiovascular: Positive for chest pain and palpitations. Negative for orthopnea, claudication, leg swelling and PND. Gastrointestinal: Negative for abdominal pain, heartburn, nausea and vomiting. Musculoskeletal: Negative for myalgias. Skin: Negative for rash. Neurological: Negative for dizziness, weakness and headaches. Endo/Heme/Allergies: Does not bruise/bleed easily.      Family History   Problem Relation Age of Onset    Diabetes Other     Alcohol abuse Mother    [facility] Arthritis-osteo Mother     Diabetes Mother     Elevated Lipids Mother     Headache Mother     Heart Disease Mother    [facility] Migraines Mother     Psychiatric Disorder Mother     Alcohol abuse Father    Derek Ryder Father     Cancer Father     Headache Father     Heart Disease Father     Lung Disease Father     Migraines Father     Heart Surgery Father     Alcohol abuse Sister     Headache Sister     Diabetes Sister     Heart Disease Sister     Migraines Sister     Psychiatric Disorder Sister     Headache Brother     Diabetes Brother     Elevated Lipids Brother     Heart Disease Brother     Migraines Brother     Psychiatric Disorder Brother     Coronary Artery Disease Brother     Cancer Maternal Aunt     Alcohol abuse Maternal Aunt     Diabetes Maternal Aunt     Headache Maternal Aunt     Lung Disease Maternal Aunt     Migraines Maternal Aunt     Headache Maternal Uncle     Migraines Maternal Uncle     Stroke Maternal Uncle     Psychiatric Disorder Maternal Uncle     Headache Paternal Aunt     Migraines Paternal Aunt     Headache Paternal Uncle     Hypertension Paternal Uncle     Migraines Paternal Uncle     Stroke Paternal Uncle     Headache Maternal Grandmother     Migraines Maternal Grandmother     Stroke Maternal Grandmother     Headache Maternal Grandfather     Migraines Maternal Grandfather     Stroke Maternal Grandfather     Headache Paternal Grandmother     Hypertension Paternal Grandmother     Lung Disease Paternal Grandmother     Migraines Paternal Grandmother     Headache Paternal Grandfather     Cancer Paternal Grandfather     Migraines Paternal Grandfather        Past Medical History:   Diagnosis Date    Abdominal pain, unspecified site     Acute otitis media     Acute pharyngitis     Anxiety     Arthritis     Autoimmune disease ([address]     Back injury     Backache     herniated disc in lower back    Blurred vision     Chest pain 5/4/2018    Chest pain, unspecified     abnormal EKG    Chronic airway obstruction, not elsewhere classified     Chronic back pain     Chronic pain     COPD     Depression     Diabetes ([address]     Dizziness     Dizziness and giddiness     possible orthostatic changes, vasovagal, autonomic dysfunction from diabetic neuropathy    Encounters for unspecified administrative purpose     Essential thrombocytosis ([address] 1/6/2016    Feeling anxious     Fibromyalgia     Headache(784.0)     Hemorrhoid     Herniated disc     at L5    Hypercholesterolemia     Hyperglycemia     Hypertension     Joint pain     Leukocytosis, unspecified     Major depressive disorder, single episode, mild (HCC)     Mental disorder     depression, anxiety, bipolar and PTSD    Neuropathy     Obesity, unspecified     Other chest pain     Palpitation 5/4/2018    ?  arrhythmia    Phobic disorders     Rheumatoid arthritis (HCC)     Rheumatoid arthritis of foot ([address] 5/4/2018    on treatment    Seasonal allergies     Shortness of breath     normal EF    Smoking 5/4/2018    discussed cessation    Streptococcal sore throat     Type 2 diabetes mellitus without complication, with long-term current use of insulin ([address] 5/4/2018    Type II or unspecified type diabetes mellitus with unspecified complication, uncontrolled     Unspecified hereditary and idiopathic peripheral neuropathy     Vitamin D deficiency        Past Surgical History:   Procedure Laterality Date    HX CHOLECYSTECTOMY  1994    HX GYN  2005    partial Hysterectomy    HX OTHER SURGICAL  12-1-15    had ingrown toenail removed from big toe, both feet    HX TUBAL LIGATION      1995       Social History   Substance Use Topics    Smoking status: Current Every Day Smoker     Packs/day: 0.25     Last attempt to quit: 7/11/2012    Smokeless tobacco: Never Used      Comment: 5 cigarettes day    Alcohol use Yes      Comment: socially       Allergies   Allergen Reactions    Levaquin [Levofloxacin] Anaphylaxis    Pollen Extracts Unable to Obtain       Prior to Admission medications    Medication Sig Start Date End Date Taking? Authorizing Provider   oxyCODONE-acetaminophen (PERCOCET 10)  mg per tablet Take 1 Tab by mouth every six (6) hours as needed for Pain for up to 15 days. Max Daily Amount: 4 Tabs. 7/10/18 7/25/18 Yes Roopa Hubbard MD   anagrelide (AGRYLIN) 0.5 mg capsule Take 1 Cap by mouth two (2) times a day. Indications: ESSENTIAL THROMBOCYTOSIS 5/29/18  Yes Roopa Hubbard MD   amitriptyline (ELAVIL) 25 mg tablet  8/22/17  Yes Historical Provider   clotrimazole-betamethasone (LOTRISONE) topical cream  9/21/17  Yes Historical Provider   DULoxetine (CYMBALTA) 30 mg capsule  9/27/17  Yes Historical Provider   gabapentin (NEURONTIN) 300 mg capsule 300 mg two (2) times a day. 8/22/17  Yes Historical Provider   QUEtiapine (SEROQUEL) 25 mg tablet  9/22/17  Yes Historical Provider   BD INSULIN SYRINGE ULTRA-FINE 1 mL 31 gauge x 15/64\" syrg  9/22/17  Yes Historical Provider   albuterol sulfate (PROVENTIL;VENTOLIN) 2.5 mg/0.5 mL nebu nebulizer solution by Nebulization route once.  Is now using the actual nebulizer machine, for current bronchitis episode (Aug/Sept. 2017)   Yes Historical Provider   insulin aspart protamine/insulin aspart (NOVOLOG MIX 70-30) 100 unit/mL (70-30) injection 10 Units by SubCUTAneous route two (2) times a day. Yes Historical Provider   clonazePAM (KLONOPIN) 2 mg tablet Take 2 mg by mouth daily. Indications: PANIC DISORDER   Yes Historical Provider   folic acid 148 mcg tablet Take 400 mcg by mouth daily. Yes Historical Provider   albuterol (PROAIR HFA) 90 mcg/actuation inhaler Take 1 Puff by inhalation every six (6) hours as needed for Wheezing. 2/6/16  Yes Rhys Wilder DO   ondansetron hcl (ZOFRAN, AS HYDROCHLORIDE,) 4 mg tablet Take 1 Tab by mouth every eight (8) hours as needed for Nausea. 12/9/15  Yes Rhys Wilder DO   methotrexate (RHEUMATREX) 2.5 mg tablet Take 2.5 mg by mouth every fourty-eight (48) hours. Yes Historical Provider   metFORMIN (GLUCOPHAGE) 1,000 mg tablet Take 500 mg by mouth two (2) times a day. Yes Historical Provider   Cholecalciferol, Vitamin D3, 5,000 unit Tab Take 5,000 Units by mouth every seven (7) days. Yes Lelia Palomares MD   atenolol (TENORMIN) 25 mg tablet Take 25 mg by mouth daily. Indications: HYPERTENSION   Yes Historical Provider   hydroxychloroquine (PLAQUENIL) 200 mg tablet Take 200 mg by mouth two (2) times a day. Yes Historical Provider   furosemide (LASIX) 40 mg tablet Take  by mouth daily. Yes Historical Provider   INFLIXIMAB (REMICADE IV) 344 mg by IntraVENous route once as needed. remicade will be every 6 weeks   Yes Historical Provider   losartan-hydrochlorothiazide (HYZAAR) 50-12.5 mg per tablet Take 1 Tab by mouth daily. Yes Historical Provider   INSULIN GLARGINE,HUM. REC. ANLOG (LANTUS SC) 5 Units by SubCUTAneous route daily. Yes Historical Provider         Visit Vitals    /68    Pulse (!) 107    Ht 5' 10\" (1.778 m)    Wt 97.5 kg (215 lb)    BMI 30.85 kg/m2         Physical Exam   Constitutional: She is oriented to person, place, and time. She appears well-developed and well-nourished. HENT:   Head: Normocephalic and atraumatic. Eyes: Conjunctivae are normal.   Neck: Neck supple. No JVD present. No tracheal deviation present. No thyromegaly present. Cardiovascular: Normal rate and regular rhythm. PMI is not displaced. Exam reveals no gallop, no S3 and no decreased pulses. No murmur heard. Pulmonary/Chest: No respiratory distress. She has no wheezes. She has no rales. She exhibits no tenderness. Abdominal: Soft. There is no tenderness. Musculoskeletal: She exhibits no edema. Neurological: She is alert and oriented to person, place, and time. Skin: Skin is warm. Psychiatric: She has a normal mood and affect. Ms. Myrna Lara has a reminder for a \"due or due soon\" health maintenance. I have asked that she contact her primary care provider for follow-up on this health maintenance. CARDIOLOGY STUDIES 5/1/2011   EKG Result possible INF MI   Myocardial Perfusion Scan Result nl scan, EF 55%   Echocardiogram - Complete Result EF 75%   Some recent data might be hidden     5/2018  sr,iwmi  SUMMARY:echo-5/2018  Left ventricle: Systolic function was normal. Ejection fraction was  estimated in the range of 55 % to 60 %. There were no regional wall motion  abnormalities. There was mild concentric hypertrophy. Doppler parameters  were consistent with abnormal left ventricular relaxation (grade 1  diastolic dysfunction). Impression 5/2018  NUCLEAR IMAGING:   Findings:   1. Stress images reveal normal Myoview distrubution in all the LV segments in short axis, vertical and horizontal long axis views. 2. Resting images have a normal uptake. 3. Gated images reveal normal wall motion and the ejection fraction is calculated to be 57%. Conclusion:   1. Normal perfusion scan. 2. Normal wall motion and ejection fraction is calculated at 57%. 3. No evidence of significant fixed or reversible defect suggesting ischemia or myocardial infarction noted from this nuclear study. 4.  Low risk scan.   5/2018  Cardiac telemetry-sinus rhythm sinus tachycardia, no significant arrhythmia . Assessment         ICD-10-CM ICD-9-CM    1. Chest pain, unspecified type R07.9 786.50     Negative nuclear stress test.  Monitor clinically   2. Palpitation R00.2 785.1     Event monitor shows sinus rhythm sinus tachycardia. No significant arrhythmias are noted   3. Rheumatoid arthritis involving multiple joints (HCC) M06.9 714.0     Normal LV systolic function. No significant valvular pathology         No orders of the defined types were placed in this encounter. Follow-up Disposition:  Return in about 1 year (around 7/11/2019).

## 2018-07-11 NOTE — LETTER
Muscodadenise Pandya 1965 7/11/2018 Dear Kee Allan MD 
 
I had the pleasure of evaluating  Ms. Britta Morin in office today. Below are the relevant portions of my assessment and plan of care. ICD-10-CM ICD-9-CM 1. Chest pain, unspecified type R07.9 786.50 Negative nuclear stress test.  Monitor clinically 2. Palpitation R00.2 785.1 Event monitor shows sinus rhythm sinus tachycardia. No significant arrhythmias are noted 3. Rheumatoid arthritis involving multiple joints (HCC) M06.9 714.0 Normal LV systolic function. No significant valvular pathology Current Outpatient Prescriptions Medication Sig Dispense Refill  oxyCODONE-acetaminophen (PERCOCET 10)  mg per tablet Take 1 Tab by mouth every six (6) hours as needed for Pain for up to 15 days. Max Daily Amount: 4 Tabs. 60 Tab 0  
 anagrelide (AGRYLIN) 0.5 mg capsule Take 1 Cap by mouth two (2) times a day. Indications: ESSENTIAL THROMBOCYTOSIS 60 Cap 6  
 amitriptyline (ELAVIL) 25 mg tablet  clotrimazole-betamethasone (LOTRISONE) topical cream     
 DULoxetine (CYMBALTA) 30 mg capsule  gabapentin (NEURONTIN) 300 mg capsule 300 mg two (2) times a day.  QUEtiapine (SEROQUEL) 25 mg tablet  BD INSULIN SYRINGE ULTRA-FINE 1 mL 31 gauge x 15/64\" syrg  albuterol sulfate (PROVENTIL;VENTOLIN) 2.5 mg/0.5 mL nebu nebulizer solution by Nebulization route once. Is now using the actual nebulizer machine, for current bronchitis episode (Aug/Sept. 2017)  insulin aspart protamine/insulin aspart (NOVOLOG MIX 70-30) 100 unit/mL (70-30) injection 10 Units by SubCUTAneous route two (2) times a day.  clonazePAM (KLONOPIN) 2 mg tablet Take 2 mg by mouth daily. Indications: PANIC DISORDER    
 folic acid 487 mcg tablet Take 400 mcg by mouth daily.  albuterol (PROAIR HFA) 90 mcg/actuation inhaler Take 1 Puff by inhalation every six (6) hours as needed for Wheezing.  1 Inhaler 0  
  ondansetron hcl (ZOFRAN, AS HYDROCHLORIDE,) 4 mg tablet Take 1 Tab by mouth every eight (8) hours as needed for Nausea. 12 Tab 0  
 methotrexate (RHEUMATREX) 2.5 mg tablet Take 2.5 mg by mouth every fourty-eight (48) hours.  metFORMIN (GLUCOPHAGE) 1,000 mg tablet Take 500 mg by mouth two (2) times a day.  Cholecalciferol, Vitamin D3, 5,000 unit Tab Take 5,000 Units by mouth every seven (7) days.  atenolol (TENORMIN) 25 mg tablet Take 25 mg by mouth daily. Indications: HYPERTENSION  hydroxychloroquine (PLAQUENIL) 200 mg tablet Take 200 mg by mouth two (2) times a day.  furosemide (LASIX) 40 mg tablet Take  by mouth daily.  INFLIXIMAB (REMICADE IV) 344 mg by IntraVENous route once as needed. remicade will be every 6 weeks  losartan-hydrochlorothiazide (HYZAAR) 50-12.5 mg per tablet Take 1 Tab by mouth daily.  INSULIN GLARGINE,HUM. REC. ANLOG (LANTUS SC) 5 Units by SubCUTAneous route daily. Facility-Administered Medications Ordered in Other Visits Medication Dose Route Frequency Provider Last Rate Last Dose  [START ON 7/17/2018] inFLIXimab (REMICADE) 400 mg in 0.9% sodium chloride 250 mL infusion  400 mg IntraVENous ONCE TITR Corina Brambila MD      
 
 
No orders of the defined types were placed in this encounter. If you have questions, please do not hesitate to call me. I look forward to following Ms. Carmen Olmedo along with you. Sincerely, Jennifer Hollins MD

## 2018-07-11 NOTE — MR AVS SNAPSHOT
[address] [facility] [address] 
 
 
 [address] [facility] [facility] [address], [address] [facility] [address] 
[phone] Patient: Rosalva Aguilar MRN: [id number] :1965 Visit Information Date & Time Provider Department Dept. Phone Encounter #  
 2018  9:15 AM Tejas Le MD [facility] [address] [place] [address] [id number] Follow-up Instructions Return in about 1 year (around 2019). Your Appointments 2018 11:30 AM  
Office Visit with Dalila Banuelos MD  
[facility] [address] [facility]) Appt Note: OV  
 [facility] [address] [facility] [address]  
[phone]  
  
   
 [facility] [address] 2019  9:15 AM  
Office Visit with Tejas Le MD  
[facility]) Appt Note: 1 yr  
 [address], [address] [facility] [address]  
[address], [address] Upcoming Health Maintenance Date Due  
 FOOT EXAM Q1 1973 EYE EXAM RETINAL OR DILATED Q1 1973 Pneumococcal 19-64 Highest Risk (1 of 3 - PCV13) 1982 DTaP/Tdap/Td series (1 - Tdap) 1984 PAP AKA CERVICAL CYTOLOGY 1984 FOBT Q 1 YEAR AGE 50-75 2013 MEDICARE YEARLY EXAM 3/14/2018 Influenza Age 5 to Adult 2018 HEMOGLOBIN A1C Q6M 10/30/2018 MICROALBUMIN Q1 2019 LIPID PANEL Q1 2019 BREAST CANCER SCRN MAMMOGRAM 2019 Allergies as of 2018  Review Complete On: 2018 By: Dalila Banuelos MD  
  
 Severity Noted Reaction Type Reactions Levaquin [Levofloxacin] High  Systemic Anaphylaxis Pollen Extracts    Unable to Obtain Current Immunizations  Reviewed on 2018 Name Date Influenza Vaccine 2017, 10/21/2016, 11/3/2015, 11/10/2014, 10/25/2013 Influenza Vaccine Whole 9/10/2012 Not reviewed this visit You Were Diagnosed With   
  
 Codes Comments Chest pain, unspecified type    -  Primary ICD-10-CM: R07.9 ICD-9-CM: 786.50 Negative nuclear stress test.  Monitor clinically Palpitation     ICD-10-CM: R00.2 ICD-9-CM: 785.1 Event monitor shows sinus rhythm sinus tachycardia. No significant arrhythmias are noted Rheumatoid arthritis involving multiple joints (HCC)     ICD-10-CM: M06.9 ICD-9-CM: 824.4 Normal LV systolic function. No significant valvular pathology Vitals BP Pulse Height(growth percentile) Weight(growth percentile) BMI OB Status 118/68 (!) 107 5' 10\" (1.778 m) 215 lb (97.5 kg) 30.85 kg/m2 Hysterectomy Smoking Status Current Every Day Smoker Vitals History BMI and BSA Data Body Mass Index Body Surface Area  
 30.85 kg/m 2 2.19 m 2 Preferred Pharmacy Pharmacy Name [facility], [address] [place] [address],[address] [place] [address] [phone] Your Updated Medication List  
  
   
This list is accurate as of 7/11/18  9:44 AM.  Always use your most recent med list.  
  
  
  
  
 * albuterol sulfate 2.5 mg/0.5 mL Nebu nebulizer solution Commonly known as:  PROVENTIL;VENTOLIN  
by Nebulization route once. Is now using the actual nebulizer machine, for current bronchitis episode (Aug/Sept. 2017) * albuterol 90 mcg/actuation inhaler Commonly known as:  PROAIR HFA Take 1 Puff by inhalation every six (6) hours as needed for Wheezing. amitriptyline 25 mg tablet Commonly known as:  ELAVIL  
  
 anagrelide 0.5 mg capsule Commonly known as:  Pili Flirt Take 1 Cap by mouth two (2) times a day. Indications: ESSENTIAL THROMBOCYTOSIS  
  
 atenolol 25 mg tablet Commonly known as:  TENORMIN Take 25 mg by mouth daily. Indications: HYPERTENSION  
  
 BD INSULIN SYRINGE ULTRA-FINE 1 mL 31 gauge x 15/64\" Syrg Generic drug:  insulin syringe-needle U-100 cholecalciferol (VITAMIN D3) 5,000 unit Tab tablet Commonly known as:  VITAMIN D3 Take 5,000 Units by mouth every seven (7) days. clotrimazole-betamethasone topical cream  
Commonly known as:  [place] Del [place]le DULoxetine 30 mg capsule Commonly known as:  CYMBALTA  
  
 folic acid 067 mcg tablet Take 400 mcg by mouth daily. furosemide 40 mg tablet Commonly known as:  LASIX Take  by mouth daily. gabapentin 300 mg capsule Commonly known as:  NEURONTIN  
300 mg two (2) times a day. HYZAAR 50-12.5 mg per tablet Generic drug:  losartan-hydroCHLOROthiazide Take 1 Tab by mouth daily. KlonoPIN 2 mg tablet Generic drug:  clonazePAM  
Take 2 mg by mouth daily. Indications: PANIC DISORDER  
  
 LANTUS SC  
5 Units by SubCUTAneous route daily. metFORMIN 1,000 mg tablet Commonly known as:  GLUCOPHAGE Take 500 mg by mouth two (2) times a day. methotrexate 2.5 mg tablet Commonly known as:  Aline Putt Take 2.5 mg by mouth every fourty-eight (48) hours. NovoLOG Mix 70-30 U-100 Insuln 100 unit/mL (70-30) injection Generic drug:  insulin aspart protamine/insulin aspart 10 Units by SubCUTAneous route two (2) times a day. ondansetron hcl 4 mg tablet Commonly known as:  Earlie Longoria Take 1 Tab by mouth every eight (8) hours as needed for Nausea. oxyCODONE-acetaminophen  mg per tablet Commonly known as:  PERCOCET 10 Take 1 Tab by mouth every six (6) hours as needed for Pain for up to 15 days. Max Daily Amount: 4 Tabs. PLAQUENIL 200 mg tablet Generic drug:  hydroxychloroquine Take 200 mg by mouth two (2) times a day. QUEtiapine 25 mg tablet Commonly known as:  SEROquel REMICADE IV  
344 mg by IntraVENous route once as needed. remicade will be every 6 weeks * Notice: This list has 2 medication(s) that are the same as other medications prescribed for you.  Read the directions carefully, and ask your doctor or other care provider to review them with you. Follow-up Instructions Return in about 1 year (around 7/11/2019). To-Do List   
 07/17/2018 10:00 AM  
  Appointment [address] at [place] [address] ([phone])  
  
 08/28/2018 10:00 AM  
  Appointment [address] at [place] [address] ([phone]) 10/09/2018 10:00 AM  
  Appointment with [address] at [place] [address] ([phone])  
  
 11/20/2018 10:00 AM  
  Appointment with [address] at [place] [address] ([phone]) Please provide this summary of care documentation to your next provider. Your primary care clinician is listed as Eleazar Cunha. If you have any questions after today's visit, please call [phone].

## 2018-07-17 ENCOUNTER — HOSPITAL ENCOUNTER (OUTPATIENT)
Dept: ONCOLOGY | Age: 53
Discharge: HOME | End: 2018-07-17

## 2018-07-17 ENCOUNTER — HOSPITAL ENCOUNTER (OUTPATIENT)
Dept: LAB | Age: 53
Discharge: HOME | End: 2018-07-17
Payer: MEDICARE

## 2018-07-17 ENCOUNTER — CLINICAL SUPPORT (OUTPATIENT)
Dept: ONCOLOGY | Age: 53
End: 2018-07-17

## 2018-07-17 ENCOUNTER — HOSPITAL ENCOUNTER (OUTPATIENT)
Dept: INFUSION THERAPY | Age: 53
Discharge: HOME | End: 2018-07-17
Payer: MEDICARE

## 2018-07-17 VITALS
HEIGHT: 71 IN | HEART RATE: 83 BPM | TEMPERATURE: 98.2 F | WEIGHT: 215 LBS | SYSTOLIC BLOOD PRESSURE: 119 MMHG | DIASTOLIC BLOOD PRESSURE: 76 MMHG | RESPIRATION RATE: 16 BRPM | BODY MASS INDEX: 30.1 KG/M2

## 2018-07-17 DIAGNOSIS — M06.09 RHEUMATOID ARTHRITIS OF MULTIPLE SITES WITHOUT RHEUMATOID FACTOR (HCC): Primary | ICD-10-CM

## 2018-07-17 DIAGNOSIS — D64.9 CHRONIC ANEMIA: ICD-10-CM

## 2018-07-17 DIAGNOSIS — M06.09 RHEUMATOID ARTHRITIS OF MULTIPLE SITES WITHOUT RHEUMATOID FACTOR (HCC): ICD-10-CM

## 2018-07-17 LAB
ALBUMIN SERPL-MCNC: 3.3 G/DL (ref 3.4–5)
ALBUMIN/GLOB SERPL: 0.9 {RATIO} (ref 0.8–1.7)
ALP SERPL-CCNC: 97 U/L (ref 45–117)
ALT SERPL-CCNC: 17 U/L (ref 13–56)
ANION GAP SERPL CALC-SCNC: 10 MMOL/L (ref 3–18)
AST SERPL-CCNC: 6 U/L (ref 15–37)
BASO+EOS+MONOS # BLD AUTO: 0.8 K/UL (ref 0–2.3)
BASO+EOS+MONOS # BLD AUTO: 8 % (ref 0.1–17)
BILIRUB SERPL-MCNC: 0.2 MG/DL (ref 0.2–1)
BUN SERPL-MCNC: 20 MG/DL (ref 7–18)
BUN/CREAT SERPL: 24 (ref 12–20)
CALCIUM SERPL-MCNC: 8.6 MG/DL (ref 8.5–10.1)
CHLORIDE SERPL-SCNC: 103 MMOL/L (ref 100–108)
CO2 SERPL-SCNC: 26 MMOL/L (ref 21–32)
CREAT SERPL-MCNC: 0.85 MG/DL (ref 0.6–1.3)
CRP SERPL-MCNC: 1.7 MG/DL (ref 0–0.3)
DIFFERENTIAL METHOD BLD: NORMAL
ERYTHROCYTE [DISTWIDTH] IN BLOOD BY AUTOMATED COUNT: 13 % (ref 11.5–14.5)
ERYTHROCYTE [SEDIMENTATION RATE] IN BLOOD: 43 MM/HR (ref 0–30)
GLOBULIN SER CALC-MCNC: 3.7 G/DL (ref 2–4)
GLUCOSE SERPL-MCNC: 340 MG/DL (ref 74–99)
HCT VFR BLD AUTO: 38.6 % (ref 36–48)
HGB BLD-MCNC: 12.7 G/DL (ref 12–16)
LYMPHOCYTES # BLD: 2.4 K/UL (ref 1.1–5.9)
LYMPHOCYTES NFR BLD: 23 % (ref 14–44)
MCH RBC QN AUTO: 27.3 PG (ref 25–35)
MCHC RBC AUTO-ENTMCNC: 32.9 G/DL (ref 31–37)
MCV RBC AUTO: 83 FL (ref 78–102)
NEUTS SEG # BLD: 7.5 K/UL (ref 1.8–9.5)
NEUTS SEG NFR BLD: 70 % (ref 40–70)
PLATELET # BLD AUTO: 414 K/UL (ref 140–440)
POTASSIUM SERPL-SCNC: 3.9 MMOL/L (ref 3.5–5.5)
PROT SERPL-MCNC: 7 G/DL (ref 6.4–8.2)
RBC # BLD AUTO: 4.65 M/UL (ref 4.1–5.1)
SODIUM SERPL-SCNC: 139 MMOL/L (ref 136–145)
WBC # BLD AUTO: 10.7 K/UL (ref 4.5–13)

## 2018-07-17 PROCEDURE — 86140 C-REACTIVE PROTEIN: CPT | Performed by: INTERNAL MEDICINE

## 2018-07-17 PROCEDURE — 80053 COMPREHEN METABOLIC PANEL: CPT | Performed by: INTERNAL MEDICINE

## 2018-07-17 PROCEDURE — 96375 TX/PRO/DX INJ NEW DRUG ADDON: CPT

## 2018-07-17 PROCEDURE — 74011250636 HC RX REV CODE- 250/636: Performed by: INTERNAL MEDICINE

## 2018-07-17 PROCEDURE — 96415 CHEMO IV INFUSION ADDL HR: CPT

## 2018-07-17 PROCEDURE — 96413 CHEMO IV INFUSION 1 HR: CPT

## 2018-07-17 PROCEDURE — 85652 RBC SED RATE AUTOMATED: CPT | Performed by: INTERNAL MEDICINE

## 2018-07-17 RX ORDER — DIPHENHYDRAMINE HYDROCHLORIDE 50 MG/ML
25 INJECTION, SOLUTION INTRAMUSCULAR; INTRAVENOUS ONCE
Status: COMPLETED | OUTPATIENT
Start: 2018-07-17 | End: 2018-07-17

## 2018-07-17 RX ORDER — SODIUM CHLORIDE 0.9 % (FLUSH) 0.9 %
10-40 SYRINGE (ML) INJECTION AS NEEDED
Status: DISCONTINUED | OUTPATIENT
Start: 2018-07-17 | End: 2018-07-21 | Stop reason: HOSPADM

## 2018-07-17 RX ORDER — ACETAMINOPHEN 325 MG/1
650 TABLET ORAL
Status: ACTIVE | OUTPATIENT
Start: 2018-07-17 | End: 2018-07-17

## 2018-07-17 RX ORDER — SODIUM CHLORIDE 9 MG/ML
250 INJECTION, SOLUTION INTRAVENOUS ONCE
Status: COMPLETED | OUTPATIENT
Start: 2018-07-17 | End: 2018-07-17

## 2018-07-17 RX ORDER — DIPHENHYDRAMINE HYDROCHLORIDE 50 MG/ML
50 INJECTION, SOLUTION INTRAMUSCULAR; INTRAVENOUS
Status: ACTIVE | OUTPATIENT
Start: 2018-07-17 | End: 2018-07-17

## 2018-07-17 RX ADMIN — SODIUM CHLORIDE 250 ML: 900 INJECTION, SOLUTION INTRAVENOUS at 11:05

## 2018-07-17 RX ADMIN — DIPHENHYDRAMINE HYDROCHLORIDE 25 MG: 50 INJECTION INTRAMUSCULAR; INTRAVENOUS at 11:12

## 2018-07-17 RX ADMIN — INFLIXIMAB 400 MG: 100 INJECTION, POWDER, LYOPHILIZED, FOR SOLUTION INTRAVENOUS at 12:00

## 2018-07-17 RX ADMIN — Medication 10 ML: at 10:40

## 2018-07-24 ENCOUNTER — HOSPITAL ENCOUNTER (OUTPATIENT)
Dept: LAB | Age: 53
Discharge: HOME | End: 2018-07-24
Payer: MEDICARE

## 2018-07-24 ENCOUNTER — HOSPITAL ENCOUNTER (OUTPATIENT)
Dept: ONCOLOGY | Age: 53
Discharge: HOME | End: 2018-07-24

## 2018-07-24 ENCOUNTER — OFFICE VISIT (OUTPATIENT)
Dept: ONCOLOGY | Age: 53
End: 2018-07-24

## 2018-07-24 VITALS
DIASTOLIC BLOOD PRESSURE: 83 MMHG | TEMPERATURE: 98.7 F | SYSTOLIC BLOOD PRESSURE: 120 MMHG | HEART RATE: 85 BPM | RESPIRATION RATE: 18 BRPM | BODY MASS INDEX: 30.49 KG/M2 | WEIGHT: 218.6 LBS

## 2018-07-24 DIAGNOSIS — N60.81 SEBACEOUS CYST OF SKIN OF RIGHT BREAST: ICD-10-CM

## 2018-07-24 DIAGNOSIS — M06.9 RHEUMATOID ARTHRITIS INVOLVING MULTIPLE JOINTS (HCC): ICD-10-CM

## 2018-07-24 DIAGNOSIS — D47.3 ESSENTIAL THROMBOCYTOSIS (HCC): Primary | ICD-10-CM

## 2018-07-24 DIAGNOSIS — D64.9 CHRONIC ANEMIA: ICD-10-CM

## 2018-07-24 DIAGNOSIS — G89.4 CHRONIC PAIN SYNDROME: ICD-10-CM

## 2018-07-24 DIAGNOSIS — M79.7 FIBROMYALGIA: ICD-10-CM

## 2018-07-24 DIAGNOSIS — M06.9 RHEUMATOID ARTHRITIS, INVOLVING UNSPECIFIED SITE, UNSPECIFIED RHEUMATOID FACTOR PRESENCE: ICD-10-CM

## 2018-07-24 DIAGNOSIS — D47.3 ESSENTIAL THROMBOCYTOSIS (HCC): ICD-10-CM

## 2018-07-24 LAB
ALBUMIN SERPL-MCNC: 3.7 G/DL (ref 3.4–5)
ALBUMIN/GLOB SERPL: 0.9 {RATIO} (ref 0.8–1.7)
ALP SERPL-CCNC: 86 U/L (ref 45–117)
ALT SERPL-CCNC: 16 U/L (ref 13–56)
ANION GAP SERPL CALC-SCNC: 7 MMOL/L (ref 3–18)
AST SERPL-CCNC: 8 U/L (ref 15–37)
BASO+EOS+MONOS # BLD AUTO: 0.8 K/UL (ref 0–2.3)
BASO+EOS+MONOS # BLD AUTO: 8 % (ref 0.1–17)
BILIRUB SERPL-MCNC: 0.3 MG/DL (ref 0.2–1)
BUN SERPL-MCNC: 17 MG/DL (ref 7–18)
BUN/CREAT SERPL: 21 (ref 12–20)
CALCIUM SERPL-MCNC: 9.7 MG/DL (ref 8.5–10.1)
CHLORIDE SERPL-SCNC: 105 MMOL/L (ref 100–108)
CO2 SERPL-SCNC: 27 MMOL/L (ref 21–32)
CREAT SERPL-MCNC: 0.8 MG/DL (ref 0.6–1.3)
DIFFERENTIAL METHOD BLD: ABNORMAL
ERYTHROCYTE [DISTWIDTH] IN BLOOD BY AUTOMATED COUNT: 13.7 % (ref 11.5–14.5)
FERRITIN SERPL-MCNC: 104 NG/ML (ref 8–388)
GLOBULIN SER CALC-MCNC: 3.9 G/DL (ref 2–4)
GLUCOSE SERPL-MCNC: 159 MG/DL (ref 74–99)
HCT VFR BLD AUTO: 40.2 % (ref 36–48)
HGB BLD-MCNC: 13.3 G/DL (ref 12–16)
IRON SATN MFR SERPL: 25 %
IRON SERPL-MCNC: 74 UG/DL (ref 50–175)
LYMPHOCYTES # BLD: 3.6 K/UL (ref 1.1–5.9)
LYMPHOCYTES NFR BLD: 33 % (ref 14–44)
MCH RBC QN AUTO: 27.5 PG (ref 25–35)
MCHC RBC AUTO-ENTMCNC: 33.1 G/DL (ref 31–37)
MCV RBC AUTO: 83.1 FL (ref 78–102)
NEUTS SEG # BLD: 6.6 K/UL (ref 1.8–9.5)
NEUTS SEG NFR BLD: 59 % (ref 40–70)
PLATELET # BLD AUTO: 448 K/UL (ref 140–440)
POTASSIUM SERPL-SCNC: 4.3 MMOL/L (ref 3.5–5.5)
PROT SERPL-MCNC: 7.6 G/DL (ref 6.4–8.2)
RBC # BLD AUTO: 4.84 M/UL (ref 4.1–5.1)
SODIUM SERPL-SCNC: 139 MMOL/L (ref 136–145)
TIBC SERPL-MCNC: 298 UG/DL (ref 250–450)
WBC # BLD AUTO: 11 K/UL (ref 4.5–13)

## 2018-07-24 PROCEDURE — 82728 ASSAY OF FERRITIN: CPT | Performed by: INTERNAL MEDICINE

## 2018-07-24 PROCEDURE — 36415 COLL VENOUS BLD VENIPUNCTURE: CPT | Performed by: INTERNAL MEDICINE

## 2018-07-24 PROCEDURE — 83540 ASSAY OF IRON: CPT | Performed by: INTERNAL MEDICINE

## 2018-07-24 PROCEDURE — 80053 COMPREHEN METABOLIC PANEL: CPT | Performed by: INTERNAL MEDICINE

## 2018-07-24 RX ORDER — OXYCODONE AND ACETAMINOPHEN 10; 325 MG/1; MG/1
1 TABLET ORAL
Qty: 60 TAB | Refills: 0 | Status: SHIPPED | OUTPATIENT
Start: 2018-07-24 | End: 2018-08-08 | Stop reason: SDUPTHER

## 2018-07-24 NOTE — PATIENT INSTRUCTIONS
Rheumatoid Arthritis: Care Instructions  Your Care Instructions    Arthritis is a common health problem in which the joints are inflamed. There are many types of arthritis. In rheumatoid arthritis, the body's own immune system attacks the joints. This causes pain, stiffness, and swelling in the joints, especially in the hands and feet. It can become hard to open jars, write, and do other daily tasks. Sometimes rheumatoid arthritis can also cause bumps to form under the skin. Over time, rheumatoid arthritis can damage and deform joints. Early treatment with medicines may reduce your chances of having a lasting disability. Follow-up care is a key part of your treatment and safety. Be sure to make and go to all appointments, and call your doctor if you are having problems. It's also a good idea to know your test results and keep a list of the medicines you take. How can you care for yourself at home? · If your doctor recommends it, get more exercise. Walking is a good choice. If your knees or ankles hurt, try riding a stationary bike or swimming. · Move each joint gently through its full range of motion once or twice a day. · Rest joints when they are sore or overworked. Short rest breaks may help more than staying in bed. · Reach and stay at a healthy weight. Regular exercise and a healthy diet will help you do this. Extra weight can strain the joints, especially the knees and hips, and make the pain worse. Losing even a few pounds may help. · Get enough calcium and vitamin D to help prevent osteoporosis, which causes thin bones. Talk to your doctor about how much you should take. · Protect your joints from injury. Do not overuse them. Try to limit or avoid activities that cause joint pain or swelling. Use special kitchen tools and other self-help devices as well as walkers, splints, or canes if needed. · Use heat to ease pain. Take warm showers or baths. Use hot packs or a heating pad set on low.  Sleep under a warm electric blanket. · Put ice or a cold pack on the area for 10 to 20 minutes at a time. Put a thin cloth between the ice and your skin. · Take pain medicines exactly as directed. ¨ If the doctor gave you a prescription medicine for pain, take it as prescribed. ¨ If you are not taking a prescription pain medicine, ask your doctor if you can take an over-the-counter medicine. · Take an active role in managing your condition. Set up a treatment plan with your doctor, and learn as much as you can about rheumatoid arthritis. This will help you control pain and stay active. When should you call for help? Call your doctor now or seek immediate medical care if:    · You have a fever or a rash along with joint pain.     · You have joint pain that is so severe that you cannot use the joint at all.     · You have sudden swelling, redness, or pain in one or more joints, and you do not know why.     · You have back or neck pain along with weakness in your arms or legs.     · You have a loss of bowel or bladder control.    Watch closely for changes in your health, and be sure to contact your doctor if:    · You have joint pain that lasts for more than 6 weeks.     · You have side effects from your arthritis medicines, such as stomach pain, nausea, heartburn, or dark and tarlike stools. Where can you learn more? Go to http://swapnil-speedy.info/. Enter K205 in the search box to learn more about \"Rheumatoid Arthritis: Care Instructions. \"  Current as of: October 10, 2017  Content Version: 11.7  © 8592-1477 [employer]. Care instructions adapted under license by [employer] (which disclaims liability or warranty for this information). If you have questions about a medical condition or this instruction, always ask your healthcare professional. [address] any warranty or liability for your use of this information.

## 2018-07-24 NOTE — MR AVS SNAPSHOT
[address] [facility] [address] [facility] [address] 
[phone] Patient: Maco Waktins MRN: [id number] :1965 Visit Information Date & Time Provider Department Dept. Phone Encounter #  
 2018 11:30 AM Griselda Lind MD [facility]  [phone] [id number] Follow-up Instructions Return in about 6 weeks (around 2018). Your Appointments 2018 11:30 AM  
Office Visit with MD [facility]  [facility]) Appt Note: OV  
 [facility] [address] [facility] [address]  
[phone]  
  
   
 [facility] [address] 2019  9:15 AM  
Office Visit with Rufina Padilla MD  
[facility]) Appt Note: 1 yr  
 [address] [facility], [address] [facility] [place] [address]  
[address], [address] [address] Upcoming Health Maintenance Date Due  
 FOOT EXAM Q1 1973 EYE EXAM RETINAL OR DILATED Q1 1973 Pneumococcal 19-64 Highest Risk (1 of 3 - PCV13) 1982 DTaP/Tdap/Td series (1 - Tdap) 1984 PAP AKA CERVICAL CYTOLOGY 1984 FOBT Q 1 YEAR AGE 50-75 2013 MEDICARE YEARLY EXAM 3/14/2018 Influenza Age 5 to Adult 2018 HEMOGLOBIN A1C Q6M 10/30/2018 MICROALBUMIN Q1 2019 LIPID PANEL Q1 2019 BREAST CANCER SCRN MAMMOGRAM 2019 Allergies as of 2018  Review Complete On: 2018 By: Griselda Lind MD  
  
 Severity Noted Reaction Type Reactions Levaquin [Levofloxacin] High  Systemic Anaphylaxis Pollen Extracts    Unable to Obtain Current Immunizations  Reviewed on 2018 Name Date Influenza Vaccine 2017, 10/21/2016, 11/3/2015, 11/10/2014, 10/25/2013 Influenza Vaccine Whole 9/10/2012 Not reviewed this visit You Were Diagnosed With   
  
 Codes Comments Essential thrombocytosis (HCC)    -  Primary ICD-10-CM: D47.3 ICD-9-CM: 238.71 Chronic pain syndrome     ICD-10-CM: G89.4 ICD-9-CM: 338. 4 Rheumatoid arthritis, involving unspecified site, unspecified rheumatoid factor presence ([facility]     ICD-10-CM: M06.9 ICD-9-CM: 714.0 Chronic anemia     ICD-10-CM: D64.9 ICD-9-CM: 285.9 Fibromyalgia     ICD-10-CM: M79.7 ICD-9-CM: 729.1 Rheumatoid arthritis involving multiple joints (HCC)     ICD-10-CM: M06.9 ICD-9-CM: 714.0 Vitals BP Pulse Temp Resp Weight(growth percentile) BMI  
 120/83 (BP 1 Location: Left arm, BP Patient Position: Sitting) 85 98.7 °F (37.1 °C) (Oral) 18 218 lb 9.6 oz (99.2 kg) 30.49 kg/m2 OB Status Smoking Status Hysterectomy Current Every Day Smoker Vitals History BMI and BSA Data Body Mass Index Body Surface Area  
 30.49 kg/m 2 2.23 m 2 Preferred Pharmacy Pharmacy Name [facility] [address] [place] [address],[address] [phone] Your Updated Medication List  
  
   
This list is accurate as of 7/24/18 12:26 PM.  Always use your most recent med list.  
  
  
  
  
 * albuterol sulfate 2.5 mg/0.5 mL Nebu nebulizer solution Commonly known as:  PROVENTIL;VENTOLIN  
by Nebulization route once. Is now using the actual nebulizer machine, for current bronchitis episode (Aug/Sept. 2017) * albuterol 90 mcg/actuation inhaler Commonly known as:  PROAIR HFA Take 1 Puff by inhalation every six (6) hours as needed for Wheezing. amitriptyline 25 mg tablet Commonly known as:  ELAVIL  
  
 anagrelide 0.5 mg capsule Commonly known as:  Miachel Dinesh Take 1 Cap by mouth two (2) times a day. Indications: ESSENTIAL THROMBOCYTOSIS  
  
 atenolol 25 mg tablet Commonly known as:  TENORMIN  
 Take 25 mg by mouth daily. Indications: HYPERTENSION  
  
 BD INSULIN SYRINGE ULTRA-FINE 1 mL 31 gauge x 15/64\" Syrg Generic drug:  insulin syringe-needle U-100  
  
 cholecalciferol (VITAMIN D3) 5,000 unit Tab tablet Commonly known as:  VITAMIN D3 Take 5,000 Units by mouth every seven (7) days. clotrimazole-betamethasone topical cream  
Commonly known as:  Moncho Drones DULoxetine 30 mg capsule Commonly known as:  CYMBALTA  
  
 folic acid 074 mcg tablet Take 400 mcg by mouth daily. furosemide 40 mg tablet Commonly known as:  LASIX Take  by mouth daily. gabapentin 300 mg capsule Commonly known as:  NEURONTIN  
300 mg two (2) times a day. HYZAAR 50-12.5 mg per tablet Generic drug:  losartan-hydroCHLOROthiazide Take 1 Tab by mouth daily. KlonoPIN 2 mg tablet Generic drug:  clonazePAM  
Take 2 mg by mouth daily. Indications: PANIC DISORDER  
  
 LANTUS SC  
5 Units by SubCUTAneous route daily. metFORMIN 1,000 mg tablet Commonly known as:  GLUCOPHAGE Take 500 mg by mouth two (2) times a day. methotrexate 2.5 mg tablet Commonly known as:  Jonathan Prayer Take 2.5 mg by mouth every fourty-eight (48) hours. NovoLOG Mix 70-30 U-100 Insuln 100 unit/mL (70-30) injection Generic drug:  insulin aspart protamine/insulin aspart 10 Units by SubCUTAneous route two (2) times a day. ondansetron hcl 4 mg tablet Commonly known as:  [place] Line Take 1 Tab by mouth every eight (8) hours as needed for Nausea. oxyCODONE-acetaminophen  mg per tablet Commonly known as:  PERCOCET 10 Take 1 Tab by mouth every six (6) hours as needed for Pain for up to 15 days. Max Daily Amount: 4 Tabs. PLAQUENIL 200 mg tablet Generic drug:  hydroxychloroquine Take 200 mg by mouth two (2) times a day. QUEtiapine 25 mg tablet Commonly known as:  SEROquel  REMICADE IV  
 344 mg by IntraVENous route once as needed. remicade will be every 6 weeks * Notice: This list has 2 medication(s) that are the same as other medications prescribed for you. Read the directions carefully, and ask your doctor or other care provider to review them with you. Prescriptions Printed Refills  
 oxyCODONE-acetaminophen (PERCOCET 10)  mg per tablet 0 Sig: Take 1 Tab by mouth every six (6) hours as needed for Pain for up to 15 days. Max Daily Amount: 4 Tabs. Class: Print Route: Oral  
  
We Performed the Following COMPLETE CBC & AUTO DIFF WBC [38385 CPT(R)] Follow-up Instructions Return in about 6 weeks (around 9/4/2018). To-Do List   
 07/24/2018 Lab:  CBC WITH 3 PART DIFF   
  
 08/28/2018 10:00 AM  
  Appointment with [address] at [facility] ([phone]) 10/09/2018 10:00 AM  
  Appointment with [address] at [facility] ([phone])  
  
 11/20/2018 10:00 AM  
  Appoint[facility] with [address] [facility] ([phone]) Patient Instructions Rheumatoid Arthritis: Care Instructions Your Care Instructions Arthritis is a common health problem in which the joints are inflamed. There are many types of arthritis. In rheumatoid arthritis, the body's own immune system attacks the joints. This causes pain, stiffness, and swelling in the joints, especially in the hands and feet. It can become hard to open jars, write, and do other daily tasks. Sometimes rheumatoid arthritis can also cause bumps to form under the skin. Over time, rheumatoid arthritis can damage and deform joints. Early treatment with medicines may reduce your chances of having a lasting disability. Follow-up care is a key part of your treatment and safety.  Be sure to make and go to all appointments, and call your doctor if you are having problems. It's also a good idea to know your test results and keep a list of the medicines you take. How can you care for yourself at home? · If your doctor recommends it, get more exercise. Walking is a good choice. If your knees or ankles hurt, try riding a stationary bike or swimming. · Move each joint gently through its full range of motion once or twice a day. · Rest joints when they are sore or overworked. Short rest breaks may help more than staying in bed. · Reach and stay at a healthy weight. Regular exercise and a healthy diet will help you do this. Extra weight can strain the joints, especially the knees and hips, and make the pain worse. Losing even a few pounds may help. · Get enough calcium and vitamin D to help prevent osteoporosis, which causes thin bones. Talk to your doctor about how much you should take. · Protect your joints from injury. Do not overuse them. Try to limit or avoid activities that cause joint pain or swelling. Use special kitchen tools and other self-help devices as well as walkers, splints, or canes if needed. · Use heat to ease pain. Take warm showers or baths. Use hot packs or a heating pad set on low. Sleep under a warm electric blanket. · Put ice or a cold pack on the area for 10 to 20 minutes at a time. Put a thin cloth between the ice and your skin. · Take pain medicines exactly as directed. ¨ If the doctor gave you a prescription medicine for pain, take it as prescribed. ¨ If you are not taking a prescription pain medicine, ask your doctor if you can take an over-the-counter medicine. · Take an active role in managing your condition. Set up a treatment plan with your doctor, and learn as much as you can about rheumatoid arthritis. This will help you control pain and stay active. When should you call for help? Call your doctor now or seek immediate medical care if: 
  · You have a fever or a rash along with joint pain.   · You have joint pain that is so severe that you cannot use the joint at all.  
  · You have sudden swelling, redness, or pain in one or more joints, and you do not know why.  
  · You have back or neck pain along with weakness in your arms or legs.  
  · You have a loss of bowel or bladder control.  
 Watch closely for changes in your health, and be sure to contact your doctor if: 
  · You have joint pain that lasts for more than 6 weeks.  
  · You have side effects from your arthritis medicines, such as stomach pain, nausea, heartburn, or dark and tarlike stools. Where can you learn more? Go to http://swapnil-speedy.info/. Enter K205 in the search box to learn more about \"Rheumatoid Arthritis: Care Instructions. \" Current as of: October 10, 2017 Content Version: 11.7 © 9121-5666 [employer]. Care instructions adapted under license by [employer] (which disclaims liability or warranty for this information). If you have questions about a medical condition or this instruction, always ask your healthcare professional. [address] any warranty or liability for your use of this information. Please provide this summary of care documentation to your next provider. Your primary care clinician is listed as Bernardino Nance. If you have any questions after today's visit, please call [phone].

## 2018-07-24 NOTE — PROGRESS NOTES
Hematology/Oncology  Progress Note    Name: Po Marx  Date: 2018  : 1965    PCP: Edelmira Kaminski MD     Ms. Carol Ann Heredia is a 45year old female who was seen for management of her rheumatoid arthritis, leukocytosis, and thrombocytosis. Current therapy: Remicade 4 mg/kg bodyweight ever 6 weeks intravenously    Subjective:     Ms. Carol Ann Heredia is a 45year-old [facility] American woman who has severe rheumatoid arthritis. She receives Remicade every 6 weeks. She also suffers from fibromyalgia and had an elevated WBC count and platelet count of undefined etiology. Today she has no new complaints to report. She continues to complain of back pain. She is using a narcotic based regimen for pain control. She states this provides some relief, but the pain is gradually worsening. She reports that the Remicade has provided a significant degree of relief from the severe rheumatoid arthritis symptoms. The patient is continuing to use her cane for mobility support. She denies any recent fevers or recurrent infections. The patient reports that her appetite has continued to improve. She also states her energy level is continuing to improve as well. She has no new complaints or concerns to report, at this time. She is requesting a new prescription for Percocet. The patient informed me that she does have a cysts under her right breast which she has been treated with antibiotic therapy and has subsequently drained.     Past Medical History:   Diagnosis Date    Abdominal pain, unspecified site     Acute otitis media     Acute pharyngitis     Anxiety     Arthritis     Autoimmune disease ([address]     Back injury     Backache     herniated disc in lower back    Blurred vision     Chest pain 2018    Chest pain, unspecified     abnormal EKG    Chronic airway obstruction, not elsewhere classified     Chronic back pain     Chronic pain     COPD     Depression     Diabetes (HCC)     Dizziness  Dizziness and giddiness     possible orthostatic changes, vasovagal, autonomic dysfunction from diabetic neuropathy    Encounters for unspecified administrative purpose     Essential thrombocytosis ([address] 1/6/2016    Feeling anxious     Fibromyalgia     Headache(784.0)     Hemorrhoid     Herniated disc     at L5    Hypercholesterolemia     Hyperglycemia     Hypertension     Joint pain     Leukocytosis, unspecified     Major depressive disorder, single episode, mild (HCC)     Mental disorder     depression, anxiety, bipolar and PTSD    Neuropathy     Obesity, unspecified     Other chest pain     Palpitation 5/4/2018    ? arrhythmia    Phobic disorders     Rheumatoid arthritis (HCC)     Rheumatoid arthritis of foot ([address] 5/4/2018    on treatment    Seasonal allergies     Shortness of breath     normal EF    Smoking 5/4/2018    discussed cessation    Streptococcal sore throat     Type 2 diabetes mellitus without complication, with long-term current use of insulin ([address] 5/4/2018    Type II or unspecified type diabetes mellitus with unspecified complication, uncontrolled     Unspecified hereditary and idiopathic peripheral neuropathy     Vitamin D deficiency      Past Surgical History:   Procedure Laterality Date    HX CHOLECYSTECTOMY  1994    HX GYN  2005    partial Hysterectomy    HX OTHER SURGICAL  12-1-15    had ingrown toenail removed from big toe, both feet    HX TUBAL LIGATION      1995     Social History     Social History    Marital status:      Spouse name: N/A    Number of children: N/A    Years of education: N/A     Occupational History    Not on file.      Social History Main Topics    Smoking status: Current Every Day Smoker     Packs/day: 0.25     Last attempt to quit: 7/11/2012    Smokeless tobacco: Never Used      Comment: 5 cigarettes day    Alcohol use Yes      Comment: socially    Drug use: No    Sexual activity: Yes     Partners: Male     Birth control/ protection: Condom     Other Topics Concern    Not on file     Social History Narrative     Family History   Problem Relation Age of Onset    Diabetes Other     Alcohol abuse Mother     Arthritis-osteo Mother     Diabetes Mother     Elevated Lipids Mother     Headache Mother     Heart Disease Mother    [facility] Migraines Mother     Psychiatric Disorder Mother     Alcohol abuse Father    Shanthi Pedro Father     Cancer Father     Headache Father     Heart Disease Father     Lung Disease Father     Migraines Father     Heart Surgery Father     Alcohol abuse Sister     Headache Sister     Diabetes Sister     Heart Disease Sister     Migraines Sister     Psychiatric Disorder Sister     Headache Brother     Diabetes Brother     Elevated Lipids Brother     Heart Disease Brother     Migraines Brother     Psychiatric Disorder Brother     Coronary Artery Disease Brother     Cancer Maternal Aunt     Alcohol abuse Maternal Aunt     Diabetes Maternal Aunt     Headache Maternal Aunt     Lung Disease Maternal Aunt     Migraines Maternal Aunt     Headache Maternal Uncle     Migraines Maternal Uncle     Stroke Maternal Uncle     Psychiatric Disorder Maternal Uncle     Headache Paternal Aunt     Migraines Paternal Aunt     Headache Paternal Uncle     Hypertension Paternal Uncle     Migraines Paternal Uncle     Stroke Paternal Uncle     Headache Maternal Grandmother     Migraines Maternal Grandmother     Stroke Maternal Grandmother     Headache Maternal Grandfather     Migraines Maternal Grandfather     Stroke Maternal Grandfather     Headache Paternal Grandmother     Hypertension Paternal Grandmother     Lung Disease Paternal Grandmother     Migraines Paternal Grandmother     Headache Paternal Grandfather     Cancer Paternal Grandfather     Migraines Paternal Grandfather      Current Outpatient Prescriptions   Medication Sig Dispense Refill    oxyCODONE-acetaminophen (PERCOCET 10)  mg per tablet Take 1 Tab by mouth every six (6) hours as needed for Pain for up to 15 days. Max Daily Amount: 4 Tabs. 60 Tab 0    anagrelide (AGRYLIN) 0.5 mg capsule Take 1 Cap by mouth two (2) times a day. Indications: ESSENTIAL THROMBOCYTOSIS 60 Cap 6    amitriptyline (ELAVIL) 25 mg tablet       clotrimazole-betamethasone (LOTRISONE) topical cream       DULoxetine (CYMBALTA) 30 mg capsule       gabapentin (NEURONTIN) 300 mg capsule 300 mg two (2) times a day.  QUEtiapine (SEROQUEL) 25 mg tablet       BD INSULIN SYRINGE ULTRA-FINE 1 mL 31 gauge x 15/64\" syrg       albuterol sulfate (PROVENTIL;VENTOLIN) 2.5 mg/0.5 mL nebu nebulizer solution by Nebulization route once. Is now using the actual nebulizer machine, for current bronchitis episode (Aug/Sept. 2017)      insulin aspart protamine/insulin aspart (NOVOLOG MIX 70-30) 100 unit/mL (70-30) injection 10 Units by SubCUTAneous route two (2) times a day.  clonazePAM (KLONOPIN) 2 mg tablet Take 2 mg by mouth daily. Indications: PANIC DISORDER      folic acid 608 mcg tablet Take 400 mcg by mouth daily.  albuterol (PROAIR HFA) 90 mcg/actuation inhaler Take 1 Puff by inhalation every six (6) hours as needed for Wheezing. 1 Inhaler 0    ondansetron hcl (ZOFRAN, AS HYDROCHLORIDE,) 4 mg tablet Take 1 Tab by mouth every eight (8) hours as needed for Nausea. 12 Tab 0    methotrexate (RHEUMATREX) 2.5 mg tablet Take 2.5 mg by mouth every fourty-eight (48) hours.  metFORMIN (GLUCOPHAGE) 1,000 mg tablet Take 500 mg by mouth two (2) times a day.  Cholecalciferol, Vitamin D3, 5,000 unit Tab Take 5,000 Units by mouth every seven (7) days.  atenolol (TENORMIN) 25 mg tablet Take 25 mg by mouth daily. Indications: HYPERTENSION      hydroxychloroquine (PLAQUENIL) 200 mg tablet Take 200 mg by mouth two (2) times a day.  furosemide (LASIX) 40 mg tablet Take  by mouth daily.         INFLIXIMAB (REMICADE IV) 344 mg by IntraVENous route once as needed. remicade will be every 6 weeks      losartan-hydrochlorothiazide (HYZAAR) 50-12.5 mg per tablet Take 1 Tab by mouth daily.  INSULIN GLARGINE,HUM. REC. ANLOG (LANTUS SC) 5 Units by SubCUTAneous route daily. Review of Systems  Constitutional: The patient has no acute distress or discomfort. HEENT: The patient denies recent head trauma, eye pain, blurred vision,  hearing deficit, oropharyngeal mucosal pain or lesions, and the patient denies throat pain or discomfort. Lymphatics: The patient denies palpable peripheral lymphadenopathy. Hematologic: The patient denies having bruising, bleeding, or progressive fatigue. Respiratory: Patient denies having shortness of breath, cough, sputum production, fever, or dyspnea on exertion. Cardiovascular: The patient denies having leg pain, leg swelling, heart palpitations, chest permit, chest pain, or lightheadedness. The patient denies having dyspnea on exertion. Breast: The patient has a sebaceous cyst involving the right breast.  Gastrointestinal: The patient denies having nausea, emesis, or diarrhea. The patient denies having any hematemesis or blood in the stool. Genitourinary: Patient denies having urinary urgency, frequency, or dysuria. The patient denies having blood in the urine. Psychological: The patient denies having symptoms of nervousness, anxiety, depression, or thoughts of harming himself some of this. Skin: Patient denies having skin rashes, skin, ulcerations, or unexplained itching or pruritus. Musculoskeletal: The patient complains of generalized back pain. Objective:     Visit Vitals    /83 (BP 1 Location: Left arm, BP Patient Position: Sitting)    Pulse 85    Temp 98.7 °F (37.1 °C) (Oral)    Resp 18    Wt 99.2 kg (218 lb 9.6 oz)    BMI 30.49 kg/m2     ECOG PS=0 Pain score 4-5/10     Physical Exam:   Gen. Appearance: The patient is in no acute distress. Skin: There is no bruise or rash. HEENT: The exam is unremarkable. Neck: Supple without lymphadenopathy or thyromegaly. Lungs: Clear to auscultation and percussion; there are no wheezes or rhonchi. Heart: Regular rate and rhythm; there are no murmurs, gallops, or rubs. Anterior chest wall and breast: Patient has a sebaceous cyst that has apparently drained from the underside of the right breast abutting the right anterior chest wall. Abdomen: Bowel sounds are present and normal.  There is no guarding, tenderness, or hepatosplenomegaly. Extremities: There is no clubbing, cyanosis, or edema. Neurologic: There are no focal neurologic deficits. Lymphatics: There is no palpable peripheral lymphadenopathy. Musculoskeletal: The patient has full range of motion at all joints. However, she complains of pain on ambulation due to the severe rheumatoid arthritis. There is  evidence of joint deformity or effusions in the hands and knees. There is no focal joint tenderness. She is using a cane for support and mobility. Psychological/psychiatric: There is no clinical evidence of anxiety, depression, or melancholy. Lab data:      Results for orders placed or performed during the hospital encounter of 07/24/18   CBC WITH 3 PART DIFF     Status: Abnormal   Result Value Ref Range Status    WBC 11.0 4.5 - 13.0 K/uL Final    RBC 4.84 4.10 - 5.10 M/uL Final    HGB 13.3 12.0 - 16.0 g/dL Final    HCT 40.2 36 - 48 % Final    MCV 83.1 78 - 102 FL Final    MCH 27.5 25.0 - 35.0 PG Final    MCHC 33.1 31 - 37 g/dL Final    RDW 13.7 11.5 - 14.5 % Final    PLATELET 988 (H) 616 - 440 K/uL Final    NEUTROPHILS 59 40 - 70 % Final    MIXED CELLS 8 0.1 - 17 % Final    LYMPHOCYTES 33 14 - 44 % Final    ABS. NEUTROPHILS 6.6 1.8 - 9.5 K/UL Final    ABS. MIXED CELLS 0.8 0.0 - 2.3 K/uL Final    ABS. LYMPHOCYTES 3.6 1.1 - 5.9 K/UL Final     Comment: Test performed at [place] [address] Location. Results Reviewed by Medical Director.     DF AUTOMATED   Final Assessment:     1. Essential thrombocytosis ([facility]    2. Chronic pain syndrome    3. Rheumatoid arthritis, involving unspecified site, unspecified rheumatoid factor presence ([facility]    4. Chronic anemia    5. Fibromyalgia    6. Rheumatoid arthritis involving multiple joints (HCC)    7. Sebaceous cyst of skin of right breast      Plan:   Leukocytosis (recurrent and persisting): I have explained to the patient that the etiology of her leukocytosis remains undefined. A previous flow cytometry did not reveal any  evidence of an immunophenotypic abnormality such as an evolving chronic lymphoproliferative disorder or myeloproliferative disorder. The CBCs will continue to be monitored every 6 weeks. The CBC from today shows her WBC count is currently normal at 11 with an absolute neutrophil count of 6.6 and absolute lymphocyte count of 3.6. Essential thrombocytosis/thrombocytosis: The current CBC shows that the platelet count is now 448,000. The platelet count is being monitored every 6 weeks. The patient was previously started on anagrelide 0.5 mg one tablet twice daily. This medication will be continued, at the current dose. I have reenforced to the patient the importance of being complaint with the treatment plan. Rheumatoid arthritis: the patient will continue to receive Remicade as a primary treatment modality for her severe rheumatoid arthritis every 6 weeks. The dose of Remicade will be 344 mg given intravenously. The patient has continued to take  Methotrexate 5 mg p.o. daily and Plaquenil 200 mg twice daily. Fibromyalgia/chronic pain syndrome: The patient  continues to have generalized pain at times related to her underlying fibromyalgia. The patient receives her Percocet medication on a monthly basis as needed. A new prescription for the Percocet was provided at this time.     Chronic anemia: I have explained to the patient the CBC from today shows her hemoglobin has remained normal at 13.3 g/dL with hematocrit of 38.8 %. I have recommended that she continue to take ferrous sulfate 325 mg by mouth once daily. Sebaceous cysts, right breast (new problem): I have advised the patient to continue to apply the warm compresses to the area. Bactroban ointment or triple antibiotic ointment should be applied to the area as well at least 2 times daily. She has previously been prescribed antibiotics, Keflex by her primary care physician and I have advised her to continue the medication until she completes the full course. We will see the patient back in 6 weeks for a complete reassessment. Orders Placed This Encounter    COMPLETE CBC & AUTO DIFF WBC    InHouse CBC (Caption Data)     Standing Status:   Future     Number of Occurrences:   1     Standing Expiration Date:   0/59/4384    METABOLIC PANEL, COMPREHENSIVE     Standing Status:   Future     Standing Expiration Date:   7/25/2019    IRON PROFILE     Standing Status:   Future     Standing Expiration Date:   7/25/2019    FERRITIN     Standing Status:   Future     Standing Expiration Date:   7/25/2019    oxyCODONE-acetaminophen (PERCOCET 10)  mg per tablet     Sig: Take 1 Tab by mouth every six (6) hours as needed for Pain for up to 15 days. Max Daily Amount: 4 Tabs.      Dispense:  60 Tab     Refill:  0       Lindsey Peacock MD  7/24/2018

## 2018-08-06 DIAGNOSIS — G89.4 CHRONIC PAIN SYNDROME: ICD-10-CM

## 2018-08-06 DIAGNOSIS — M06.9 RHEUMATOID ARTHRITIS, INVOLVING UNSPECIFIED SITE, UNSPECIFIED RHEUMATOID FACTOR PRESENCE: ICD-10-CM

## 2018-08-06 RX ORDER — OXYCODONE AND ACETAMINOPHEN 10; 325 MG/1; MG/1
1 TABLET ORAL
Qty: 60 TAB | Refills: 0 | Status: CANCELLED | OUTPATIENT
Start: 2018-08-06 | End: 2018-08-21

## 2018-08-07 NOTE — TELEPHONE ENCOUNTER
Per M.WADE, re-routing to FRED DHALIWAL For printout at [facility] on Wed, patient will  at [facility].

## 2018-08-08 DIAGNOSIS — M06.9 RHEUMATOID ARTHRITIS, INVOLVING UNSPECIFIED SITE, UNSPECIFIED RHEUMATOID FACTOR PRESENCE: ICD-10-CM

## 2018-08-08 DIAGNOSIS — G89.4 CHRONIC PAIN SYNDROME: ICD-10-CM

## 2018-08-08 RX ORDER — OXYCODONE AND ACETAMINOPHEN 10; 325 MG/1; MG/1
1 TABLET ORAL
Qty: 60 TAB | Refills: 0 | Status: SHIPPED | OUTPATIENT
Start: 2018-08-08 | End: 2018-08-10 | Stop reason: SDUPTHER

## 2018-08-10 DIAGNOSIS — G89.4 CHRONIC PAIN SYNDROME: ICD-10-CM

## 2018-08-10 DIAGNOSIS — M06.9 RHEUMATOID ARTHRITIS, INVOLVING UNSPECIFIED SITE, UNSPECIFIED RHEUMATOID FACTOR PRESENCE: ICD-10-CM

## 2018-08-10 RX ORDER — OXYCODONE AND ACETAMINOPHEN 10; 325 MG/1; MG/1
1 TABLET ORAL
Qty: 60 TAB | Refills: 0 | Status: SHIPPED | OUTPATIENT
Start: 2018-08-10 | End: 2018-08-25

## 2018-08-28 ENCOUNTER — HOSPITAL ENCOUNTER (OUTPATIENT)
Dept: LAB | Age: 53
Discharge: HOME | End: 2018-08-28
Payer: MEDICARE

## 2018-08-28 ENCOUNTER — HOSPITAL ENCOUNTER (OUTPATIENT)
Dept: ONCOLOGY | Age: 53
Discharge: HOME | End: 2018-08-28

## 2018-08-28 ENCOUNTER — HOSPITAL ENCOUNTER (OUTPATIENT)
Dept: INFUSION THERAPY | Age: 53
Discharge: HOME | End: 2018-08-28
Payer: MEDICARE

## 2018-08-28 ENCOUNTER — CLINICAL SUPPORT (OUTPATIENT)
Dept: ONCOLOGY | Age: 53
End: 2018-08-28

## 2018-08-28 VITALS
SYSTOLIC BLOOD PRESSURE: 109 MMHG | HEIGHT: 71 IN | HEART RATE: 91 BPM | TEMPERATURE: 98.6 F | BODY MASS INDEX: 29.82 KG/M2 | RESPIRATION RATE: 20 BRPM | DIASTOLIC BLOOD PRESSURE: 68 MMHG | WEIGHT: 213 LBS

## 2018-08-28 DIAGNOSIS — M06.09 RHEUMATOID ARTHRITIS OF MULTIPLE SITES WITHOUT RHEUMATOID FACTOR (HCC): ICD-10-CM

## 2018-08-28 DIAGNOSIS — M06.09 RHEUMATOID ARTHRITIS OF MULTIPLE SITES WITHOUT RHEUMATOID FACTOR (HCC): Primary | ICD-10-CM

## 2018-08-28 LAB
ALBUMIN SERPL-MCNC: 3.9 G/DL (ref 3.4–5)
ALBUMIN/GLOB SERPL: 0.9 {RATIO} (ref 0.8–1.7)
ALP SERPL-CCNC: 101 U/L (ref 45–117)
ALT SERPL-CCNC: 20 U/L (ref 13–56)
ANION GAP SERPL CALC-SCNC: 8 MMOL/L (ref 3–18)
AST SERPL-CCNC: 6 U/L (ref 15–37)
BASO+EOS+MONOS # BLD AUTO: 0.7 K/UL (ref 0–2.3)
BASO+EOS+MONOS # BLD AUTO: 7 % (ref 0.1–17)
BILIRUB SERPL-MCNC: 0.2 MG/DL (ref 0.2–1)
BUN SERPL-MCNC: 11 MG/DL (ref 7–18)
BUN/CREAT SERPL: 15 (ref 12–20)
CALCIUM SERPL-MCNC: 9.9 MG/DL (ref 8.5–10.1)
CHLORIDE SERPL-SCNC: 104 MMOL/L (ref 100–108)
CO2 SERPL-SCNC: 28 MMOL/L (ref 21–32)
CREAT SERPL-MCNC: 0.71 MG/DL (ref 0.6–1.3)
CRP SERPL-MCNC: 1.2 MG/DL (ref 0–0.3)
DIFFERENTIAL METHOD BLD: ABNORMAL
ERYTHROCYTE [DISTWIDTH] IN BLOOD BY AUTOMATED COUNT: 13.6 % (ref 11.5–14.5)
ERYTHROCYTE [SEDIMENTATION RATE] IN BLOOD: 13 MM/HR (ref 0–30)
GLOBULIN SER CALC-MCNC: 4.2 G/DL (ref 2–4)
GLUCOSE SERPL-MCNC: 249 MG/DL (ref 74–99)
HCT VFR BLD AUTO: 42.3 % (ref 36–48)
HGB BLD-MCNC: 13.6 G/DL (ref 12–16)
LYMPHOCYTES # BLD: 2.9 K/UL (ref 1.1–5.9)
LYMPHOCYTES NFR BLD: 27 % (ref 14–44)
MCH RBC QN AUTO: 26.6 PG (ref 25–35)
MCHC RBC AUTO-ENTMCNC: 32.2 G/DL (ref 31–37)
MCV RBC AUTO: 82.6 FL (ref 78–102)
NEUTS SEG # BLD: 7.2 K/UL (ref 1.8–9.5)
NEUTS SEG NFR BLD: 66 % (ref 40–70)
PLATELET # BLD AUTO: 463 K/UL (ref 140–440)
POTASSIUM SERPL-SCNC: 4.3 MMOL/L (ref 3.5–5.5)
PROT SERPL-MCNC: 8.1 G/DL (ref 6.4–8.2)
RBC # BLD AUTO: 5.12 M/UL (ref 4.1–5.1)
SODIUM SERPL-SCNC: 140 MMOL/L (ref 136–145)
WBC # BLD AUTO: 10.8 K/UL (ref 4.5–13)

## 2018-08-28 PROCEDURE — 96415 CHEMO IV INFUSION ADDL HR: CPT

## 2018-08-28 PROCEDURE — 74011250636 HC RX REV CODE- 250/636: Performed by: INTERNAL MEDICINE

## 2018-08-28 PROCEDURE — 96375 TX/PRO/DX INJ NEW DRUG ADDON: CPT

## 2018-08-28 PROCEDURE — 80053 COMPREHEN METABOLIC PANEL: CPT | Performed by: INTERNAL MEDICINE

## 2018-08-28 PROCEDURE — 85652 RBC SED RATE AUTOMATED: CPT | Performed by: INTERNAL MEDICINE

## 2018-08-28 PROCEDURE — 96413 CHEMO IV INFUSION 1 HR: CPT

## 2018-08-28 PROCEDURE — 86140 C-REACTIVE PROTEIN: CPT | Performed by: INTERNAL MEDICINE

## 2018-08-28 RX ORDER — DIPHENHYDRAMINE HYDROCHLORIDE 50 MG/ML
50 INJECTION, SOLUTION INTRAMUSCULAR; INTRAVENOUS
Status: ACTIVE | OUTPATIENT
Start: 2018-08-28 | End: 2018-08-28

## 2018-08-28 RX ORDER — DIPHENHYDRAMINE HYDROCHLORIDE 50 MG/ML
25 INJECTION, SOLUTION INTRAMUSCULAR; INTRAVENOUS ONCE
Status: COMPLETED | OUTPATIENT
Start: 2018-08-28 | End: 2018-08-28

## 2018-08-28 RX ORDER — SODIUM CHLORIDE 0.9 % (FLUSH) 0.9 %
10-40 SYRINGE (ML) INJECTION AS NEEDED
Status: DISCONTINUED | OUTPATIENT
Start: 2018-08-28 | End: 2018-09-01 | Stop reason: HOSPADM

## 2018-08-28 RX ORDER — ACETAMINOPHEN 325 MG/1
650 TABLET ORAL
Status: ACTIVE | OUTPATIENT
Start: 2018-08-28 | End: 2018-08-28

## 2018-08-28 RX ORDER — SODIUM CHLORIDE 9 MG/ML
250 INJECTION, SOLUTION INTRAVENOUS ONCE
Status: COMPLETED | OUTPATIENT
Start: 2018-08-28 | End: 2018-08-28

## 2018-08-28 RX ADMIN — Medication 10 ML: at 11:00

## 2018-08-28 RX ADMIN — INFLIXIMAB 400 MG: 100 INJECTION, POWDER, LYOPHILIZED, FOR SOLUTION INTRAVENOUS at 12:00

## 2018-08-28 RX ADMIN — SODIUM CHLORIDE 250 ML: 900 INJECTION, SOLUTION INTRAVENOUS at 11:15

## 2018-08-28 RX ADMIN — DIPHENHYDRAMINE HYDROCHLORIDE 25 MG: 50 INJECTION INTRAMUSCULAR; INTRAVENOUS at 11:26

## 2018-08-28 NOTE — PROGRESS NOTES
[facility] Progress Note Date: 2018 Name: Vickie Bob MRN: [id number] : 1965 Ms. Willian Rosas arrived in the [facility] today at 1030, in stable condition, here for Q 6 Week, IV Remicade Infusion. She was assessed and education was provided. Ms. Janes Horn vitals were reviewed. Visit Vitals  /75 (BP 1 Location: Left arm, BP Patient Position: Sitting)  Pulse 88  Temp 98.8 °F (37.1 °C)  Resp 20  
 Ht 5' 11\" (1.803 m)  Wt 96.6 kg (213 lb)  Breastfeeding No  
 BMI 29.71 kg/m2 PIV was established in her dorsal left forearm at 1100, without incident, and blood was drawn for a CBC, CMP, ESR, & CRP (2 lavender top tubes & 2 SST tubes), per order. (The CBC was processed in house, and the CMP, ESR, & CRP, were sent out to be processed. ) Lab results were obtained and reviewed, and the CBC results from today listed below, as well as the most recent CMP results from 18, were all noted to be satisfactory for treatment today. Recent Results (from the past 12 hour(s)) CBC WITH 3 PART DIFF Collection Time: 18 11:00 AM  
Result Value Ref Range WBC 10.8 4.5 - 13.0 K/uL  
 RBC 5.12 (H) 4.10 - 5.10 M/uL  
 HGB 13.6 12.0 - 16.0 g/dL HCT 42.3 36 - 48 % MCV 82.6 78 - 102 FL  
 MCH 26.6 25.0 - 35.0 PG  
 MCHC 32.2 31 - 37 g/dL  
 RDW 13.6 11.5 - 14.5 % PLATELET 436 (H) 735 - 440 K/uL NEUTROPHILS 66 40 - 70 % MIXED CELLS 7 0.1 - 17 % LYMPHOCYTES 27 14 - 44 % ABS. NEUTROPHILS 7.2 1.8 - 9.5 K/UL  
 ABS. MIXED CELLS 0.7 0.0 - 2.3 K/uL  
 ABS. LYMPHOCYTES 2.9 1.1 - 5.9 K/UL  
 DF AUTOMATED Pre-medication consisting of IV Benadryl 25 mg, was administered per order, and without incident.  
  
  
  
Remicade 400 mg IV (4 mg/kg) Maintenance Dose, was administered over 2 hours, per order, and without incident.  
  
  
  
After the completion of the IV Remicade, Ms. Willian Rosas was monitored for 30 minutes post Remicade, per order, and also without incident.  
  
  
After the completion of the 30 minute monitoring period, the PIV was removed and gauze/bandaid was applied. Ms. Paulina Epstein tolerated well, and had no complaints. Ms. Paulina Epstein was discharged from [place] [address] in stable condition at [phone]. [place] She is to return in 6 weeks, on Tuesday, 10-9-18,  at 1000,  for her next appointment, for her next dose of IV Remicade. Zunilda Chatterjee RN August 28, 2018 10:52 AM

## 2018-09-04 ENCOUNTER — HOSPITAL ENCOUNTER (OUTPATIENT)
Dept: LAB | Age: 53
Discharge: HOME | End: 2018-09-04
Payer: MEDICARE

## 2018-09-04 ENCOUNTER — HOSPITAL ENCOUNTER (OUTPATIENT)
Dept: ONCOLOGY | Age: 53
Discharge: HOME | End: 2018-09-04

## 2018-09-04 ENCOUNTER — OFFICE VISIT (OUTPATIENT)
Dept: ONCOLOGY | Age: 53
End: 2018-09-04

## 2018-09-04 VITALS
SYSTOLIC BLOOD PRESSURE: 127 MMHG | DIASTOLIC BLOOD PRESSURE: 87 MMHG | BODY MASS INDEX: 30.1 KG/M2 | TEMPERATURE: 98.5 F | HEART RATE: 93 BPM | WEIGHT: 215 LBS | HEIGHT: 71 IN | RESPIRATION RATE: 18 BRPM

## 2018-09-04 DIAGNOSIS — G89.4 CHRONIC PAIN SYNDROME: ICD-10-CM

## 2018-09-04 DIAGNOSIS — D75.839 THROMBOCYTOSIS: ICD-10-CM

## 2018-09-04 DIAGNOSIS — N60.81 SEBACEOUS CYST OF SKIN OF RIGHT BREAST: ICD-10-CM

## 2018-09-04 DIAGNOSIS — M06.9 RHEUMATOID ARTHRITIS INVOLVING MULTIPLE JOINTS (HCC): ICD-10-CM

## 2018-09-04 DIAGNOSIS — D64.9 CHRONIC ANEMIA: ICD-10-CM

## 2018-09-04 DIAGNOSIS — D47.3 ESSENTIAL THROMBOCYTOSIS (HCC): Primary | ICD-10-CM

## 2018-09-04 DIAGNOSIS — D47.3 ESSENTIAL THROMBOCYTOSIS (HCC): ICD-10-CM

## 2018-09-04 LAB
ALBUMIN SERPL-MCNC: 3.6 G/DL (ref 3.4–5)
ALBUMIN/GLOB SERPL: 0.9 {RATIO} (ref 0.8–1.7)
ALP SERPL-CCNC: 92 U/L (ref 45–117)
ALT SERPL-CCNC: 18 U/L (ref 13–56)
ANION GAP SERPL CALC-SCNC: 9 MMOL/L (ref 3–18)
AST SERPL-CCNC: 12 U/L (ref 15–37)
BASO+EOS+MONOS # BLD AUTO: 0.7 K/UL (ref 0–2.3)
BASO+EOS+MONOS # BLD AUTO: 8 % (ref 0.1–17)
BILIRUB SERPL-MCNC: 0.4 MG/DL (ref 0.2–1)
BUN SERPL-MCNC: 13 MG/DL (ref 7–18)
BUN/CREAT SERPL: 17 (ref 12–20)
CALCIUM SERPL-MCNC: 9.8 MG/DL (ref 8.5–10.1)
CHLORIDE SERPL-SCNC: 106 MMOL/L (ref 100–108)
CO2 SERPL-SCNC: 26 MMOL/L (ref 21–32)
CREAT SERPL-MCNC: 0.77 MG/DL (ref 0.6–1.3)
DIFFERENTIAL METHOD BLD: NORMAL
ERYTHROCYTE [DISTWIDTH] IN BLOOD BY AUTOMATED COUNT: 13.6 % (ref 11.5–14.5)
FERRITIN SERPL-MCNC: 134 NG/ML (ref 8–388)
GLOBULIN SER CALC-MCNC: 4 G/DL (ref 2–4)
GLUCOSE SERPL-MCNC: 175 MG/DL (ref 74–99)
HCT VFR BLD AUTO: 41.9 % (ref 36–48)
HGB BLD-MCNC: 13.7 G/DL (ref 12–16)
IRON SATN MFR SERPL: 28 %
IRON SERPL-MCNC: 87 UG/DL (ref 50–175)
LYMPHOCYTES # BLD: 3.1 K/UL (ref 1.1–5.9)
LYMPHOCYTES NFR BLD: 32 % (ref 14–44)
MCH RBC QN AUTO: 27 PG (ref 25–35)
MCHC RBC AUTO-ENTMCNC: 32.7 G/DL (ref 31–37)
MCV RBC AUTO: 82.5 FL (ref 78–102)
NEUTS SEG # BLD: 6 K/UL (ref 1.8–9.5)
NEUTS SEG NFR BLD: 60 % (ref 40–70)
PLATELET # BLD AUTO: 421 K/UL (ref 140–440)
POTASSIUM SERPL-SCNC: 4.2 MMOL/L (ref 3.5–5.5)
PROT SERPL-MCNC: 7.6 G/DL (ref 6.4–8.2)
RBC # BLD AUTO: 5.08 M/UL (ref 4.1–5.1)
SODIUM SERPL-SCNC: 141 MMOL/L (ref 136–145)
TIBC SERPL-MCNC: 313 UG/DL (ref 250–450)
WBC # BLD AUTO: 9.8 K/UL (ref 4.5–13)

## 2018-09-04 PROCEDURE — 36415 COLL VENOUS BLD VENIPUNCTURE: CPT | Performed by: INTERNAL MEDICINE

## 2018-09-04 PROCEDURE — 83540 ASSAY OF IRON: CPT | Performed by: INTERNAL MEDICINE

## 2018-09-04 PROCEDURE — 82728 ASSAY OF FERRITIN: CPT | Performed by: INTERNAL MEDICINE

## 2018-09-04 PROCEDURE — 80053 COMPREHEN METABOLIC PANEL: CPT | Performed by: INTERNAL MEDICINE

## 2018-09-04 RX ORDER — OXYCODONE AND ACETAMINOPHEN 10; 325 MG/1; MG/1
1 TABLET ORAL
Qty: 120 TAB | Refills: 0 | Status: SHIPPED | OUTPATIENT
Start: 2018-09-04 | End: 2018-09-04 | Stop reason: CLARIF

## 2018-09-04 RX ORDER — OXYCODONE AND ACETAMINOPHEN 10; 325 MG/1; MG/1
1 TABLET ORAL
Qty: 60 TAB | Refills: 0 | Status: SHIPPED | OUTPATIENT
Start: 2018-09-04 | End: 2018-09-17 | Stop reason: SDUPTHER

## 2018-09-04 NOTE — PROGRESS NOTES
Hematology/Oncology  Progress Note    Name: Josefina Celaya  Date: 2018  : 1965    PCP: Fariha Olivier MD     Ms. Sy Flor is a 45year old female who was seen for management of her rheumatoid arthritis, leukocytosis, and thrombocytosis. Current therapy: Remicade 4 mg/kg bodyweight ever 6 weeks intravenously    Subjective:     Ms. Sy Flor is a 45year-old Counts include 234 beds at the [facility] American woman who has severe rheumatoid arthritis. She receives Remicade every 6 weeks. She also suffers from fibromyalgia and had an elevated WBC count and platelet count of undefined etiology. Today she has no new complaints to report. She continues to complain of back pain. She is using a narcotic based regimen for pain control. She states this provides some relief, but the pain is gradually worsening. She reports that the Remicade has provided a significant degree of relief from the severe rheumatoid arthritis symptoms. The patient is continuing to use her cane for mobility support. She denies any recent fevers or recurrent infections. The patient reports that her appetite has continued to improve. She also states her energy level is continuing to improve as well. She has no new complaints or concerns to report, at this time. She is requesting a new prescription for Percocet. The patient informed me that the cyst that she had on her right breast has completely resolved since her last clinic visit.     Past Medical History:   Diagnosis Date    Abdominal pain, unspecified site     Acute otitis media     Acute pharyngitis     Anxiety     Arthritis     Autoimmune disease ([facility] [address]     Back injury     Backache     herniated disc in lower back    Blurred vision     Chest pain 2018    Chest pain, unspecified     abnormal EKG    Chronic airway obstruction, not elsewhere classified     Chronic back pain     Chronic pain     COPD     Depression     Diabetes (HCC)     Dizziness     Dizziness and giddiness possible orthostatic changes, vasovagal, autonomic dysfunction from diabetic neuropathy    Encounters for unspecified administrative purpose     Essential thrombocytosis ([facility] [address] 1/6/2016    Feeling anxious     Fibromyalgia     Headache(784.0)     Hemorrhoid     Herniated disc     at L5    Hypercholesterolemia     Hyperglycemia     Hypertension     Joint pain     Leukocytosis, unspecified     Major depressive disorder, single episode, mild (HCC)     Mental disorder     depression, anxiety, bipolar and PTSD    Neuropathy     Obesity, unspecified     Other chest pain     Palpitation 5/4/2018    ? arrhythmia    Phobic disorders     Rheumatoid arthritis (HCC)     Rheumatoid arthritis of foot ([facility] [address] 5/4/2018    on treatment    Seasonal allergies     Shortness of breath     normal EF    Smoking 5/4/2018    discussed cessation    Streptococcal sore throat     Type 2 diabetes mellitus without complication, with long-term current use of insulin ([facility] [address] 5/4/2018    Type II or unspecified type diabetes mellitus with unspecified complication, uncontrolled     Unspecified hereditary and idiopathic peripheral neuropathy     Vitamin D deficiency      Past Surgical History:   Procedure Laterality Date    HX CHOLECYSTECTOMY  1994    HX GYN  2005    partial Hysterectomy    HX OTHER SURGICAL  12-1-15    had ingrown toenail removed from big toe, both feet    HX TUBAL LIGATION      1995     Social History     Social History    Marital status:      Spouse name: N/A    Number of children: N/A    Years of education: N/A     Occupational History    Not on file.      Social History Main Topics    Smoking status: Current Every Day Smoker     Packs/day: 0.25     Last attempt to quit: 7/11/2012    Smokeless tobacco: Never Used      Comment: 5 cigarettes day    Alcohol use Yes      Comment: socially    Drug use: No    Sexual activity: Yes     Partners: Male     Birth control/ protection: Condom Other Topics Concern    Not on file     Social History Narrative     Family History   Problem Relation Age of Onset    Diabetes Other     Alcohol abuse Mother     Arthritis-osteo Mother     Diabetes Mother     Elevated Lipids Mother     Headache Mother     Heart Disease Mother    [de-identified] Migraines Mother     Psychiatric Disorder Mother     Alcohol abuse Father    Filomena Flow Father     Cancer Father     Headache Father     Heart Disease Father     Lung Disease Father     Migraines Father     Heart Surgery Father     Alcohol abuse Sister     Headache Sister     Diabetes Sister     Heart Disease Sister     Migraines Sister     Psychiatric Disorder Sister     Headache Brother     Diabetes Brother     Elevated Lipids Brother     Heart Disease Brother     Migraines Brother     Psychiatric Disorder Brother     Coronary Artery Disease Brother     Cancer Maternal Aunt     Alcohol abuse Maternal Aunt     Diabetes Maternal Aunt     Headache Maternal Aunt     Lung Disease Maternal Aunt     Migraines Maternal Aunt     Headache Maternal Uncle     Migraines Maternal Uncle     Stroke Maternal Uncle     Psychiatric Disorder Maternal Uncle     Headache Paternal Aunt     Migraines Paternal Aunt     Headache Paternal Uncle     Hypertension Paternal Uncle     Migraines Paternal Uncle     Stroke Paternal Uncle     Headache Maternal Grandmother     Migraines Maternal Grandmother     Stroke Maternal Grandmother     Headache Maternal Grandfather     Migraines Maternal Grandfather     Stroke Maternal Grandfather     Headache Paternal Grandmother     Hypertension Paternal Grandmother     Lung Disease Paternal Grandmother     Migraines Paternal Grandmother     Headache Paternal Grandfather     Cancer Paternal Grandfather     Migraines Paternal Grandfather      Current Outpatient Prescriptions   Medication Sig Dispense Refill    oxyCODONE-acetaminophen (PERCOCET 10)  mg per tablet Take 1 Tab by mouth every six (6) hours as needed for Pain. Max Daily Amount: 4 Tabs. 60 Tab 0    anagrelide (AGRYLIN) 0.5 mg capsule Take 1 Cap by mouth two (2) times a day. Indications: ESSENTIAL THROMBOCYTOSIS 60 Cap 6    amitriptyline (ELAVIL) 25 mg tablet       clotrimazole-betamethasone (LOTRISONE) topical cream       DULoxetine (CYMBALTA) 30 mg capsule       gabapentin (NEURONTIN) 300 mg capsule 300 mg two (2) times a day.  QUEtiapine (SEROQUEL) 25 mg tablet       BD INSULIN SYRINGE ULTRA-FINE 1 mL 31 gauge x 15/64\" syrg       albuterol sulfate (PROVENTIL;VENTOLIN) 2.5 mg/0.5 mL nebu nebulizer solution by Nebulization route once. Is now using the actual nebulizer machine, for current bronchitis episode (Aug/Sept. 2017)      insulin aspart protamine/insulin aspart (NOVOLOG MIX 70-30) 100 unit/mL (70-30) injection 10 Units by SubCUTAneous route two (2) times a day.  clonazePAM (KLONOPIN) 2 mg tablet Take 2 mg by mouth daily. Indications: PANIC DISORDER      folic acid 939 mcg tablet Take 400 mcg by mouth daily.  albuterol (PROAIR HFA) 90 mcg/actuation inhaler Take 1 Puff by inhalation every six (6) hours as needed for Wheezing. 1 Inhaler 0    ondansetron hcl (ZOFRAN, AS HYDROCHLORIDE,) 4 mg tablet Take 1 Tab by mouth every eight (8) hours as needed for Nausea. 12 Tab 0    methotrexate (RHEUMATREX) 2.5 mg tablet Take 2.5 mg by mouth every fourty-eight (48) hours.  metFORMIN (GLUCOPHAGE) 1,000 mg tablet Take 500 mg by mouth two (2) times a day.  Cholecalciferol, Vitamin D3, 5,000 unit Tab Take 5,000 Units by mouth every seven (7) days.  atenolol (TENORMIN) 25 mg tablet Take 25 mg by mouth daily. Indications: HYPERTENSION      hydroxychloroquine (PLAQUENIL) 200 mg tablet Take 200 mg by mouth two (2) times a day.  furosemide (LASIX) 40 mg tablet Take  by mouth daily.  INFLIXIMAB (REMICADE IV) 344 mg by IntraVENous route once as needed.  remicade will be every 6 weeks      losartan-hydrochlorothiazide (HYZAAR) 50-12.5 mg per tablet Take 1 Tab by mouth daily.  INSULIN GLARGINE,HUM. REC. ANLOG (LANTUS SC) 5 Units by SubCUTAneous route daily. Review of Systems  Constitutional: The patient has no acute distress or discomfort. HEENT: The patient denies recent head trauma, eye pain, blurred vision,  hearing deficit, oropharyngeal mucosal pain or lesions, and the patient denies throat pain or discomfort. Lymphatics: The patient denies palpable peripheral lymphadenopathy. Hematologic: The patient denies having bruising, bleeding, or progressive fatigue. Respiratory: Patient denies having shortness of breath, cough, sputum production, fever, or dyspnea on exertion. Cardiovascular: The patient denies having leg pain, leg swelling, heart palpitations, chest permit, chest pain, or lightheadedness. The patient denies having dyspnea on exertion. Breast: The patient has a sebaceous cyst involving the right breast.  Gastrointestinal: The patient denies having nausea, emesis, or diarrhea. The patient denies having any hematemesis or blood in the stool. Genitourinary: Patient denies having urinary urgency, frequency, or dysuria. The patient denies having blood in the urine. Psychological: The patient denies having symptoms of nervousness, anxiety, depression, or thoughts of harming himself some of this. Skin: Patient denies having skin rashes, skin, ulcerations, or unexplained itching or pruritus. Musculoskeletal: The patient complains of generalized back pain. Objective:     Visit Vitals    /87 (BP 1 Location: Left arm, BP Patient Position: Sitting)    Pulse 93    Temp 98.5 °F (36.9 °C) (Oral)    Resp 18    Ht 5' 11\" (1.803 m)    Wt 97.5 kg (215 lb)    BMI 29.99 kg/m2     ECOG PS=0 Pain score 4-5/10     Physical Exam:   Gen. Appearance: The patient is in no acute distress. Skin: There is no bruise or rash. HEENT: The exam is unremarkable. Neck: Supple without lymphadenopathy or thyromegaly. Lungs: Clear to auscultation and percussion; there are no wheezes or rhonchi. Heart: Regular rate and rhythm; there are no murmurs, gallops, or rubs. Anterior chest wall and breast: Patient has a sebaceous cyst that has apparently drained from the underside of the right breast abutting the right anterior chest wall. Abdomen: Bowel sounds are present and normal.  There is no guarding, tenderness, or hepatosplenomegaly. Extremities: There is no clubbing, cyanosis, or edema. Neurologic: There are no focal neurologic deficits. Lymphatics: There is no palpable peripheral lymphadenopathy. Musculoskeletal: The patient has full range of motion at all joints. However, she complains of pain on ambulation due to the severe rheumatoid arthritis. There is  evidence of joint deformity or effusions in the hands and knees. There is no focal joint tenderness. She is using a cane for support and mobility. Psychological/psychiatric: There is no clinical evidence of anxiety, depression, or melancholy. Lab data:      Results for orders placed or performed during the hospital encounter of 09/04/18   CBC WITH 3 PART DIFF     Status: None   Result Value Ref Range Status    WBC 9.8 4.5 - 13.0 K/uL Final    RBC 5.08 4.10 - 5.10 M/uL Final    HGB 13.7 12.0 - 16.0 g/dL Final    HCT 41.9 36 - 48 % Final    MCV 82.5 78 - 102 FL Final    MCH 27.0 25.0 - 35.0 PG Final    MCHC 32.7 31 - 37 g/dL Final    RDW 13.6 11.5 - 14.5 % Final    PLATELET 875 303 - 364 K/uL Final    NEUTROPHILS 60 40 - 70 % Final    MIXED CELLS 8 0.1 - 17 % Final    LYMPHOCYTES 32 14 - 44 % Final    ABS. NEUTROPHILS 6.0 1.8 - 9.5 K/UL Final    ABS. MIXED CELLS 0.7 0.0 - 2.3 K/uL Final    ABS. LYMPHOCYTES 3.1 1.1 - 5.9 K/UL Final     Comment: Test performed at [place] [id number] Location. Results Reviewed by Medical Director. DF AUTOMATED   Final           Assessment:     1.  Essential thrombocytosis ([email or web address]    2. Thrombocytosis ([facility] [address]    3. Chronic pain syndrome    4. Rheumatoid arthritis involving multiple joints (HCC)    5. Chronic anemia    6. Sebaceous cyst of skin of right breast      Plan:   Leukocytosis (recurrent and persisting): I have explained to the patient that the etiology of her leukocytosis remains undefined. A previous flow cytometry did not reveal any  evidence of an immunophenotypic abnormality such as an evolving chronic lymphoproliferative disorder or myeloproliferative disorder. The CBCs will continue to be monitored every 6 weeks. The CBC from today shows her WBC count is currently normal at 9.8 with an absolute neutrophil count of 6.3 and absolute lymphocyte count 3.1. Essential thrombocytosis/thrombocytosis: The current CBC shows that the platelet count is now 121,000. The platelet count is being monitored every 6 weeks. The patient was previously started on anagrelide 0.5 mg one tablet twice daily. This medication will be continued, at the current dose. I have reenforced to the patient the importance of being complaint with the treatment plan. Rheumatoid arthritis: the patient will continue to receive Remicade as a primary treatment modality for her severe rheumatoid arthritis every 6 weeks. The dose of Remicade will be 344 mg given intravenously. The patient has continued to take  Methotrexate 5 mg p.o. daily and Plaquenil 200 mg twice daily. Fibromyalgia/chronic pain syndrome: The patient  continues to have generalized pain at times related to her underlying fibromyalgia. The patient receives her Percocet medication on a monthly basis as needed. A new prescription for the Percocet was provided at this time. Chronic anemia: I have explained to the patient the CBC from today shows her hemoglobin has remained normal at 13.7 g/dL with hematocrit of 41.9 %. I have recommended that she continue to take ferrous sulfate 325 mg by mouth once daily.     Sebaceous cysts, right breast: I have initially advised the patient to continue to apply the warm compresses to the area. She also completed a full course of antibiotic with Keflex and did apply Bactroban ointment to the area as well. This is has completely resolved at this point. There is no evidence of residual.    We will see the patient back in 6 weeks for a complete reassessment. Orders Placed This Encounter    COMPLETE CBC & AUTO DIFF WBC    InHouse CBC ([facility])     Standing Status:   Future     Number of Occurrences:   1     Standing Expiration Date:   9/20/4359    METABOLIC PANEL, COMPREHENSIVE     Standing Status:   Future     Standing Expiration Date:   9/5/2019    IRON PROFILE     Standing Status:   Future     Standing Expiration Date:   9/5/2019    FERRITIN     Standing Status:   Future     Standing Expiration Date:   9/5/2019    DISCONTD: oxyCODONE-acetaminophen (PERCOCET 10)  mg per tablet     Sig: Take 1 Tab by mouth every six (6) hours as needed for Pain. Max Daily Amount: 4 Tabs. Dispense:  120 Tab     Refill:  0    oxyCODONE-acetaminophen (PERCOCET 10)  mg per tablet     Sig: Take 1 Tab by mouth every six (6) hours as needed for Pain. Max Daily Amount: 4 Tabs.      Dispense:  60 Tab     Refill:  0       Leslie Diehl MD  9/4/2018

## 2018-09-04 NOTE — MR AVS SNAPSHOT
[address] [facility] [address] [facility] [address] 
[phone] Patient: Evonne Born MRN: [id number] :1965 Visit Information Date & Time Provider Department Dept. Phone Encounter #  
 2018 11:30 AM MD Ana [facility]  [phone] [id number] Follow-up Instructions Return in about 6 weeks (around 10/16/2018). Your Appointments 10/16/2018 11:30 AM  
Office Visit with MD Ana Richardson [facility]  [address] [facility]) Appt Note: OV  
 [facility] [address] [facility] [address]  
[phone]  
  
   
 [facility] [address] 2019  9:15 AM  
Office Visit with Brennan Ruby MD  
[facility]) Appt Note: 1 yr  
 [address] [facility], [address] [facility] [address]  
[address], [address] [address] Upcoming Health Maintenance Date Due  
 FOOT EXAM Q1 1973 EYE EXAM RETINAL OR DILATED Q1 1973 Pneumococcal 19-64 Highest Risk (1 of 3 - PCV13) 1982 DTaP/Tdap/Td series (1 - Tdap) 1984 PAP AKA CERVICAL CYTOLOGY 1984 FOBT Q 1 YEAR AGE 50-75 2013 MEDICARE YEARLY EXAM 3/14/2018 Influenza Age 5 to Adult 2018 HEMOGLOBIN A1C Q6M 10/30/2018 MICROALBUMIN Q1 2019 LIPID PANEL Q1 2019 BREAST CANCER SCRN MAMMOGRAM 2019 Allergies as of 2018  Review Complete On: 2018 By: David Grimaldo MD  
  
 Severity Noted Reaction Type Reactions Levaquin [Levofloxacin] High  Systemic Anaphylaxis Pollen Extracts    Unable to Obtain Current Immunizations  Reviewed on 2018 Name Date Influenza Vaccine 2017, 10/21/2016, 11/3/2015, 11/10/2014, 10/25/2013 Influenza Vaccine Whole 9/10/2012 Not reviewed this visit You Were Diagnosed With   
  
 Codes Comments Essential thrombocytosis (HCC)    -  Primary ICD-10-CM: D47.3 ICD-9-CM: 238.71 Thrombocytosis ([address]     ICD-10-CM: D47.3 ICD-9-CM: 238.71 Chronic pain syndrome     ICD-10-CM: G89.4 ICD-9-CM: 338. 4 Rheumatoid arthritis involving multiple joints (HCC)     ICD-10-CM: M06.9 ICD-9-CM: 714.0 Chronic anemia     ICD-10-CM: D64.9 ICD-9-CM: 775. 9 Vitals BP Pulse Temp Resp Height(growth percentile) Weight(growth percentile) 127/87 (BP 1 Location: Left arm, BP Patient Position: Sitting) 93 98.5 °F (36.9 °C) (Oral) 18 5' 11\" (1.803 m) 215 lb (97.5 kg) BMI OB Status Smoking Status 29.99 kg/m2 Hysterectomy Current Every Day Smoker BMI and BSA Data Body Mass Index Body Surface Area  
 29.99 kg/m 2 2.21 m 2 Preferred Pharmacy Pharmacy Name [facility], [address] [place] [address],[address] [place] [address] [phone] Your Updated Medication List  
  
   
This list is accurate as of 9/4/18 12:07 PM.  Always use your most recent med list.  
  
  
  
  
 * albuterol sulfate 2.5 mg/0.5 mL Nebu nebulizer solution Commonly known as:  PROVENTIL;VENTOLIN  
by Nebulization route once. Is now using the actual nebulizer machine, for current bronchitis episode (Aug/Sept. 2017) * albuterol 90 mcg/actuation inhaler Commonly known as:  PROAIR HFA Take 1 Puff by inhalation every six (6) hours as needed for Wheezing. amitriptyline 25 mg tablet Commonly known as:  ELAVIL  
  
 anagrelide 0.5 mg capsule Commonly known as:  Patsi Skill Take 1 Cap by mouth two (2) times a day. Indications: ESSENTIAL THROMBOCYTOSIS  
  
 atenolol 25 mg tablet Commonly known as:  TENORMIN Take 25 mg by mouth daily. Indications: HYPERTENSION  
  
 BD INSULIN SYRINGE ULTRA-FINE 1 mL 31 gauge x 15/64\" Syrg Generic drug:  insulin syringe-needle U-100  
  
 cholecalciferol (VITAMIN D3) 5,000 unit Tab tablet Commonly known as:  VITAMIN D3 Take 5,000 Units by mouth every seven (7) days. clotrimazole-betamethasone topical cream  
Commonly known as:  Terri Moser DULoxetine 30 mg capsule Commonly known as:  CYMBALTA  
  
 folic acid 851 mcg tablet Take 400 mcg by mouth daily. furosemide 40 mg tablet Commonly known as:  LASIX Take  by mouth daily. gabapentin 300 mg capsule Commonly known as:  NEURONTIN  
300 mg two (2) times a day. HYZAAR 50-12.5 mg per tablet Generic drug:  losartan-hydroCHLOROthiazide Take 1 Tab by mouth daily. KlonoPIN 2 mg tablet Generic drug:  clonazePAM  
Take 2 mg by mouth daily. Indications: PANIC DISORDER  
  
 LANTUS SC  
5 Units by SubCUTAneous route daily. metFORMIN 1,000 mg tablet Commonly known as:  GLUCOPHAGE Take 500 mg by mouth two (2) times a day. methotrexate 2.5 mg tablet Commonly known as:  Willma Bellis Take 2.5 mg by mouth every fourty-eight (48) hours. NovoLOG Mix 70-30 U-100 Insuln 100 unit/mL (70-30) injection Generic drug:  insulin aspart protamine/insulin aspart 10 Units by SubCUTAneous route two (2) times a day. ondansetron hcl 4 mg tablet Commonly known as:  Marrion Mend Take 1 Tab by mouth every eight (8) hours as needed for Nausea. oxyCODONE-acetaminophen  mg per tablet Commonly known as:  PERCOCET 10 Take 1 Tab by mouth every six (6) hours as needed for Pain. Max Daily Amount: 4 Tabs. PLAQUENIL 200 mg tablet Generic drug:  hydroxychloroquine Take 200 mg by mouth two (2) times a day. QUEtiapine 25 mg tablet Commonly known as:  SEROquel REMICADE IV  
344 mg by IntraVENous route once as needed. remicade will be every 6 weeks * Notice:   This list has 2 medication(s) that are the same as other medications prescribed for you. Read the directions carefully, and ask your doctor or other care provider to review them with you. Prescriptions Printed Refills  
 oxyCODONE-acetaminophen (PERCOCET 10)  mg per tablet 0 Sig: Take 1 Tab by mouth every six (6) hours as needed for Pain. Max Daily Amount: 4 Tabs. Class: Print Route: Oral  
  
We Performed the Following COMPLETE CBC & AUTO DIFF WBC [33018 CPT(R)] Follow-up Instructions Return in about 6 weeks (around 10/16/2018). To-Do List   
 09/04/2018 Lab:  CBC WITH 3 PART DIFF   
  
 10/09/2018 10:00 AM  
  Appoint[facility] with [address] [facility] ([phone])  
  
 11/20/2018 10:00 AM  
  Appointment with [address] at [facility] ([phone]) Patient Instructions Rheumatoid Arthritis: Care Instructions Your Care Instructions Arthritis is a common health problem in which the joints are inflamed. There are many types of arthritis. In rheumatoid arthritis, the body's own immune system attacks the joints. This causes pain, stiffness, and swelling in the joints, especially in the hands and feet. It can become hard to open jars, write, and do other daily tasks. Sometimes rheumatoid arthritis can also cause bumps to form under the skin. Over time, rheumatoid arthritis can damage and deform joints. Early treatment with medicines may reduce your chances of having a lasting disability. Follow-up care is a key part of your treatment and safety. Be sure to make and go to all appointments, and call your doctor if you are having problems. It's also a good idea to know your test results and keep a list of the medicines you take. How can you care for yourself at home? · If your doctor recommends it, get more exercise. Walking is a good choice. If your knees or ankles hurt, try riding a stationary bike or swimming. · Move each joint gently through its full range of motion once or twice a day. · Rest joints when they are sore or overworked. Short rest breaks may help more than staying in bed. · Reach and stay at a healthy weight. Regular exercise and a healthy diet will help you do this. Extra weight can strain the joints, especially the knees and hips, and make the pain worse. Losing even a few pounds may help. · Get enough calcium and vitamin D to help prevent osteoporosis, which causes thin bones. Talk to your doctor about how much you should take. · Protect your joints from injury. Do not overuse them. Try to limit or avoid activities that cause joint pain or swelling. Use special kitchen tools and other self-help devices as well as walkers, splints, or canes if needed. · Use heat to ease pain. Take warm showers or baths. Use hot packs or a heating pad set on low. Sleep under a warm electric blanket. · Put ice or a cold pack on the area for 10 to 20 minutes at a time. Put a thin cloth between the ice and your skin. · Take pain medicines exactly as directed. ¨ If the doctor gave you a prescription medicine for pain, take it as prescribed. ¨ If you are not taking a prescription pain medicine, ask your doctor if you can take an over-the-counter medicine. · Take an active role in managing your condition. Set up a treatment plan with your doctor, and learn as much as you can about rheumatoid arthritis. This will help you control pain and stay active. When should you call for help? Call your doctor now or seek immediate medical care if: 
  · You have a fever or a rash along with joint pain.  
  · You have joint pain that is so severe that you cannot use the joint at all.  
  · You have sudden swelling, redness, or pain in one or more joints, and you do not know why.  
  · You have back or neck pain along with weakness in your arms or legs.  
  · You have a loss of bowel or bladder control.  Watch closely for changes in your health, and be sure to contact your doctor if: 
  · You have joint pain that lasts for more than 6 weeks.  
  · You have side effects from your arthritis medicines, such as stomach pain, nausea, heartburn, or dark and tarlike stools. Where can you learn more? Go to http://swapnil-speedy.info/. Enter K205 in the search box to learn more about \"Rheumatoid Arthritis: Care Instructions. \" Current as of: October 10, 2017 Content Version: 11.7 © 5726-4072 [employer]. Care instructions adapted under license by [employer] (which disclaims liability or warranty for this information). If you have questions about a medical condition or this instruction, always ask your healthcare professional. [address] any warranty or liability for your use of this information. Please provide this summary of care documentation to your next provider. Your primary care clinician is listed as [employer]. If you have any questions after today's visit, please call [phone].

## 2018-09-04 NOTE — PATIENT INSTRUCTIONS
Rheumatoid Arthritis: Care Instructions  Your Care Instructions    Arthritis is a common health problem in which the joints are inflamed. There are many types of arthritis. In rheumatoid arthritis, the body's own immune system attacks the joints. This causes pain, stiffness, and swelling in the joints, especially in the hands and feet. It can become hard to open jars, write, and do other daily tasks. Sometimes rheumatoid arthritis can also cause bumps to form under the skin. Over time, rheumatoid arthritis can damage and deform joints. Early treatment with medicines may reduce your chances of having a lasting disability. Follow-up care is a key part of your treatment and safety. Be sure to make and go to all appointments, and call your doctor if you are having problems. It's also a good idea to know your test results and keep a list of the medicines you take. How can you care for yourself at home? · If your doctor recommends it, get more exercise. Walking is a good choice. If your knees or ankles hurt, try riding a stationary bike or swimming. · Move each joint gently through its full range of motion once or twice a day. · Rest joints when they are sore or overworked. Short rest breaks may help more than staying in bed. · Reach and stay at a healthy weight. Regular exercise and a healthy diet will help you do this. Extra weight can strain the joints, especially the knees and hips, and make the pain worse. Losing even a few pounds may help. · Get enough calcium and vitamin D to help prevent osteoporosis, which causes thin bones. Talk to your doctor about how much you should take. · Protect your joints from injury. Do not overuse them. Try to limit or avoid activities that cause joint pain or swelling. Use special kitchen tools and other self-help devices as well as walkers, splints, or canes if needed. · Use heat to ease pain. Take warm showers or baths. Use hot packs or a heating pad set on low.  Sleep under a warm electric blanket. · Put ice or a cold pack on the area for 10 to 20 minutes at a time. Put a thin cloth between the ice and your skin. · Take pain medicines exactly as directed. ¨ If the doctor gave you a prescription medicine for pain, take it as prescribed. ¨ If you are not taking a prescription pain medicine, ask your doctor if you can take an over-the-counter medicine. · Take an active role in managing your condition. Set up a treatment plan with your doctor, and learn as much as you can about rheumatoid arthritis. This will help you control pain and stay active. When should you call for help? Call your doctor now or seek immediate medical care if:    · You have a fever or a rash along with joint pain.     · You have joint pain that is so severe that you cannot use the joint at all.     · You have sudden swelling, redness, or pain in one or more joints, and you do not know why.     · You have back or neck pain along with weakness in your arms or legs.     · You have a loss of bowel or bladder control.    Watch closely for changes in your health, and be sure to contact your doctor if:    · You have joint pain that lasts for more than 6 weeks.     · You have side effects from your arthritis medicines, such as stomach pain, nausea, heartburn, or dark and tarlike stools. Where can you learn more? Go to http://swapnil-speedy.info/. Enter K205 in the search box to learn more about \"Rheumatoid Arthritis: Care Instructions. \"  Current as of: October 10, 2017  Content Version: 11.7  © 8355-2215 [employer]. Care instructions adapted under license by [employer] (which disclaims liability or warranty for this information). If you have questions about a medical condition or this instruction, always ask your healthcare professional. [address] any warranty or liability for your use of this information.

## 2018-09-13 DIAGNOSIS — G89.4 CHRONIC PAIN SYNDROME: ICD-10-CM

## 2018-09-13 RX ORDER — OXYCODONE AND ACETAMINOPHEN 10; 325 MG/1; MG/1
1 TABLET ORAL
Qty: 60 TAB | Refills: 0 | Status: CANCELLED | OUTPATIENT
Start: 2018-09-13

## 2018-09-17 DIAGNOSIS — G89.4 CHRONIC PAIN SYNDROME: ICD-10-CM

## 2018-09-17 RX ORDER — OXYCODONE AND ACETAMINOPHEN 10; 325 MG/1; MG/1
1 TABLET ORAL
Qty: 60 TAB | Refills: 0 | Status: SHIPPED | OUTPATIENT
Start: 2018-09-17 | End: 2018-09-28 | Stop reason: SDUPTHER

## 2018-09-24 ENCOUNTER — HOSPITAL ENCOUNTER (OUTPATIENT)
Dept: LAB | Age: 53
Discharge: HOME | End: 2018-09-24
Payer: MEDICARE

## 2018-09-24 LAB
ALBUMIN SERPL-MCNC: 3.3 G/DL (ref 3.4–5)
ALBUMIN/GLOB SERPL: 0.9 {RATIO} (ref 0.8–1.7)
ALP SERPL-CCNC: 79 U/L (ref 45–117)
ALT SERPL-CCNC: 17 U/L (ref 13–56)
ANION GAP SERPL CALC-SCNC: 9 MMOL/L (ref 3–18)
APPEARANCE UR: CLEAR
AST SERPL-CCNC: 7 U/L (ref 15–37)
BACTERIA URNS QL MICRO: ABNORMAL /HPF
BASOPHILS # BLD: 0 K/UL (ref 0–0.1)
BASOPHILS NFR BLD: 1 % (ref 0–2)
BILIRUB SERPL-MCNC: 0.3 MG/DL (ref 0.2–1)
BILIRUB UR QL: NEGATIVE
BUN SERPL-MCNC: 13 MG/DL (ref 7–18)
BUN/CREAT SERPL: 19 (ref 12–20)
CALCIUM SERPL-MCNC: 9.1 MG/DL (ref 8.5–10.1)
CHLORIDE SERPL-SCNC: 106 MMOL/L (ref 100–108)
CK SERPL-CCNC: 64 U/L (ref 26–192)
CO2 SERPL-SCNC: 26 MMOL/L (ref 21–32)
COLOR UR: YELLOW
CREAT SERPL-MCNC: 0.67 MG/DL (ref 0.6–1.3)
CRP SERPL-MCNC: 1 MG/DL (ref 0–0.3)
DIFFERENTIAL METHOD BLD: NORMAL
EOSINOPHIL # BLD: 0.4 K/UL (ref 0–0.4)
EOSINOPHIL NFR BLD: 5 % (ref 0–5)
EPITH CASTS URNS QL MICRO: ABNORMAL /LPF (ref 0–5)
ERYTHROCYTE [DISTWIDTH] IN BLOOD BY AUTOMATED COUNT: 13.9 % (ref 11.6–14.5)
ERYTHROCYTE [SEDIMENTATION RATE] IN BLOOD: 25 MM/HR (ref 0–30)
GLOBULIN SER CALC-MCNC: 3.6 G/DL (ref 2–4)
GLUCOSE SERPL-MCNC: 230 MG/DL (ref 74–99)
GLUCOSE UR STRIP.AUTO-MCNC: NEGATIVE MG/DL
HCT VFR BLD AUTO: 39.6 % (ref 35–45)
HGB BLD-MCNC: 13.1 G/DL (ref 12–16)
HGB UR QL STRIP: NEGATIVE
KETONES UR QL STRIP.AUTO: NEGATIVE MG/DL
LEUKOCYTE ESTERASE UR QL STRIP.AUTO: NEGATIVE
LYMPHOCYTES # BLD: 2.4 K/UL (ref 0.9–3.6)
LYMPHOCYTES NFR BLD: 28 % (ref 21–52)
MCH RBC QN AUTO: 26.9 PG (ref 24–34)
MCHC RBC AUTO-ENTMCNC: 33.1 G/DL (ref 31–37)
MCV RBC AUTO: 81.3 FL (ref 74–97)
MONOCYTES # BLD: 0.8 K/UL (ref 0.05–1.2)
MONOCYTES NFR BLD: 9 % (ref 3–10)
NEUTS SEG # BLD: 5 K/UL (ref 1.8–8)
NEUTS SEG NFR BLD: 57 % (ref 40–73)
NITRITE UR QL STRIP.AUTO: NEGATIVE
PH UR STRIP: 5 [PH] (ref 5–8)
PLATELET # BLD AUTO: 383 K/UL (ref 135–420)
PMV BLD AUTO: 9.5 FL (ref 9.2–11.8)
POTASSIUM SERPL-SCNC: 4.4 MMOL/L (ref 3.5–5.5)
PROT SERPL-MCNC: 6.9 G/DL (ref 6.4–8.2)
PROT UR STRIP-MCNC: 100 MG/DL
RBC # BLD AUTO: 4.87 M/UL (ref 4.2–5.3)
RBC #/AREA URNS HPF: NEGATIVE /HPF (ref 0–5)
SODIUM SERPL-SCNC: 141 MMOL/L (ref 136–145)
SP GR UR REFRACTOMETRY: 1.02 (ref 1–1.03)
UROBILINOGEN UR QL STRIP.AUTO: 0.2 EU/DL (ref 0.2–1)
WBC # BLD AUTO: 8.6 K/UL (ref 4.6–13.2)
WBC URNS QL MICRO: ABNORMAL /HPF (ref 0–4)

## 2018-09-24 PROCEDURE — 86140 C-REACTIVE PROTEIN: CPT | Performed by: SPECIALIST

## 2018-09-24 PROCEDURE — 81001 URINALYSIS AUTO W/SCOPE: CPT | Performed by: SPECIALIST

## 2018-09-24 PROCEDURE — 82550 ASSAY OF CK (CPK): CPT | Performed by: SPECIALIST

## 2018-09-24 PROCEDURE — 85025 COMPLETE CBC W/AUTO DIFF WBC: CPT | Performed by: SPECIALIST

## 2018-09-24 PROCEDURE — 80053 COMPREHEN METABOLIC PANEL: CPT | Performed by: SPECIALIST

## 2018-09-24 PROCEDURE — 36415 COLL VENOUS BLD VENIPUNCTURE: CPT | Performed by: SPECIALIST

## 2018-09-24 PROCEDURE — 85652 RBC SED RATE AUTOMATED: CPT | Performed by: SPECIALIST

## 2018-09-28 DIAGNOSIS — G89.4 CHRONIC PAIN SYNDROME: ICD-10-CM

## 2018-09-28 RX ORDER — OXYCODONE AND ACETAMINOPHEN 10; 325 MG/1; MG/1
1 TABLET ORAL
Qty: 60 TAB | Refills: 0 | Status: SHIPPED | OUTPATIENT
Start: 2018-09-28 | End: 2018-10-01 | Stop reason: SDUPTHER

## 2018-10-01 DIAGNOSIS — G89.4 CHRONIC PAIN SYNDROME: ICD-10-CM

## 2018-10-01 RX ORDER — OXYCODONE AND ACETAMINOPHEN 10; 325 MG/1; MG/1
1 TABLET ORAL
Qty: 60 TAB | Refills: 0 | Status: SHIPPED | OUTPATIENT
Start: 2018-10-01 | End: 2018-10-16 | Stop reason: SDUPTHER

## 2018-10-01 RX ORDER — OXYCODONE AND ACETAMINOPHEN 10; 325 MG/1; MG/1
1 TABLET ORAL
Qty: 60 TAB | Refills: 0 | Status: CANCELLED | OUTPATIENT
Start: 2018-10-01

## 2018-10-08 NOTE — ED NOTES
Bedside shift change report given to Georges Dixon RN (oncoming nurse) by Gia Kong RN (offgoing nurse). Report included the following information SBAR, ED Summary, Procedure Summary, MAR and Cardiac Rhythm S Tach with PVCs.
Gabriel Sanchez MD at bedside
I have reviewed discharge instructions with the patient. The patient verbalized understanding. The patient is currently eating breakfast.  Will discharge home after IVF completed.
Pt arrived to the ED with co syncope. Pt woke up sweaty and walked to the bathroom and felt dizzy. Pt ambulated to her daughter and told her to call 911. Pt slid down her wall safely without LOC or injury.  Hx dizziness and RA
warm

## 2018-10-09 ENCOUNTER — CLINICAL SUPPORT (OUTPATIENT)
Dept: ONCOLOGY | Age: 53
End: 2018-10-09

## 2018-10-09 ENCOUNTER — HOSPITAL ENCOUNTER (OUTPATIENT)
Dept: ONCOLOGY | Age: 53
Discharge: HOME | End: 2018-10-09

## 2018-10-09 ENCOUNTER — HOSPITAL ENCOUNTER (OUTPATIENT)
Dept: INFUSION THERAPY | Age: 53
Discharge: HOME | End: 2018-10-09
Payer: MEDICARE

## 2018-10-09 VITALS
HEIGHT: 71 IN | DIASTOLIC BLOOD PRESSURE: 83 MMHG | WEIGHT: 217 LBS | BODY MASS INDEX: 30.38 KG/M2 | RESPIRATION RATE: 20 BRPM | HEART RATE: 103 BPM | SYSTOLIC BLOOD PRESSURE: 134 MMHG | TEMPERATURE: 97.9 F

## 2018-10-09 DIAGNOSIS — M06.09 RHEUMATOID ARTHRITIS WITHOUT RHEUMATOID FACTOR, MULTIPLE SITES (HCC): ICD-10-CM

## 2018-10-09 DIAGNOSIS — M06.09 RHEUMATOID ARTHRITIS WITHOUT RHEUMATOID FACTOR, MULTIPLE SITES (HCC): Primary | ICD-10-CM

## 2018-10-09 LAB
ALBUMIN SERPL-MCNC: 3.6 G/DL (ref 3.4–5)
ALBUMIN/GLOB SERPL: 0.9 {RATIO} (ref 0.8–1.7)
ALP SERPL-CCNC: 100 U/L (ref 45–117)
ALT SERPL-CCNC: 18 U/L (ref 13–56)
ANION GAP SERPL CALC-SCNC: 12 MMOL/L (ref 3–18)
AST SERPL-CCNC: 7 U/L (ref 15–37)
BASO+EOS+MONOS # BLD AUTO: 0.5 K/UL (ref 0–2.3)
BASO+EOS+MONOS # BLD AUTO: 5 % (ref 0.1–17)
BILIRUB SERPL-MCNC: 0.3 MG/DL (ref 0.2–1)
BUN SERPL-MCNC: 14 MG/DL (ref 7–18)
BUN/CREAT SERPL: 19 (ref 12–20)
CALCIUM SERPL-MCNC: 9.7 MG/DL (ref 8.5–10.1)
CHLORIDE SERPL-SCNC: 107 MMOL/L (ref 100–108)
CO2 SERPL-SCNC: 22 MMOL/L (ref 21–32)
CREAT SERPL-MCNC: 0.72 MG/DL (ref 0.6–1.3)
DIFFERENTIAL METHOD BLD: NORMAL
ERYTHROCYTE [DISTWIDTH] IN BLOOD BY AUTOMATED COUNT: 13.1 % (ref 11.5–14.5)
GLOBULIN SER CALC-MCNC: 4.2 G/DL (ref 2–4)
GLUCOSE SERPL-MCNC: 188 MG/DL (ref 74–99)
HCT VFR BLD AUTO: 39.6 % (ref 36–48)
HGB BLD-MCNC: 12.9 G/DL (ref 12–16)
LYMPHOCYTES # BLD: 2.6 K/UL (ref 1.1–5.9)
LYMPHOCYTES NFR BLD: 29 % (ref 14–44)
MCH RBC QN AUTO: 26.8 PG (ref 25–35)
MCHC RBC AUTO-ENTMCNC: 32.6 G/DL (ref 31–37)
MCV RBC AUTO: 82.2 FL (ref 78–102)
NEUTS SEG # BLD: 5.7 K/UL (ref 1.8–9.5)
NEUTS SEG NFR BLD: 66 % (ref 40–70)
PLATELET # BLD AUTO: 416 K/UL (ref 140–440)
POTASSIUM SERPL-SCNC: 4.6 MMOL/L (ref 3.5–5.5)
PROT SERPL-MCNC: 7.8 G/DL (ref 6.4–8.2)
RBC # BLD AUTO: 4.82 M/UL (ref 4.1–5.1)
SODIUM SERPL-SCNC: 141 MMOL/L (ref 136–145)
WBC # BLD AUTO: 8.8 K/UL (ref 4.5–13)

## 2018-10-09 PROCEDURE — 86141 C-REACTIVE PROTEIN HS: CPT | Performed by: INTERNAL MEDICINE

## 2018-10-09 PROCEDURE — 96415 CHEMO IV INFUSION ADDL HR: CPT

## 2018-10-09 PROCEDURE — 74011250636 HC RX REV CODE- 250/636: Performed by: INTERNAL MEDICINE

## 2018-10-09 PROCEDURE — 96413 CHEMO IV INFUSION 1 HR: CPT

## 2018-10-09 PROCEDURE — 96375 TX/PRO/DX INJ NEW DRUG ADDON: CPT

## 2018-10-09 PROCEDURE — 80053 COMPREHEN METABOLIC PANEL: CPT | Performed by: INTERNAL MEDICINE

## 2018-10-09 PROCEDURE — 85652 RBC SED RATE AUTOMATED: CPT | Performed by: INTERNAL MEDICINE

## 2018-10-09 RX ORDER — DIPHENHYDRAMINE HYDROCHLORIDE 50 MG/ML
50 INJECTION, SOLUTION INTRAMUSCULAR; INTRAVENOUS
Status: ACTIVE | OUTPATIENT
Start: 2018-10-09 | End: 2018-10-09

## 2018-10-09 RX ORDER — SODIUM CHLORIDE 9 MG/ML
250 INJECTION, SOLUTION INTRAVENOUS CONTINUOUS
Status: DISPENSED | OUTPATIENT
Start: 2018-10-09 | End: 2018-10-10

## 2018-10-09 RX ORDER — SODIUM CHLORIDE 0.9 % (FLUSH) 0.9 %
10-40 SYRINGE (ML) INJECTION AS NEEDED
Status: DISCONTINUED | OUTPATIENT
Start: 2018-10-09 | End: 2018-10-13 | Stop reason: HOSPADM

## 2018-10-09 RX ORDER — DIPHENHYDRAMINE HYDROCHLORIDE 50 MG/ML
25 INJECTION, SOLUTION INTRAMUSCULAR; INTRAVENOUS ONCE
Status: COMPLETED | OUTPATIENT
Start: 2018-10-09 | End: 2018-10-09

## 2018-10-09 RX ORDER — ACETAMINOPHEN 325 MG/1
650 TABLET ORAL
Status: ACTIVE | OUTPATIENT
Start: 2018-10-09 | End: 2018-10-09

## 2018-10-09 RX ADMIN — SODIUM CHLORIDE 250 ML: 900 INJECTION, SOLUTION INTRAVENOUS at 11:00

## 2018-10-09 RX ADMIN — INFLIXIMAB 400 MG: 100 INJECTION, POWDER, LYOPHILIZED, FOR SOLUTION INTRAVENOUS at 11:45

## 2018-10-09 RX ADMIN — DIPHENHYDRAMINE HYDROCHLORIDE 25 MG: 50 INJECTION INTRAMUSCULAR; INTRAVENOUS at 11:04

## 2018-10-09 RX ADMIN — Medication 10 ML: at 10:25

## 2018-10-09 NOTE — PROGRESS NOTES
[facility] Progress Note Date: 2018 Name: Lima Fernández MRN: [id number] : 1965 Ms. Yumiko Luong arrived in the [facility] today at 1010, in stable condition, here for Q 6 Week, IV Remicade Infusion. She was assessed and education was provided. Ms. Cheng Medina vitals were reviewed. Visit Vitals  /80 (BP 1 Location: Left arm, BP Patient Position: Sitting)  Pulse 95  Temp 98.1 °F (36.7 °C)  Resp 20  
 Ht 5' 11\" (1.803 m)  Wt 98.4 kg (217 lb)  Breastfeeding No  
 BMI 30.27 kg/m2 PIV (#22G) was established in her dorsal left forearm at 1025, without incident, and blood was drawn for a CBC, CMP, ESR, & CRP (2 lavender top tubes & 2 SST tubes), per order. (The CBC was processed in house, and the CMP, ESR, & CRP, were sent out to be processed. ) Lab results were obtained and reviewed, and the CBC results from today listed below, as well as the most recent CMP results from 18, were all noted to be satisfactory for treatment today. Recent Results (from the past 12 hour(s)) CBC WITH 3 PART DIFF Collection Time: 10/09/18 10:25 AM  
Result Value Ref Range WBC 8.8 4.5 - 13.0 K/uL  
 RBC 4.82 4.10 - 5.10 M/uL  
 HGB 12.9 12.0 - 16.0 g/dL HCT 39.6 36 - 48 % MCV 82.2 78 - 102 FL  
 MCH 26.8 25.0 - 35.0 PG  
 MCHC 32.6 31 - 37 g/dL  
 RDW 13.1 11.5 - 14.5 % PLATELET 969 042 - 298 K/uL NEUTROPHILS 66 40 - 70 % MIXED CELLS 5 0.1 - 17 % LYMPHOCYTES 29 14 - 44 % ABS. NEUTROPHILS 5.7 1.8 - 9.5 K/UL  
 ABS. MIXED CELLS 0.5 0.0 - 2.3 K/uL  
 ABS. LYMPHOCYTES 2.6 1.1 - 5.9 K/UL  
 DF AUTOMATED Pre-medication consisting of IV Benadryl 25 mg, was administered per order, and without incident.  
  
  
  
Remicade 400 mg IV (4 mg/kg) Maintenance Dose, was administered over 2 hours, per order, and without incident.  
  
  
  
After the completion of the IV Remicade, Ms. Yumiko Luong was monitored for 30 minutes post Remicade, per order, and also without incident.  
  
  
After the completion of the 30 minute monitoring period, the PIV was removed and gauze/bandaid was applied. Ms. Rubia Khanna tolerated well, and had no complaints. Ms. Rubia Khanna was discharged from [place] [address] in stable condition at 1425. Isaias Roper She is to return in 6 weeks, on Tuesday, 11-20-18,  at 1000,  for her next appointment, for her next dose of IV Remicade. Jessie Rodríguez RN October 9, 2018 10:52 AM

## 2018-10-10 LAB — ERYTHROCYTE [SEDIMENTATION RATE] IN BLOOD: 53 MM/HR (ref 0–30)

## 2018-10-11 LAB — CRP SERPL HS-MCNC: 10.6 MG/L (ref 0–3)

## 2018-10-16 ENCOUNTER — OFFICE VISIT (OUTPATIENT)
Dept: ONCOLOGY | Age: 53
End: 2018-10-16

## 2018-10-16 ENCOUNTER — HOSPITAL ENCOUNTER (OUTPATIENT)
Dept: LAB | Age: 53
Discharge: HOME | End: 2018-10-16
Payer: MEDICARE

## 2018-10-16 ENCOUNTER — HOSPITAL ENCOUNTER (OUTPATIENT)
Dept: ONCOLOGY | Age: 53
Discharge: HOME | End: 2018-10-16

## 2018-10-16 VITALS
RESPIRATION RATE: 17 BRPM | DIASTOLIC BLOOD PRESSURE: 72 MMHG | SYSTOLIC BLOOD PRESSURE: 125 MMHG | HEART RATE: 88 BPM | BODY MASS INDEX: 30.52 KG/M2 | TEMPERATURE: 98.5 F | HEIGHT: 71 IN | WEIGHT: 218 LBS

## 2018-10-16 DIAGNOSIS — D64.9 CHRONIC ANEMIA: ICD-10-CM

## 2018-10-16 DIAGNOSIS — D75.839 THROMBOCYTOSIS: ICD-10-CM

## 2018-10-16 DIAGNOSIS — M79.7 FIBROMYALGIA: ICD-10-CM

## 2018-10-16 DIAGNOSIS — G89.4 CHRONIC PAIN SYNDROME: ICD-10-CM

## 2018-10-16 DIAGNOSIS — M06.9 RHEUMATOID ARTHRITIS INVOLVING MULTIPLE JOINTS (HCC): ICD-10-CM

## 2018-10-16 DIAGNOSIS — D47.3 ESSENTIAL THROMBOCYTOSIS (HCC): Primary | ICD-10-CM

## 2018-10-16 LAB
ALBUMIN SERPL-MCNC: 3.6 G/DL (ref 3.4–5)
ALBUMIN/GLOB SERPL: 1 {RATIO} (ref 0.8–1.7)
ALP SERPL-CCNC: 92 U/L (ref 45–117)
ALT SERPL-CCNC: 29 U/L (ref 13–56)
ANION GAP SERPL CALC-SCNC: 11 MMOL/L (ref 3–18)
AST SERPL-CCNC: 4 U/L (ref 15–37)
BASO+EOS+MONOS # BLD AUTO: 0.7 K/UL (ref 0–2.3)
BASO+EOS+MONOS # BLD AUTO: 7 % (ref 0.1–17)
BILIRUB SERPL-MCNC: 0.1 MG/DL (ref 0.2–1)
BUN SERPL-MCNC: 18 MG/DL (ref 7–18)
BUN/CREAT SERPL: 21 (ref 12–20)
CALCIUM SERPL-MCNC: 8.9 MG/DL (ref 8.5–10.1)
CHLORIDE SERPL-SCNC: 105 MMOL/L (ref 100–108)
CO2 SERPL-SCNC: 25 MMOL/L (ref 21–32)
CREAT SERPL-MCNC: 0.85 MG/DL (ref 0.6–1.3)
DIFFERENTIAL METHOD BLD: NORMAL
ERYTHROCYTE [DISTWIDTH] IN BLOOD BY AUTOMATED COUNT: 13.5 % (ref 11.5–14.5)
FERRITIN SERPL-MCNC: 107 NG/ML (ref 8–388)
GLOBULIN SER CALC-MCNC: 3.6 G/DL (ref 2–4)
GLUCOSE SERPL-MCNC: 371 MG/DL (ref 74–99)
HCT VFR BLD AUTO: 40 % (ref 36–48)
HGB BLD-MCNC: 12.9 G/DL (ref 12–16)
IRON SATN MFR SERPL: 20 %
IRON SERPL-MCNC: 55 UG/DL (ref 50–175)
LYMPHOCYTES # BLD: 3.7 K/UL (ref 1.1–5.9)
LYMPHOCYTES NFR BLD: 38 % (ref 14–44)
MCH RBC QN AUTO: 26.5 PG (ref 25–35)
MCHC RBC AUTO-ENTMCNC: 32.3 G/DL (ref 31–37)
MCV RBC AUTO: 82.3 FL (ref 78–102)
NEUTS SEG # BLD: 5.3 K/UL (ref 1.8–9.5)
NEUTS SEG NFR BLD: 55 % (ref 40–70)
PLATELET # BLD AUTO: 434 K/UL (ref 140–440)
POTASSIUM SERPL-SCNC: 4.1 MMOL/L (ref 3.5–5.5)
PROT SERPL-MCNC: 7.2 G/DL (ref 6.4–8.2)
RBC # BLD AUTO: 4.86 M/UL (ref 4.1–5.1)
SODIUM SERPL-SCNC: 141 MMOL/L (ref 136–145)
TIBC SERPL-MCNC: 276 UG/DL (ref 250–450)
WBC # BLD AUTO: 9.7 K/UL (ref 4.5–13)

## 2018-10-16 PROCEDURE — 80053 COMPREHEN METABOLIC PANEL: CPT | Performed by: INTERNAL MEDICINE

## 2018-10-16 PROCEDURE — 82728 ASSAY OF FERRITIN: CPT | Performed by: INTERNAL MEDICINE

## 2018-10-16 PROCEDURE — 36415 COLL VENOUS BLD VENIPUNCTURE: CPT | Performed by: INTERNAL MEDICINE

## 2018-10-16 PROCEDURE — 83540 ASSAY OF IRON: CPT | Performed by: INTERNAL MEDICINE

## 2018-10-16 RX ORDER — ANAGRELIDE 0.5 MG/1
0.5 CAPSULE ORAL 2 TIMES DAILY
Qty: 60 CAP | Refills: 6 | Status: SHIPPED | OUTPATIENT
Start: 2018-10-16 | End: 2018-11-13 | Stop reason: SDUPTHER

## 2018-10-16 RX ORDER — OXYCODONE AND ACETAMINOPHEN 10; 325 MG/1; MG/1
1 TABLET ORAL
Qty: 60 TAB | Refills: 0 | Status: SHIPPED | OUTPATIENT
Start: 2018-10-16 | End: 2018-10-29 | Stop reason: SDUPTHER

## 2018-10-16 NOTE — PATIENT INSTRUCTIONS
Rheumatoid Arthritis: Care Instructions  Your Care Instructions    Arthritis is a common health problem in which the joints are inflamed. There are many types of arthritis. In rheumatoid arthritis, the body's own immune system attacks the joints. This causes pain, stiffness, and swelling in the joints, especially in the hands and feet. It can become hard to open jars, write, and do other daily tasks. Sometimes rheumatoid arthritis can also cause bumps to form under the skin. Over time, rheumatoid arthritis can damage and deform joints. Early treatment with medicines may reduce your chances of having a lasting disability. Follow-up care is a key part of your treatment and safety. Be sure to make and go to all appointments, and call your doctor if you are having problems. It's also a good idea to know your test results and keep a list of the medicines you take. How can you care for yourself at home? · If your doctor recommends it, get more exercise. Walking is a good choice. If your knees or ankles hurt, try riding a stationary bike or swimming. · Move each joint gently through its full range of motion once or twice a day. · Rest joints when they are sore or overworked. Short rest breaks may help more than staying in bed. · Reach and stay at a healthy weight. Regular exercise and a healthy diet will help you do this. Extra weight can strain the joints, especially the knees and hips, and make the pain worse. Losing even a few pounds may help. · Get enough calcium and vitamin D to help prevent osteoporosis, which causes thin bones. Talk to your doctor about how much you should take. · Protect your joints from injury. Do not overuse them. Try to limit or avoid activities that cause joint pain or swelling. Use special kitchen tools and other self-help devices as well as walkers, splints, or canes if needed. · Use heat to ease pain. Take warm showers or baths. Use hot packs or a heating pad set on low.  Sleep under a warm electric blanket. · Put ice or a cold pack on the area for 10 to 20 minutes at a time. Put a thin cloth between the ice and your skin. · Take pain medicines exactly as directed. ¨ If the doctor gave you a prescription medicine for pain, take it as prescribed. ¨ If you are not taking a prescription pain medicine, ask your doctor if you can take an over-the-counter medicine. · Take an active role in managing your condition. Set up a treatment plan with your doctor, and learn as much as you can about rheumatoid arthritis. This will help you control pain and stay active. When should you call for help? Call your doctor now or seek immediate medical care if:    · You have a fever or a rash along with joint pain.     · You have joint pain that is so severe that you cannot use the joint at all.     · You have sudden swelling, redness, or pain in one or more joints, and you do not know why.     · You have back or neck pain along with weakness in your arms or legs.     · You have a loss of bowel or bladder control.    Watch closely for changes in your health, and be sure to contact your doctor if:    · You have joint pain that lasts for more than 6 weeks.     · You have side effects from your arthritis medicines, such as stomach pain, nausea, heartburn, or dark and tarlike stools. Where can you learn more? Go to http://swapnil-speedy.info/. Enter K205 in the search box to learn more about \"Rheumatoid Arthritis: Care Instructions. \"  Current as of: June 11, 2018  Content Version: 11.8  © 0601-9486 [employer]. Care instructions adapted under license by [employer] (which disclaims liability or warranty for this information). If you have questions about a medical condition or this instruction, always ask your healthcare professional. [address] any warranty or liability for your use of this information.        Anemia: Care Instructions  Your Care Instructions    Anemia is a low level of red blood cells, which carry oxygen throughout your body. Many things can cause anemia. Lack of iron is one of the most common causes. Your body needs iron to make hemoglobin, a substance in red blood cells that carries oxygen from the lungs to your body's cells. Without enough iron, the body produces fewer and smaller red blood cells. As a result, your body's cells do not get enough oxygen, and you feel tired and weak. And you may have trouble concentrating. Bleeding is the most common cause of a lack of iron. You may have heavy menstrual bleeding or bleeding caused by conditions such as ulcers, hemorrhoids, or cancer. Regular use of aspirin or other anti-inflammatory medicines (such as ibuprofen) also can cause bleeding in some people. A lack of iron in your diet also can cause anemia, especially at times when the body needs more iron, such as during pregnancy, infancy, and the teen years. Your doctor may have prescribed iron pills. It may take several months of treatment for your iron levels to return to normal. Your doctor also may suggest that you eat foods that are rich in iron, such as meat and beans. There are many other causes of anemia. It is not always due to a lack of iron. Finding the specific cause of your anemia will help your doctor find the right treatment for you. Follow-up care is a key part of your treatment and safety. Be sure to make and go to all appointments, and call your doctor if you are having problems. It's also a good idea to know your test results and keep a list of the medicines you take. How can you care for yourself at home? · Take your medicines exactly as prescribed. Call your doctor if you think you are having a problem with your medicine. · If your doctor recommends iron pills, take them as directed:  ¨ Try to take the pills on an empty stomach about 1 hour before or 2 hours after meals.  But you may need to take iron with food to avoid an upset stomach. ¨ Do not take antacids or drink milk or caffeine drinks (such as coffee, tea, or cola) at the same time or within 2 hours of the time that you take your iron. They can make it hard for your body to absorb the iron. ¨ Vitamin C (from food or supplements) helps your body absorb iron. Try taking iron pills with a glass of orange juice or some other food that is high in vitamin C, such as citrus fruits. ¨ Iron pills may cause stomach problems, such as heartburn, nausea, diarrhea, constipation, and cramps. Be sure to drink plenty of fluids, and include fruits, vegetables, and fiber in your diet each day. Iron pills often make your bowel movements dark or green. ¨ If you forget to take an iron pill, do not take a double dose of iron the next time you take a pill. ¨ Keep iron pills out of the reach of small children. An overdose of iron can be very dangerous. · Follow your doctor's advice about eating iron-rich foods. These include red meat, shellfish, poultry, eggs, beans, raisins, whole-grain bread, and leafy green vegetables. · Steam vegetables to help them keep their iron content. When should you call for help? Call 911 anytime you think you may need emergency care. For example, call if:    · You have symptoms of a heart attack. These may include:  ¨ Chest pain or pressure, or a strange feeling in the chest.  ¨ Sweating. ¨ Shortness of breath. ¨ Nausea or vomiting. ¨ Pain, pressure, or a strange feeling in the back, neck, jaw, or upper belly or in one or both shoulders or arms. ¨ Lightheadedness or sudden weakness. ¨ A fast or irregular heartbeat. After you call 911, the  may tell you to chew 1 adult-strength or 2 to 4 low-dose aspirin. Wait for an ambulance.  Do not try to drive yourself.     · You passed out (lost consciousness).    Call your doctor now or seek immediate medical care if:    · You have new or increased shortness of breath.     · You are dizzy or lightheaded, or you feel like you may faint.     · Your fatigue and weakness continue or get worse.     · You have any abnormal bleeding, such as:  ¨ Nosebleeds. ¨ Vaginal bleeding that is different (heavier, more frequent, at a different time of the month) than what you are used to. ¨ Bloody or black stools, or rectal bleeding. ¨ Bloody or pink urine.    Watch closely for changes in your health, and be sure to contact your doctor if:    · You do not get better as expected. Where can you learn more? Go to http://swapnil-speedy.info/. Enter R301 in the search box to learn more about \"Anemia: Care Instructions. \"  Current as of: May 7, 2018  Content Version: 11.8  © 9190-9794 [employer]. Care instructions adapted under license by [employer] (which disclaims liability or warranty for this information). If you have questions about a medical condition or this instruction, always ask your healthcare professional. [place] [address] any warranty or liability for your use of this information.

## 2018-10-16 NOTE — PROGRESS NOTES
Hematology/Oncology  Progress Note    Name: Jeyson Lara  Date: 10/16/2018  : 1965    PCP: Miley Gaytan MD     Ms. Claudetta Pole is a 45year old female who was seen for management of her rheumatoid arthritis, leukocytosis, and thrombocytosis. Current therapy: Remicade 4 mg/kg bodyweight ever 6 weeks intravenously    Subjective:     Ms. Claudetta Pole is a 45year-old [facility] [place] woman who has severe rheumatoid arthritis. She receives Remicade every 6 weeks. She also suffers from fibromyalgia and had an elevated WBC count and platelet count of undefined etiology. Today she has no new complaints to report. She continues to complain of back pain. She is using a narcotic based regimen for pain control. She states this provides some relief, but the pain is gradually worsening. She reports that the Remicade has provided a significant degree of relief from the severe rheumatoid arthritis symptoms. The patient is continuing to use her cane for mobility support. She denies any recent fevers or recurrent infections. The patient reports that her appetite has continued to improve. She also states her energy level is continuing to improve as well. She has no new complaints or concerns to report, at this time. She is requesting a new prescription for Percocet. The patient informed me that the cyst that she had on her right breast has completely resolved since her last clinic visit.     Past Medical History:   Diagnosis Date    Abdominal pain, unspecified site     Acute otitis media     Acute pharyngitis     Anxiety     Arthritis     Autoimmune disease ([facility] [address]     Back injury     Backache     herniated disc in lower back    Blurred vision     Chest pain 2018    Chest pain, unspecified     abnormal EKG    Chronic airway obstruction, not elsewhere classified     Chronic back pain     Chronic pain     COPD     Depression     Diabetes (HCC)     Dizziness     Dizziness and giddiness possible orthostatic changes, vasovagal, autonomic dysfunction from diabetic neuropathy    Encounters for unspecified administrative purpose     Essential thrombocytosis ([facility] [address] 1/6/2016    Feeling anxious     Fibromyalgia     Headache(784.0)     Hemorrhoid     Herniated disc     at L5    Hypercholesterolemia     Hyperglycemia     Hypertension     Joint pain     Leukocytosis, unspecified     Major depressive disorder, single episode, mild (HCC)     Mental disorder     depression, anxiety, bipolar and PTSD    Neuropathy     Obesity, unspecified     Other chest pain     Palpitation 5/4/2018    ? arrhythmia    Phobic disorders     Rheumatoid arthritis (HCC)     Rheumatoid arthritis of foot ([facility] [address] 5/4/2018    on treatment    Seasonal allergies     Shortness of breath     normal EF    Smoking 5/4/2018    discussed cessation    Streptococcal sore throat     Type 2 diabetes mellitus without complication, with long-term current use of insulin ([facility] [address] 5/4/2018    Type II or unspecified type diabetes mellitus with unspecified complication, uncontrolled     Unspecified hereditary and idiopathic peripheral neuropathy     Vitamin D deficiency      Past Surgical History:   Procedure Laterality Date    HX CHOLECYSTECTOMY  1994    HX GYN  2005    partial Hysterectomy    HX OTHER SURGICAL  12-1-15    had ingrown toenail removed from big toe, both feet    HX TUBAL LIGATION      1995     Social History     Social History    Marital status:      Spouse name: N/A    Number of children: N/A    Years of education: N/A     Occupational History    Not on file.      Social History Main Topics    Smoking status: Current Every Day Smoker     Packs/day: 0.25     Last attempt to quit: 7/11/2012    Smokeless tobacco: Never Used      Comment: 5 cigarettes day    Alcohol use Yes      Comment: socially    Drug use: No    Sexual activity: Yes     Partners: Male     Birth control/ protection: Condom Other Topics Concern    Not on file     Social History Narrative     Family History   Problem Relation Age of Onset    Diabetes Other     Alcohol abuse Mother     Arthritis-osteo Mother     Diabetes Mother     Elevated Lipids Mother     Headache Mother     Heart Disease Mother    [facility] Migraines Mother     Psychiatric Disorder Mother     Alcohol abuse Father    Juana Valente Father     Cancer Father     Headache Father     Heart Disease Father     Lung Disease Father     Migraines Father     Heart Surgery Father     Alcohol abuse Sister     Headache Sister     Diabetes Sister     Heart Disease Sister     Migraines Sister     Psychiatric Disorder Sister     Headache Brother     Diabetes Brother     Elevated Lipids Brother     Heart Disease Brother     Migraines Brother     Psychiatric Disorder Brother     Coronary Artery Disease Brother     Cancer Maternal Aunt     Alcohol abuse Maternal Aunt     Diabetes Maternal Aunt     Headache Maternal Aunt     Lung Disease Maternal Aunt     Migraines Maternal Aunt     Headache Maternal Uncle     Migraines Maternal Uncle     Stroke Maternal Uncle     Psychiatric Disorder Maternal Uncle     Headache Paternal Aunt     Migraines Paternal Aunt     Headache Paternal Uncle     Hypertension Paternal Uncle     Migraines Paternal Uncle     Stroke Paternal Uncle     Headache Maternal Grandmother     Migraines Maternal Grandmother     Stroke Maternal Grandmother     Headache Maternal Grandfather     Migraines Maternal Grandfather     Stroke Maternal Grandfather     Headache Paternal Grandmother     Hypertension Paternal Grandmother     Lung Disease Paternal Grandmother     Migraines Paternal Grandmother     Headache Paternal Grandfather     Cancer Paternal Grandfather     Migraines Paternal Grandfather      Current Outpatient Prescriptions   Medication Sig Dispense Refill    oxyCODONE-acetaminophen (PERCOCET 10)  mg per tablet Take 1 Tab by mouth every six (6) hours as needed for Pain. Max Daily Amount: 4 Tabs. 60 Tab 0    anagrelide (AGRYLIN) 0.5 mg capsule Take 1 Cap by mouth two (2) times a day. Indications: ESSENTIAL THROMBOCYTOSIS 60 Cap 6    amitriptyline (ELAVIL) 25 mg tablet       clotrimazole-betamethasone (LOTRISONE) topical cream       DULoxetine (CYMBALTA) 30 mg capsule       gabapentin (NEURONTIN) 300 mg capsule 300 mg two (2) times a day.  QUEtiapine (SEROQUEL) 25 mg tablet       BD INSULIN SYRINGE ULTRA-FINE 1 mL 31 gauge x 15/64\" syrg       albuterol sulfate (PROVENTIL;VENTOLIN) 2.5 mg/0.5 mL nebu nebulizer solution by Nebulization route once. Is now using the actual nebulizer machine, for current bronchitis episode (Aug/Sept. 2017)      insulin aspart protamine/insulin aspart (NOVOLOG MIX 70-30) 100 unit/mL (70-30) injection 10 Units by SubCUTAneous route two (2) times a day.  clonazePAM (KLONOPIN) 2 mg tablet Take 2 mg by mouth daily. Indications: PANIC DISORDER      folic acid 835 mcg tablet Take 400 mcg by mouth daily.  albuterol (PROAIR HFA) 90 mcg/actuation inhaler Take 1 Puff by inhalation every six (6) hours as needed for Wheezing. 1 Inhaler 0    ondansetron hcl (ZOFRAN, AS HYDROCHLORIDE,) 4 mg tablet Take 1 Tab by mouth every eight (8) hours as needed for Nausea. 12 Tab 0    methotrexate (RHEUMATREX) 2.5 mg tablet Take 2.5 mg by mouth every fourty-eight (48) hours.  metFORMIN (GLUCOPHAGE) 1,000 mg tablet Take 500 mg by mouth two (2) times a day.  Cholecalciferol, Vitamin D3, 5,000 unit Tab Take 5,000 Units by mouth every seven (7) days.  atenolol (TENORMIN) 25 mg tablet Take 25 mg by mouth daily. Indications: HYPERTENSION      hydroxychloroquine (PLAQUENIL) 200 mg tablet Take 200 mg by mouth two (2) times a day.  furosemide (LASIX) 40 mg tablet Take  by mouth daily.  INFLIXIMAB (REMICADE IV) 344 mg by IntraVENous route once as needed.  remicade will be every 6 weeks      losartan-hydrochlorothiazide (HYZAAR) 50-12.5 mg per tablet Take 1 Tab by mouth daily.  INSULIN GLARGINE,HUM. REC. ANLOG (LANTUS SC) 5 Units by SubCUTAneous route daily. Review of Systems  Constitutional: The patient has no acute distress or discomfort. HEENT: The patient denies recent head trauma, eye pain, blurred vision,  hearing deficit, oropharyngeal mucosal pain or lesions, and the patient denies throat pain or discomfort. Lymphatics: The patient denies palpable peripheral lymphadenopathy. Hematologic: The patient denies having bruising, bleeding, or progressive fatigue. Respiratory: Patient denies having shortness of breath, cough, sputum production, fever, or dyspnea on exertion. Cardiovascular: The patient denies having leg pain, leg swelling, heart palpitations, chest permit, chest pain, or lightheadedness. The patient denies having dyspnea on exertion. Breast: The patient has a sebaceous cyst involving the right breast.  Gastrointestinal: The patient denies having nausea, emesis, or diarrhea. The patient denies having any hematemesis or blood in the stool. Genitourinary: Patient denies having urinary urgency, frequency, or dysuria. The patient denies having blood in the urine. Psychological: The patient denies having symptoms of nervousness, anxiety, depression, or thoughts of harming himself some of this. Skin: Patient denies having skin rashes, skin, ulcerations, or unexplained itching or pruritus. Musculoskeletal: The patient complains of generalized back pain. Objective:     Visit Vitals    /72    Pulse 88    Temp 98.5 °F (36.9 °C) (Oral)    Resp 17    Ht 5' 11\" (1.803 m)    Wt 98.9 kg (218 lb)    BMI 30.4 kg/m2     ECOG PS=0 Pain score 4-5/10     Physical Exam:   Gen. Appearance: The patient is in no acute distress. Skin: There is no bruise or rash. HEENT: The exam is unremarkable. Neck: Supple without lymphadenopathy or thyromegaly. Lungs: Clear to auscultation and percussion; there are no wheezes or rhonchi. Heart: Regular rate and rhythm; there are no murmurs, gallops, or rubs. Anterior chest wall and breast: Patient has a sebaceous cyst that has apparently drained from the underside of the right breast abutting the right anterior chest wall. Abdomen: Bowel sounds are present and normal.  There is no guarding, tenderness, or hepatosplenomegaly. Extremities: There is no clubbing, cyanosis, or edema. Neurologic: There are no focal neurologic deficits. Lymphatics: There is no palpable peripheral lymphadenopathy. Musculoskeletal: The patient has full range of motion at all joints. However, she complains of pain on ambulation due to the severe rheumatoid arthritis. There is  evidence of joint deformity or effusions in the hands and knees. There is no focal joint tenderness. She is using a cane for support and mobility. Psychological/psychiatric: There is no clinical evidence of anxiety, depression, or melancholy. Lab data:      Results for orders placed or performed during the hospital encounter of 10/16/18   CBC WITH 3 PART DIFF     Status: None   Result Value Ref Range Status    WBC 9.7 4.5 - 13.0 K/uL Final    RBC 4.86 4.10 - 5.10 M/uL Final    HGB 12.9 12.0 - 16.0 g/dL Final    HCT 40.0 36 - 48 % Final    MCV 82.3 78 - 102 FL Final    MCH 26.5 25.0 - 35.0 PG Final    MCHC 32.3 31 - 37 g/dL Final    RDW 13.5 11.5 - 14.5 % Final    PLATELET 608 796 - 038 K/uL Final    NEUTROPHILS 55 40 - 70 % Final    MIXED CELLS 7 0.1 - 17 % Final    LYMPHOCYTES 38 14 - 44 % Final    ABS. NEUTROPHILS 5.3 1.8 - 9.5 K/UL Final    ABS. MIXED CELLS 0.7 0.0 - 2.3 K/uL Final    ABS. LYMPHOCYTES 3.7 1.1 - 5.9 K/UL Final     Comment: Test performed at [place] [id number] Location. Results Reviewed by Medical Director. DF AUTOMATED   Final           Assessment:     1. Essential thrombocytosis ([facility] [address]    2. Chronic anemia    3.  Thrombocytosis ([facility] [address] 4. Chronic pain syndrome    5. Rheumatoid arthritis involving multiple joints (HCC)    6. Fibromyalgia      Plan:   Leukocytosis (recurrent and persisting): I have explained to the patient that the etiology of her leukocytosis remains undefined. A previous flow cytometry did not reveal any  evidence of an immunophenotypic abnormality such as an evolving chronic lymphoproliferative disorder or myeloproliferative disorder. The CBCs will continue to be monitored every 6 weeks. The CBC from today shows her WBC count is currently normal at 9.7, hemoglobin is 12.9 g/dL with hematocrit of 40%. The platelet count is 391,837. The absolute neutrophil count is 5.3 and the absolute lymphocyte count is 3.7. Essential thrombocytosis/thrombocytosis: The current CBC shows that the platelet count is now 434,000. The platelet count is being monitored every 6 weeks. The patient was previously started on anagrelide 0.5 mg one tablet twice daily. This medication will be continued, at the current dose. I have reenforced to the patient the importance of being complaint with the treatment plan. Rheumatoid arthritis: the patient will continue to receive Remicade as a primary treatment modality for her severe rheumatoid arthritis every 6 weeks. The dose of Remicade will be 344 mg given intravenously. The patient has continued to take  Methotrexate 5 mg p.o. daily and Plaquenil 200 mg twice daily. Fibromyalgia/chronic pain syndrome: The patient  continues to have generalized pain at times related to her underlying fibromyalgia. The patient receives her Percocet medication on a monthly basis as needed. A new prescription for the Percocet was provided at this time. Chronic anemia: I have explained to the patient the CBC from today shows her hemoglobin has remained normal at 12.9 g/dL with hematocrit of 40 %. I have recommended that she continue to take ferrous sulfate 325 mg by mouth once daily.     We will see the patient back in 6 weeks for a complete reassessment. Orders Placed This Encounter    COMPLETE CBC & AUTO DIFF WBC    InHouse CBC ([place])     Standing Status:   Future     Number of Occurrences:   1     Standing Expiration Date:   55/29/6919    METABOLIC PANEL, COMPREHENSIVE     Standing Status:   Future     Standing Expiration Date:   10/17/2019    IRON PROFILE     Standing Status:   Future     Standing Expiration Date:   10/17/2019    FERRITIN     Standing Status:   Future     Standing Expiration Date:   10/17/2019    oxyCODONE-acetaminophen (PERCOCET 10)  mg per tablet     Sig: Take 1 Tab by mouth every six (6) hours as needed for Pain. Max Daily Amount: 4 Tabs. Dispense:  60 Tab     Refill:  0    anagrelide (AGRYLIN) 0.5 mg capsule     Sig: Take 1 Cap by mouth two (2) times a day.  Indications: ESSENTIAL THROMBOCYTOSIS     Dispense:  60 Cap     Refill:  6       Aixa Gutierrez MD  10/16/2018

## 2018-10-16 NOTE — MR AVS SNAPSHOT
[address] [facility] [address] [facility] [address] 
[phone] Patient: Jorge Palacios MRN: [id number] :1965 Visit Information Date & Time Provider Department Dept. Phone Encounter #  
 10/16/2018 11:30 AM Maggi Medellin MD [facility]  [phone] [id number] Follow-up Instructions Return in about 6 weeks (around 2018). Your Appointments 2018  1:15 PM  
Office Visit with MD Ana Fernandes [facility]  [address] [facility]) Appt Note: [address] [facility] [address]  
[phone]  
  
   
 [facility] [address] 2019  9:15 AM  
Office Visit with Kylah Danielson MD  
[facility]) Appt Note: 1 yr  
 [address] [facility], [address] [facility] [address]  
[address], [address] Upcoming Health Maintenance Date Due  
 FOOT EXAM Q1 1973 EYE EXAM RETINAL OR DILATED Q1 1973 Pneumococcal 19-64 Highest Risk (1 of 3 - PCV13) 1982 DTaP/Tdap/Td series (1 - Tdap) 1984 PAP AKA CERVICAL CYTOLOGY 1984 Shingrix Vaccine Age 50> (1 of 2) 2013 FOBT Q 1 YEAR AGE 50-75 2013 MEDICARE YEARLY EXAM 3/14/2018 Influenza Age 5 to Adult 2018 HEMOGLOBIN A1C Q6M 10/30/2018 MICROALBUMIN Q1 2019 LIPID PANEL Q1 2019 BREAST CANCER SCRN MAMMOGRAM 2019 Allergies as of 10/16/2018  Review Complete On: 10/16/2018 By: Maggi Medellin MD  
  
 Severity Noted Reaction Type Reactions Levaquin [Levofloxacin] High  Systemic Anaphylaxis Pollen Extracts    Unable to Obtain Current Immunizations  Reviewed on 10/9/2018 Name Date Influenza Vaccine 9/22/2017, 10/21/2016, 11/3/2015, 11/10/2014, 10/25/2013 Influenza Vaccine Whole 9/10/2012 Not reviewed this visit You Were Diagnosed With   
  
 Codes Comments Essential thrombocytosis (HCC)    -  Primary ICD-10-CM: D47.3 ICD-9-CM: 238.71 Chronic anemia     ICD-10-CM: D64.9 ICD-9-CM: 803. 9 Thrombocytosis ([address]     ICD-10-CM: D47.3 ICD-9-CM: 238.71 Chronic pain syndrome     ICD-10-CM: G89.4 ICD-9-CM: 338. 4 Rheumatoid arthritis involving multiple joints (HCC)     ICD-10-CM: M06.9 ICD-9-CM: 714.0 Vitals BP Pulse Temp Resp Height(growth percentile) Weight(growth percentile) 125/72 88 98.5 °F (36.9 °C) (Oral) 17 5' 11\" (1.803 m) 218 lb (98.9 kg) BMI OB Status Smoking Status 30.4 kg/m2 Hysterectomy Current Every Day Smoker BMI and BSA Data Body Mass Index Body Surface Area  
 30.4 kg/m 2 2.23 m 2 Preferred Pharmacy Pharmacy Name [facility] - [facility], [address] [place] [address],[facility] [address] [place] [address] [phone] Your Updated Medication List  
  
   
This list is accurate as of 10/16/18 12:28 PM.  Always use your most recent med list.  
  
  
  
  
 * albuterol sulfate 2.5 mg/0.5 mL Nebu nebulizer solution Commonly known as:  PROVENTIL;VENTOLIN  
by Nebulization route once. Is now using the actual nebulizer machine, for current bronchitis episode (Aug/Sept. 2017) * albuterol 90 mcg/actuation inhaler Commonly known as:  PROAIR HFA Take 1 Puff by inhalation every six (6) hours as needed for Wheezing. amitriptyline 25 mg tablet Commonly known as:  ELAVIL  
  
 anagrelide 0.5 mg capsule Commonly known as:  Mart Model Take 1 Cap by mouth two (2) times a day. Indications: ESSENTIAL THROMBOCYTOSIS  
  
 atenolol 25 mg tablet Commonly known as:  TENORMIN Take 25 mg by mouth daily. Indications: HYPERTENSION  
  
 BD INSULIN SYRINGE ULTRA-FINE 1 mL 31 gauge x 15/64\" Syrg Generic drug:  insulin syringe-needle U-100  
  
 cholecalciferol (VITAMIN D3) 5,000 unit Tab tablet Commonly known as:  VITAMIN D3 Take 5,000 Units by mouth every seven (7) days. clotrimazole-betamethasone topical cream  
Commonly known as:  Jozef Sarbjit DULoxetine 30 mg capsule Commonly known as:  CYMBALTA  
  
 folic acid 153 mcg tablet Take 400 mcg by mouth daily. furosemide 40 mg tablet Commonly known as:  LASIX Take  by mouth daily. gabapentin 300 mg capsule Commonly known as:  NEURONTIN  
300 mg two (2) times a day. HYZAAR 50-12.5 mg per tablet Generic drug:  losartan-hydroCHLOROthiazide Take 1 Tab by mouth daily. KlonoPIN 2 mg tablet Generic drug:  clonazePAM  
Take 2 mg by mouth daily. Indications: PANIC DISORDER  
  
 LANTUS SC  
5 Units by SubCUTAneous route daily. metFORMIN 1,000 mg tablet Commonly known as:  GLUCOPHAGE Take 500 mg by mouth two (2) times a day. methotrexate 2.5 mg tablet Commonly known as:  Milly Stevens Take 2.5 mg by mouth every fourty-eight (48) hours. NovoLOG Mix 70-30 U-100 Insuln 100 unit/mL (70-30) injection Generic drug:  insulin aspart protamine/insulin aspart 10 Units by SubCUTAneous route two (2) times a day. ondansetron hcl 4 mg tablet Commonly known as:  Elnoria Friendly Take 1 Tab by mouth every eight (8) hours as needed for Nausea. oxyCODONE-acetaminophen  mg per tablet Commonly known as:  PERCOCET 10 Take 1 Tab by mouth every six (6) hours as needed for Pain. Max Daily Amount: 4 Tabs. PLAQUENIL 200 mg tablet Generic drug:  hydroxychloroquine Take 200 mg by mouth two (2) times a day. QUEtiapine 25 mg tablet Commonly known as:  SEROquel REMICADE IV  
344 mg by IntraVENous route once as needed. remicade will be every 6 weeks * Notice:   This list has 2 medication(s) that are the same as other medications prescribed for you. Read the directions carefully, and ask your doctor or other care provider to review them with you. Prescriptions Printed Refills  
 oxyCODONE-acetaminophen (PERCOCET 10)  mg per tablet 0 Sig: Take 1 Tab by mouth every six (6) hours as needed for Pain. Max Daily Amount: 4 Tabs. Class: Print Route: Oral  
  
Prescriptions Sent to Pharmacy Refills  
 anagrelide (AGRYLIN) 0.5 mg capsule 6 Sig: Take 1 Cap by mouth two (2) times a day. Indications: ESSENTIAL THROMBOCYTOSIS Class: Normal  
 [facility] [address] #: [phone] Route: Oral  
  
We Performed the Following COMPLETE CBC & AUTO DIFF WBC [43860 CPT(R)] Follow-up Instructions Return in about 6 weeks (around 11/27/2018). To-Do List   
 10/16/2018 Lab:  CBC WITH 3 PART DIFF   
  
 11/20/2018 10:00 AM  
  Appointment with [address] at [email or web address] ([phone])  
  
 01/08/2019 10:00 AM  
  Appointment with [address] at [facility] ([phone]) Patient Instructions Rheumatoid Arthritis: Care Instructions Your Care Instructions Arthritis is a common health problem in which the joints are inflamed. There are many types of arthritis. In rheumatoid arthritis, the body's own immune system attacks the joints. This causes pain, stiffness, and swelling in the joints, especially in the hands and feet. It can become hard to open jars, write, and do other daily tasks. Sometimes rheumatoid arthritis can also cause bumps to form under the skin. Over time, rheumatoid arthritis can damage and deform joints. Early treatment with medicines may reduce your chances of having a lasting disability. Follow-up care is a key part of your treatment and safety.  Be sure to make and go to all appointments, and call your doctor if you are having problems. It's also a good idea to know your test results and keep a list of the medicines you take. How can you care for yourself at home? · If your doctor recommends it, get more exercise. Walking is a good choice. If your knees or ankles hurt, try riding a stationary bike or swimming. · Move each joint gently through its full range of motion once or twice a day. · Rest joints when they are sore or overworked. Short rest breaks may help more than staying in bed. · Reach and stay at a healthy weight. Regular exercise and a healthy diet will help you do this. Extra weight can strain the joints, especially the knees and hips, and make the pain worse. Losing even a few pounds may help. · Get enough calcium and vitamin D to help prevent osteoporosis, which causes thin bones. Talk to your doctor about how much you should take. · Protect your joints from injury. Do not overuse them. Try to limit or avoid activities that cause joint pain or swelling. Use special kitchen tools and other self-help devices as well as walkers, splints, or canes if needed. · Use heat to ease pain. Take warm showers or baths. Use hot packs or a heating pad set on low. Sleep under a warm electric blanket. · Put ice or a cold pack on the area for 10 to 20 minutes at a time. Put a thin cloth between the ice and your skin. · Take pain medicines exactly as directed. ¨ If the doctor gave you a prescription medicine for pain, take it as prescribed. ¨ If you are not taking a prescription pain medicine, ask your doctor if you can take an over-the-counter medicine. · Take an active role in managing your condition. Set up a treatment plan with your doctor, and learn as much as you can about rheumatoid arthritis. This will help you control pain and stay active. When should you call for help? Call your doctor now or seek immediate medical care if: 
  · You have a fever or a rash along with joint pain.   · You have joint pain that is so severe that you cannot use the joint at all.  
  · You have sudden swelling, redness, or pain in one or more joints, and you do not know why.  
  · You have back or neck pain along with weakness in your arms or legs.  
  · You have a loss of bowel or bladder control.  
 Watch closely for changes in your health, and be sure to contact your doctor if: 
  · You have joint pain that lasts for more than 6 weeks.  
  · You have side effects from your arthritis medicines, such as stomach pain, nausea, heartburn, or dark and tarlike stools. Where can you learn more? Go to http://swapnil-speedy.info/. Enter K205 in the search box to learn more about \"Rheumatoid Arthritis: Care Instructions. \" Current as of: June 11, 2018 Content Version: 11.8 © 3939-5201 [employer]. Care instructions adapted under license by [employer] (which disclaims liability or warranty for this information). If you have questions about a medical condition or this instruction, always ask your healthcare professional. [place] [address] any warranty or liability for your use of this information. Anemia: Care Instructions Your Care Instructions Anemia is a low level of red blood cells, which carry oxygen throughout your body. Many things can cause anemia. Lack of iron is one of the most common causes. Your body needs iron to make hemoglobin, a substance in red blood cells that carries oxygen from the lungs to your body's cells. Without enough iron, the body produces fewer and smaller red blood cells. As a result, your body's cells do not get enough oxygen, and you feel tired and weak. And you may have trouble concentrating. Bleeding is the most common cause of a lack of iron. You may have heavy menstrual bleeding or bleeding caused by conditions such as ulcers, hemorrhoids, or cancer.  Regular use of aspirin or other anti-inflammatory medicines (such as ibuprofen) also can cause bleeding in some people. A lack of iron in your diet also can cause anemia, especially at times when the body needs more iron, such as during pregnancy, infancy, and the teen years. Your doctor may have prescribed iron pills. It may take several months of treatment for your iron levels to return to normal. Your doctor also may suggest that you eat foods that are rich in iron, such as meat and beans. There are many other causes of anemia. It is not always due to a lack of iron. Finding the specific cause of your anemia will help your doctor find the right treatment for you. Follow-up care is a key part of your treatment and safety. Be sure to make and go to all appointments, and call your doctor if you are having problems. It's also a good idea to know your test results and keep a list of the medicines you take. How can you care for yourself at home? · Take your medicines exactly as prescribed. Call your doctor if you think you are having a problem with your medicine. · If your doctor recommends iron pills, take them as directed: ¨ Try to take the pills on an empty stomach about 1 hour before or 2 hours after meals. But you may need to take iron with food to avoid an upset stomach. ¨ Do not take antacids or drink milk or caffeine drinks (such as coffee, tea, or cola) at the same time or within 2 hours of the time that you take your iron. They can make it hard for your body to absorb the iron. ¨ Vitamin C (from food or supplements) helps your body absorb iron. Try taking iron pills with a glass of orange juice or some other food that is high in vitamin C, such as citrus fruits. ¨ Iron pills may cause stomach problems, such as heartburn, nausea, diarrhea, constipation, and cramps. Be sure to drink plenty of fluids, and include fruits, vegetables, and fiber in your diet each day. Iron pills often make your bowel movements dark or green. ¨ If you forget to take an iron pill, do not take a double dose of iron the next time you take a pill. ¨ Keep iron pills out of the reach of small children. An overdose of iron can be very dangerous. · Follow your doctor's advice about eating iron-rich foods. These include red meat, shellfish, poultry, eggs, beans, raisins, whole-grain bread, and leafy green vegetables. · Steam vegetables to help them keep their iron content. When should you call for help? Call 911 anytime you think you may need emergency care. For example, call if: 
  · You have symptoms of a heart attack. These may include: ¨ Chest pain or pressure, or a strange feeling in the chest. 
¨ Sweating. ¨ Shortness of breath. ¨ Nausea or vomiting. ¨ Pain, pressure, or a strange feeling in the back, neck, jaw, or upper belly or in one or both shoulders or arms. ¨ Lightheadedness or sudden weakness. ¨ A fast or irregular heartbeat. After you call 911, the  may tell you to chew 1 adult-strength or 2 to 4 low-dose aspirin. Wait for an ambulance. Do not try to drive yourself.  
  · You passed out (lost consciousness).  
 Call your doctor now or seek immediate medical care if: 
  · You have new or increased shortness of breath.  
  · You are dizzy or lightheaded, or you feel like you may faint.  
  · Your fatigue and weakness continue or get worse.  
  · You have any abnormal bleeding, such as: 
¨ Nosebleeds. ¨ Vaginal bleeding that is different (heavier, more frequent, at a different time of the month) than what you are used to. ¨ Bloody or black stools, or rectal bleeding. ¨ Bloody or pink urine.  
 Watch closely for changes in your health, and be sure to contact your doctor if: 
  · You do not get better as expected. Where can you learn more? Go to http://swapnil-speedy.info/. Enter R301 in the search box to learn more about \"Anemia: Care Instructions. \" Current as of: May 7, 2018 Content Version: 11.8 © 8768-0914 [employer]. Care instructions adapted under license by [employer] (which disclaims liability or warranty for this information). If you have questions about a medical condition or this instruction, always ask your healthcare professional. [address] any warranty or liability for your use of this information. Please provide this summary of care documentation to your next provider. Your primary care clinician is listed as [place]. If you have any questions after today's visit, please call [phone].

## 2018-10-22 ENCOUNTER — HOSPITAL ENCOUNTER (OUTPATIENT)
Dept: LAB | Age: 53
Discharge: HOME | End: 2018-10-22
Payer: MEDICARE

## 2018-10-22 DIAGNOSIS — Z12.4 ROUTINE CERVICAL SMEAR: ICD-10-CM

## 2018-10-22 DIAGNOSIS — I10 HTN (HYPERTENSION): ICD-10-CM

## 2018-10-22 DIAGNOSIS — M13.0 POLYARTHRITIS: ICD-10-CM

## 2018-10-22 DIAGNOSIS — E55.9 VITAMIN D DEFICIENCY: ICD-10-CM

## 2018-10-22 DIAGNOSIS — E78.00 HIGH CHOLESTEROL: ICD-10-CM

## 2018-10-22 DIAGNOSIS — F33.1 MODERATE RECURRENT MAJOR DEPRESSION (HCC): ICD-10-CM

## 2018-10-22 DIAGNOSIS — M06.9 RA (RHEUMATOID ARTHRITIS) (HCC): ICD-10-CM

## 2018-10-22 DIAGNOSIS — E11.9 DIABETES MELLITUS (HCC): ICD-10-CM

## 2018-10-22 DIAGNOSIS — N76.0 VAGINITIS: ICD-10-CM

## 2018-10-22 LAB
ALBUMIN SERPL-MCNC: 3.6 G/DL (ref 3.4–5)
ALBUMIN SERPL-MCNC: 3.6 G/DL (ref 3.4–5)
ALBUMIN/GLOB SERPL: 0.9 {RATIO} (ref 0.8–1.7)
ALBUMIN/GLOB SERPL: 0.9 {RATIO} (ref 0.8–1.7)
ALP SERPL-CCNC: 96 U/L (ref 45–117)
ALP SERPL-CCNC: 98 U/L (ref 45–117)
ALT SERPL-CCNC: 21 U/L (ref 13–56)
ALT SERPL-CCNC: 22 U/L (ref 13–56)
ANION GAP SERPL CALC-SCNC: 5 MMOL/L (ref 3–18)
ANION GAP SERPL CALC-SCNC: 7 MMOL/L (ref 3–18)
APPEARANCE UR: CLEAR
AST SERPL-CCNC: 8 U/L (ref 15–37)
AST SERPL-CCNC: 8 U/L (ref 15–37)
BACTERIA URNS QL MICRO: ABNORMAL /HPF
BASOPHILS # BLD: 0 K/UL (ref 0–0.1)
BASOPHILS # BLD: 0 K/UL (ref 0–0.1)
BASOPHILS NFR BLD: 0 % (ref 0–2)
BASOPHILS NFR BLD: 0 % (ref 0–2)
BILIRUB SERPL-MCNC: 0.2 MG/DL (ref 0.2–1)
BILIRUB SERPL-MCNC: 0.3 MG/DL (ref 0.2–1)
BILIRUB UR QL: NEGATIVE
BUN SERPL-MCNC: 18 MG/DL (ref 7–18)
BUN SERPL-MCNC: 20 MG/DL (ref 7–18)
BUN/CREAT SERPL: 21 (ref 12–20)
BUN/CREAT SERPL: 24 (ref 12–20)
CALCIUM SERPL-MCNC: 9.2 MG/DL (ref 8.5–10.1)
CALCIUM SERPL-MCNC: 9.5 MG/DL (ref 8.5–10.1)
CHLORIDE SERPL-SCNC: 104 MMOL/L (ref 100–108)
CHLORIDE SERPL-SCNC: 105 MMOL/L (ref 100–108)
CHOLEST SERPL-MCNC: 171 MG/DL
CK SERPL-CCNC: 69 U/L (ref 26–192)
CO2 SERPL-SCNC: 27 MMOL/L (ref 21–32)
CO2 SERPL-SCNC: 29 MMOL/L (ref 21–32)
COLOR UR: YELLOW
CREAT SERPL-MCNC: 0.83 MG/DL (ref 0.6–1.3)
CREAT SERPL-MCNC: 0.84 MG/DL (ref 0.6–1.3)
CREAT UR-MCNC: 106 MG/DL (ref 30–125)
CRP SERPL-MCNC: 0.8 MG/DL (ref 0–0.3)
DIFFERENTIAL METHOD BLD: ABNORMAL
DIFFERENTIAL METHOD BLD: ABNORMAL
EOSINOPHIL # BLD: 0.3 K/UL (ref 0–0.4)
EOSINOPHIL # BLD: 0.3 K/UL (ref 0–0.4)
EOSINOPHIL NFR BLD: 3 % (ref 0–5)
EOSINOPHIL NFR BLD: 3 % (ref 0–5)
EPITH CASTS URNS QL MICRO: ABNORMAL /LPF (ref 0–5)
ERYTHROCYTE [DISTWIDTH] IN BLOOD BY AUTOMATED COUNT: 14 % (ref 11.6–14.5)
ERYTHROCYTE [DISTWIDTH] IN BLOOD BY AUTOMATED COUNT: 14.1 % (ref 11.6–14.5)
ERYTHROCYTE [SEDIMENTATION RATE] IN BLOOD: 5 MM/HR (ref 0–30)
GLOBULIN SER CALC-MCNC: 4.2 G/DL (ref 2–4)
GLOBULIN SER CALC-MCNC: 4.2 G/DL (ref 2–4)
GLUCOSE SERPL-MCNC: 244 MG/DL (ref 74–99)
GLUCOSE SERPL-MCNC: 263 MG/DL (ref 74–99)
GLUCOSE UR STRIP.AUTO-MCNC: NEGATIVE MG/DL
HBA1C MFR BLD: 8.6 % (ref 4.2–5.6)
HCT VFR BLD AUTO: 41.1 % (ref 35–45)
HCT VFR BLD AUTO: 41.7 % (ref 35–45)
HDLC SERPL-MCNC: 44 MG/DL (ref 40–60)
HDLC SERPL: 3.9 {RATIO} (ref 0–5)
HGB BLD-MCNC: 13.9 G/DL (ref 12–16)
HGB BLD-MCNC: 13.9 G/DL (ref 12–16)
HGB UR QL STRIP: NEGATIVE
HYALINE CASTS URNS QL MICRO: ABNORMAL /LPF (ref 0–2)
KETONES UR QL STRIP.AUTO: NEGATIVE MG/DL
LDLC SERPL CALC-MCNC: 94.6 MG/DL (ref 0–100)
LEUKOCYTE ESTERASE UR QL STRIP.AUTO: NEGATIVE
LIPID PROFILE,FLP: ABNORMAL
LYMPHOCYTES # BLD: 2.3 K/UL (ref 0.9–3.6)
LYMPHOCYTES # BLD: 2.5 K/UL (ref 0.9–3.6)
LYMPHOCYTES NFR BLD: 27 % (ref 21–52)
LYMPHOCYTES NFR BLD: 28 % (ref 21–52)
MCH RBC QN AUTO: 26.9 PG (ref 24–34)
MCH RBC QN AUTO: 27.2 PG (ref 24–34)
MCHC RBC AUTO-ENTMCNC: 33.3 G/DL (ref 31–37)
MCHC RBC AUTO-ENTMCNC: 33.8 G/DL (ref 31–37)
MCV RBC AUTO: 80.4 FL (ref 74–97)
MCV RBC AUTO: 80.8 FL (ref 74–97)
MICROALBUMIN UR-MCNC: 16.6 MG/DL (ref 0–3)
MICROALBUMIN/CREAT UR-RTO: 157 MG/G (ref 0–30)
MONOCYTES # BLD: 0.5 K/UL (ref 0.05–1.2)
MONOCYTES # BLD: 0.7 K/UL (ref 0.05–1.2)
MONOCYTES NFR BLD: 6 % (ref 3–10)
MONOCYTES NFR BLD: 8 % (ref 3–10)
NEUTS SEG # BLD: 5.4 K/UL (ref 1.8–8)
NEUTS SEG # BLD: 5.5 K/UL (ref 1.8–8)
NEUTS SEG NFR BLD: 61 % (ref 40–73)
NEUTS SEG NFR BLD: 64 % (ref 40–73)
NITRITE UR QL STRIP.AUTO: NEGATIVE
PH UR STRIP: 5.5 [PH] (ref 5–8)
PLATELET # BLD AUTO: 427 K/UL (ref 135–420)
PLATELET # BLD AUTO: 430 K/UL (ref 135–420)
PMV BLD AUTO: 9.7 FL (ref 9.2–11.8)
PMV BLD AUTO: 9.7 FL (ref 9.2–11.8)
POTASSIUM SERPL-SCNC: 4.6 MMOL/L (ref 3.5–5.5)
POTASSIUM SERPL-SCNC: 4.6 MMOL/L (ref 3.5–5.5)
PROT SERPL-MCNC: 7.8 G/DL (ref 6.4–8.2)
PROT SERPL-MCNC: 7.8 G/DL (ref 6.4–8.2)
PROT UR STRIP-MCNC: 100 MG/DL
RBC # BLD AUTO: 5.11 M/UL (ref 4.2–5.3)
RBC # BLD AUTO: 5.16 M/UL (ref 4.2–5.3)
RBC #/AREA URNS HPF: ABNORMAL /HPF (ref 0–5)
SODIUM SERPL-SCNC: 138 MMOL/L (ref 136–145)
SODIUM SERPL-SCNC: 139 MMOL/L (ref 136–145)
SP GR UR REFRACTOMETRY: 1.02 (ref 1–1.03)
T4 SERPL-MCNC: 11 UG/DL (ref 4.7–13.3)
TRIGL SERPL-MCNC: 162 MG/DL (ref ?–150)
TSH SERPL DL<=0.05 MIU/L-ACNC: 2.13 UIU/ML (ref 0.36–3.74)
UROBILINOGEN UR QL STRIP.AUTO: 0.2 EU/DL (ref 0.2–1)
VLDLC SERPL CALC-MCNC: 32.4 MG/DL
WBC # BLD AUTO: 8.6 K/UL (ref 4.6–13.2)
WBC # BLD AUTO: 8.9 K/UL (ref 4.6–13.2)
WBC URNS QL MICRO: ABNORMAL /HPF (ref 0–4)

## 2018-10-22 PROCEDURE — 80053 COMPREHEN METABOLIC PANEL: CPT | Performed by: SPECIALIST

## 2018-10-22 PROCEDURE — 84443 ASSAY THYROID STIM HORMONE: CPT | Performed by: INTERNAL MEDICINE

## 2018-10-22 PROCEDURE — 36415 COLL VENOUS BLD VENIPUNCTURE: CPT | Performed by: INTERNAL MEDICINE

## 2018-10-22 PROCEDURE — 81001 URINALYSIS AUTO W/SCOPE: CPT | Performed by: SPECIALIST

## 2018-10-22 PROCEDURE — 82043 UR ALBUMIN QUANTITATIVE: CPT | Performed by: INTERNAL MEDICINE

## 2018-10-22 PROCEDURE — 86140 C-REACTIVE PROTEIN: CPT | Performed by: SPECIALIST

## 2018-10-22 PROCEDURE — 85652 RBC SED RATE AUTOMATED: CPT | Performed by: SPECIALIST

## 2018-10-22 PROCEDURE — 80053 COMPREHEN METABOLIC PANEL: CPT | Performed by: INTERNAL MEDICINE

## 2018-10-22 PROCEDURE — 82550 ASSAY OF CK (CPK): CPT | Performed by: SPECIALIST

## 2018-10-22 PROCEDURE — 85025 COMPLETE CBC W/AUTO DIFF WBC: CPT | Performed by: INTERNAL MEDICINE

## 2018-10-22 PROCEDURE — 83036 HEMOGLOBIN GLYCOSYLATED A1C: CPT | Performed by: INTERNAL MEDICINE

## 2018-10-22 PROCEDURE — 84436 ASSAY OF TOTAL THYROXINE: CPT | Performed by: INTERNAL MEDICINE

## 2018-10-22 PROCEDURE — 80061 LIPID PANEL: CPT | Performed by: INTERNAL MEDICINE

## 2018-10-26 DIAGNOSIS — G89.4 CHRONIC PAIN SYNDROME: ICD-10-CM

## 2018-10-26 RX ORDER — OXYCODONE AND ACETAMINOPHEN 10; 325 MG/1; MG/1
1 TABLET ORAL
Qty: 60 TAB | Refills: 0 | Status: CANCELLED | OUTPATIENT
Start: 2018-10-26

## 2018-10-29 DIAGNOSIS — G89.4 CHRONIC PAIN SYNDROME: ICD-10-CM

## 2018-10-29 RX ORDER — OXYCODONE AND ACETAMINOPHEN 10; 325 MG/1; MG/1
1 TABLET ORAL
Qty: 60 TAB | Refills: 0 | Status: SHIPPED | OUTPATIENT
Start: 2018-10-29 | End: 2018-11-09 | Stop reason: SDUPTHER

## 2018-11-06 ENCOUNTER — HOSPITAL ENCOUNTER (OUTPATIENT)
Dept: MAMMOGRAPHY | Age: 53
Discharge: HOME | End: 2018-11-06
Attending: INTERNAL MEDICINE
Payer: MEDICARE

## 2018-11-06 DIAGNOSIS — Z12.31 VISIT FOR SCREENING MAMMOGRAM: ICD-10-CM

## 2018-11-06 PROCEDURE — 77067 SCR MAMMO BI INCL CAD: CPT

## 2018-11-09 DIAGNOSIS — G89.4 CHRONIC PAIN SYNDROME: ICD-10-CM

## 2018-11-09 RX ORDER — OXYCODONE AND ACETAMINOPHEN 10; 325 MG/1; MG/1
1 TABLET ORAL
Qty: 60 TAB | Refills: 0 | Status: SHIPPED | OUTPATIENT
Start: 2018-11-09 | End: 2018-11-27 | Stop reason: SDUPTHER

## 2018-11-13 DIAGNOSIS — D75.839 THROMBOCYTOSIS: ICD-10-CM

## 2018-11-13 RX ORDER — ANAGRELIDE 0.5 MG/1
0.5 CAPSULE ORAL 2 TIMES DAILY
Qty: 60 CAP | Refills: 6 | Status: SHIPPED | OUTPATIENT
Start: 2018-11-13 | End: 2019-08-15 | Stop reason: SDUPTHER

## 2018-11-20 ENCOUNTER — CLINICAL SUPPORT (OUTPATIENT)
Dept: ONCOLOGY | Age: 53
End: 2018-11-20

## 2018-11-20 ENCOUNTER — HOSPITAL ENCOUNTER (OUTPATIENT)
Dept: INFUSION THERAPY | Age: 53
Discharge: HOME | End: 2018-11-20
Payer: MEDICARE

## 2018-11-20 ENCOUNTER — HOSPITAL ENCOUNTER (OUTPATIENT)
Dept: ONCOLOGY | Age: 53
Discharge: HOME | End: 2018-11-20

## 2018-11-20 VITALS
HEIGHT: 71 IN | BODY MASS INDEX: 30.45 KG/M2 | DIASTOLIC BLOOD PRESSURE: 77 MMHG | WEIGHT: 217.5 LBS | HEART RATE: 97 BPM | TEMPERATURE: 98.8 F | SYSTOLIC BLOOD PRESSURE: 130 MMHG | RESPIRATION RATE: 20 BRPM

## 2018-11-20 DIAGNOSIS — M06.09 RHEUMATOID ARTHRITIS WITHOUT RHEUMATOID FACTOR, MULTIPLE SITES (HCC): Primary | ICD-10-CM

## 2018-11-20 DIAGNOSIS — M06.09 RHEUMATOID ARTHRITIS WITHOUT RHEUMATOID FACTOR, MULTIPLE SITES (HCC): ICD-10-CM

## 2018-11-20 LAB
ALBUMIN SERPL-MCNC: 3.5 G/DL (ref 3.4–5)
ALBUMIN/GLOB SERPL: 0.9 {RATIO} (ref 0.8–1.7)
ALP SERPL-CCNC: 103 U/L (ref 45–117)
ALT SERPL-CCNC: 16 U/L (ref 13–56)
ANION GAP SERPL CALC-SCNC: 8 MMOL/L (ref 3–18)
AST SERPL-CCNC: 8 U/L (ref 15–37)
BASO+EOS+MONOS # BLD AUTO: 0.7 K/UL (ref 0–2.3)
BASO+EOS+MONOS # BLD AUTO: 8 % (ref 0.1–17)
BILIRUB SERPL-MCNC: 0.3 MG/DL (ref 0.2–1)
BUN SERPL-MCNC: 15 MG/DL (ref 7–18)
BUN/CREAT SERPL: 19 (ref 12–20)
CALCIUM SERPL-MCNC: 9.3 MG/DL (ref 8.5–10.1)
CHLORIDE SERPL-SCNC: 106 MMOL/L (ref 100–108)
CO2 SERPL-SCNC: 25 MMOL/L (ref 21–32)
CREAT SERPL-MCNC: 0.79 MG/DL (ref 0.6–1.3)
DIFFERENTIAL METHOD BLD: NORMAL
ERYTHROCYTE [DISTWIDTH] IN BLOOD BY AUTOMATED COUNT: 13.1 % (ref 11.5–14.5)
ERYTHROCYTE [SEDIMENTATION RATE] IN BLOOD: 40 MM/HR (ref 0–30)
GLOBULIN SER CALC-MCNC: 3.7 G/DL (ref 2–4)
GLUCOSE SERPL-MCNC: 365 MG/DL (ref 74–99)
HCT VFR BLD AUTO: 38.9 % (ref 36–48)
HGB BLD-MCNC: 12.6 G/DL (ref 12–16)
LYMPHOCYTES # BLD: 2.3 K/UL (ref 1.1–5.9)
LYMPHOCYTES NFR BLD: 25 % (ref 14–44)
MCH RBC QN AUTO: 26.3 PG (ref 25–35)
MCHC RBC AUTO-ENTMCNC: 32.4 G/DL (ref 31–37)
MCV RBC AUTO: 81 FL (ref 78–102)
NEUTS SEG # BLD: 6.2 K/UL (ref 1.8–9.5)
NEUTS SEG NFR BLD: 67 % (ref 40–70)
PLATELET # BLD AUTO: 405 K/UL (ref 140–440)
POTASSIUM SERPL-SCNC: 4.2 MMOL/L (ref 3.5–5.5)
PROT SERPL-MCNC: 7.2 G/DL (ref 6.4–8.2)
RBC # BLD AUTO: 4.8 M/UL (ref 4.1–5.1)
SODIUM SERPL-SCNC: 139 MMOL/L (ref 136–145)
WBC # BLD AUTO: 9.2 K/UL (ref 4.5–13)

## 2018-11-20 PROCEDURE — 74011250636 HC RX REV CODE- 250/636: Performed by: INTERNAL MEDICINE

## 2018-11-20 PROCEDURE — 96413 CHEMO IV INFUSION 1 HR: CPT

## 2018-11-20 PROCEDURE — 80053 COMPREHEN METABOLIC PANEL: CPT

## 2018-11-20 PROCEDURE — 96415 CHEMO IV INFUSION ADDL HR: CPT

## 2018-11-20 PROCEDURE — 96375 TX/PRO/DX INJ NEW DRUG ADDON: CPT

## 2018-11-20 PROCEDURE — 85652 RBC SED RATE AUTOMATED: CPT

## 2018-11-20 PROCEDURE — 86141 C-REACTIVE PROTEIN HS: CPT

## 2018-11-20 RX ORDER — SODIUM CHLORIDE 0.9 % (FLUSH) 0.9 %
10-40 SYRINGE (ML) INJECTION AS NEEDED
Status: DISCONTINUED | OUTPATIENT
Start: 2018-11-20 | End: 2018-11-24 | Stop reason: HOSPADM

## 2018-11-20 RX ORDER — DIPHENHYDRAMINE HYDROCHLORIDE 50 MG/ML
25 INJECTION, SOLUTION INTRAMUSCULAR; INTRAVENOUS ONCE
Status: COMPLETED | OUTPATIENT
Start: 2018-11-20 | End: 2018-11-20

## 2018-11-20 RX ORDER — ACETAMINOPHEN 325 MG/1
650 TABLET ORAL
Status: ACTIVE | OUTPATIENT
Start: 2018-11-20 | End: 2018-11-20

## 2018-11-20 RX ORDER — SODIUM CHLORIDE 9 MG/ML
250 INJECTION, SOLUTION INTRAVENOUS CONTINUOUS
Status: DISPENSED | OUTPATIENT
Start: 2018-11-20 | End: 2018-11-21

## 2018-11-20 RX ORDER — DIPHENHYDRAMINE HYDROCHLORIDE 50 MG/ML
50 INJECTION, SOLUTION INTRAMUSCULAR; INTRAVENOUS
Status: ACTIVE | OUTPATIENT
Start: 2018-11-20 | End: 2018-11-20

## 2018-11-20 RX ADMIN — SODIUM CHLORIDE 250 ML: 900 INJECTION, SOLUTION INTRAVENOUS at 11:05

## 2018-11-20 RX ADMIN — Medication 10 ML: at 10:40

## 2018-11-20 RX ADMIN — DIPHENHYDRAMINE HYDROCHLORIDE 25 MG: 50 INJECTION INTRAMUSCULAR; INTRAVENOUS at 11:10

## 2018-11-20 RX ADMIN — INFLIXIMAB 400 MG: 100 INJECTION, POWDER, LYOPHILIZED, FOR SOLUTION INTRAVENOUS at 12:00

## 2018-11-20 NOTE — PROGRESS NOTES
[facility] Progress Note Date: 2018 Name: Myron Smith MRN: [id number] : 1965 Ms. Annalise Taylor arrived in the [facility] today at 21 , in stable condition, here for Q 6 Week, IV Remicade Infusion. She was assessed and education was provided. Ms. Caty Ross vitals were reviewed. Visit Vitals /73 (BP 1 Location: Right arm, BP Patient Position: Sitting) Pulse 91 Temp 98.8 °F (37.1 °C) Resp 20 Ht 5' 11\" (1.803 m) Wt 98.7 kg (217 lb 8 oz) Breastfeeding? No  
BMI 30.34 kg/m² PIV (#22G) was established in her dorsal left forearm at 1040, without incident, and blood was drawn for a CBC, CMP, ESR, & CRP (2 lavender top tubes & 2 SST tubes), per order. (The CBC was processed in house, and the CMP, ESR, & CRP, were sent out to be processed. ) Lab results were obtained and reviewed, and the CBC results from today listed below, as well as the most recent CMP results from 10-22-18, were all noted to be satisfactory for treatment today. Recent Results (from the past 12 hour(s)) CBC WITH 3 PART DIFF Collection Time: 18 10:40 AM  
Result Value Ref Range WBC 9.2 4.5 - 13.0 K/uL  
 RBC 4.80 4. 10 - 5.10 M/uL  
 HGB 12.6 12.0 - 16.0 g/dL HCT 38.9 36 - 48 % MCV 81.0 78 - 102 FL  
 MCH 26.3 25.0 - 35.0 PG  
 MCHC 32.4 31 - 37 g/dL  
 RDW 13.1 11.5 - 14.5 % PLATELET 060 231 - 837 K/uL NEUTROPHILS 67 40 - 70 % MIXED CELLS 8 0.1 - 17 % LYMPHOCYTES 25 14 - 44 % ABS. NEUTROPHILS 6.2 1.8 - 9.5 K/UL  
 ABS. MIXED CELLS 0.7 0.0 - 2.3 K/uL  
 ABS. LYMPHOCYTES 2.3 1.1 - 5.9 K/UL  
 DF AUTOMATED Pre-medication consisting of IV Benadryl 25 mg, was administered per order, and without incident.  
  
  
  
Remicade 400 mg IV (4 mg/kg) Maintenance Dose, was administered over 2 hours, per order, and without incident.  
  
  
  
After the completion of the IV Remicade, Ms. Annalise Taylor was monitored for 30 minutes post Remicade, per order, and also without incident.  
  
  
After the completion of the 30 minute monitoring period, the PIV was removed and gauze/bandaid was applied. Ms. Srinivasan Vargas tolerated well, and had no complaints. Ms. Srinivasan Vargas was discharged from [place] [address] in stable condition at 1440. Cortney Coffey She is to return in 7 weeks, on Tuesday, 1-8-18,  at 1000,  for her next appointment, for her next dose of IV Remicade. (Please note that this appointment is 1 week delayed, due to the [address].) Rosie Barnes RN November 20, 2018 10:52 AM

## 2018-11-21 LAB — CRP SERPL HS-MCNC: 13.07 MG/L (ref 0–3)

## 2018-11-27 ENCOUNTER — OFFICE VISIT (OUTPATIENT)
Dept: ONCOLOGY | Age: 53
End: 2018-11-27

## 2018-11-27 ENCOUNTER — HOSPITAL ENCOUNTER (OUTPATIENT)
Dept: ONCOLOGY | Age: 53
Discharge: HOME | End: 2018-11-27

## 2018-11-27 ENCOUNTER — HOSPITAL ENCOUNTER (OUTPATIENT)
Dept: LAB | Age: 53
Discharge: HOME | End: 2018-11-27
Payer: MEDICARE

## 2018-11-27 VITALS
TEMPERATURE: 98.6 F | BODY MASS INDEX: 30.07 KG/M2 | OXYGEN SATURATION: 99 % | HEIGHT: 71 IN | RESPIRATION RATE: 16 BRPM | SYSTOLIC BLOOD PRESSURE: 100 MMHG | WEIGHT: 214.8 LBS | HEART RATE: 106 BPM | DIASTOLIC BLOOD PRESSURE: 64 MMHG

## 2018-11-27 DIAGNOSIS — D75.839 THROMBOCYTOSIS: ICD-10-CM

## 2018-11-27 DIAGNOSIS — D64.9 CHRONIC ANEMIA: ICD-10-CM

## 2018-11-27 DIAGNOSIS — M79.7 FIBROMYALGIA: ICD-10-CM

## 2018-11-27 DIAGNOSIS — G89.4 CHRONIC PAIN SYNDROME: ICD-10-CM

## 2018-11-27 DIAGNOSIS — M06.09 RHEUMATOID ARTHRITIS WITHOUT RHEUMATOID FACTOR, MULTIPLE SITES (HCC): ICD-10-CM

## 2018-11-27 DIAGNOSIS — D47.3 ESSENTIAL THROMBOCYTOSIS (HCC): ICD-10-CM

## 2018-11-27 DIAGNOSIS — D72.829 LEUKOCYTOSIS, UNSPECIFIED TYPE: ICD-10-CM

## 2018-11-27 DIAGNOSIS — D47.3 ESSENTIAL THROMBOCYTOSIS (HCC): Primary | ICD-10-CM

## 2018-11-27 LAB
ALBUMIN SERPL-MCNC: 3.7 G/DL (ref 3.4–5)
ALBUMIN/GLOB SERPL: 0.9 {RATIO} (ref 0.8–1.7)
ALP SERPL-CCNC: 102 U/L (ref 45–117)
ALT SERPL-CCNC: 20 U/L (ref 13–56)
ANION GAP SERPL CALC-SCNC: 9 MMOL/L (ref 3–18)
AST SERPL-CCNC: 10 U/L (ref 15–37)
BASO+EOS+MONOS # BLD AUTO: 1 K/UL (ref 0–2.3)
BASO+EOS+MONOS # BLD AUTO: 10 % (ref 0.1–17)
BILIRUB SERPL-MCNC: 0.2 MG/DL (ref 0.2–1)
BUN SERPL-MCNC: 15 MG/DL (ref 7–18)
BUN/CREAT SERPL: 15 (ref 12–20)
CALCIUM SERPL-MCNC: 9.4 MG/DL (ref 8.5–10.1)
CHLORIDE SERPL-SCNC: 105 MMOL/L (ref 100–108)
CO2 SERPL-SCNC: 24 MMOL/L (ref 21–32)
CREAT SERPL-MCNC: 1.01 MG/DL (ref 0.6–1.3)
DIFFERENTIAL METHOD BLD: ABNORMAL
ERYTHROCYTE [DISTWIDTH] IN BLOOD BY AUTOMATED COUNT: 13.2 % (ref 11.5–14.5)
FERRITIN SERPL-MCNC: 124 NG/ML (ref 8–388)
GLOBULIN SER CALC-MCNC: 4.1 G/DL (ref 2–4)
GLUCOSE SERPL-MCNC: 152 MG/DL (ref 74–99)
HCT VFR BLD AUTO: 42.7 % (ref 36–48)
HGB BLD-MCNC: 13.8 G/DL (ref 12–16)
IRON SATN MFR SERPL: 22 %
IRON SERPL-MCNC: 64 UG/DL (ref 50–175)
LYMPHOCYTES # BLD: 3.4 K/UL (ref 1.1–5.9)
LYMPHOCYTES NFR BLD: 31 % (ref 14–44)
MCH RBC QN AUTO: 26.4 PG (ref 25–35)
MCHC RBC AUTO-ENTMCNC: 32.3 G/DL (ref 31–37)
MCV RBC AUTO: 81.6 FL (ref 78–102)
NEUTS SEG # BLD: 6.6 K/UL (ref 1.8–9.5)
NEUTS SEG NFR BLD: 59 % (ref 40–70)
PLATELET # BLD AUTO: 426 K/UL (ref 140–440)
POTASSIUM SERPL-SCNC: 3.9 MMOL/L (ref 3.5–5.5)
PROT SERPL-MCNC: 7.8 G/DL (ref 6.4–8.2)
RBC # BLD AUTO: 5.23 M/UL (ref 4.1–5.1)
SODIUM SERPL-SCNC: 138 MMOL/L (ref 136–145)
TIBC SERPL-MCNC: 297 UG/DL (ref 250–450)
WBC # BLD AUTO: 11 K/UL (ref 4.5–13)

## 2018-11-27 PROCEDURE — 80053 COMPREHEN METABOLIC PANEL: CPT

## 2018-11-27 PROCEDURE — 82728 ASSAY OF FERRITIN: CPT

## 2018-11-27 PROCEDURE — 36415 COLL VENOUS BLD VENIPUNCTURE: CPT

## 2018-11-27 PROCEDURE — 83540 ASSAY OF IRON: CPT

## 2018-11-27 RX ORDER — OXYCODONE AND ACETAMINOPHEN 10; 325 MG/1; MG/1
1 TABLET ORAL
Qty: 60 TAB | Refills: 0 | Status: SHIPPED | OUTPATIENT
Start: 2018-11-27 | End: 2018-12-07 | Stop reason: SDUPTHER

## 2018-11-27 NOTE — PATIENT INSTRUCTIONS
Anemia: Care Instructions Your Care Instructions Anemia is a low level of red blood cells, which carry oxygen throughout your body. Many things can cause anemia. Lack of iron is one of the most common causes. Your body needs iron to make hemoglobin, a substance in red blood cells that carries oxygen from the lungs to your body's cells. Without enough iron, the body produces fewer and smaller red blood cells. As a result, your body's cells do not get enough oxygen, and you feel tired and weak. And you may have trouble concentrating. Bleeding is the most common cause of a lack of iron. You may have heavy menstrual bleeding or bleeding caused by conditions such as ulcers, hemorrhoids, or cancer. Regular use of aspirin or other anti-inflammatory medicines (such as ibuprofen) also can cause bleeding in some people. A lack of iron in your diet also can cause anemia, especially at times when the body needs more iron, such as during pregnancy, infancy, and the teen years. Your doctor may have prescribed iron pills. It may take several months of treatment for your iron levels to return to normal. Your doctor also may suggest that you eat foods that are rich in iron, such as meat and beans. There are many other causes of anemia. It is not always due to a lack of iron. Finding the specific cause of your anemia will help your doctor find the right treatment for you. Follow-up care is a key part of your treatment and safety. Be sure to make and go to all appointments, and call your doctor if you are having problems. It's also a good idea to know your test results and keep a list of the medicines you take. How can you care for yourself at home? · Take your medicines exactly as prescribed. Call your doctor if you think you are having a problem with your medicine. · If your doctor recommends iron pills, take them as directed: ? Try to take the pills on an empty stomach about 1 hour before or 2 hours after meals. But you may need to take iron with food to avoid an upset stomach. ? Do not take antacids or drink milk or caffeine drinks (such as coffee, tea, or cola) at the same time or within 2 hours of the time that you take your iron. They can make it hard for your body to absorb the iron. ? Vitamin C (from food or supplements) helps your body absorb iron. Try taking iron pills with a glass of orange juice or some other food that is high in vitamin C, such as citrus fruits. ? Iron pills may cause stomach problems, such as heartburn, nausea, diarrhea, constipation, and cramps. Be sure to drink plenty of fluids, and include fruits, vegetables, and fiber in your diet each day. Iron pills often make your bowel movements dark or green. ? If you forget to take an iron pill, do not take a double dose of iron the next time you take a pill. ? Keep iron pills out of the reach of small children. An overdose of iron can be very dangerous. · Follow your doctor's advice about eating iron-rich foods. These include red meat, shellfish, poultry, eggs, beans, raisins, whole-grain bread, and leafy green vegetables. · Steam vegetables to help them keep their iron content. When should you call for help? Call 911 anytime you think you may need emergency care. For example, call if: 
  · You have symptoms of a heart attack. These may include: 
? Chest pain or pressure, or a strange feeling in the chest. 
? Sweating. ? Shortness of breath. ? Nausea or vomiting. ? Pain, pressure, or a strange feeling in the back, neck, jaw, or upper belly or in one or both shoulders or arms. ? Lightheadedness or sudden weakness. ? A fast or irregular heartbeat. After you call 911, the  may tell you to chew 1 adult-strength or 2 to 4 low-dose aspirin. Wait for an ambulance. Do not try to drive yourself.  
  · You passed out (lost consciousness).  
 Call your doctor now or seek immediate medical care if:   · You have new or increased shortness of breath.  
  · You are dizzy or lightheaded, or you feel like you may faint.  
  · Your fatigue and weakness continue or get worse.  
  · You have any abnormal bleeding, such as: 
? Nosebleeds. ? Vaginal bleeding that is different (heavier, more frequent, at a different time of the month) than what you are used to. 
? Bloody or black stools, or rectal bleeding. ? Bloody or pink urine.  
 Watch closely for changes in your health, and be sure to contact your doctor if: 
  · You do not get better as expected. Where can you learn more? Go to http://swapnil-speedy.info/. Enter R301 in the search box to learn more about \"Anemia: Care Instructions. \" Current as of: May 7, 2018 Content Version: 11.8 © 3934-1672 [employer]. Care instructions adapted under license by [employer] (which disclaims liability or warranty for this information). If you have questions about a medical condition or this instruction, always ask your healthcare professional. [place] [address] any warranty or liability for your use of this information. Rheumatoid Arthritis: Care Instructions Your Care Instructions Arthritis is a common health problem in which the joints are inflamed. There are many types of arthritis. In rheumatoid arthritis, the body's own immune system attacks the joints. This causes pain, stiffness, and swelling in the joints, especially in the hands and feet. It can become hard to open jars, write, and do other daily tasks. Sometimes rheumatoid arthritis can also cause bumps to form under the skin. Over time, rheumatoid arthritis can damage and deform joints. Early treatment with medicines may reduce your chances of having a lasting disability. Follow-up care is a key part of your treatment and safety.  Be sure to make and go to all appointments, and call your doctor if you are having problems. It's also a good idea to know your test results and keep a list of the medicines you take. How can you care for yourself at home? · If your doctor recommends it, get more exercise. Walking is a good choice. If your knees or ankles hurt, try riding a stationary bike or swimming. · Move each joint gently through its full range of motion once or twice a day. · Rest joints when they are sore or overworked. Short rest breaks may help more than staying in bed. · Reach and stay at a healthy weight. Regular exercise and a healthy diet will help you do this. Extra weight can strain the joints, especially the knees and hips, and make the pain worse. Losing even a few pounds may help. · Get enough calcium and vitamin D to help prevent osteoporosis, which causes thin bones. Talk to your doctor about how much you should take. · Protect your joints from injury. Do not overuse them. Try to limit or avoid activities that cause joint pain or swelling. Use special kitchen tools and other self-help devices as well as walkers, splints, or canes if needed. · Use heat to ease pain. Take warm showers or baths. Use hot packs or a heating pad set on low. Sleep under a warm electric blanket. · Put ice or a cold pack on the area for 10 to 20 minutes at a time. Put a thin cloth between the ice and your skin. · Take pain medicines exactly as directed. ? If the doctor gave you a prescription medicine for pain, take it as prescribed. ? If you are not taking a prescription pain medicine, ask your doctor if you can take an over-the-counter medicine. · Take an active role in managing your condition. Set up a treatment plan with your doctor, and learn as much as you can about rheumatoid arthritis. This will help you control pain and stay active. When should you call for help? Call your doctor now or seek immediate medical care if: 
  · You have a fever or a rash along with joint pain.   · You have joint pain that is so severe that you cannot use the joint at all.  
  · You have sudden swelling, redness, or pain in one or more joints, and you do not know why.  
  · You have back or neck pain along with weakness in your arms or legs.  
  · You have a loss of bowel or bladder control.  
 Watch closely for changes in your health, and be sure to contact your doctor if: 
  · You have joint pain that lasts for more than 6 weeks.  
  · You have side effects from your arthritis medicines, such as stomach pain, nausea, heartburn, or dark and tarlike stools. Where can you learn more? Go to http://swapnil-speedy.info/. Enter K205 in the search box to learn more about \"Rheumatoid Arthritis: Care Instructions. \" Current as of: June 11, 2018 Content Version: 11.8 © 4515-0935 [employer]. Care instructions adapted under license by [employer] (which disclaims liability or warranty for this information). If you have questions about a medical condition or this instruction, always ask your healthcare professional. [address] any warranty or liability for your use of this information.

## 2018-11-27 NOTE — PROGRESS NOTES
Hematology/Oncology  Progress Note Name: Fallon Precise Date: 2018 : 1965 PCP: Campbell Eckert MD  
 
Ms. Angelina Baer is a 45year old female who was seen for management of her rheumatoid arthritis, leukocytosis, and thrombocytosis. Current therapy: Remicade 4 mg/kg bodyweight ever 6 weeks intravenously Subjective:  
 
Ms. Angeilna Baer is a 45year-old [facility] American woman who has severe rheumatoid arthritis. She receives Remicade every 6 weeks. She also suffers from fibromyalgia and had an elevated WBC count and platelet count of undefined etiology. Today she has no new complaints to report. She continues to complain of back pain. She is using a narcotic based regimen for pain control. She states this provides some relief, but the pain is gradually worsening. She reports that the Remicade has provided a significant degree of relief from the severe rheumatoid arthritis symptoms. The patient is continuing to use her cane for mobility support. She denies any recent fevers or recurrent infections. The patient reports that her appetite has continued to improve. She also states her energy level is continuing to improve as well. She has no new complaints or concerns to report, at this time. She is requesting a new prescription for Percocet. Past Medical History:  
Diagnosis Date  Abdominal pain, unspecified site  Acute otitis media  Acute pharyngitis  Anxiety  Arthritis  Autoimmune disease ([address]  Back injury  Backache   
 herniated disc in lower back  Blurred vision  Chest pain 2018  Chest pain, unspecified   
 abnormal EKG  Chronic airway obstruction, not elsewhere classified  Chronic back pain  Chronic pain  COPD  Depression  Diabetes ([address]  Dizziness  Dizziness and giddiness   
 possible orthostatic changes, vasovagal, autonomic dysfunction from diabetic neuropathy  Encounters for unspecified administrative purpose  Essential thrombocytosis ([facility] 1/6/2016  Feeling anxious  Fibromyalgia  Headache(784.0)  Hemorrhoid  Herniated disc   
 at L5  
 Hypercholesterolemia  Hyperglycemia  Hypertension  Joint pain  Leukocytosis, unspecified  Major depressive disorder, single episode, mild ([facility]  Mental disorder   
 depression, anxiety, bipolar and PTSD  Neuropathy  Obesity, unspecified  Other chest pain  Palpitation 5/4/2018  
 ? arrhythmia  Phobic disorders  Rheumatoid arthritis ([facility]  Rheumatoid arthritis of foot ([facility] [facility] 5/4/2018 on treatment  Seasonal allergies  Shortness of breath   
 normal EF  Smoking 5/4/2018  
 discussed cessation  Streptococcal sore throat  Type 2 diabetes mellitus without complication, with long-term current use of insulin ([facility] [address] [facility] 5/4/2018  Type II or unspecified type diabetes mellitus with unspecified complication, uncontrolled  Unspecified hereditary and idiopathic peripheral neuropathy  Vitamin D deficiency Past Surgical History:  
Procedure Laterality Date [address] [facility]  HX GYN  2005  
 partial Hysterectomy  HX OTHER SURGICAL  12-1-15  
 had ingrown toenail removed from big toe, both feet [address] Social History Socioeconomic History  Marital status:  Spouse name: Not on file  Number of children: Not on file  Years of education: Not on file  Highest education level: Not on file Social Needs  Financial resource strain: Not on file  Food insecurity - worry: Not on file  Food insecurity - inability: Not on file  Transportation needs - medical: Not on file  Transportation needs - non-medical: Not on file Occupational History  Not on file Tobacco Use  Smoking status: Current Every Day Smoker Packs/day: 0.25 Last attempt to quit: 7/11/2012 Years since quittin.3  Smokeless tobacco: Never Used  Tobacco comment: 5 cigarettes day Substance and Sexual Activity  Alcohol use: Yes Comment: socially  Drug use: No  
 Sexual activity: Yes  
  Partners: Male Birth control/protection: Condom Other Topics Concern  Not on file Social History Narrative  Not on file Family History Problem Relation Age of Onset  Diabetes Other  Alcohol abuse Mother Romans Arthritis-osteo Mother  Diabetes Mother  Elevated Lipids Mother  Headache Mother  Heart Disease Mother  Migraines Mother  Psychiatric Disorder Mother  Alcohol abuse Father  Arthritis-osteo Father  Cancer Father  Headache Father  Heart Disease Father  Lung Disease Father  Migraines Father  Heart Surgery Father  Alcohol abuse Sister  Headache Sister  Diabetes Sister  Heart Disease Sister  Migraines Sister  Psychiatric Disorder Sister  Headache Brother  Diabetes Brother  Elevated Lipids Brother  Heart Disease Brother  Migraines Brother  Psychiatric Disorder Brother  Coronary Artery Disease Brother  Cancer Maternal Aunt  Alcohol abuse Maternal Aunt  Diabetes Maternal Aunt  Headache Maternal Aunt  Lung Disease Maternal Aunt  Migraines Maternal Aunt  Headache Maternal Uncle  Migraines Maternal Uncle  Stroke Maternal Uncle  Psychiatric Disorder Maternal Uncle  Headache Paternal Aunt  Migraines Paternal Aunt  Headache Paternal Uncle  Hypertension Paternal Uncle  Migraines Paternal Uncle  Stroke Paternal Uncle  Headache Maternal Grandmother  Migraines Maternal Grandmother  Stroke Maternal Grandmother  Headache Maternal Grandfather  Migraines Maternal Grandfather  Stroke Maternal Grandfather  Headache Paternal Grandmother  Hypertension Paternal Grandmother  Lung Disease Paternal Grandmother  Migraines Paternal Grandmother  Cancer Paternal Grandmother  Headache Paternal Grandfather  Cancer Paternal Grandfather  Migraines Paternal Grandfather Current Outpatient Medications Medication Sig Dispense Refill  oxyCODONE-acetaminophen (PERCOCET 10)  mg per tablet Take 1 Tab by mouth every six (6) hours as needed for Pain. Max Daily Amount: 4 Tabs. 60 Tab 0  
 anagrelide (AGRYLIN) 0.5 mg capsule Take 1 Cap by mouth two (2) times a day. 60 Cap 6  
 amitriptyline (ELAVIL) 25 mg tablet  clotrimazole-betamethasone (LOTRISONE) topical cream     
 DULoxetine (CYMBALTA) 30 mg capsule  gabapentin (NEURONTIN) 300 mg capsule 300 mg two (2) times a day.  QUEtiapine (SEROQUEL) 25 mg tablet  BD INSULIN SYRINGE ULTRA-FINE 1 mL 31 gauge x 15/64\" syrg  albuterol sulfate (PROVENTIL;VENTOLIN) 2.5 mg/0.5 mL nebu nebulizer solution by Nebulization route once. Is now using the actual nebulizer machine, for current bronchitis episode (Aug/Sept. 2017)  insulin aspart protamine/insulin aspart (NOVOLOG MIX 70-30) 100 unit/mL (70-30) injection 10 Units by SubCUTAneous route two (2) times a day.  clonazePAM (KLONOPIN) 2 mg tablet Take 2 mg by mouth daily. Indications: PANIC DISORDER    
 folic acid 280 mcg tablet Take 400 mcg by mouth daily.  albuterol (PROAIR HFA) 90 mcg/actuation inhaler Take 1 Puff by inhalation every six (6) hours as needed for Wheezing. 1 Inhaler 0  
 ondansetron hcl (ZOFRAN, AS HYDROCHLORIDE,) 4 mg tablet Take 1 Tab by mouth every eight (8) hours as needed for Nausea. 12 Tab 0  
 methotrexate (RHEUMATREX) 2.5 mg tablet Take 2.5 mg by mouth every fourty-eight (48) hours.  metFORMIN (GLUCOPHAGE) 1,000 mg tablet Take 500 mg by mouth two (2) times a day.  Cholecalciferol, Vitamin D3, 5,000 unit Tab Take 5,000 Units by mouth every seven (7) days.  atenolol (TENORMIN) 25 mg tablet Take 25 mg by mouth daily. Indications: HYPERTENSION  hydroxychloroquine (PLAQUENIL) 200 mg tablet Take 200 mg by mouth two (2) times a day.  furosemide (LASIX) 40 mg tablet Take  by mouth daily.  INFLIXIMAB (REMICADE IV) 344 mg by IntraVENous route once as needed. remicade will be every 6 weeks  losartan-hydrochlorothiazide (HYZAAR) 50-12.5 mg per tablet Take 1 Tab by mouth daily.  INSULIN GLARGINE,HUM. REC. ANLOG (LANTUS SC) 5 Units by SubCUTAneous route daily. Review of Systems Constitutional: The patient has no acute distress or discomfort. HEENT: The patient denies recent head trauma, eye pain, blurred vision,  hearing deficit, oropharyngeal mucosal pain or lesions, and the patient denies throat pain or discomfort. Lymphatics: The patient denies palpable peripheral lymphadenopathy. Hematologic: The patient denies having bruising, bleeding, or progressive fatigue. Respiratory: Patient denies having shortness of breath, cough, sputum production, fever, or dyspnea on exertion. Cardiovascular: The patient denies having leg pain, leg swelling, heart palpitations, chest permit, chest pain, or lightheadedness. The patient denies having dyspnea on exertion. Breast: The patient has a sebaceous cyst involving the right breast. 
Gastrointestinal: The patient denies having nausea, emesis, or diarrhea. The patient denies having any hematemesis or blood in the stool. Genitourinary: Patient denies having urinary urgency, frequency, or dysuria. The patient denies having blood in the urine. Psychological: The patient denies having symptoms of nervousness, anxiety, depression, or thoughts of harming himself some of this. Skin: Patient denies having skin rashes, skin, ulcerations, or unexplained itching or pruritus. Musculoskeletal: The patient complains of generalized back pain. Objective:  
 
Visit Vitals /64 Pulse (!) 106 Temp 98.6 °F (37 °C) (Oral) Resp 16 Ht 5' 11\" (1.803 m) Wt 97.4 kg (214 lb 12.8 oz) SpO2 99% BMI 29.96 kg/m² ECOG PS=0 Pain score 4-5/10 Physical Exam:  
Gen. Appearance: The patient is in no acute distress. Skin: There is no bruise or rash. HEENT: The exam is unremarkable. Neck: Supple without lymphadenopathy or thyromegaly. Lungs: Clear to auscultation and percussion; there are no wheezes or rhonchi. Heart: Regular rate and rhythm; there are no murmurs, gallops, or rubs. Anterior chest wall and breast: Patient has a sebaceous cyst that has apparently drained from the underside of the right breast abutting the right anterior chest wall. Abdomen: Bowel sounds are present and normal.  There is no guarding, tenderness, or hepatosplenomegaly. Extremities: There is no clubbing, cyanosis, or edema. Neurologic: There are no focal neurologic deficits. Lymphatics: There is no palpable peripheral lymphadenopathy. Musculoskeletal: The patient has full range of motion at all joints. However, she complains of pain on ambulation due to the severe rheumatoid arthritis. There is  evidence of joint deformity or effusions in the hands and knees. There is no focal joint tenderness. She is using a cane for support and mobility. Psychological/psychiatric: There is no clinical evidence of anxiety, depression, or melancholy. Lab data: 
   
Results for orders placed or performed during the hospital encounter of 11/27/18 CBC WITH 3 PART DIFF     Status: Abnormal  
Result Value Ref Range Status  WBC 11.0 4.5 - 13.0 K/uL Final  
 RBC 5.23 (H) 4.10 - 5.10 M/uL Final  
 HGB 13.8 12.0 - 16.0 g/dL Final  
 HCT 42.7 36 - 48 % Final  
 MCV 81.6 78 - 102 FL Final  
 MCH 26.4 25.0 - 35.0 PG Final  
 MCHC 32.3 31 - 37 g/dL Final  
 RDW 13.2 11.5 - 14.5 % Final  
 PLATELET 619 961 - 073 K/uL Final  
 NEUTROPHILS 59 40 - 70 % Final  
 MIXED CELLS 10 0.1 - 17 % Final  
 LYMPHOCYTES 31 14 - 44 % Final  
 ABS. NEUTROPHILS 6.6 1.8 - 9.5 K/UL Final  
 ABS. MIXED CELLS 1.0 0.0 - 2.3 K/uL Final  
 ABS. LYMPHOCYTES 3.4 1.1 - 5.9 K/UL Final  
  Comment: Test performed at [place] [address] Location. Results Reviewed by Medical Director. DF AUTOMATED   Final  
 
 
   
Assessment: 1. Essential thrombocytosis ([facility] 2. Thrombocytosis ([facility] 3. Chronic pain syndrome 4. Rheumatoid arthritis without rheumatoid factor, multiple sites ([facility] [address] [facility] 5. Fibromyalgia 6. Leukocytosis, unspecified type 7. Chronic anemia Plan:  
Leukocytosis (recurrent and persisting): I have explained to the patient that the etiology of her leukocytosis remains undefined. A previous flow cytometry did not reveal any  evidence of an immunophenotypic abnormality such as an evolving chronic lymphoproliferative disorder or myeloproliferative disorder. The CBCs will continue to be monitored every 6 weeks. The CBC from today shows her WBC count is currently normal at 11, the hemoglobin is 13.8 g/dL, hematocrit 42%, and the platelet count is [phone]. Her absolute neutrophil count is 6.6 and the absolute lymphocyte count is 3.4. Essential thrombocytosis/thrombocytosis: The current CBC shows that the platelet count is now 426,000. The platelet count is being monitored every 6 weeks. The patient was previously started on anagrelide 0.5 mg one tablet twice daily. This medication will be continued, at the current dose. I have reenforced to the patient the importance of being complaint with the treatment plan. Rheumatoid arthritis: the patient will continue to receive Remicade as a primary treatment modality for her severe rheumatoid arthritis every 6 weeks. The dose of Remicade will be 344 mg given intravenously. The patient has continued to take  Methotrexate 5 mg p.o. daily and Plaquenil 200 mg twice daily.   
 
Fibromyalgia/chronic pain syndrome: The patient  continues to have generalized pain at times related to her underlying fibromyalgia. The patient receives her Percocet medication on a monthly basis as needed. A new prescription for the Percocet was provided at this time. Chronic anemia: I have explained to the patient the CBC from today shows her hemoglobin has remained normal at 13.8 g/dL with a hematocrit of 42.7 %. I have recommended that she continue to take ferrous sulfate 325 mg by mouth once daily. We will see the patient back in 6 weeks for a complete reassessment. Orders Placed This Encounter  COMPLETE CBC & AUTO DIFF WBC  InHouse CBC ([employer]) Standing Status:   Future Number of Occurrences:   1 Standing Expiration Date:   12/4/2018  METABOLIC PANEL, COMPREHENSIVE Standing Status:   Future Standing Expiration Date:   11/28/2019  
 IRON PROFILE Standing Status:   Future Standing Expiration Date:   11/28/2019  FERRITIN Standing Status:   Future Standing Expiration Date:   11/28/2019  
 oxyCODONE-acetaminophen (PERCOCET 10)  mg per tablet Sig: Take 1 Tab by mouth every six (6) hours as needed for Pain. Max Daily Amount: 4 Tabs. Dispense:  60 Tab Refill:  0 David Spann MD 
11/27/2018

## 2018-12-07 DIAGNOSIS — G89.4 CHRONIC PAIN SYNDROME: ICD-10-CM

## 2018-12-10 DIAGNOSIS — G89.4 CHRONIC PAIN SYNDROME: ICD-10-CM

## 2018-12-10 RX ORDER — OXYCODONE AND ACETAMINOPHEN 10; 325 MG/1; MG/1
1 TABLET ORAL
Qty: 60 TAB | Refills: 0 | Status: SHIPPED | OUTPATIENT
Start: 2018-12-10 | End: 2018-12-10 | Stop reason: SDUPTHER

## 2018-12-10 RX ORDER — OXYCODONE AND ACETAMINOPHEN 10; 325 MG/1; MG/1
1 TABLET ORAL
Qty: 60 TAB | Refills: 0 | Status: SHIPPED | OUTPATIENT
Start: 2018-12-10 | End: 2018-12-18 | Stop reason: SDUPTHER

## 2018-12-10 NOTE — TELEPHONE ENCOUNTER
Please prepare this at [facility] to  there, she called today to request this.

## 2018-12-18 DIAGNOSIS — G89.4 CHRONIC PAIN SYNDROME: ICD-10-CM

## 2018-12-18 RX ORDER — OXYCODONE AND ACETAMINOPHEN 10; 325 MG/1; MG/1
1 TABLET ORAL
Qty: 60 TAB | Refills: 0 | Status: SHIPPED | OUTPATIENT
Start: 2018-12-18 | End: 2019-01-21 | Stop reason: SDUPTHER

## 2019-01-02 RX ORDER — ACETAMINOPHEN 325 MG/1
650 TABLET ORAL
Status: CANCELLED | OUTPATIENT
Start: 2019-01-08 | End: 2019-01-08

## 2019-01-02 RX ORDER — DIPHENHYDRAMINE HYDROCHLORIDE 50 MG/ML
50 INJECTION, SOLUTION INTRAMUSCULAR; INTRAVENOUS
Status: CANCELLED | OUTPATIENT
Start: 2019-01-08 | End: 2019-01-08

## 2019-01-02 RX ORDER — DIPHENHYDRAMINE HYDROCHLORIDE 50 MG/ML
25 INJECTION, SOLUTION INTRAMUSCULAR; INTRAVENOUS ONCE
Status: CANCELLED | OUTPATIENT
Start: 2019-01-08 | End: 2019-01-08

## 2019-01-06 ENCOUNTER — HOSPITAL ENCOUNTER (EMERGENCY)
Age: 54
Discharge: STILL ADMITTED OR ADMINISTRATIVE | End: 2019-01-06
Attending: EMERGENCY MEDICINE
Payer: MEDICARE

## 2019-01-06 PROCEDURE — 75810000275 HC EMERGENCY DEPT VISIT NO LEVEL OF CARE

## 2019-01-08 ENCOUNTER — HOSPITAL ENCOUNTER (OUTPATIENT)
Dept: ONCOLOGY | Age: 54
Discharge: HOME | End: 2019-01-08

## 2019-01-08 ENCOUNTER — OFFICE VISIT (OUTPATIENT)
Dept: ONCOLOGY | Age: 54
End: 2019-01-08

## 2019-01-08 ENCOUNTER — HOSPITAL ENCOUNTER (OUTPATIENT)
Dept: LAB | Age: 54
Discharge: HOME | End: 2019-01-08
Payer: MEDICARE

## 2019-01-08 DIAGNOSIS — M79.7 FIBROMYALGIA: ICD-10-CM

## 2019-01-08 DIAGNOSIS — D64.9 CHRONIC ANEMIA: ICD-10-CM

## 2019-01-08 DIAGNOSIS — M06.09 RHEUMATOID ARTHRITIS WITHOUT RHEUMATOID FACTOR, MULTIPLE SITES (HCC): ICD-10-CM

## 2019-01-08 DIAGNOSIS — J18.9 COMMUNITY ACQUIRED PNEUMONIA OF RIGHT UPPER LOBE OF LUNG: ICD-10-CM

## 2019-01-08 DIAGNOSIS — G89.4 CHRONIC PAIN SYNDROME: ICD-10-CM

## 2019-01-08 DIAGNOSIS — D47.3 ESSENTIAL THROMBOCYTOSIS (HCC): ICD-10-CM

## 2019-01-08 DIAGNOSIS — D47.3 ESSENTIAL THROMBOCYTOSIS (HCC): Primary | ICD-10-CM

## 2019-01-08 LAB
BASO+EOS+MONOS # BLD AUTO: 0.9 K/UL (ref 0–2.3)
BASO+EOS+MONOS NFR BLD AUTO: 6 % (ref 0.1–17)
DIFFERENTIAL METHOD BLD: ABNORMAL
ERYTHROCYTE [DISTWIDTH] IN BLOOD BY AUTOMATED COUNT: 13.4 % (ref 11.5–14.5)
FERRITIN SERPL-MCNC: 130 NG/ML (ref 8–388)
HCT VFR BLD AUTO: 38 % (ref 36–48)
HGB BLD-MCNC: 12.6 G/DL (ref 12–16)
LYMPHOCYTES # BLD: 1.8 K/UL (ref 1.1–5.9)
LYMPHOCYTES NFR BLD: 12 % (ref 14–44)
MCH RBC QN AUTO: 27.1 PG (ref 25–35)
MCHC RBC AUTO-ENTMCNC: 33.2 G/DL (ref 31–37)
MCV RBC AUTO: 81.7 FL (ref 78–102)
NEUTS SEG # BLD: 13.2 K/UL (ref 1.8–9.5)
NEUTS SEG NFR BLD: 83 % (ref 40–70)
PLATELET # BLD AUTO: 407 K/UL (ref 140–440)
RBC # BLD AUTO: 4.65 M/UL (ref 4.1–5.1)
WBC # BLD AUTO: 15.9 K/UL (ref 4.5–13)

## 2019-01-08 PROCEDURE — 36415 COLL VENOUS BLD VENIPUNCTURE: CPT

## 2019-01-08 PROCEDURE — 80053 COMPREHEN METABOLIC PANEL: CPT

## 2019-01-08 PROCEDURE — 82728 ASSAY OF FERRITIN: CPT

## 2019-01-08 PROCEDURE — 83540 ASSAY OF IRON: CPT

## 2019-01-08 RX ORDER — OXYCODONE AND ACETAMINOPHEN 10; 325 MG/1; MG/1
1 TABLET ORAL
Qty: 60 TAB | Refills: 0 | Status: SHIPPED | OUTPATIENT
Start: 2019-01-08 | End: 2019-02-04 | Stop reason: SDUPTHER

## 2019-01-08 NOTE — PATIENT INSTRUCTIONS
Pneumonia: Care Instructions Your Care Instructions Pneumonia is an infection of the lungs. Most cases are caused by infections from bacteria or viruses. Pneumonia may be mild or very severe. If it is caused by bacteria, you will be treated with antibiotics. It may take a few weeks to a few months to recover fully from pneumonia, depending on how sick you were and whether your overall health is good. Follow-up care is a key part of your treatment and safety. Be sure to make and go to all appointments, and call your doctor if you are having problems. It's also a good idea to know your test results and keep a list of the medicines you take. How can you care for yourself at home? · Take your antibiotics exactly as directed. Do not stop taking the medicine just because you are feeling better. You need to take the full course of antibiotics. · Take your medicines exactly as prescribed. Call your doctor if you think you are having a problem with your medicine. · Get plenty of rest and sleep. You may feel weak and tired for a while, but your energy level will improve with time. · To prevent dehydration, drink plenty of fluids, enough so that your urine is light yellow or clear like water. Choose water and other caffeine-free clear liquids until you feel better. If you have kidney, heart, or liver disease and have to limit fluids, talk with your doctor before you increase the amount of fluids you drink. · Take care of your cough so you can rest. A cough that brings up mucus from your lungs is common with pneumonia. It is one way your body gets rid of the infection. But if coughing keeps you from resting or causes severe fatigue and chest-wall pain, talk to your doctor. He or she may suggest that you take a medicine to reduce the cough. · Use a vaporizer or humidifier to add moisture to your bedroom. Follow the directions for cleaning the machine. · Do not smoke or allow others to smoke around you. Smoke will make your cough last longer. If you need help quitting, talk to your doctor about stop-smoking programs and medicines. These can increase your chances of quitting for good. · Take an over-the-counter pain medicine, such as acetaminophen (Tylenol), ibuprofen (Advil, Motrin), or naproxen (Aleve). Read and follow all instructions on the label. · Do not take two or more pain medicines at the same time unless the doctor told you to. Many pain medicines have acetaminophen, which is Tylenol. Too much acetaminophen (Tylenol) can be harmful. · If you were given a spirometer to measure how well your lungs are working, use it as instructed. This can help your doctor tell how your recovery is going. · To prevent pneumonia in the future, talk to your doctor about getting a flu vaccine (once a year) and a pneumococcal vaccine (one time only for most people). When should you call for help? Call 911 anytime you think you may need emergency care. For example, call if: 
  · You have severe trouble breathing.  
 Call your doctor now or seek immediate medical care if: 
  · You cough up dark brown or bloody mucus (sputum).  
  · You have new or worse trouble breathing.  
  · You are dizzy or lightheaded, or you feel like you may faint.  
 Watch closely for changes in your health, and be sure to contact your doctor if: 
  · You have a new or higher fever.  
  · You are coughing more deeply or more often.  
  · You are not getting better after 2 days (48 hours).  
  · You do not get better as expected. Where can you learn more? Go to http://swapnil-speedy.info/. Enter [id number] in the search box to learn more about \"Pneumonia: Care Instructions. \" Current as of: December 6, 2017 Content Version: 11.8 © 2410-0956 [employer]wise, [employer].  Care instructions adapted under license by [employer] (which disclaims liability or warranty for this information). If you have questions about a medical condition or this instruction, always ask your healthcare professional. [address] any warranty or liability for your use of this information. Pneumonia: Care Instructions Your Care Instructions Pneumonia is an infection of the lungs. Most cases are caused by infections from bacteria or viruses. Pneumonia may be mild or very severe. If it is caused by bacteria, you will be treated with antibiotics. It may take a few weeks to a few months to recover fully from pneumonia, depending on how sick you were and whether your overall health is good. Follow-up care is a key part of your treatment and safety. Be sure to make and go to all appointments, and call your doctor if you are having problems. It's also a good idea to know your test results and keep a list of the medicines you take. How can you care for yourself at home? · Take your antibiotics exactly as directed. Do not stop taking the medicine just because you are feeling better. You need to take the full course of antibiotics. · Take your medicines exactly as prescribed. Call your doctor if you think you are having a problem with your medicine. · Get plenty of rest and sleep. You may feel weak and tired for a while, but your energy level will improve with time. · To prevent dehydration, drink plenty of fluids, enough so that your urine is light yellow or clear like water. Choose water and other caffeine-free clear liquids until you feel better. If you have kidney, heart, or liver disease and have to limit fluids, talk with your doctor before you increase the amount of fluids you drink. · Take care of your cough so you can rest. A cough that brings up mucus from your lungs is common with pneumonia. It is one way your body gets rid of the infection.  But if coughing keeps you from resting or causes severe fatigue and chest-wall pain, talk to your doctor. He or she may suggest that you take a medicine to reduce the cough. · Use a vaporizer or humidifier to add moisture to your bedroom. Follow the directions for cleaning the machine. · Do not smoke or allow others to smoke around you. Smoke will make your cough last longer. If you need help quitting, talk to your doctor about stop-smoking programs and medicines. These can increase your chances of quitting for good. · Take an over-the-counter pain medicine, such as acetaminophen (Tylenol), ibuprofen (Advil, Motrin), or naproxen (Aleve). Read and follow all instructions on the label. · Do not take two or more pain medicines at the same time unless the doctor told you to. Many pain medicines have acetaminophen, which is Tylenol. Too much acetaminophen (Tylenol) can be harmful. · If you were given a spirometer to measure how well your lungs are working, use it as instructed. This can help your doctor tell how your recovery is going. · To prevent pneumonia in the future, talk to your doctor about getting a flu vaccine (once a year) and a pneumococcal vaccine (one time only for most people). When should you call for help? Call 911 anytime you think you may need emergency care. For example, call if: 
  · You have severe trouble breathing.  
 Call your doctor now or seek immediate medical care if: 
  · You cough up dark brown or bloody mucus (sputum).  
  · You have new or worse trouble breathing.  
  · You are dizzy or lightheaded, or you feel like you may faint.  
 Watch closely for changes in your health, and be sure to contact your doctor if: 
  · You have a new or higher fever.  
  · You are coughing more deeply or more often.  
  · You are not getting better after 2 days (48 hours).  
  · You do not get better as expected. Where can you learn more? Go to http://swapnil-speedy.info/. Enter [id number] in the search box to learn more about \"Pneumonia: Care Instructions. \" Current as of: December 6, 2017 Content Version: 11.8 © 4410-3088 [employer]. Care instructions adapted under license by [email or web address] (which disclaims liability or warranty for this information). If you have questions about a medical condition or this instruction, always ask your healthcare professional. [address] any warranty or liability for your use of this information. Anemia: Care Instructions Your Care Instructions Anemia is a low level of red blood cells, which carry oxygen throughout your body. Many things can cause anemia. Lack of iron is one of the most common causes. Your body needs iron to make hemoglobin, a substance in red blood cells that carries oxygen from the lungs to your body's cells. Without enough iron, the body produces fewer and smaller red blood cells. As a result, your body's cells do not get enough oxygen, and you feel tired and weak. And you may have trouble concentrating. Bleeding is the most common cause of a lack of iron. You may have heavy menstrual bleeding or bleeding caused by conditions such as ulcers, hemorrhoids, or cancer. Regular use of aspirin or other anti-inflammatory medicines (such as ibuprofen) also can cause bleeding in some people. A lack of iron in your diet also can cause anemia, especially at times when the body needs more iron, such as during pregnancy, infancy, and the teen years. Your doctor may have prescribed iron pills. It may take several months of treatment for your iron levels to return to normal. Your doctor also may suggest that you eat foods that are rich in iron, such as meat and beans. There are many other causes of anemia. It is not always due to a lack of iron. Finding the specific cause of your anemia will help your doctor find the right treatment for you. Follow-up care is a key part of your treatment and safety. Be sure to make and go to all appointments, and call your doctor if you are having problems. It's also a good idea to know your test results and keep a list of the medicines you take. How can you care for yourself at home? · Take your medicines exactly as prescribed. Call your doctor if you think you are having a problem with your medicine. · If your doctor recommends iron pills, take them as directed: ? Try to take the pills on an empty stomach about 1 hour before or 2 hours after meals. But you may need to take iron with food to avoid an upset stomach. ? Do not take antacids or drink milk or caffeine drinks (such as coffee, tea, or cola) at the same time or within 2 hours of the time that you take your iron. They can make it hard for your body to absorb the iron. ? Vitamin C (from food or supplements) helps your body absorb iron. Try taking iron pills with a glass of orange juice or some other food that is high in vitamin C, such as citrus fruits. ? Iron pills may cause stomach problems, such as heartburn, nausea, diarrhea, constipation, and cramps. Be sure to drink plenty of fluids, and include fruits, vegetables, and fiber in your diet each day. Iron pills often make your bowel movements dark or green. ? If you forget to take an iron pill, do not take a double dose of iron the next time you take a pill. ? Keep iron pills out of the reach of small children. An overdose of iron can be very dangerous. · Follow your doctor's advice about eating iron-rich foods. These include red meat, shellfish, poultry, eggs, beans, raisins, whole-grain bread, and leafy green vegetables. · Steam vegetables to help them keep their iron content. When should you call for help? Call 911 anytime you think you may need emergency care. For example, call if: 
  · You have symptoms of a heart attack. These may include: ? Chest pain or pressure, or a strange feeling in the chest. 
? Sweating. ? Shortness of breath. ? Nausea or vomiting. ? Pain, pressure, or a strange feeling in the back, neck, jaw, or upper belly or in one or both shoulders or arms. ? Lightheadedness or sudden weakness. ? A fast or irregular heartbeat. After you call 911, the  may tell you to chew 1 adult-strength or 2 to 4 low-dose aspirin. Wait for an ambulance. Do not try to drive yourself.  
  · You passed out (lost consciousness).  
 Call your doctor now or seek immediate medical care if: 
  · You have new or increased shortness of breath.  
  · You are dizzy or lightheaded, or you feel like you may faint.  
  · Your fatigue and weakness continue or get worse.  
  · You have any abnormal bleeding, such as: 
? Nosebleeds. ? Vaginal bleeding that is different (heavier, more frequent, at a different time of the month) than what you are used to. 
? Bloody or black stools, or rectal bleeding. ? Bloody or pink urine.  
 Watch closely for changes in your health, and be sure to contact your doctor if: 
  · You do not get better as expected. Where can you learn more? Go to http://swapnil-speedy.info/. Enter R301 in the search box to learn more about \"Anemia: Care Instructions. \" Current as of: May 7, 2018 Content Version: 11.8 © 9482-3061 [employer]. Care instructions adapted under license by [employer] (which disclaims liability or warranty for this information). If you have questions about a medical condition or this instruction, always ask your healthcare professional. [place] [address] any warranty or liability for your use of this information. Rheumatoid Arthritis: Care Instructions Your Care Instructions Arthritis is a common health problem in which the joints are inflamed. There are many types of arthritis.  In rheumatoid arthritis, the body's own immune system attacks the joints. This causes pain, stiffness, and swelling in the joints, especially in the hands and feet. It can become hard to open jars, write, and do other daily tasks. Sometimes rheumatoid arthritis can also cause bumps to form under the skin. Over time, rheumatoid arthritis can damage and deform joints. Early treatment with medicines may reduce your chances of having a lasting disability. Follow-up care is a key part of your treatment and safety. Be sure to make and go to all appointments, and call your doctor if you are having problems. It's also a good idea to know your test results and keep a list of the medicines you take. How can you care for yourself at home? · If your doctor recommends it, get more exercise. Walking is a good choice. If your knees or ankles hurt, try riding a stationary bike or swimming. · Move each joint gently through its full range of motion once or twice a day. · Rest joints when they are sore or overworked. Short rest breaks may help more than staying in bed. · Reach and stay at a healthy weight. Regular exercise and a healthy diet will help you do this. Extra weight can strain the joints, especially the knees and hips, and make the pain worse. Losing even a few pounds may help. · Get enough calcium and vitamin D to help prevent osteoporosis, which causes thin bones. Talk to your doctor about how much you should take. · Protect your joints from injury. Do not overuse them. Try to limit or avoid activities that cause joint pain or swelling. Use special kitchen tools and other self-help devices as well as walkers, splints, or canes if needed. · Use heat to ease pain. Take warm showers or baths. Use hot packs or a heating pad set on low. Sleep under a warm electric blanket. · Put ice or a cold pack on the area for 10 to 20 minutes at a time. Put a thin cloth between the ice and your skin. · Take pain medicines exactly as directed. ? If the doctor gave you a prescription medicine for pain, take it as prescribed. ? If you are not taking a prescription pain medicine, ask your doctor if you can take an over-the-counter medicine. · Take an active role in managing your condition. Set up a treatment plan with your doctor, and learn as much as you can about rheumatoid arthritis. This will help you control pain and stay active. When should you call for help? Call your doctor now or seek immediate medical care if: 
  · You have a fever or a rash along with joint pain.  
  · You have joint pain that is so severe that you cannot use the joint at all.  
  · You have sudden swelling, redness, or pain in one or more joints, and you do not know why.  
  · You have back or neck pain along with weakness in your arms or legs.  
  · You have a loss of bowel or bladder control.  
 Watch closely for changes in your health, and be sure to contact your doctor if: 
  · You have joint pain that lasts for more than 6 weeks.  
  · You have side effects from your arthritis medicines, such as stomach pain, nausea, heartburn, or dark and tarlike stools. Where can you learn more? Go to http://swapnil-speedy.info/. Enter K205 in the search box to learn more about \"Rheumatoid Arthritis: Care Instructions. \" Current as of: June 11, 2018 Content Version: 11.8 © 1604-7082 Healthwise[employer]. Care instructions adapted under license by [employer] (which disclaims liability or warranty for this information). If you have questions about a medical condition or this instruction, always ask your healthcare professional. [place] [address] any warranty or liability for your use of this information.

## 2019-01-08 NOTE — PROGRESS NOTES
Hematology/Oncology  Progress Note Name: Vega Saha Date: 2019 : 1965 PCP: Vianey Jesus MD  
 
Ms. Mariluz Deluna is a 45year old female who was seen for management of her rheumatoid arthritis, leukocytosis, and thrombocytosis. Current therapy: Remicade 4 mg/kg bodyweight ever 6 weeks intravenously Subjective:  
 
Ms. Mariluz Deluna is a 45year-old [facility] American woman who has severe rheumatoid arthritis. She receives Remicade every 6 weeks. She also suffers from fibromyalgia and had an elevated WBC count and platelet count of undefined etiology. Today she has no new complaints to report. She continues to complain of back pain. She is using a narcotic based regimen for pain control. She states this provides some relief, but the pain is gradually worsening. She reports that the Remicade has provided a significant degree of relief from the severe rheumatoid arthritis symptoms. The patient is continuing to use her cane for mobility support. The patient reports that she was seen in the emergency room a few days ago and was told that she has pneumonia. She is currently on antibiotic therapy at this time. She did have a significant cough but states that this has subsided. Fever is no longer evident. Past Medical History:  
Diagnosis Date  Abdominal pain, unspecified site  Acute otitis media  Acute pharyngitis  Anxiety  Arthritis  Autoimmune disease ([address]  Back injury  Backache   
 herniated disc in lower back  Blurred vision  Chest pain 2018  Chest pain, unspecified   
 abnormal EKG  Chronic airway obstruction, not elsewhere classified  Chronic back pain  Chronic pain  COPD  Depression  Diabetes ([address]  Dizziness  Dizziness and giddiness   
 possible orthostatic changes, vasovagal, autonomic dysfunction from diabetic neuropathy  Encounters for unspecified administrative purpose  Essential thrombocytosis ([facility] 2016  Feeling anxious  Fibromyalgia  Headache(784.0)  Hemorrhoid  Herniated disc   
 at L5  
 Hypercholesterolemia  Hyperglycemia  Hypertension  Joint pain  Leukocytosis, unspecified  Major depressive disorder, single episode, mild ([address]  Mental disorder   
 depression, anxiety, bipolar and PTSD  Neuropathy  Obesity, unspecified  Other chest pain  Palpitation 2018  
 ? arrhythmia  Phobic disorders  Rheumatoid arthritis ([address]  Rheumatoid arthritis of foot ([facility] 2018 on treatment  Seasonal allergies  Shortness of breath   
 normal EF  Smoking 2018  
 discussed cessation  Streptococcal sore throat  Type 2 diabetes mellitus without complication, with long-term current use of insulin ([facility] 2018  Type II or unspecified type diabetes mellitus with unspecified complication, uncontrolled  Unspecified hereditary and idiopathic peripheral neuropathy  Vitamin D deficiency Past Surgical History:  
Procedure Laterality Date [address]  HX GYN  2005  
 partial Hysterectomy  HX OTHER SURGICAL  12-1-15  
 had ingrown toenail removed from big toe, both feet [address] Social History Socioeconomic History  Marital status:  Spouse name: Not on file  Number of children: Not on file  Years of education: Not on file  Highest education level: Not on file Social Needs  Financial resource strain: Not on file  Food insecurity - worry: Not on file  Food insecurity - inability: Not on file  Transportation needs - medical: Not on file  Transportation needs - non-medical: Not on file Occupational History  Not on file Tobacco Use  Smoking status: Current Every Day Smoker Packs/day: 0.25 Last attempt to quit: 2012 Years since quittin.4  Smokeless tobacco: Never Used  Tobacco comment: 5 cigarettes day Substance and Sexual Activity  Alcohol use: Yes Comment: socially  Drug use: No  
 Sexual activity: Yes  
  Partners: Male Birth control/protection: Condom Other Topics Concern  Not on file Social History Narrative  Not on file Family History Problem Relation Age of Onset  Diabetes Other  Alcohol abuse Mother [facility] Arthritis-osteo Mother  Diabetes Mother  Elevated Lipids Mother  Headache Mother  Heart Disease Mother  Migraines Mother  Psychiatric Disorder Mother  Alcohol abuse Father  Arthritis-osteo Father  Cancer Father  Headache Father  Heart Disease Father  Lung Disease Father  Migraines Father  Heart Surgery Father  Alcohol abuse Sister  Headache Sister  Diabetes Sister  Heart Disease Sister  Migraines Sister  Psychiatric Disorder Sister  Headache Brother  Diabetes Brother  Elevated Lipids Brother  Heart Disease Brother  Migraines Brother  Psychiatric Disorder Brother  Coronary Artery Disease Brother  Cancer Maternal Aunt  Alcohol abuse Maternal Aunt  Diabetes Maternal Aunt  Headache Maternal Aunt  Lung Disease Maternal Aunt  Migraines Maternal Aunt  Headache Maternal Uncle  Migraines Maternal Uncle  Stroke Maternal Uncle  Psychiatric Disorder Maternal Uncle  Headache Paternal Aunt  Migraines Paternal Aunt  Headache Paternal Uncle  Hypertension Paternal Uncle  Migraines Paternal Uncle  Stroke Paternal Uncle  Headache Maternal Grandmother  Migraines Maternal Grandmother  Stroke Maternal Grandmother  Headache Maternal Grandfather  Migraines Maternal Grandfather  Stroke Maternal Grandfather  Headache Paternal Grandmother  Hypertension Paternal Grandmother  Lung Disease Paternal Grandmother  Migraines Paternal Grandmother  Cancer Paternal Grandmother  Headache Paternal Grandfather  Cancer Paternal Grandfather  Migraines Paternal Grandfather Current Outpatient Medications Medication Sig Dispense Refill  oxyCODONE-acetaminophen (PERCOCET 10)  mg per tablet Take 1 Tab by mouth every six (6) hours as needed for Pain. Max Daily Amount: 4 Tabs. 60 Tab 0  
 oxyCODONE-acetaminophen (PERCOCET 10)  mg per tablet Take 1 Tab by mouth every six (6) hours as needed for Pain. Max Daily Amount: 4 Tabs. 60 Tab 0  
 anagrelide (AGRYLIN) 0.5 mg capsule Take 1 Cap by mouth two (2) times a day. 60 Cap 6  
 amitriptyline (ELAVIL) 25 mg tablet  clotrimazole-betamethasone (LOTRISONE) topical cream     
 DULoxetine (CYMBALTA) 30 mg capsule  gabapentin (NEURONTIN) 300 mg capsule 300 mg two (2) times a day.  QUEtiapine (SEROQUEL) 25 mg tablet  BD INSULIN SYRINGE ULTRA-FINE 1 mL 31 gauge x 15/64\" syrg  albuterol sulfate (PROVENTIL;VENTOLIN) 2.5 mg/0.5 mL nebu nebulizer solution by Nebulization route once. Is now using the actual nebulizer machine, for current bronchitis episode (Aug/Sept. 2017)  insulin aspart protamine/insulin aspart (NOVOLOG MIX 70-30) 100 unit/mL (70-30) injection 10 Units by SubCUTAneous route two (2) times a day.  clonazePAM (KLONOPIN) 2 mg tablet Take 2 mg by mouth daily. Indications: PANIC DISORDER    
 folic acid 536 mcg tablet Take 400 mcg by mouth daily.  albuterol (PROAIR HFA) 90 mcg/actuation inhaler Take 1 Puff by inhalation every six (6) hours as needed for Wheezing. 1 Inhaler 0  
 ondansetron hcl (ZOFRAN, AS HYDROCHLORIDE,) 4 mg tablet Take 1 Tab by mouth every eight (8) hours as needed for Nausea. 12 Tab 0  
 methotrexate (RHEUMATREX) 2.5 mg tablet Take 2.5 mg by mouth every fourty-eight (48) hours.  metFORMIN (GLUCOPHAGE) 1,000 mg tablet Take 500 mg by mouth two (2) times a day.  Cholecalciferol, Vitamin D3, 5,000 unit Tab Take 5,000 Units by mouth every seven (7) days.  atenolol (TENORMIN) 25 mg tablet Take 25 mg by mouth daily. Indications: HYPERTENSION  hydroxychloroquine (PLAQUENIL) 200 mg tablet Take 200 mg by mouth two (2) times a day.  furosemide (LASIX) 40 mg tablet Take  by mouth daily.  INFLIXIMAB (REMICADE IV) 344 mg by IntraVENous route once as needed. remicade will be every 6 weeks  losartan-hydrochlorothiazide (HYZAAR) 50-12.5 mg per tablet Take 1 Tab by mouth daily.  INSULIN GLARGINE,HUM. REC. ANLOG (LANTUS SC) 5 Units by SubCUTAneous route daily. Review of Systems Constitutional: The patient has no acute distress or discomfort. HEENT: The patient denies recent head trauma, eye pain, blurred vision,  hearing deficit, oropharyngeal mucosal pain or lesions, and the patient denies throat pain or discomfort. Lymphatics: The patient denies palpable peripheral lymphadenopathy. Hematologic: The patient denies having bruising, bleeding, or progressive fatigue. Respiratory: Patient denies having shortness of breath, cough, sputum production, fever, or dyspnea on exertion. Cardiovascular: The patient denies having leg pain, leg swelling, heart palpitations, chest permit, chest pain, or lightheadedness. The patient denies having dyspnea on exertion. Breast: The patient has a sebaceous cyst involving the right breast. 
Gastrointestinal: The patient denies having nausea, emesis, or diarrhea. The patient denies having any hematemesis or blood in the stool. Genitourinary: Patient denies having urinary urgency, frequency, or dysuria. The patient denies having blood in the urine. Psychological: The patient denies having symptoms of nervousness, anxiety, depression, or thoughts of harming himself some of this. Skin: Patient denies having skin rashes, skin, ulcerations, or unexplained itching or pruritus. Musculoskeletal: The patient complains of generalized back pain. Objective:  
 
Visit Vitals BP (P) 114/66 Temp (P) 98.4 °F (36.9 °C) (Oral) Resp (P) 16 Ht (P) 5' 11\" (1.803 m) Wt (P) 96.6 kg (213 lb) SpO2 (P) 100% BMI (P) 29.71 kg/m² ECOG PS=0 Pain score 4-5/10 Physical Exam:  
Gen. Appearance: The patient is in no acute distress. Skin: There is no bruise or rash. HEENT: The exam is unremarkable. Neck: Supple without lymphadenopathy or thyromegaly. Lungs: Clear to auscultation and percussion; there are no wheezes or rhonchi. Heart: Regular rate and rhythm; there are no murmurs, gallops, or rubs. Anterior chest wall and breast: Patient has a sebaceous cyst that has apparently drained from the underside of the right breast abutting the right anterior chest wall. Abdomen: Bowel sounds are present and normal.  There is no guarding, tenderness, or hepatosplenomegaly. Extremities: There is no clubbing, cyanosis, or edema. Neurologic: There are no focal neurologic deficits. Lymphatics: There is no palpable peripheral lymphadenopathy. Musculoskeletal: The patient has full range of motion at all joints. However, she complains of pain on ambulation due to the severe rheumatoid arthritis. There is  evidence of joint deformity or effusions in the hands and knees. There is no focal joint tenderness. She is using a cane for support and mobility. Psychological/psychiatric: There is no clinical evidence of anxiety, depression, or melancholy. Lab data: 
   
Results for orders placed or performed during the hospital encounter of 01/08/19 CBC WITH 3 PART DIFF     Status: Abnormal  
Result Value Ref Range Status  WBC 15.9 (H) 4.5 - 13.0 K/uL Final  
 RBC 4.65 4.10 - 5.10 M/uL Final  
 HGB 12.6 12.0 - 16.0 g/dL Final  
 HCT 38.0 36 - 48 % Final  
 MCV 81.7 78 - 102 FL Final  
 MCH 27.1 25.0 - 35.0 PG Final  
 MCHC 33.2 31 - 37 g/dL Final  
 RDW 13.4 11.5 - 14.5 % Final  
 PLATELET 125 292 - 556 K/uL Final  
 NEUTROPHILS 83 (H) 40 - 70 % Final  
 MIXED CELLS 6 0.1 - 17 % Final  
 LYMPHOCYTES 12 (L) 14 - 44 % Final  
 ABS. NEUTROPHILS 13.2 (H) 1.8 - 9.5 K/UL Final  
 ABS. MIXED CELLS 0.9 0.0 - 2.3 K/uL Final  
 ABS. LYMPHOCYTES 1.8 1.1 - 5.9 K/UL Final  
  Comment: Test performed at [address]. Results Reviewed by Medical Director. DF AUTOMATED   Final  
 
 
   
Assessment: 1. Essential thrombocytosis ([facility] 2. Chronic pain syndrome 3. Rheumatoid arthritis without rheumatoid factor, multiple sites ([facility] 4. Fibromyalgia 5. Chronic anemia 6. Community acquired pneumonia of right upper lobe of lung ([facility] Plan:  
Leukocytosis (recurrent and persisting): I have explained to the patient that the etiology of her leukocytosis remains undefined. A previous flow cytometry did not reveal any  evidence of an immunophenotypic abnormality such as an evolving chronic lymphoproliferative disorder or myeloproliferative disorder. The CBCs will continue to be monitored every 6 weeks. The CBC from today shows her WBC count is currently 15.9, and the absolute neutrophil count is 13.2 with an absolute lymphocyte count of 1.8. Essential thrombocytosis/thrombocytosis: The current CBC shows that the platelet count is now 407,000. The platelet count is being monitored every 6 weeks. The patient was previously started on anagrelide 0.5 mg one tablet twice daily. This medication will be continued, at the current dose. I have reenforced to the patient the importance of being complaint with the treatment plan. Rheumatoid arthritis: the patient will continue to receive Remicade as a primary treatment modality for her severe rheumatoid arthritis every 6 weeks. The dose of Remicade will be 344 mg given intravenously. The patient has continued to take  Methotrexate 5 mg p.o. daily and Plaquenil 200 mg twice daily. Fibromyalgia/chronic pain syndrome: The patient  continues to have generalized pain at times related to her underlying fibromyalgia. The patient receives her Percocet medication on a monthly basis as needed. A new prescription for the Percocet was provided at this time. Chronic anemia: I have explained to the patient the CBC from today shows her hemoglobin has remained normal at 12.6 g/dL with hematocrit of 38 %. I have recommended that she continue to take ferrous sulfate 325 mg by mouth once daily. Pneumonia (new problem) patient is currently taking broad-spectrum antibiotics. She cannot recall the name of the antibiotic but I have encouraged her to complete the full course of therapy. She is to take extra Tylenol for any fevers. We will see the patient back in 6 weeks for a complete reassessment. Orders Placed This Encounter  COMPLETE CBC & AUTO DIFF WBC  InHouse CBC (LightTable) Standing Status:   Future Number of Occurrences:   1 Standing Expiration Date:   1/15/2019  METABOLIC PANEL, COMPREHENSIVE Standing Status:   Future Standing Expiration Date:   1/9/2020  
 IRON PROFILE Standing Status:   Future Standing Expiration Date:   1/9/2020  FERRITIN Standing Status:   Future Standing Expiration Date:   1/9/2020  
 oxyCODONE-acetaminophen (PERCOCET 10)  mg per tablet Sig: Take 1 Tab by mouth every six (6) hours as needed for Pain. Max Daily Amount: 4 Tabs. Dispense:  60 Tab Refill:  0 Lebron Treviño MD 
1/8/2019

## 2019-01-09 LAB
ALBUMIN SERPL-MCNC: 3.6 G/DL (ref 3.4–5)
ALBUMIN/GLOB SERPL: 0.9 {RATIO} (ref 0.8–1.7)
ALP SERPL-CCNC: 108 U/L (ref 45–117)
ALT SERPL-CCNC: 17 U/L (ref 13–56)
ANION GAP SERPL CALC-SCNC: 8 MMOL/L (ref 3–18)
AST SERPL-CCNC: 7 U/L (ref 15–37)
BILIRUB SERPL-MCNC: 0.3 MG/DL (ref 0.2–1)
BUN SERPL-MCNC: 19 MG/DL (ref 7–18)
BUN/CREAT SERPL: 21 (ref 12–20)
CALCIUM SERPL-MCNC: 9.2 MG/DL (ref 8.5–10.1)
CHLORIDE SERPL-SCNC: 100 MMOL/L (ref 100–108)
CO2 SERPL-SCNC: 24 MMOL/L (ref 21–32)
CREAT SERPL-MCNC: 0.91 MG/DL (ref 0.6–1.3)
GLOBULIN SER CALC-MCNC: 4 G/DL (ref 2–4)
GLUCOSE SERPL-MCNC: 410 MG/DL (ref 74–99)
IRON SATN MFR SERPL: 10 % (ref 20–50)
IRON SERPL-MCNC: 25 UG/DL (ref 50–175)
POTASSIUM SERPL-SCNC: 4.2 MMOL/L (ref 3.5–5.5)
PROT SERPL-MCNC: 7.6 G/DL (ref 6.4–8.2)
SODIUM SERPL-SCNC: 132 MMOL/L (ref 136–145)
TIBC SERPL-MCNC: 262 UG/DL (ref 250–450)

## 2019-01-21 DIAGNOSIS — G89.4 CHRONIC PAIN SYNDROME: ICD-10-CM

## 2019-01-22 ENCOUNTER — HOSPITAL ENCOUNTER (OUTPATIENT)
Dept: ONCOLOGY | Age: 54
Discharge: HOME | End: 2019-01-22

## 2019-01-22 ENCOUNTER — HOSPITAL ENCOUNTER (OUTPATIENT)
Dept: INFUSION THERAPY | Age: 54
Discharge: HOME | End: 2019-01-22
Payer: MEDICARE

## 2019-01-22 ENCOUNTER — CLINICAL SUPPORT (OUTPATIENT)
Dept: ONCOLOGY | Age: 54
End: 2019-01-22

## 2019-01-22 VITALS
HEART RATE: 102 BPM | OXYGEN SATURATION: 97 % | TEMPERATURE: 98.8 F | BODY MASS INDEX: 29.96 KG/M2 | HEIGHT: 71 IN | DIASTOLIC BLOOD PRESSURE: 70 MMHG | WEIGHT: 214 LBS | RESPIRATION RATE: 20 BRPM | SYSTOLIC BLOOD PRESSURE: 114 MMHG

## 2019-01-22 DIAGNOSIS — M06.09 RHEUMATOID ARTHRITIS OF MULTIPLE SITES WITHOUT RHEUMATOID FACTOR (HCC): Primary | ICD-10-CM

## 2019-01-22 DIAGNOSIS — M06.09 RHEUMATOID ARTHRITIS OF MULTIPLE SITES WITHOUT RHEUMATOID FACTOR (HCC): ICD-10-CM

## 2019-01-22 LAB
ALBUMIN SERPL-MCNC: 3.3 G/DL (ref 3.4–5)
ALBUMIN/GLOB SERPL: 0.8 {RATIO} (ref 0.8–1.7)
ALP SERPL-CCNC: 123 U/L (ref 45–117)
ALT SERPL-CCNC: 22 U/L (ref 13–56)
ANION GAP SERPL CALC-SCNC: 7 MMOL/L (ref 3–18)
AST SERPL-CCNC: 9 U/L (ref 15–37)
BASO+EOS+MONOS # BLD AUTO: 0.9 K/UL (ref 0–2.3)
BASO+EOS+MONOS NFR BLD AUTO: 7 % (ref 0.1–17)
BILIRUB SERPL-MCNC: 0.3 MG/DL (ref 0.2–1)
BUN SERPL-MCNC: 17 MG/DL (ref 7–18)
BUN/CREAT SERPL: 18 (ref 12–20)
CALCIUM SERPL-MCNC: 9.4 MG/DL (ref 8.5–10.1)
CHLORIDE SERPL-SCNC: 104 MMOL/L (ref 100–108)
CO2 SERPL-SCNC: 28 MMOL/L (ref 21–32)
CREAT SERPL-MCNC: 0.93 MG/DL (ref 0.6–1.3)
CRP SERPL-MCNC: 1.6 MG/DL (ref 0–0.3)
DIFFERENTIAL METHOD BLD: ABNORMAL
ERYTHROCYTE [DISTWIDTH] IN BLOOD BY AUTOMATED COUNT: 13.7 % (ref 11.5–14.5)
ERYTHROCYTE [SEDIMENTATION RATE] IN BLOOD: 28 MM/HR (ref 0–30)
GLOBULIN SER CALC-MCNC: 3.9 G/DL (ref 2–4)
GLUCOSE SERPL-MCNC: 315 MG/DL (ref 74–99)
HCT VFR BLD AUTO: 39.4 % (ref 36–48)
HGB BLD-MCNC: 13.1 G/DL (ref 12–16)
LYMPHOCYTES # BLD: 3.1 K/UL (ref 1.1–5.9)
LYMPHOCYTES NFR BLD: 23 % (ref 14–44)
MCH RBC QN AUTO: 27.2 PG (ref 25–35)
MCHC RBC AUTO-ENTMCNC: 33.2 G/DL (ref 31–37)
MCV RBC AUTO: 81.7 FL (ref 78–102)
NEUTS SEG # BLD: 9.6 K/UL (ref 1.8–9.5)
NEUTS SEG NFR BLD: 71 % (ref 40–70)
PLATELET # BLD AUTO: 383 K/UL (ref 140–440)
POTASSIUM SERPL-SCNC: 4.1 MMOL/L (ref 3.5–5.5)
PROT SERPL-MCNC: 7.2 G/DL (ref 6.4–8.2)
RBC # BLD AUTO: 4.82 M/UL (ref 4.1–5.1)
SODIUM SERPL-SCNC: 139 MMOL/L (ref 136–145)
WBC # BLD AUTO: 13.6 K/UL (ref 4.5–13)

## 2019-01-22 PROCEDURE — 80053 COMPREHEN METABOLIC PANEL: CPT

## 2019-01-22 PROCEDURE — 96415 CHEMO IV INFUSION ADDL HR: CPT

## 2019-01-22 PROCEDURE — 96375 TX/PRO/DX INJ NEW DRUG ADDON: CPT

## 2019-01-22 PROCEDURE — 99211 OFF/OP EST MAY X REQ PHY/QHP: CPT

## 2019-01-22 PROCEDURE — 74011250636 HC RX REV CODE- 250/636: Performed by: INTERNAL MEDICINE

## 2019-01-22 PROCEDURE — 85652 RBC SED RATE AUTOMATED: CPT

## 2019-01-22 PROCEDURE — 86140 C-REACTIVE PROTEIN: CPT

## 2019-01-22 PROCEDURE — 96413 CHEMO IV INFUSION 1 HR: CPT

## 2019-01-22 RX ORDER — DIPHENHYDRAMINE HYDROCHLORIDE 50 MG/ML
50 INJECTION, SOLUTION INTRAMUSCULAR; INTRAVENOUS
Status: ACTIVE | OUTPATIENT
Start: 2019-01-22 | End: 2019-01-22

## 2019-01-22 RX ORDER — SODIUM CHLORIDE 0.9 % (FLUSH) 0.9 %
10-40 SYRINGE (ML) INJECTION AS NEEDED
Status: DISCONTINUED | OUTPATIENT
Start: 2019-01-22 | End: 2019-01-26 | Stop reason: HOSPADM

## 2019-01-22 RX ORDER — ACETAMINOPHEN 325 MG/1
650 TABLET ORAL
Status: ACTIVE | OUTPATIENT
Start: 2019-01-22 | End: 2019-01-22

## 2019-01-22 RX ORDER — SODIUM CHLORIDE 9 MG/ML
250 INJECTION, SOLUTION INTRAVENOUS CONTINUOUS
Status: DISPENSED | OUTPATIENT
Start: 2019-01-22 | End: 2019-01-23

## 2019-01-22 RX ORDER — DIPHENHYDRAMINE HYDROCHLORIDE 50 MG/ML
25 INJECTION, SOLUTION INTRAMUSCULAR; INTRAVENOUS ONCE
Status: COMPLETED | OUTPATIENT
Start: 2019-01-22 | End: 2019-01-22

## 2019-01-22 RX ORDER — OXYCODONE AND ACETAMINOPHEN 10; 325 MG/1; MG/1
1 TABLET ORAL
Qty: 60 TAB | Refills: 0 | Status: SHIPPED | OUTPATIENT
Start: 2019-01-22 | End: 2019-02-06 | Stop reason: SDUPTHER

## 2019-01-22 RX ADMIN — INFLIXIMAB 400 MG: 100 INJECTION, POWDER, LYOPHILIZED, FOR SOLUTION INTRAVENOUS at 12:25

## 2019-01-22 RX ADMIN — DIPHENHYDRAMINE HYDROCHLORIDE 25 MG: 50 INJECTION INTRAMUSCULAR; INTRAVENOUS at 11:55

## 2019-01-22 RX ADMIN — SODIUM CHLORIDE 250 ML: 0.9 INJECTION, SOLUTION INTRAVENOUS at 11:50

## 2019-01-22 RX ADMIN — Medication 10 ML: at 11:00

## 2019-01-22 NOTE — PROGRESS NOTES
[address] [facility] Progress Note Date: 2019 Name: Bryanna Deras MRN: [id number] : 1965 Ms. Queta Altman arrived in the [place] today at 21 , in stable condition, here for Q 6 Week, IV Remicade Infusion. She was assessed and education was provided. Ms. Holger Lynch vitals were reviewed. Visit Vitals /86 (BP 1 Location: Left arm, BP Patient Position: Sitting) Pulse (!) 107 Temp 100 °F (37.8 °C) Resp 20 Ht 5' 11\" (1.803 m) Wt 97.1 kg (214 lb) SpO2 97% Breastfeeding? No  
BMI 29.85 kg/m² Upon arrival to the [place] today, Ms. Queta Altman stated that she had recently had an episode of pneumonia, but stated that she has been finished with both her antibiotics & prednisone for over 1 week, and feels much better today. PIV (#22G) was established in her posterior right forearm at 1100, without incident, and blood was drawn for a CBC, CMP, ESR, & CRP (2 lavender top tubes & 2 SST tubes), per order. (The CBC was processed in house, and the CMP, ESR, & CRP, were sent out to be processed. ) Lab results were obtained and reviewed, and the CBC results from today listed below, as well as the most recent CMP results from 19, were all noted to be satisfactory for treatment today. Dr. Trudi Rios was made aware of her initial low grade temperature of 100.0 upon arrival to the [place] today, and of her slightly elevated WBC count listed below, and was also made aware that she has completed ordered doses of antibiotics and prednisone for a recently treated episode of pneumonia. Order was received from Dr. Trudi Rios at 21 [phone], to proceed with Remicade today, as planned. Pharmacy was made aware, that OK to make and send Remicade today. Recent Results (from the past 12 hour(s)) CBC WITH 3 PART DIFF Collection Time: 19 11:00 AM  
Result Value Ref Range WBC 13.6 (H) 4.5 - 13.0 K/uL  
 RBC 4.82 4.10 - 5.10 M/uL  
 HGB 13.1 12.0 - 16.0 g/dL HCT 39.4 36 - 48 % MCV 81.7 78 - 102 FL  
 MCH 27.2 25.0 - 35.0 PG  
 MCHC 33.2 31 - 37 g/dL  
 RDW 13.7 11.5 - 14.5 % PLATELET 104 554 - 870 K/uL NEUTROPHILS 71 (H) 40 - 70 % MIXED CELLS 7 0.1 - 17 % LYMPHOCYTES 23 14 - 44 % ABS. NEUTROPHILS 9.6 (H) 1.8 - 9.5 K/UL  
 ABS. MIXED CELLS 0.9 0.0 - 2.3 K/uL  
 ABS. LYMPHOCYTES 3.1 1.1 - 5.9 K/UL  
 DF AUTOMATED Pre-medication consisting of IV Benadryl 25 mg, was administered per order, and without incident.  
  
  
  
Remicade 400 mg IV (4 mg/kg) Maintenance Dose, was administered over 2 hours, per order, and without incident.  
  
  
  
After the completion of the IV Remicade, Ms. Yumiko Luong was monitored for 30 minutes post Remicade, per order, and also without incident.  
  
  
After the completion of the 30 minute monitoring period, the PIV was removed and gauze/bandaid was applied. Ms. Yumiko Luong tolerated well, and had no complaints. Ms. Yumiko Luong was discharged from [place] [address] in stable condition at 1500. .. She is to return in 6 weeks, on Tuesday, 3-5-19,  at 1000,  for her next appointment, for her next dose of IV Remicade.   
 
Josh Baum RN 
January 22, 2019 
10:52 AM

## 2019-02-04 DIAGNOSIS — D47.3 ESSENTIAL THROMBOCYTOSIS (HCC): ICD-10-CM

## 2019-02-06 ENCOUNTER — TELEPHONE (OUTPATIENT)
Dept: ONCOLOGY | Age: 54
End: 2019-02-06

## 2019-02-06 DIAGNOSIS — G89.4 CHRONIC PAIN SYNDROME: ICD-10-CM

## 2019-02-06 RX ORDER — OXYCODONE AND ACETAMINOPHEN 10; 325 MG/1; MG/1
1 TABLET ORAL
Qty: 60 TAB | Refills: 0 | Status: SHIPPED | OUTPATIENT
Start: 2019-02-06 | End: 2019-03-01 | Stop reason: SDUPTHER

## 2019-02-06 NOTE — TELEPHONE ENCOUNTER
Patient calling regarding her refill request of 2/4/19, assured patient we did give her refill request to Dr. Ever Brooke am however he was very busy and could not get to it, it is not at [facility] office, not ready for pickup. .     She said she would prefer to pick it up at the [facility] office today. Re-routing the request to [facility].

## 2019-02-13 RX ORDER — OXYCODONE AND ACETAMINOPHEN 10; 325 MG/1; MG/1
1 TABLET ORAL
Qty: 60 TAB | Refills: 0 | Status: SHIPPED | OUTPATIENT
Start: 2019-02-13 | End: 2019-03-05 | Stop reason: SDUPTHER

## 2019-02-19 ENCOUNTER — HOSPITAL ENCOUNTER (OUTPATIENT)
Dept: LAB | Age: 54
Discharge: HOME | End: 2019-02-19
Payer: MEDICARE

## 2019-02-19 ENCOUNTER — HOSPITAL ENCOUNTER (OUTPATIENT)
Dept: ONCOLOGY | Age: 54
Discharge: HOME | End: 2019-02-19

## 2019-02-19 ENCOUNTER — APPOINTMENT (OUTPATIENT)
Dept: INFUSION THERAPY | Age: 54
End: 2019-02-19
Payer: MEDICARE

## 2019-02-19 ENCOUNTER — OFFICE VISIT (OUTPATIENT)
Dept: ONCOLOGY | Age: 54
End: 2019-02-19

## 2019-02-19 VITALS
TEMPERATURE: 98.3 F | HEIGHT: 71 IN | OXYGEN SATURATION: 97 % | HEART RATE: 104 BPM | SYSTOLIC BLOOD PRESSURE: 132 MMHG | BODY MASS INDEX: 31.16 KG/M2 | DIASTOLIC BLOOD PRESSURE: 81 MMHG | WEIGHT: 222.6 LBS

## 2019-02-19 DIAGNOSIS — M79.7 FIBROMYALGIA: ICD-10-CM

## 2019-02-19 DIAGNOSIS — G89.4 CHRONIC PAIN SYNDROME: ICD-10-CM

## 2019-02-19 DIAGNOSIS — D47.3 ESSENTIAL THROMBOCYTOSIS (HCC): ICD-10-CM

## 2019-02-19 DIAGNOSIS — D64.9 CHRONIC ANEMIA: ICD-10-CM

## 2019-02-19 DIAGNOSIS — M06.09 RHEUMATOID ARTHRITIS WITHOUT RHEUMATOID FACTOR, MULTIPLE SITES (HCC): ICD-10-CM

## 2019-02-19 DIAGNOSIS — D47.3 ESSENTIAL THROMBOCYTOSIS (HCC): Primary | ICD-10-CM

## 2019-02-19 LAB
ALBUMIN SERPL-MCNC: 3.4 G/DL (ref 3.4–5)
ALBUMIN/GLOB SERPL: 0.9 {RATIO} (ref 0.8–1.7)
ALP SERPL-CCNC: 118 U/L (ref 45–117)
ALT SERPL-CCNC: 18 U/L (ref 13–56)
ANION GAP SERPL CALC-SCNC: 9 MMOL/L (ref 3–18)
AST SERPL-CCNC: 8 U/L (ref 15–37)
BASO+EOS+MONOS # BLD AUTO: 0.4 K/UL (ref 0–2.3)
BASO+EOS+MONOS NFR BLD AUTO: 4 % (ref 0.1–17)
BILIRUB SERPL-MCNC: 0.2 MG/DL (ref 0.2–1)
BUN SERPL-MCNC: 18 MG/DL (ref 7–18)
BUN/CREAT SERPL: 19 (ref 12–20)
CALCIUM SERPL-MCNC: 9.3 MG/DL (ref 8.5–10.1)
CHLORIDE SERPL-SCNC: 104 MMOL/L (ref 100–108)
CO2 SERPL-SCNC: 26 MMOL/L (ref 21–32)
CREAT SERPL-MCNC: 0.94 MG/DL (ref 0.6–1.3)
DIFFERENTIAL METHOD BLD: ABNORMAL
ERYTHROCYTE [DISTWIDTH] IN BLOOD BY AUTOMATED COUNT: 13.8 % (ref 11.5–14.5)
FERRITIN SERPL-MCNC: 101 NG/ML (ref 8–388)
GLOBULIN SER CALC-MCNC: 3.9 G/DL (ref 2–4)
GLUCOSE SERPL-MCNC: 372 MG/DL (ref 74–99)
HCT VFR BLD AUTO: 43.7 % (ref 36–48)
HGB BLD-MCNC: 14.2 G/DL (ref 12–16)
IRON SATN MFR SERPL: 17 %
IRON SERPL-MCNC: 52 UG/DL (ref 50–175)
LYMPHOCYTES # BLD: 3.6 K/UL (ref 1.1–5.9)
LYMPHOCYTES NFR BLD: 34 % (ref 14–44)
MCH RBC QN AUTO: 27.4 PG (ref 25–35)
MCHC RBC AUTO-ENTMCNC: 32.5 G/DL (ref 31–37)
MCV RBC AUTO: 84.4 FL (ref 78–102)
NEUTS SEG # BLD: 6.6 K/UL (ref 1.8–9.5)
NEUTS SEG NFR BLD: 62 % (ref 40–70)
PLATELET # BLD AUTO: 337 K/UL (ref 140–440)
POTASSIUM SERPL-SCNC: 4 MMOL/L (ref 3.5–5.5)
PROT SERPL-MCNC: 7.3 G/DL (ref 6.4–8.2)
RBC # BLD AUTO: 5.18 M/UL (ref 4.1–5.1)
SODIUM SERPL-SCNC: 139 MMOL/L (ref 136–145)
TIBC SERPL-MCNC: 308 UG/DL (ref 250–450)
WBC # BLD AUTO: 10.6 K/UL (ref 4.5–13)

## 2019-02-19 PROCEDURE — 80053 COMPREHEN METABOLIC PANEL: CPT

## 2019-02-19 PROCEDURE — 83540 ASSAY OF IRON: CPT

## 2019-02-19 PROCEDURE — 82728 ASSAY OF FERRITIN: CPT

## 2019-02-19 PROCEDURE — 36415 COLL VENOUS BLD VENIPUNCTURE: CPT

## 2019-02-19 NOTE — PROGRESS NOTES
Hematology/Oncology  Progress Note    Name: Gaby Jung  Date: 2019  : 1965    PCP: Alyssa Quiroga MD     Ms. Kati Badillo is a 52year old female who was seen for management of her rheumatoid arthritis, leukocytosis, and thrombocytosis. Current therapy: Remicade 4 mg/kg bodyweight ever 6 weeks intravenously    Subjective:     Ms. Kati Badillo is a 52year-old [facility] American woman who has severe rheumatoid arthritis. She receives Remicade every 6 weeks. She also suffers from fibromyalgia and had an elevated WBC count and platelet count of undefined etiology. Today she has no new complaints to report. She continues to complain of back pain. She is using a narcotic based regimen for pain control. She states this provides some relief, but the pain is gradually worsening. She reports that the Remicade has provided a significant degree of relief from the severe rheumatoid arthritis symptoms. The patient is continuing to use her cane for mobility support. The patient reports that she was seen in the emergency room a few days ago and was told that she has pneumonia. She is currently on antibiotic therapy at this time. She did have a significant cough but states that this has subsided. Fever is no longer evident.     Past Medical History:   Diagnosis Date    Abdominal pain, unspecified site     Acute otitis media     Acute pharyngitis     Anxiety     Arthritis     Autoimmune disease ([facility]     Back injury     Backache     herniated disc in lower back    Blurred vision     Chest pain 2018    Chest pain, unspecified     abnormal EKG    Chronic airway obstruction, not elsewhere classified     Chronic back pain     Chronic pain     COPD     Depression     Diabetes (HCC)     Dizziness     Dizziness and giddiness     possible orthostatic changes, vasovagal, autonomic dysfunction from diabetic neuropathy    Encounters for unspecified administrative purpose    [employer] thrombocytosis ([facility] 2016    Feeling anxious     Fibromyalgia     Headache(784.0)     Hemorrhoid     Herniated disc     at L5    Hypercholesterolemia     Hyperglycemia     Hypertension     Joint pain     Leukocytosis, unspecified     Major depressive disorder, single episode, mild (HCC)     Mental disorder     depression, anxiety, bipolar and PTSD    Neuropathy     Obesity, unspecified     Other chest pain     Palpitation 2018    ?  arrhythmia    Phobic disorders     Rheumatoid arthritis (HCC)     Rheumatoid arthritis of foot ([facility] 2018    on treatment    Seasonal allergies     Shortness of breath     normal EF    Smoking 2018    discussed cessation    Streptococcal sore throat     Type 2 diabetes mellitus without complication, with long-term current use of insulin ([facility] [facility] 2018    Type II or unspecified type diabetes mellitus with unspecified complication, uncontrolled     Unspecified hereditary and idiopathic peripheral neuropathy     Vitamin D deficiency      Past Surgical History:   Procedure Laterality Date    HX CHOLECYSTECTOMY      HX GYN  2005    partial Hysterectomy    HX OTHER SURGICAL  12-1-15    had ingrown toenail removed from big toe, both feet    HX TUBAL LIGATION           Social History     Socioeconomic History    Marital status:      Spouse name: Not on file    Number of children: Not on file    Years of education: Not on file    Highest education level: Not on file   Social Needs    Financial resource strain: Not on file    Food insecurity - worry: Not on file    Food insecurity - inability: Not on file   [employer] needs - medical: Not on file   [employer] needs - non-medical: Not on file   Occupational History    Not on file   Tobacco Use    Smoking status: Current Every Day Smoker     Packs/day: 0.25     Last attempt to quit: 2012     Years since quittin.6    Smokeless tobacco: Never Used    Tobacco comment: 5 cigarettes day   Substance and Sexual Activity    Alcohol use: Yes     Comment: socially    Drug use: No    Sexual activity: Yes     Partners: Male     Birth control/protection: Condom   Other Topics Concern    Not on file   Social History Narrative    Not on file     Family History   Problem Relation Age of Onset    Diabetes Other     Alcohol abuse Mother     Arthritis-osteo Mother     Diabetes Mother     Elevated Lipids Mother     Headache Mother     Heart Disease Mother    [facility] Migraines Mother     Psychiatric Disorder Mother     Alcohol abuse Father    Kareen Jean Father     Cancer Father     Headache Father     Heart Disease Father     Lung Disease Father     Migraines Father     Heart Surgery Father     Alcohol abuse Sister     Headache Sister     Diabetes Sister     Heart Disease Sister     Migraines Sister     Psychiatric Disorder Sister     Headache Brother     Diabetes Brother     Elevated Lipids Brother     Heart Disease Brother     Migraines Brother     Psychiatric Disorder Brother     Coronary Artery Disease Brother     Cancer Maternal Aunt     Alcohol abuse Maternal Aunt     Diabetes Maternal Aunt     Headache Maternal Aunt     Lung Disease Maternal Aunt     Migraines Maternal Aunt     Headache Maternal Uncle     Migraines Maternal Uncle     Stroke Maternal Uncle     Psychiatric Disorder Maternal Uncle     Headache Paternal Aunt     Migraines Paternal Aunt     Headache Paternal Uncle     Hypertension Paternal Uncle     Migraines Paternal Uncle     Stroke Paternal Uncle     Headache Maternal Grandmother     Migraines Maternal Grandmother     Stroke Maternal Grandmother     Headache Maternal Grandfather     Migraines Maternal Grandfather     Stroke Maternal Grandfather     Headache Paternal Grandmother     Hypertension Paternal Grandmother     Lung Disease Paternal Grandmother     Migraines Paternal Grandmother     Cancer Paternal Grandmother     Headache Paternal Grandfather     Cancer Paternal Grandfather     Migraines Paternal Grandfather      Current Outpatient Medications   Medication Sig Dispense Refill    oxyCODONE-acetaminophen (PERCOCET 10)  mg per tablet Take 1 Tab by mouth every six (6) hours as needed for Pain. Max Daily Amount: 4 Tabs. 60 Tab 0    oxyCODONE-acetaminophen (PERCOCET 10)  mg per tablet Take 1 Tab by mouth every six (6) hours as needed for Pain. Max Daily Amount: 4 Tabs. 60 Tab 0    anagrelide (AGRYLIN) 0.5 mg capsule Take 1 Cap by mouth two (2) times a day. 60 Cap 6    amitriptyline (ELAVIL) 25 mg tablet       clotrimazole-betamethasone (LOTRISONE) topical cream       DULoxetine (CYMBALTA) 30 mg capsule       gabapentin (NEURONTIN) 300 mg capsule 300 mg two (2) times a day.  QUEtiapine (SEROQUEL) 25 mg tablet       BD INSULIN SYRINGE ULTRA-FINE 1 mL 31 gauge x 15/64\" syrg       albuterol sulfate (PROVENTIL;VENTOLIN) 2.5 mg/0.5 mL nebu nebulizer solution by Nebulization route once. Is now using the actual nebulizer machine, for current bronchitis episode (Aug/Sept. 2017)      insulin aspart protamine/insulin aspart (NOVOLOG MIX 70-30) 100 unit/mL (70-30) injection 10 Units by SubCUTAneous route two (2) times a day.  clonazePAM (KLONOPIN) 2 mg tablet Take 2 mg by mouth daily. Indications: PANIC DISORDER      folic acid 614 mcg tablet Take 400 mcg by mouth daily.  albuterol (PROAIR HFA) 90 mcg/actuation inhaler Take 1 Puff by inhalation every six (6) hours as needed for Wheezing. 1 Inhaler 0    ondansetron hcl (ZOFRAN, AS HYDROCHLORIDE,) 4 mg tablet Take 1 Tab by mouth every eight (8) hours as needed for Nausea. 12 Tab 0    methotrexate (RHEUMATREX) 2.5 mg tablet Take 2.5 mg by mouth every fourty-eight (48) hours.  metFORMIN (GLUCOPHAGE) 1,000 mg tablet Take 500 mg by mouth two (2) times a day.       Cholecalciferol, Vitamin D3, 5,000 unit Tab Take 5,000 Units by mouth every seven (7) days.  atenolol (TENORMIN) 25 mg tablet Take 25 mg by mouth daily. Indications: HYPERTENSION      hydroxychloroquine (PLAQUENIL) 200 mg tablet Take 200 mg by mouth two (2) times a day.  furosemide (LASIX) 40 mg tablet Take  by mouth daily.  INFLIXIMAB (REMICADE IV) 344 mg by IntraVENous route once as needed. remicade will be every 6 weeks      losartan-hydrochlorothiazide (HYZAAR) 50-12.5 mg per tablet Take 1 Tab by mouth daily.  INSULIN GLARGINE,HUM. REC. ANLOG (LANTUS SC) 5 Units by SubCUTAneous route daily. Review of Systems  Constitutional: The patient has no acute distress or discomfort. HEENT: The patient denies recent head trauma, eye pain, blurred vision,  hearing deficit, oropharyngeal mucosal pain or lesions, and the patient denies throat pain or discomfort. Lymphatics: The patient denies palpable peripheral lymphadenopathy. Hematologic: The patient denies having bruising, bleeding, or progressive fatigue. Respiratory: Patient denies having shortness of breath, cough, sputum production, fever, or dyspnea on exertion. Cardiovascular: The patient denies having leg pain, leg swelling, heart palpitations, chest permit, chest pain, or lightheadedness. The patient denies having dyspnea on exertion. Breast: The patient has a sebaceous cyst involving the right breast.  Gastrointestinal: The patient denies having nausea, emesis, or diarrhea. The patient denies having any hematemesis or blood in the stool. Genitourinary: Patient denies having urinary urgency, frequency, or dysuria. The patient denies having blood in the urine. Psychological: The patient denies having symptoms of nervousness, anxiety, depression, or thoughts of harming himself some of this. Skin: Patient denies having skin rashes, skin, ulcerations, or unexplained itching or pruritus. Musculoskeletal: The patient complains of generalized back pain.     Objective:     Visit Vitals  BP 132/81   Pulse (!) 104   Temp 98.3 °F (36.8 °C)   Ht 5' 11\" (1.803 m)   Wt 101 kg (222 lb 9.6 oz)   SpO2 97%   BMI 31.05 kg/m²     ECOG PS=0 Pain score 4-5/10     Physical Exam:   Gen. Appearance: The patient is in no acute distress. Skin: There is no bruise or rash. HEENT: The exam is unremarkable. Neck: Supple without lymphadenopathy or thyromegaly. Lungs: Clear to auscultation and percussion; there are no wheezes or rhonchi. Heart: Regular rate and rhythm; there are no murmurs, gallops, or rubs. Anterior chest wall and breast: Patient has a sebaceous cyst that has apparently drained from the underside of the right breast abutting the right anterior chest wall. Abdomen: Bowel sounds are present and normal.  There is no guarding, tenderness, or hepatosplenomegaly. Extremities: There is no clubbing, cyanosis, or edema. Neurologic: There are no focal neurologic deficits. Lymphatics: There is no palpable peripheral lymphadenopathy. Musculoskeletal: The patient has full range of motion at all joints. However, she complains of pain on ambulation due to the severe rheumatoid arthritis. There is  evidence of joint deformity or effusions in the hands and knees. There is no focal joint tenderness. She is using a cane for support and mobility. Psychological/psychiatric: There is no clinical evidence of anxiety, depression, or melancholy. Lab data:      Results for orders placed or performed during the hospital encounter of 02/19/19   CBC WITH 3 PART DIFF     Status: Abnormal   Result Value Ref Range Status    WBC 10.6 4.5 - 13.0 K/uL Final    RBC 5.18 (H) 4.10 - 5.10 M/uL Final    HGB 14.2 12.0 - 16.0 g/dL Final    HCT 43.7 36 - 48 % Final    MCV 84.4 78 - 102 FL Final    MCH 27.4 25.0 - 35.0 PG Final    MCHC 32.5 31 - 37 g/dL Final    RDW 13.8 11.5 - 14.5 % Final    PLATELET 789 058 - 175 K/uL Final    NEUTROPHILS 62 40 - 70 % Final    MIXED CELLS 4 0.1 - 17 % Final    LYMPHOCYTES 34 14 - 44 % Final    ABS. NEUTROPHILS 6.6 1.8 - 9.5 K/UL Final    ABS. MIXED CELLS 0.4 0.0 - 2.3 K/uL Final    ABS. LYMPHOCYTES 3.6 1.1 - 5.9 K/UL Final     Comment: Test performed at [address] [place] [address] or Outpatient Infusion Center Location. Reviewed by Medical Director. DF AUTOMATED   Final           Assessment:     1. Essential thrombocytosis ([facility]    2. Chronic pain syndrome    3. Rheumatoid arthritis without rheumatoid factor, multiple sites ([facility]    4. Fibromyalgia    5. Chronic anemia      Plan:   Leukocytosis (recurrent and persisting): I have explained to the patient that the etiology of her leukocytosis remains undefined. A previous flow cytometry did not reveal any  evidence of an immunophenotypic abnormality such as an evolving chronic lymphoproliferative disorder or myeloproliferative disorder. The CBCs will continue to be monitored every 6 weeks. The CBC from today shows her WBC count is currently 10.6, and the absolute neutrophil count is 6.6 with an absolute lymphocyte count of 3.6. Essential thrombocytosis/thrombocytosis: The current CBC shows that the platelet count is now 337,000. The platelet count is being monitored every 6 weeks. The patient was previously started on anagrelide 0.5 mg one tablet twice daily. This medication will be continued, at the current dose. I have reenforced to the patient the importance of being complaint with the treatment plan. Rheumatoid arthritis: the patient will continue to receive Remicade as a primary treatment modality for her severe rheumatoid arthritis every 6 weeks. The dose of Remicade will be 344 mg given intravenously. The patient has continued to take  Methotrexate 5 mg p.o. daily and Plaquenil 200 mg twice daily. Fibromyalgia/chronic pain syndrome: The patient  continues to have generalized pain at times related to her underlying fibromyalgia. The patient receives her Percocet medication on a monthly basis as needed.   A new prescription for the Percocet was provided at this time. Chronic anemia: I have explained to the patient the CBC from today shows her hemoglobin has remained normal at 14.2 g/dL with hematocrit of 43.7 %. I have recommended that she continue to take ferrous sulfate 325 mg by mouth once daily. Pneumonia (new problem) patient is currently taking broad-spectrum antibiotics. She cannot recall the name of the antibiotic but I have encouraged her to complete the full course of therapy. She is to take extra Tylenol for any fevers. We will see the patient back in 6 weeks for a complete reassessment.     Orders Placed This Encounter    COMPLETE CBC & AUTO DIFF WBC    InHouse CBC ([employer])     Standing Status:   Future     Number of Occurrences:   1     Standing Expiration Date:   8/15/0232    METABOLIC PANEL, COMPREHENSIVE     Standing Status:   Future     Standing Expiration Date:   2/20/2020    IRON PROFILE     Standing Status:   Future     Standing Expiration Date:   2/20/2020    FERRITIN     Standing Status:   Future     Standing Expiration Date:   2/20/2020       Martha Hess MD  2/19/2019

## 2019-02-19 NOTE — PATIENT INSTRUCTIONS
Anemia: Care Instructions  Your Care Instructions    Anemia is a low level of red blood cells, which carry oxygen throughout your body. Many things can cause anemia. Lack of iron is one of the most common causes. Your body needs iron to make hemoglobin, a substance in red blood cells that carries oxygen from the lungs to your body's cells. Without enough iron, the body produces fewer and smaller red blood cells. As a result, your body's cells do not get enough oxygen, and you feel tired and weak. And you may have trouble concentrating. Bleeding is the most common cause of a lack of iron. You may have heavy menstrual bleeding or bleeding caused by conditions such as ulcers, hemorrhoids, or cancer. Regular use of aspirin or other anti-inflammatory medicines (such as ibuprofen) also can cause bleeding in some people. A lack of iron in your diet also can cause anemia, especially at times when the body needs more iron, such as during pregnancy, infancy, and the teen years. Your doctor may have prescribed iron pills. It may take several months of treatment for your iron levels to return to normal. Your doctor also may suggest that you eat foods that are rich in iron, such as meat and beans. There are many other causes of anemia. It is not always due to a lack of iron. Finding the specific cause of your anemia will help your doctor find the right treatment for you. Follow-up care is a key part of your treatment and safety. Be sure to make and go to all appointments, and call your doctor if you are having problems. It's also a good idea to know your test results and keep a list of the medicines you take. How can you care for yourself at home? · Take your medicines exactly as prescribed. Call your doctor if you think you are having a problem with your medicine. · If your doctor recommends iron pills, take them as directed:  ? Try to take the pills on an empty stomach about 1 hour before or 2 hours after meals. But you may need to take iron with food to avoid an upset stomach. ? Do not take antacids or drink milk or caffeine drinks (such as coffee, tea, or cola) at the same time or within 2 hours of the time that you take your iron. They can make it hard for your body to absorb the iron. ? Vitamin C (from food or supplements) helps your body absorb iron. Try taking iron pills with a glass of orange juice or some other food that is high in vitamin C, such as citrus fruits. ? Iron pills may cause stomach problems, such as heartburn, nausea, diarrhea, constipation, and cramps. Be sure to drink plenty of fluids, and include fruits, vegetables, and fiber in your diet each day. Iron pills often make your bowel movements dark or green. ? If you forget to take an iron pill, do not take a double dose of iron the next time you take a pill. ? Keep iron pills out of the reach of small children. An overdose of iron can be very dangerous. · Follow your doctor's advice about eating iron-rich foods. These include red meat, shellfish, poultry, eggs, beans, raisins, whole-grain bread, and leafy green vegetables. · Steam vegetables to help them keep their iron content. When should you call for help? Call 911 anytime you think you may need emergency care. For example, call if:    · You have symptoms of a heart attack. These may include:  ? Chest pain or pressure, or a strange feeling in the chest.  ? Sweating. ? Shortness of breath. ? Nausea or vomiting. ? Pain, pressure, or a strange feeling in the back, neck, jaw, or upper belly or in one or both shoulders or arms. ? Lightheadedness or sudden weakness. ? A fast or irregular heartbeat. After you call 911, the  may tell you to chew 1 adult-strength or 2 to 4 low-dose aspirin. Wait for an ambulance.  Do not try to drive yourself.     · You passed out (lost consciousness).    Call your doctor now or seek immediate medical care if:    · You have new or increased shortness of breath.     · You are dizzy or lightheaded, or you feel like you may faint.     · Your fatigue and weakness continue or get worse.     · You have any abnormal bleeding, such as:  ? Nosebleeds. ? Vaginal bleeding that is different (heavier, more frequent, at a different time of the month) than what you are used to.  ? Bloody or black stools, or rectal bleeding. ? Bloody or pink urine.    Watch closely for changes in your health, and be sure to contact your doctor if:    · You do not get better as expected. Where can you learn more? Go to http://swapnil-speedy.info/. Enter R301 in the search box to learn more about \"Anemia: Care Instructions. \"  Current as of: May 6, 2018  Content Version: 11.9  © 1732-8060 [employer]. Care instructions adapted under license by [employer] (which disclaims liability or warranty for this information). If you have questions about a medical condition or this instruction, always ask your healthcare professional. [address] any warranty or liability for your use of this information.

## 2019-03-01 DIAGNOSIS — G89.4 CHRONIC PAIN SYNDROME: ICD-10-CM

## 2019-03-04 NOTE — TELEPHONE ENCOUNTER
Patient called to see if her refill is ready yet, she requested it 3/1/19. Not done yet. Re-routed to Dr. Marko Quintanilla for [address] 3/5/19.

## 2019-03-05 ENCOUNTER — CLINICAL SUPPORT (OUTPATIENT)
Dept: ONCOLOGY | Age: 54
End: 2019-03-05

## 2019-03-05 ENCOUNTER — HOSPITAL ENCOUNTER (OUTPATIENT)
Dept: INFUSION THERAPY | Age: 54
Discharge: HOME | End: 2019-03-05
Payer: MEDICARE

## 2019-03-05 ENCOUNTER — HOSPITAL ENCOUNTER (OUTPATIENT)
Dept: ONCOLOGY | Age: 54
Discharge: HOME | End: 2019-03-05

## 2019-03-05 VITALS
DIASTOLIC BLOOD PRESSURE: 81 MMHG | HEIGHT: 71 IN | HEART RATE: 98 BPM | TEMPERATURE: 98.8 F | WEIGHT: 217 LBS | RESPIRATION RATE: 20 BRPM | BODY MASS INDEX: 30.38 KG/M2 | OXYGEN SATURATION: 98 % | SYSTOLIC BLOOD PRESSURE: 130 MMHG

## 2019-03-05 DIAGNOSIS — G89.4 CHRONIC PAIN SYNDROME: ICD-10-CM

## 2019-03-05 DIAGNOSIS — D47.3 ESSENTIAL THROMBOCYTOSIS (HCC): ICD-10-CM

## 2019-03-05 DIAGNOSIS — M06.09 RHEUMATOID ARTHRITIS OF MULTIPLE SITES WITHOUT RHEUMATOID FACTOR (HCC): ICD-10-CM

## 2019-03-05 DIAGNOSIS — M06.09 RHEUMATOID ARTHRITIS OF MULTIPLE SITES WITHOUT RHEUMATOID FACTOR (HCC): Primary | ICD-10-CM

## 2019-03-05 LAB
ALBUMIN SERPL-MCNC: 3.3 G/DL (ref 3.4–5)
ALBUMIN/GLOB SERPL: 1 {RATIO} (ref 0.8–1.7)
ALP SERPL-CCNC: 128 U/L (ref 45–117)
ALT SERPL-CCNC: 16 U/L (ref 13–56)
ANION GAP SERPL CALC-SCNC: 7 MMOL/L (ref 3–18)
AST SERPL-CCNC: 6 U/L (ref 15–37)
BASO+EOS+MONOS # BLD AUTO: 0.9 K/UL (ref 0–2.3)
BASO+EOS+MONOS NFR BLD AUTO: 12 % (ref 0.1–17)
BILIRUB SERPL-MCNC: 0.2 MG/DL (ref 0.2–1)
BUN SERPL-MCNC: 15 MG/DL (ref 7–18)
BUN/CREAT SERPL: 19 (ref 12–20)
CALCIUM SERPL-MCNC: 9 MG/DL (ref 8.5–10.1)
CHLORIDE SERPL-SCNC: 105 MMOL/L (ref 100–108)
CO2 SERPL-SCNC: 25 MMOL/L (ref 21–32)
CREAT SERPL-MCNC: 0.81 MG/DL (ref 0.6–1.3)
DIFFERENTIAL METHOD BLD: NORMAL
ERYTHROCYTE [DISTWIDTH] IN BLOOD BY AUTOMATED COUNT: 13.2 % (ref 11.5–14.5)
GLOBULIN SER CALC-MCNC: 3.4 G/DL (ref 2–4)
GLUCOSE SERPL-MCNC: 443 MG/DL (ref 74–99)
HCT VFR BLD AUTO: 40.6 % (ref 36–48)
HGB BLD-MCNC: 13.1 G/DL (ref 12–16)
LYMPHOCYTES # BLD: 2 K/UL (ref 1.1–5.9)
LYMPHOCYTES NFR BLD: 27 % (ref 14–44)
MCH RBC QN AUTO: 27.2 PG (ref 25–35)
MCHC RBC AUTO-ENTMCNC: 32.3 G/DL (ref 31–37)
MCV RBC AUTO: 84.4 FL (ref 78–102)
NEUTS SEG # BLD: 4.5 K/UL (ref 1.8–9.5)
NEUTS SEG NFR BLD: 60 % (ref 40–70)
PLATELET # BLD AUTO: 381 K/UL (ref 140–440)
POTASSIUM SERPL-SCNC: 4 MMOL/L (ref 3.5–5.5)
PROT SERPL-MCNC: 6.7 G/DL (ref 6.4–8.2)
RBC # BLD AUTO: 4.81 M/UL (ref 4.1–5.1)
SODIUM SERPL-SCNC: 137 MMOL/L (ref 136–145)
WBC # BLD AUTO: 7.4 K/UL (ref 4.5–13)

## 2019-03-05 PROCEDURE — 86140 C-REACTIVE PROTEIN: CPT

## 2019-03-05 PROCEDURE — 85652 RBC SED RATE AUTOMATED: CPT

## 2019-03-05 PROCEDURE — 96375 TX/PRO/DX INJ NEW DRUG ADDON: CPT

## 2019-03-05 PROCEDURE — 80053 COMPREHEN METABOLIC PANEL: CPT

## 2019-03-05 PROCEDURE — 96415 CHEMO IV INFUSION ADDL HR: CPT

## 2019-03-05 PROCEDURE — 74011250636 HC RX REV CODE- 250/636: Performed by: NURSE PRACTITIONER

## 2019-03-05 PROCEDURE — 96413 CHEMO IV INFUSION 1 HR: CPT

## 2019-03-05 PROCEDURE — 74011250636 HC RX REV CODE- 250/636: Performed by: INTERNAL MEDICINE

## 2019-03-05 RX ORDER — DIPHENHYDRAMINE HYDROCHLORIDE 50 MG/ML
25 INJECTION, SOLUTION INTRAMUSCULAR; INTRAVENOUS ONCE
Status: COMPLETED | OUTPATIENT
Start: 2019-03-05 | End: 2019-03-05

## 2019-03-05 RX ORDER — OXYCODONE AND ACETAMINOPHEN 10; 325 MG/1; MG/1
1 TABLET ORAL
Qty: 60 TAB | Refills: 0 | Status: SHIPPED | OUTPATIENT
Start: 2019-03-05 | End: 2019-04-01 | Stop reason: SDUPTHER

## 2019-03-05 RX ORDER — SODIUM CHLORIDE 0.9 % (FLUSH) 0.9 %
10-40 SYRINGE (ML) INJECTION AS NEEDED
Status: DISCONTINUED | OUTPATIENT
Start: 2019-03-05 | End: 2019-03-09 | Stop reason: HOSPADM

## 2019-03-05 RX ORDER — OXYCODONE AND ACETAMINOPHEN 10; 325 MG/1; MG/1
1 TABLET ORAL
Qty: 60 TAB | Refills: 0 | Status: SHIPPED | OUTPATIENT
Start: 2019-03-05 | End: 2019-03-19 | Stop reason: SDUPTHER

## 2019-03-05 RX ORDER — SODIUM CHLORIDE 9 MG/ML
250 INJECTION, SOLUTION INTRAVENOUS CONTINUOUS
Status: DISPENSED | OUTPATIENT
Start: 2019-03-05 | End: 2019-03-06

## 2019-03-05 RX ADMIN — DIPHENHYDRAMINE HYDROCHLORIDE 25 MG: 50 INJECTION INTRAMUSCULAR; INTRAVENOUS at 11:18

## 2019-03-05 RX ADMIN — Medication 10 ML: at 10:35

## 2019-03-05 RX ADMIN — SODIUM CHLORIDE 250 ML: 900 INJECTION, SOLUTION INTRAVENOUS at 11:10

## 2019-03-05 RX ADMIN — INFLIXIMAB 400 MG: 100 INJECTION, POWDER, LYOPHILIZED, FOR SOLUTION INTRAVENOUS at 11:50

## 2019-03-05 NOTE — PROGRESS NOTES
[facility]    Date: 2019    Name: Demetra Culp    MRN: [id number]         : 1965      Ms. Rubia Khanna arrived in the [facility] today at 21 , in stable condition, here for Q 6 Week, IV Remicade Infusion. She was assessed and education was provided. Ms. Joi Everett vitals were reviewed. Visit Vitals  /79 (BP 1 Location: Right arm, BP Patient Position: Sitting)   Pulse 88   Temp 98.8 °F (37.1 °C)   Resp 20   Ht 5' 11\" (1.803 m)   Wt 98.4 kg (217 lb)   SpO2 98%   Breastfeeding? No   BMI 30.27 kg/m²                 PIV (#22G) was established in her posterior right forearm at 1035, without incident, and blood was drawn for a CBC, CMP, ESR, & CRP (2 lavender top tubes & 2 SST tubes), per order. (The CBC was processed in house, and the CMP, ESR, & CRP, were sent out to be processed. )        Lab results were obtained and reviewed, and the CBC results from today listed below, as well as the most recent CMP results from 19, were all noted to be satisfactory for treatment today. Recent Results (from the past 12 hour(s))   CBC WITH 3 PART DIFF    Collection Time: 19 10:35 AM   Result Value Ref Range    WBC 7.4 4.5 - 13.0 K/uL    RBC 4.81 4.10 - 5.10 M/uL    HGB 13.1 12.0 - 16.0 g/dL    HCT 40.6 36 - 48 %    MCV 84.4 78 - 102 FL    MCH 27.2 25.0 - 35.0 PG    MCHC 32.3 31 - 37 g/dL    RDW 13.2 11.5 - 14.5 %    PLATELET 227 388 - 165 K/uL    NEUTROPHILS 60 40 - 70 %    MIXED CELLS 12 0.1 - 17 %    LYMPHOCYTES 27 14 - 44 %    ABS. NEUTROPHILS 4.5 1.8 - 9.5 K/UL    ABS. MIXED CELLS 0.9 0.0 - 2.3 K/uL    ABS.  LYMPHOCYTES 2.0 1.1 - 5.9 K/UL    DF AUTOMATED             Pre-medication consisting of IV Benadryl 25 mg, was administered per order, and without incident.            Remicade 400 mg IV (4 mg/kg) Maintenance Dose, was administered over 2 hours, per order, and without incident.            After the completion of the IV Remicade, Ms. Rubia Khanna was monitored for 30 minutes post Remicade, per order, and also without incident.         After the completion of the 30 minute monitoring period, the PIV was removed and gauze/bandaid was applied. Ms. Ju Solis tolerated well, and had no complaints. Ms. Ju Solis was discharged from [place] [address] in stable condition at 1430. .. She is to return in 6 weeks, on Tuesday, 4-16-19,  at 1000,  for her next appointment, for her next dose of IV Remicade.      Dominic Maldonado RN  March 5, 2019  10:52 AM

## 2019-03-06 LAB
CRP SERPL-MCNC: 1.3 MG/DL (ref 0–0.3)
ERYTHROCYTE [SEDIMENTATION RATE] IN BLOOD: 18 MM/HR (ref 0–30)

## 2019-03-15 DIAGNOSIS — G89.4 CHRONIC PAIN SYNDROME: ICD-10-CM

## 2019-03-15 RX ORDER — OXYCODONE AND ACETAMINOPHEN 10; 325 MG/1; MG/1
1 TABLET ORAL
Qty: 60 TAB | Refills: 0 | OUTPATIENT
Start: 2019-03-15 | End: 2019-04-14

## 2019-03-19 DIAGNOSIS — G89.4 CHRONIC PAIN SYNDROME: ICD-10-CM

## 2019-03-19 RX ORDER — OXYCODONE AND ACETAMINOPHEN 10; 325 MG/1; MG/1
1 TABLET ORAL
Qty: 60 TAB | Refills: 0 | Status: SHIPPED | OUTPATIENT
Start: 2019-03-19 | End: 2019-04-01 | Stop reason: SDUPTHER

## 2019-03-29 DIAGNOSIS — G89.4 CHRONIC PAIN SYNDROME: ICD-10-CM

## 2019-03-29 RX ORDER — OXYCODONE AND ACETAMINOPHEN 10; 325 MG/1; MG/1
1 TABLET ORAL
Qty: 60 TAB | Refills: 0 | Status: CANCELLED | OUTPATIENT
Start: 2019-03-29 | End: 2019-04-28

## 2019-04-01 DIAGNOSIS — G89.4 CHRONIC PAIN SYNDROME: ICD-10-CM

## 2019-04-01 RX ORDER — OXYCODONE AND ACETAMINOPHEN 10; 325 MG/1; MG/1
1 TABLET ORAL
Qty: 60 TAB | Refills: 0 | Status: SHIPPED | OUTPATIENT
Start: 2019-04-01 | End: 2019-04-16 | Stop reason: SDUPTHER

## 2019-04-02 ENCOUNTER — APPOINTMENT (OUTPATIENT)
Dept: INFUSION THERAPY | Age: 54
End: 2019-04-02
Payer: MEDICARE

## 2019-04-10 ENCOUNTER — APPOINTMENT (OUTPATIENT)
Dept: GENERAL RADIOLOGY | Age: 54
End: 2019-04-10
Attending: EMERGENCY MEDICINE
Payer: MEDICARE

## 2019-04-10 ENCOUNTER — HOSPITAL ENCOUNTER (EMERGENCY)
Age: 54
Discharge: HOME | End: 2019-04-10
Attending: EMERGENCY MEDICINE
Payer: MEDICARE

## 2019-04-10 VITALS
RESPIRATION RATE: 18 BRPM | BODY MASS INDEX: 30.24 KG/M2 | OXYGEN SATURATION: 100 % | HEIGHT: 71 IN | TEMPERATURE: 98.1 F | WEIGHT: 216 LBS | DIASTOLIC BLOOD PRESSURE: 63 MMHG | SYSTOLIC BLOOD PRESSURE: 123 MMHG | HEART RATE: 89 BPM

## 2019-04-10 DIAGNOSIS — M79.2 RADICULAR PAIN IN RIGHT ARM: ICD-10-CM

## 2019-04-10 DIAGNOSIS — N61.1 ABSCESS OF LEFT BREAST: Primary | ICD-10-CM

## 2019-04-10 DIAGNOSIS — M25.511 ACUTE PAIN OF RIGHT SHOULDER: ICD-10-CM

## 2019-04-10 PROCEDURE — 99282 EMERGENCY DEPT VISIT SF MDM: CPT

## 2019-04-10 PROCEDURE — 73030 X-RAY EXAM OF SHOULDER: CPT

## 2019-04-10 RX ORDER — CLINDAMYCIN HYDROCHLORIDE 150 MG/1
150 CAPSULE ORAL 3 TIMES DAILY
Qty: 21 CAP | Refills: 0 | Status: SHIPPED | OUTPATIENT
Start: 2019-04-10 | End: 2019-04-17

## 2019-04-10 NOTE — ED NOTES
Felipe Pandya is a 52 y.o. female that was discharged in stable condition. The patients diagnosis, condition and treatment were explained to  patient and aftercare instructions were given. The patient verbalized understanding. Patient armband removed and shredded.

## 2019-04-10 NOTE — DISCHARGE INSTRUCTIONS
Patient Education      If you were prescribed any medication take as directed. Do not drive or use heavy equipment if prescribed narcotics. Follow up with your primary care physician or with specialist as directed. Return to the emergency room with any new or worsening conditions. Shoulder Pain: Care Instructions  Your Care Instructions    You can hurt your shoulder by using it too much during an activity, such as fishing or baseball. It can also happen as part of the everyday wear and tear of getting older. Shoulder injuries can be slow to heal, but your shoulder should get better with time. Your doctor may recommend a sling to rest your shoulder. If you have injured your shoulder, you may need testing and treatment. Follow-up care is a key part of your treatment and safety. Be sure to make and go to all appointments, and call your doctor if you are having problems. It's also a good idea to know your test results and keep a list of the medicines you take. How can you care for yourself at home? · Take pain medicines exactly as directed. ? If the doctor gave you a prescription medicine for pain, take it as prescribed. ? If you are not taking a prescription pain medicine, ask your doctor if you can take an over-the-counter medicine. ? Do not take two or more pain medicines at the same time unless the doctor told you to. Many pain medicines contain acetaminophen, which is Tylenol. Too much acetaminophen (Tylenol) can be harmful. · If your doctor recommends that you wear a sling, use it as directed. Do not take it off before your doctor tells you to. · Put ice or a cold pack on the sore area for 10 to 20 minutes at a time. Put a thin cloth between the ice and your skin. · If there is no swelling, you can put moist heat, a heating pad, or a warm cloth on your shoulder. Some doctors suggest alternating between hot and cold. · Rest your shoulder for a few days.  If your doctor recommends it, you can then begin gentle exercise of the shoulder, but do not lift anything heavy. When should you call for help? Call 911 anytime you think you may need emergency care. For example, call if:    · You have chest pain or pressure. This may occur with:  ? Sweating. ? Shortness of breath. ? Nausea or vomiting. ? Pain that spreads from the chest to the neck, jaw, or one or both shoulders or arms. ? Dizziness or lightheadedness. ? A fast or uneven pulse. After calling 911, chew 1 adult-strength aspirin. Wait for an ambulance. Do not try to drive yourself.     · Your arm or hand is cool or pale or changes color.    Call your doctor now or seek immediate medical care if:    · You have signs of infection, such as:  ? Increased pain, swelling, warmth, or redness in your shoulder. ? Red streaks leading from a place on your shoulder. ? Pus draining from an area of your shoulder. ? Swollen lymph nodes in your neck, armpits, or groin. ? A fever.    Watch closely for changes in your health, and be sure to contact your doctor if:    · You cannot use your shoulder.     · Your shoulder does not get better as expected. Where can you learn more? Go to http://swapnil-speedy.info/. Enter B586 in the search box to learn more about \"Shoulder Pain: Care Instructions. \"  Current as of: September 20, 2018  Content Version: 11.9  © 7307-7323 [employer]. Care instructions adapted under license by [employer] (which disclaims liability or warranty for this information). If you have questions about a medical condition or this instruction, always ask your healthcare professional. [place] [address] any warranty or liability for your use of this information.

## 2019-04-10 NOTE — ED TRIAGE NOTES
Pt co  r shoulder pain for several weeks worse with movement. Pt also co  Left breast pain with redness and soreness

## 2019-04-10 NOTE — ED PROVIDER NOTES
EMERGENCY DEPARTMENT HISTORY AND PHYSICAL EXAM 
 
9:39 AM 
 
 
Date: 4/10/2019 Patient Name: Tevin Feliz History of Presenting Illness Chief Complaint Patient presents with  Shoulder Pain  Breast pain History Provided By: Patient Additional History (Context): Tevin Feliz is a 52 y.o. female with Past medical history of diabetes, hypertension, chronic back pain, neuropathy, anxiety who presents with moderate right shoulder pain for about 2 months. She denies any trauma she states that intermittently she has some tingling in the right arm not currently. No current neck pain, no dizziness chest pain or shortness of breath. She also complains of some tenderness and a lump on her left breast.  States that this lump has been there for a couple of days. She denies fever, insect bite, chills or any other symptoms. PCP: Jamila Bowie MD 
 
 
 
Past History Past Medical History: 
Past Medical History:  
Diagnosis Date  Abdominal pain, unspecified site  Acute otitis media  Acute pharyngitis  Anxiety  Arthritis  Autoimmune disease ([address]  Back injury  Backache   
 herniated disc in lower back  Blurred vision  Chest pain 5/4/2018  Chest pain, unspecified   
 abnormal EKG  Chronic airway obstruction, not elsewhere classified  Chronic back pain  Chronic pain  COPD  Depression  Diabetes ([address]  Dizziness  Dizziness and giddiness   
 possible orthostatic changes, vasovagal, autonomic dysfunction from diabetic neuropathy  Encounters for unspecified administrative purpose  Essential thrombocytosis ([address] 1/6/2016  Feeling anxious  Fibromyalgia  Headache(784.0)  Hemorrhoid  Herniated disc   
 at L5  
 Hypercholesterolemia  Hyperglycemia  Hypertension  Joint pain  Leukocytosis, unspecified  Major depressive disorder, single episode, mild ([address]  Mental disorder   
 depression, anxiety, bipolar and PTSD  Neuropathy  Obesity, unspecified  Other chest pain  Palpitation 5/4/2018  
 ? arrhythmia  Phobic disorders  Rheumatoid arthritis ([address]  Rheumatoid arthritis of foot ([address] 5/4/2018 on treatment  Seasonal allergies  Shortness of breath   
 normal EF  Smoking 5/4/2018  
 discussed cessation  Streptococcal sore throat  Type 2 diabetes mellitus without complication, with long-term current use of insulin ([facility] 5/4/2018  Type II or unspecified type diabetes mellitus with unspecified complication, uncontrolled  Unspecified hereditary and idiopathic peripheral neuropathy  Vitamin D deficiency Past Surgical History: 
Past Surgical History:  
Procedure Laterality Date [address] [facility]  HX GYN  2005  
 partial Hysterectomy  HX OTHER SURGICAL  12-1-15  
 had ingrown toenail removed from big toe, both feet [address]  Family History: 
Family History Problem Relation Age of Onset  Diabetes Other  Alcohol abuse Mother [facility] Arthritis-osteo Mother  Diabetes Mother  Elevated Lipids Mother  Headache Mother  Heart Disease Mother  Migraines Mother  Psychiatric Disorder Mother  Alcohol abuse Father  Arthritis-osteo Father  Cancer Father  Headache Father  Heart Disease Father  Lung Disease Father  Migraines Father  Heart Surgery Father  Alcohol abuse Sister  Headache Sister  Diabetes Sister  Heart Disease Sister  Migraines Sister  Psychiatric Disorder Sister  Headache Brother  Diabetes Brother  Elevated Lipids Brother  Heart Disease Brother  Migraines Brother  Psychiatric Disorder Brother  Coronary Artery Disease Brother  Cancer Maternal Aunt  Alcohol abuse Maternal Aunt  Diabetes Maternal Aunt  Headache Maternal Aunt  Lung Disease Maternal Aunt  Migraines Maternal Aunt  Headache Maternal Uncle  Migraines Maternal Uncle  Stroke Maternal Uncle  Psychiatric Disorder Maternal Uncle  Headache Paternal Aunt  Migraines Paternal Aunt  Headache Paternal Uncle  Hypertension Paternal Uncle  Migraines Paternal Uncle  Stroke Paternal Uncle  Headache Maternal Grandmother  Migraines Maternal Grandmother  Stroke Maternal Grandmother  Headache Maternal Grandfather  Migraines Maternal Grandfather  Stroke Maternal Grandfather  Headache Paternal Grandmother  Hypertension Paternal Grandmother  Lung Disease Paternal Grandmother  Migraines Paternal Grandmother  Cancer Paternal Grandmother  Headache Paternal Grandfather  Cancer Paternal Grandfather  Migraines Paternal Grandfather Social History: 
Social History Tobacco Use  Smoking status: Current Every Day Smoker Packs/day: 0.25 Last attempt to quit: 2012 Years since quittin.7  Smokeless tobacco: Never Used  Tobacco comment: 5 cigarettes day Substance Use Topics  Alcohol use: Yes Comment: socially  Drug use: No  
 
 
Allergies: Allergies Allergen Reactions  Levaquin [Levofloxacin] Anaphylaxis  Pollen Extracts Unable to Obtain Review of Systems Review of Systems Constitutional: Negative for chills and fever. HENT: Negative for congestion, rhinorrhea, sore throat and trouble swallowing. Eyes: Negative for visual disturbance. Respiratory: Negative for cough, shortness of breath and wheezing. Cardiovascular: Negative for chest pain. Gastrointestinal: Negative for abdominal pain, nausea and vomiting. Endocrine: Negative for polyuria. Genitourinary: Negative for dysuria. Musculoskeletal: Negative for joint swelling and neck stiffness. Right shoulder pain Skin: Negative for pallor and rash. Tender lump on left breast  
Neurological: Negative for dizziness, weakness, numbness and headaches. Hematological: Does not bruise/bleed easily. Psychiatric/Behavioral: Negative for confusion and dysphoric mood. All other systems reviewed and are negative. Physical Exam  
 
Visit Vitals /63 (BP 1 Location: Left arm, BP Patient Position: At rest) Pulse 89 Temp 98.1 °F (36.7 °C) Resp 18 Ht 5' 11\" (1.803 m) Wt 98 kg (216 lb) SpO2 100% BMI 30.13 kg/m² Physical Exam  
Constitutional: She is oriented to person, place, and time. She appears well-developed and well-nourished. No distress. HENT:  
Head: Normocephalic and atraumatic. Mouth/Throat: Oropharynx is clear and moist.  
Eyes: Pupils are equal, round, and reactive to light. Conjunctivae are normal. No scleral icterus. Neck: Normal range of motion. Neck supple. Cardiovascular: Normal rate and intact distal pulses. Capillary refill < 3 seconds Pulmonary/Chest: Effort normal and breath sounds normal. No respiratory distress. She has no wheezes. Abdominal: Soft. Bowel sounds are normal. She exhibits no distension. There is no tenderness. Musculoskeletal: Normal range of motion. She exhibits no edema. Lymphadenopathy:  
  She has no cervical adenopathy. Neurological: She is alert and oriented to person, place, and time. No cranial nerve deficit. She exhibits normal muscle tone. Coordination normal.  
Median ulnar radial nerves intact Sensation intact Strength 5 out of 5 Some mild pain limitation with abduction of the right arm Skin: Skin is warm and dry. She is not diaphoretic. There is a 2 x 2 centimeter fluctuant abscess under the left breast area. It has come to a head. There is no surrounding erythema. Female RN Nixon Bennett assisted with breast exam  
Psychiatric: She has a normal mood and affect. Her behavior is normal.  
Nursing note and vitals reviewed. Diagnostic Study Results Labs - No results found for this or any previous visit (from the past 12 hour(s)). Radiologic Studies -  
XR SHOULDER RT AP/LAT MIN 2 V Final Result IMPRESSION:  
  
Normal right shoulder. Medical Decision Making I am the first provider for this patient. I reviewed the vital signs, available nursing notes, past medical history, past surgical history, family history and social history. Vital Signs-Reviewed the patient's vital signs. Records Reviewed: Nursing Notes and Old Medical Records (Time of Review: 9:39 AM) Provider Notes (Medical Decision Making): DDX: Shoulder sprain, pinched nerve, rotator cuff injury, abscess Get x-ray shoulder, do I&D MDM Medications - No data to display ED Course: Progress Notes, Reevaluation, and Consults: As patient was in the bed she came out stating that \"the abscess has burst and pus is all over my gown\". I reassessed the site with female RN Emily at the bedside. Abscess is currently draining, no need for I&D. Will put on antibiotic. Patient admits that she picks up her Percocet prescribed by her other doctor, I discussed that she can use this for her shoulder pain. We will have her follow-up with Ortho I have reassessed the patient. I have discussed the workup, results and plan with the patient and patient is in agreement. Patient is feeling better. Patient will be prescribed clindamycin. Patient was discharge in stable condition. Patient was given outpatient follow up. Patient is to return to emergency department if any new or worsening condition. Diagnosis Clinical Impression: 1. Abscess of left breast   
2. Acute pain of right shoulder 3. Radicular pain in right arm Disposition: Discharged Follow-up Information Follow up With Specialties Details Why Contact Info  Gavin Varela MD Orthopedic Surgery Schedule an appointment as soon as possible for a visit in 3 days  [address] [facility] Lonnie chase [address] [place] [address] 
[phone] Patrick Khan MD Internal Medicine Schedule an appointment as soon as possible for a visit in 3 days  [address] [facility] [address] [facility] [address] [phone] [address] [facility] EMERGENCY DEPT Emergency Medicine  As needed, If symptoms worsen [address] [facility] [id number] [phone] Patient's Medications Start Taking CLINDAMYCIN (CLEOCIN) 150 MG CAPSULE    Take 1 Cap by mouth three (3) times daily for 7 days. Indications: skin infection Continue Taking ALBUTEROL (PROAIR HFA) 90 MCG/ACTUATION INHALER    Take 1 Puff by inhalation every six (6) hours as needed for Wheezing. ALBUTEROL SULFATE (PROVENTIL;VENTOLIN) 2.5 MG/0.5 ML NEBU NEBULIZER SOLUTION    by Nebulization route once. Is now using the actual nebulizer machine, for current bronchitis episode (Aug/Sept. 2017) AMITRIPTYLINE (ELAVIL) 25 MG TABLET ANAGRELIDE (AGRYLIN) 0.5 MG CAPSULE    Take 1 Cap by mouth two (2) times a day. ATENOLOL (TENORMIN) 25 MG TABLET    Take 25 mg by mouth daily. Indications: HYPERTENSION  
 BD INSULIN SYRINGE ULTRA-FINE 1 ML 31 GAUGE X 15/64\" SYRG      
 CHOLECALCIFEROL, VITAMIN D3, 5,000 UNIT TAB    Take 5,000 Units by mouth every seven (7) days. CLONAZEPAM (KLONOPIN) 2 MG TABLET    Take 2 mg by mouth daily. Indications: PANIC DISORDER  
 CLOTRIMAZOLE-BETAMETHASONE (LOTRISONE) TOPICAL CREAM      
 DULOXETINE (CYMBALTA) 30 MG CAPSULE      
 FOLIC ACID 409 MCG TABLET    Take 400 mcg by mouth daily. FUROSEMIDE (LASIX) 40 MG TABLET    Take  by mouth daily. GABAPENTIN (NEURONTIN) 300 MG CAPSULE    300 mg two (2) times a day. HYDROXYCHLOROQUINE (PLAQUENIL) 200 MG TABLET    Take 200 mg by mouth two (2) times a day. INFLIXIMAB (REMICADE IV)    344 mg by IntraVENous route once as needed. remicade will be every 6 weeks INSULIN ASPART PROTAMINE/INSULIN ASPART (NOVOLOG MIX 70-30) 100 UNIT/ML (70-30) INJECTION    10 Units by SubCUTAneous route two (2) times a day. INSULIN GLARGINE,HUM. REC. ANLOG (LANTUS SC)    5 Units by SubCUTAneous route daily. LOSARTAN-HYDROCHLOROTHIAZIDE (HYZAAR) 50-12.5 MG PER TABLET    Take 1 Tab by mouth daily. METFORMIN (GLUCOPHAGE) 1,000 MG TABLET    Take 500 mg by mouth two (2) times a day. METHOTREXATE (RHEUMATREX) 2.5 MG TABLET    Take 2.5 mg by mouth every fourty-eight (48) hours. ONDANSETRON HCL (ZOFRAN, AS HYDROCHLORIDE,) 4 MG TABLET    Take 1 Tab by mouth every eight (8) hours as needed for Nausea. OXYCODONE-ACETAMINOPHEN (PERCOCET 10)  MG PER TABLET    Take 1 Tab by mouth every six (6) hours as needed for Pain for up to 30 days. Max Daily Amount: 4 Tabs. QUETIAPINE (SEROQUEL) 25 MG TABLET These Medications have changed No medications on file Stop Taking No medications on file Louie Or,  
 
Dragon medical dictation software was used for portions of this report. Unintended transcription errors may occur. My signature above authenticates this document and my orders, the final   
diagnosis (es), discharge prescription (s), and instructions in the Epic   
record. If you have any questions please contact [phone].

## 2019-04-16 ENCOUNTER — HOSPITAL ENCOUNTER (OUTPATIENT)
Dept: ONCOLOGY | Age: 54
Discharge: HOME | End: 2019-04-16

## 2019-04-16 ENCOUNTER — HOSPITAL ENCOUNTER (OUTPATIENT)
Dept: LAB | Age: 54
Discharge: HOME | End: 2019-04-16
Payer: MEDICARE

## 2019-04-16 ENCOUNTER — OFFICE VISIT (OUTPATIENT)
Dept: ONCOLOGY | Age: 54
End: 2019-04-16

## 2019-04-16 VITALS
WEIGHT: 218.4 LBS | BODY MASS INDEX: 30.57 KG/M2 | DIASTOLIC BLOOD PRESSURE: 66 MMHG | HEART RATE: 101 BPM | HEIGHT: 71 IN | SYSTOLIC BLOOD PRESSURE: 112 MMHG | OXYGEN SATURATION: 98 % | TEMPERATURE: 98.1 F

## 2019-04-16 DIAGNOSIS — M79.7 FIBROMYALGIA: ICD-10-CM

## 2019-04-16 DIAGNOSIS — G89.4 CHRONIC PAIN SYNDROME: ICD-10-CM

## 2019-04-16 DIAGNOSIS — M06.09 RHEUMATOID ARTHRITIS OF MULTIPLE SITES WITHOUT RHEUMATOID FACTOR (HCC): ICD-10-CM

## 2019-04-16 DIAGNOSIS — D75.839 THROMBOCYTOSIS: ICD-10-CM

## 2019-04-16 DIAGNOSIS — D47.3 ESSENTIAL THROMBOCYTOSIS (HCC): ICD-10-CM

## 2019-04-16 DIAGNOSIS — M06.9 RHEUMATOID ARTHRITIS INVOLVING MULTIPLE JOINTS (HCC): ICD-10-CM

## 2019-04-16 DIAGNOSIS — D47.3 ESSENTIAL THROMBOCYTOSIS (HCC): Primary | ICD-10-CM

## 2019-04-16 DIAGNOSIS — D64.9 CHRONIC ANEMIA: ICD-10-CM

## 2019-04-16 LAB
ALBUMIN SERPL-MCNC: 3.4 G/DL (ref 3.4–5)
ALBUMIN/GLOB SERPL: 0.9 {RATIO} (ref 0.8–1.7)
ALP SERPL-CCNC: 130 U/L (ref 45–117)
ALT SERPL-CCNC: 19 U/L (ref 13–56)
ANION GAP SERPL CALC-SCNC: 8 MMOL/L (ref 3–18)
AST SERPL-CCNC: 8 U/L (ref 15–37)
BASO+EOS+MONOS # BLD AUTO: 1 K/UL (ref 0–2.3)
BASO+EOS+MONOS NFR BLD AUTO: 8 % (ref 0.1–17)
BILIRUB SERPL-MCNC: 0.1 MG/DL (ref 0.2–1)
BUN SERPL-MCNC: 19 MG/DL (ref 7–18)
BUN/CREAT SERPL: 22 (ref 12–20)
CALCIUM SERPL-MCNC: 9.3 MG/DL (ref 8.5–10.1)
CHLORIDE SERPL-SCNC: 102 MMOL/L (ref 100–108)
CO2 SERPL-SCNC: 26 MMOL/L (ref 21–32)
CREAT SERPL-MCNC: 0.87 MG/DL (ref 0.6–1.3)
DIFFERENTIAL METHOD BLD: NORMAL
ERYTHROCYTE [DISTWIDTH] IN BLOOD BY AUTOMATED COUNT: 13.4 % (ref 11.5–14.5)
ERYTHROCYTE [SEDIMENTATION RATE] IN BLOOD: 48 MM/HR (ref 0–30)
GLOBULIN SER CALC-MCNC: 3.7 G/DL (ref 2–4)
GLUCOSE SERPL-MCNC: 371 MG/DL (ref 74–99)
HCT VFR BLD AUTO: 40 % (ref 36–48)
HGB BLD-MCNC: 13.1 G/DL (ref 12–16)
LYMPHOCYTES # BLD: 3 K/UL (ref 1.1–5.9)
LYMPHOCYTES NFR BLD: 25 % (ref 14–44)
MCH RBC QN AUTO: 27.5 PG (ref 25–35)
MCHC RBC AUTO-ENTMCNC: 32.8 G/DL (ref 31–37)
MCV RBC AUTO: 84 FL (ref 78–102)
NEUTS SEG # BLD: 8.1 K/UL (ref 1.8–9.5)
NEUTS SEG NFR BLD: 67 % (ref 40–70)
PLATELET # BLD AUTO: 425 K/UL (ref 140–440)
POTASSIUM SERPL-SCNC: 4 MMOL/L (ref 3.5–5.5)
PROT SERPL-MCNC: 7.1 G/DL (ref 6.4–8.2)
RBC # BLD AUTO: 4.76 M/UL (ref 4.1–5.1)
SODIUM SERPL-SCNC: 136 MMOL/L (ref 136–145)
WBC # BLD AUTO: 12.1 K/UL (ref 4.5–13)

## 2019-04-16 PROCEDURE — 36415 COLL VENOUS BLD VENIPUNCTURE: CPT

## 2019-04-16 PROCEDURE — 80053 COMPREHEN METABOLIC PANEL: CPT

## 2019-04-16 PROCEDURE — 85652 RBC SED RATE AUTOMATED: CPT

## 2019-04-16 RX ORDER — OXYCODONE AND ACETAMINOPHEN 10; 325 MG/1; MG/1
1 TABLET ORAL
Qty: 60 TAB | Refills: 0 | Status: SHIPPED | OUTPATIENT
Start: 2019-04-16 | End: 2019-04-29 | Stop reason: SDUPTHER

## 2019-04-17 RX ORDER — DIPHENHYDRAMINE HYDROCHLORIDE 50 MG/ML
50 INJECTION, SOLUTION INTRAMUSCULAR; INTRAVENOUS
Status: CANCELLED | OUTPATIENT
Start: 2019-04-23 | End: 2019-04-23

## 2019-04-17 RX ORDER — ACETAMINOPHEN 325 MG/1
650 TABLET ORAL
Status: CANCELLED | OUTPATIENT
Start: 2019-04-23 | End: 2019-04-23

## 2019-04-22 ENCOUNTER — OFFICE VISIT (OUTPATIENT)
Dept: ORTHOPEDIC SURGERY | Facility: CLINIC | Age: 54
End: 2019-04-22

## 2019-04-22 VITALS
WEIGHT: 219.2 LBS | RESPIRATION RATE: 18 BRPM | HEIGHT: 71 IN | TEMPERATURE: 97.2 F | OXYGEN SATURATION: 100 % | HEART RATE: 90 BPM | DIASTOLIC BLOOD PRESSURE: 70 MMHG | SYSTOLIC BLOOD PRESSURE: 133 MMHG | BODY MASS INDEX: 30.69 KG/M2

## 2019-04-22 DIAGNOSIS — G89.29 CHRONIC RIGHT SHOULDER PAIN: Primary | ICD-10-CM

## 2019-04-22 DIAGNOSIS — M54.16 LUMBAR RADICULOPATHY: ICD-10-CM

## 2019-04-22 DIAGNOSIS — G89.29 CHRONIC PAIN OF RIGHT KNEE: ICD-10-CM

## 2019-04-22 DIAGNOSIS — M75.51 ACUTE BURSITIS OF RIGHT SHOULDER: ICD-10-CM

## 2019-04-22 DIAGNOSIS — M25.511 CHRONIC RIGHT SHOULDER PAIN: Primary | ICD-10-CM

## 2019-04-22 DIAGNOSIS — M54.12 CERVICAL RADICULOPATHY: ICD-10-CM

## 2019-04-22 DIAGNOSIS — M25.561 CHRONIC PAIN OF RIGHT KNEE: ICD-10-CM

## 2019-04-22 DIAGNOSIS — M17.11 PRIMARY OSTEOARTHRITIS OF RIGHT KNEE: ICD-10-CM

## 2019-04-22 RX ORDER — BETAMETHASONE SODIUM PHOSPHATE AND BETAMETHASONE ACETATE 3; 3 MG/ML; MG/ML
6 INJECTION, SUSPENSION INTRA-ARTICULAR; INTRALESIONAL; INTRAMUSCULAR; SOFT TISSUE ONCE
Qty: 0.5 ML | Refills: 0
Start: 2019-04-22 | End: 2019-04-22

## 2019-04-22 NOTE — PROGRESS NOTES
Patient: Norberto Matthews                MRN: [id number]       SSN: [id number] YOB: 1965        AGE: 52 y.o. SEX: female PCP: Desiree Weinstein MD 
04/22/19 Chief Complaint Patient presents with  Arm Pain Right  Leg Pain Right HISTORY:  Norberto Matthews is a 52 y.o. female who is seen for neck, right arm, right leg, and right shoulder pain. She notes neck pain that radiates to her right arm. She was seen by Dr. Vladislav Rodarte her she has a herniated disc in her back. She has rheumatoid arthritis for which she sees Dr. Linda Connell. She has been experiencing increasing knee pain for the past year. There is no history of injury. She notes pain with standing, walking, and stair climbing. She experiences startup pain after sitting. Pain Assessment  4/22/2019 Location of Pain Leg Location Modifiers Right Severity of Pain 8 Quality of Pain Aching Duration of Pain Persistent Frequency of Pain Constant Aggravating Factors Bending;Standing;Walking Limiting Behavior Yes Relieving Factors Nothing Result of Injury No  
 
Occupation, etc:  Ms. Germaine Romano  receives social security disability benefits for RA, PTSD, and schizophrenia. 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 She has 3 sons and a daughter. She has 7 grand children with 2 on the way. Ms. Germaine Romano weighs 219 lbs and is 5'11\" tall. Lab Results Component Value Date/Time Hemoglobin A1c 8.6 (H) 10/22/2018 09:06 AM  
 
Weight Metrics 4/22/2019 4/16/2019 4/10/2019 3/5/2019 2/19/2019 1/22/2019 11/27/2018 Weight 219 lb 3.2 oz 218 lb 6.4 oz 216 lb 217 lb 222 lb 9.6 oz 214 lb 214 lb 12.8 oz BMI 30.57 kg/m2 30.46 kg/m2 30.13 kg/m2 30.27 kg/m2 31.05 kg/m2 29.85 kg/m2 29.96 kg/m2 Patient Active Problem List  
Diagnosis Code  RA (rheumatoid arthritis) ([facility]) M06.9  Fibromyalgia M79.7  Leukocytosis D72.829  Thrombocytosis ([facility]) D47.3  Anemia D64.9  Essential thrombocytosis (HCC) D47.3  Rheumatoid arthritis involving multiple joints ([facility]) M06.9  Chronic anemia D64.9  Chronic pain syndrome G89.4  Chest pain R07.9  Palpitation R00.2  Rheumatoid arthritis of foot ([facility] [address] M06.9  Smoking F17.200  Type 2 diabetes mellitus without complication, with long-term current use of insulin (HCC) E11.9, Z79.4  Type 2 diabetes with nephropathy ([facility]) E11.21  
 Sebaceous cyst of skin of right breast N60.81 REVIEW OF SYSTEMS: All Below are Negative except: See HPI Constitutional: negative for fever, chills, and weight loss. Cardiovascular: negative for chest pain, claudication, leg swelling, SOB, STARKS Gastrointestinal: Negative for pain, N/V/C/D, Blood in stool or urine, dysuria, hematuria, incontinence, pelvic pain. Musculoskeletal: See HPI Neurological: Negative for dizziness and weakness. Negative for headaches, Visual changes, confusion, seizures Phychiatric/Behavioral: Negative for depression, memory loss, substance abuse. Extremities: Negative for hair changes, rash, or skin lesion changes. Hematologic: Negative for bleeding problems, bruising, pallor or swollen lymph nodes Peripheral Vascular: No calf pain, no circulation deficits. Social History Socioeconomic History  Marital status:  Spouse name: Not on file  Number of children: Not on file  Years of education: Not on file  Highest education level: Not on file Occupational History  Not on file Social Needs  Financial resource strain: Not on file  Food insecurity:  
  Worry: Not on file Inability: Not on file  Transportation needs:  
  Medical: Not on file Non-medical: Not on file Tobacco Use  Smoking status: Current Every Day Smoker Packs/day: 0.25 Last attempt to quit: 2012 Years since quittin.7  Smokeless tobacco: Never Used  Tobacco comment: 5 cigarettes day Substance and Sexual Activity  Alcohol use: Yes Comment: socially  Drug use: No  
 Sexual activity: Yes  
  Partners: Male Birth control/protection: Condom Lifestyle  Physical activity:  
  Days per week: Not on file Minutes per session: Not on file  Stress: Not on file Relationships  Social connections:  
  Talks on phone: Not on file Gets together: Not on file Attends Buddhist service: Not on file Active member of club or organization: Not on file Attends meetings of clubs or organizations: Not on file Relationship status: Not on file  Intimate partner violence:  
  Fear of current or ex partner: Not on file Emotionally abused: Not on file Physically abused: Not on file Forced sexual activity: Not on file Other Topics Concern  Not on file Social History Narrative  Not on file Allergies Allergen Reactions  Levaquin [Levofloxacin] Anaphylaxis  Pollen Extracts Unable to Obtain Current Outpatient Medications Medication Sig  
 oxyCODONE-acetaminophen (PERCOCET 10)  mg per tablet Take 1 Tab by mouth every six (6) hours as needed for Pain for up to 30 days. Max Daily Amount: 4 Tabs.  anagrelide (AGRYLIN) 0.5 mg capsule Take 1 Cap by mouth two (2) times a day.  amitriptyline (ELAVIL) 25 mg tablet  clotrimazole-betamethasone (LOTRISONE) topical cream   
 DULoxetine (CYMBALTA) 30 mg capsule  gabapentin (NEURONTIN) 300 mg capsule 300 mg two (2) times a day.  QUEtiapine (SEROQUEL) 25 mg tablet  BD INSULIN SYRINGE ULTRA-FINE 1 mL 31 gauge x 15/64\" syrg  insulin aspart protamine/insulin aspart (NOVOLOG MIX 70-30) 100 unit/mL (70-30) injection 10 Units by SubCUTAneous route two (2) times a day.  clonazePAM (KLONOPIN) 2 mg tablet Take 2 mg by mouth daily. Indications: PANIC DISORDER  
 folic acid 840 mcg tablet Take 400 mcg by mouth daily.  albuterol (PROAIR HFA) 90 mcg/actuation inhaler Take 1 Puff by inhalation every six (6) hours as needed for Wheezing.  ondansetron hcl (ZOFRAN, AS HYDROCHLORIDE,) 4 mg tablet Take 1 Tab by mouth every eight (8) hours as needed for Nausea.  methotrexate (RHEUMATREX) 2.5 mg tablet Take 2.5 mg by mouth every fourty-eight (48) hours.  metFORMIN (GLUCOPHAGE) 1,000 mg tablet Take 500 mg by mouth two (2) times a day.  Cholecalciferol, Vitamin D3, 5,000 unit Tab Take 5,000 Units by mouth every seven (7) days.  atenolol (TENORMIN) 25 mg tablet Take 25 mg by mouth daily. Indications: HYPERTENSION  hydroxychloroquine (PLAQUENIL) 200 mg tablet Take 200 mg by mouth two (2) times a day.  furosemide (LASIX) 40 mg tablet Take  by mouth daily.  INFLIXIMAB (REMICADE IV) 344 mg by IntraVENous route once as needed. remicade will be every 6 weeks  INSULIN GLARGINE,HUM. REC. ANLOG (LANTUS SC) 5 Units by SubCUTAneous route daily.  albuterol sulfate (PROVENTIL;VENTOLIN) 2.5 mg/0.5 mL nebu nebulizer solution by Nebulization route once. Is now using the actual nebulizer machine, for current bronchitis episode (Aug/Sept. 2017)  losartan-hydrochlorothiazide (HYZAAR) 50-12.5 mg per tablet Take 1 Tab by mouth daily. No current facility-administered medications for this visit. Facility-Administered Medications Ordered in Other Visits Medication Dose Route Frequency  [START ON 4/23/2019] inFLIXimab (REMICADE) 400 mg in 0.9% sodium chloride 250 mL infusion  400 mg IntraVENous ONCE TITR  
  
PHYSICAL EXAMINATION: 
Visit Vitals /70 Pulse 90 Temp 97.2 °F (36.2 °C) (Oral) Resp 18 Ht 5' 11\" (1.803 m) Wt 219 lb 3.2 oz (99.4 kg) SpO2 100% BMI 30.57 kg/m² ORTHO EXAMINATION: 
Examination Right knee Skin Intact Range of motion 100-10 with pain Effusion +1 Medial joint line tenderness ++ Lateral joint line tenderness - Popliteal tenderness -  
 Osteophytes palpable + medial   
Humbertos -  
Patella crepitus + Anterior drawer - Lateral laxity - Medial laxity -  
Varus deformity -  
Valgus deformity -  
Pretibial edema -  
Calf tenderness - Examination Right shoulder Skin Intact Effusion - Biceps deformity - Atrophy -  
AC joint tenderness - Acromial tenderness + Biceps tenderness - Forward flexion/Elevation  with pain and pain on returning arm to side Active abduction  External rotation ROM 30 Internal rotation ROM 75 Apprehension - Impingement -  
Drop Arm Test -  
Neurovascular Intact TIME OUT: 
Chart reviewed for the following: 
 I, Jordy Segal MD, have reviewed the History, Physical and updated the Allergic reactions for Rosalva Aguilar TIME OUT performed immediately prior to start of procedure: 
Villa Mcardle, MD, have performed the following reviews on Rosalva Aguilar prior to the start of the procedure:         
* Patient was identified by name and date of birth * Agreement on procedure being performed was verified * Risks and Benefits explained to the patient * Procedure site verified and marked as necessary * Patient was positioned for comfort * Consent was obtained Time: 5:01 PM  
 
Date of procedure: 4/22/2019 Procedure performed by:  Jordy Segal MD 
Ms. Anali Lim tolerated the procedure well with no complications. RADIOGRAPHS: 
XR RIGHT KNEE 4/22/19 ANGEL IMPRESSION:  Three views - No fractures, no effusion, moderate medial joint space narrowing, + osteophytes present. IKDC Grade C IMPRESSION:   
  ICD-10-CM ICD-9-CM 1. Chronic right shoulder pain M25.511 719.41 betamethasone (CELESTONE SOLUSPAN) 6 mg/mL injection G89.29 338.29 BETAMETHASONE ACETATE & SODIUM PHOSPHATE INJECTION 3 MG EA.  
   DRAIN/INJECT LARGE JOINT/BURSA 2.  Chronic pain of right knee M25.561 719.46 AMB POC X-RAY KNEE 3 VIEW  
 G89.29 338.29   
 3. Acute bursitis of right shoulder M75.51 726.10 REFERRAL TO SPINE SURGERY  
   betamethasone (CELESTONE SOLUSPAN) 6 mg/mL injection BETAMETHASONE ACETATE & SODIUM PHOSPHATE INJECTION 3 MG EA.  
   DRAIN/INJECT LARGE JOINT/BURSA 4. Primary osteoarthritis of right knee M17.11 715.16   
5. Cervical radiculopathy M54.12 723.4 REFERRAL TO SPINE SURGERY 6. Lumbar radiculopathy M54.16 724.4 REFERRAL TO SPINE SURGERY  
 
PLAN:  After discussing treatment options, patient's right shoulder was injected with 4 cc Marcaine and 1/2 cc Celestone. She will follow up as needed. She will follow up at the spine center. There is no need for surgery at this time.  
 
Scribed by Reno Weeks ([address]) as dictated by Gaurang Rodriguez MD

## 2019-04-23 ENCOUNTER — CLINICAL SUPPORT (OUTPATIENT)
Dept: ONCOLOGY | Age: 54
End: 2019-04-23

## 2019-04-23 ENCOUNTER — HOSPITAL ENCOUNTER (OUTPATIENT)
Dept: ONCOLOGY | Age: 54
Discharge: HOME | End: 2019-04-23

## 2019-04-23 ENCOUNTER — HOSPITAL ENCOUNTER (OUTPATIENT)
Dept: INFUSION THERAPY | Age: 54
Discharge: HOME | End: 2019-04-23
Payer: MEDICARE

## 2019-04-23 VITALS
DIASTOLIC BLOOD PRESSURE: 75 MMHG | RESPIRATION RATE: 20 BRPM | SYSTOLIC BLOOD PRESSURE: 146 MMHG | OXYGEN SATURATION: 98 % | BODY MASS INDEX: 30.52 KG/M2 | HEART RATE: 106 BPM | HEIGHT: 71 IN | TEMPERATURE: 97.7 F | WEIGHT: 218 LBS

## 2019-04-23 DIAGNOSIS — M06.09 RHEUMATOID ARTHRITIS OF MULTIPLE SITES WITHOUT RHEUMATOID FACTOR (HCC): Primary | ICD-10-CM

## 2019-04-23 DIAGNOSIS — M06.09 RHEUMATOID ARTHRITIS OF MULTIPLE SITES WITHOUT RHEUMATOID FACTOR (HCC): ICD-10-CM

## 2019-04-23 LAB
ALBUMIN SERPL-MCNC: 3.6 G/DL (ref 3.4–5)
ALBUMIN/GLOB SERPL: 0.9 {RATIO} (ref 0.8–1.7)
ALP SERPL-CCNC: 125 U/L (ref 45–117)
ALT SERPL-CCNC: 20 U/L (ref 13–56)
ANION GAP SERPL CALC-SCNC: 13 MMOL/L (ref 3–18)
AST SERPL-CCNC: 8 U/L (ref 15–37)
BASO+EOS+MONOS # BLD AUTO: 0.5 K/UL (ref 0–2.3)
BASO+EOS+MONOS NFR BLD AUTO: 3 % (ref 0.1–17)
BILIRUB SERPL-MCNC: 0.2 MG/DL (ref 0.2–1)
BUN SERPL-MCNC: 18 MG/DL (ref 7–18)
BUN/CREAT SERPL: 18 (ref 12–20)
CALCIUM SERPL-MCNC: 9.5 MG/DL (ref 8.5–10.1)
CHLORIDE SERPL-SCNC: 103 MMOL/L (ref 100–108)
CO2 SERPL-SCNC: 22 MMOL/L (ref 21–32)
CREAT SERPL-MCNC: 1.02 MG/DL (ref 0.6–1.3)
DIFFERENTIAL METHOD BLD: ABNORMAL
ERYTHROCYTE [DISTWIDTH] IN BLOOD BY AUTOMATED COUNT: 13.5 % (ref 11.5–14.5)
GLOBULIN SER CALC-MCNC: 4.1 G/DL (ref 2–4)
GLUCOSE SERPL-MCNC: 357 MG/DL (ref 74–99)
HCT VFR BLD AUTO: 41.2 % (ref 36–48)
HGB BLD-MCNC: 13.6 G/DL (ref 12–16)
LYMPHOCYTES # BLD: 2.5 K/UL (ref 1.1–5.9)
LYMPHOCYTES NFR BLD: 17 % (ref 14–44)
MCH RBC QN AUTO: 27.5 PG (ref 25–35)
MCHC RBC AUTO-ENTMCNC: 33 G/DL (ref 31–37)
MCV RBC AUTO: 83.4 FL (ref 78–102)
NEUTS SEG # BLD: 12.2 K/UL (ref 1.8–9.5)
NEUTS SEG NFR BLD: 80 % (ref 40–70)
PLATELET # BLD AUTO: 479 K/UL (ref 140–440)
POTASSIUM SERPL-SCNC: 4.2 MMOL/L (ref 3.5–5.5)
PROT SERPL-MCNC: 7.7 G/DL (ref 6.4–8.2)
RBC # BLD AUTO: 4.94 M/UL (ref 4.1–5.1)
SODIUM SERPL-SCNC: 138 MMOL/L (ref 136–145)
WBC # BLD AUTO: 15.2 K/UL (ref 4.5–13)

## 2019-04-23 PROCEDURE — 74011250636 HC RX REV CODE- 250/636: Performed by: INTERNAL MEDICINE

## 2019-04-23 PROCEDURE — 96375 TX/PRO/DX INJ NEW DRUG ADDON: CPT

## 2019-04-23 PROCEDURE — 74011250636 HC RX REV CODE- 250/636: Performed by: NURSE PRACTITIONER

## 2019-04-23 PROCEDURE — 86140 C-REACTIVE PROTEIN: CPT

## 2019-04-23 PROCEDURE — 80053 COMPREHEN METABOLIC PANEL: CPT

## 2019-04-23 PROCEDURE — 85652 RBC SED RATE AUTOMATED: CPT

## 2019-04-23 PROCEDURE — 96415 CHEMO IV INFUSION ADDL HR: CPT

## 2019-04-23 PROCEDURE — 96413 CHEMO IV INFUSION 1 HR: CPT

## 2019-04-23 RX ORDER — SODIUM CHLORIDE 9 MG/ML
250 INJECTION, SOLUTION INTRAVENOUS CONTINUOUS
Status: DISCONTINUED | OUTPATIENT
Start: 2019-04-23 | End: 2019-04-24 | Stop reason: HOSPADM

## 2019-04-23 RX ORDER — DIPHENHYDRAMINE HYDROCHLORIDE 50 MG/ML
25 INJECTION, SOLUTION INTRAMUSCULAR; INTRAVENOUS ONCE
Status: COMPLETED | OUTPATIENT
Start: 2019-04-23 | End: 2019-04-23

## 2019-04-23 RX ORDER — SODIUM CHLORIDE 0.9 % (FLUSH) 0.9 %
10-40 SYRINGE (ML) INJECTION AS NEEDED
Status: DISCONTINUED | OUTPATIENT
Start: 2019-04-23 | End: 2019-04-27 | Stop reason: HOSPADM

## 2019-04-23 RX ADMIN — DIPHENHYDRAMINE HYDROCHLORIDE 25 MG: 50 INJECTION INTRAMUSCULAR; INTRAVENOUS at 10:48

## 2019-04-23 RX ADMIN — SODIUM CHLORIDE 250 ML: 900 INJECTION, SOLUTION INTRAVENOUS at 10:45

## 2019-04-23 RX ADMIN — INFLIXIMAB 400 MG: 100 INJECTION, POWDER, LYOPHILIZED, FOR SOLUTION INTRAVENOUS at 12:15

## 2019-04-23 RX ADMIN — Medication 10 ML: at 10:15

## 2019-04-23 NOTE — PROGRESS NOTES
[facility] Progress Note Date: 2019 Name: Amaris Murphy MRN: [id number] : 1965 Ms. Jayy Enriquez arrived in the [facility] today at [address], in stable condition, here for Q 6 Week, IV Remicade Infusion. She was assessed and education was provided. Ms. Juan Antonio Bach vitals were reviewed. Visit Vitals /70 (BP 1 Location: Left arm, BP Patient Position: Sitting) Pulse 100 Temp 98.7 °F (37.1 °C) Resp 16 Ht 5' 11\" (1.803 m) Wt 98.9 kg (218 lb) SpO2 98% Breastfeeding? No  
BMI 30.40 kg/m² PIV (#22G) was established in her posterior right lower forearm at 1015, without incident, and blood was drawn for a CBC, CMP, ESR, & CRP (2 lavender top tubes & 2 SST tubes), per order. (The CBC was processed in house, and the CMP, ESR, & CRP, were sent out to be processed. ) Lab results were obtained and reviewed, and the CBC results from today listed below, as well as the most recent CMP results from 19, were all noted to be satisfactory for treatment today. Dr. Jeromy Mathew was made aware of the slightly elevated WBC count listed below, and order was received from Dr. Jeromy Mathew, to proceed with the Remicade today, as planned. Recent Results (from the past 12 hour(s)) CBC WITH 3 PART DIFF Collection Time: 19 10:15 AM  
Result Value Ref Range WBC 15.2 (H) 4.5 - 13.0 K/uL  
 RBC 4.94 4.10 - 5.10 M/uL  
 HGB 13.6 12.0 - 16.0 g/dL HCT 41.2 36 - 48 % MCV 83.4 78 - 102 FL  
 MCH 27.5 25.0 - 35.0 PG  
 MCHC 33.0 31 - 37 g/dL  
 RDW 13.5 11.5 - 14.5 % PLATELET 758 (H) 480 - 440 K/uL NEUTROPHILS 80 (H) 40 - 70 % MIXED CELLS 3 0.1 - 17 % LYMPHOCYTES 17 14 - 44 % ABS. NEUTROPHILS 12.2 (H) 1.8 - 9.5 K/UL  
 ABS. MIXED CELLS 0.5 0.0 - 2.3 K/uL  
 ABS. LYMPHOCYTES 2.5 1.1 - 5.9 K/UL  
 DF AUTOMATED Pre-medication consisting of IV Benadryl 25 mg, was administered per order, and without incident.  
  
  
  
 Remicade 400 mg IV (4 mg/kg) Maintenance Dose, was administered over 2 hours, per order, and without incident.  
  
  
  
After the completion of the IV Remicade, Ms. Steven Meehan was monitored for 30 minutes post Remicade, per order, and also without incident.  
  
  
After the completion of the 30 minute monitoring period, the PIV was removed and gauze/bandaid was applied. Ms. Steven Meehan tolerated well, and had no complaints. Ms. Steven Meehan was discharged from [place] [address] in stable condition at 1510. ... She is to return in 6 weeks, on Tuesday, 6-4-19,  at 0900,  for her next appointment, for her next dose of IV Remicade. Alessio Mendoza RN April 23, 2019 
10:52 AM

## 2019-04-25 LAB — CRP SERPL-MCNC: 1.4 MG/DL (ref 0–0.3)

## 2019-04-29 DIAGNOSIS — G89.4 CHRONIC PAIN SYNDROME: ICD-10-CM

## 2019-04-30 ENCOUNTER — DOCUMENTATION ONLY (OUTPATIENT)
Dept: ONCOLOGY | Age: 54
End: 2019-04-30

## 2019-04-30 RX ORDER — NALOXONE HYDROCHLORIDE 4 MG/.1ML
SPRAY NASAL
Qty: 1 EACH | Refills: 1 | Status: SHIPPED | OUTPATIENT
Start: 2019-04-30

## 2019-04-30 RX ORDER — OXYCODONE AND ACETAMINOPHEN 10; 325 MG/1; MG/1
1 TABLET ORAL
Qty: 60 TAB | Refills: 0 | Status: SHIPPED | OUTPATIENT
Start: 2019-04-30 | End: 2019-05-13 | Stop reason: SDUPTHER

## 2019-04-30 NOTE — PROGRESS NOTES
Rx for Oxycodone-acetaminophen  mg given,  1 Tab every 6 hours PRN.  reviewed, patient taking Clonazepam, Naloxone Rx given. Patient denies misuse, no signs of adverse effects noted.        FRED Renner-C  4/30/2019  09:07

## 2019-05-13 DIAGNOSIS — G89.4 CHRONIC PAIN SYNDROME: ICD-10-CM

## 2019-05-13 RX ORDER — OXYCODONE AND ACETAMINOPHEN 10; 325 MG/1; MG/1
1 TABLET ORAL
Qty: 60 TAB | Refills: 0 | Status: SHIPPED | OUTPATIENT
Start: 2019-05-13 | End: 2019-05-28 | Stop reason: SDUPTHER

## 2019-05-14 ENCOUNTER — HOSPITAL ENCOUNTER (EMERGENCY)
Age: 54
Discharge: HOME | End: 2019-05-14
Attending: EMERGENCY MEDICINE

## 2019-05-14 ENCOUNTER — APPOINTMENT (OUTPATIENT)
Dept: GENERAL RADIOLOGY | Age: 54
End: 2019-05-14
Attending: PHYSICIAN ASSISTANT

## 2019-05-14 VITALS
TEMPERATURE: 98.1 F | WEIGHT: 215 LBS | BODY MASS INDEX: 30.1 KG/M2 | DIASTOLIC BLOOD PRESSURE: 78 MMHG | HEART RATE: 97 BPM | RESPIRATION RATE: 18 BRPM | HEIGHT: 71 IN | SYSTOLIC BLOOD PRESSURE: 129 MMHG | OXYGEN SATURATION: 97 %

## 2019-05-14 DIAGNOSIS — T07.XXXA MULTIPLE CONTUSIONS: Primary | ICD-10-CM

## 2019-05-14 PROCEDURE — 99283 EMERGENCY DEPT VISIT LOW MDM: CPT

## 2019-05-14 PROCEDURE — 73030 X-RAY EXAM OF SHOULDER: CPT

## 2019-05-14 RX ORDER — INSULIN GLARGINE 100 [IU]/ML
10 INJECTION, SOLUTION SUBCUTANEOUS
COMMUNITY
End: 2019-05-20

## 2019-05-14 RX ORDER — AMITRIPTYLINE HYDROCHLORIDE 25 MG/1
TABLET, FILM COATED ORAL
COMMUNITY
End: 2019-05-20

## 2019-05-14 RX ORDER — LOSARTAN POTASSIUM AND HYDROCHLOROTHIAZIDE 12.5; 5 MG/1; MG/1
1 TABLET ORAL DAILY
COMMUNITY
End: 2019-05-20

## 2019-05-14 RX ORDER — METFORMIN HYDROCHLORIDE 500 MG/1
500 TABLET ORAL 2 TIMES DAILY WITH MEALS
COMMUNITY
End: 2019-05-20

## 2019-05-14 RX ORDER — OXYCODONE AND ACETAMINOPHEN 10; 325 MG/1; MG/1
1 TABLET ORAL
COMMUNITY
End: 2019-05-20

## 2019-05-14 RX ORDER — CLONAZEPAM 2 MG/1
2 TABLET ORAL DAILY
COMMUNITY
End: 2019-05-20

## 2019-05-14 RX ORDER — ANAGRELIDE 0.5 MG/1
0.5 CAPSULE ORAL 2 TIMES DAILY
COMMUNITY
End: 2019-05-20

## 2019-05-14 RX ORDER — DULOXETIN HYDROCHLORIDE 20 MG/1
20 CAPSULE, DELAYED RELEASE ORAL 2 TIMES DAILY
COMMUNITY
End: 2019-05-20

## 2019-05-14 RX ORDER — ATENOLOL 25 MG/1
25 TABLET ORAL DAILY
COMMUNITY
End: 2019-05-20

## 2019-05-14 RX ORDER — QUETIAPINE FUMARATE 25 MG/1
25 TABLET, FILM COATED ORAL
COMMUNITY
End: 2019-05-20
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
## 2019-05-15 ENCOUNTER — APPOINTMENT (OUTPATIENT)
Dept: GENERAL RADIOLOGY | Age: 54
End: 2019-05-15
Attending: PHYSICIAN ASSISTANT
Payer: MEDICARE

## 2019-05-15 ENCOUNTER — HOSPITAL ENCOUNTER (EMERGENCY)
Age: 54
Discharge: HOME | End: 2019-05-15
Attending: EMERGENCY MEDICINE
Payer: MEDICARE

## 2019-05-15 VITALS
TEMPERATURE: 98.8 F | HEART RATE: 83 BPM | SYSTOLIC BLOOD PRESSURE: 131 MMHG | RESPIRATION RATE: 16 BRPM | DIASTOLIC BLOOD PRESSURE: 71 MMHG | OXYGEN SATURATION: 97 % | WEIGHT: 215 LBS | BODY MASS INDEX: 30.1 KG/M2 | HEIGHT: 71 IN

## 2019-05-15 DIAGNOSIS — M54.50 ACUTE BILATERAL LOW BACK PAIN WITHOUT SCIATICA: ICD-10-CM

## 2019-05-15 DIAGNOSIS — S80.00XA CONTUSION OF KNEE, UNSPECIFIED LATERALITY, INITIAL ENCOUNTER: Primary | ICD-10-CM

## 2019-05-15 PROCEDURE — 96372 THER/PROPH/DIAG INJ SC/IM: CPT

## 2019-05-15 PROCEDURE — 74011250636 HC RX REV CODE- 250/636: Performed by: PHYSICIAN ASSISTANT

## 2019-05-15 PROCEDURE — 99282 EMERGENCY DEPT VISIT SF MDM: CPT

## 2019-05-15 PROCEDURE — 72110 X-RAY EXAM L-2 SPINE 4/>VWS: CPT

## 2019-05-15 PROCEDURE — 99283 EMERGENCY DEPT VISIT LOW MDM: CPT

## 2019-05-15 PROCEDURE — 73590 X-RAY EXAM OF LOWER LEG: CPT

## 2019-05-15 PROCEDURE — 73560 X-RAY EXAM OF KNEE 1 OR 2: CPT

## 2019-05-15 RX ORDER — KETOROLAC TROMETHAMINE 10 MG/1
10 TABLET, FILM COATED ORAL
Qty: 20 TAB | Refills: 0 | Status: SHIPPED | OUTPATIENT
Start: 2019-05-15 | End: 2019-05-20

## 2019-05-15 RX ORDER — KETOROLAC TROMETHAMINE 30 MG/ML
15 INJECTION, SOLUTION INTRAMUSCULAR; INTRAVENOUS
Status: COMPLETED | OUTPATIENT
Start: 2019-05-15 | End: 2019-05-15

## 2019-05-15 RX ADMIN — KETOROLAC TROMETHAMINE 15 MG: 30 INJECTION, SOLUTION INTRAMUSCULAR at 15:09

## 2019-05-15 NOTE — ED NOTES
Shira Javier is a 52 y.o. female that was discharged in stable. Pt was accompanied by spouse. Pt is not driving. The patients diagnosis, condition and treatment were explained to  patient and aftercare instructions were given. The patient verbalized understanding. Patient armband removed and shredded.
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
## 2019-05-15 NOTE — DISCHARGE INSTRUCTIONS
Patient Education        Bruises: Care Instructions  Your Care Instructions    Bruises occur when small blood vessels under the skin tear or rupture, most often from a twist, bump, or fall. Blood leaks into tissues under the skin and causes a black-and-blue spot that often turns colors, including purplish black, reddish blue, or yellowish green, as the bruise heals. Bruises hurt, but most are not serious and will go away on their own within 2 to 4 weeks. Sometimes, gravity causes them to spread down the body. A leg bruise usually will take longer to heal than a bruise on the face or arms. Follow-up care is a key part of your treatment and safety. Be sure to make and go to all appointments, and call your doctor if you are having problems. It's also a good idea to know your test results and keep a list of the medicines you take. How can you care for yourself at home? · Take pain medicines exactly as directed. ? If the doctor gave you a prescription medicine for pain, take it as prescribed. ? If you are not taking a prescription pain medicine, ask your doctor if you can take an over-the-counter medicine. · Put ice or a cold pack on the area for 10 to 20 minutes at a time. Put a thin cloth between the ice and your skin. · If you can, prop up the bruised area on pillows as much as possible for the next few days. Try to keep the bruise above the level of your heart. When should you call for help? Call your doctor now or seek immediate medical care if:    · You have signs of infection, such as:  ? Increased pain, swelling, warmth, or redness. ? Red streaks leading from the bruise. ? Pus draining from the bruise. ? A fever.     · You have a bruise on your leg and signs of a blood clot, such as:  ? Increasing redness and swelling along with warmth, tenderness, and pain in the bruised area. ? Pain in your calf, back of the knee, thigh, or groin. ?  Redness and swelling in your leg or groin.     · Your pain gets worse.    Watch closely for changes in your health, and be sure to contact your doctor if:    · You do not get better as expected. Where can you learn more? Go to http://swapnil-speedy.info/. Enter [phone] in the search box to learn more about \"Bruises: Care Instructions. \"  Current as of: September 23, 2018  Content Version: 11.9  © 2520-9272 [employer]. Care instructions adapted under license by [employer] (which disclaims liability or warranty for this information). If you have questions about a medical condition or this instruction, always ask your healthcare professional. [place] [address] any warranty or liability for your use of this information. Patient Education        Contusion: Care Instructions  Your Care Instructions    Contusion is the medical term for a bruise. It is the result of a direct blow or an impact, such as a fall. Contusions are common sports injuries. Most people think of a bruise as a black-and-blue spot. This happens when small blood vessels get torn and leak blood under the skin. But bones, muscles, and organs can also get bruised. This may damage deep tissues but not cause a bruise you can see. The doctor will do a physical exam to find the location of your contusion. You may also have tests to make sure you do not have a more serious injury, such as a broken bone or nerve damage. These may include X-rays or other imaging tests like a CT scan or MRI. Deep-tissue contusions may cause pain and swelling. But if there is no serious damage, they will often get better in a few weeks with home treatment. The doctor has checked you carefully, but problems can develop later. If you notice any problems or new symptoms, get medical treatment right away. Follow-up care is a key part of your treatment and safety. Be sure to make and go to all appointments, and call your doctor if you are having problems.  It's also a good idea to know your test results and keep a list of the medicines you take. How can you care for yourself at home? · Put ice or a cold pack on the sore area for 10 to 20 minutes at a time to stop swelling. Put a thin cloth between the ice pack and your skin. · Be safe with medicines. Read and follow all instructions on the label. ? If the doctor gave you a prescription medicine for pain, take it as prescribed. ? If you are not taking a prescription pain medicine, ask your doctor if you can take an over-the-counter medicine. · If you can, prop up the sore area on pillows as much as possible for the next few days. Try to keep the sore area above the level of your heart. When should you call for help? Call your doctor now or seek immediate medical care if:    · Your pain gets worse.     · You have new or worse swelling.     · You have tingling, weakness, or numbness in the area near the contusion.     · The area near the contusion is cold or pale.    Watch closely for changes in your health, and be sure to contact your doctor if:    · You do not get better as expected. Where can you learn more? Go to http://swapnil-speedy.info/. Enter S779 in the search box to learn more about \"Contusion: Care Instructions. \"  Current as of: September 23, 2018  Content Version: 11.9  © 0930-8213 [employer]. Care instructions adapted under license by [employer] (which disclaims liability or warranty for this information). If you have questions about a medical condition or this instruction, always ask your healthcare professional. [place] [address] any warranty or liability for your use of this information.
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
## 2019-05-28 ENCOUNTER — APPOINTMENT (OUTPATIENT)
Dept: INFUSION THERAPY | Age: 54
End: 2019-05-28
Payer: MEDICARE

## 2019-05-28 ENCOUNTER — OFFICE VISIT (OUTPATIENT)
Dept: ONCOLOGY | Age: 54
End: 2019-05-28

## 2019-05-28 ENCOUNTER — HOSPITAL ENCOUNTER (OUTPATIENT)
Dept: LAB | Age: 54
Discharge: HOME | End: 2019-05-28
Payer: MEDICARE

## 2019-05-28 ENCOUNTER — HOSPITAL ENCOUNTER (OUTPATIENT)
Dept: ONCOLOGY | Age: 54
Discharge: HOME | End: 2019-05-28

## 2019-05-28 VITALS
SYSTOLIC BLOOD PRESSURE: 120 MMHG | DIASTOLIC BLOOD PRESSURE: 79 MMHG | WEIGHT: 217.6 LBS | RESPIRATION RATE: 16 BRPM | HEART RATE: 109 BPM | BODY MASS INDEX: 30.46 KG/M2 | HEIGHT: 71 IN | OXYGEN SATURATION: 100 % | TEMPERATURE: 98.8 F

## 2019-05-28 DIAGNOSIS — D72.829 LEUKOCYTOSIS, UNSPECIFIED TYPE: ICD-10-CM

## 2019-05-28 DIAGNOSIS — D47.3 ESSENTIAL THROMBOCYTOSIS (HCC): Primary | ICD-10-CM

## 2019-05-28 DIAGNOSIS — M06.09 RHEUMATOID ARTHRITIS OF MULTIPLE SITES WITHOUT RHEUMATOID FACTOR (HCC): ICD-10-CM

## 2019-05-28 DIAGNOSIS — D64.9 CHRONIC ANEMIA: ICD-10-CM

## 2019-05-28 DIAGNOSIS — G89.4 CHRONIC PAIN SYNDROME: ICD-10-CM

## 2019-05-28 DIAGNOSIS — M79.7 FIBROMYALGIA: ICD-10-CM

## 2019-05-28 LAB
ALBUMIN SERPL-MCNC: 3.5 G/DL (ref 3.4–5)
ALBUMIN/GLOB SERPL: 0.9 {RATIO} (ref 0.8–1.7)
ALP SERPL-CCNC: 137 U/L (ref 45–117)
ALT SERPL-CCNC: 21 U/L (ref 13–56)
ANION GAP SERPL CALC-SCNC: 9 MMOL/L (ref 3–18)
AST SERPL-CCNC: 11 U/L (ref 15–37)
BASO+EOS+MONOS # BLD AUTO: 0.8 K/UL (ref 0–2.3)
BASO+EOS+MONOS NFR BLD AUTO: 8 % (ref 0.1–17)
BILIRUB SERPL-MCNC: 0.4 MG/DL (ref 0.2–1)
BUN SERPL-MCNC: 14 MG/DL (ref 7–18)
BUN/CREAT SERPL: 16 (ref 12–20)
CALCIUM SERPL-MCNC: 9.2 MG/DL (ref 8.5–10.1)
CHLORIDE SERPL-SCNC: 101 MMOL/L (ref 100–108)
CO2 SERPL-SCNC: 25 MMOL/L (ref 21–32)
CREAT SERPL-MCNC: 0.86 MG/DL (ref 0.6–1.3)
DIFFERENTIAL METHOD BLD: NORMAL
ERYTHROCYTE [DISTWIDTH] IN BLOOD BY AUTOMATED COUNT: 13.8 % (ref 11.5–14.5)
FERRITIN SERPL-MCNC: 103 NG/ML (ref 8–388)
GLOBULIN SER CALC-MCNC: 3.7 G/DL (ref 2–4)
GLUCOSE SERPL-MCNC: 464 MG/DL (ref 74–99)
HCT VFR BLD AUTO: 42.6 % (ref 36–48)
HGB BLD-MCNC: 13.9 G/DL (ref 12–16)
IRON SATN MFR SERPL: 18 %
IRON SERPL-MCNC: 51 UG/DL (ref 50–175)
LYMPHOCYTES # BLD: 2.6 K/UL (ref 1.1–5.9)
LYMPHOCYTES NFR BLD: 26 % (ref 14–44)
MCH RBC QN AUTO: 27.5 PG (ref 25–35)
MCHC RBC AUTO-ENTMCNC: 32.6 G/DL (ref 31–37)
MCV RBC AUTO: 84.2 FL (ref 78–102)
NEUTS SEG # BLD: 6.6 K/UL (ref 1.8–9.5)
NEUTS SEG NFR BLD: 66 % (ref 40–70)
PLATELET # BLD AUTO: 416 K/UL (ref 140–440)
POTASSIUM SERPL-SCNC: 4.2 MMOL/L (ref 3.5–5.5)
PROT SERPL-MCNC: 7.2 G/DL (ref 6.4–8.2)
RBC # BLD AUTO: 5.06 M/UL (ref 4.1–5.1)
SODIUM SERPL-SCNC: 135 MMOL/L (ref 136–145)
TIBC SERPL-MCNC: 287 UG/DL (ref 250–450)
WBC # BLD AUTO: 10 K/UL (ref 4.5–13)

## 2019-05-28 PROCEDURE — 36415 COLL VENOUS BLD VENIPUNCTURE: CPT

## 2019-05-28 PROCEDURE — 83540 ASSAY OF IRON: CPT

## 2019-05-28 PROCEDURE — 80053 COMPREHEN METABOLIC PANEL: CPT

## 2019-05-28 PROCEDURE — 82728 ASSAY OF FERRITIN: CPT

## 2019-05-28 RX ORDER — OXYCODONE AND ACETAMINOPHEN 10; 325 MG/1; MG/1
1 TABLET ORAL
Qty: 60 TAB | Refills: 0 | Status: SHIPPED | OUTPATIENT
Start: 2019-05-28 | End: 2019-06-13 | Stop reason: SDUPTHER

## 2019-05-28 NOTE — PATIENT INSTRUCTIONS
Anemia: Care Instructions Your Care Instructions Anemia is a low level of red blood cells, which carry oxygen throughout your body. Many things can cause anemia. Lack of iron is one of the most common causes. Your body needs iron to make hemoglobin, a substance in red blood cells that carries oxygen from the lungs to your body's cells. Without enough iron, the body produces fewer and smaller red blood cells. As a result, your body's cells do not get enough oxygen, and you feel tired and weak. And you may have trouble concentrating. Bleeding is the most common cause of a lack of iron. You may have heavy menstrual bleeding or bleeding caused by conditions such as ulcers, hemorrhoids, or cancer. Regular use of aspirin or other anti-inflammatory medicines (such as ibuprofen) also can cause bleeding in some people. A lack of iron in your diet also can cause anemia, especially at times when the body needs more iron, such as during pregnancy, infancy, and the teen years. Your doctor may have prescribed iron pills. It may take several months of treatment for your iron levels to return to normal. Your doctor also may suggest that you eat foods that are rich in iron, such as meat and beans. There are many other causes of anemia. It is not always due to a lack of iron. Finding the specific cause of your anemia will help your doctor find the right treatment for you. Follow-up care is a key part of your treatment and safety. Be sure to make and go to all appointments, and call your doctor if you are having problems. It's also a good idea to know your test results and keep a list of the medicines you take. How can you care for yourself at home? · Take your medicines exactly as prescribed. Call your doctor if you think you are having a problem with your medicine. · If your doctor recommends iron pills, take them as directed: ? Try to take the pills on an empty stomach about 1 hour before or 2 hours after meals. But you may need to take iron with food to avoid an upset stomach. ? Do not take antacids or drink milk or caffeine drinks (such as coffee, tea, or cola) at the same time or within 2 hours of the time that you take your iron. They can make it hard for your body to absorb the iron. ? Vitamin C (from food or supplements) helps your body absorb iron. Try taking iron pills with a glass of orange juice or some other food that is high in vitamin C, such as citrus fruits. ? Iron pills may cause stomach problems, such as heartburn, nausea, diarrhea, constipation, and cramps. Be sure to drink plenty of fluids, and include fruits, vegetables, and fiber in your diet each day. Iron pills often make your bowel movements dark or green. ? If you forget to take an iron pill, do not take a double dose of iron the next time you take a pill. ? Keep iron pills out of the reach of small children. An overdose of iron can be very dangerous. · Follow your doctor's advice about eating iron-rich foods. These include red meat, shellfish, poultry, eggs, beans, raisins, whole-grain bread, and leafy green vegetables. · Steam vegetables to help them keep their iron content. When should you call for help? Call 911 anytime you think you may need emergency care. For example, call if: 
  · You have symptoms of a heart attack. These may include: 
? Chest pain or pressure, or a strange feeling in the chest. 
? Sweating. ? Shortness of breath. ? Nausea or vomiting. ? Pain, pressure, or a strange feeling in the back, neck, jaw, or upper belly or in one or both shoulders or arms. ? Lightheadedness or sudden weakness. ? A fast or irregular heartbeat. After you call 911, the  may tell you to chew 1 adult-strength or 2 to 4 low-dose aspirin. Wait for an ambulance. Do not try to drive yourself.  
  · You passed out (lost consciousness).  
 Call your doctor now or seek immediate medical care if:   · You have new or increased shortness of breath.  
  · You are dizzy or lightheaded, or you feel like you may faint.  
  · Your fatigue and weakness continue or get worse.  
  · You have any abnormal bleeding, such as: 
? Nosebleeds. ? Vaginal bleeding that is different (heavier, more frequent, at a different time of the month) than what you are used to. 
? Bloody or black stools, or rectal bleeding. ? Bloody or pink urine.  
 Watch closely for changes in your health, and be sure to contact your doctor if: 
  · You do not get better as expected. Where can you learn more? Go to http://swapnil-speedy.info/. Enter R301 in the search box to learn more about \"Anemia: Care Instructions. \" Current as of: May 6, 2018 Content Version: 11.9 © 5039-1149 [employer]. Care instructions adapted under license by [employer] (which disclaims liability or warranty for this information). If you have questions about a medical condition or this instruction, always ask your healthcare professional. [place] [address] any warranty or liability for your use of this information.

## 2019-05-28 NOTE — PROGRESS NOTES
Hematology/Oncology  Progress Note    Name: Tunde Fay  Date: 2019  : 1965    PCP: Mark Burgos MD     Ms. Damon Cristina is a 52year old female who was seen for management of her rheumatoid arthritis, leukocytosis, and thrombocytosis. Current therapy: Remicade 4 mg/kg bodyweight ever 6 weeks intravenously    Subjective:     Ms. Damon Cristina is a 52year-old [facility] American woman who has severe rheumatoid arthritis. She receives Remicade every 6 weeks. Today the patient is complaining of her back pain and knee pain. She informed me that she is been told that she may need back surgery but she is reluctant to proceed with this recommendation. She is requesting a new prescription today for her pain medication. Otherwise she is essentially unchanged from prior. Past Medical History:   Diagnosis Date    Abdominal pain, unspecified site     Acute otitis media     Acute pharyngitis     Anxiety     Arthritis     Autoimmune disease ([address]     Back injury     Backache     herniated disc in lower back    Blurred vision     Chest pain 2018    Chest pain, unspecified     abnormal EKG    Chronic airway obstruction, not elsewhere classified     Chronic back pain     Chronic pain     COPD     Depression     Diabetes (HCC)     Dizziness     Dizziness and giddiness     possible orthostatic changes, vasovagal, autonomic dysfunction from diabetic neuropathy    Encounters for unspecified administrative purpose     Essential thrombocytosis ([address] 2016    Feeling anxious     Fibromyalgia     Headache(784.0)     Hemorrhoid     Herniated disc     at L5    Hypercholesterolemia     Hyperglycemia     Hypertension     Joint pain     Leukocytosis, unspecified     Major depressive disorder, single episode, mild (HCC)     Mental disorder     depression, anxiety, bipolar and PTSD    Neuropathy     Obesity, unspecified     Other chest pain     Palpitation 2018    ? arrhythmia    Phobic disorders     Rheumatoid arthritis (HCC)     Rheumatoid arthritis of foot ([facility] 2018    on treatment    Seasonal allergies     Shortness of breath     normal EF    Smoking 2018    discussed cessation    Streptococcal sore throat     Type 2 diabetes mellitus without complication, with long-term current use of insulin ([facility] 2018    Type II or unspecified type diabetes mellitus with unspecified complication, uncontrolled     Unspecified hereditary and idiopathic peripheral neuropathy     Vitamin D deficiency      Past Surgical History:   Procedure Laterality Date    HX CHOLECYSTECTOMY      HX GYN      partial Hysterectomy    HX OTHER SURGICAL  12-1-15    had ingrown toenail removed from big toe, both feet    HX TUBAL LIGATION           Social History     Socioeconomic History    Marital status:      Spouse name: Not on file    Number of children: Not on file    Years of education: Not on file    Highest education level: Not on file   Occupational History    Not on file   Social Needs    Financial resource strain: Not on file    Food insecurity:     Worry: Not on file     Inability: Not on file    Transportation needs:     Medical: Not on file     Non-medical: Not on file   Tobacco Use    Smoking status: Current Every Day Smoker     Packs/day: 0.25     Last attempt to quit: 2012     Years since quittin.8    Smokeless tobacco: Never Used    Tobacco comment: 5 cigarettes day   Substance and Sexual Activity    Alcohol use: Yes     Comment: socially    Drug use: No    Sexual activity: Yes     Partners: Male     Birth control/protection: Condom   Lifestyle    Physical activity:     Days per week: Not on file     Minutes per session: Not on file    Stress: Not on file   Relationships    Social connections:     Talks on phone: Not on file     Gets together: Not on file     Attends Holiness service: Not on file     Active member of club or organization: Not on file     Attends meetings of clubs or organizations: Not on file     Relationship status: Not on file    Intimate partner violence:     Fear of current or ex partner: Not on file     Emotionally abused: Not on file     Physically abused: Not on file     Forced sexual activity: Not on file   Other Topics Concern    Not on file   Social History Narrative    Not on file     Family History   Problem Relation Age of Onset    Diabetes Other     Alcohol abuse Mother     Arthritis-osteo Mother     Diabetes Mother     Elevated Lipids Mother     Headache Mother     Heart Disease Mother     Migraines Mother     Psychiatric Disorder Mother     Alcohol abuse Father     Arthritis-osteo Father     Cancer Father     Headache Father     Heart Disease Father     Lung Disease Father     Migraines Father     Heart Surgery Father     Alcohol abuse Sister     Headache Sister     Diabetes Sister     Heart Disease Sister     Migraines Sister     Psychiatric Disorder Sister     Headache Brother     Diabetes Brother     Elevated Lipids Brother     Heart Disease Brother     Migraines Brother     Psychiatric Disorder Brother     Coronary Artery Disease Brother     Cancer Maternal Aunt     Alcohol abuse Maternal Aunt     Diabetes Maternal Aunt     Headache Maternal Aunt     Lung Disease Maternal Aunt     Migraines Maternal Aunt     Headache Maternal Uncle     Migraines Maternal Uncle     Stroke Maternal Uncle     Psychiatric Disorder Maternal Uncle     Headache Paternal Aunt     Migraines Paternal Aunt     Headache Paternal Uncle     Hypertension Paternal Uncle     Migraines Paternal Uncle     Stroke Paternal Uncle     Headache Maternal Grandmother     Migraines Maternal Grandmother     Stroke Maternal Grandmother     Headache Maternal Grandfather     Migraines Maternal Grandfather     Stroke Maternal Grandfather     Headache Paternal Grandmother     Hypertension Paternal Grandmother     Lung Disease Paternal Grandmother     Migraines Paternal Grandmother     Cancer Paternal Grandmother     Headache Paternal Grandfather     Cancer Paternal Grandfather     Migraines Paternal Grandfather      Current Outpatient Medications   Medication Sig Dispense Refill    oxyCODONE-acetaminophen (PERCOCET 10)  mg per tablet Take 1 Tab by mouth every six (6) hours as needed for Pain for up to 30 days. Max Daily Amount: 4 Tabs. 60 Tab 0    naloxone (NARCAN) 4 mg/actuation nasal spray Use 1 spray intranasally, then discard. Repeat with new spray every 2 min as needed for opioid overdose symptoms, alternating nostrils. Indications: Decrease in Rate & Depth of Breathing due to Opioid Drug, opioid overdose 1 Each 1    anagrelide (AGRYLIN) 0.5 mg capsule Take 1 Cap by mouth two (2) times a day. 60 Cap 6    amitriptyline (ELAVIL) 25 mg tablet       clotrimazole-betamethasone (LOTRISONE) topical cream       DULoxetine (CYMBALTA) 30 mg capsule       gabapentin (NEURONTIN) 300 mg capsule 300 mg two (2) times a day.  QUEtiapine (SEROQUEL) 25 mg tablet       BD INSULIN SYRINGE ULTRA-FINE 1 mL 31 gauge x 15/64\" syrg       albuterol sulfate (PROVENTIL;VENTOLIN) 2.5 mg/0.5 mL nebu nebulizer solution by Nebulization route once. Is now using the actual nebulizer machine, for current bronchitis episode (Aug/Sept. 2017)      insulin aspart protamine/insulin aspart (NOVOLOG MIX 70-30) 100 unit/mL (70-30) injection 10 Units by SubCUTAneous route two (2) times a day.  clonazePAM (KLONOPIN) 2 mg tablet Take 2 mg by mouth daily. Indications: PANIC DISORDER      folic acid 664 mcg tablet Take 400 mcg by mouth daily.  albuterol (PROAIR HFA) 90 mcg/actuation inhaler Take 1 Puff by inhalation every six (6) hours as needed for Wheezing.  1 Inhaler 0    ondansetron hcl (ZOFRAN, AS HYDROCHLORIDE,) 4 mg tablet Take 1 Tab by mouth every eight (8) hours as needed for Nausea. 12 Tab 0    methotrexate (RHEUMATREX) 2.5 mg tablet Take 2.5 mg by mouth every fourty-eight (48) hours.  metFORMIN (GLUCOPHAGE) 1,000 mg tablet Take 500 mg by mouth two (2) times a day.  Cholecalciferol, Vitamin D3, 5,000 unit Tab Take 5,000 Units by mouth every seven (7) days.  atenolol (TENORMIN) 25 mg tablet Take 25 mg by mouth daily. Indications: HYPERTENSION      hydroxychloroquine (PLAQUENIL) 200 mg tablet Take 200 mg by mouth two (2) times a day.  furosemide (LASIX) 40 mg tablet Take  by mouth daily.  INFLIXIMAB (REMICADE IV) 344 mg by IntraVENous route once as needed. remicade will be every 6 weeks      losartan-hydrochlorothiazide (HYZAAR) 50-12.5 mg per tablet Take 1 Tab by mouth daily.  INSULIN GLARGINE,HUM. REC. ANLOG (LANTUS SC) 5 Units by SubCUTAneous route daily. Facility-Administered Medications Ordered in Other Visits   Medication Dose Route Frequency Provider Last Rate Last Dose    [START ON 6/4/2019] inFLIXimab (REMICADE) 400 mg in 0.9% sodium chloride 250 mL infusion  400 mg IntraVENous ONCE TITR Blanquita Hernandez, NP           Review of Systems  Constitutional: The patient has no acute distress or discomfort. HEENT: The patient denies recent head trauma, eye pain, blurred vision,  hearing deficit, oropharyngeal mucosal pain or lesions, and the patient denies throat pain or discomfort. Lymphatics: The patient denies palpable peripheral lymphadenopathy. Hematologic: The patient denies having bruising, bleeding, or progressive fatigue. Respiratory: Patient denies having shortness of breath, cough, sputum production, fever, or dyspnea on exertion. Cardiovascular: The patient denies having leg pain, leg swelling, heart palpitations, chest permit, chest pain, or lightheadedness. The patient denies having dyspnea on exertion.   Breast: The patient has a sebaceous cyst involving the right breast.  Gastrointestinal: The patient denies having nausea, emesis, or diarrhea. The patient denies having any hematemesis or blood in the stool. Genitourinary: Patient denies having urinary urgency, frequency, or dysuria. The patient denies having blood in the urine. Psychological: The patient denies having symptoms of nervousness, anxiety, depression, or thoughts of harming himself some of this. Skin: Patient denies having skin rashes, skin, ulcerations, or unexplained itching or pruritus. Musculoskeletal: The patient complains of generalized back pain. Objective:     Visit Vitals  /79   Pulse (!) 109   Temp 98.8 °F (37.1 °C) (Oral)   Resp 16   Ht 5' 11\" (1.803 m)   Wt 98.7 kg (217 lb 9.6 oz)   SpO2 100%   BMI 30.35 kg/m²     ECOG PS=0 Pain score 4/10     Physical Exam:   Gen. Appearance: The patient is in no acute distress. Skin: There is no bruise or rash. HEENT: The exam is unremarkable. Neck: Supple without lymphadenopathy or thyromegaly. Lungs: Clear to auscultation and percussion; there are no wheezes or rhonchi. Heart: Regular rate and rhythm; there are no murmurs, gallops, or rubs. Anterior chest wall and breast: Patient has a sebaceous cyst that has apparently drained from the underside of the right breast abutting the right anterior chest wall. Abdomen: Bowel sounds are present and normal.  There is no guarding, tenderness, or hepatosplenomegaly. Extremities: There is no clubbing, cyanosis, or edema. Neurologic: There are no focal neurologic deficits. Lymphatics: There is no palpable peripheral lymphadenopathy. Musculoskeletal: The patient has full range of motion at all joints. However, she complains of pain on ambulation due to the severe rheumatoid arthritis. There is  evidence of joint deformity or effusions in the hands and knees. There is no focal joint tenderness. She is using a cane for support and mobility. Psychological/psychiatric: There is no clinical evidence of anxiety, depression, or melancholy.     Lab data:      Results for orders placed or performed during the hospital encounter of 05/28/19   CBC WITH 3 PART DIFF     Status: None   Result Value Ref Range Status    WBC 10.0 4.5 - 13.0 K/uL Final    RBC 5.06 4.10 - 5.10 M/uL Final    HGB 13.9 12.0 - 16.0 g/dL Final    HCT 42.6 36 - 48 % Final    MCV 84.2 78 - 102 FL Final    MCH 27.5 25.0 - 35.0 PG Final    MCHC 32.6 31 - 37 g/dL Final    RDW 13.8 11.5 - 14.5 % Final    PLATELET 814 141 - 524 K/uL Final    NEUTROPHILS 66 40 - 70 % Final    MIXED CELLS 8 0.1 - 17 % Final    LYMPHOCYTES 26 14 - 44 % Final    ABS. NEUTROPHILS 6.6 1.8 - 9.5 K/UL Final    ABS. MIXED CELLS 0.8 0.0 - 2.3 K/uL Final    ABS. LYMPHOCYTES 2.6 1.1 - 5.9 K/UL Final     Comment: Test performed at [address] [place] [address] or Outpatient Infusion Center Location. Reviewed by Medical Director. DF AUTOMATED   Final           Assessment:     1. Essential thrombocytosis ([facility]    2. Chronic anemia    3. Leukocytosis, unspecified type    4. Chronic pain syndrome    5. Rheumatoid arthritis of multiple sites without rheumatoid factor ([facility]    6. Fibromyalgia      Plan:   Leukocytosis (recurrent and persisting): I have explained to the patient that the etiology of her leukocytosis remains undefined. A previous flow cytometry did not reveal any  evidence of an immunophenotypic abnormality such as an evolving chronic lymphoproliferative disorder or myeloproliferative disorder. The CBCs will continue to be monitored every 6 weeks. The CBC from today shows her WBC count is currently 10, the absolute neutrophil count is 6.6 and the absolute lymphocyte count is 2.6. Essential thrombocytosis/thrombocytosis: The current CBC shows that the platelet count is now 416,000. The platelet count is being monitored every 6 weeks. The patient was previously started on anagrelide 0.5 mg one tablet twice daily. This medication will be continued, at the current dose.  I have reenforced to the patient the importance of being complaint with the treatment plan. Rheumatoid arthritis: the patient will continue to receive Remicade as a primary treatment modality for her severe rheumatoid arthritis every 6 weeks. Remicade is scheduled to resume on 7/5/2019. This medication is on hold until patient is finished antibiotic. The dose of Remicade will remain 344 mg given intravenously. The patient has continued to take  Methotrexate 5 mg p.o. daily and Plaquenil 200 mg twice daily. Fibromyalgia/chronic pain syndrome: The patient  continues to have generalized pain at times related to her underlying fibromyalgia. The patient receives her Percocet  mg medication on a monthly basis as needed. A new prescription for the Percocet was provided at this time. Chronic anemia: I have explained to the patient the CBC from today shows her hemoglobin has remained normal at 13.9 g/dL with hematocrit of 42.6 %. I have recommended that she continue to take ferrous sulfate 325 mg by mouth once daily. We will see the patient back in 6 weeks for a complete reassessment. Orders Placed This Encounter    COMPLETE CBC & AUTO DIFF WBC    InHouse CBC ([employer])     Standing Status:   Future     Number of Occurrences:   1     Standing Expiration Date:   6/5/2382    METABOLIC PANEL, COMPREHENSIVE     Standing Status:   Future     Standing Expiration Date:   5/28/2020    IRON PROFILE     Standing Status:   Future     Standing Expiration Date:   5/28/2020    FERRITIN     Standing Status:   Future     Standing Expiration Date:   5/28/2020    oxyCODONE-acetaminophen (PERCOCET 10)  mg per tablet     Sig: Take 1 Tab by mouth every six (6) hours as needed for Pain for up to 30 days. Max Daily Amount: 4 Tabs. Dispense:  60 Tab     Refill:  0     Rey Ramirez MD, [place]  5/28/2019

## 2019-05-29 ENCOUNTER — TELEPHONE (OUTPATIENT)
Dept: ONCOLOGY | Age: 54
End: 2019-05-29

## 2019-05-29 NOTE — TELEPHONE ENCOUNTER
Jennie Manzo at [facility] lab called with a critical value for the patient. The patient's Glucose tested at 464.

## 2019-05-30 ENCOUNTER — DOCUMENTATION ONLY (OUTPATIENT)
Dept: ONCOLOGY | Age: 54
End: 2019-05-30

## 2019-05-30 NOTE — PROGRESS NOTES
Patient was called yesterday concerning her blood glucose value of 464 mg/dl from 5/28/2019. The patient reported that she feels fine. She also said that she uses insulin and that she monitors her blood sugar on a daily basis before she administer insulin. She said that her blood sugar reading is currently 149 mg/dl.

## 2019-06-04 ENCOUNTER — HOSPITAL ENCOUNTER (OUTPATIENT)
Dept: ONCOLOGY | Age: 54
Discharge: HOME | End: 2019-06-04

## 2019-06-04 ENCOUNTER — CLINICAL SUPPORT (OUTPATIENT)
Dept: ONCOLOGY | Age: 54
End: 2019-06-04

## 2019-06-04 ENCOUNTER — HOSPITAL ENCOUNTER (OUTPATIENT)
Dept: INFUSION THERAPY | Age: 54
Discharge: HOME | End: 2019-06-04
Payer: MEDICARE

## 2019-06-04 VITALS
WEIGHT: 221 LBS | HEIGHT: 71 IN | TEMPERATURE: 97.1 F | HEART RATE: 87 BPM | RESPIRATION RATE: 16 BRPM | DIASTOLIC BLOOD PRESSURE: 77 MMHG | OXYGEN SATURATION: 98 % | BODY MASS INDEX: 30.94 KG/M2 | SYSTOLIC BLOOD PRESSURE: 129 MMHG

## 2019-06-04 DIAGNOSIS — M06.09 RHEUMATOID ARTHRITIS OF MULTIPLE SITES WITHOUT RHEUMATOID FACTOR (HCC): ICD-10-CM

## 2019-06-04 DIAGNOSIS — M06.09 RHEUMATOID ARTHRITIS OF MULTIPLE SITES WITHOUT RHEUMATOID FACTOR (HCC): Primary | ICD-10-CM

## 2019-06-04 LAB
ALBUMIN SERPL-MCNC: 3.3 G/DL (ref 3.4–5)
ALBUMIN/GLOB SERPL: 0.9 {RATIO} (ref 0.8–1.7)
ALP SERPL-CCNC: 122 U/L (ref 45–117)
ALT SERPL-CCNC: 17 U/L (ref 13–56)
ANION GAP SERPL CALC-SCNC: 8 MMOL/L (ref 3–18)
AST SERPL-CCNC: 6 U/L (ref 15–37)
BASO+EOS+MONOS # BLD AUTO: 0.5 K/UL (ref 0–2.3)
BASO+EOS+MONOS NFR BLD AUTO: 5 % (ref 0.1–17)
BILIRUB SERPL-MCNC: 0.3 MG/DL (ref 0.2–1)
BUN SERPL-MCNC: 18 MG/DL (ref 7–18)
BUN/CREAT SERPL: 25 (ref 12–20)
CALCIUM SERPL-MCNC: 8.9 MG/DL (ref 8.5–10.1)
CHLORIDE SERPL-SCNC: 106 MMOL/L (ref 100–108)
CO2 SERPL-SCNC: 24 MMOL/L (ref 21–32)
CREAT SERPL-MCNC: 0.73 MG/DL (ref 0.6–1.3)
DIFFERENTIAL METHOD BLD: ABNORMAL
ERYTHROCYTE [DISTWIDTH] IN BLOOD BY AUTOMATED COUNT: 13.3 % (ref 11.5–14.5)
ERYTHROCYTE [SEDIMENTATION RATE] IN BLOOD: 40 MM/HR (ref 0–30)
GLOBULIN SER CALC-MCNC: 3.7 G/DL (ref 2–4)
GLUCOSE SERPL-MCNC: 331 MG/DL (ref 74–99)
HCT VFR BLD AUTO: 40.2 % (ref 36–48)
HGB BLD-MCNC: 13.3 G/DL (ref 12–16)
LYMPHOCYTES # BLD: 2.3 K/UL (ref 1.1–5.9)
LYMPHOCYTES NFR BLD: 24 % (ref 14–44)
MCH RBC QN AUTO: 27.8 PG (ref 25–35)
MCHC RBC AUTO-ENTMCNC: 33.1 G/DL (ref 31–37)
MCV RBC AUTO: 83.9 FL (ref 78–102)
NEUTS SEG # BLD: 6.8 K/UL (ref 1.8–9.5)
NEUTS SEG NFR BLD: 71 % (ref 40–70)
PLATELET # BLD AUTO: 385 K/UL (ref 140–440)
POTASSIUM SERPL-SCNC: 4.2 MMOL/L (ref 3.5–5.5)
PROT SERPL-MCNC: 7 G/DL (ref 6.4–8.2)
RBC # BLD AUTO: 4.79 M/UL (ref 4.1–5.1)
SODIUM SERPL-SCNC: 138 MMOL/L (ref 136–145)
WBC # BLD AUTO: 9.6 K/UL (ref 4.5–13)

## 2019-06-04 PROCEDURE — 86140 C-REACTIVE PROTEIN: CPT

## 2019-06-04 PROCEDURE — 96375 TX/PRO/DX INJ NEW DRUG ADDON: CPT

## 2019-06-04 PROCEDURE — 74011250636 HC RX REV CODE- 250/636: Performed by: NURSE PRACTITIONER

## 2019-06-04 PROCEDURE — 96413 CHEMO IV INFUSION 1 HR: CPT

## 2019-06-04 PROCEDURE — 36415 COLL VENOUS BLD VENIPUNCTURE: CPT

## 2019-06-04 PROCEDURE — 80053 COMPREHEN METABOLIC PANEL: CPT

## 2019-06-04 PROCEDURE — 74011250636 HC RX REV CODE- 250/636: Performed by: INTERNAL MEDICINE

## 2019-06-04 PROCEDURE — 85652 RBC SED RATE AUTOMATED: CPT

## 2019-06-04 PROCEDURE — 96415 CHEMO IV INFUSION ADDL HR: CPT

## 2019-06-04 RX ORDER — SODIUM CHLORIDE 0.9 % (FLUSH) 0.9 %
10-40 SYRINGE (ML) INJECTION AS NEEDED
Status: DISCONTINUED | OUTPATIENT
Start: 2019-06-04 | End: 2019-06-08 | Stop reason: HOSPADM

## 2019-06-04 RX ORDER — DIPHENHYDRAMINE HYDROCHLORIDE 50 MG/ML
25 INJECTION, SOLUTION INTRAMUSCULAR; INTRAVENOUS ONCE
Status: COMPLETED | OUTPATIENT
Start: 2019-06-04 | End: 2019-06-04

## 2019-06-04 RX ORDER — SODIUM CHLORIDE 9 MG/ML
250 INJECTION, SOLUTION INTRAVENOUS ONCE
Status: COMPLETED | OUTPATIENT
Start: 2019-06-04 | End: 2019-06-04

## 2019-06-04 RX ADMIN — SODIUM CHLORIDE 250 ML: 9 INJECTION, SOLUTION INTRAVENOUS at 10:15

## 2019-06-04 RX ADMIN — DIPHENHYDRAMINE HYDROCHLORIDE 25 MG: 50 INJECTION INTRAMUSCULAR; INTRAVENOUS at 10:30

## 2019-06-04 RX ADMIN — Medication 10 ML: at 09:45

## 2019-06-04 RX ADMIN — INFLIXIMAB 400 MG: 100 INJECTION, POWDER, LYOPHILIZED, FOR SOLUTION INTRAVENOUS at 11:00

## 2019-06-04 NOTE — PROGRESS NOTES
[facility] Progress Note    Date: 2019    Name: Miranda Snow    MRN: [id number]         : 1965      Ms. Pritesh Chandra arrived in the [facility] today at 0930, in stable condition, here for Q 6 Week, IV Remicade Infusion. She was assessed and education was provided. Ms. López Hand vitals were reviewed. Visit Vitals  /87 (BP 1 Location: Left arm, BP Patient Position: Sitting)   Pulse 90   Temp 98.5 °F (36.9 °C)   Resp 16   Ht 5' 11\" (1.803 m)   Wt 100.2 kg (221 lb)   SpO2 98%   Breastfeeding? No   BMI 30.82 kg/m²                 PIV (#22G) was established in her posterior right forearm at 0945, without incident, and blood was drawn for a CBC, CMP, ESR, & CRP (2 lavender top tubes & 2 SST tubes), per order. (The CBC was processed in house, and the CMP, ESR, & CRP, were sent out to be processed. )        Lab results were obtained and reviewed, and the CBC results from today listed below, as well as the most recent CMP results from 19, were all noted to be satisfactory for treatment today. Recent Results (from the past 12 hour(s))   CBC WITH 3 PART DIFF    Collection Time: 19  9:45 AM   Result Value Ref Range    WBC 9.6 4.5 - 13.0 K/uL    RBC 4.79 4.10 - 5.10 M/uL    HGB 13.3 12.0 - 16.0 g/dL    HCT 40.2 36 - 48 %    MCV 83.9 78 - 102 FL    MCH 27.8 25.0 - 35.0 PG    MCHC 33.1 31 - 37 g/dL    RDW 13.3 11.5 - 14.5 %    PLATELET 343 194 - 337 K/uL    NEUTROPHILS 71 (H) 40 - 70 %    MIXED CELLS 5 0.1 - 17 %    LYMPHOCYTES 24 14 - 44 %    ABS. NEUTROPHILS 6.8 1.8 - 9.5 K/UL    ABS. MIXED CELLS 0.5 0.0 - 2.3 K/uL    ABS.  LYMPHOCYTES 2.3 1.1 - 5.9 K/UL    DF AUTOMATED             Pre-medication consisting of IV Benadryl 25 mg, was administered per order, and without incident.            Remicade 400 mg IV (4 mg/kg) Maintenance Dose, was administered over 2 hours, per order, and without incident.            After the completion of the IV Remicade, Ms. Pritesh Chandra was monitored for 30 minutes post Remicade, per order, and also without incident.         After the completion of the 30 minute monitoring period, the PIV was removed and gauze/bandaid was applied. Ms. Oly Flores tolerated well, and had no complaints. Ms. Oly Flores was discharged from [place] [address] in stable condition at 1340. ... She is to return in 6 weeks, on Tuesday, 7-16-19,  at 1000,  for her next appointment, for her next dose of IV Remicade.      Andrey Guidry RN  June 4, 2019  10:52 AM

## 2019-06-05 LAB — CRP SERPL-MCNC: 1.3 MG/DL (ref 0–0.3)

## 2019-06-13 ENCOUNTER — DOCUMENTATION ONLY (OUTPATIENT)
Dept: ONCOLOGY | Age: 54
End: 2019-06-13

## 2019-06-13 DIAGNOSIS — G89.4 CHRONIC PAIN SYNDROME: ICD-10-CM

## 2019-06-13 RX ORDER — OXYCODONE AND ACETAMINOPHEN 10; 325 MG/1; MG/1
1 TABLET ORAL
Qty: 60 TAB | Refills: 0 | Status: SHIPPED | OUTPATIENT
Start: 2019-06-13 | End: 2019-06-13 | Stop reason: SDUPTHER

## 2019-06-13 RX ORDER — OXYCODONE AND ACETAMINOPHEN 10; 325 MG/1; MG/1
1 TABLET ORAL
Qty: 60 TAB | Refills: 0 | Status: SHIPPED | OUTPATIENT
Start: 2019-06-13 | End: 2019-06-26 | Stop reason: SDUPTHER

## 2019-06-26 DIAGNOSIS — G89.4 CHRONIC PAIN SYNDROME: ICD-10-CM

## 2019-06-26 RX ORDER — OXYCODONE AND ACETAMINOPHEN 10; 325 MG/1; MG/1
1 TABLET ORAL
Qty: 60 TAB | Refills: 0 | Status: SHIPPED | OUTPATIENT
Start: 2019-06-27 | End: 2019-07-09 | Stop reason: SDUPTHER

## 2019-07-09 ENCOUNTER — OFFICE VISIT (OUTPATIENT)
Dept: ONCOLOGY | Age: 54
End: 2019-07-09

## 2019-07-09 ENCOUNTER — APPOINTMENT (OUTPATIENT)
Dept: INFUSION THERAPY | Age: 54
End: 2019-07-09
Payer: MEDICARE

## 2019-07-09 ENCOUNTER — HOSPITAL ENCOUNTER (OUTPATIENT)
Dept: LAB | Age: 54
Discharge: HOME | End: 2019-07-09
Payer: MEDICARE

## 2019-07-09 ENCOUNTER — HOSPITAL ENCOUNTER (OUTPATIENT)
Dept: ONCOLOGY | Age: 54
Discharge: HOME | End: 2019-07-09

## 2019-07-09 VITALS
WEIGHT: 221.6 LBS | HEIGHT: 71 IN | BODY MASS INDEX: 31.02 KG/M2 | OXYGEN SATURATION: 98 % | SYSTOLIC BLOOD PRESSURE: 116 MMHG | DIASTOLIC BLOOD PRESSURE: 80 MMHG | TEMPERATURE: 98.6 F | HEART RATE: 97 BPM

## 2019-07-09 DIAGNOSIS — D64.9 CHRONIC ANEMIA: Primary | ICD-10-CM

## 2019-07-09 DIAGNOSIS — D64.9 CHRONIC ANEMIA: ICD-10-CM

## 2019-07-09 DIAGNOSIS — M06.09 RHEUMATOID ARTHRITIS OF MULTIPLE SITES WITHOUT RHEUMATOID FACTOR (HCC): ICD-10-CM

## 2019-07-09 DIAGNOSIS — G89.4 CHRONIC PAIN SYNDROME: ICD-10-CM

## 2019-07-09 DIAGNOSIS — D47.3 ESSENTIAL THROMBOCYTOSIS (HCC): ICD-10-CM

## 2019-07-09 DIAGNOSIS — M79.7 FIBROMYALGIA: ICD-10-CM

## 2019-07-09 DIAGNOSIS — D72.829 LEUKOCYTOSIS, UNSPECIFIED TYPE: ICD-10-CM

## 2019-07-09 LAB
ALBUMIN SERPL-MCNC: 3.4 G/DL (ref 3.4–5)
ALBUMIN/GLOB SERPL: 0.9 {RATIO} (ref 0.8–1.7)
ALP SERPL-CCNC: 113 U/L (ref 45–117)
ALT SERPL-CCNC: 17 U/L (ref 13–56)
ANION GAP SERPL CALC-SCNC: 7 MMOL/L (ref 3–18)
AST SERPL-CCNC: 7 U/L (ref 15–37)
BASO+EOS+MONOS # BLD AUTO: 0.4 K/UL (ref 0–2.3)
BASO+EOS+MONOS NFR BLD AUTO: 5 % (ref 0.1–17)
BILIRUB SERPL-MCNC: 0.3 MG/DL (ref 0.2–1)
BUN SERPL-MCNC: 16 MG/DL (ref 7–18)
BUN/CREAT SERPL: 19 (ref 12–20)
CALCIUM SERPL-MCNC: 9.4 MG/DL (ref 8.5–10.1)
CHLORIDE SERPL-SCNC: 104 MMOL/L (ref 100–108)
CO2 SERPL-SCNC: 26 MMOL/L (ref 21–32)
CREAT SERPL-MCNC: 0.86 MG/DL (ref 0.6–1.3)
CRP SERPL-MCNC: 1.7 MG/DL (ref 0–0.3)
DIFFERENTIAL METHOD BLD: NORMAL
ERYTHROCYTE [DISTWIDTH] IN BLOOD BY AUTOMATED COUNT: 13.4 % (ref 11.5–14.5)
ERYTHROCYTE [SEDIMENTATION RATE] IN BLOOD: 32 MM/HR (ref 0–30)
GLOBULIN SER CALC-MCNC: 3.6 G/DL (ref 2–4)
GLUCOSE SERPL-MCNC: 319 MG/DL (ref 74–99)
HCT VFR BLD AUTO: 40.7 % (ref 36–48)
HGB BLD-MCNC: 13.4 G/DL (ref 12–16)
LYMPHOCYTES # BLD: 2.8 K/UL (ref 1.1–5.9)
LYMPHOCYTES NFR BLD: 32 % (ref 14–44)
MCH RBC QN AUTO: 27.3 PG (ref 25–35)
MCHC RBC AUTO-ENTMCNC: 32.9 G/DL (ref 31–37)
MCV RBC AUTO: 83.1 FL (ref 78–102)
NEUTS SEG # BLD: 5.8 K/UL (ref 1.8–9.5)
NEUTS SEG NFR BLD: 64 % (ref 40–70)
PLATELET # BLD AUTO: 391 K/UL (ref 140–440)
POTASSIUM SERPL-SCNC: 4.1 MMOL/L (ref 3.5–5.5)
PROT SERPL-MCNC: 7 G/DL (ref 6.4–8.2)
RBC # BLD AUTO: 4.9 M/UL (ref 4.1–5.1)
SODIUM SERPL-SCNC: 137 MMOL/L (ref 136–145)
WBC # BLD AUTO: 9 K/UL (ref 4.5–13)

## 2019-07-09 PROCEDURE — 36415 COLL VENOUS BLD VENIPUNCTURE: CPT

## 2019-07-09 PROCEDURE — 86140 C-REACTIVE PROTEIN: CPT

## 2019-07-09 PROCEDURE — 80053 COMPREHEN METABOLIC PANEL: CPT

## 2019-07-09 PROCEDURE — 85652 RBC SED RATE AUTOMATED: CPT

## 2019-07-09 RX ORDER — OXYCODONE AND ACETAMINOPHEN 10; 325 MG/1; MG/1
1 TABLET ORAL
Qty: 60 TAB | Refills: 0 | Status: SHIPPED | OUTPATIENT
Start: 2019-07-09 | End: 2019-07-19 | Stop reason: SDUPTHER

## 2019-07-09 NOTE — PROGRESS NOTES
Hematology/Oncology  Progress Note    Name: Noam Hernández  Date: 2019  : 1965    PCP: Ibis Crouch MD     Ms. Donita Obregon is a 52year old female who was seen for management of her rheumatoid arthritis, leukocytosis, and thrombocytosis. Current therapy: Remicade 4 mg/kg bodyweight ever 6 weeks intravenously    Subjective:     Ms. Donita Obregon is a 52year-old [facility] woman who has severe rheumatoid arthritis. She receives Remicade every 6 weeks next dose due on 2019. She also suffers from fibromyalgia and had an elevated WBC count and platelet count of undefined etiology. Today she complains of 10/10 right shoulder pain, worse with movement. She tells me that her Rheumatologist Dr. Dayanna Murguia has referred her to physical therapy. She continues to complain of back pain as well. She is using a narcotic based regimen for pain control. She states this provides some relief, but the pain to left shoulder is gradually worsening. She reports that the Remicade has provided a significant degree of relief from the severe rheumatoid arthritis symptoms. The patient is continuing to use her cane for mobility support.      Past Medical History:   Diagnosis Date    Abdominal pain, unspecified site     Acute otitis media     Acute pharyngitis     Anxiety     Arthritis     Autoimmune disease ([address]     Back injury     Backache     herniated disc in lower back    Blurred vision     Chest pain 2018    Chest pain, unspecified     abnormal EKG    Chronic airway obstruction, not elsewhere classified     Chronic back pain     Chronic pain     COPD     Depression     Diabetes (HCC)     Dizziness     Dizziness and giddiness     possible orthostatic changes, vasovagal, autonomic dysfunction from diabetic neuropathy    Encounters for unspecified administrative purpose     Essential thrombocytosis ([facility] 2016    Feeling anxious     Fibromyalgia     Headache(784.0)     Hemorrhoid     Herniated disc     at L5    Hypercholesterolemia     Hyperglycemia     Hypertension     Joint pain     Leukocytosis, unspecified     Major depressive disorder, single episode, mild (HCC)     Mental disorder     depression, anxiety, bipolar and PTSD    Neuropathy     Obesity, unspecified     Other chest pain     Palpitation 2018    ?  arrhythmia    Phobic disorders     Rheumatoid arthritis ([facility])     Rheumatoid arthritis of foot ([facility] 2018    on treatment    Seasonal allergies     Shortness of breath     normal EF    Smoking 2018    discussed cessation    Streptococcal sore throat     Type 2 diabetes mellitus without complication, with long-term current use of insulin ([facility] 2018    Type II or unspecified type diabetes mellitus with unspecified complication, uncontrolled     Unspecified hereditary and idiopathic peripheral neuropathy     Vitamin D deficiency      Past Surgical History:   Procedure Laterality Date    HX CHOLECYSTECTOMY      HX GYN      partial Hysterectomy    HX OTHER SURGICAL  12-1-15    had ingrown toenail removed from big toe, both feet    HX TUBAL LIGATION           Social History     Socioeconomic History    Marital status:      Spouse name: Not on file    Number of children: Not on file    Years of education: Not on file    Highest education level: Not on file   Occupational History    Not on file   Social Needs    Financial resource strain: Not on file    Food insecurity:     Worry: Not on file     Inability: Not on file    Transportation needs:     Medical: Not on file     Non-medical: Not on file   Tobacco Use    Smoking status: Current Every Day Smoker     Packs/day: 0.25     Last attempt to quit: 2012     Years since quittin.9    Smokeless tobacco: Never Used    Tobacco comment: 5 cigarettes day   Substance and Sexual Activity    Alcohol use: Yes     Comment: socially    Drug use: No    Sexual activity: Yes Partners: Male     Birth control/protection: Condom   Lifestyle    Physical activity:     Days per week: Not on file     Minutes per session: Not on file    Stress: Not on file   Relationships    Social connections:     Talks on phone: Not on file     Gets together: Not on file     Attends Church service: Not on file     Active member of club or organization: Not on file     Attends meetings of clubs or organizations: Not on file     Relationship status: Not on file    Intimate partner violence:     Fear of current or ex partner: Not on file     Emotionally abused: Not on file     Physically abused: Not on file     Forced sexual activity: Not on file   Other Topics Concern    Not on file   Social History Narrative    Not on file     Family History   Problem Relation Age of Onset    Diabetes Other     Alcohol abuse Mother     Arthritis-osteo Mother     Diabetes Mother     Elevated Lipids Mother     Headache Mother     Heart Disease Mother     Migraines Mother     Psychiatric Disorder Mother     Alcohol abuse Father     Arthritis-osteo Father     Cancer Father     Headache Father     Heart Disease Father     Lung Disease Father     Migraines Father     Heart Surgery Father     Alcohol abuse Sister     Headache Sister     Diabetes Sister     Heart Disease Sister     Migraines Sister     Psychiatric Disorder Sister     Headache Brother     Diabetes Brother     Elevated Lipids Brother     Heart Disease Brother     Migraines Brother     Psychiatric Disorder Brother     Coronary Artery Disease Brother     Cancer Maternal Aunt     Alcohol abuse Maternal Aunt     Diabetes Maternal Aunt     Headache Maternal Aunt     Lung Disease Maternal Aunt     Migraines Maternal Aunt     Headache Maternal Uncle     Migraines Maternal Uncle     Stroke Maternal Uncle     Psychiatric Disorder Maternal Uncle     Headache Paternal Aunt     Migraines Paternal Aunt     Headache Paternal Uncle  Hypertension Paternal Uncle     Migraines Paternal Uncle     Stroke Paternal Uncle     Headache Maternal Grandmother     Migraines Maternal Grandmother     Stroke Maternal Grandmother     Headache Maternal Grandfather     Migraines Maternal Grandfather     Stroke Maternal Grandfather     Headache Paternal Grandmother     Hypertension Paternal Grandmother     Lung Disease Paternal Grandmother     Migraines Paternal Grandmother     Cancer Paternal Grandmother     Headache Paternal Grandfather     Cancer Paternal Grandfather     Migraines Paternal Grandfather      Current Outpatient Medications   Medication Sig Dispense Refill    oxyCODONE-acetaminophen (PERCOCET 10)  mg per tablet Take 1 Tab by mouth every six (6) hours as needed for Pain for up to 14 days. Max Daily Amount: 4 Tabs. 60 Tab 0    naloxone (NARCAN) 4 mg/actuation nasal spray Use 1 spray intranasally, then discard. Repeat with new spray every 2 min as needed for opioid overdose symptoms, alternating nostrils. Indications: Decrease in Rate & Depth of Breathing due to Opioid Drug, opioid overdose 1 Each 1    anagrelide (AGRYLIN) 0.5 mg capsule Take 1 Cap by mouth two (2) times a day. 60 Cap 6    amitriptyline (ELAVIL) 25 mg tablet       clotrimazole-betamethasone (LOTRISONE) topical cream       DULoxetine (CYMBALTA) 30 mg capsule       gabapentin (NEURONTIN) 300 mg capsule 300 mg two (2) times a day.  QUEtiapine (SEROQUEL) 25 mg tablet       BD INSULIN SYRINGE ULTRA-FINE 1 mL 31 gauge x 15/64\" syrg       albuterol sulfate (PROVENTIL;VENTOLIN) 2.5 mg/0.5 mL nebu nebulizer solution by Nebulization route once. Is now using the actual nebulizer machine, for current bronchitis episode (Aug/Sept. 2017)      insulin aspart protamine/insulin aspart (NOVOLOG MIX 70-30) 100 unit/mL (70-30) injection 10 Units by SubCUTAneous route two (2) times a day.  clonazePAM (KLONOPIN) 2 mg tablet Take 2 mg by mouth daily. Indications: PANIC DISORDER      folic acid 120 mcg tablet Take 400 mcg by mouth daily.  albuterol (PROAIR HFA) 90 mcg/actuation inhaler Take 1 Puff by inhalation every six (6) hours as needed for Wheezing. 1 Inhaler 0    ondansetron hcl (ZOFRAN, AS HYDROCHLORIDE,) 4 mg tablet Take 1 Tab by mouth every eight (8) hours as needed for Nausea. 12 Tab 0    methotrexate (RHEUMATREX) 2.5 mg tablet Take 2.5 mg by mouth every fourty-eight (48) hours.  metFORMIN (GLUCOPHAGE) 1,000 mg tablet Take 500 mg by mouth two (2) times a day.  Cholecalciferol, Vitamin D3, 5,000 unit Tab Take 5,000 Units by mouth every seven (7) days.  atenolol (TENORMIN) 25 mg tablet Take 25 mg by mouth daily. Indications: HYPERTENSION      hydroxychloroquine (PLAQUENIL) 200 mg tablet Take 200 mg by mouth two (2) times a day.  furosemide (LASIX) 40 mg tablet Take  by mouth daily.  INFLIXIMAB (REMICADE IV) 344 mg by IntraVENous route once as needed. remicade will be every 6 weeks      losartan-hydrochlorothiazide (HYZAAR) 50-12.5 mg per tablet Take 1 Tab by mouth daily.  INSULIN GLARGINE,HUM. REC. ANLOG (LANTUS SC) 5 Units by SubCUTAneous route daily. Review of Systems  Constitutional: The patient has no acute distress or discomfort. HEENT: The patient denies recent head trauma, eye pain, blurred vision,  hearing deficit, oropharyngeal mucosal pain or lesions, and the patient denies throat pain or discomfort. Lymphatics: The patient denies palpable peripheral lymphadenopathy. Hematologic: The patient denies having bruising, bleeding, or progressive fatigue. Respiratory: Patient denies having shortness of breath, cough, sputum production, fever, or dyspnea on exertion. Cardiovascular: The patient denies having leg pain, leg swelling, heart palpitations, chest permit, chest pain, or lightheadedness. The patient denies having dyspnea on exertion.   Breast: The patient has a sebaceous cyst involving the right breast.  Gastrointestinal: The patient denies having nausea, emesis, or diarrhea. The patient denies having any hematemesis or blood in the stool. Genitourinary: Patient denies having urinary urgency, frequency, or dysuria. The patient denies having blood in the urine. Psychological: The patient denies having symptoms of nervousness, anxiety, depression, or thoughts of harming himself some of this. Skin: Patient denies having skin rashes, skin, ulcerations, or unexplained itching or pruritus. Musculoskeletal: Right shoulder pain. The patient complains of generalized back pain. Objective:     Visit Vitals  Blood Pressure 116/80   Pulse 97   Temperature 98.6 °F (37 °C)   Height 5' 11\" (1.803 m)   Weight 100.5 kg (221 lb 9.6 oz)   Oxygen Saturation 98%   Body Mass Index 30.91 kg/m²     ECOG PS=0 Pain score 10/10     Physical Exam:   Gen. Appearance: The patient is in no acute distress. Skin: There is no bruise or rash. HEENT: The exam is unremarkable. Neck: Supple without lymphadenopathy or thyromegaly. Lungs: Clear to auscultation and percussion; there are no wheezes or rhonchi. Heart: Regular rate and rhythm; there are no murmurs, gallops, or rubs. Anterior chest wall and breast: Patient has a sebaceous cyst that has apparently drained from the underside of the right breast abutting the right anterior chest wall. Abdomen: Bowel sounds are present and normal.  There is no guarding, tenderness, or hepatosplenomegaly. Extremities: There is no clubbing, cyanosis, or edema. Neurologic: There are no focal neurologic deficits. Lymphatics: There is no palpable peripheral lymphadenopathy. Musculoskeletal: The patient has limited range of motion at right shoulder. However, she complains of pain on ambulation due to the severe rheumatoid arthritis. There is  evidence of joint deformity or effusions in the hands and knees. There is no focal joint tenderness.  She is using a cane for support and mobility. Psychological/psychiatric: There is no clinical evidence of anxiety, depression, or melancholy. Lab data:      Results for orders placed or performed during the hospital encounter of 07/09/19   CBC WITH 3 PART DIFF     Status: None   Result Value Ref Range Status    WBC 9.0 4.5 - 13.0 K/uL Final    RBC 4.90 4. 10 - 5.10 M/uL Final    HGB 13.4 12.0 - 16.0 g/dL Final    HCT 40.7 36 - 48 % Final    MCV 83.1 78 - 102 FL Final    MCH 27.3 25.0 - 35.0 PG Final    MCHC 32.9 31 - 37 g/dL Final    RDW 13.4 11.5 - 14.5 % Final    PLATELET 556 464 - 393 K/uL Final    NEUTROPHILS 64 40 - 70 % Final    MIXED CELLS 5 0.1 - 17 % Final    LYMPHOCYTES 32 14 - 44 % Final    ABS. NEUTROPHILS 5.8 1.8 - 9.5 K/UL Final    ABS. MIXED CELLS 0.4 0.0 - 2.3 K/uL Final    ABS. LYMPHOCYTES 2.8 1.1 - 5.9 K/UL Final     Comment: Test performed at [address] [place] [address] or Outpatient [facility]fusion [facility] Location. Reviewed by Medical Director. DF AUTOMATED   Final           Assessment:     1. Chronic anemia    2. Chronic pain syndrome    3. Rheumatoid arthritis of multiple sites without rheumatoid factor ([facility]    4. Leukocytosis, unspecified type    5. Essential thrombocytosis (HCC)    6. Fibromyalgia      Plan:   Leukocytosis (recurrent and persisting): I have explained to the patient that the etiology of her leukocytosis remains undefined. A previous flow cytometry did not reveal any  evidence of an immunophenotypic abnormality such as an evolving chronic lymphoproliferative disorder or myeloproliferative disorder. The CBCs will continue to be monitored every 6 weeks. The CBC from today shows her WBC count is currently 9.0, and the absolute neutrophil count is 5.8 with an absolute lymphocyte count of 2.8. Essential thrombocytosis/thrombocytosis (Resolved): The current CBC shows that the platelet count is now 391,000. The platelet count is being monitored every 6 weeks.   The patient was previously started on anagrelide 0.5 mg one tablet twice daily. This medication will be continued, at the current dose. I have reenforced to the patient the importance of being complaint with the treatment plan. Rheumatoid arthritis: the patient will continue to receive Remicade as a primary treatment modality for her severe rheumatoid arthritis every 6 weeks. Remicade was resumed on 4/23/2019 next dose is scheduled for 7/16/2019. This medication was on hold until patient is finished antibiotic. The patient has continued to take  Methotrexate 5 mg p.o. daily and Plaquenil 200 mg twice daily. Fibromyalgia/chronic pain syndrome: The patient  continues to have generalized pain at times related to her underlying fibromyalgia. The patient receives her Percocet  mg medication on a monthly basis as needed. A new prescription for the Percocet was provided today. Chronic anemia: I have explained to the patient the CBC from today shows her hemoglobin has remained normal at 13.4g/dL with hematocrit of 40.7 %. I have recommended that she continue to take ferrous sulfate 325 mg by mouth once daily. We will see the patient back in 6 weeks for a complete reassessment. Orders Placed This Encounter    COMPLETE CBC & AUTO DIFF WBC    InHouse CBC ([employer])     Standing Status:   Future     Number of Occurrences:   1     Standing Expiration Date:   4/60/8959    METABOLIC PANEL, COMPREHENSIVE     Standing Status:   Future     Standing Expiration Date:   7/9/2020    SED RATE (ESR)     Standing Status:   Future     Standing Expiration Date:   7/9/2020    C REACTIVE PROTEIN, QT     Standing Status:   Future     Standing Expiration Date:   7/9/2020    oxyCODONE-acetaminophen (PERCOCET 10)  mg per tablet     Sig: Take 1 Tab by mouth every six (6) hours as needed for Pain for up to 14 days. Max Daily Amount: 4 Tabs.      Dispense:  60 Tab     Refill:  0       Luc Trejo NP  7/9/2019      I have assessed the patient independently and  agree with the full assessment as outlined.   Danuta Carter MD, [address]

## 2019-07-11 ENCOUNTER — OFFICE VISIT (OUTPATIENT)
Dept: CARDIOLOGY CLINIC | Age: 54
End: 2019-07-11

## 2019-07-11 VITALS
DIASTOLIC BLOOD PRESSURE: 75 MMHG | HEART RATE: 89 BPM | WEIGHT: 221 LBS | SYSTOLIC BLOOD PRESSURE: 119 MMHG | BODY MASS INDEX: 30.94 KG/M2 | HEIGHT: 71 IN

## 2019-07-11 DIAGNOSIS — M06.9 RHEUMATOID ARTHRITIS INVOLVING MULTIPLE JOINTS (HCC): ICD-10-CM

## 2019-07-11 DIAGNOSIS — R00.2 PALPITATION: Primary | ICD-10-CM

## 2019-07-11 DIAGNOSIS — R07.9 CHEST PAIN, UNSPECIFIED TYPE: ICD-10-CM

## 2019-07-11 NOTE — PROGRESS NOTES
Patient brought medications list.    No EKG done, per doctor. 1. Have you been to the ER, urgent care clinic since your last visit? Hospitalized since your last visit? Yes When: 04/2019 Where: [facility] Reason for visit: Boil    2. Have you seen or consulted any other health care providers outside of the [address] [place] [address] since your last visit? Include any pap smears or colon screening.  No

## 2019-07-11 NOTE — PROGRESS NOTES
HISTORY OF PRESENT ILLNESS  Kerri Vergara is a 52 y.o. female. Palpitations    The history is provided by the patient. This is a recurrent problem. The current episode started more than 1 week ago. The problem has been rapidly improving. The problem occurs rarely. The problem is associated with nothing. Pertinent negatives include no fever, no chest pain, no claudication, no orthopnea, no PND, no abdominal pain, no nausea, no vomiting, no headaches, no dizziness, no weakness, no cough, no hemoptysis, no shortness of breath and no sputum production. Chest Pain (Angina)    The history is provided by the patient. This is a new problem. The problem has been resolved. The problem occurs rarely. Pertinent negatives include no abdominal pain, no claudication, no cough, no dizziness, no fever, no headaches, no hemoptysis, no nausea, no orthopnea, no palpitations, no PND, no shortness of breath, no sputum production, no vomiting and no weakness. Review of Systems   Constitutional: Negative for chills and fever. HENT: Negative for nosebleeds. Eyes: Negative for blurred vision and double vision. Respiratory: Negative for cough, hemoptysis, sputum production, shortness of breath and wheezing. Cardiovascular: Negative for chest pain, palpitations, orthopnea, claudication, leg swelling and PND. Gastrointestinal: Negative for abdominal pain, heartburn, nausea and vomiting. Musculoskeletal: Negative for myalgias. Skin: Negative for rash. Neurological: Negative for dizziness, weakness and headaches. Endo/Heme/Allergies: Does not bruise/bleed easily.      Family History   Problem Relation Age of Onset    Diabetes Other     Alcohol abuse Mother    [facility] Arthritis-osteo Mother     Diabetes Mother     Elevated Lipids Mother     Headache Mother     Heart Disease Mother    [facility] Migraines Mother     Psychiatric Disorder Mother     Alcohol abuse Father    Shar Merritt Father     Cancer Father     Headache Father     Heart Disease Father     Lung Disease Father     Migraines Father     Heart Surgery Father     Alcohol abuse Sister     Headache Sister     Diabetes Sister     Heart Disease Sister     Migraines Sister     Psychiatric Disorder Sister     Headache Brother     Diabetes Brother     Elevated Lipids Brother     Heart Disease Brother     Migraines Brother     Psychiatric Disorder Brother     Coronary Artery Disease Brother     Cancer Maternal Aunt     Alcohol abuse Maternal Aunt     Diabetes Maternal Aunt     Headache Maternal Aunt     Lung Disease Maternal Aunt     Migraines Maternal Aunt     Headache Maternal Uncle     Migraines Maternal Uncle     Stroke Maternal Uncle     Psychiatric Disorder Maternal Uncle     Headache Paternal Aunt     Migraines Paternal Aunt     Headache Paternal Uncle     Hypertension Paternal Uncle     Migraines Paternal Uncle     Stroke Paternal Uncle     Headache Maternal Grandmother     Migraines Maternal Grandmother     Stroke Maternal Grandmother     Headache Maternal Grandfather     Migraines Maternal Grandfather     Stroke Maternal Grandfather     Headache Paternal Grandmother     Hypertension Paternal Grandmother     Lung Disease Paternal Grandmother     Migraines Paternal Grandmother     Cancer Paternal Grandmother     Headache Paternal Grandfather     Cancer Paternal Grandfather     Migraines Paternal Grandfather        Past Medical History:   Diagnosis Date    Abdominal pain, unspecified site     Acute otitis media     Acute pharyngitis     Anxiety     Arthritis     Autoimmune disease ([address]     Back injury     Backache     herniated disc in lower back    Blurred vision     Chest pain 5/4/2018    Chest pain, unspecified     abnormal EKG    Chronic airway obstruction, not elsewhere classified     Chronic back pain     Chronic pain     COPD     Depression     Diabetes ([address]     Dizziness     Dizziness and giddiness     possible orthostatic changes, vasovagal, autonomic dysfunction from diabetic neuropathy    Encounters for unspecified administrative purpose     Essential thrombocytosis ([facility] 2016    Feeling anxious     Fibromyalgia     Headache(784.0)     Hemorrhoid     Herniated disc     at L5    Hypercholesterolemia     Hyperglycemia     Hypertension     Joint pain     Leukocytosis, unspecified     Major depressive disorder, single episode, mild (HCC)     Mental disorder     depression, anxiety, bipolar and PTSD    Neuropathy     Obesity, unspecified     Other chest pain     Palpitation 2018    ?  arrhythmia    Phobic disorders     Rheumatoid arthritis (HCC)     Rheumatoid arthritis of foot ([facility] 2018    on treatment    Seasonal allergies     Shortness of breath     normal EF    Smoking 2018    discussed cessation    Streptococcal sore throat     Type 2 diabetes mellitus without complication, with long-term current use of insulin ([facility] 2018    Type II or unspecified type diabetes mellitus with unspecified complication, uncontrolled     Unspecified hereditary and idiopathic peripheral neuropathy     Vitamin D deficiency        Past Surgical History:   Procedure Laterality Date    HX CHOLECYSTECTOMY      HX GYN      partial Hysterectomy    HX OTHER SURGICAL  12-1-15    had ingrown toenail removed from big toe, both feet    HX TUBAL LIGATION             Social History     Tobacco Use    Smoking status: Current Every Day Smoker     Packs/day: 0.25     Last attempt to quit: 2012     Years since quittin.0    Smokeless tobacco: Never Used    Tobacco comment: 5 cigarettes day   Substance Use Topics    Alcohol use: Yes     Frequency: Monthly or less     Drinks per session: 1 or 2     Binge frequency: Never     Comment: socially       Allergies   Allergen Reactions    Levaquin [Levofloxacin] Anaphylaxis    Pollen Extracts Unable to Obtain       Prior to Admission medications    Medication Sig Start Date End Date Taking? Authorizing Provider   insulin lispro protamine/insulin lispro (HUMALOG MIX 75-25,U-100,INSULN) 100 unit/mL (75-25) injection by SubCUTAneous route. Yes Provider, Historical   oxyCODONE-acetaminophen (PERCOCET 10)  mg per tablet Take 1 Tab by mouth every six (6) hours as needed for Pain for up to 14 days. Max Daily Amount: 4 Tabs. 7/9/19 7/23/19 Yes Dauna Landau A, NP   naloxone [facility]) 4 mg/actuation nasal spray Use 1 spray intranasally, then discard. Repeat with new spray every 2 min as needed for opioid overdose symptoms, alternating nostrils. Indications: Decrease in Rate & Depth of Breathing due to Opioid Drug, opioid overdose 4/30/19  Yes Dauna Landau A, NP   anagrelide (AGRYLIN) 0.5 mg capsule Take 1 Cap by mouth two (2) times a day. 11/13/18  Yes Homero CHASE NP   amitriptyline (ELAVIL) 25 mg tablet  8/22/17  Yes Provider, Historical   clotrimazole-betamethasone (LOTRISONE) topical cream  9/21/17  Yes Provider, Historical   DULoxetine (CYMBALTA) 30 mg capsule  9/27/17  Yes Provider, Historical   gabapentin (NEURONTIN) 300 mg capsule 300 mg two (2) times a day. 8/22/17  Yes Provider, Historical   QUEtiapine (SEROQUEL) 25 mg tablet  9/22/17  Yes Provider, Historical   BD INSULIN SYRINGE ULTRA-FINE 1 mL 31 gauge x 15/64\" syrg  9/22/17  Yes Provider, Historical   clonazePAM (KLONOPIN) 2 mg tablet Take 2 mg by mouth daily. Indications: PANIC DISORDER   Yes Provider, Historical   folic acid 718 mcg tablet Take 400 mcg by mouth daily. Yes Provider, Historical   albuterol (PROAIR HFA) 90 mcg/actuation inhaler Take 1 Puff by inhalation every six (6) hours as needed for Wheezing. 2/6/16  Yes Elizabeth Wilder,    ondansetron hcl (ZOFRAN, AS HYDROCHLORIDE,) 4 mg tablet Take 1 Tab by mouth every eight (8) hours as needed for Nausea.  12/9/15  Yes Elizabeth Wilder, DO   methotrexate (RHEUMATREX) 2.5 mg tablet Take 2.5 mg by mouth every fourty-eight (48) hours. Yes Provider, Historical   metFORMIN (GLUCOPHAGE) 1,000 mg tablet Take 500 mg by mouth two (2) times a day. Yes Provider, Historical   Cholecalciferol, Vitamin D3, 5,000 unit Tab Take 5,000 Units by mouth every seven (7) days. Yes Other, MD Lelia   atenolol (TENORMIN) 25 mg tablet Take 25 mg by mouth daily. Indications: HYPERTENSION   Yes Provider, Historical   hydroxychloroquine (PLAQUENIL) 200 mg tablet Take 200 mg by mouth two (2) times a day. Yes Provider, Historical   furosemide (LASIX) 40 mg tablet Take  by mouth daily. Yes Provider, Historical   INFLIXIMAB (REMICADE IV) 344 mg by IntraVENous route once as needed. remicade will be every 6 weeks   Yes Provider, Historical   losartan-hydrochlorothiazide (HYZAAR) 50-12.5 mg per tablet Take 1 Tab by mouth daily. Yes Provider, Historical   INSULIN GLARGINE,HUM. REC. ANLOG (LANTUS SC) 5 Units by SubCUTAneous route daily. Yes Provider, Historical         Visit Vitals  /75   Pulse 89   Ht 5' 11\" (1.803 m)   Wt 100.2 kg (221 lb)   BMI 30.82 kg/m²         Physical Exam   Constitutional: She is oriented to person, place, and time. She appears well-developed and well-nourished. HENT:   Head: Normocephalic and atraumatic. Eyes: Conjunctivae are normal.   Neck: Neck supple. No JVD present. No tracheal deviation present. No thyromegaly present. Cardiovascular: Normal rate and regular rhythm. PMI is not displaced. Exam reveals no gallop, no S3 and no decreased pulses. No murmur heard. Pulmonary/Chest: No respiratory distress. She has no wheezes. She has no rales. She exhibits no tenderness. Abdominal: Soft. There is no tenderness. Musculoskeletal: She exhibits no edema. Neurological: She is alert and oriented to person, place, and time. Skin: Skin is warm. Psychiatric: She has a normal mood and affect. Ms. Farzana Aguilar has a reminder for a \"due or due soon\" health maintenance.  I have asked that she contact her primary care provider for follow-up on this health maintenance. No flowsheet data found. 5/2018  sr,iwmi  SUMMARY:echo-5/2018  Left ventricle: Systolic function was normal. Ejection fraction was  estimated in the range of 55 % to 60 %. There were no regional wall motion  abnormalities. There was mild concentric hypertrophy. Doppler parameters  were consistent with abnormal left ventricular relaxation (grade 1  diastolic dysfunction). Impression 5/2018  NUCLEAR IMAGING:   Findings:   1. Stress images reveal normal Myoview distrubution in all the LV segments in short axis, vertical and horizontal long axis views. 2. Resting images have a normal uptake. 3. Gated images reveal normal wall motion and the ejection fraction is calculated to be 57%. Conclusion:   1. Normal perfusion scan. 2. Normal wall motion and ejection fraction is calculated at 57%. 3. No evidence of significant fixed or reversible defect suggesting ischemia or myocardial infarction noted from this nuclear study. 4. Low risk scan.   5/2018  Cardiac telemetry-sinus rhythm sinus tachycardia, no significant arrhythmia . Assessment         ICD-10-CM ICD-9-CM    1. Palpitation R00.2 785.1     Significantly improved. Continue to monitor   2. Chest pain, unspecified type R07.9 786.50     Atypical.  Resolved. Clinical monitoring   3. Rheumatoid arthritis involving multiple joints (HCC) M06.9 714.0     Stable on treatment follow-up with PCP         No orders of the defined types were placed in this encounter. Follow-up and Dispositions    · Return in about 1 year (around 7/11/2020).

## 2019-07-16 ENCOUNTER — HOSPITAL ENCOUNTER (OUTPATIENT)
Dept: ONCOLOGY | Age: 54
Discharge: HOME | End: 2019-07-16

## 2019-07-16 ENCOUNTER — HOSPITAL ENCOUNTER (OUTPATIENT)
Dept: INFUSION THERAPY | Age: 54
Discharge: HOME | End: 2019-07-16
Payer: MEDICARE

## 2019-07-16 ENCOUNTER — CLINICAL SUPPORT (OUTPATIENT)
Dept: ONCOLOGY | Age: 54
End: 2019-07-16

## 2019-07-16 VITALS
WEIGHT: 216 LBS | OXYGEN SATURATION: 100 % | TEMPERATURE: 98.6 F | BODY MASS INDEX: 30.24 KG/M2 | HEART RATE: 91 BPM | HEIGHT: 71 IN | DIASTOLIC BLOOD PRESSURE: 75 MMHG | SYSTOLIC BLOOD PRESSURE: 119 MMHG | RESPIRATION RATE: 16 BRPM

## 2019-07-16 DIAGNOSIS — M06.09 RHEUMATOID ARTHRITIS OF MULTIPLE SITES WITHOUT RHEUMATOID FACTOR (HCC): ICD-10-CM

## 2019-07-16 DIAGNOSIS — M06.09 RHEUMATOID ARTHRITIS OF MULTIPLE SITES WITHOUT RHEUMATOID FACTOR (HCC): Primary | ICD-10-CM

## 2019-07-16 LAB
ALBUMIN SERPL-MCNC: 3.5 G/DL (ref 3.4–5)
ALBUMIN/GLOB SERPL: 0.9 {RATIO} (ref 0.8–1.7)
ALP SERPL-CCNC: 129 U/L (ref 45–117)
ALT SERPL-CCNC: 20 U/L (ref 13–56)
ANION GAP SERPL CALC-SCNC: 8 MMOL/L (ref 3–18)
AST SERPL-CCNC: 7 U/L (ref 15–37)
BASO+EOS+MONOS # BLD AUTO: 0.8 K/UL (ref 0–2.3)
BASO+EOS+MONOS NFR BLD AUTO: 7 % (ref 0.1–17)
BILIRUB SERPL-MCNC: 0.3 MG/DL (ref 0.2–1)
BUN SERPL-MCNC: 16 MG/DL (ref 7–18)
BUN/CREAT SERPL: 19 (ref 12–20)
CALCIUM SERPL-MCNC: 9.5 MG/DL (ref 8.5–10.1)
CHLORIDE SERPL-SCNC: 102 MMOL/L (ref 100–108)
CO2 SERPL-SCNC: 26 MMOL/L (ref 21–32)
CREAT SERPL-MCNC: 0.85 MG/DL (ref 0.6–1.3)
DIFFERENTIAL METHOD BLD: NORMAL
ERYTHROCYTE [DISTWIDTH] IN BLOOD BY AUTOMATED COUNT: 13.5 % (ref 11.5–14.5)
GLOBULIN SER CALC-MCNC: 3.9 G/DL (ref 2–4)
GLUCOSE SERPL-MCNC: 306 MG/DL (ref 74–99)
HCT VFR BLD AUTO: 42.8 % (ref 36–48)
HGB BLD-MCNC: 14.1 G/DL (ref 12–16)
LYMPHOCYTES # BLD: 3 K/UL (ref 1.1–5.9)
LYMPHOCYTES NFR BLD: 26 % (ref 14–44)
MCH RBC QN AUTO: 27.7 PG (ref 25–35)
MCHC RBC AUTO-ENTMCNC: 32.9 G/DL (ref 31–37)
MCV RBC AUTO: 84.1 FL (ref 78–102)
NEUTS SEG # BLD: 7.9 K/UL (ref 1.8–9.5)
NEUTS SEG NFR BLD: 67 % (ref 40–70)
PLATELET # BLD AUTO: 427 K/UL (ref 140–440)
POTASSIUM SERPL-SCNC: 4.3 MMOL/L (ref 3.5–5.5)
PROT SERPL-MCNC: 7.4 G/DL (ref 6.4–8.2)
RBC # BLD AUTO: 5.09 M/UL (ref 4.1–5.1)
SODIUM SERPL-SCNC: 136 MMOL/L (ref 136–145)
WBC # BLD AUTO: 11.7 K/UL (ref 4.5–13)

## 2019-07-16 PROCEDURE — 36415 COLL VENOUS BLD VENIPUNCTURE: CPT

## 2019-07-16 PROCEDURE — 96415 CHEMO IV INFUSION ADDL HR: CPT

## 2019-07-16 PROCEDURE — 80053 COMPREHEN METABOLIC PANEL: CPT

## 2019-07-16 PROCEDURE — 74011250636 HC RX REV CODE- 250/636: Performed by: NURSE PRACTITIONER

## 2019-07-16 PROCEDURE — 86140 C-REACTIVE PROTEIN: CPT

## 2019-07-16 PROCEDURE — 74011250636 HC RX REV CODE- 250/636: Performed by: INTERNAL MEDICINE

## 2019-07-16 PROCEDURE — 96375 TX/PRO/DX INJ NEW DRUG ADDON: CPT

## 2019-07-16 PROCEDURE — 85652 RBC SED RATE AUTOMATED: CPT

## 2019-07-16 PROCEDURE — 96413 CHEMO IV INFUSION 1 HR: CPT

## 2019-07-16 RX ORDER — SODIUM CHLORIDE 0.9 % (FLUSH) 0.9 %
10-40 SYRINGE (ML) INJECTION AS NEEDED
Status: DISCONTINUED | OUTPATIENT
Start: 2019-07-16 | End: 2019-07-20 | Stop reason: HOSPADM

## 2019-07-16 RX ORDER — SODIUM CHLORIDE 9 MG/ML
250 INJECTION, SOLUTION INTRAVENOUS CONTINUOUS
Status: DISCONTINUED | OUTPATIENT
Start: 2019-07-16 | End: 2019-07-17 | Stop reason: HOSPADM

## 2019-07-16 RX ORDER — DIPHENHYDRAMINE HYDROCHLORIDE 50 MG/ML
25 INJECTION, SOLUTION INTRAMUSCULAR; INTRAVENOUS ONCE
Status: COMPLETED | OUTPATIENT
Start: 2019-07-16 | End: 2019-07-16

## 2019-07-16 RX ADMIN — DIPHENHYDRAMINE HYDROCHLORIDE 25 MG: 50 INJECTION INTRAMUSCULAR; INTRAVENOUS at 10:56

## 2019-07-16 RX ADMIN — SODIUM CHLORIDE 400 MG: 900 INJECTION, SOLUTION INTRAVENOUS at 12:00

## 2019-07-16 RX ADMIN — Medication 10 ML: at 10:20

## 2019-07-16 RX ADMIN — SODIUM CHLORIDE 250 ML: 9 INJECTION, SOLUTION INTRAVENOUS at 10:50

## 2019-07-16 NOTE — PROGRESS NOTES
[facility] Progress Note    Date: 2019    Name: Calli Varner    MRN: [id number]         : 1965      Ms. Yared Arellano arrived in the [facility] today at 1010, in stable condition, here for Q 6 Week, IV Remicade Infusion. She was assessed and education was provided. Ms. Espinal Mom vitals were reviewed. Visit Vitals  /78 (BP 1 Location: Left arm, BP Patient Position: Sitting)   Pulse 86   Temp 99.2 °F (37.3 °C)   Resp 16   SpO2 100%   Breastfeeding? No         Switching to Renflexis (infliximab-abda) (Remicade Biosimilar) today. PIV (#22G) was established in her posterior right forearm at 1020, without incident, and blood was drawn for a CBC, CMP, ESR, & CRP (2 lavender top tubes & 2 SST tubes), per order. (The CBC was processed in house, and the CMP, ESR, & CRP, were sent out to be processed. )        Lab results were obtained and reviewed, and the CBC results from today listed below, as well as the most recent CMP results from 19, were all noted to be satisfactory for treatment today. Recent Results (from the past 12 hour(s))   CBC WITH 3 PART DIFF    Collection Time: 19 10:20 AM   Result Value Ref Range    WBC 11.7 4.5 - 13.0 K/uL    RBC 5.09 4. 10 - 5.10 M/uL    HGB 14.1 12.0 - 16.0 g/dL    HCT 42.8 36 - 48 %    MCV 84.1 78 - 102 FL    MCH 27.7 25.0 - 35.0 PG    MCHC 32.9 31 - 37 g/dL    RDW 13.5 11.5 - 14.5 %    PLATELET 188 679 - 154 K/uL    NEUTROPHILS 67 40 - 70 %    MIXED CELLS 7 0.1 - 17 %    LYMPHOCYTES 26 14 - 44 %    ABS. NEUTROPHILS 7.9 1.8 - 9.5 K/UL    ABS. MIXED CELLS 0.8 0.0 - 2.3 K/uL    ABS. LYMPHOCYTES 3.0 1.1 - 5.9 K/UL    DF AUTOMATED             Pre-medication consisting of IV Benadryl 25 mg, was administered per order, and without incident.            Renflexis (infliximab-abda) 400 mg IV (4 mg/kg) Maintenance Dose, was administered over 2 hours, per order, and without incident.      Per Pharmacy instruction (per pharmacist Akila), since this is her 1st dose of Renflexis today, must infuse using rate titration, and if Renflexis is tolerated well today, then can be infused at 125 ml/hr for 2 hours, starting with her next scheduled dose. Renflexis was infused at 10 ml/hr X 15 minutes, 20 ml/hr X 15 minutes, 40 ml/hr X 15 minutes, 80 ml/hr X 15 minutes, 150 ml/hr X 30 minutes, & 250 ml/hr, until completion.            After the completion of the IV Remicade, Ms. Vahid Saba was monitored for 30 minutes post Remicade, per order, and also without incident.         After the completion of the 30 minute monitoring period, the PIV was removed and gauze/bandaid was applied. Ms. Vahid Saba tolerated well, and had no complaints. Ms. Vahid Saba was discharged from [place] [id number] in stable condition at 1435. .. She is to return in 6 weeks, on Tuesday, 8-27-19,  at 1000,  for her next appointment, for her next dose of IV Renflexis.  Haile Riddle RN  July 16, 2019  10:52 AM

## 2019-07-17 LAB — ERYTHROCYTE [SEDIMENTATION RATE] IN BLOOD: 29 MM/HR (ref 0–30)

## 2019-07-19 DIAGNOSIS — G89.4 CHRONIC PAIN SYNDROME: ICD-10-CM

## 2019-07-19 RX ORDER — OXYCODONE AND ACETAMINOPHEN 10; 325 MG/1; MG/1
1 TABLET ORAL
Qty: 60 TAB | Refills: 0 | Status: SHIPPED | OUTPATIENT
Start: 2019-07-19 | End: 2019-08-02 | Stop reason: SDUPTHER

## 2019-07-19 RX ORDER — OXYCODONE AND ACETAMINOPHEN 10; 325 MG/1; MG/1
1 TABLET ORAL
Qty: 60 TAB | Refills: 0 | Status: CANCELLED | OUTPATIENT
Start: 2019-07-19 | End: 2019-08-02

## 2019-07-20 LAB — CRP SERPL-MCNC: 1.3 MG/DL (ref 0–0.3)

## 2019-08-02 DIAGNOSIS — G89.4 CHRONIC PAIN SYNDROME: ICD-10-CM

## 2019-08-02 RX ORDER — OXYCODONE AND ACETAMINOPHEN 10; 325 MG/1; MG/1
1 TABLET ORAL
Qty: 60 TAB | Refills: 0 | Status: SHIPPED | OUTPATIENT
Start: 2019-08-02 | End: 2019-08-15 | Stop reason: SDUPTHER

## 2019-08-07 ENCOUNTER — APPOINTMENT (OUTPATIENT)
Dept: GENERAL RADIOLOGY | Age: 54
DRG: 281 | End: 2019-08-07
Attending: EMERGENCY MEDICINE
Payer: MEDICARE

## 2019-08-07 ENCOUNTER — HOSPITAL ENCOUNTER (INPATIENT)
Age: 54
LOS: 3 days | Discharge: HOME | DRG: 281 | End: 2019-08-10
Attending: EMERGENCY MEDICINE | Admitting: INTERNAL MEDICINE
Payer: MEDICARE

## 2019-08-07 ENCOUNTER — APPOINTMENT (OUTPATIENT)
Dept: CT IMAGING | Age: 54
DRG: 281 | End: 2019-08-07
Attending: INTERNAL MEDICINE
Payer: MEDICARE

## 2019-08-07 DIAGNOSIS — I24.9 ACS (ACUTE CORONARY SYNDROME) (HCC): Primary | ICD-10-CM

## 2019-08-07 PROBLEM — Z72.0 TOBACCO USE: Status: ACTIVE | Noted: 2018-05-04

## 2019-08-07 LAB
ALBUMIN SERPL-MCNC: 3.3 G/DL (ref 3.4–5)
ALBUMIN/GLOB SERPL: 0.8 {RATIO} (ref 0.8–1.7)
ALP SERPL-CCNC: 116 U/L (ref 45–117)
ALT SERPL-CCNC: 18 U/L (ref 13–56)
ANION GAP SERPL CALC-SCNC: 8 MMOL/L (ref 3–18)
APPEARANCE UR: CLEAR
AST SERPL-CCNC: 3 U/L (ref 10–38)
BACTERIA URNS QL MICRO: ABNORMAL /HPF
BASOPHILS # BLD: 0 K/UL (ref 0–0.1)
BASOPHILS NFR BLD: 0 % (ref 0–2)
BILIRUB SERPL-MCNC: 0.2 MG/DL (ref 0.2–1)
BILIRUB UR QL: NEGATIVE
BUN SERPL-MCNC: 20 MG/DL (ref 7–18)
BUN/CREAT SERPL: 22 (ref 12–20)
CALCIUM SERPL-MCNC: 9.5 MG/DL (ref 8.5–10.1)
CHLORIDE SERPL-SCNC: 103 MMOL/L (ref 100–111)
CK MB CFR SERPL CALC: 1.7 % (ref 0–4)
CK MB CFR SERPL CALC: 2.7 % (ref 0–4)
CK MB CFR SERPL CALC: ABNORMAL % (ref 0–4)
CK MB SERPL-MCNC: 1 NG/ML (ref 5–25)
CK MB SERPL-MCNC: 2 NG/ML (ref 5–25)
CK MB SERPL-MCNC: <1 NG/ML (ref 5–25)
CK SERPL-CCNC: 56 U/L (ref 26–192)
CK SERPL-CCNC: 60 U/L (ref 26–192)
CK SERPL-CCNC: 73 U/L (ref 26–192)
CO2 SERPL-SCNC: 27 MMOL/L (ref 21–32)
COLOR UR: YELLOW
CREAT SERPL-MCNC: 0.89 MG/DL (ref 0.6–1.3)
DIFFERENTIAL METHOD BLD: NORMAL
EOSINOPHIL # BLD: 0.3 K/UL (ref 0–0.4)
EOSINOPHIL NFR BLD: 3 % (ref 0–5)
EPITH CASTS URNS QL MICRO: ABNORMAL /LPF (ref 0–5)
ERYTHROCYTE [DISTWIDTH] IN BLOOD BY AUTOMATED COUNT: 13.6 % (ref 11.6–14.5)
EST. AVERAGE GLUCOSE BLD GHB EST-MCNC: 292 MG/DL
GLOBULIN SER CALC-MCNC: 4.4 G/DL (ref 2–4)
GLUCOSE BLD STRIP.AUTO-MCNC: 211 MG/DL (ref 70–110)
GLUCOSE BLD STRIP.AUTO-MCNC: 231 MG/DL (ref 70–110)
GLUCOSE BLD STRIP.AUTO-MCNC: 334 MG/DL (ref 70–110)
GLUCOSE BLD STRIP.AUTO-MCNC: 349 MG/DL (ref 70–110)
GLUCOSE SERPL-MCNC: 325 MG/DL (ref 74–99)
GLUCOSE UR STRIP.AUTO-MCNC: 500 MG/DL
HBA1C MFR BLD: 11.8 % (ref 4.2–5.6)
HCT VFR BLD AUTO: 41.7 % (ref 35–45)
HGB BLD-MCNC: 13.9 G/DL (ref 12–16)
HGB UR QL STRIP: NEGATIVE
KETONES UR QL STRIP.AUTO: NEGATIVE MG/DL
LEUKOCYTE ESTERASE UR QL STRIP.AUTO: NEGATIVE
LYMPHOCYTES # BLD: 2.4 K/UL (ref 0.9–3.6)
LYMPHOCYTES NFR BLD: 25 % (ref 21–52)
MCH RBC QN AUTO: 27.5 PG (ref 24–34)
MCHC RBC AUTO-ENTMCNC: 33.3 G/DL (ref 31–37)
MCV RBC AUTO: 82.4 FL (ref 74–97)
MONOCYTES # BLD: 0.6 K/UL (ref 0.05–1.2)
MONOCYTES NFR BLD: 7 % (ref 3–10)
NEUTS SEG # BLD: 6 K/UL (ref 1.8–8)
NEUTS SEG NFR BLD: 65 % (ref 40–73)
NITRITE UR QL STRIP.AUTO: NEGATIVE
PH UR STRIP: 5 [PH] (ref 5–8)
PLATELET # BLD AUTO: 366 K/UL (ref 135–420)
PMV BLD AUTO: 9.6 FL (ref 9.2–11.8)
POTASSIUM SERPL-SCNC: 3.6 MMOL/L (ref 3.5–5.5)
PROT SERPL-MCNC: 7.7 G/DL (ref 6.4–8.2)
PROT UR STRIP-MCNC: 30 MG/DL
RBC # BLD AUTO: 5.06 M/UL (ref 4.2–5.3)
RBC #/AREA URNS HPF: ABNORMAL /HPF (ref 0–5)
SODIUM SERPL-SCNC: 138 MMOL/L (ref 136–145)
SP GR UR REFRACTOMETRY: 1.01 (ref 1–1.03)
TROPONIN I SERPL-MCNC: 0.14 NG/ML (ref 0–0.04)
TROPONIN I SERPL-MCNC: 0.19 NG/ML (ref 0–0.04)
TROPONIN I SERPL-MCNC: 0.58 NG/ML (ref 0–0.04)
UROBILINOGEN UR QL STRIP.AUTO: 0.2 EU/DL (ref 0.2–1)
WBC # BLD AUTO: 9.4 K/UL (ref 4.6–13.2)
WBC URNS QL MICRO: ABNORMAL /HPF (ref 0–4)

## 2019-08-07 PROCEDURE — 83036 HEMOGLOBIN GLYCOSYLATED A1C: CPT

## 2019-08-07 PROCEDURE — 74011250637 HC RX REV CODE- 250/637: Performed by: INTERNAL MEDICINE

## 2019-08-07 PROCEDURE — 71275 CT ANGIOGRAPHY CHEST: CPT

## 2019-08-07 PROCEDURE — 81001 URINALYSIS AUTO W/SCOPE: CPT

## 2019-08-07 PROCEDURE — 96374 THER/PROPH/DIAG INJ IV PUSH: CPT

## 2019-08-07 PROCEDURE — 74011636320 HC RX REV CODE- 636/320: Performed by: INTERNAL MEDICINE

## 2019-08-07 PROCEDURE — 74011636637 HC RX REV CODE- 636/637: Performed by: EMERGENCY MEDICINE

## 2019-08-07 PROCEDURE — 36415 COLL VENOUS BLD VENIPUNCTURE: CPT

## 2019-08-07 PROCEDURE — 93005 ELECTROCARDIOGRAM TRACING: CPT

## 2019-08-07 PROCEDURE — 74011636637 HC RX REV CODE- 636/637: Performed by: INTERNAL MEDICINE

## 2019-08-07 PROCEDURE — 82550 ASSAY OF CK (CPK): CPT

## 2019-08-07 PROCEDURE — 82962 GLUCOSE BLOOD TEST: CPT

## 2019-08-07 PROCEDURE — 71045 X-RAY EXAM CHEST 1 VIEW: CPT

## 2019-08-07 PROCEDURE — 74011250636 HC RX REV CODE- 250/636: Performed by: EMERGENCY MEDICINE

## 2019-08-07 PROCEDURE — 80053 COMPREHEN METABOLIC PANEL: CPT

## 2019-08-07 PROCEDURE — 85025 COMPLETE CBC W/AUTO DIFF WBC: CPT

## 2019-08-07 PROCEDURE — 74011250636 HC RX REV CODE- 250/636: Performed by: INTERNAL MEDICINE

## 2019-08-07 PROCEDURE — 65270000029 HC RM PRIVATE

## 2019-08-07 PROCEDURE — 99285 EMERGENCY DEPT VISIT HI MDM: CPT

## 2019-08-07 RX ORDER — ENOXAPARIN SODIUM 100 MG/ML
1 INJECTION SUBCUTANEOUS
Status: DISCONTINUED | OUTPATIENT
Start: 2019-08-07 | End: 2019-08-07

## 2019-08-07 RX ORDER — MAGNESIUM SULFATE 100 %
4 CRYSTALS MISCELLANEOUS AS NEEDED
Status: DISCONTINUED | OUTPATIENT
Start: 2019-08-07 | End: 2019-08-10 | Stop reason: HOSPADM

## 2019-08-07 RX ORDER — LOSARTAN POTASSIUM 50 MG/1
50 TABLET ORAL DAILY
Status: DISCONTINUED | OUTPATIENT
Start: 2019-08-08 | End: 2019-08-10 | Stop reason: HOSPADM

## 2019-08-07 RX ORDER — INSULIN GLARGINE 100 [IU]/ML
10 INJECTION, SOLUTION SUBCUTANEOUS
Status: DISCONTINUED | OUTPATIENT
Start: 2019-08-07 | End: 2019-08-08

## 2019-08-07 RX ORDER — HYDROXYCHLOROQUINE SULFATE 200 MG/1
200 TABLET, FILM COATED ORAL 2 TIMES DAILY
Status: DISCONTINUED | OUTPATIENT
Start: 2019-08-07 | End: 2019-08-07

## 2019-08-07 RX ORDER — POTASSIUM CHLORIDE 20 MEQ/1
40 TABLET, EXTENDED RELEASE ORAL
Status: COMPLETED | OUTPATIENT
Start: 2019-08-07 | End: 2019-08-07

## 2019-08-07 RX ORDER — SODIUM CHLORIDE 9 MG/ML
100 INJECTION, SOLUTION INTRAVENOUS CONTINUOUS
Status: DISCONTINUED | OUTPATIENT
Start: 2019-08-07 | End: 2019-08-08

## 2019-08-07 RX ORDER — IPRATROPIUM BROMIDE AND ALBUTEROL SULFATE 2.5; .5 MG/3ML; MG/3ML
3 SOLUTION RESPIRATORY (INHALATION)
Status: DISCONTINUED | OUTPATIENT
Start: 2019-08-07 | End: 2019-08-10 | Stop reason: HOSPADM

## 2019-08-07 RX ORDER — NALOXONE HYDROCHLORIDE 0.4 MG/ML
0.4 INJECTION, SOLUTION INTRAMUSCULAR; INTRAVENOUS; SUBCUTANEOUS AS NEEDED
Status: DISCONTINUED | OUTPATIENT
Start: 2019-08-07 | End: 2019-08-10 | Stop reason: HOSPADM

## 2019-08-07 RX ORDER — QUETIAPINE FUMARATE 25 MG/1
25 TABLET, FILM COATED ORAL 2 TIMES DAILY
Status: DISCONTINUED | OUTPATIENT
Start: 2019-08-07 | End: 2019-08-10 | Stop reason: HOSPADM

## 2019-08-07 RX ORDER — ONDANSETRON 2 MG/ML
4 INJECTION INTRAMUSCULAR; INTRAVENOUS
Status: DISCONTINUED | OUTPATIENT
Start: 2019-08-07 | End: 2019-08-10 | Stop reason: HOSPADM

## 2019-08-07 RX ORDER — FOLIC ACID 1 MG/1
1 TABLET ORAL DAILY
Status: DISCONTINUED | OUTPATIENT
Start: 2019-08-08 | End: 2019-08-10 | Stop reason: HOSPADM

## 2019-08-07 RX ORDER — DOCUSATE SODIUM 100 MG/1
100 CAPSULE, LIQUID FILLED ORAL
Status: DISCONTINUED | OUTPATIENT
Start: 2019-08-07 | End: 2019-08-10 | Stop reason: HOSPADM

## 2019-08-07 RX ORDER — ACETAMINOPHEN 325 MG/1
650 TABLET ORAL
Status: DISCONTINUED | OUTPATIENT
Start: 2019-08-07 | End: 2019-08-10 | Stop reason: HOSPADM

## 2019-08-07 RX ORDER — AMITRIPTYLINE HYDROCHLORIDE 25 MG/1
25 TABLET, FILM COATED ORAL
Status: DISCONTINUED | OUTPATIENT
Start: 2019-08-07 | End: 2019-08-08

## 2019-08-07 RX ORDER — ANAGRELIDE 0.5 MG/1
0.5 CAPSULE ORAL 2 TIMES DAILY
Status: DISCONTINUED | OUTPATIENT
Start: 2019-08-07 | End: 2019-08-10 | Stop reason: HOSPADM

## 2019-08-07 RX ORDER — ATENOLOL 50 MG/1
25 TABLET ORAL DAILY
Status: DISCONTINUED | OUTPATIENT
Start: 2019-08-08 | End: 2019-08-10 | Stop reason: HOSPADM

## 2019-08-07 RX ORDER — OXYCODONE AND ACETAMINOPHEN 5; 325 MG/1; MG/1
1 TABLET ORAL
Status: DISCONTINUED | OUTPATIENT
Start: 2019-08-07 | End: 2019-08-10 | Stop reason: HOSPADM

## 2019-08-07 RX ORDER — ENOXAPARIN SODIUM 100 MG/ML
1 INJECTION SUBCUTANEOUS ONCE
Status: COMPLETED | OUTPATIENT
Start: 2019-08-07 | End: 2019-08-07

## 2019-08-07 RX ORDER — DULOXETIN HYDROCHLORIDE 30 MG/1
30 CAPSULE, DELAYED RELEASE ORAL 2 TIMES DAILY
Status: DISCONTINUED | OUTPATIENT
Start: 2019-08-07 | End: 2019-08-10 | Stop reason: HOSPADM

## 2019-08-07 RX ORDER — INSULIN LISPRO 100 [IU]/ML
INJECTION, SOLUTION INTRAVENOUS; SUBCUTANEOUS
Status: DISCONTINUED | OUTPATIENT
Start: 2019-08-07 | End: 2019-08-10 | Stop reason: HOSPADM

## 2019-08-07 RX ORDER — GABAPENTIN 300 MG/1
300 CAPSULE ORAL 2 TIMES DAILY
Status: DISCONTINUED | OUTPATIENT
Start: 2019-08-07 | End: 2019-08-10 | Stop reason: HOSPADM

## 2019-08-07 RX ORDER — FUROSEMIDE 40 MG/1
40 TABLET ORAL DAILY
Status: DISCONTINUED | OUTPATIENT
Start: 2019-08-08 | End: 2019-08-08

## 2019-08-07 RX ORDER — DEXTROSE MONOHYDRATE 100 MG/ML
125-250 INJECTION, SOLUTION INTRAVENOUS AS NEEDED
Status: DISCONTINUED | OUTPATIENT
Start: 2019-08-07 | End: 2019-08-10 | Stop reason: HOSPADM

## 2019-08-07 RX ORDER — METHOTREXATE 2.5 MG/1
2.5 TABLET ORAL
Status: DISCONTINUED | OUTPATIENT
Start: 2019-08-08 | End: 2019-08-07

## 2019-08-07 RX ORDER — DEXTROSE 50 % IN WATER (D50W) INTRAVENOUS SYRINGE
25-50 AS NEEDED
Status: DISCONTINUED | OUTPATIENT
Start: 2019-08-07 | End: 2019-08-07 | Stop reason: SDUPTHER

## 2019-08-07 RX ORDER — CLONAZEPAM 0.5 MG/1
2 TABLET ORAL 2 TIMES DAILY
Status: DISCONTINUED | OUTPATIENT
Start: 2019-08-07 | End: 2019-08-10 | Stop reason: HOSPADM

## 2019-08-07 RX ORDER — ENOXAPARIN SODIUM 100 MG/ML
1 INJECTION SUBCUTANEOUS EVERY 12 HOURS
Status: DISCONTINUED | OUTPATIENT
Start: 2019-08-08 | End: 2019-08-09

## 2019-08-07 RX ADMIN — CLONAZEPAM 2 MG: 0.5 TABLET ORAL at 21:37

## 2019-08-07 RX ADMIN — DULOXETINE HYDROCHLORIDE 30 MG: 30 CAPSULE, DELAYED RELEASE ORAL at 21:37

## 2019-08-07 RX ADMIN — POTASSIUM CHLORIDE 40 MEQ: 20 TABLET, EXTENDED RELEASE ORAL at 19:58

## 2019-08-07 RX ADMIN — IOPAMIDOL 80 ML: 755 INJECTION, SOLUTION INTRAVENOUS at 22:25

## 2019-08-07 RX ADMIN — SODIUM CHLORIDE 100 ML/HR: 900 INJECTION, SOLUTION INTRAVENOUS at 21:33

## 2019-08-07 RX ADMIN — INSULIN LISPRO 8 UNITS: 100 INJECTION, SOLUTION INTRAVENOUS; SUBCUTANEOUS at 21:40

## 2019-08-07 RX ADMIN — AMITRIPTYLINE HYDROCHLORIDE 25 MG: 25 TABLET, FILM COATED ORAL at 23:04

## 2019-08-07 RX ADMIN — INSULIN HUMAN 8 UNITS: 100 INJECTION, SOLUTION PARENTERAL at 09:27

## 2019-08-07 RX ADMIN — INSULIN GLARGINE 10 UNITS: 100 INJECTION, SOLUTION SUBCUTANEOUS at 22:14

## 2019-08-07 RX ADMIN — ANAGRELIDE HYDROCHLORIDE 0.5 MG: 0.5 CAPSULE ORAL at 23:04

## 2019-08-07 RX ADMIN — SODIUM CHLORIDE 250 ML: 900 INJECTION, SOLUTION INTRAVENOUS at 09:00

## 2019-08-07 RX ADMIN — QUETIAPINE FUMARATE 25 MG: 25 TABLET ORAL at 21:37

## 2019-08-07 RX ADMIN — ENOXAPARIN SODIUM 100 MG: 100 INJECTION SUBCUTANEOUS at 13:24

## 2019-08-07 NOTE — H&P
2  History & Physical    Patient: Sade Dozier MRN: [id number]  CSN: [id number]    YOB: 1965  Age: 52 y.o. Sex: female      DOA: 8/7/2019    Chief Complaint:   Chief Complaint   Patient presents with    Chest Pain    High Blood Sugar          HPI:     Sade Dozier is a 52 y.o.  female who has PMH of multiple morbidities including essential thrombocytosis, HTN, DM, Tobacco use, mood disorder, fibromyalgia, Dx with dHF with preserved EF, noncompliant and has had multiple ER visits in [facility]  Pt presented to ER with sudden onset of SOB and substernal pressure like sensation that started PTA. Pt states that she had this knd of CP on and off for the last week, in ER, pt was found to have elevated trops without EKG changes. She was also found to have elevatedFS. She admitted to heavy smoking and non-compliance with her meds especially insulin. States that she is on 75/25 insulin but her FS is not controlled on it.  Follows with hematology and was placed on anagleride   Currently denies CP and started on lovenoc therapeutic and will be admitted for further work up to r/o ACS     Past Medical History:   Diagnosis Date    Abdominal pain, unspecified site     Acute otitis media     Acute pharyngitis     Anxiety     Arthritis     Autoimmune disease ([address]     Back injury     Backache     herniated disc in lower back    Blurred vision     Chest pain 5/4/2018    Chest pain, unspecified     abnormal EKG    Chronic airway obstruction, not elsewhere classified     Chronic back pain     Chronic pain     COPD     Depression     Diabetes ([address]     Dizziness     Dizziness and giddiness     possible orthostatic changes, vasovagal, autonomic dysfunction from diabetic neuropathy    Encounters for unspecified administrative purpose     Essential thrombocytosis ([address] 1/6/2016    Feeling anxious     Fibromyalgia     Headache(784.0)     Hemorrhoid     Herniated disc     at L5    Hypercholesterolemia     Hyperglycemia     Hypertension     Joint pain     Leukocytosis, unspecified     Major depressive disorder, single episode, mild (HCC)     Mental disorder     depression, anxiety, bipolar and PTSD    Neuropathy     Obesity, unspecified     Other chest pain     Palpitation 5/4/2018    ?  arrhythmia    Phobic disorders     Rheumatoid arthritis ([facility]     Rheumatoid arthritis of foot ([facility] 5/4/2018    on treatment    Seasonal allergies     Shortness of breath     normal EF    Smoking 5/4/2018    discussed cessation    Streptococcal sore throat     Type 2 diabetes mellitus without complication, with long-term current use of insulin ([facility] 5/4/2018    Type II or unspecified type diabetes mellitus with unspecified complication, uncontrolled     Unspecified hereditary and idiopathic peripheral neuropathy     Vitamin D deficiency        Past Surgical History:   Procedure Laterality Date    HX CHOLECYSTECTOMY  1994    HX GYN  2005    partial Hysterectomy    HX OTHER SURGICAL  12-1-15    had ingrown toenail removed from big toe, both feet    HX TUBAL LIGATION      1995       Family History   Problem Relation Age of Onset    Diabetes Other     Alcohol abuse Mother     Arthritis-osteo Mother     Diabetes Mother     Elevated Lipids Mother     Headache Mother     Heart Disease Mother     Migraines Mother     Psychiatric Disorder Mother     Alcohol abuse Father     Arthritis-osteo Father     Cancer Father     Headache Father     Heart Disease Father     Lung Disease Father     Migraines Father     Heart Surgery Father     Alcohol abuse Sister     Headache Sister     Diabetes Sister     Heart Disease Sister     Migraines Sister     Psychiatric Disorder Sister     Headache Brother     Diabetes Brother     Elevated Lipids Brother     Heart Disease Brother     Migraines Brother     Psychiatric Disorder Brother     Coronary Artery Disease Brother     Cancer Maternal Aunt     Alcohol abuse Maternal Aunt     Diabetes Maternal Aunt     Headache Maternal Aunt     Lung Disease Maternal Aunt     Migraines Maternal Aunt     Headache Maternal Uncle     Migraines Maternal Uncle     Stroke Maternal Uncle     Psychiatric Disorder Maternal Uncle     Headache Paternal Aunt     Migraines Paternal Aunt     Headache Paternal Uncle     Hypertension Paternal Uncle     Migraines Paternal Uncle     Stroke Paternal Uncle     Headache Maternal Grandmother     Migraines Maternal Grandmother     Stroke Maternal Grandmother     Headache Maternal Grandfather     Migraines Maternal Grandfather     Stroke Maternal Grandfather     Headache Paternal Grandmother     Hypertension Paternal Grandmother     Lung Disease Paternal Grandmother     Migraines Paternal Grandmother     Cancer Paternal Grandmother     Headache Paternal Grandfather     Cancer Paternal Grandfather     Migraines Paternal Grandfather        Social History     Socioeconomic History    Marital status:      Spouse name: Not on file    Number of children: Not on file    Years of education: Not on file    Highest education level: Not on file   Tobacco Use    Smoking status: Current Every Day Smoker     Packs/day: 0.25     Last attempt to quit: 2012     Years since quittin.0    Smokeless tobacco: Never Used    Tobacco comment: 5 cigarettes day   Substance and Sexual Activity    Alcohol use: Yes     Frequency: Monthly or less     Drinks per session: 1 or 2     Binge frequency: Never     Comment: socially    Drug use: No    Sexual activity: Yes     Partners: Male     Birth control/protection: Condom       Prior to Admission medications    Medication Sig Start Date End Date Taking? Authorizing Provider   oxyCODONE-acetaminophen (PERCOCET 10)  mg per tablet Take 1 Tab by mouth every six (6) hours as needed for Pain for up to 14 days.  Max Daily Amount: 4 Tabs. 8/2/19 8/16/19 Yes Dwain Prieto, NP   anagrelide (AGRYLIN) 0.5 mg capsule Take 1 Cap by mouth two (2) times a day. 11/13/18  Yes Otoo, Lanis Seip A, NP   amitriptyline (ELAVIL) 25 mg tablet 25 mg nightly. 8/22/17  Yes Provider, Historical   DULoxetine (CYMBALTA) 30 mg capsule two (2) times a day. 9/27/17  Yes Provider, Historical   QUEtiapine (SEROQUEL) 25 mg tablet 25 mg two (2) times a day. 9/22/17  Yes Provider, Historical   clonazePAM (KLONOPIN) 2 mg tablet Take 2 mg by mouth two (2) times a day. Indications: panic disorder   Yes Provider, Historical   folic acid 261 mcg tablet Take 400 mcg by mouth daily. Yes Provider, Historical   albuterol (PROAIR HFA) 90 mcg/actuation inhaler Take 1 Puff by inhalation every six (6) hours as needed for Wheezing. 2/6/16  Yes Arian Wilder, DO   ondansetron hcl (ZOFRAN, AS HYDROCHLORIDE,) 4 mg tablet Take 1 Tab by mouth every eight (8) hours as needed for Nausea. 12/9/15  Yes Arian Wilder DO   methotrexate (RHEUMATREX) 2.5 mg tablet Take 2.5 mg by mouth every fourty-eight (48) hours. Yes Provider, Historical   metFORMIN (GLUCOPHAGE) 1,000 mg tablet Take 500 mg by mouth two (2) times a day. Yes Provider, Historical   atenolol (TENORMIN) 25 mg tablet Take 25 mg by mouth daily. Indications: HYPERTENSION   Yes Provider, Historical   furosemide (LASIX) 40 mg tablet Take  by mouth daily. Yes Provider, Historical   losartan-hydrochlorothiazide (HYZAAR) 50-12.5 mg per tablet Take 1 Tab by mouth daily. Yes Provider, Historical   insulin lispro protamine/insulin lispro (HUMALOG MIX 75-25,U-100,INSULN) 100 unit/mL (75-25) injection 20 Units by SubCUTAneous route Before breakfast and dinner. Provider, Historical   naloxone (NARCAN) 4 mg/actuation nasal spray Use 1 spray intranasally, then discard. Repeat with new spray every 2 min as needed for opioid overdose symptoms, alternating nostrils.   Indications: Decrease in Rate & Depth of Breathing due to Opioid Drug, opioid overdose 4/30/19   Demario CHASE, NP   clotrimazole-betamethasone (LOTRISONE) topical cream two (2) times a day. Indications: right hand 9/21/17   Provider, Historical   gabapentin (NEURONTIN) 300 mg capsule 300 mg two (2) times a day. 8/22/17   Provider, Historical   BD INSULIN SYRINGE ULTRA-FINE 1 mL 31 gauge x 15/64\" syrg  9/22/17   Provider, Historical   Cholecalciferol, Vitamin D3, 5,000 unit Tab Take 5,000 Units by mouth every seven (7) days. Other, MD Lelia   hydroxychloroquine (PLAQUENIL) 200 mg tablet Take 200 mg by mouth two (2) times a day. Provider, Historical   INFLIXIMAB (REMICADE IV) 344 mg by IntraVENous route once as needed. remicade will be every 6 weeks    Provider, Historical   INSULIN GLARGINE,HUM. REC. ANLOG (LANTUS SC) 5 Units by SubCUTAneous route daily. Provider, Historical       Allergies   Allergen Reactions    Levaquin [Levofloxacin] Anaphylaxis    Pollen Extracts Unable to Obtain         Review of Systems  GENERAL: Patient alert, awake and oriented times 3, able to communicate full sentences and not in distress. HEENT: No change in vision, no earache, tinnitus, sore throat or sinus congestion. NECK: No pain or stiffness. PULMONARY: + shortness of breath, mild cough / wheeze. Cardiovascular: no pnd / orthopnea, + CP  GASTROINTESTINAL: No abdominal pain, nausea, vomiting or diarrhea, melena or bright red blood per rectum. GENITOURINARY: No urinary frequency, urgency, hesitancy or dysuria. MUSCULOSKELETAL: No joint or muscle pain, no back pain, no recent trauma. DERMATOLOGIC: No rash, no itching, no lesions. ENDOCRINE: No polyuria, polydipsia, no heat or cold intolerance. No recent change in weight. HEMATOLOGICAL: No anemia or easy bruising or bleeding. NEUROLOGIC: No headache, seizures, numbness, tingling or weakness.        Physical Exam:     Physical Exam:  Visit Vitals  /69 (BP 1 Location: Left arm, BP Patient Position: At rest) Pulse 92   Temp 97.3 °F (36.3 °C)   Resp 18   Ht 5' 11\" (1.803 m)   Wt 97.5 kg (215 lb)   SpO2 97%   Breastfeeding? No   BMI 29.99 kg/m²      O2 Device: Room air    Temp (24hrs), Av.1 °F (36.7 °C), Min:97.3 °F (36.3 °C), Max:98.9 °F (37.2 °C)    No intake/output data recorded. 701 -  1900  In: 250 [I.V.:250]  Out: -     General:  Alert, cooperative, no distress, appears stated age. Head: Normocephalic, without obvious abnormality, atraumatic. Eyes:  Conjunctivae/corneas clear. PERRL, EOMs intact. Nose: Nares normal. No drainage or sinus tenderness. Neck: Supple, symmetrical, trachea midline, no adenopathy, thyroid: no enlargement, no carotid bruit and no JVD. Lungs:   Decrease BS b/l with mils scattered wheezing    Heart:  Regular rate and rhythm, S1, S2 normal.     Abdomen: Soft, non-tender. Bowel sounds normal.    Extremities: Extremities normal, atraumatic, no cyanosis or edema. Pulses: 2+ and symmetric all extremities. Skin:  No rashes or lesions   Neurologic: AAOx3, No focal motor or sensory deficit. Labs Reviewed:    Lab results reviewed. For significant abnormal values and values requiring intervention, see assessment and plan.   CT, CXR and EKG    Procedures/imaging: see electronic medical records for all procedures/Xrays and details which were not copied into this note but were reviewed prior to creation of Plan      Assessment/Plan     Principal Problem:    ACS (acute coronary syndrome) ([facility] (2019)    Active Problems:    Essential thrombocytosis ([address] (2016)      Rheumatoid arthritis involving multiple joints ([address] (2016)      Chest pain (2018)      Tobacco use (2018)      Overview: discussed cessation      Type 2 diabetes mellitus without complication, with long-term current use of insulin ([address] (2018)      Acute bronchitis (2019)      Chronic diastolic congestive heart failure ([address] (2019)       Pt is being admitted for Chest pain R/o ACS with elevated trops/NSTEMI  DM with Hyperglycemia >> uncontrolled 2 ry to Non-compliance  Active smoker with early signs of COPD, mild wheezing and has Acute bronchitis   Essential thrombocytosis >> controlled on anagrelide   Hx of RA on Methotrexate weekly and Infliximab   DHF and pulmonary HTN >> chronic and compensated on lasix     Will continue Pt on Therapeutic Lovenox  Cardiology consult  Series of cardiac enzymes   Echo  NPO PMN  Doxycycline   SSI and Lantus   Nicotine patch   Hold methotrexate >> pt gets her weekly dose on weekend +/- 1 day   continue Lasix     Hx of anxiety, Fibromyalgia and mood disorder>> continue Home meds     DVT/GI Prophylaxis: Lovenox    Plan of care is discussed in details with Patient/Family at bedside and agreed upon    Sabas Russell MD  8/8/2019 7:36 PM

## 2019-08-07 NOTE — Clinical Note
TRANSFER - IN REPORT:  
 
Verbal report received from: [address] [place] [address]. Report consisted of patient's Situation, Background, Assessment and  
Recommendations(SBAR). Opportunity for questions and clarification was provided. Assessment completed upon patient's arrival to unit and care assumed. Patient transported with a Cardiac Cath Tech / Patient Care Tech.

## 2019-08-07 NOTE — Clinical Note
Right groin and right radial clipped, prepped with ChloraPrep and draped. Wet prep solution applied at: 200. Wet prep solution dried at: 203. Wet prep elapsed drying time: 3 mins.

## 2019-08-07 NOTE — Clinical Note
Contrast Dose Calculator:  
Patient's age: 52.  
Patient's sex: Female. Patient weight (kg) = 97.5. Creatinine level (mg/dL) = 0.95. Creatinine clearance (mL/min): 103. Contrast concentration (mg/mL) = 300. MACD = 300 mL. Max Contrast dose per Creatinine Cl calculator = 231.75 mL.

## 2019-08-07 NOTE — ED NOTES
[facility] at bedside for transport to [address] [place] [address] for admission.

## 2019-08-07 NOTE — Clinical Note
TRANSFER - OUT REPORT:  
 
Verbal report given to: [address] Report consisted of patient's Situation, Background, Assessment and  
Recommendations(SBAR). Opportunity for questions and clarification was provided. Patient transported with a Cardiac Cath Tech / Patient Care Tech. Patient transported to: [address]

## 2019-08-07 NOTE — PROGRESS NOTES
Rec patient from [facility] ED by medical transport. Called Dr. Wyatt Lombard and made aware.

## 2019-08-07 NOTE — ED PROVIDER NOTES
EMERGENCY DEPARTMENT HISTORY AND PHYSICAL EXAM    8:37 AM      Date: 8/7/2019  Patient Name: Layla Chaudhari    History of Presenting Illness     Chief Complaint   Patient presents with    Chest Pain    High Blood Sugar         History Provided By: Patient    Additional History (Context): Layla Chaudhari is a 52 y.o. female with diabetes, hypertension, hyperlipidemia, obesity and COPD who presents with with increased blood sugar and chest pain for 1 week. Patient states she has had her blood sugars in the 400-500s for the last week. She has some mild tenderness on her chest, nonradiating, 5 out of 10, thing makes better or worse. Patient denies fever, nausea vomiting or diarrhea. Patient has a mild cough. Patient denies smoking alcohol or recreational drug use. Anant Bishop PCP: Abbie Flores MD      Current Facility-Administered Medications   Medication Dose Route Frequency Provider Last Rate Last Dose    enoxaparin (LOVENOX) injection 100 mg  1 mg/kg SubCUTAneous ONCE Anju Wilder,          Current Outpatient Medications   Medication Sig Dispense Refill    oxyCODONE-acetaminophen (PERCOCET 10)  mg per tablet Take 1 Tab by mouth every six (6) hours as needed for Pain for up to 14 days. Max Daily Amount: 4 Tabs. 60 Tab 0    insulin lispro protamine/insulin lispro (HUMALOG MIX 75-25,U-100,INSULN) 100 unit/mL (75-25) injection by SubCUTAneous route.  naloxone (NARCAN) 4 mg/actuation nasal spray Use 1 spray intranasally, then discard. Repeat with new spray every 2 min as needed for opioid overdose symptoms, alternating nostrils. Indications: Decrease in Rate & Depth of Breathing due to Opioid Drug, opioid overdose 1 Each 1    anagrelide (AGRYLIN) 0.5 mg capsule Take 1 Cap by mouth two (2) times a day.  60 Cap 6    amitriptyline (ELAVIL) 25 mg tablet       clotrimazole-betamethasone (LOTRISONE) topical cream       DULoxetine (CYMBALTA) 30 mg capsule       gabapentin (NEURONTIN) 300 mg capsule 300 mg two (2) times a day.  QUEtiapine (SEROQUEL) 25 mg tablet       BD INSULIN SYRINGE ULTRA-FINE 1 mL 31 gauge x 15/64\" syrg       clonazePAM (KLONOPIN) 2 mg tablet Take 2 mg by mouth daily. Indications: PANIC DISORDER      folic acid 775 mcg tablet Take 400 mcg by mouth daily.  albuterol (PROAIR HFA) 90 mcg/actuation inhaler Take 1 Puff by inhalation every six (6) hours as needed for Wheezing. 1 Inhaler 0    ondansetron hcl (ZOFRAN, AS HYDROCHLORIDE,) 4 mg tablet Take 1 Tab by mouth every eight (8) hours as needed for Nausea. 12 Tab 0    methotrexate (RHEUMATREX) 2.5 mg tablet Take 2.5 mg by mouth every fourty-eight (48) hours.  metFORMIN (GLUCOPHAGE) 1,000 mg tablet Take 500 mg by mouth two (2) times a day.  Cholecalciferol, Vitamin D3, 5,000 unit Tab Take 5,000 Units by mouth every seven (7) days.  atenolol (TENORMIN) 25 mg tablet Take 25 mg by mouth daily. Indications: HYPERTENSION      hydroxychloroquine (PLAQUENIL) 200 mg tablet Take 200 mg by mouth two (2) times a day.  furosemide (LASIX) 40 mg tablet Take  by mouth daily.  INFLIXIMAB (REMICADE IV) 344 mg by IntraVENous route once as needed. remicade will be every 6 weeks      losartan-hydrochlorothiazide (HYZAAR) 50-12.5 mg per tablet Take 1 Tab by mouth daily.  INSULIN GLARGINE,HUM. REC. ANLOG (LANTUS SC) 5 Units by SubCUTAneous route daily.          Past History     Past Medical History:  Past Medical History:   Diagnosis Date    Abdominal pain, unspecified site     Acute otitis media     Acute pharyngitis     Anxiety     Arthritis     Autoimmune disease ([facility] [address]     Back injury     Backache     herniated disc in lower back    Blurred vision     Chest pain 5/4/2018    Chest pain, unspecified     abnormal EKG    Chronic airway obstruction, not elsewhere classified     Chronic back pain     Chronic pain     COPD     Depression     Diabetes (HCC)     Dizziness     Dizziness and giddiness     possible orthostatic changes, vasovagal, autonomic dysfunction from diabetic neuropathy    Encounters for unspecified administrative purpose     Essential thrombocytosis ([facility] [address] 1/6/2016    Feeling anxious     Fibromyalgia     Headache(784.0)     Hemorrhoid     Herniated disc     at L5    Hypercholesterolemia     Hyperglycemia     Hypertension     Joint pain     Leukocytosis, unspecified     Major depressive disorder, single episode, mild (HCC)     Mental disorder     depression, anxiety, bipolar and PTSD    Neuropathy     Obesity, unspecified     Other chest pain     Palpitation 5/4/2018    ?  arrhythmia    Phobic disorders     Rheumatoid arthritis ([address]     Rheumatoid arthritis of foot ([address] 5/4/2018    on treatment    Seasonal allergies     Shortness of breath     normal EF    Smoking 5/4/2018    discussed cessation    Streptococcal sore throat     Type 2 diabetes mellitus without complication, with long-term current use of insulin ([address] 5/4/2018    Type II or unspecified type diabetes mellitus with unspecified complication, uncontrolled     Unspecified hereditary and idiopathic peripheral neuropathy     Vitamin D deficiency        Past Surgical History:  Past Surgical History:   Procedure Laterality Date    HX CHOLECYSTECTOMY  1994    HX GYN  2005    partial Hysterectomy    HX OTHER SURGICAL  12-1-15    had ingrown toenail removed from big toe, both feet    HX TUBAL LIGATION      1995       Family History:  Family History   Problem Relation Age of Onset    Diabetes Other     Alcohol abuse Mother     Arthritis-osteo Mother     Diabetes Mother     Elevated Lipids Mother     Headache Mother     Heart Disease Mother    [facility] Migraines Mother     Psychiatric Disorder Mother     Alcohol abuse Father     Arthritis-osteo Father     Cancer Father     Headache Father     Heart Disease Father     Lung Disease Father     Migraines Father     Heart Surgery Father     Alcohol abuse Sister     Headache Sister     Diabetes Sister     Heart Disease Sister     Migraines Sister     Psychiatric Disorder Sister     Headache Brother     Diabetes Brother     Elevated Lipids Brother     Heart Disease Brother     Migraines Brother     Psychiatric Disorder Brother     Coronary Artery Disease Brother     Cancer Maternal Aunt     Alcohol abuse Maternal Aunt     Diabetes Maternal Aunt     Headache Maternal Aunt     Lung Disease Maternal Aunt     Migraines Maternal Aunt     Headache Maternal Uncle     Migraines Maternal Uncle     Stroke Maternal Uncle     Psychiatric Disorder Maternal Uncle     Headache Paternal Aunt     Migraines Paternal Aunt     Headache Paternal Uncle     Hypertension Paternal Uncle     Migraines Paternal Uncle     Stroke Paternal Uncle     Headache Maternal Grandmother     Migraines Maternal Grandmother     Stroke Maternal Grandmother     Headache Maternal Grandfather     Migraines Maternal Grandfather     Stroke Maternal Grandfather     Headache Paternal Grandmother     Hypertension Paternal Grandmother     Lung Disease Paternal Grandmother     Migraines Paternal Grandmother     Cancer Paternal Grandmother     Headache Paternal Grandfather     Cancer Paternal Grandfather     Migraines Paternal Grandfather        Social History:  Social History     Tobacco Use    Smoking status: Current Every Day Smoker     Packs/day: 0.25     Last attempt to quit: 2012     Years since quittin.0    Smokeless tobacco: Never Used    Tobacco comment: 5 cigarettes day   Substance Use Topics    Alcohol use: Yes     Frequency: Monthly or less     Drinks per session: 1 or 2     Binge frequency: Never     Comment: socially    Drug use: No       Allergies: Allergies   Allergen Reactions    Levaquin [Levofloxacin] Anaphylaxis    Pollen Extracts Unable to Obtain         Review of Systems       Review of Systems   Constitutional: Negative. Negative for chills, diaphoresis and fever. HENT: Negative. Negative for congestion, rhinorrhea and sore throat. Eyes: Negative. Negative for pain, discharge and redness. Respiratory: Negative. Negative for cough, chest tightness, shortness of breath and wheezing. Cardiovascular: Positive for chest pain. Gastrointestinal: Negative. Negative for abdominal pain, constipation, diarrhea, nausea and vomiting. Genitourinary: Negative. Negative for dysuria, flank pain, frequency, hematuria and urgency. Musculoskeletal: Negative. Negative for back pain and neck pain. Skin: Negative. Negative for rash. Neurological: Negative. Negative for syncope, weakness, numbness and headaches. Psychiatric/Behavioral: Negative. All other systems reviewed and are negative. Physical Exam     Visit Vitals  /82   Pulse 94   Temp 98.9 °F (37.2 °C)   Resp 16   Ht 5' 11\" (1.803 m)   Wt 97.5 kg (215 lb)   SpO2 100%   BMI 29.99 kg/m²         Physical Exam   Constitutional: She is oriented to person, place, and time. She appears well-developed and well-nourished. Non-toxic appearance. She does not have a sickly appearance. She does not appear ill. No distress. HENT:   Head: Normocephalic and atraumatic. Mouth/Throat: Oropharynx is clear and moist. No oropharyngeal exudate. Eyes: Pupils are equal, round, and reactive to light. Conjunctivae and EOM are normal. No scleral icterus. Neck: Normal range of motion. Neck supple. No hepatojugular reflux and no JVD present. No tracheal deviation present. No thyromegaly present. Cardiovascular: Normal rate, regular rhythm, S1 normal, S2 normal, normal heart sounds, intact distal pulses and normal pulses. Exam reveals no gallop, no S3 and no S4. No murmur heard. Pulses:       Radial pulses are 2+ on the right side, and 2+ on the left side. Dorsalis pedis pulses are 2+ on the right side, and 2+ on the left side.    Pulmonary/Chest: Effort normal and breath sounds normal. No respiratory distress. She has no decreased breath sounds. She has no wheezes. She has no rhonchi. She has no rales. Abdominal: Soft. Normal appearance and bowel sounds are normal. She exhibits no distension and no mass. There is no hepatosplenomegaly. There is no tenderness. There is no rigidity, no rebound, no guarding, no CVA tenderness, no tenderness at McBurney's point and negative Ortiz's sign. Musculoskeletal: Normal range of motion. She exhibits no edema. Lymphadenopathy:        Head (right side): No submental, no submandibular, no preauricular and no occipital adenopathy present. Head (left side): No submental, no submandibular, no preauricular and no occipital adenopathy present. She has no cervical adenopathy. Right: No supraclavicular adenopathy present. Left: No supraclavicular adenopathy present. Neurological: She is alert and oriented to person, place, and time. She has normal strength and normal reflexes. She is not disoriented. No cranial nerve deficit or sensory deficit. Coordination and gait normal. GCS eye subscore is 4. GCS verbal subscore is 5. GCS motor subscore is 6. Skin: Skin is warm, dry and intact. No rash noted. She is not diaphoretic. Psychiatric: She has a normal mood and affect. Her speech is normal and behavior is normal. Judgment and thought content normal. Cognition and memory are normal.   Nursing note and vitals reviewed.         Diagnostic Study Results     Labs -  Recent Results (from the past 12 hour(s))   EKG, 12 LEAD, INITIAL    Collection Time: 08/07/19  8:29 AM   Result Value Ref Range    Ventricular Rate 90 BPM    Atrial Rate 90 BPM    P-R Interval 194 ms    QRS Duration 96 ms    Q-T Interval 378 ms    QTC Calculation (Bezet) 462 ms    Calculated P Axis 55 degrees    Calculated R Axis 48 degrees    Calculated T Axis 27 degrees    Diagnosis       Normal sinus rhythm  Normal ECG  When compared with ECG of 17-AUG-2017 07:57,  No significant change was found     GLUCOSE, POC    Collection Time: 08/07/19  8:30 AM   Result Value Ref Range    Glucose (POC) 334 (H) 70 - 110 mg/dL   CBC WITH AUTOMATED DIFF    Collection Time: 08/07/19  8:35 AM   Result Value Ref Range    WBC 9.4 4.6 - 13.2 K/uL    RBC 5.06 4.20 - 5.30 M/uL    HGB 13.9 12.0 - 16.0 g/dL    HCT 41.7 35.0 - 45.0 %    MCV 82.4 74.0 - 97.0 FL    MCH 27.5 24.0 - 34.0 PG    MCHC 33.3 31.0 - 37.0 g/dL    RDW 13.6 11.6 - 14.5 %    PLATELET 105 521 - 055 K/uL    MPV 9.6 9.2 - 11.8 FL    NEUTROPHILS 65 40 - 73 %    LYMPHOCYTES 25 21 - 52 %    MONOCYTES 7 3 - 10 %    EOSINOPHILS 3 0 - 5 %    BASOPHILS 0 0 - 2 %    ABS. NEUTROPHILS 6.0 1.8 - 8.0 K/UL    ABS. LYMPHOCYTES 2.4 0.9 - 3.6 K/UL    ABS. MONOCYTES 0.6 0.05 - 1.2 K/UL    ABS. EOSINOPHILS 0.3 0.0 - 0.4 K/UL    ABS. BASOPHILS 0.0 0.0 - 0.1 K/UL    DF AUTOMATED     METABOLIC PANEL, COMPREHENSIVE    Collection Time: 08/07/19  8:35 AM   Result Value Ref Range    Sodium 138 136 - 145 mmol/L    Potassium 3.6 3.5 - 5.5 mmol/L    Chloride 103 100 - 111 mmol/L    CO2 27 21 - 32 mmol/L    Anion gap 8 3.0 - 18 mmol/L    Glucose 325 (H) 74 - 99 mg/dL    BUN 20 (H) 7.0 - 18 MG/DL    Creatinine 0.89 0.6 - 1.3 MG/DL    BUN/Creatinine ratio 22 (H) 12 - 20      GFR est AA >60 >60 ml/min/1.73m2    GFR est non-AA >60 >60 ml/min/1.73m2    Calcium 9.5 8.5 - 10.1 MG/DL    Bilirubin, total 0.2 0.2 - 1.0 MG/DL    ALT (SGPT) 18 13 - 56 U/L    AST (SGOT) 3 (L) 10 - 38 U/L    Alk.  phosphatase 116 45 - 117 U/L    Protein, total 7.7 6.4 - 8.2 g/dL    Albumin 3.3 (L) 3.4 - 5.0 g/dL    Globulin 4.4 (H) 2.0 - 4.0 g/dL    A-G Ratio 0.8 0.8 - 1.7     CARDIAC PANEL,(CK, CKMB & TROPONIN)    Collection Time: 08/07/19  8:35 AM   Result Value Ref Range    CK 56 26 - 192 U/L    CK - MB <1.0 <3.6 ng/ml    CK-MB Index  0.0 - 4.0 %     CALCULATION NOT PERFORMED WHEN RESULT IS BELOW LINEAR LIMIT    Troponin-I, QT 0.14 (H) 0.0 - 0.045 NG/ML   URINALYSIS W/ RFLX MICROSCOPIC    Collection Time: 08/07/19  8:35 AM   Result Value Ref Range    Color YELLOW      Appearance CLEAR      Specific gravity 1.015 1.005 - 1.030      pH (UA) 5.0 5.0 - 8.0      Protein 30 (A) NEG mg/dL    Glucose 500 (A) NEG mg/dL    Ketone NEGATIVE  NEG mg/dL    Bilirubin NEGATIVE  NEG      Blood NEGATIVE  NEG      Urobilinogen 0.2 0.2 - 1.0 EU/dL    Nitrites NEGATIVE  NEG      Leukocyte Esterase NEGATIVE  NEG     URINE MICROSCOPIC ONLY    Collection Time: 08/07/19  8:35 AM   Result Value Ref Range    WBC 0 to 3 0 - 4 /hpf    RBC 0 to 3 0 - 5 /hpf    Epithelial cells 2+ 0 - 5 /lpf    Bacteria 1+ (A) NEG /hpf   GLUCOSE, POC    Collection Time: 08/07/19 10:36 AM   Result Value Ref Range    Glucose (POC) 211 (H) 70 - 110 mg/dL   CARDIAC PANEL,(CK, CKMB & TROPONIN)    Collection Time: 08/07/19 12:00 PM   Result Value Ref Range    CK 60 26 - 192 U/L    CK - MB 1.0 <3.6 ng/ml    CK-MB Index 1.7 0.0 - 4.0 %    Troponin-I, QT 0.19 (H) 0.0 - 0.045 NG/ML       Radiologic Studies -   XR CHEST PORT   Final Result   Findings/impression:      Cardiac silhouette unchanged. Lungs clear. Chronic osseous findings without   acute osseous abnormality. Medical Decision Making   Provider Notes (Medical Decision Making):  MDM  Number of Diagnoses or Management Options  Diagnosis management comments: DIFFERENTIAL DIAGNOSES/ MEDICAL DECISION MAKING:  Chest pain etiologies include acute cardiac events to include possible acute myocardial infarction, acute coronary syndrome, pneumonia, chest wall pain (myofascial/ musculoskeletal etiology), chronic obstructive pulmonary disease (copd), acute asthma exacerbation, congestive heart failure, acute bronchitis, pulmonary embolism, upper respiratory infection, referred abdominal pain, other etiologies, versus combination of the above. I am the first provider for this patient.     I reviewed the vital signs, available nursing notes, past medical history, past surgical history, family history and social history. Vital Signs-Reviewed the patient's vital signs. Records Reviewed: Nursing Notes (Time of Review: 8:37 AM)    ED Course: Progress Notes, Reevaluation, and Consults:    Labs essentially normal with the exception of the point of 0.14 and blood glucose of 325. Chest X-Ray showed No acute process. EKG showed NSR with rate of 90 bpm. With no ST elevations or depression and non specific T wave changes. 12:37 PM 8/7/2019    Consult:  Discussed care with Dr. Lian Allred. Hospitalist. Standard discussion; including history of patients chief complaint, available diagnostic results, and treatment course. Agrees to admit  1:13 PM        Diagnosis         Clinical Impression:   1. ACS (acute coronary syndrome) [facility])        Disposition: Admitted       Attestation        Provider Attestation:     I personally performed the services described in the documentation, reviewed the documentation and it accurately and completely records my words and actions utilizing the [address] August 07, 2019 at 1:16 PM - Tina, [address]. It is dictated using utilizing voice recognition software. Unfortunately this leads to occasional typographical errors. I apologize in advance if the situation occurs. If questions arise please do not hesitate to contact me or call our department.

## 2019-08-07 NOTE — ROUTINE PROCESS
Verbal shift change report given to Dickson Decker (oncoming nurse) by Michelle Ivey RN (offgoing nurse). Report included the following information SBAR, Kardex, ED Summary, Procedure Summary, Intake/Output, MAR and Recent Results.

## 2019-08-08 ENCOUNTER — APPOINTMENT (OUTPATIENT)
Dept: NON INVASIVE DIAGNOSTICS | Age: 54
DRG: 281 | End: 2019-08-08
Attending: NURSE PRACTITIONER
Payer: MEDICARE

## 2019-08-08 PROBLEM — J20.9 ACUTE BRONCHITIS: Status: ACTIVE | Noted: 2019-08-08

## 2019-08-08 PROBLEM — I50.32 CHRONIC DIASTOLIC CONGESTIVE HEART FAILURE (HCC): Status: ACTIVE | Noted: 2019-08-08

## 2019-08-08 LAB
ANION GAP SERPL CALC-SCNC: 6 MMOL/L (ref 3–18)
ATRIAL RATE: 90 BPM
BASOPHILS # BLD: 0 K/UL (ref 0–0.1)
BASOPHILS NFR BLD: 0 % (ref 0–2)
BNP SERPL-MCNC: 96 PG/ML (ref 0–900)
BUN SERPL-MCNC: 20 MG/DL (ref 7–18)
BUN/CREAT SERPL: 26 (ref 12–20)
CALCIUM SERPL-MCNC: 9.2 MG/DL (ref 8.5–10.1)
CALCULATED P AXIS, ECG09: 55 DEGREES
CALCULATED R AXIS, ECG10: 48 DEGREES
CALCULATED T AXIS, ECG11: 27 DEGREES
CHLORIDE SERPL-SCNC: 106 MMOL/L (ref 100–111)
CHOLEST SERPL-MCNC: 193 MG/DL
CK MB CFR SERPL CALC: 2.4 % (ref 0–4)
CK MB CFR SERPL CALC: 2.6 % (ref 0–4)
CK MB SERPL-MCNC: 1.5 NG/ML (ref 5–25)
CK MB SERPL-MCNC: 2 NG/ML (ref 5–25)
CK SERPL-CCNC: 62 U/L (ref 26–192)
CK SERPL-CCNC: 76 U/L (ref 26–192)
CO2 SERPL-SCNC: 27 MMOL/L (ref 21–32)
CREAT SERPL-MCNC: 0.78 MG/DL (ref 0.6–1.3)
DIAGNOSIS, 93000: NORMAL
DIFFERENTIAL METHOD BLD: ABNORMAL
ECHO AO ROOT DIAM: 3 CM
ECHO LA AREA 4C: 15.1 CM2
ECHO LA VOL 2C: 33.6 ML (ref 22–52)
ECHO LA VOL 4C: 35.24 ML (ref 22–52)
ECHO LA VOL BP: 38.91 ML (ref 22–52)
ECHO LA VOL/BSA BIPLANE: 17.89 ML/M2 (ref 16–28)
ECHO LA VOLUME INDEX A2C: 15.45 ML/M2 (ref 16–28)
ECHO LA VOLUME INDEX A4C: 16.2 ML/M2 (ref 16–28)
ECHO LV INTERNAL DIMENSION DIASTOLIC: 3.85 CM (ref 3.9–5.3)
ECHO LV INTERNAL DIMENSION SYSTOLIC: 2.83 CM
ECHO LV IVSD: 0.87 CM (ref 0.6–0.9)
ECHO LV MASS 2D: 104.3 G (ref 67–162)
ECHO LV MASS INDEX 2D: 48 G/M2 (ref 43–95)
ECHO LV POSTERIOR WALL DIASTOLIC: 0.83 CM (ref 0.6–0.9)
ECHO LVOT DIAM: 2.03 CM
ECHO LVOT PEAK GRADIENT: 3.2 MMHG
ECHO LVOT PEAK VELOCITY: 89.01 CM/S
ECHO LVOT VTI: 15.33 CM
ECHO MV A VELOCITY: 88.4 CM/S
ECHO MV E DECELERATION TIME (DT): 201.9 MS
ECHO MV E VELOCITY: 55.18 CM/S
ECHO MV E/A RATIO: 0.62
ECHO TV REGURGITANT MAX VELOCITY: 239.87 CM/S
ECHO TV REGURGITANT PEAK GRADIENT: 23 MMHG
EOSINOPHIL # BLD: 0.4 K/UL (ref 0–0.4)
EOSINOPHIL NFR BLD: 4 % (ref 0–5)
ERYTHROCYTE [DISTWIDTH] IN BLOOD BY AUTOMATED COUNT: 13.5 % (ref 11.6–14.5)
GLUCOSE BLD STRIP.AUTO-MCNC: 170 MG/DL (ref 70–110)
GLUCOSE BLD STRIP.AUTO-MCNC: 197 MG/DL (ref 70–110)
GLUCOSE BLD STRIP.AUTO-MCNC: 251 MG/DL (ref 70–110)
GLUCOSE BLD STRIP.AUTO-MCNC: 341 MG/DL (ref 70–110)
GLUCOSE SERPL-MCNC: 247 MG/DL (ref 74–99)
HCT VFR BLD AUTO: 42.3 % (ref 35–45)
HDLC SERPL-MCNC: 38 MG/DL (ref 40–60)
HDLC SERPL: 5.1 {RATIO} (ref 0–5)
HGB BLD-MCNC: 13.8 G/DL (ref 12–16)
LDLC SERPL CALC-MCNC: 88.6 MG/DL (ref 0–100)
LIPID PROFILE,FLP: ABNORMAL
LYMPHOCYTES # BLD: 3.8 K/UL (ref 0.9–3.6)
LYMPHOCYTES NFR BLD: 39 % (ref 21–52)
MAGNESIUM SERPL-MCNC: 2.1 MG/DL (ref 1.6–2.6)
MCH RBC QN AUTO: 26.8 PG (ref 24–34)
MCHC RBC AUTO-ENTMCNC: 32.6 G/DL (ref 31–37)
MCV RBC AUTO: 82.3 FL (ref 74–97)
MONOCYTES # BLD: 0.7 K/UL (ref 0.05–1.2)
MONOCYTES NFR BLD: 7 % (ref 3–10)
NEUTS SEG # BLD: 4.9 K/UL (ref 1.8–8)
NEUTS SEG NFR BLD: 50 % (ref 40–73)
P-R INTERVAL, ECG05: 194 MS
PHOSPHATE SERPL-MCNC: 3.3 MG/DL (ref 2.5–4.9)
PLATELET # BLD AUTO: 343 K/UL (ref 135–420)
PMV BLD AUTO: 9.7 FL (ref 9.2–11.8)
POTASSIUM SERPL-SCNC: 4 MMOL/L (ref 3.5–5.5)
Q-T INTERVAL, ECG07: 378 MS
QRS DURATION, ECG06: 96 MS
QTC CALCULATION (BEZET), ECG08: 462 MS
RBC # BLD AUTO: 5.14 M/UL (ref 4.2–5.3)
SODIUM SERPL-SCNC: 139 MMOL/L (ref 136–145)
TRIGL SERPL-MCNC: 332 MG/DL (ref ?–150)
TROPONIN I SERPL-MCNC: 0.33 NG/ML (ref 0–0.04)
TROPONIN I SERPL-MCNC: 0.6 NG/ML (ref 0–0.04)
VENTRICULAR RATE, ECG03: 90 BPM
VLDLC SERPL CALC-MCNC: 66.4 MG/DL
WBC # BLD AUTO: 9.7 K/UL (ref 4.6–13.2)

## 2019-08-08 PROCEDURE — 82962 GLUCOSE BLOOD TEST: CPT

## 2019-08-08 PROCEDURE — 74011250636 HC RX REV CODE- 250/636: Performed by: INTERNAL MEDICINE

## 2019-08-08 PROCEDURE — 74011250636 HC RX REV CODE- 250/636: Performed by: NURSE PRACTITIONER

## 2019-08-08 PROCEDURE — 83735 ASSAY OF MAGNESIUM: CPT

## 2019-08-08 PROCEDURE — 80061 LIPID PANEL: CPT

## 2019-08-08 PROCEDURE — 74011000258 HC RX REV CODE- 258: Performed by: INTERNAL MEDICINE

## 2019-08-08 PROCEDURE — 74011250637 HC RX REV CODE- 250/637: Performed by: NURSE PRACTITIONER

## 2019-08-08 PROCEDURE — 82550 ASSAY OF CK (CPK): CPT

## 2019-08-08 PROCEDURE — 84100 ASSAY OF PHOSPHORUS: CPT

## 2019-08-08 PROCEDURE — 97161 PT EVAL LOW COMPLEX 20 MIN: CPT

## 2019-08-08 PROCEDURE — 74011636637 HC RX REV CODE- 636/637: Performed by: INTERNAL MEDICINE

## 2019-08-08 PROCEDURE — 74011250637 HC RX REV CODE- 250/637: Performed by: INTERNAL MEDICINE

## 2019-08-08 PROCEDURE — 74011636637 HC RX REV CODE- 636/637: Performed by: NURSE PRACTITIONER

## 2019-08-08 PROCEDURE — 97165 OT EVAL LOW COMPLEX 30 MIN: CPT

## 2019-08-08 PROCEDURE — 36415 COLL VENOUS BLD VENIPUNCTURE: CPT

## 2019-08-08 PROCEDURE — 83880 ASSAY OF NATRIURETIC PEPTIDE: CPT

## 2019-08-08 PROCEDURE — 85025 COMPLETE CBC W/AUTO DIFF WBC: CPT

## 2019-08-08 PROCEDURE — 93306 TTE W/DOPPLER COMPLETE: CPT

## 2019-08-08 PROCEDURE — 65270000029 HC RM PRIVATE

## 2019-08-08 PROCEDURE — 80048 BASIC METABOLIC PNL TOTAL CA: CPT

## 2019-08-08 RX ORDER — ATORVASTATIN CALCIUM 20 MG/1
20 TABLET, FILM COATED ORAL
Status: DISCONTINUED | OUTPATIENT
Start: 2019-08-08 | End: 2019-08-09

## 2019-08-08 RX ORDER — ASPIRIN 325 MG
325 TABLET ORAL DAILY
Status: DISCONTINUED | OUTPATIENT
Start: 2019-08-09 | End: 2019-08-10

## 2019-08-08 RX ORDER — INSULIN GLARGINE 100 [IU]/ML
15 INJECTION, SOLUTION SUBCUTANEOUS
Status: DISCONTINUED | OUTPATIENT
Start: 2019-08-08 | End: 2019-08-10 | Stop reason: HOSPADM

## 2019-08-08 RX ORDER — ASPIRIN 325 MG/1
325 TABLET, FILM COATED ORAL DAILY
Status: DISCONTINUED | OUTPATIENT
Start: 2019-08-08 | End: 2019-08-08

## 2019-08-08 RX ORDER — GUAIFENESIN 100 MG/5ML
81 LIQUID (ML) ORAL DAILY
Status: DISCONTINUED | OUTPATIENT
Start: 2019-08-08 | End: 2019-08-08

## 2019-08-08 RX ORDER — FUROSEMIDE 10 MG/ML
40 INJECTION INTRAMUSCULAR; INTRAVENOUS DAILY
Status: COMPLETED | OUTPATIENT
Start: 2019-08-08 | End: 2019-08-08

## 2019-08-08 RX ORDER — FUROSEMIDE 10 MG/ML
40 INJECTION INTRAMUSCULAR; INTRAVENOUS DAILY
Status: DISCONTINUED | OUTPATIENT
Start: 2019-08-08 | End: 2019-08-08

## 2019-08-08 RX ORDER — IBUPROFEN 200 MG
1 TABLET ORAL DAILY
Status: DISCONTINUED | OUTPATIENT
Start: 2019-08-08 | End: 2019-08-10 | Stop reason: HOSPADM

## 2019-08-08 RX ADMIN — DULOXETINE HYDROCHLORIDE 30 MG: 30 CAPSULE, DELAYED RELEASE ORAL at 17:11

## 2019-08-08 RX ADMIN — FUROSEMIDE 40 MG: 40 TABLET ORAL at 08:59

## 2019-08-08 RX ADMIN — INSULIN GLARGINE 15 UNITS: 100 INJECTION, SOLUTION SUBCUTANEOUS at 22:38

## 2019-08-08 RX ADMIN — INSULIN LISPRO 3 UNITS: 100 INJECTION, SOLUTION INTRAVENOUS; SUBCUTANEOUS at 11:36

## 2019-08-08 RX ADMIN — FOLIC ACID 1 MG: 1 TABLET ORAL at 08:59

## 2019-08-08 RX ADMIN — ENOXAPARIN SODIUM 100 MG: 100 INJECTION SUBCUTANEOUS at 02:06

## 2019-08-08 RX ADMIN — QUETIAPINE FUMARATE 25 MG: 25 TABLET ORAL at 08:44

## 2019-08-08 RX ADMIN — ASPIRIN 81 MG 81 MG: 81 TABLET ORAL at 11:19

## 2019-08-08 RX ADMIN — ATENOLOL 25 MG: 50 TABLET ORAL at 08:59

## 2019-08-08 RX ADMIN — DOXYCYCLINE 100 MG: 100 INJECTION, POWDER, LYOPHILIZED, FOR SOLUTION INTRAVENOUS at 15:06

## 2019-08-08 RX ADMIN — DULOXETINE HYDROCHLORIDE 30 MG: 30 CAPSULE, DELAYED RELEASE ORAL at 08:43

## 2019-08-08 RX ADMIN — DOXYCYCLINE 100 MG: 100 INJECTION, POWDER, LYOPHILIZED, FOR SOLUTION INTRAVENOUS at 03:20

## 2019-08-08 RX ADMIN — CLONAZEPAM 2 MG: 0.5 TABLET ORAL at 17:10

## 2019-08-08 RX ADMIN — FUROSEMIDE 40 MG: 10 INJECTION, SOLUTION INTRAMUSCULAR; INTRAVENOUS at 17:11

## 2019-08-08 RX ADMIN — LOSARTAN POTASSIUM 50 MG: 50 TABLET ORAL at 08:59

## 2019-08-08 RX ADMIN — CLONAZEPAM 2 MG: 0.5 TABLET ORAL at 08:43

## 2019-08-08 RX ADMIN — ANAGRELIDE HYDROCHLORIDE 0.5 MG: 0.5 CAPSULE ORAL at 09:00

## 2019-08-08 RX ADMIN — ENOXAPARIN SODIUM 100 MG: 100 INJECTION SUBCUTANEOUS at 13:52

## 2019-08-08 RX ADMIN — INSULIN LISPRO 3 UNITS: 100 INJECTION, SOLUTION INTRAVENOUS; SUBCUTANEOUS at 22:38

## 2019-08-08 RX ADMIN — INSULIN LISPRO 4 UNITS: 100 INJECTION, SOLUTION INTRAVENOUS; SUBCUTANEOUS at 08:50

## 2019-08-08 RX ADMIN — ATORVASTATIN CALCIUM 20 MG: 20 TABLET, FILM COATED ORAL at 22:27

## 2019-08-08 RX ADMIN — INSULIN LISPRO 12 UNITS: 100 INJECTION, SOLUTION INTRAVENOUS; SUBCUTANEOUS at 17:11

## 2019-08-08 RX ADMIN — QUETIAPINE FUMARATE 25 MG: 25 TABLET ORAL at 17:11

## 2019-08-08 RX ADMIN — ANAGRELIDE HYDROCHLORIDE 0.5 MG: 0.5 CAPSULE ORAL at 18:00

## 2019-08-08 NOTE — PROGRESS NOTES
Problem: Falls - Risk of  Goal: *Absence of Falls  Description  Document Suze Girt Fall Risk and appropriate interventions in the flowsheet. Outcome: Progressing Towards Goal  Note:   Fall Risk Interventions:  Mobility Interventions: Communicate number of staff needed for ambulation/transfer, OT consult for ADLs, Utilize walker, cane, or other assistive device, Bed/chair exit alarm         Medication Interventions: Patient to call before getting OOB, Teach patient to arise slowly, Bed/chair exit alarm         History of Falls Interventions: Bed/chair exit alarm, Door open when patient unattended         Problem: Falls - Risk of  Goal: *Absence of Falls  Description  Document Suze Girt Fall Risk and appropriate interventions in the flowsheet. Outcome: Progressing Towards Goal  Note:   Fall Risk Interventions:  Mobility Interventions: Communicate number of staff needed for ambulation/transfer, OT consult for ADLs, Utilize walker, cane, or other assistive device, Bed/chair exit alarm         Medication Interventions: Patient to call before getting OOB, Teach patient to arise slowly, Bed/chair exit alarm         History of Falls Interventions: Bed/chair exit alarm, Door open when patient unattended         Problem: Patient Education: Go to Patient Education Activity  Goal: Patient/Family Education  Outcome: Progressing Towards Goal     Problem: Diabetes Self-Management  Goal: *Disease process and treatment process  Description  Define diabetes and identify own type of diabetes; list 3 options for treating diabetes. Outcome: Progressing Towards Goal  Goal: *Incorporating nutritional management into lifestyle  Description  Describe effect of type, amount and timing of food on blood glucose; list 3 methods for planning meals. Outcome: Progressing Towards Goal  Goal: *Incorporating physical activity into lifestyle  Description  State effect of exercise on blood glucose levels.   Outcome: Progressing Towards Goal  Goal: *Developing strategies to promote health/change behavior  Description  Define the ABC's of diabetes; identify appropriate screenings, schedule and personal plan for screenings. Outcome: Progressing Towards Goal  Goal: *Using medications safely  Description  State effect of diabetes medications on diabetes; name diabetes medication taking, action and side effects. Outcome: Progressing Towards Goal  Goal: *Monitoring blood glucose, interpreting and using results  Description  Identify recommended blood glucose targets  and personal targets. Outcome: Progressing Towards Goal  Goal: *Prevention, detection, treatment of acute complications  Description  List symptoms of hyper- and hypoglycemia; describe how to treat low blood sugar and actions for lowering  high blood glucose level. Outcome: Progressing Towards Goal  Goal: *Prevention, detection and treatment of chronic complications  Description  Define the natural course of diabetes and describe the relationship of blood glucose levels to long term complications of diabetes.   Outcome: Progressing Towards Goal  Goal: *Developing strategies to address psychosocial issues  Description  Describe feelings about living with diabetes; identify support needed and support network  Outcome: Progressing Towards Goal  Goal: *Insulin pump training  Outcome: Progressing Towards Goal  Goal: *Sick day guidelines  Outcome: Progressing Towards Goal  Goal: *Patient Specific Goal (EDIT GOAL, INSERT TEXT)  Outcome: Progressing Towards Goal     Problem: Patient Education: Go to Patient Education Activity  Goal: Patient/Family Education  Outcome: Progressing Towards Goal

## 2019-08-08 NOTE — PROGRESS NOTES
OT order received and chart reviewed. Pt not seen for skilled OT due to:  []  Nausea/vomiting  [x]  Eating lunch  []  Pain  []  Pt lethargic  []  Off Unit  [] Speaking with hospital staff member  [] Other:    Will f/u later as schedule allows.      Thank you for this referral.  Checo Schrader MS, OTR/L

## 2019-08-08 NOTE — CONSULTS
[facility] - Consult Note    Date of  Admission: 8/7/2019  8:21 AM     Primary Care Physician: Ricarda Carrero MD     Plan:     1. NSTEMI- denies active chest pain or chest pressure. SOB has improved. Has positive troponin up trending. EKG showed NSR w/o acute ischemic changes. Continue asa, bb statin. Will obtain Echo to assess lvf, wma. Plans for cardiac catheterization tomorrow. 2. Hypertension- stable on ARB and bb. Will monitor   3. Essential thrombocytosis- on anagrelide. Current platelet count is normal.  In view of ACS would like to continue Lovenox and aspirin at present time. Aspirin added at full dose at present. 4. Hx of RA on Methotrexate weekly and Infliximab   5. Active tobacco use- discussed cessation  6. COPD- mild exacerbation. w/u and medications per medical team  7. Hx  of cholecystectomy and also alcoholic pancreatitis. She says last drink was 4-5 months ago. 8. Morbid obesity  9. Diabetes- poorly controlled. A1c 11.8   10. Non compliance with medications           XR Results (most recent):  Results from Hospital Encounter encounter on 08/07/19   XR CHEST PORT    Narrative History: Chest pain    Portable frontal view chest.    COMPARISON: August 17, 2017      Impression Findings/impression:    Cardiac silhouette unchanged. Lungs clear. Chronic osseous findings without  acute osseous abnormality.             Assessment:     Hospital Problems  Date Reviewed: 7/9/2019          Codes Class Noted POA    Acute bronchitis ICD-10-CM: J20.9  ICD-9-CM: 466.0  8/8/2019 Unknown        Chronic diastolic congestive [facility] failure ([facility] ICD-10-CM: I50.32  ICD-9-CM: 428.32, 428.0  8/8/2019 Unknown        * (Principal) ACS (acute coronary syndrome) ([facility] ICD-10-CM: I24.9  ICD-9-CM: 411.1  8/7/2019 Unknown        Chest pain ICD-10-CM: R07.9  ICD-9-CM: 786.50  5/4/2018 Yes        Tobacco use ICD-10-CM: Z72.0  ICD-9-CM: 305.1  5/4/2018 Unknown    Overview Signed 5/4/2018 12:44 PM by Paulina Manzo MD MACK     discussed cessation             Type 2 diabetes mellitus without complication, with long-term current use of insulin ([facility] ICD-10-CM: E11.9, Z79.4  ICD-9-CM: 250.00, V58.67  5/4/2018 Yes        Rheumatoid arthritis involving multiple joints ([facility] ICD-10-CM: M06.9  ICD-9-CM: 714.0  11/1/2016 Yes        Essential thrombocytosis ([facility] ICD-10-CM: D47.3  ICD-9-CM: 238.71  1/6/2016 Yes                   History of Present Illness: This is a 52 y.o. female admitted for ACS (acute coronary syndrome) ([facility] [I24.9]. PMH of multiple morbidities including essential thrombocytosis, HTN, DM, Tobacco use, mood disorder, fibromyalgia, Dx with dHF with preserved EF, noncompliant and has had multiple ER visits in [facility] ER. The patient  presented to ER with sudden onset of SOB and substernal pressure like sensation that started PTA. Patient states that she had this knd of CP on and off for the last week, in ER, pt was found to have elevated trops without EKG changes. She was also found to have elevated FS. She admitted to heavy smoking and non-compliance with her meds especially insulin. States that she is on 75/25 insulin but her FS is not controlled on it. Follows with hematology and was placed on anagleride  Currently denies CP and started on Lovenox sq bid. Therapeutic. Denies any alcohol use recently. Has history of heavy alcohol use. [employer] Friend No prior CAD or CHF history.           Past Medical History:     Past Medical History:   Diagnosis Date    Abdominal pain, unspecified site     Acute otitis media     Acute pharyngitis     Anxiety     Arthritis     Autoimmune disease ([facility]     Back injury     Backache     herniated disc in lower back    Blurred vision     Chest pain 5/4/2018    Chest pain, unspecified     abnormal EKG    Chronic airway obstruction, not elsewhere classified     Chronic back pain     Chronic pain     COPD     Depression     Diabetes (HCC)     Dizziness     Dizziness and giddiness     possible orthostatic changes, vasovagal, autonomic dysfunction from diabetic neuropathy    Encounters for unspecified administrative purpose     Essential thrombocytosis ([facility] 2016    Feeling anxious     Fibromyalgia     Headache(784.0)     Hemorrhoid     Herniated disc     at L5    Hypercholesterolemia     Hyperglycemia     Hypertension     Joint pain     Leukocytosis, unspecified     Major depressive disorder, single episode, mild (HCC)     Mental disorder     depression, anxiety, bipolar and PTSD    Neuropathy     Obesity, unspecified     Other chest pain     Palpitation 2018    ?  arrhythmia    Phobic disorders     Rheumatoid arthritis (HCC)     Rheumatoid arthritis of foot ([facility] 2018    on treatment    Seasonal allergies     Shortness of breath     normal EF    Smoking 2018    discussed cessation    Streptococcal sore throat     Type 2 diabetes mellitus without complication, with long-term current use of insulin ([facility] 2018    Type II or unspecified type diabetes mellitus with unspecified complication, uncontrolled     Unspecified hereditary and idiopathic peripheral neuropathy     Vitamin D deficiency          Social History:     Social History     Socioeconomic History    Marital status:      Spouse name: Not on file    Number of children: Not on file    Years of education: Not on file    Highest education level: Not on file   Tobacco Use    Smoking status: Current Every Day Smoker     Packs/day: 0.25     Last attempt to quit: 2012     Years since quittin.0    Smokeless tobacco: Never Used    Tobacco comment: 5 cigarettes day   Substance and Sexual Activity    Alcohol use: Yes     Frequency: Monthly or less     Drinks per session: 1 or 2     Binge frequency: Never     Comment: socially    Drug use: No    Sexual activity: Yes     Partners: Male     Birth control/protection: Condom        Family History:     Family History Problem Relation Age of Onset    Diabetes Other     Alcohol abuse Mother     Arthritis-osteo Mother     Diabetes Mother     Elevated Lipids Mother     Headache Mother     Heart Disease Mother    Tera.Pikes Migraines Mother     Psychiatric Disorder Mother     Alcohol abuse Father    Asenath Adonis Father     Cancer Father     Headache Father     Heart Disease Father     Lung Disease Father     Migraines Father     Heart Surgery Father     Alcohol abuse Sister     Headache Sister     Diabetes Sister     Heart Disease Sister     Migraines Sister     Psychiatric Disorder Sister     Headache Brother     Diabetes Brother     Elevated Lipids Brother     Heart Disease Brother     Migraines Brother     Psychiatric Disorder Brother     Coronary Artery Disease Brother     Cancer Maternal Aunt     Alcohol abuse Maternal Aunt     Diabetes Maternal Aunt     Headache Maternal Aunt     Lung Disease Maternal Aunt     Migraines Maternal Aunt     Headache Maternal Uncle     Migraines Maternal Uncle     Stroke Maternal Uncle     Psychiatric Disorder Maternal Uncle     Headache Paternal Aunt     Migraines Paternal Aunt     Headache Paternal Uncle     Hypertension Paternal Uncle     Migraines Paternal Uncle     Stroke Paternal Uncle     Headache Maternal Grandmother     Migraines Maternal Grandmother     Stroke Maternal Grandmother     Headache Maternal Grandfather     Migraines Maternal Grandfather     Stroke Maternal Grandfather     Headache Paternal Grandmother     Hypertension Paternal Grandmother     Lung Disease Paternal Grandmother     Migraines Paternal Grandmother     Cancer Paternal Grandmother     Headache Paternal Grandfather     Cancer Paternal Grandfather     Migraines Paternal Grandfather         Medications:      Allergies   Allergen Reactions    Levaquin [Levofloxacin] Anaphylaxis    Pollen Extracts Unable to Obtain        Current Facility-Administered Medications Medication Dose Route Frequency    doxycycline (VIBRAMYCIN) 100 mg in 0.9% sodium chloride (MBP/ADV) 100 mL MBP  100 mg IntraVENous Q12H    nicotine (NICODERM CQ) 14 mg/24 hr patch 1 Patch  1 Patch TransDERmal DAILY    atorvastatin (LIPITOR) tablet 20 mg  20 mg Oral QHS    buffered aspirin (BUFFERIN) tablet 325 mg  325 mg Oral DAILY    insulin lispro (HUMALOG) injection   SubCUTAneous AC&HS    glucose chewable tablet 16 g  4 Tab Oral PRN    glucagon (GLUCAGEN) injection 1 mg  1 mg IntraMUSCular PRN    dextrose 10% infusion 125-250 mL  125-250 mL IntraVENous PRN    albuterol-ipratropium (DUO-NEB) 2.5 MG-0.5 MG/3 ML  3 mL Nebulization Q6H PRN    anagrelide (AGRYLIN) capsule 0.5 mg  0.5 mg Oral BID    atenolol (TENORMIN) tablet 25 mg  25 mg Oral DAILY    clonazePAM (KlonoPIN) tablet 2 mg  2 mg Oral BID    DULoxetine (CYMBALTA) capsule 30 mg  30 mg Oral BID    folic acid (FOLVITE) tablet 1 mg  1 mg Oral DAILY    furosemide (LASIX) tablet 40 mg  40 mg Oral DAILY    gabapentin (NEURONTIN) capsule 300 mg  300 mg Oral BID    losartan (COZAAR) tablet 50 mg  50 mg Oral DAILY    QUEtiapine (SEROquel) tablet 25 mg  25 mg Oral BID    acetaminophen (TYLENOL) tablet 650 mg  650 mg Oral Q6H PRN    oxyCODONE-acetaminophen (PERCOCET) 5-325 mg per tablet 1 Tab  1 Tab Oral Q6H PRN    naloxone (NARCAN) injection 0.4 mg  0.4 mg IntraVENous PRN    ondansetron (ZOFRAN) injection 4 mg  4 mg IntraVENous Q6H PRN    docusate sodium (COLACE) capsule 100 mg  100 mg Oral BID PRN    enoxaparin (LOVENOX) injection 100 mg  1 mg/kg SubCUTAneous Q12H    0.9% sodium chloride infusion  100 mL/hr IntraVENous CONTINUOUS    insulin glargine (LANTUS) injection 10 Units  10 Units SubCUTAneous QHS        Review Of Systems:           Constitutional: No fever, no chills, no weight loss, no night sweats   HEENT: No epistaxis, no nasal drainage, no difficulty in swallowing, no redness in eyes  Respiratory: wheezing, dyspnea on exertion .   Cardiovascular:  chest pain,  chest pressure, dyspnea  Gastrointestinal: no abd pain, no vomiting, no diarrhea, no bleeding symptoms  Genitourinary: No urinary symptoms or hematuria  Integument/breast: No ulcers or rashes  Musculoskeletal: no muscle pain, no weakness  Neurological: No focal weakness, no seizures, no headaches  Behvioral/Psych: No anxiety, no depression         Physical Exam:     Visit Vitals  /78 (BP 1 Location: Left arm, BP Patient Position: At rest)   Pulse 100   Temp 98.3 °F (36.8 °C)   Resp 16   Ht 5' 11\" (1.803 m)   Wt 97.5 kg (215 lb)   SpO2 100%   Breastfeeding? No   BMI 29.99 kg/m²     BP Readings from Last 3 Encounters:   08/08/19 127/78   07/16/19 119/75   07/11/19 119/75     Pulse Readings from Last 3 Encounters:   08/08/19 100   07/16/19 91   07/11/19 89     Wt Readings from Last 3 Encounters:   08/07/19 97.5 kg (215 lb)   07/16/19 98 kg (216 lb)   07/11/19 100.2 kg (221 lb)       General:  alert, cooperative, no distress, appears stated age, morbidly obese  Skin: Warm and dry, acyanotic, normal color. Head: Normocephalic, atraumatic. Eyes: Sclerae anicteric, conjunctivae without injection. Neck:  nontender, no nuchal rigidity, no masses, no stridor, no carotid bruit, no JVD  Lungs: coarse breath sounds. Heart:  regular rate and rhythm, S1, S2 normal, no S3 or S4, no click  Abdomen:  abdomen is soft without significant tenderness, masses, organomegaly or guarding  Extremities:  extremities normal, atraumatic, no cyanosis or edema. Peripheral pulses present    Neurological: grossly intact. No focal abnormalities, moves all extremities well. Psychiatric Affect: The patient is awake, alert and oriented x3. Nila Garnett is interactive and appropriate.    Data Review:     Recent Results (from the past 48 hour(s))   EKG, 12 LEAD, INITIAL    Collection Time: 08/07/19  8:29 AM   Result Value Ref Range    Ventricular Rate 90 BPM    Atrial Rate 90 BPM    P-R Interval 194 ms    QRS Duration 96 ms    Q-T Interval 378 ms    QTC Calculation (Bezet) 462 ms    Calculated P Axis 55 degrees    Calculated R Axis 48 degrees    Calculated T Axis 27 degrees    Diagnosis       Normal sinus rhythm  Normal ECG  When compared with ECG of 17-AUG-2017 07:57,  No significant change was found  Confirmed by Emma Kessler MD, Juan Sanches ([id number]) on 8/8/2019 8:53:56 AM     GLUCOSE, POC    Collection Time: 08/07/19  8:30 AM   Result Value Ref Range    Glucose (POC) 334 (H) 70 - 110 mg/dL   CBC WITH AUTOMATED DIFF    Collection Time: 08/07/19  8:35 AM   Result Value Ref Range    WBC 9.4 4.6 - 13.2 K/uL    RBC 5.06 4.20 - 5.30 M/uL    HGB 13.9 12.0 - 16.0 g/dL    HCT 41.7 35.0 - 45.0 %    MCV 82.4 74.0 - 97.0 FL    MCH 27.5 24.0 - 34.0 PG    MCHC 33.3 31.0 - 37.0 g/dL    RDW 13.6 11.6 - 14.5 %    PLATELET 203 930 - 613 K/uL    MPV 9.6 9.2 - 11.8 FL    NEUTROPHILS 65 40 - 73 %    LYMPHOCYTES 25 21 - 52 %    MONOCYTES 7 3 - 10 %    EOSINOPHILS 3 0 - 5 %    BASOPHILS 0 0 - 2 %    ABS. NEUTROPHILS 6.0 1.8 - 8.0 K/UL    ABS. LYMPHOCYTES 2.4 0.9 - 3.6 K/UL    ABS. MONOCYTES 0.6 0.05 - 1.2 K/UL    ABS. EOSINOPHILS 0.3 0.0 - 0.4 K/UL    ABS. BASOPHILS 0.0 0.0 - 0.1 K/UL    DF AUTOMATED     METABOLIC PANEL, COMPREHENSIVE    Collection Time: 08/07/19  8:35 AM   Result Value Ref Range    Sodium 138 136 - 145 mmol/L    Potassium 3.6 3.5 - 5.5 mmol/L    Chloride 103 100 - 111 mmol/L    CO2 27 21 - 32 mmol/L    Anion gap 8 3.0 - 18 mmol/L    Glucose 325 (H) 74 - 99 mg/dL    BUN 20 (H) 7.0 - 18 MG/DL    Creatinine 0.89 0.6 - 1.3 MG/DL    BUN/Creatinine ratio 22 (H) 12 - 20      GFR est AA >60 >60 ml/min/1.73m2    GFR est non-AA >60 >60 ml/min/1.73m2    Calcium 9.5 8.5 - 10.1 MG/DL    Bilirubin, total 0.2 0.2 - 1.0 MG/DL    ALT (SGPT) 18 13 - 56 U/L    AST (SGOT) 3 (L) 10 - 38 U/L    Alk.  phosphatase 116 45 - 117 U/L    Protein, total 7.7 6.4 - 8.2 g/dL    Albumin 3.3 (L) 3.4 - 5.0 g/dL    Globulin 4.4 (H) 2.0 - 4.0 g/dL    A-G Ratio 0.8 0.8 - 1. 7     CARDIAC PANEL,(CK, CKMB & TROPONIN)    Collection Time: 08/07/19  8:35 AM   Result Value Ref Range    CK 56 26 - 192 U/L    CK - MB <1.0 <3.6 ng/ml    CK-MB Index  0.0 - 4.0 %     CALCULATION NOT PERFORMED WHEN RESULT IS BELOW LINEAR LIMIT    Troponin-I, QT 0.14 (H) 0.0 - 0.045 NG/ML   URINALYSIS W/ RFLX MICROSCOPIC    Collection Time: 08/07/19  8:35 AM   Result Value Ref Range    Color YELLOW      Appearance CLEAR      Specific gravity 1.015 1.005 - 1.030      pH (UA) 5.0 5.0 - 8.0      Protein 30 (A) NEG mg/dL    Glucose 500 (A) NEG mg/dL    Ketone NEGATIVE  NEG mg/dL    Bilirubin NEGATIVE  NEG      Blood NEGATIVE  NEG      Urobilinogen 0.2 0.2 - 1.0 EU/dL    Nitrites NEGATIVE  NEG      Leukocyte Esterase NEGATIVE  NEG     URINE MICROSCOPIC ONLY    Collection Time: 08/07/19  8:35 AM   Result Value Ref Range    WBC 0 to 3 0 - 4 /hpf    RBC 0 to 3 0 - 5 /hpf    Epithelial cells 2+ 0 - 5 /lpf    Bacteria 1+ (A) NEG /hpf   GLUCOSE, POC    Collection Time: 08/07/19 10:36 AM   Result Value Ref Range    Glucose (POC) 211 (H) 70 - 110 mg/dL   CARDIAC PANEL,(CK, CKMB & TROPONIN)    Collection Time: 08/07/19 12:00 PM   Result Value Ref Range    CK 60 26 - 192 U/L    CK - MB 1.0 <3.6 ng/ml    CK-MB Index 1.7 0.0 - 4.0 %    Troponin-I, QT 0.19 (H) 0.0 - 0.045 NG/ML   GLUCOSE, POC    Collection Time: 08/07/19  6:28 PM   Result Value Ref Range    Glucose (POC) 231 (H) 70 - 110 mg/dL   GLUCOSE, POC    Collection Time: 08/07/19  9:32 PM   Result Value Ref Range    Glucose (POC) 349 (H) 70 - 110 mg/dL   HEMOGLOBIN A1C WITH EAG    Collection Time: 08/07/19  9:42 PM   Result Value Ref Range    Hemoglobin A1c 11.8 (H) 4.2 - 5.6 %    Est. average glucose 292 mg/dL   CARDIAC PANEL,(CK, CKMB & TROPONIN)    Collection Time: 08/07/19  9:42 PM   Result Value Ref Range    CK 73 26 - 192 U/L    CK - MB 2.0 <3.6 ng/ml    CK-MB Index 2.7 0.0 - 4.0 %    Troponin-I, QT 0.58 (H) 0.0 - 0.045 NG/ML   CARDIAC PANEL,(CK, CKMB & TROPONIN) Collection Time: 08/08/19  2:56 AM   Result Value Ref Range    CK 76 26 - 192 U/L    CK - MB 2.0 <3.6 ng/ml    CK-MB Index 2.6 0.0 - 4.0 %    Troponin-I, QT 0.60 (H) 0.0 - 6.509 NG/ML   METABOLIC PANEL, BASIC    Collection Time: 08/08/19  2:56 AM   Result Value Ref Range    Sodium 139 136 - 145 mmol/L    Potassium 4.0 3.5 - 5.5 mmol/L    Chloride 106 100 - 111 mmol/L    CO2 27 21 - 32 mmol/L    Anion gap 6 3.0 - 18 mmol/L    Glucose 247 (H) 74 - 99 mg/dL    BUN 20 (H) 7.0 - 18 MG/DL    Creatinine 0.78 0.6 - 1.3 MG/DL    BUN/Creatinine ratio 26 (H) 12 - 20      GFR est AA >60 >60 ml/min/1.73m2    GFR est non-AA >60 >60 ml/min/1.73m2    Calcium 9.2 8.5 - 10.1 MG/DL   MAGNESIUM    Collection Time: 08/08/19  2:56 AM   Result Value Ref Range    Magnesium 2.1 1.6 - 2.6 mg/dL   PHOSPHORUS    Collection Time: 08/08/19  2:56 AM   Result Value Ref Range    Phosphorus 3.3 2.5 - 4.9 MG/DL   CBC WITH AUTOMATED DIFF    Collection Time: 08/08/19  2:56 AM   Result Value Ref Range    WBC 9.7 4.6 - 13.2 K/uL    RBC 5.14 4.20 - 5.30 M/uL    HGB 13.8 12.0 - 16.0 g/dL    HCT 42.3 35.0 - 45.0 %    MCV 82.3 74.0 - 97.0 FL    MCH 26.8 24.0 - 34.0 PG    MCHC 32.6 31.0 - 37.0 g/dL    RDW 13.5 11.6 - 14.5 %    PLATELET 555 656 - 030 K/uL    MPV 9.7 9.2 - 11.8 FL    NEUTROPHILS 50 40 - 73 %    LYMPHOCYTES 39 21 - 52 %    MONOCYTES 7 3 - 10 %    EOSINOPHILS 4 0 - 5 %    BASOPHILS 0 0 - 2 %    ABS. NEUTROPHILS 4.9 1.8 - 8.0 K/UL    ABS. LYMPHOCYTES 3.8 (H) 0.9 - 3.6 K/UL    ABS. MONOCYTES 0.7 0.05 - 1.2 K/UL    ABS. EOSINOPHILS 0.4 0.0 - 0.4 K/UL    ABS.  BASOPHILS 0.0 0.0 - 0.1 K/UL    DF AUTOMATED     GLUCOSE, POC    Collection Time: 08/08/19  8:45 AM   Result Value Ref Range    Glucose (POC) 251 (H) 70 - 110 mg/dL       No intake or output data in the 24 hours ending 08/08/19 0930    Cardiographics:       EKG Results     Procedure 720 Value Units Date/Time    EKG, 12 LEAD, INITIAL [[id number]] Collected:  08/07/19 0829    Order Status: Completed Updated:  08/08/19 0854     Ventricular Rate 90 BPM      Atrial Rate 90 BPM      P-R Interval 194 ms      QRS Duration 96 ms      Q-T Interval 378 ms      QTC Calculation (Bezet) 462 ms      Calculated P Axis 55 degrees      Calculated R Axis 48 degrees      Calculated T Axis 27 degrees      Diagnosis --     Normal sinus rhythm  Normal ECG  When compared with ECG of 17-AUG-2017 07:57,  No significant change was found  Confirmed by Jeffry Naranjo MD, Carmen Dalton ([id number]) on 8/8/2019 8:53:56 AM                 Signed By: Elizabeth BABB Phone [phone]    August 8, 2019    I have independently evaluated taken history and examined the patient. All relevant labs and testing data's are reviewed. Care plan discussed and updated after review.   Nader Jameson MD

## 2019-08-08 NOTE — PROGRESS NOTES
Interdisciplinary Round Note   Patient Information:   Kerri Vergara   [birth year]   Reason for Admission: ACS (acute coronary syndrome) [facility]) [I24.9]   Attending Provider:   Victorino Yates MD  Primary Care Physician: Jorge Farah MD       [phone]   Estimated discharge date:  8/10/19   Hospital day: 1  [unfilled]  1d 21h  RRAT Score: High Risk            24       Total Score        3 Has Seen PCP in Last 6 Months (Yes=3, No=0)    9 Pt. Coverage (Medicare=5 , Medicaid, or Self-Pay=4)    12 Charlson Comorbidity Score (Age + Comorbid Conditions)        Criteria that do not apply:    . Living with Significant Other. Assisted Living. LTAC. SNF. or   Rehab    Patient Length of Stay (>5 days = 3)    IP Visits Last 12 Months (1-3=4, 4=9, >4=11)       normal sinus rhythm     No  NO  Other n/a     Chemical      Lines, Drains, & Airways  None       IV Antibiotics:    Current Antimicrobial Therapy (168h ago, onward)    Ordered     Start Stop    08/08/19 0156  doxycycline (VIBRAMYCIN) 100 mg in 0.9% sodium chloride (MBP/ADV) 100 mL MBP  100 mg,   IntraVENous,   EVERY 12 HOURS      08/08/19 0300 08/13/19 0259        GI Prophylaxis: GI Prophylaxis: no   Type: n/a      Recent Glucose Results:   Lab Results   Component Value Date/Time     (H) 08/08/2019 02:56 AM    GLUCPOC 341 (H) 08/08/2019 04:08 PM    GLUCPOC 170 (H) 08/08/2019 11:35 AM    GLUCPOC 251 (H) 08/08/2019 08:45 AM      Activity Level: Activity Level:  Up with Assistance    Needs assistance with ADLs: no       Goals for Today: Echo   Recommendations:   Discharge Disposition: Home Independent  n/a  Focused Teaching for:  medication    Needs for Discharge: 4 wheeled walker with seat IDR Team: Nurse,NP, Case management  Recommendations from IDR team: maintain diabetic diet    Other Notes: none

## 2019-08-08 NOTE — PROGRESS NOTES
Reason for Admission:   ACS (acute coronary syndrome) ([facility] [I24.9]               RRAT Score:     24             Resources/supports as identified by patient/family:   Patient has family supports. Top Challenges facing patient (as identified by patient/family and CM): Finances/Medication cost?   NO    Transportation     Patient's family will transport home. Support system or lack thereof? Patient has supportive family. Living arrangements? Patient lives alone; however, she has family close by. Self-care/ADLs/Cognition? Patient is alert and oriented. Current Advanced Directive/Advance Care Plan:   no                          Plan for utilizing home health:   Patient has no home health orders in place at this time. This writer will continue to closely monitor for potential home health needs and orders. Likelihood of readmission:   HIGH    Transition of Care Plan:  Patient will return home with help from her family. Patient's family will also transport home at the time of discharge. Initial assessment completed with patient and her family. Cognitive status of patient: Alert and oriented. Face sheet information confirmed:  yes. The patient designates her daughters (Dwayne Christians and Netta) to participate in her discharge plan and to receive any needed information. This patient lives in an apartment, alone. Patient is able to navigate steps as needed, but slowly. Prior to hospitalization, patient was considered to be independent with ADLs/IADLS : yes . Patient has a current ACP document on file: no     Patient's family will be available to transport patient home upon discharge. The patient already has a rollator available in the home. Patient is not currently active with home health. Patient has not stayed in a skilled nursing facility or rehab.         This patient is on dialysis :no     Currently, the discharge plan is to return home with help from her family. Patient's family will also transport home at the time of discharge. Patient's daughter is Christelle Haro, #[phone]). Patient's other daughter is Rose Mcintosh, #[phone]). Patient's other daughter is [facility], #[phone]). Patient's PCP is Katelynn Manuel MD. Patient is insured through [facility] and she also has Medicaid. The patient states that she can obtain her medications from the pharmacy, and take her medications as directed. This writer will continue to closely monitor for discharge planning to ensure a safe discharge home from [facility]. Care Management Interventions  PCP Verified by CM: Yes(Dr. Katelynn Manuel)  Palliative Care Criteria Met (RRAT>21 & CHF Dx)?: No  Mode of Transport at Discharge: Other (see comment)(Mr. Suzanne Au will transport.)  Transition of Care Consult (CM Consult): Discharge Planning  Current Support Network: Lives Alone, Family Lives Nearby  Confirm Follow Up Transport: Family  Plan discussed with Pt/Family/Caregiver: Yes  Discharge Location  Discharge Placement: Home with family assistance        David Ortiz MSW  Care Manager  Pager#: [phone]

## 2019-08-08 NOTE — DIABETES MGMT
Diabetes Patient/Family Education Record  Factors That  May Influence Patients Ability  to Learn or  Comply with Recommendations   []   Language barrier    []   Cultural needs   []   Motivation    []   Cognitive limitation    []   Physical   [x]   Education    []   Physiological factors   []   Hearing/vision/speaking impairment   []   Mandaeism beliefs    []   Financial factors   []  Other:   []  No factors identified at this time. Person Instructed:   [x]   Patient: She woke up and stated, \"hey, my blood sugar is always high, I take my two insulin (75/25 twice daily and lantus at bedtime),  but I don't know what else to do. \"    []   Family   []  Other     Preference for Learning:   [x]   Verbal   [x]   Written: diab educ packet   []  Demonstration     Level of Comprehension & Competence:    [x]  Good                                      [] Fair                                     []  Poor                             []  Needs Reinforcement   [x]  Teachback completed    Education Component:   [x]  Medication management, including how to administer insulin (if appropriate) and potential medication interactions: Yes. Patient reported home diabetes medications:  Metformin 1000 mg twice daily with meals. Humalog 75/25 mix insulin: 6 units before breakfast and 6 units before dinner. Lantus insulin 20 units daily at bedtime. Patient stated that she does not skip her insulin but cannot figure out why her blood sugar is still always high. Educated patient that diabetes medications are effective when she include nutrition and regular exercise. Patient asked, \"well, can I just take more insulin? \" Encouraged patient to discuss this with her primary medical provider and discouraged her from increasing her medication dose without direction from her provider. See notes additional notes below under nutrition. [x]  Nutritional management -obtain usual meal pattern: Yes.  Patient reported that she has been eating a lot of potatoes including potato chips and a lot of bread and other starchy food. Educated patient about the importance of the following nutr recomm for diabetes: eat 3 meals daily but follow the recommended serving size and portion control of the following carbs: starches, fruits. Patient stated that she does not drink dairy or eat yogurt. Patient reported that she is only drinking water and sugar free beverages. Patient is willing to make necessary change in her diet. I also encouraged her to attend the free diabetes classes. She accepted the class sched and stated that she will attend. [x]  Exercise: Yes. [x]  Signs, symptoms, and treatment of hyperglycemia and hypoglycemia: Yes. Patient stated that she can't recall ever experiencing low blood sugar. Educated patient about symptoms of low blood sugar, causes, treatment and prevention. Patient stated that she will never have low blood sugar because she like eating - and too much. [x] Prevention, recognition and treatment of hyperglycemia and hypoglycemia: Yes. [x]  Importance of blood glucose monitoring and how to obtain a blood glucose meter: Yes. Patient reported that she has no problem checking her blood sugar everyday. []  Instruction on use of the blood glucose meter   [x]  Discuss the importance of HbA1C monitoring: Yes. Discussed with patient that her current A1c level is 11.8% (8/07/2019) which is equivalent to estimated average blood glucose of 292 g/dL during the past 2-3 months. Patient stated, Katerine Drafts my - that is way up. \"  Encouraged patient to attend the free diabetes classes. She agreed. Informed patient the recommended A1c level is <7%. []  Sick day guidelines   [x]  Proper use and disposal of lancets, needles, syringes or insulin pens (if appropriate): Yes. [x]  Potential long-term complications (retinopathy, kidney disease, neuropathy, foot care): Yes.    [x] Information about whom to contact in case of emergency or for more information: Yes. [x]  Goal:  Patient/family will demonstrate understanding of Diabetes Self Management Skills by: 8/11/2019  Plan for post-discharge education or self-management support:    [x] Outpatient class schedule provided            [] Patient Declined    [] Scheduled for outpatient classes (date) _______  Verify:  Does patient understand how diabetes medications work? Yes, but she didn't realize to include healthy diet (portion control) and regular exercise. Educated. See notes above. Does patient know what their most recent A1c is? Yes. Does patient monitor glucose at home? Yes  Does patient have difficulty obtaining diabetes medications and testing supplies?  No.       Matthew Jarrell RN [facility]  Pager: [phone]

## 2019-08-08 NOTE — PROGRESS NOTES
Problem: Self Care Deficits Care Plan (Adult)  Goal: *Acute Goals and Plan of Care (Insert Text)  Outcome: Resolved/Met   OCCUPATIONAL THERAPY EVALUATION/DISCHARGE    Patient: Donna Benson (20 y.o. female)  Date: 8/8/2019  Primary Diagnosis: ACS (acute coronary syndrome) ([facility] [address] [I24.9]  Procedure(s) (LRB):  LEFT HEART CATH (N/A)  CORONARY ANGIOGRAPHY (N/A)     Precautions: Falls     PLOF: Pt reports she lives in a handicap accessible apartment on the 1st floor, and was (I) with basic self-care/ADLs, IADLs, and functional mobility PTA. ASSESSMENT AND RECOMMENDATIONS:  Pt cleared to participate in OT evaluation by Rn. Upon entering room, pt supine with HOB elevated, alert, and agreeable to therapy session. Based on the objective data described below, the patient presents she is (I) with basic self-care/ADLs. Pt reported she had pain in R shld prior to admission, but the pain has now transferred to her R bicep. She reports she is currently being treated with cortisone shots for her R shoulder. Educated pt on the role of OT, evaluation process, and if there is ever a change in functional performance to notify physician to re-order Ot, if needed. Pt demonstrated good understanding. As pt presents she is at his baseline, skilled occupational therapy is not indicated at this time.     Discharge Recommendations: Outpatien  Further Equipment Recommendations for Discharge: N/A     SUBJECTIVE:   Patient stated I have a cane with the four prongs    OBJECTIVE DATA SUMMARY:     Past Medical History:   Diagnosis Date    Abdominal pain, unspecified site     Acute otitis media     Acute pharyngitis     Anxiety     Arthritis     Autoimmune disease ([facility] [address]     Back injury     Backache     herniated disc in lower back    Blurred vision     Chest pain 5/4/2018    Chest pain, unspecified     abnormal EKG    Chronic airway obstruction, not elsewhere classified     Chronic back pain     Chronic pain     COPD     Depression Diabetes ([address]     Dizziness     Dizziness and giddiness     possible orthostatic changes, vasovagal, autonomic dysfunction from diabetic neuropathy    Encounters for unspecified administrative purpose     Essential thrombocytosis ([address] 1/6/2016    Feeling anxious     Fibromyalgia     Headache(784.0)     Hemorrhoid     Herniated disc     at L5    Hypercholesterolemia     Hyperglycemia     Hypertension     Joint pain     Leukocytosis, unspecified     Major depressive disorder, single episode, mild (HCC)     Mental disorder     depression, anxiety, bipolar and PTSD    Neuropathy     Obesity, unspecified     Other chest pain     Palpitation 5/4/2018    ? arrhythmia    Phobic disorders     Rheumatoid arthritis ([address]     Rheumatoid arthritis of foot ([facility] [address] 5/4/2018    on treatment    Seasonal allergies     Shortness of breath     normal EF    Smoking 5/4/2018    discussed cessation    Streptococcal sore throat     Type 2 diabetes mellitus without complication, with long-term current use of insulin ([facility] [address] 5/4/2018    Type II or unspecified type diabetes mellitus with unspecified complication, uncontrolled     Unspecified hereditary and idiopathic peripheral neuropathy     Vitamin D deficiency      Past Surgical History:   Procedure Laterality Date    HX CHOLECYSTECTOMY  1994    HX GYN  2005    partial Hysterectomy    HX OTHER SURGICAL  12-1-15    had ingrown toenail removed from big toe, both feet    HX TUBAL LIGATION      1995     Barriers to Learning/Limitations: None  Compensate with: visual, verbal, tactile, kinesthetic cues/model    Home Situation:   Home Situation  Home Environment: Apartment  # Steps to Enter: 0  One/Two Story Residence: One story  Living Alone: Yes  Support Systems: Family member(s)  Patient Expects to be Discharged to[de-identified] Apartment  Current DME Used/Available at Home: Candace Pires  ? Right hand dominant   ?      Left hand dominant    Cognitive/Behavioral Status:  Neurologic State: Alert  Orientation Level: Appropriate for age  Cognition: Appropriate decision making; Follows commands  Safety/Judgement: Fall prevention    Skin: Visible skin appeared intact  Edema: None noted    Coordination: BUE  Coordination: Within functional limits  Fine Motor Skills-Upper: Left Intact; Right Intact    Gross Motor Skills-Upper: Left Intact; Right Intact    Balance:  Sitting: Intact  Standing: Intact    Strength: BUE  Strength: Within functional limits    Tone & Sensation: BUE  Tone: Normal  Sensation: Intact    Range of Motion: BUE  AROM: Within functional limits         Functional Mobility and Transfers for ADLs:  Bed Mobility:  Supine to Sit: Independent  Sit to Supine: Independent  Scooting: Independent  Transfers:  Sit to Stand: Independent  Stand to Sit: Independent   Toilet Transfer : Independent   Bathroom Mobility: Independent    ADL Assessment:  Feeding: Independent    Oral Facial Hygiene/Grooming: Independent    Bathing: Independent    Upper Body Dressing: Independent    Lower Body Dressing: Independent    Toileting: Independent      ADL Intervention:  Grooming  Grooming Assistance: Independent    Lower Body Dressing Assistance  Dressing Assistance: Independent    Toileting  Toileting Assistance: Independent, no LOB noted. Cognitive Retraining  Safety/Judgement: Fall prevention      Pain:  Pt reports pain, pointing to her R bicep, but did not provide a number on the pain level scale. Pain Intervention(s): Medication (see MAR); Rest, Ice, Repositioning  Response to intervention: Nurse notified, See doc flow    Activity Tolerance:   Good    Please refer to the flowsheet for vital signs taken during this treatment. After treatment:   ?  Patient left in no apparent distress sitting up in chair  ? Patient left in no apparent distress in bed  ? Call bell left within reach  ? Nursing notified  ? Caregiver present  ?   Bed alarm activated    COMMUNICATION/EDUCATION:   ?      Role of Occupational Therapy in the acute care setting  ? Home safety education was provided and the patient/caregiver indicated understanding. ? Patient/family have participated as able and agree with findings and recommendations. ?      Patient is unable to participate in plan of care at this time. Thank you for this referral.  Gissel Oscar MS, OTR/L  Time Calculation: 14 mins      Eval Complexity: History: MEDIUM Complexity : Expanded review of history including physical, cognitive and psychosocial  history ; Examination: LOW Complexity : 1-3 performance deficits relating to physical, cognitive , or psychosocial skils that result in activity limitations and / or participation restrictions ;    Decision Making:LOW Complexity : No comorbidities that affect functional and no verbal or physical assistance needed to complete eval tasks

## 2019-08-08 NOTE — ROUTINE PROCESS
Bedside shift change report given to Shad Chaudhari RN (oncoming nurse) by Arlene Alfonso RN (offgoing nurse). Report included the following information SBAR, Kardex, ED Summary, Procedure Summary and MAR.

## 2019-08-08 NOTE — DIABETES MGMT
Glycemic Control Plan of Care    T2DM with current A1c of 11.8% (8/07/2019). See separate notes, 8/08/2019, for assessment of home diabetes management and education. Patient is concern about persistent high blood sugar despite taking both 75/25 mix insulin BID and lantus insulin at bedtime. Patient reported not compliant with diet. She accepted the class sched to attend free diabetes education. Home diabetes medications: Patient reported on 8/08/2019:  Metformin 1000 mg twice daily with meals. Humalog 75/25 mix insulin: 6 units before breakfast and 6 units before dinner. Lantus insulin 20 units daily at bedtime. POC BG range on 8/07/2019: 211-349 mg/dL. Patient placed on basal lantus insulin 10 units at HS and correctional lispro insulin. POC BG report on 8/08/2019 at time of review: 251, 170 mg/dL. Recommendation(s):  1.) Cont inpatient glycemic monitoring.  2.) Cont on basal and correctional insulin. 3.) Consider increasing daily basal lantus insulin dose at bedtime from 10 to 15 units. Jonathon GARCIA NP, and obtained order. Assessment:  Patient is 52year old with past medical history including type 2 diabetes mellitus, peripheral neuropathy, smoking, obesity, hypertension, hypercholesterolemia, thrombocytosis, rheumatoid arthritis and fibromyalgia (Methotrexate), diastolic heart failure and COPD - was admitted on 8/07/2019 with report of shortness of breath and substernal chest pressure. Noted:  ACS. Elevated troponin/NSTEMI. Chronic diastolic CHF. Uncontrolled T2DM with current A1c of 11.8% (8/07/2019). Most recent blood glucose values:    Results for Richie Marquez (MRN [id number]) as of 8/8/2019 14:07   Ref. Range 8/7/2019 08:30 8/7/2019 10:36 8/7/2019 18:28 8/7/2019 21:32   GLUCOSE,FAST - POC Latest Ref Range: 70 - 110 mg/dL 334 (H) 211 (H) 231 (H) 349 (H)     Results for Richie Mraquez (MRN [id number]) as of 8/8/2019 14:07   Ref.  Range 8/8/2019 08:45 8/8/2019 11:35 GLUCOSE,FAST - POC Latest Ref Range: 70 - 110 mg/dL 251 (H) 170 (H)     Current A1C: 11.8% (8/07/2019) which is equivalent to estimated average blood glucose of 292 mg/dL during the past 2-3 months. Current hospital diabetes medications:  Basal lantus insulin 15 units daily at bedtime. Correctional lispro insulin ACHS. Normal sensitivity dose. Total daily dose insulin requirement previous day: 8/07/2019:  Lantus: 10 units  Lispro: 8 units  Regular: 8 units  TDD insulin: 26 units    Home diabetes medications: Patient reported on 8/08/2019:  Metformin 1000 mg twice daily with meals. Humalog 75/25 mix insulin: 6 units before breakfast and 6 units before dinner. Lantus insulin 20 units daily at bedtime. Diet: Diabetic consistent carb 188kcal; regular; 2 Gm NA    Goals:  Blood glucose will be within target range of  mg/dL by 8/11/2019.     Education:  _X__  Refer to Diabetes Education Record: 8/08/2019             ___  Education not indicated at this time    Leanna Del Rio RN [facility]  Pager: [phone]

## 2019-08-08 NOTE — PROGRESS NOTES
PT orders received, chart reviewed. Pt unable to participate with PT due to:  []  Nausea/vomiting  []  Eating  []  Pain  [x]  Pt sleeping at 1130 with nursing present in room  [x]  Off Unit at 1028  []  Pt refused  []  Other   Will f/u later as patient's schedule allows.  Thank you for this referral.  Fortino Mcmillan, PT, DPT

## 2019-08-08 NOTE — PROGRESS NOTES
[facility] Hospitalist Group  Progress Note    Patient: Blayne Members Age: 52 y.o. : 1965 MR#: [id number] SSN: [id number]  Date: 2019     Subjective:   SOB, chest tightness/middle of chest. Denies CP. Sleepy. Assessment/Plan:   1. ACS  2. Elevated troponin/NSTEMI  3. CP  4. Acute bronchitis  5. Chronic diastolic congestive HF, compensated  6. DMT2 with hyperglycemia and uncontrolled. a1c 11.8  7. Essential thrombocytosis. anagrelide  8. Pulmonary HTN  9. HTN  10. Hx RA and fibromyalagia. Methotrexate and weekly infliximab  11. Tobacco abuse. Nicotine patch  12. COPD without exacerbation  13. Depression and anxiety    Plan  1. Cardiology consult. Plan for cardiac cath in am. Continue ASA, BB, losartan, statin. Echo pending. 2. PO abx/doxy for acute bronchitis. Tobacco cessation/nicotine patch  3. POC glucose, SSI, Lantus. Diabetes educator  4. Continue anagrelide. 5. Monitor mood and continue seroquel, cymbalta,  6. Continue other Home meds  7. Possible discharge after cardiac cath depends on the findings and ok with cardiology       Additional Notes:      Case discussed with:  [x]Patient  [x]Family  [x]Nursing  []Case Management  DVT Prophylaxis:  [x]Lovenox  []Hep SQ  []SCDs  []Coumadin   []On Heparin gtt    Objective:   VS:   Visit Vitals  /78   Pulse 96   Temp 98.3 °F (36.8 °C)   Resp 16   Ht 5' 11\" (1.803 m)   Wt 97.5 kg (215 lb)   SpO2 96%   Breastfeeding?  No   BMI 29.99 kg/m²      Tmax/24hrs: Temp (24hrs), Av °F (36.7 °C), Min:97.3 °F (36.3 °C), Max:98.9 °F (37.2 °C)  No intake or output data in the 24 hours ending 19 1018    General:  Alert, NAD  Cardiovascular:  RRR  Pulmonary:  LSC throughout; respiratory effort WNL  GI:  +BS in all four quadrants, soft, non-tender  Extremities:  No edema; 2+ dorsalis pedis pulses bilaterally  Neuro: sleepy, oriented        Labs:    Recent Results (from the past 24 hour(s))   GLUCOSE, POC    Collection Time: 08/07/19 10:36 AM   Result Value Ref Range    Glucose (POC) 211 (H) 70 - 110 mg/dL   CARDIAC PANEL,(CK, CKMB & TROPONIN)    Collection Time: 08/07/19 12:00 PM   Result Value Ref Range    CK 60 26 - 192 U/L    CK - MB 1.0 <3.6 ng/ml    CK-MB Index 1.7 0.0 - 4.0 %    Troponin-I, QT 0.19 (H) 0.0 - 0.045 NG/ML   GLUCOSE, POC    Collection Time: 08/07/19  6:28 PM   Result Value Ref Range    Glucose (POC) 231 (H) 70 - 110 mg/dL   GLUCOSE, POC    Collection Time: 08/07/19  9:32 PM   Result Value Ref Range    Glucose (POC) 349 (H) 70 - 110 mg/dL   HEMOGLOBIN A1C WITH EAG    Collection Time: 08/07/19  9:42 PM   Result Value Ref Range    Hemoglobin A1c 11.8 (H) 4.2 - 5.6 %    Est. average glucose 292 mg/dL   CARDIAC PANEL,(CK, CKMB & TROPONIN)    Collection Time: 08/07/19  9:42 PM   Result Value Ref Range    CK 73 26 - 192 U/L    CK - MB 2.0 <3.6 ng/ml    CK-MB Index 2.7 0.0 - 4.0 %    Troponin-I, QT 0.58 (H) 0.0 - 0.045 NG/ML   CARDIAC PANEL,(CK, CKMB & TROPONIN)    Collection Time: 08/08/19  2:56 AM   Result Value Ref Range    CK 76 26 - 192 U/L    CK - MB 2.0 <3.6 ng/ml    CK-MB Index 2.6 0.0 - 4.0 %    Troponin-I, QT 0.60 (H) 0.0 - 9.920 NG/ML   METABOLIC PANEL, BASIC    Collection Time: 08/08/19  2:56 AM   Result Value Ref Range    Sodium 139 136 - 145 mmol/L    Potassium 4.0 3.5 - 5.5 mmol/L    Chloride 106 100 - 111 mmol/L    CO2 27 21 - 32 mmol/L    Anion gap 6 3.0 - 18 mmol/L    Glucose 247 (H) 74 - 99 mg/dL    BUN 20 (H) 7.0 - 18 MG/DL    Creatinine 0.78 0.6 - 1.3 MG/DL    BUN/Creatinine ratio 26 (H) 12 - 20      GFR est AA >60 >60 ml/min/1.73m2    GFR est non-AA >60 >60 ml/min/1.73m2    Calcium 9.2 8.5 - 10.1 MG/DL   MAGNESIUM    Collection Time: 08/08/19  2:56 AM   Result Value Ref Range    Magnesium 2.1 1.6 - 2.6 mg/dL   PHOSPHORUS    Collection Time: 08/08/19  2:56 AM   Result Value Ref Range    Phosphorus 3.3 2.5 - 4.9 MG/DL   CBC WITH AUTOMATED DIFF    Collection Time: 08/08/19  2:56 AM   Result Value Ref Range    WBC 9.7 4.6 - 13.2 K/uL    RBC 5.14 4.20 - 5.30 M/uL    HGB 13.8 12.0 - 16.0 g/dL    HCT 42.3 35.0 - 45.0 %    MCV 82.3 74.0 - 97.0 FL    MCH 26.8 24.0 - 34.0 PG    MCHC 32.6 31.0 - 37.0 g/dL    RDW 13.5 11.6 - 14.5 %    PLATELET 608 952 - 109 K/uL    MPV 9.7 9.2 - 11.8 FL    NEUTROPHILS 50 40 - 73 %    LYMPHOCYTES 39 21 - 52 %    MONOCYTES 7 3 - 10 %    EOSINOPHILS 4 0 - 5 %    BASOPHILS 0 0 - 2 %    ABS. NEUTROPHILS 4.9 1.8 - 8.0 K/UL    ABS. LYMPHOCYTES 3.8 (H) 0.9 - 3.6 K/UL    ABS. MONOCYTES 0.7 0.05 - 1.2 K/UL    ABS. EOSINOPHILS 0.4 0.0 - 0.4 K/UL    ABS.  BASOPHILS 0.0 0.0 - 0.1 K/UL    DF AUTOMATED     GLUCOSE, POC    Collection Time: 08/08/19  8:45 AM   Result Value Ref Range    Glucose (POC) 251 (H) 70 - 110 mg/dL       Signed By: Meg Courtney NP     August 8, 2019

## 2019-08-08 NOTE — PROGRESS NOTES
Echocardiogram completed. Patient returned to room with armband in place. Report to follow.     [address] [facility]

## 2019-08-08 NOTE — PROGRESS NOTES
PHYSICAL THERAPY EVALUATION AND DISCHARGE    Patient: Kerri Vergara (36 y.o. female)  Date: 8/8/2019  Primary Diagnosis: ACS (acute coronary syndrome) (HCC) [I24.9]  Procedure(s) (LRB):  LEFT HEART CATH (N/A)  CORONARY ANGIOGRAPHY (N/A)     Precautions:   (universal)    PLOF: Independent with mobility including gait no AD. She does have a cane and rollator if needed at home. ASSESSMENT :  PT orders received and patient cleared by nursing to participate with therapy. Patient is a 52 y.o. female admitted to the hospital due to chest pain and high blood sugar. Pt's troponin have been trending upward with cardiology following due to possible NSTEMI. Pt denies chest pain throughout therapy. Patient consents to PT evaluation and treatment. Pt has functional B LE strength and good sitting/standing balance. Pt is independent and safe with bed mobility, transfers, and gait. Gait 20 feet in room no AD independent. Pt has good ho, no LOB, and no gross abnormalities. Pt prefers to not ambulate further at this time and requests to go back to bed. Pt educated on safety while in the hospital and at home. Pt's only complaint is R shoulder pain. She reports she had a cortisone injection 2-3 weeks ago and is suppose to receive a referral to therapy. Pt ended therapy supine in bed with all needs met. Patient does not require further skilled physical therapy at this level of care. PLAN :  Recommendations and Planned Interventions:    No skilled inpatient physical therapy needs identified at this time. Discharge Recommendations: outpatient PT or OT for R shoulder pain  Further Equipment Recommendations for Discharge: N/A     SUBJECTIVE:   Patient stated My shoulder hurts.     OBJECTIVE DATA SUMMARY:     Past Medical History:   Diagnosis Date    Abdominal pain, unspecified site     Acute otitis media     Acute pharyngitis     Anxiety     Arthritis     Autoimmune disease ([facility]     Back injury     Backache herniated disc in lower back    Blurred vision     Chest pain 5/4/2018    Chest pain, unspecified     abnormal EKG    Chronic airway obstruction, not elsewhere classified     Chronic back pain     Chronic pain     COPD     Depression     Diabetes (HCC)     Dizziness     Dizziness and giddiness     possible orthostatic changes, vasovagal, autonomic dysfunction from diabetic neuropathy    Encounters for unspecified administrative purpose     Essential thrombocytosis ([facility] 1/6/2016    Feeling anxious     Fibromyalgia     Headache(784.0)     Hemorrhoid     Herniated disc     at L5    Hypercholesterolemia     Hyperglycemia     Hypertension     Joint pain     Leukocytosis, unspecified     Major depressive disorder, single episode, mild (HCC)     Mental disorder     depression, anxiety, bipolar and PTSD    Neuropathy     Obesity, unspecified     Other chest pain     Palpitation 5/4/2018    ?  arrhythmia    Phobic disorders     Rheumatoid arthritis (HCC)     Rheumatoid arthritis of foot ([facility] 5/4/2018    on treatment    Seasonal allergies     Shortness of breath     normal EF    Smoking 5/4/2018    discussed cessation    Streptococcal sore throat     Type 2 diabetes mellitus without complication, with long-term current use of insulin ([facility] 5/4/2018    Type II or unspecified type diabetes mellitus with unspecified complication, uncontrolled     Unspecified hereditary and idiopathic peripheral neuropathy     Vitamin D deficiency      Past Surgical History:   Procedure Laterality Date    HX CHOLECYSTECTOMY  1994    HX GYN  2005    partial Hysterectomy    HX OTHER SURGICAL  12-1-15    had ingrown toenail removed from big toe, both feet    HX TUBAL LIGATION      1995     Barriers to Learning/Limitations: None  Compensate with: N/A  Home Situation:   Home Situation  Home Environment: Private residence  # Steps to Enter: (0-2 steps depending on entrance)  One/Two Story Residence: One story  Living Alone: Yes  Support Systems: Family member(s)  Patient Expects to be Discharged to[de-identified] Private residence  Current DME Used/Available at Home: [address], [place], straight, Walker, rollator, Grab bars, Raised toilet seat  Critical Behavior:  Neurologic State: Alert;Drowsy  Orientation Level: Oriented X4  Cognition: Follows commands  Safety/Judgement: Fall prevention; Awareness of environment  Psychosocial  Patient Behaviors: Calm; Cooperative  Purposeful Interaction: Yes  Pt Identified Daily Priority: Clinical issues (comment)  Caritas Process: Establish trust;Teaching/learning; Attend basic human needs  Caring Interventions: Reassure  Reassure: Informing; Acceptance;Caring rounds  Skin Condition/Temp: Warm     Skin Integrity: (dressing from renal biopsy RUQ)  Skin Integumentary  Skin Color: Appropriate for ethnicity  Skin Condition/Temp: Warm  Skin Integrity: (dressing from renal biopsy RUQ)     B LE Strength:    Strength: Within functional limits              B LE Tone & Sensation:   Tone: Normal          Sensation: Intact           B LE Range Of Motion:  AROM: Within functional limits                 Posture:  Posture (WDL): Within defined limits     Functional Mobility:  Bed Mobility:     Supine to Sit: Independent  Sit to Supine: Independent  Scooting: Independent  Transfers:  Sit to Stand: Independent  Stand to Sit: Independent                       Balance:   Sitting: Intact  Standing: Intact    Ambulation/Gait Training:  Gait Description (WDL): Within defined limits   20 feet no AD    Therapeutic Exercises:   Reviewed and performed ankle pumps. Pain:  Pain level pre-treatment: mild/moderate R shoulder  Pain level post-treatment: mild/moderate R shoulder  Pain Intervention(s): Medication (see MAR); Rest, Repositioning   Response to intervention: Nurse notified, See doc flow    Activity Tolerance:   good  Please refer to the flowsheet for vital signs taken during this treatment.     After treatment:   ?         Patient left in no apparent distress sitting up in chair  ? Patient left in no apparent distress in bed  ? Call bell left within reach  ? Personal items in reach  ? Nursing notified [place]  ? Caregiver present  ? Bed/chair alarm activated  ? SCDs applied       COMMUNICATION/EDUCATION:   ?         Role of Physical Therapy in the acute care setting. ?         Fall prevention education was provided and the patient/caregiver indicated understanding. ? Patient/family have participated as able in goal setting and plan of care. ?         Patient/family agree to work toward stated goals and plan of care. ?         Patient understands intent and goals of therapy, but is neutral about his/her participation. ? Patient is unable to participate in goal setting/plan of care: ongoing with therapy staff. ?         Out of bed at least 3-5 times a day. ? Other:     Thank you for this referral.  Flavia Harris, PT, DPT   Time Calculation: 15 mins      Eval Complexity: History: MEDIUM  Complexity : 1-2 comorbidities / personal factors will impact the outcome/ POC Exam:HIGH Complexity : 4+ Standardized tests and measures addressing body structure, function, activity limitation and / or participation in recreation  Presentation: LOW Complexity : Stable, uncomplicated  Clinical Decision Making:Low Complexity    Overall Complexity:LOW

## 2019-08-08 NOTE — INTERDISCIPLINARY ROUNDS
Interdisciplinary Round Note Patient Information: 
 Tabatha Smith 
 470/01 Reason for Admission: ACS (acute coronary syndrome) ([facility] [I24.9] Attending Provider:   Diego Torres MD 
Primary Care Physician: Ricarda Carrero MD 
     [phone] Estimated discharge date:  TBD Hospital day: 1 
[unfilled] 
- - - 
RRAT Score: High Risk   
      
 24 Total Score 3 Has Seen PCP in Last 6 Months (Yes=3, No=0)  
 9 Pt. Coverage (Medicare=5 , Medicaid, or Self-Pay=4) 12 Charlson Comorbidity Score (Age + Comorbid Conditions) Criteria that do not apply:  
 . Living with Significant Other. Assisted Living. LTAC. SNF. or  
Rehab Patient Length of Stay (>5 days = 3) IP Visits Last 12 Months (1-3=4, 4=9, >4=11) normal sinus rhythm No 
N/A Other NONE Chemical    
 Lines, Drains, & Airways None IV Antibiotics:   
Current Antimicrobial Therapy (168h ago, onward) Ordered     Start Stop  
 08/08/19 0156  doxycycline (VIBRAMYCIN) 100 mg in 0.9% sodium chloride (MBP/ADV) 100 mL MBP  100 mg,   IntraVENous,   EVERY 12 HOURS    
 08/08/19 0300 08/13/19 0259 GI Prophylaxis: GI Prophylaxis: no 
 Type: n/a      Recent Glucose Results:  
Lab Results Component Value Date/Time  (H) 08/08/2019 02:56 AM  
  (H) 08/07/2019 08:35 AM  
 GLUCPOC 349 (H) 08/07/2019 09:32 PM  
 GLUCPOC 231 (H) 08/07/2019 06:28 PM  
 GLUCPOC 211 (H) 08/07/2019 10:36 AM  
  
Activity Level: Activity Level: Up with Assistance Needs assistance with ADLs: no 
 
 
 Goals for Today: Safety, Pain Control Recommendations:  
Discharge Disposition: TBD, pending progress Cardiology Focused Teaching for:  self care Needs for Discharge: TBD IDR Team: TBD Recommendations from IDR team: TBD Other Notes: TBD

## 2019-08-08 NOTE — PROGRESS NOTES
Bedside and Verbal shift change report given to [address] [place] [address] Yusuf (oncoming nurse) by Maryjane Mcneil (offgoing nurse). Report included the following information SBAR, Kardex and MAR.

## 2019-08-09 LAB
ANION GAP SERPL CALC-SCNC: 5 MMOL/L (ref 3–18)
BASOPHILS # BLD: 0 K/UL (ref 0–0.1)
BASOPHILS NFR BLD: 0 % (ref 0–2)
BUN SERPL-MCNC: 25 MG/DL (ref 7–18)
BUN/CREAT SERPL: 26 (ref 12–20)
CALCIUM SERPL-MCNC: 9.1 MG/DL (ref 8.5–10.1)
CHLORIDE SERPL-SCNC: 104 MMOL/L (ref 100–111)
CO2 SERPL-SCNC: 29 MMOL/L (ref 21–32)
CREAT SERPL-MCNC: 0.95 MG/DL (ref 0.6–1.3)
DIFFERENTIAL METHOD BLD: ABNORMAL
EOSINOPHIL # BLD: 0.4 K/UL (ref 0–0.4)
EOSINOPHIL NFR BLD: 4 % (ref 0–5)
ERYTHROCYTE [DISTWIDTH] IN BLOOD BY AUTOMATED COUNT: 13.5 % (ref 11.6–14.5)
GLUCOSE BLD STRIP.AUTO-MCNC: 220 MG/DL (ref 70–110)
GLUCOSE BLD STRIP.AUTO-MCNC: 229 MG/DL (ref 70–110)
GLUCOSE BLD STRIP.AUTO-MCNC: 261 MG/DL (ref 70–110)
GLUCOSE BLD STRIP.AUTO-MCNC: 263 MG/DL (ref 70–110)
GLUCOSE BLD STRIP.AUTO-MCNC: 325 MG/DL (ref 70–110)
GLUCOSE SERPL-MCNC: 290 MG/DL (ref 74–99)
HCT VFR BLD AUTO: 41.6 % (ref 35–45)
HGB BLD-MCNC: 13.6 G/DL (ref 12–16)
LYMPHOCYTES # BLD: 3.7 K/UL (ref 0.9–3.6)
LYMPHOCYTES NFR BLD: 42 % (ref 21–52)
MCH RBC QN AUTO: 27.1 PG (ref 24–34)
MCHC RBC AUTO-ENTMCNC: 32.7 G/DL (ref 31–37)
MCV RBC AUTO: 82.9 FL (ref 74–97)
MONOCYTES # BLD: 0.7 K/UL (ref 0.05–1.2)
MONOCYTES NFR BLD: 8 % (ref 3–10)
NEUTS SEG # BLD: 4.1 K/UL (ref 1.8–8)
NEUTS SEG NFR BLD: 46 % (ref 40–73)
PLATELET # BLD AUTO: 346 K/UL (ref 135–420)
PMV BLD AUTO: 9.6 FL (ref 9.2–11.8)
POTASSIUM SERPL-SCNC: 3.8 MMOL/L (ref 3.5–5.5)
RBC # BLD AUTO: 5.02 M/UL (ref 4.2–5.3)
SODIUM SERPL-SCNC: 138 MMOL/L (ref 136–145)
WBC # BLD AUTO: 8.8 K/UL (ref 4.6–13.2)

## 2019-08-09 PROCEDURE — B2151ZZ FLUOROSCOPY OF LEFT HEART USING LOW OSMOLAR CONTRAST: ICD-10-PCS | Performed by: INTERNAL MEDICINE

## 2019-08-09 PROCEDURE — 74011250636 HC RX REV CODE- 250/636

## 2019-08-09 PROCEDURE — 99152 MOD SED SAME PHYS/QHP 5/>YRS: CPT | Performed by: INTERNAL MEDICINE

## 2019-08-09 PROCEDURE — 74011636320 HC RX REV CODE- 636/320: Performed by: INTERNAL MEDICINE

## 2019-08-09 PROCEDURE — 74011250637 HC RX REV CODE- 250/637: Performed by: INTERNAL MEDICINE

## 2019-08-09 PROCEDURE — B2111ZZ FLUOROSCOPY OF MULTIPLE CORONARY ARTERIES USING LOW OSMOLAR CONTRAST: ICD-10-PCS | Performed by: INTERNAL MEDICINE

## 2019-08-09 PROCEDURE — 77030013744: Performed by: INTERNAL MEDICINE

## 2019-08-09 PROCEDURE — 85025 COMPLETE CBC W/AUTO DIFF WBC: CPT

## 2019-08-09 PROCEDURE — 74011636637 HC RX REV CODE- 636/637: Performed by: INTERNAL MEDICINE

## 2019-08-09 PROCEDURE — 80048 BASIC METABOLIC PNL TOTAL CA: CPT

## 2019-08-09 PROCEDURE — 4A023N7 MEASUREMENT OF CARDIAC SAMPLING AND PRESSURE, LEFT HEART, PERCUTANEOUS APPROACH: ICD-10-PCS | Performed by: INTERNAL MEDICINE

## 2019-08-09 PROCEDURE — 65270000029 HC RM PRIVATE

## 2019-08-09 PROCEDURE — 93458 L HRT ARTERY/VENTRICLE ANGIO: CPT | Performed by: INTERNAL MEDICINE

## 2019-08-09 PROCEDURE — C1894 INTRO/SHEATH, NON-LASER: HCPCS | Performed by: INTERNAL MEDICINE

## 2019-08-09 PROCEDURE — 93005 ELECTROCARDIOGRAM TRACING: CPT

## 2019-08-09 PROCEDURE — 74011250636 HC RX REV CODE- 250/636: Performed by: INTERNAL MEDICINE

## 2019-08-09 PROCEDURE — 82962 GLUCOSE BLOOD TEST: CPT

## 2019-08-09 PROCEDURE — 77030029997 HC DEV COM RDL R BND TELE -B: Performed by: INTERNAL MEDICINE

## 2019-08-09 PROCEDURE — 74011636637 HC RX REV CODE- 636/637: Performed by: NURSE PRACTITIONER

## 2019-08-09 PROCEDURE — 74011000250 HC RX REV CODE- 250: Performed by: INTERNAL MEDICINE

## 2019-08-09 PROCEDURE — 77030013797 HC KT TRNSDUC PRSSR EDWD -A: Performed by: INTERNAL MEDICINE

## 2019-08-09 PROCEDURE — 74011250637 HC RX REV CODE- 250/637: Performed by: NURSE PRACTITIONER

## 2019-08-09 PROCEDURE — 36415 COLL VENOUS BLD VENIPUNCTURE: CPT

## 2019-08-09 PROCEDURE — 77030015766: Performed by: INTERNAL MEDICINE

## 2019-08-09 PROCEDURE — 74011000258 HC RX REV CODE- 258: Performed by: INTERNAL MEDICINE

## 2019-08-09 RX ORDER — VERAPAMIL HYDROCHLORIDE 2.5 MG/ML
INJECTION, SOLUTION INTRAVENOUS AS NEEDED
Status: DISCONTINUED | OUTPATIENT
Start: 2019-08-09 | End: 2019-08-09 | Stop reason: HOSPADM

## 2019-08-09 RX ORDER — ATORVASTATIN CALCIUM 40 MG/1
40 TABLET, FILM COATED ORAL
Status: DISCONTINUED | OUTPATIENT
Start: 2019-08-09 | End: 2019-08-10 | Stop reason: HOSPADM

## 2019-08-09 RX ORDER — DOXYCYCLINE 100 MG/1
100 CAPSULE ORAL 2 TIMES DAILY
Qty: 5 CAP | Refills: 0 | Status: SHIPPED | OUTPATIENT
Start: 2019-08-09 | End: 2019-08-12

## 2019-08-09 RX ORDER — SODIUM CHLORIDE 0.9 % (FLUSH) 0.9 %
5-40 SYRINGE (ML) INJECTION AS NEEDED
Status: CANCELLED | OUTPATIENT
Start: 2019-08-09

## 2019-08-09 RX ORDER — LIDOCAINE HYDROCHLORIDE 10 MG/ML
INJECTION, SOLUTION EPIDURAL; INFILTRATION; INTRACAUDAL; PERINEURAL AS NEEDED
Status: DISCONTINUED | OUTPATIENT
Start: 2019-08-09 | End: 2019-08-09 | Stop reason: HOSPADM

## 2019-08-09 RX ORDER — INSULIN GLARGINE 100 [IU]/ML
20 INJECTION, SOLUTION SUBCUTANEOUS DAILY
Qty: 1 VIAL | Refills: 1 | Status: SHIPPED | OUTPATIENT
Start: 2019-08-09 | End: 2019-08-09 | Stop reason: SDUPTHER

## 2019-08-09 RX ORDER — LOSARTAN POTASSIUM 50 MG/1
50 TABLET ORAL DAILY
Qty: 30 TAB | Refills: 1 | Status: SHIPPED | OUTPATIENT
Start: 2019-08-10 | End: 2019-10-23 | Stop reason: SDUPTHER

## 2019-08-09 RX ORDER — FENTANYL CITRATE 50 UG/ML
INJECTION, SOLUTION INTRAMUSCULAR; INTRAVENOUS AS NEEDED
Status: DISCONTINUED | OUTPATIENT
Start: 2019-08-09 | End: 2019-08-09 | Stop reason: HOSPADM

## 2019-08-09 RX ORDER — INSULIN LISPRO 100 [IU]/ML
INJECTION, SOLUTION INTRAVENOUS; SUBCUTANEOUS
Qty: 1 VIAL | Refills: 1 | Status: SHIPPED | OUTPATIENT
Start: 2019-08-09 | End: 2020-06-05

## 2019-08-09 RX ORDER — MIDAZOLAM HYDROCHLORIDE 1 MG/ML
INJECTION, SOLUTION INTRAMUSCULAR; INTRAVENOUS AS NEEDED
Status: DISCONTINUED | OUTPATIENT
Start: 2019-08-09 | End: 2019-08-09 | Stop reason: HOSPADM

## 2019-08-09 RX ORDER — SODIUM CHLORIDE 0.9 % (FLUSH) 0.9 %
5-40 SYRINGE (ML) INJECTION EVERY 8 HOURS
Status: CANCELLED | OUTPATIENT
Start: 2019-08-09

## 2019-08-09 RX ORDER — ATORVASTATIN CALCIUM 40 MG/1
40 TABLET, FILM COATED ORAL
Qty: 30 TAB | Refills: 1 | Status: SHIPPED | OUTPATIENT
Start: 2019-08-09 | End: 2019-09-12 | Stop reason: SDUPTHER

## 2019-08-09 RX ORDER — ASPIRIN 81 MG/1
81 TABLET ORAL DAILY
Qty: 30 TAB | Refills: 1 | Status: SHIPPED | OUTPATIENT
Start: 2019-08-09

## 2019-08-09 RX ORDER — IBUPROFEN 200 MG
1 TABLET ORAL DAILY
Qty: 30 PATCH | Refills: 0 | Status: SHIPPED | OUTPATIENT
Start: 2019-08-10 | End: 2019-08-10

## 2019-08-09 RX ORDER — HEPARIN SODIUM 1000 [USP'U]/ML
INJECTION, SOLUTION INTRAVENOUS; SUBCUTANEOUS AS NEEDED
Status: DISCONTINUED | OUTPATIENT
Start: 2019-08-09 | End: 2019-08-09 | Stop reason: HOSPADM

## 2019-08-09 RX ORDER — INSULIN GLARGINE 100 [IU]/ML
30 INJECTION, SOLUTION SUBCUTANEOUS DAILY
Qty: 1 VIAL | Refills: 1 | Status: SHIPPED | OUTPATIENT
Start: 2019-08-09 | End: 2021-01-12

## 2019-08-09 RX ADMIN — ATORVASTATIN CALCIUM 40 MG: 40 TABLET, FILM COATED ORAL at 21:54

## 2019-08-09 RX ADMIN — GABAPENTIN 300 MG: 300 CAPSULE ORAL at 18:38

## 2019-08-09 RX ADMIN — INSULIN LISPRO 12 UNITS: 100 INJECTION, SOLUTION INTRAVENOUS; SUBCUTANEOUS at 18:39

## 2019-08-09 RX ADMIN — INSULIN LISPRO 9 UNITS: 100 INJECTION, SOLUTION INTRAVENOUS; SUBCUTANEOUS at 08:42

## 2019-08-09 RX ADMIN — INSULIN GLARGINE 15 UNITS: 100 INJECTION, SOLUTION SUBCUTANEOUS at 21:53

## 2019-08-09 RX ADMIN — ASPIRIN 325 MG: 325 TABLET, FILM COATED ORAL at 08:36

## 2019-08-09 RX ADMIN — DOXYCYCLINE 100 MG: 100 INJECTION, POWDER, LYOPHILIZED, FOR SOLUTION INTRAVENOUS at 04:56

## 2019-08-09 RX ADMIN — INSULIN LISPRO 9 UNITS: 100 INJECTION, SOLUTION INTRAVENOUS; SUBCUTANEOUS at 21:56

## 2019-08-09 RX ADMIN — ANAGRELIDE HYDROCHLORIDE 0.5 MG: 0.5 CAPSULE ORAL at 18:00

## 2019-08-09 RX ADMIN — FOLIC ACID 1 MG: 1 TABLET ORAL at 08:38

## 2019-08-09 RX ADMIN — ANAGRELIDE HYDROCHLORIDE 0.5 MG: 0.5 CAPSULE ORAL at 09:00

## 2019-08-09 RX ADMIN — CLONAZEPAM 2 MG: 0.5 TABLET ORAL at 08:38

## 2019-08-09 RX ADMIN — QUETIAPINE FUMARATE 25 MG: 25 TABLET ORAL at 08:36

## 2019-08-09 RX ADMIN — DULOXETINE HYDROCHLORIDE 30 MG: 30 CAPSULE, DELAYED RELEASE ORAL at 08:36

## 2019-08-09 RX ADMIN — QUETIAPINE FUMARATE 25 MG: 25 TABLET ORAL at 18:38

## 2019-08-09 RX ADMIN — DULOXETINE HYDROCHLORIDE 30 MG: 30 CAPSULE, DELAYED RELEASE ORAL at 18:38

## 2019-08-09 RX ADMIN — CLONAZEPAM 2 MG: 0.5 TABLET ORAL at 19:43

## 2019-08-09 RX ADMIN — ATENOLOL 25 MG: 50 TABLET ORAL at 08:36

## 2019-08-09 RX ADMIN — DOXYCYCLINE 100 MG: 100 INJECTION, POWDER, LYOPHILIZED, FOR SOLUTION INTRAVENOUS at 18:38

## 2019-08-09 RX ADMIN — LOSARTAN POTASSIUM 50 MG: 50 TABLET ORAL at 08:38

## 2019-08-09 NOTE — PROGRESS NOTES
LAD mid 40% stenosis. LVEF normal.    Plan;  Continue medical management.   Discharge planning per medical team

## 2019-08-09 NOTE — PROGRESS NOTES
Bedside, Verbal shift change report given to  Noah Means RN (oncoming nurse) by Souleymane Valencia RN (offgoing nurse). Report included the following information SBAR, Kardex, Procedure Summary and MAR.

## 2019-08-09 NOTE — INTERDISCIPLINARY ROUNDS
IDR/SLIDR Summary Patient: Delmis Rivera MRN: [id number]    Age: 52 y.o. YOB: 1965 Room/Bed: [facility]/ Admit Diagnosis: ACS (acute coronary syndrome) ([facility]) [I24.9]  Principal Diagnosis: ACS (acute coronary syndrome) ([facility] Goals: *** Readmission: {YES/NO:13066}  Quality Measure: {Quality Measures:21346} VTE Prophylaxis: {VTE Prophylaxis:21798} Influenza Vaccine screening completed? {YES/NO:19327} Pneumococcal Vaccine screening completed? {YES/NO:34697} Mobility needs: {Yes/No Caps:58201}   Nutrition plan:{Yes/No Caps:18300} Consults:{Consult needed:21347} Financial concerns:{Yes/No Caps:64181}  Escalated to CM? {YES/NO:79028} RRAT Score: ***   Interventions:{Intervention:21348} Testing due for pt today? {YES/NO:00779} LOS: 2 days Expected length of stay *** days Discharge plan: ***   PCP: Bg Clal MD 
Transportation needs: {Yes/No Caps:33694} Days before discharge:{Days before Discharge:21344} Discharge disposition: {Discharge Destination:74457} Signed:  
 
Tiesha Powers RN 
8/9/2019 
8:10 AM

## 2019-08-09 NOTE — PROGRESS NOTES
[facility]  [facility]    Patient: Eva Wadsworth Age: 52 y.o. : 1965 MR#: [id number] SSN: [id number]  Date/Time: 2019    Subjective:     Has no c/o, denying F/C, N/V, CP, SOB. Ambulates room without difficulty. Assessment/Plan:   1. NSTEMI - med tx on Lovenox tx dose, ASA, BBlocker, statin. Cath today. 2. Acute bronchitis - no acute, improved. Continue doxy through . 3. HTN - BPs wnl. On atenolol, losartan. 4. DM2 - uncontrolled. Basal/bolus insulin. HgbA1c 11.8%. Bumped up Lantus from 10 to 15u/daily. 5. Essential thrombocytosis - Agrylin. 6. Hx RArthritis, fibromyalgia - no acute. MTX, weekly infliximab. 7. Hx COPD - no acute. Nebs prn.   8. Hx depression/anxiety - no acute. 9. Possibly d/c home if cath negative today? Will d/w cardiology. Additional Notes:      Case discussed with:  [x]Patient  []Family  []Nursing  []Case Management  DVT Prophylaxis:  [x]Lovenox  []Hep SQ  []SCDs  []Coumadin   []On Heparin gtt    Objective:   VS:   Visit Vitals  /83   Pulse 99   Temp 97.8 °F (36.6 °C)   Resp 16   Ht 5' 11\" (1.803 m)   Wt 97.5 kg (215 lb)   SpO2 98%   Breastfeeding? No   BMI 29.99 kg/m²      Tmax/24hrs: Temp (24hrs), Av.9 °F (36.6 °C), Min:97.3 °F (36.3 °C), Max:98.1 °F (36.7 °C)    Input/Output:     Intake/Output Summary (Last 24 hours) at 2019 1118  Last data filed at 2019 0456  Gross per 24 hour   Intake 100 ml   Output    Net 100 ml       General:  Awake, alert, NAD. Cardiovascular:  RRR. Pulmonary:  CTA B.  GI:  Soft, NT/ND, NABS. Extremities:  No CT or edema.    Additional:      Labs:    Recent Results (from the past 24 hour(s))   GLUCOSE, POC    Collection Time: 19 11:35 AM   Result Value Ref Range    Glucose (POC) 170 (H) 70 - 110 mg/dL   CARDIAC PANEL,(CK, CKMB & TROPONIN)    Collection Time: 19  3:03 PM   Result Value Ref Range    CK 62 26 - 192 U/L    CK - MB 1.5 <3.6 ng/ml CK-MB Index 2.4 0.0 - 4.0 %    Troponin-I, QT 0.33 (H) 0.0 - 0.045 NG/ML   GLUCOSE, POC    Collection Time: 08/08/19  4:08 PM   Result Value Ref Range    Glucose (POC) 341 (H) 70 - 110 mg/dL   GLUCOSE, POC    Collection Time: 08/08/19  9:35 PM   Result Value Ref Range    Glucose (POC) 197 (H) 70 - 958 mg/dL   METABOLIC PANEL, BASIC    Collection Time: 08/09/19  3:53 AM   Result Value Ref Range    Sodium 138 136 - 145 mmol/L    Potassium 3.8 3.5 - 5.5 mmol/L    Chloride 104 100 - 111 mmol/L    CO2 29 21 - 32 mmol/L    Anion gap 5 3.0 - 18 mmol/L    Glucose 290 (H) 74 - 99 mg/dL    BUN 25 (H) 7.0 - 18 MG/DL    Creatinine 0.95 0.6 - 1.3 MG/DL    BUN/Creatinine ratio 26 (H) 12 - 20      GFR est AA >60 >60 ml/min/1.73m2    GFR est non-AA >60 >60 ml/min/1.73m2    Calcium 9.1 8.5 - 10.1 MG/DL   CBC WITH AUTOMATED DIFF    Collection Time: 08/09/19  3:53 AM   Result Value Ref Range    WBC 8.8 4.6 - 13.2 K/uL    RBC 5.02 4.20 - 5.30 M/uL    HGB 13.6 12.0 - 16.0 g/dL    HCT 41.6 35.0 - 45.0 %    MCV 82.9 74.0 - 97.0 FL    MCH 27.1 24.0 - 34.0 PG    MCHC 32.7 31.0 - 37.0 g/dL    RDW 13.5 11.6 - 14.5 %    PLATELET 948 455 - 814 K/uL    MPV 9.6 9.2 - 11.8 FL    NEUTROPHILS 46 40 - 73 %    LYMPHOCYTES 42 21 - 52 %    MONOCYTES 8 3 - 10 %    EOSINOPHILS 4 0 - 5 %    BASOPHILS 0 0 - 2 %    ABS. NEUTROPHILS 4.1 1.8 - 8.0 K/UL    ABS. LYMPHOCYTES 3.7 (H) 0.9 - 3.6 K/UL    ABS. MONOCYTES 0.7 0.05 - 1.2 K/UL    ABS. EOSINOPHILS 0.4 0.0 - 0.4 K/UL    ABS.  BASOPHILS 0.0 0.0 - 0.1 K/UL    DF AUTOMATED     EKG, 12 LEAD, INITIAL    Collection Time: 08/09/19  6:50 AM   Result Value Ref Range    Ventricular Rate 98 BPM    Atrial Rate 98 BPM    P-R Interval 200 ms    QRS Duration 80 ms    Q-T Interval 372 ms    QTC Calculation (Bezet) 474 ms    Calculated P Axis 52 degrees    Calculated R Axis 47 degrees    Calculated T Axis 32 degrees    Diagnosis       Normal sinus rhythm  Normal ECG  When compared with ECG of 07-AUG-2019 08:29,  No significant change was found     GLUCOSE, POC    Collection Time: 08/09/19  8:12 AM   Result Value Ref Range    Glucose (POC) 261 (H) 70 - 110 mg/dL   GLUCOSE, POC    Collection Time: 08/09/19 10:17 AM   Result Value Ref Range    Glucose (POC) 229 (H) 70 - 110 mg/dL     Additional Data Reviewed:      Signed By: Jelena Wells MD     August 9, 2019 11:18 AM

## 2019-08-09 NOTE — PROGRESS NOTES
[facility]      CARDIOLOGY PROGRESS NOTE  RECS:      1. NSTEMI- denies active chest pain or chest pressure. Continue asa, bb statin. Also on Lovenox sq bid. Plans for cardiac catheterization today discussed risk and benefits  patient want to proceed. Recent echo showed preserved EF%; grade 1 left ventricular diastolic dysfunction. 2. Hypertension- stable on ARB and bb. Will monitor   3. Essential thrombocytosis- on anagrelide. Current platelet count is normal.  In view of ACS would like to continue Lovenox and aspirin at present time. Aspirin added at full dose at present. 4. Hx of RA on Methotrexate weekly and Infliximab   5. Active tobacco use- discussed cessation  6. COPD- mild exacerbation. w/u and medications per medical team  7. Hx  of cholecystectomy and also alcoholic pancreatitis. She says last drink was 4-5 months ago. 8. Morbid obesity- weight loss advised   9. Diabetes- poorly controlled. A1c 11.8   10. Mixed hyperlipidemia- increased Lipitor to 40 mg.       Echo 8/19  · Left Ventricle: Normal cavity size and wall thickness. Low normal systolic dysfunction. Estimated left ventricular ejection fraction is 51 - 55%. Visually measured ejection fraction. No regional wall motion abnormality noted. Mild (grade 1) left ventricular diastolic dysfunction. · Pulmonary Artery: Pulmonary arterial systolic pressure is 26 mmHg.     ASSESSMENT:  Hospital Problems  Date Reviewed: 7/9/2019          Codes Class Noted POA    Acute bronchitis ICD-10-CM: J20.9  ICD-9-CM: 466.0  8/8/2019 Unknown        Chronic diastolic congestive heart failure ([facility] ICD-10-CM: I50.32  ICD-9-CM: 428.32, 428.0  8/8/2019 Unknown        * (Principal) ACS (acute coronary syndrome) ([facility] ICD-10-CM: I24.9  ICD-9-CM: 411.1  8/7/2019 Unknown        Chest pain ICD-10-CM: R07.9  ICD-9-CM: 786.50  5/4/2018 Yes        Tobacco use ICD-10-CM: Z72.0  ICD-9-CM: 305.1  5/4/2018 Unknown    Overview Signed 5/4/2018 12:44 PM by Gary Sawyer MD     discussed cessation             Type 2 diabetes mellitus without complication, with long-term current use of insulin [facility]) ICD-10-CM: E11.9, Z79.4  ICD-9-CM: 250.00, V58.67  5/4/2018 Yes        Rheumatoid arthritis involving multiple joints ([facility] ICD-10-CM: M06.9  ICD-9-CM: 714.0  11/1/2016 Yes        Essential thrombocytosis (HCC) ICD-10-CM: D47.3  ICD-9-CM: 238.71  1/6/2016 Yes                SUBJECTIVE:  No CP  No SOB    OBJECTIVE:    VS:   Visit Vitals  /83   Pulse 99   Temp 97.8 °F (36.6 °C)   Resp 16   Ht 5' 11\" (1.803 m)   Wt 97.5 kg (215 lb)   SpO2 98%   Breastfeeding? No   BMI 29.99 kg/m²         Intake/Output Summary (Last 24 hours) at 8/9/2019 1020  Last data filed at 8/9/2019 0456  Gross per 24 hour   Intake 100 ml   Output    Net 100 ml     TELE: normal sinus rhythm    General: alert, well developed, pleasant and in no apparent distress  HENT: Normocephalic, atraumatic. Normal external eye. Neck :  JVD difficult to assess due to obesity  Cardiac:  regular rate and rhythm, S1, S2 normal, no S3 or S4, no click, no rub  Lungs: few inspiratory rales lung bases. diminished breath sounds b/l.    Abdomen: Soft, nontender, no masses  Extremities:  No c/c/e, peripheral pulses present      Labs: Results:       Chemistry Recent Labs     08/09/19  0353 08/08/19  0256 08/07/19  0835   * 247* 325*    139 138   K 3.8 4.0 3.6    106 103   CO2 29 27 27   BUN 25* 20* 20*   CREA 0.95 0.78 0.89   CA 9.1 9.2 9.5   AGAP 5 6 8   BUCR 26* 26* 22*   AP  --   --  116   TP  --   --  7.7   ALB  --   --  3.3*   GLOB  --   --  4.4*   AGRAT  --   --  0.8      CBC w/Diff Recent Labs     08/09/19  0353 08/08/19  0256 08/07/19  0835   WBC 8.8 9.7 9.4   RBC 5.02 5.14 5.06   HGB 13.6 13.8 13.9   HCT 41.6 42.3 41.7    343 366   GRANS 46 50 65   LYMPH 42 39 25   EOS 4 4 3      Cardiac Enzymes Recent Labs     08/08/19  1503 08/08/19  0256   CPK 62 76   CKND1 2.4 2.6      Coagulation No results for input(s): PTP, INR, APTT in the last 72 hours. No lab exists for component: INREXT    Lipid Panel Lab Results   Component Value Date/Time    Cholesterol, total 193 08/08/2019 02:56 AM    HDL Cholesterol 38 (L) 08/08/2019 02:56 AM    LDL, calculated 88.6 08/08/2019 02:56 AM    VLDL, calculated 66.4 08/08/2019 02:56 AM    Triglyceride 332 (H) 08/08/2019 02:56 AM    CHOL/HDL Ratio 5.1 (H) 08/08/2019 02:56 AM      BNP No results for input(s): BNPP in the last 72 hours. Liver Enzymes Recent Labs     08/07/19  0835   TP 7.7   ALB 3.3*      SGOT 3*      Thyroid Studies Lab Results   Component Value Date/Time    T4, Total 11.0 10/22/2018 09:06 AM    TSH 2.13 10/22/2018 09:06 AM              Aissatou Kim NP-C [place]:[phone] supervised    I have independently evaluated and examined the patient. All relevant labs and testing data's are reviewed. Care plan discussed and updated after review.     Leonie Parkinson MD

## 2019-08-09 NOTE — PROGRESS NOTES
08/09/19 1213   Vitals   Temp 98.1 °F (36.7 °C)   Temp Source Oral   Pulse (Heart Rate) 84   Heart Rate Source Monitor   Resp Rate 16   O2 Sat (%) 99 %   Level of Consciousness Alert   /71   MAP (Calculated) 84   BP 1 Location Left arm   BP 1 Method Automatic   BP Patient Position At rest   MEWS Score 1     TRANSFER - IN REPORT:    Report obtained by [facility], RN transport dropped off pt, Adolm Socks  being received from  (unit) for routine progression of care      Report consisted of patients Situation, Background, Assessment and   Recommendations(SBAR). Information from the following report(s) SBAR was reviewed with the receiving nurse. Opportunity for questions and clarification was provided. Assessment completed upon patients arrival to unit and care assumed.

## 2019-08-09 NOTE — PROGRESS NOTES
TRANSFER - IN REPORT:    Verbal report received from Carina(name) on Rubia Mccabe  being received from CCL(unit) for routine progression of care      Report consisted of patients Situation, Background, Assessment and   Recommendations(SBAR). Information from the following report(s) SBAR, Kardex, Procedure Summary and MAR  was reviewed with the receiving nurse. Opportunity for questions and clarification was provided. Assessment completed upon patients arrival to unit and care assumed.

## 2019-08-09 NOTE — PROGRESS NOTES
Problem: Falls - Risk of  Goal: *Absence of Falls  Description  Document Evert Woods Fall Risk and appropriate interventions in the flowsheet. Outcome: Progressing Towards Goal  Note:   Fall Risk Interventions:  Mobility Interventions: Communicate number of staff needed for ambulation/transfer         Medication Interventions: Patient to call before getting OOB         History of Falls Interventions: Bed/chair exit alarm         Problem: Patient Education: Go to Patient Education Activity  Goal: Patient/Family Education  Outcome: Progressing Towards Goal     Problem: Diabetes Self-Management  Goal: *Disease process and treatment process  Description  Define diabetes and identify own type of diabetes; list 3 options for treating diabetes. Outcome: Progressing Towards Goal  Goal: *Incorporating nutritional management into lifestyle  Description  Describe effect of type, amount and timing of food on blood glucose; list 3 methods for planning meals. Outcome: Progressing Towards Goal  Goal: *Incorporating physical activity into lifestyle  Description  State effect of exercise on blood glucose levels. Outcome: Progressing Towards Goal  Goal: *Developing strategies to promote health/change behavior  Description  Define the ABC's of diabetes; identify appropriate screenings, schedule and personal plan for screenings. Outcome: Progressing Towards Goal  Goal: *Using medications safely  Description  State effect of diabetes medications on diabetes; name diabetes medication taking, action and side effects. Outcome: Progressing Towards Goal  Goal: *Monitoring blood glucose, interpreting and using results  Description  Identify recommended blood glucose targets  and personal targets.   Outcome: Progressing Towards Goal  Goal: *Prevention, detection, treatment of acute complications  Description  List symptoms of hyper- and hypoglycemia; describe how to treat low blood sugar and actions for lowering  high blood glucose level.  Outcome: Progressing Towards Goal  Goal: *Prevention, detection and treatment of chronic complications  Description  Define the natural course of diabetes and describe the relationship of blood glucose levels to long term complications of diabetes.   Outcome: Progressing Towards Goal  Goal: *Developing strategies to address psychosocial issues  Description  Describe feelings about living with diabetes; identify support needed and support network  Outcome: Progressing Towards Goal  Goal: *Insulin pump training  Outcome: Progressing Towards Goal  Goal: *Sick day guidelines  Outcome: Progressing Towards Goal  Goal: *Patient Specific Goal (EDIT GOAL, INSERT TEXT)  Outcome: Progressing Towards Goal     Problem: Patient Education: Go to Patient Education Activity  Goal: Patient/Family Education  Outcome: Progressing Towards Goal     Problem: Patient Education: Go to Patient Education Activity  Goal: Patient/Family Education  Outcome: Progressing Towards Goal     Problem: Patient Education: Go to Patient Education Activity  Goal: Patient/Family Education  Outcome: Progressing Towards Goal     Problem: Diabetes Maintenance:Admission  Goal: *Blood glucose 80 to 180 mg/dl  Outcome: Progressing Towards Goal

## 2019-08-09 NOTE — PROGRESS NOTES
Assumed care of patient. TR band to right wrist.     1530  Removed 2CC air from TR band    1535  Lunch brought to bedside. 1545  Removed 2CC air from TR band    1600  Removed 2CC air from TR band    1615  Removed 2CC air from TR band    1630  Removed 2CC air from TR band    1635  Report called to [address] [place] [address]    1645  TR band removed, dressing applied, no bleeding or hematoma noted. 1710  Pt transported to [address] [facility].

## 2019-08-09 NOTE — DISCHARGE SUMMARY
[facility]  [facility]       Patient: Donna Benson Age: 52 y.o. : 1965 MR#: [id number] SSN: [id number]  PCP on record: Danica Tate MD  Admit date: 2019  Discharge date: 2019    Consults:    Dr. Tona España, cardiology. Procedures:   Cardiac cath preliminary report: LAD mid 40% stenosis, LVEF normal.       Significant Diagnostic Studies:    Echo:   · Left Ventricle: Normal cavity size and wall thickness. Low normal systolic dysfunction. Estimated left ventricular ejection fraction is 51 - 55%. Visually measured ejection fraction. No regional wall motion abnormality noted. Mild (grade 1) left ventricular diastolic dysfunction. · Pulmonary Artery: Pulmonary arterial systolic pressure is 26 mmHg. Discharge Diagnoses:                                           1. NSTEMI  2. Acute bronchitis  3. HTN  4. DM2, uncontrolled  5. COPD  6. Rheumatoid arthritis  7. Fibromyalgia  8. Depression/anxiety. Hospital Course by Problem   1. Please refer to admission H&P. Briefly, presented with sudden onset of SOB and substernal pressure. Has had this kind of chest pressure on and off for the past week. In the ED, she had elevated trop I without EKG changes. Started on tx-dose Lovenox and admitted to r/o ACS. Hospital course follows below. 2. NSTEMI - peak trop I of 0.60 on . Maintained on med tx on Lovenox tx dose, ASA, BBlocker, statin. Underwent echo, results pasted above. Had a cardiac cath results of which are copied above in the procedures section, and discussed with cardiology. Will maintain on ASA 81mg daily. 3. Acute bronchitis - no acute, improved. Continue doxy through . 4. HTN - BPs wnl. On atenolol, losartan. 5. DM2 - uncontrolled. Basal/bolus insulin. HgbA1c 11.8%. Bumped up Lantus from 10 to 15u/daily, will increase Lantus to 30 units daily.  Will d/c her 75/25 insulin, which she says never controlled her blood sugars. New Rx written for sliding scale Novolog. She is not compliant with diet. Counseled. 6. Essential thrombocytosis - Agrylin. No new issues. Plt counts wnl here, in 340-360 range. 7. Hx RArthritis, fibromyalgia - no acute. MTX, weekly infliximab. 8. Hx COPD - no acute. Nebs prn.   9. Hx depression/anxiety - no acute. 10. See progress note for today's eval and assessment. Condition: Good. Disposition:    [x]Home   []Home with Home Health   []SNF/NH   []Rehab   []Home with family   []Alternate Facility:____________________      Discharge Medications:     Current Discharge Medication List      START taking these medications    Details   atorvastatin (LIPITOR) 40 mg tablet Take 1 Tab by mouth nightly. Qty: 30 Tab, Refills: 1      losartan (COZAAR) 50 mg tablet Take 1 Tab by mouth daily. Qty: 30 Tab, Refills: 1      nicotine (NICODERM CQ) 14 mg/24 hr patch 1 Patch by TransDERmal route daily for 30 days. Qty: 30 Patch, Refills: 0      doxycycline (MONODOX) 100 mg capsule Take 1 Cap by mouth two (2) times a day for 3 days. Qty: 5 Cap, Refills: 0      insulin glargine (LANTUS) 100 unit/mL injection 30 Units by SubCUTAneous route daily. Qty: 1 Vial, Refills: 1      insulin lispro (HUMALOG) 100 unit/mL injection For Blood Sugar of less than 150 = 0 units/150 -199 = 3 units/200 -249 = 6 units/250 -299 = 9 units/300 - 349 = 12 units/350+ = 15 units. Qty: 1 Vial, Refills: 1      aspirin delayed-release 81 mg tablet Take 1 Tab by mouth daily. Qty: 30 Tab, Refills: 1         CONTINUE these medications which have NOT CHANGED    Details   oxyCODONE-acetaminophen (PERCOCET 10)  mg per tablet Take 1 Tab by mouth every six (6) hours as needed for Pain for up to 14 days. Max Daily Amount: 4 Tabs. Qty: 60 Tab, Refills: 0    Associated Diagnoses: Chronic pain syndrome      anagrelide (AGRYLIN) 0.5 mg capsule Take 1 Cap by mouth two (2) times a day.   Qty: 60 Cap, Refills: 6    Associated Diagnoses: Thrombocytosis (HCC)      amitriptyline (ELAVIL) 25 mg tablet 25 mg nightly. DULoxetine (CYMBALTA) 30 mg capsule two (2) times a day. QUEtiapine (SEROQUEL) 25 mg tablet 25 mg two (2) times a day. clonazePAM (KLONOPIN) 2 mg tablet Take 2 mg by mouth two (2) times a day. Indications: panic disorder      folic acid 447 mcg tablet Take 400 mcg by mouth daily. albuterol (PROAIR HFA) 90 mcg/actuation inhaler Take 1 Puff by inhalation every six (6) hours as needed for Wheezing. Qty: 1 Inhaler, Refills: 0      ondansetron hcl (ZOFRAN, AS HYDROCHLORIDE,) 4 mg tablet Take 1 Tab by mouth every eight (8) hours as needed for Nausea. Qty: 12 Tab, Refills: 0      methotrexate (RHEUMATREX) 2.5 mg tablet Take 2.5 mg by mouth every fourty-eight (48) hours. metFORMIN (GLUCOPHAGE) 1,000 mg tablet Take 500 mg by mouth two (2) times a day. atenolol (TENORMIN) 25 mg tablet Take 25 mg by mouth daily. Indications: HYPERTENSION      furosemide (LASIX) 40 mg tablet Take  by mouth daily. naloxone (NARCAN) 4 mg/actuation nasal spray Use 1 spray intranasally, then discard. Repeat with new spray every 2 min as needed for opioid overdose symptoms, alternating nostrils. Indications: Decrease in Rate & Depth of Breathing due to Opioid Drug, opioid overdose  Qty: 1 Each, Refills: 1      clotrimazole-betamethasone (LOTRISONE) topical cream two (2) times a day. Indications: right hand      gabapentin (NEURONTIN) 300 mg capsule 300 mg two (2) times a day. BD INSULIN SYRINGE ULTRA-FINE 1 mL 31 gauge x 15/64\" syrg       Cholecalciferol, Vitamin D3, 5,000 unit Tab Take 5,000 Units by mouth every seven (7) days. hydroxychloroquine (PLAQUENIL) 200 mg tablet Take 200 mg by mouth two (2) times a day. INFLIXIMAB (REMICADE IV) 344 mg by IntraVENous route once as needed.  remicade will be every 6 weeks         STOP taking these medications       losartan-hydrochlorothiazide (HYZAAR) 50-12.5 mg per tablet Comments:   Reason for Stopping:         insulin lispro protamine/insulin lispro (HUMALOG MIX 75-25,U-100,INSULN) 100 unit/mL (75-25) injection Comments:   Reason for Stopping: Follow-up Appointments:   1. Your PCP: Cesilia Donohue MD, within 7-10 days  2. With cardiology in 3-4 weeks.      >30 minutes spent coordinating this discharge (review instructions/follow-up, prescriptions, preparing report for sign off)    Signed:  Vitor Burch MD  8/9/2019  1:58 PM

## 2019-08-10 VITALS
TEMPERATURE: 98 F | HEART RATE: 91 BPM | RESPIRATION RATE: 17 BRPM | SYSTOLIC BLOOD PRESSURE: 107 MMHG | OXYGEN SATURATION: 99 % | WEIGHT: 220.46 LBS | HEIGHT: 71 IN | DIASTOLIC BLOOD PRESSURE: 69 MMHG | BODY MASS INDEX: 30.86 KG/M2

## 2019-08-10 LAB
ANION GAP SERPL CALC-SCNC: 6 MMOL/L (ref 3–18)
ATRIAL RATE: 98 BPM
BASOPHILS # BLD: 0 K/UL (ref 0–0.1)
BASOPHILS NFR BLD: 0 % (ref 0–2)
BUN SERPL-MCNC: 24 MG/DL (ref 7–18)
BUN/CREAT SERPL: 25 (ref 12–20)
CALCIUM SERPL-MCNC: 8.6 MG/DL (ref 8.5–10.1)
CALCULATED P AXIS, ECG09: 52 DEGREES
CALCULATED R AXIS, ECG10: 47 DEGREES
CALCULATED T AXIS, ECG11: 32 DEGREES
CHLORIDE SERPL-SCNC: 106 MMOL/L (ref 100–111)
CO2 SERPL-SCNC: 27 MMOL/L (ref 21–32)
CREAT SERPL-MCNC: 0.95 MG/DL (ref 0.6–1.3)
DIAGNOSIS, 93000: NORMAL
DIFFERENTIAL METHOD BLD: ABNORMAL
END DIASTOLIC PRESSURE: 13
EOSINOPHIL # BLD: 0.4 K/UL (ref 0–0.4)
EOSINOPHIL NFR BLD: 5 % (ref 0–5)
ERYTHROCYTE [DISTWIDTH] IN BLOOD BY AUTOMATED COUNT: 13.6 % (ref 11.6–14.5)
GLUCOSE BLD STRIP.AUTO-MCNC: 261 MG/DL (ref 70–110)
GLUCOSE BLD STRIP.AUTO-MCNC: 272 MG/DL (ref 70–110)
GLUCOSE SERPL-MCNC: 267 MG/DL (ref 74–99)
HCT VFR BLD AUTO: 40.5 % (ref 35–45)
HGB BLD-MCNC: 13 G/DL (ref 12–16)
LYMPHOCYTES # BLD: 2.6 K/UL (ref 0.9–3.6)
LYMPHOCYTES NFR BLD: 29 % (ref 21–52)
MCH RBC QN AUTO: 26.6 PG (ref 24–34)
MCHC RBC AUTO-ENTMCNC: 32.1 G/DL (ref 31–37)
MCV RBC AUTO: 82.8 FL (ref 74–97)
MONOCYTES # BLD: 1.1 K/UL (ref 0.05–1.2)
MONOCYTES NFR BLD: 12 % (ref 3–10)
NEUTS SEG # BLD: 4.9 K/UL (ref 1.8–8)
NEUTS SEG NFR BLD: 54 % (ref 40–73)
P-R INTERVAL, ECG05: 200 MS
PLATELET # BLD AUTO: 330 K/UL (ref 135–420)
PMV BLD AUTO: 9.5 FL (ref 9.2–11.8)
POTASSIUM SERPL-SCNC: 4 MMOL/L (ref 3.5–5.5)
Q-T INTERVAL, ECG07: 372 MS
QRS DURATION, ECG06: 80 MS
QTC CALCULATION (BEZET), ECG08: 474 MS
RBC # BLD AUTO: 4.89 M/UL (ref 4.2–5.3)
SODIUM SERPL-SCNC: 139 MMOL/L (ref 136–145)
VENTRICULAR RATE, ECG03: 98 BPM
WBC # BLD AUTO: 8.9 K/UL (ref 4.6–13.2)

## 2019-08-10 PROCEDURE — 74011250636 HC RX REV CODE- 250/636: Performed by: INTERNAL MEDICINE

## 2019-08-10 PROCEDURE — 36415 COLL VENOUS BLD VENIPUNCTURE: CPT

## 2019-08-10 PROCEDURE — 74011250637 HC RX REV CODE- 250/637: Performed by: NURSE PRACTITIONER

## 2019-08-10 PROCEDURE — 80048 BASIC METABOLIC PNL TOTAL CA: CPT

## 2019-08-10 PROCEDURE — 82962 GLUCOSE BLOOD TEST: CPT

## 2019-08-10 PROCEDURE — 74011636637 HC RX REV CODE- 636/637: Performed by: INTERNAL MEDICINE

## 2019-08-10 PROCEDURE — 93005 ELECTROCARDIOGRAM TRACING: CPT

## 2019-08-10 PROCEDURE — 74011250637 HC RX REV CODE- 250/637: Performed by: INTERNAL MEDICINE

## 2019-08-10 PROCEDURE — 74011000258 HC RX REV CODE- 258: Performed by: INTERNAL MEDICINE

## 2019-08-10 PROCEDURE — 85025 COMPLETE CBC W/AUTO DIFF WBC: CPT

## 2019-08-10 RX ORDER — IBUPROFEN 200 MG
1 TABLET ORAL DAILY
Qty: 30 PATCH | Refills: 3 | Status: SHIPPED | OUTPATIENT
Start: 2019-08-10 | End: 2019-09-09

## 2019-08-10 RX ORDER — DOXYCYCLINE 100 MG/1
100 CAPSULE ORAL EVERY 12 HOURS
Status: DISCONTINUED | OUTPATIENT
Start: 2019-08-10 | End: 2019-08-10 | Stop reason: HOSPADM

## 2019-08-10 RX ORDER — GUAIFENESIN 100 MG/5ML
81 LIQUID (ML) ORAL DAILY
Status: DISCONTINUED | OUTPATIENT
Start: 2019-08-11 | End: 2019-08-10 | Stop reason: HOSPADM

## 2019-08-10 RX ADMIN — ANAGRELIDE HYDROCHLORIDE 0.5 MG: 0.5 CAPSULE ORAL at 09:00

## 2019-08-10 RX ADMIN — DULOXETINE HYDROCHLORIDE 30 MG: 30 CAPSULE, DELAYED RELEASE ORAL at 08:35

## 2019-08-10 RX ADMIN — FOLIC ACID 1 MG: 1 TABLET ORAL at 08:36

## 2019-08-10 RX ADMIN — ASPIRIN 325 MG: 325 TABLET, FILM COATED ORAL at 08:36

## 2019-08-10 RX ADMIN — DOXYCYCLINE 100 MG: 100 INJECTION, POWDER, LYOPHILIZED, FOR SOLUTION INTRAVENOUS at 04:52

## 2019-08-10 RX ADMIN — INSULIN LISPRO 9 UNITS: 100 INJECTION, SOLUTION INTRAVENOUS; SUBCUTANEOUS at 08:36

## 2019-08-10 RX ADMIN — ATENOLOL 25 MG: 50 TABLET ORAL at 08:35

## 2019-08-10 RX ADMIN — CLONAZEPAM 2 MG: 0.5 TABLET ORAL at 08:35

## 2019-08-10 RX ADMIN — GABAPENTIN 300 MG: 300 CAPSULE ORAL at 08:36

## 2019-08-10 RX ADMIN — LOSARTAN POTASSIUM 50 MG: 50 TABLET ORAL at 08:35

## 2019-08-10 RX ADMIN — QUETIAPINE FUMARATE 25 MG: 25 TABLET ORAL at 08:35

## 2019-08-10 NOTE — PROGRESS NOTES
Bedside and Verbal shift change report given to Rochelle (oncoming nurse) by Henrique Rivera (offgoing nurse). Report included the following information SBAR, Kardex, Intake/Output, MAR and Recent Results.

## 2019-08-10 NOTE — PROGRESS NOTES
[facility]  Progress Note    Patient: Layla Chaudhari Age: 52 y.o. : 1965 MR#: [id number] SSN: [id number]  Date/Time: 8/10/2019    Subjective:     Feels well. Denies F/C, N/V, CP, SOB. Ambulates without difficulty. Reviewed discharge meds and plans, cath results. Assessment/Plan:   1. NSTEMI - with CADz LAD mid 40% stenosis. Med tx.   2. Acute bronchitis - continue doxy through . 3. HTN - BPs wnl. On atenolol, losartan. 4. DM2 - uncontrolled. Basal/bolus insulin. HgbA1c 11.8%. Home on Lantus and Novolog SSI. Lantus to 30u daily. 5. Essential thrombocytosis - Agrylin. 6. Hx RArthritis, fibromyalgia - no acute. MTX, weekly infliximab. 7. Hx COPD - no acute. Nebs prn.   8. Hx depression/anxiety - no acute. 9. Discharge home today. Additional Notes:      Case discussed with:  [x]Patient  []Family  [x]Nursing  []Case Management  DVT Prophylaxis:  [x]Lovenox  []Hep SQ  []SCDs  []Coumadin   []On Heparin gtt    Objective:   VS:   Visit Vitals  /69 (BP 1 Location: Left arm, BP Patient Position: At rest)   Pulse 91   Temp 98 °F (36.7 °C)   Resp 17   Ht 5' 11\" (1.803 m)   Wt 100 kg (220 lb 7.4 oz)   SpO2 99%   Breastfeeding? No   BMI 30.75 kg/m²      Tmax/24hrs: Temp (24hrs), Av.1 °F (36.7 °C), Min:97.7 °F (36.5 °C), Max:98.8 °F (37.1 °C)    Input/Output:     Intake/Output Summary (Last 24 hours) at 8/10/2019 1316  Last data filed at 8/10/2019 1025  Gross per 24 hour   Intake 680 ml   Output    Net 680 ml       General:  Awake, alert, NAD. Cardiovascular:  RRR. Pulmonary:  CTA B.  GI:  Soft, NT/ND, NABS. Extremities:  No CT or edema.    Additional:      Labs:    Recent Results (from the past 24 hour(s))   CARDIAC PROCEDURE    Collection Time: 19  2:40 PM   Result Value Ref Range    EDP 13    GLUCOSE, POC    Collection Time: 19  5:47 PM   Result Value Ref Range    Glucose (POC) 325 (H) 70 - 110 mg/dL   GLUCOSE, POC Collection Time: 08/09/19  9:52 PM   Result Value Ref Range    Glucose (POC) 263 (H) 70 - 472 mg/dL   METABOLIC PANEL, BASIC    Collection Time: 08/10/19  3:03 AM   Result Value Ref Range    Sodium 139 136 - 145 mmol/L    Potassium 4.0 3.5 - 5.5 mmol/L    Chloride 106 100 - 111 mmol/L    CO2 27 21 - 32 mmol/L    Anion gap 6 3.0 - 18 mmol/L    Glucose 267 (H) 74 - 99 mg/dL    BUN 24 (H) 7.0 - 18 MG/DL    Creatinine 0.95 0.6 - 1.3 MG/DL    BUN/Creatinine ratio 25 (H) 12 - 20      GFR est AA >60 >60 ml/min/1.73m2    GFR est non-AA >60 >60 ml/min/1.73m2    Calcium 8.6 8.5 - 10.1 MG/DL   CBC WITH AUTOMATED DIFF    Collection Time: 08/10/19  3:03 AM   Result Value Ref Range    WBC 8.9 4.6 - 13.2 K/uL    RBC 4.89 4.20 - 5.30 M/uL    HGB 13.0 12.0 - 16.0 g/dL    HCT 40.5 35.0 - 45.0 %    MCV 82.8 74.0 - 97.0 FL    MCH 26.6 24.0 - 34.0 PG    MCHC 32.1 31.0 - 37.0 g/dL    RDW 13.6 11.6 - 14.5 %    PLATELET 726 869 - 405 K/uL    MPV 9.5 9.2 - 11.8 FL    NEUTROPHILS 54 40 - 73 %    LYMPHOCYTES 29 21 - 52 %    MONOCYTES 12 (H) 3 - 10 %    EOSINOPHILS 5 0 - 5 %    BASOPHILS 0 0 - 2 %    ABS. NEUTROPHILS 4.9 1.8 - 8.0 K/UL    ABS. LYMPHOCYTES 2.6 0.9 - 3.6 K/UL    ABS. MONOCYTES 1.1 0.05 - 1.2 K/UL    ABS. EOSINOPHILS 0.4 0.0 - 0.4 K/UL    ABS.  BASOPHILS 0.0 0.0 - 0.1 K/UL    DF AUTOMATED     EKG, 12 LEAD, INITIAL    Collection Time: 08/10/19  7:03 AM   Result Value Ref Range    Ventricular Rate 99 BPM    Atrial Rate 99 BPM    P-R Interval 190 ms    QRS Duration 94 ms    Q-T Interval 372 ms    QTC Calculation (Bezet) 477 ms    Calculated P Axis 50 degrees    Calculated R Axis 38 degrees    Calculated T Axis 34 degrees    Diagnosis       Normal sinus rhythm  Normal ECG  When compared with ECG of 09-AUG-2019 06:50,  No significant change was found     GLUCOSE, POC    Collection Time: 08/10/19  8:13 AM   Result Value Ref Range    Glucose (POC) 261 (H) 70 - 110 mg/dL   GLUCOSE, POC    Collection Time: 08/10/19 11:59 AM   Result Value Ref Range    Glucose (POC) 272 (H) 70 - 110 mg/dL     Additional Data Reviewed:      Signed By: Blank Will MD     August 10, 2019 11:18 AM

## 2019-08-10 NOTE — PROGRESS NOTES
Patient discharged to home with AVS/FU instructions and prescriptions. Accompanied down stairs by  Staff and her son. Transportation provided by patient's son.

## 2019-08-10 NOTE — PROGRESS NOTES
Problem: Falls - Risk of  Goal: *Absence of Falls  Description  Document Radha Curry Fall Risk and appropriate interventions in the flowsheet. Outcome: Progressing Towards Goal  Note:   Fall Risk Interventions:  Mobility Interventions: Communicate number of staff needed for ambulation/transfer         Medication Interventions: Patient to call before getting OOB         History of Falls Interventions: Bed/chair exit alarm         Problem: Patient Education: Go to Patient Education Activity  Goal: Patient/Family Education  Outcome: Progressing Towards Goal     Problem: Diabetes Self-Management  Goal: *Disease process and treatment process  Description  Define diabetes and identify own type of diabetes; list 3 options for treating diabetes. Outcome: Progressing Towards Goal  Goal: *Incorporating nutritional management into lifestyle  Description  Describe effect of type, amount and timing of food on blood glucose; list 3 methods for planning meals. Outcome: Progressing Towards Goal  Goal: *Incorporating physical activity into lifestyle  Description  State effect of exercise on blood glucose levels. Outcome: Progressing Towards Goal  Goal: *Developing strategies to promote health/change behavior  Description  Define the ABC's of diabetes; identify appropriate screenings, schedule and personal plan for screenings. Outcome: Progressing Towards Goal  Goal: *Using medications safely  Description  State effect of diabetes medications on diabetes; name diabetes medication taking, action and side effects. Outcome: Progressing Towards Goal  Goal: *Monitoring blood glucose, interpreting and using results  Description  Identify recommended blood glucose targets  and personal targets.   Outcome: Progressing Towards Goal  Goal: *Prevention, detection, treatment of acute complications  Description  List symptoms of hyper- and hypoglycemia; describe how to treat low blood sugar and actions for lowering  high blood glucose level.  Outcome: Progressing Towards Goal  Goal: *Prevention, detection and treatment of chronic complications  Description  Define the natural course of diabetes and describe the relationship of blood glucose levels to long term complications of diabetes.   Outcome: Progressing Towards Goal  Goal: *Developing strategies to address psychosocial issues  Description  Describe feelings about living with diabetes; identify support needed and support network  Outcome: Progressing Towards Goal  Goal: *Insulin pump training  Outcome: Progressing Towards Goal  Goal: *Sick day guidelines  Outcome: Progressing Towards Goal  Goal: *Patient Specific Goal (EDIT GOAL, INSERT TEXT)  Outcome: Progressing Towards Goal     Problem: Patient Education: Go to Patient Education Activity  Goal: Patient/Family Education  Outcome: Progressing Towards Goal     Problem: Patient Education: Go to Patient Education Activity  Goal: Patient/Family Education  Outcome: Progressing Towards Goal     Problem: Patient Education: Go to Patient Education Activity  Goal: Patient/Family Education  Outcome: Progressing Towards Goal     Problem: Diabetes Maintenance:Admission  Goal: *Blood glucose 80 to 180 mg/dl  Outcome: Progressing Towards Goal

## 2019-08-10 NOTE — PROGRESS NOTES
[facility]      CARDIOLOGY PROGRESS NOTE  RECS:      1. NSTEMI- denies active chest pain or chest pressure. Continue asa, bb statin. S/p cardiac catheterization 8/9/10 that showed LAD mid 40% stenosis. LVEF normal. Will Continue with  medical management. Recent echo showed preserved EF%; grade 1 left ventricular diastolic dysfunction. 2. Hypertension- stable on ARB and bb. Will monitor   3. Essential thrombocytosis- on anagrelide. Current platelet count is normal.    4. Hx of RA on Methotrexate weekly and Infliximab   5. Active tobacco use- discussed cessation  6. COPD- mild exacerbation. w/u and medications per medical team  7. Hx  of cholecystectomy and also alcoholic pancreatitis. She says last drink was 4-5 months ago. 8. Morbid obesity- weight loss advised   9. Diabetes- poorly controlled. A1c 11.8   10. Mixed hyperlipidemia- increased Lipitor to 40 mg. Right radial artery intact with no ecchymosis or hematoma    Echo 8/19  · Left Ventricle: Normal cavity size and wall thickness. Low normal systolic dysfunction. Estimated left ventricular ejection fraction is 51 - 55%. Visually measured ejection fraction. No regional wall motion abnormality noted. Mild (grade 1) left ventricular diastolic dysfunction. · Pulmonary Artery: Pulmonary arterial systolic pressure is 26 mmHg.     ASSESSMENT:  Hospital Problems  Date Reviewed: 7/9/2019          Codes Class Noted POA    Acute bronchitis ICD-10-CM: J20.9  ICD-9-CM: 466.0  8/8/2019 Unknown        Chronic diastolic congestive heart failure (HCC) ICD-10-CM: I50.32  ICD-9-CM: 428.32, 428.0  8/8/2019 Unknown        * (Principal) ACS (acute coronary syndrome) ([facility] [address] ICD-10-CM: I24.9  ICD-9-CM: 411.1  8/7/2019 Unknown        Chest pain ICD-10-CM: R07.9  ICD-9-CM: 786.50  5/4/2018 Yes        Tobacco use ICD-10-CM: Z72.0  ICD-9-CM: 305.1  5/4/2018 Unknown    Overview Signed 5/4/2018 12:44 PM by Olvin Araujo MD     discussed cessation Type 2 diabetes mellitus without complication, with long-term current use of insulin (HCC) ICD-10-CM: E11.9, Z79.4  ICD-9-CM: 250.00, V58.67  5/4/2018 Yes        Rheumatoid arthritis involving multiple joints (HCC) ICD-10-CM: M06.9  ICD-9-CM: 714.0  11/1/2016 Yes        Essential thrombocytosis ([facility] [address] ICD-10-CM: D47.3  ICD-9-CM: 238.71  1/6/2016 Yes                SUBJECTIVE:  No CP  No SOB    OBJECTIVE:    VS:   Visit Vitals  /79 (BP 1 Location: Left arm, BP Patient Position: At rest)   Pulse 100   Temp 98.1 °F (36.7 °C)   Resp 19   Ht 5' 11\" (1.803 m)   Wt 100 kg (220 lb 7.4 oz)   SpO2 99%   Breastfeeding? No   BMI 30.75 kg/m²         Intake/Output Summary (Last 24 hours) at 8/10/2019 1000  Last data filed at 8/9/2019 2156  Gross per 24 hour   Intake 360 ml   Output    Net 360 ml     TELE: normal sinus rhythm    General: alert, well developed, pleasant and in no apparent distress  HENT: Normocephalic, atraumatic. Normal external eye. Neck :  JVD difficult to assess due to obesity  Cardiac:  regular rate and rhythm, S1, S2 normal, no S3 or S4, no click, no rub  Lungs: few inspiratory rales lung bases. diminished breath sounds b/l. Abdomen: Soft, nontender, no masses  Extremities:  No c/c/e, peripheral pulses present      Labs: Results:       Chemistry Recent Labs     08/10/19  0303 08/09/19  0353 08/08/19  0256   * 290* 247*    138 139   K 4.0 3.8 4.0    104 106   CO2 27 29 27   BUN 24* 25* 20*   CREA 0.95 0.95 0.78   CA 8.6 9.1 9.2   AGAP 6 5 6   BUCR 25* 26* 26*      CBC w/Diff Recent Labs     08/10/19  0303 08/09/19  0353 08/08/19  0256   WBC 8.9 8.8 9.7   RBC 4.89 5.02 5.14   HGB 13.0 13.6 13.8   HCT 40.5 41.6 42.3    346 343   GRANS 54 46 50   LYMPH 29 42 39   EOS 5 4 4      Cardiac Enzymes Recent Labs     08/08/19  1503 08/08/19  0256   CPK 62 76   CKND1 2.4 2.6      Coagulation No results for input(s): PTP, INR, APTT in the last 72 hours.     No lab exists for component: INREXT, INREXT    Lipid Panel Lab Results   Component Value Date/Time    Cholesterol, total 193 08/08/2019 02:56 AM    HDL Cholesterol 38 (L) 08/08/2019 02:56 AM    LDL, calculated 88.6 08/08/2019 02:56 AM    VLDL, calculated 66.4 08/08/2019 02:56 AM    Triglyceride 332 (H) 08/08/2019 02:56 AM    CHOL/HDL Ratio 5.1 (H) 08/08/2019 02:56 AM      BNP No results for input(s): BNPP in the last 72 hours. Liver Enzymes No results for input(s): TP, ALB, TBIL, AP, SGOT, GPT in the last 72 hours. No lab exists for component: DBIL   Thyroid Studies Lab Results   Component Value Date/Time    T4, Total 11.0 10/22/2018 09:06 AM    TSH 2.13 10/22/2018 09:06 AM              Aissatou BABB Elda:[phone] supervised  I have independently evaluated and examined the patient. All relevant labs and testing data's are reviewed. Care plan discussed and updated after review.     Dewitte Lennox MD

## 2019-08-10 NOTE — PROGRESS NOTES
Bedside and Verbal shift change report given to [facility] (oncoming nurse) by Deirdre Trevino (offgoing nurse). Report included the following information SBAR, Kardex, ED Summary, OR Summary, Procedure Summary, Intake/Output, Accordion, Recent Results and Med Rec Status.

## 2019-08-10 NOTE — DISCHARGE INSTRUCTIONS
Counting Carbohydrates: Care Instructions  Your Care Instructions    You don't have to eat special foods when you have diabetes. You just have to be careful to eat healthy foods. Carbohydrates (carbs) raise blood sugar higher and quicker than any other nutrient. Carbs are found in desserts, breads and cereals, and fruit. They're also in starchy vegetables. These include potatoes, corn, and grains such as rice and pasta. Carbs are also in milk and yogurt. The more carbs you eat at one time, the higher your blood sugar will rise. Spreading carbs all through the day helps keep your blood sugar levels within your target range. Counting carbs is one of the best ways to keep your blood sugar under control. If you use insulin, counting carbs helps you match the right amount of insulin to the number of grams of carbs in a meal. Then you can change your diet and insulin dose as needed. Testing your blood sugar several times a day can help you learn how carbs affect your blood sugar. A registered dietitian or certified diabetes educator can help you plan meals and snacks. Follow-up care is a key part of your treatment and safety. Be sure to make and go to all appointments, and call your doctor if you are having problems. It's also a good idea to know your test results and keep a list of the medicines you take. How can you care for yourself at home? Know your daily amount of carbohydrates  Your daily amount depends on several things, such as your weight, how active you are, which diabetes medicines you take, and what your goals are for your blood sugar levels. A registered dietitian or certified diabetes educator can help you plan how many carbs to include in each meal and snack. For most adults, a guideline for the daily amount of carbs is:  · 45 to 60 grams at each meal. That's about the same as 3 to 4 carbohydrate servings. · 15 to 20 grams at each snack. That's about the same as 1 carbohydrate serving.   Count carbs  Counting carbs lets you know how much rapid-acting insulin to take before you eat. If you use an insulin pump, you get a constant rate of insulin during the day. So the pump must be programmed at meals. This gives you extra insulin to cover the rise in blood sugar after meals. If you take insulin:  · Learn your own insulin-to-carb ratio. You and your diabetes health professional will figure out the ratio. You can do this by testing your blood sugar after meals. For example, you may need a certain amount of insulin for every 15 grams of carbs. · Add up the carb grams in a meal. Then you can figure out how many units of insulin to take based on your insulin-to-carb ratio. · Exercise lowers blood sugar. You can use less insulin than you would if you were not doing exercise. Keep in mind that timing matters. If you exercise within 1 hour after a meal, your body may need less insulin for that meal than it would if you exercised 3 hours after the meal. Test your blood sugar to find out how exercise affects your need for insulin. If you do or don't take insulin:  · Look at labels on packaged foods. This can tell you how many carbs are in a serving. You can also use guides from the American Diabetes Association. · Be aware of portions, or serving sizes. If a package has two servings and you eat the whole package, you need to double the number of grams of carbohydrate listed for one serving. · Protein, fat, and fiber do not raise blood sugar as much as carbs do. If you eat a lot of these nutrients in a meal, your blood sugar will rise more slowly than it would otherwise. Eat from all food groups  · Eat at least three meals a day. · Plan meals to include food from all the food groups. The food groups include grains, fruits, dairy, proteins, and vegetables. · Talk to your dietitian or diabetes educator about ways to add limited amounts of sweets into your meal plan. · If you drink alcohol, talk to your doctor. It may not be recommended when you are taking certain diabetes medicines. Where can you learn more? Go to http://swapnil-speedy.info/. Enter G861 in the search box to learn more about \"Counting Carbohydrates: Care Instructions. \"  Current as of: July 25, 2018  Content Version: 12.1  © 0694-3275 [employer]. Care instructions adapted under license by [employer] (which disclaims liability or warranty for this information). If you have questions about a medical condition or this instruction, always ask your healthcare professional. [address] any warranty or liability for your use of this information. Patient Education        Learning About Diabetes and Coronary Artery Disease  How are diabetes and heart disease connected? Many people think diabetes and heart disease go hand in hand. But having diabetes doesn't have to mean that you are going to have a heart attack someday. Healthy living can help prevent many of the problems that come with both diabetes and heart disease. For some people, diabetes can cause problems in your body that may lead to heart disease. Diabetes can make the problems of heart disease worse. Experts do not fully understand how diabetes affects the heart. Many things can lead to heart disease, including high blood sugar, insulin resistance, high cholesterol, and high blood pressure. But lifestyle and genetics may also affect a person's risk. But here's the good news: The good things you're doing to stay healthy with diabetes--eating healthy foods, quitting smoking, getting exercise and more--are also helping your heart. What increases your risk for heart disease? When you have diabetes, your risk for heart disease is even higher if you have:  · High blood pressure, which pushes blood through the arteries with too much force. Over time, this damages the walls of the arteries.   · High cholesterol, which causes the buildup of a kind of fat inside the blood vessel walls. This buildup can lower blood flow to the heart muscle and raise your risk for having a heart attack. · Kidney damage, which shares many of the risk factors for heart disease (such as high blood sugar, high blood pressure, and high cholesterol). How can you keep your heart healthy when you have diabetes? Managing your diabetes and keeping your heart healthy are two sides of the same coin. Here are some things you can do. · Test your blood sugar levels and get your diabetes tests on schedule. Try to keep your numbers within your target range. · Keep track of your blood pressure. Your doctor will give you a goal that's right for you. If your blood pressure is high, your treatment may also include medicine. Changes in your lifestyle, such as staying at a healthy weight, may also help you lower your blood pressure. · Eat heart-healthy foods. These include fruits, vegetables, whole grains, fish, and low-fat or nonfat dairy foods. Limit sodium, alcohol, and sweets. · If your doctor recommends it, get more exercise. Walking is a good choice. Bit by bit, increase the amount you walk every day. Try for at least 30 minutes on most days of the week. · Do not smoke. Smoking can make diabetes and heart disease worse. If you need help quitting, talk to your doctor about stop-smoking programs and medicines. These can increase your chances of quitting for good. · Your doctor may talk with you about taking medicines for your heart. For example, your doctor may suggest taking a statin or daily aspirin. Where can you learn more? Go to http://swapnil-speedy.info/. Enter G761 in the search box to learn more about \"Learning About Diabetes and Coronary Artery Disease. \"  Current as of: July 25, 2018  Content Version: 12.1  © 7156-8863 [employer] Inc[employer].  Care instructions adapted under license by [employer] (which disclaims liability or warranty for this information). If you have questions about a medical condition or this instruction, always ask your healthcare professional. [place] [address] any warranty or liability for your use of this information.

## 2019-08-11 LAB
ATRIAL RATE: 99 BPM
CALCULATED P AXIS, ECG09: 50 DEGREES
CALCULATED R AXIS, ECG10: 38 DEGREES
CALCULATED T AXIS, ECG11: 34 DEGREES
DIAGNOSIS, 93000: NORMAL
P-R INTERVAL, ECG05: 190 MS
Q-T INTERVAL, ECG07: 372 MS
QRS DURATION, ECG06: 94 MS
QTC CALCULATION (BEZET), ECG08: 477 MS
VENTRICULAR RATE, ECG03: 99 BPM

## 2019-08-12 ENCOUNTER — PATIENT OUTREACH (OUTPATIENT)
Dept: CASE MANAGEMENT | Age: 54
End: 2019-08-12

## 2019-08-12 ENCOUNTER — PATIENT OUTREACH (OUTPATIENT)
Dept: CARDIOLOGY CLINIC | Age: 54
End: 2019-08-12

## 2019-08-12 NOTE — PROGRESS NOTES
8/12/2019 8:21 AM   Late entry for Friday 8/9/2019  1502  Nurse Navigator unable to assess related to patient out of room for a procedure.      8/12/2019 8:24 AM   Patient discharged prior to being assessed

## 2019-08-12 NOTE — PROGRESS NOTES
Hospital Discharge Follow-Up      Date/Time:  2019 10:41 AM    Patient was admitted to [facility] on 19 and discharged on 8/10/19 for NSTEMI. The physician discharge summary was available at the time of outreach. Patient was contacted within 2 business days of discharge. Inpatient RRAT score: Medium Risk            15       Total Score        3 Has Seen PCP in Last 6 Months (Yes=3, No=0)    12 Charlson Comorbidity Score (Age + Comorbid Conditions)        Criteria that do not apply:    . Living with Significant Other. Assisted Living. LTAC. SNF. or   Rehab    Patient Length of Stay (>5 days = 3)    IP Visits Last 12 Months (1-3=4, 4=9, >4=11)    Pt. Coverage (Medicare=5 , Medicaid, or Self-Pay=4)      Was this a readmission? no   Patient stated reason for the readmission: Chest pain. Nurse Navigator (NN) contacted the patient by telephone to perform post hospital discharge assessment. Verified name and  with patient as identifiers. Provided introduction to self, and explanation of the Nurse Navigator role. Reviewed discharge instructions and red flags with patient who verbalized understanding. Patient given an opportunity to ask questions and does not have any further questions or concerns at this time. The patient agrees to contact the PCP office for questions related to their healthcare. NN provided contact information for future reference. Disease Specific:   N/A    Summary of patient's top problems:  1. NSTEMI- denies active chest pain or chest pressure. S/p cardiac catheterization 8/9/10 that showed LAD mid 40% stenosis. LVEF normal.   2. Hypertension - stable  3. Acute Bronchitis - on doxycycline    Home Health orders at discharge: [address]: n/a  Date of initial visit: [address] [facility] ordered/company: n/a  Durable Medical Equipment received: n/a    Barriers to care?  depression, lack of knowledge about disease    Advance Care Planning:   Does patient have an Advance Directive:  not on file; education provided     Medication(s):   New Medications at Discharge: Lipitor 40 mg qhs, Losartan 50 mg every day, Nicoderm CQ, Doxycycline 100 mg BID, 3 days, Lantus 30 U every day, Humalog on sliding scale, aspirin 81 mg every day. Changed Medications at Discharge: none  Discontinued Medications at Discharge: hyzaar, Humalog 75/25    Medication reconciliation was performed with patient, who verbalizes understanding of administration of home medications. There were no barriers to obtaining medications identified at this time. Referral to Pharm D needed: no     Current Outpatient Medications   Medication Sig    nicotine (NICODERM CQ) 14 mg/24 hr patch 1 Patch by TransDERmal route daily for 30 days.  atorvastatin (LIPITOR) 40 mg tablet Take 1 Tab by mouth nightly.  losartan (COZAAR) 50 mg tablet Take 1 Tab by mouth daily.  doxycycline (MONODOX) 100 mg capsule Take 1 Cap by mouth two (2) times a day for 3 days.  insulin lispro (HUMALOG) 100 unit/mL injection For Blood Sugar of less than 150 = 0 units/150 -199 = 3 units/200 -249 = 6 units/250 -299 = 9 units/300 - 349 = 12 units/350+ = 15 units.  aspirin delayed-release 81 mg tablet Take 1 Tab by mouth daily.  insulin glargine (LANTUS) 100 unit/mL injection 30 Units by SubCUTAneous route daily.  oxyCODONE-acetaminophen (PERCOCET 10)  mg per tablet Take 1 Tab by mouth every six (6) hours as needed for Pain for up to 14 days. Max Daily Amount: 4 Tabs.  naloxone (NARCAN) 4 mg/actuation nasal spray Use 1 spray intranasally, then discard. Repeat with new spray every 2 min as needed for opioid overdose symptoms, alternating nostrils. Indications: Decrease in Rate & Depth of Breathing due to Opioid Drug, opioid overdose    anagrelide (AGRYLIN) 0.5 mg capsule Take 1 Cap by mouth two (2) times a day.  amitriptyline (ELAVIL) 25 mg tablet 25 mg nightly.     clotrimazole-betamethasone (LOTRISONE) topical cream two (2) times a day. Indications: right hand    DULoxetine (CYMBALTA) 30 mg capsule two (2) times a day.  QUEtiapine (SEROQUEL) 25 mg tablet 25 mg two (2) times a day.  BD INSULIN SYRINGE ULTRA-FINE 1 mL 31 gauge x 15/64\" syrg     clonazePAM (KLONOPIN) 2 mg tablet Take 2 mg by mouth two (2) times a day. Indications: panic disorder    folic acid 040 mcg tablet Take 400 mcg by mouth daily.  albuterol (PROAIR HFA) 90 mcg/actuation inhaler Take 1 Puff by inhalation every six (6) hours as needed for Wheezing.  ondansetron hcl (ZOFRAN, AS HYDROCHLORIDE,) 4 mg tablet Take 1 Tab by mouth every eight (8) hours as needed for Nausea.  methotrexate (RHEUMATREX) 2.5 mg tablet Take 2.5 mg by mouth every fourty-eight (48) hours.  metFORMIN (GLUCOPHAGE) 1,000 mg tablet Take 500 mg by mouth two (2) times a day.  Cholecalciferol, Vitamin D3, 5,000 unit Tab Take 5,000 Units by mouth every seven (7) days.  atenolol (TENORMIN) 25 mg tablet Take 25 mg by mouth daily. Indications: HYPERTENSION    hydroxychloroquine (PLAQUENIL) 200 mg tablet Take 200 mg by mouth two (2) times a day.  furosemide (LASIX) 40 mg tablet Take  by mouth daily.  INFLIXIMAB (REMICADE IV) 344 mg by IntraVENous route once as needed. remicade will be every 6 weeks     No current facility-administered medications for this visit. There are no discontinued medications.     [facility] follow up appointment(s):   Future Appointments   Date Time Provider Louise Ordonez   8/27/2019 10:00 AM [place] INFUSION CHAIR 3 [facility]   8/30/2019 10:45 AM MD MAGEN Denise [place] SCHED   9/4/2019 10:45 AM Jyoti Obrien MD [facility] [place] SCHED   10/8/2019 10:00 AM [place] INFUSION CHAIR 3 [facility]   7/9/2020  9:00 AM Jyoti Obrien MD [facility] [place] SCHED      Non-[facility] follow up appointment(s): Dr Jia Garcia declined assistance in making f/u appt.    [facility]:  out of service area       Goals      Attends follow-up appointments as directed.       8/12/19 Patient will attend all scheduled appointments through 9/12/19       Knowledge and adherence medication (ie. action, side effects, missed dose, etc.)      8/12/19 Pt will take all medications prescribed to be evaluated on each outreach through  9/12/19       Prepare patients and caregivers for end of life decisions (ie. need for hospice, pain management, symptom relief, advance directives etc.)      8/12/19 Pt will complete an ACP and have it scanned into their EMR by 9/12/19

## 2019-08-15 DIAGNOSIS — D75.839 THROMBOCYTOSIS: ICD-10-CM

## 2019-08-15 DIAGNOSIS — G89.4 CHRONIC PAIN SYNDROME: ICD-10-CM

## 2019-08-16 RX ORDER — OXYCODONE AND ACETAMINOPHEN 10; 325 MG/1; MG/1
1 TABLET ORAL
Qty: 60 TAB | Refills: 0 | Status: SHIPPED | OUTPATIENT
Start: 2019-08-16 | End: 2019-08-30 | Stop reason: SDUPTHER

## 2019-08-16 RX ORDER — ANAGRELIDE 0.5 MG/1
0.5 CAPSULE ORAL 2 TIMES DAILY
Qty: 60 CAP | Refills: 6 | Status: SHIPPED | OUTPATIENT
Start: 2019-08-16 | End: 2020-01-10 | Stop reason: SDUPTHER

## 2019-08-19 ENCOUNTER — PATIENT OUTREACH (OUTPATIENT)
Dept: CARDIOLOGY CLINIC | Age: 54
End: 2019-08-19

## 2019-08-20 ENCOUNTER — TELEPHONE (OUTPATIENT)
Dept: CARDIOLOGY CLINIC | Age: 54
End: 2019-08-20

## 2019-08-20 NOTE — TELEPHONE ENCOUNTER
If she is having facial swelling - stop Atorvastatin. Take Benedryl 50 mg. If swelling worsens or has swelling with tongue or difficulty breathing - go to ER.

## 2019-08-20 NOTE — TELEPHONE ENCOUNTER
Patient called and states that since starting Lipitor she's having increased swelling in her face that's getting worse. Please advise.

## 2019-08-20 NOTE — TELEPHONE ENCOUNTER
Called and spoke with patient. Advise to stop Lipitor and take benadryl and go to the ER if worsens. Patient states understanding.

## 2019-08-27 ENCOUNTER — HOSPITAL ENCOUNTER (OUTPATIENT)
Dept: ONCOLOGY | Age: 54
Discharge: HOME | End: 2019-08-27

## 2019-08-27 ENCOUNTER — CLINICAL SUPPORT (OUTPATIENT)
Dept: ONCOLOGY | Age: 54
End: 2019-08-27

## 2019-08-27 ENCOUNTER — HOSPITAL ENCOUNTER (OUTPATIENT)
Dept: INFUSION THERAPY | Age: 54
Discharge: HOME | End: 2019-08-27
Payer: MEDICARE

## 2019-08-27 VITALS
TEMPERATURE: 98 F | HEART RATE: 65 BPM | WEIGHT: 227 LBS | HEIGHT: 71 IN | SYSTOLIC BLOOD PRESSURE: 133 MMHG | DIASTOLIC BLOOD PRESSURE: 76 MMHG | BODY MASS INDEX: 31.78 KG/M2 | RESPIRATION RATE: 16 BRPM

## 2019-08-27 DIAGNOSIS — M05.80 OTHER RHEUMATOID ARTHRITIS WITH RHEUMATOID FACTOR OF UNSPECIFIED SITE (HCC): Primary | ICD-10-CM

## 2019-08-27 DIAGNOSIS — M05.80 OTHER RHEUMATOID ARTHRITIS WITH RHEUMATOID FACTOR OF UNSPECIFIED SITE (HCC): ICD-10-CM

## 2019-08-27 LAB
ALBUMIN SERPL-MCNC: 3.2 G/DL (ref 3.4–5)
ALBUMIN/GLOB SERPL: 0.8 {RATIO} (ref 0.8–1.7)
ALP SERPL-CCNC: 116 U/L (ref 45–117)
ALT SERPL-CCNC: 16 U/L (ref 13–56)
ANION GAP SERPL CALC-SCNC: 8 MMOL/L (ref 3–18)
AST SERPL-CCNC: 16 U/L (ref 10–38)
BASO+EOS+MONOS # BLD AUTO: 0.5 K/UL (ref 0–2.3)
BASO+EOS+MONOS NFR BLD AUTO: 5 % (ref 0.1–17)
BILIRUB SERPL-MCNC: 0.2 MG/DL (ref 0.2–1)
BUN SERPL-MCNC: 16 MG/DL (ref 7–18)
BUN/CREAT SERPL: 21 (ref 12–20)
CALCIUM SERPL-MCNC: 9.3 MG/DL (ref 8.5–10.1)
CHLORIDE SERPL-SCNC: 107 MMOL/L (ref 100–111)
CO2 SERPL-SCNC: 24 MMOL/L (ref 21–32)
CREAT SERPL-MCNC: 0.76 MG/DL (ref 0.6–1.3)
DIFFERENTIAL METHOD BLD: ABNORMAL
ERYTHROCYTE [DISTWIDTH] IN BLOOD BY AUTOMATED COUNT: 13.2 % (ref 11.5–14.5)
GLOBULIN SER CALC-MCNC: 3.8 G/DL (ref 2–4)
GLUCOSE SERPL-MCNC: 197 MG/DL (ref 74–99)
HCT VFR BLD AUTO: 39.2 % (ref 36–48)
HGB BLD-MCNC: 12.9 G/DL (ref 12–16)
LYMPHOCYTES # BLD: 2.8 K/UL (ref 1.1–5.9)
LYMPHOCYTES NFR BLD: 26 % (ref 14–44)
MCH RBC QN AUTO: 28 PG (ref 25–35)
MCHC RBC AUTO-ENTMCNC: 32.9 G/DL (ref 31–37)
MCV RBC AUTO: 85 FL (ref 78–102)
NEUTS SEG # BLD: 7.5 K/UL (ref 1.8–9.5)
NEUTS SEG NFR BLD: 70 % (ref 40–70)
PLATELET # BLD AUTO: 458 K/UL (ref 140–440)
POTASSIUM SERPL-SCNC: 4.4 MMOL/L (ref 3.5–5.5)
PROT SERPL-MCNC: 7 G/DL (ref 6.4–8.2)
RBC # BLD AUTO: 4.61 M/UL (ref 4.1–5.1)
SODIUM SERPL-SCNC: 139 MMOL/L (ref 136–145)
WBC # BLD AUTO: 10.8 K/UL (ref 4.5–13)

## 2019-08-27 PROCEDURE — 96375 TX/PRO/DX INJ NEW DRUG ADDON: CPT

## 2019-08-27 PROCEDURE — 96415 CHEMO IV INFUSION ADDL HR: CPT

## 2019-08-27 PROCEDURE — 96413 CHEMO IV INFUSION 1 HR: CPT

## 2019-08-27 PROCEDURE — 74011250636 HC RX REV CODE- 250/636: Performed by: INTERNAL MEDICINE

## 2019-08-27 PROCEDURE — 80053 COMPREHEN METABOLIC PANEL: CPT

## 2019-08-27 PROCEDURE — 74011250636 HC RX REV CODE- 250/636: Performed by: NURSE PRACTITIONER

## 2019-08-27 PROCEDURE — 86140 C-REACTIVE PROTEIN: CPT

## 2019-08-27 PROCEDURE — 85652 RBC SED RATE AUTOMATED: CPT

## 2019-08-27 RX ORDER — SODIUM CHLORIDE 0.9 % (FLUSH) 0.9 %
10-40 SYRINGE (ML) INJECTION AS NEEDED
Status: DISCONTINUED | OUTPATIENT
Start: 2019-08-27 | End: 2019-08-31 | Stop reason: HOSPADM

## 2019-08-27 RX ORDER — DIPHENHYDRAMINE HYDROCHLORIDE 50 MG/ML
50 INJECTION, SOLUTION INTRAMUSCULAR; INTRAVENOUS
Status: ACTIVE | OUTPATIENT
Start: 2019-08-27 | End: 2019-08-27

## 2019-08-27 RX ORDER — ACETAMINOPHEN 325 MG/1
650 TABLET ORAL
Status: ACTIVE | OUTPATIENT
Start: 2019-08-27 | End: 2019-08-27

## 2019-08-27 RX ORDER — SODIUM CHLORIDE 9 MG/ML
250 INJECTION, SOLUTION INTRAVENOUS AS NEEDED
Status: DISCONTINUED | OUTPATIENT
Start: 2019-08-27 | End: 2019-08-28 | Stop reason: HOSPADM

## 2019-08-27 RX ORDER — DIPHENHYDRAMINE HYDROCHLORIDE 50 MG/ML
25 INJECTION, SOLUTION INTRAMUSCULAR; INTRAVENOUS ONCE
Status: COMPLETED | OUTPATIENT
Start: 2019-08-27 | End: 2019-08-27

## 2019-08-27 RX ADMIN — SODIUM CHLORIDE 400 MG: 900 INJECTION, SOLUTION INTRAVENOUS at 11:56

## 2019-08-27 RX ADMIN — Medication 10 ML: at 10:45

## 2019-08-27 RX ADMIN — DIPHENHYDRAMINE HYDROCHLORIDE 25 MG: 50 INJECTION INTRAMUSCULAR; INTRAVENOUS at 10:57

## 2019-08-27 RX ADMIN — SODIUM CHLORIDE 250 ML: 9 INJECTION, SOLUTION INTRAVENOUS at 10:54

## 2019-08-27 NOTE — PROGRESS NOTES
[facility] Progress Note    Date: 2019    Name: Romeo Hansen    MRN: [id number]         : 1965      Ms. Sofi Pryor was assessed and education was provided. Ms. Sultana Brower vitals were reviewed and patient was observed for 5 minutes prior to treatment. Visit Vitals  /83 (BP 1 Location: Right arm, BP Patient Position: At rest;Sitting)   Pulse 87   Temp 97.7 °F (36.5 °C)   Resp 16   Ht 5' 11\" (1.803 m)   Wt 103 kg (227 lb)   Breastfeeding? No   BMI 31.66 kg/m²       Lab results were obtained and reviewed. Recent Results (from the past 12 hour(s))   CBC WITH 3 PART DIFF    Collection Time: 19 10:45 AM   Result Value Ref Range    WBC 10.8 4.5 - 13.0 K/uL    RBC 4.61 4.10 - 5.10 M/uL    HGB 12.9 12.0 - 16.0 g/dL    HCT 39.2 36 - 48 %    MCV 85.0 78 - 102 FL    MCH 28.0 25.0 - 35.0 PG    MCHC 32.9 31 - 37 g/dL    RDW 13.2 11.5 - 14.5 %    PLATELET 340 (H) 952 - 440 K/uL    NEUTROPHILS 70 40 - 70 %    MIXED CELLS 5 0.1 - 17 %    LYMPHOCYTES 26 14 - 44 %    ABS. NEUTROPHILS 7.5 1.8 - 9.5 K/UL    ABS. MIXED CELLS 0.5 0.0 - 2.3 K/uL    ABS. LYMPHOCYTES 2.8 1.1 - 5.9 K/UL    DF AUTOMATED         Pre-medication benadryl 25 mg IVP given via peripheral IV. Renflexis 400 mg IV given via peripheral IV after brisk blood return obtained. Ms. Sofi Pryor tolerated the infusion, and had no complaints. Patient armband removed and shredded. Ms. Sofi Pryor was discharged from [place] [id number] in stable condition at 1430. She is to return on 10/8/19 at 1000 for her next appointment.     Mihir San RN  2019

## 2019-08-28 LAB
CRP SERPL-MCNC: 1.4 MG/DL (ref 0–0.3)
ERYTHROCYTE [SEDIMENTATION RATE] IN BLOOD: 58 MM/HR (ref 0–30)

## 2019-08-30 ENCOUNTER — OFFICE VISIT (OUTPATIENT)
Dept: ONCOLOGY | Age: 54
End: 2019-08-30

## 2019-08-30 VITALS
TEMPERATURE: 98.1 F | BODY MASS INDEX: 32.31 KG/M2 | HEART RATE: 85 BPM | DIASTOLIC BLOOD PRESSURE: 79 MMHG | SYSTOLIC BLOOD PRESSURE: 133 MMHG | RESPIRATION RATE: 12 BRPM | WEIGHT: 230.8 LBS | HEIGHT: 71 IN | OXYGEN SATURATION: 98 %

## 2019-08-30 DIAGNOSIS — M06.09 RHEUMATOID ARTHRITIS OF MULTIPLE SITES WITHOUT RHEUMATOID FACTOR (HCC): ICD-10-CM

## 2019-08-30 DIAGNOSIS — D47.3 ESSENTIAL THROMBOCYTOSIS (HCC): Primary | ICD-10-CM

## 2019-08-30 DIAGNOSIS — D64.9 CHRONIC ANEMIA: ICD-10-CM

## 2019-08-30 DIAGNOSIS — M79.7 FIBROMYALGIA: ICD-10-CM

## 2019-08-30 DIAGNOSIS — G89.4 CHRONIC PAIN SYNDROME: ICD-10-CM

## 2019-08-30 RX ORDER — OXYCODONE AND ACETAMINOPHEN 10; 325 MG/1; MG/1
1 TABLET ORAL
Qty: 60 TAB | Refills: 0 | Status: SHIPPED | OUTPATIENT
Start: 2019-08-30 | End: 2019-09-12 | Stop reason: SDUPTHER

## 2019-08-30 NOTE — PATIENT INSTRUCTIONS
Anemia: Care Instructions  Your Care Instructions    Anemia is a low level of red blood cells, which carry oxygen throughout your body. Many things can cause anemia. Lack of iron is one of the most common causes. Your body needs iron to make hemoglobin, a substance in red blood cells that carries oxygen from the lungs to your body's cells. Without enough iron, the body produces fewer and smaller red blood cells. As a result, your body's cells do not get enough oxygen, and you feel tired and weak. And you may have trouble concentrating. Bleeding is the most common cause of a lack of iron. You may have heavy menstrual bleeding or bleeding caused by conditions such as ulcers, hemorrhoids, or cancer. Regular use of aspirin or other anti-inflammatory medicines (such as ibuprofen) also can cause bleeding in some people. A lack of iron in your diet also can cause anemia, especially at times when the body needs more iron, such as during pregnancy, infancy, and the teen years. Your doctor may have prescribed iron pills. It may take several months of treatment for your iron levels to return to normal. Your doctor also may suggest that you eat foods that are rich in iron, such as meat and beans. There are many other causes of anemia. It is not always due to a lack of iron. Finding the specific cause of your anemia will help your doctor find the right treatment for you. Follow-up care is a key part of your treatment and safety. Be sure to make and go to all appointments, and call your doctor if you are having problems. It's also a good idea to know your test results and keep a list of the medicines you take. How can you care for yourself at home? · Take your medicines exactly as prescribed. Call your doctor if you think you are having a problem with your medicine. · If your doctor recommends iron pills, take them as directed:  ? Try to take the pills on an empty stomach about 1 hour before or 2 hours after meals. But you may need to take iron with food to avoid an upset stomach. ? Do not take antacids or drink milk or caffeine drinks (such as coffee, tea, or cola) at the same time or within 2 hours of the time that you take your iron. They can make it hard for your body to absorb the iron. ? Vitamin C (from food or supplements) helps your body absorb iron. Try taking iron pills with a glass of orange juice or some other food that is high in vitamin C, such as citrus fruits. ? Iron pills may cause stomach problems, such as heartburn, nausea, diarrhea, constipation, and cramps. Be sure to drink plenty of fluids, and include fruits, vegetables, and fiber in your diet each day. Iron pills often make your bowel movements dark or green. ? If you forget to take an iron pill, do not take a double dose of iron the next time you take a pill. ? Keep iron pills out of the reach of small children. An overdose of iron can be very dangerous. · Follow your doctor's advice about eating iron-rich foods. These include red meat, shellfish, poultry, eggs, beans, raisins, whole-grain bread, and leafy green vegetables. · Steam vegetables to help them keep their iron content. When should you call for help? Call 911 anytime you think you may need emergency care. For example, call if:    · You have symptoms of a heart attack. These may include:  ? Chest pain or pressure, or a strange feeling in the chest.  ? Sweating. ? Shortness of breath. ? Nausea or vomiting. ? Pain, pressure, or a strange feeling in the back, neck, jaw, or upper belly or in one or both shoulders or arms. ? Lightheadedness or sudden weakness. ? A fast or irregular heartbeat. After you call 911, the  may tell you to chew 1 adult-strength or 2 to 4 low-dose aspirin. Wait for an ambulance.  Do not try to drive yourself.     · You passed out (lost consciousness).    Call your doctor now or seek immediate medical care if:    · You have new or increased shortness of breath.     · You are dizzy or lightheaded, or you feel like you may faint.     · Your fatigue and weakness continue or get worse.     · You have any abnormal bleeding, such as:  ? Nosebleeds. ? Vaginal bleeding that is different (heavier, more frequent, at a different time of the month) than what you are used to.  ? Bloody or black stools, or rectal bleeding. ? Bloody or pink urine.    Watch closely for changes in your health, and be sure to contact your doctor if:    · You do not get better as expected. Where can you learn more? Go to http://swapnil-speedy.info/. Enter R301 in the search box to learn more about \"Anemia: Care Instructions. \"  Current as of: March 28, 2019  Content Version: 12.1  © 7245-2748 [employer]. Care instructions adapted under license by [employer] (which disclaims liability or warranty for this information). If you have questions about a medical condition or this instruction, always ask your healthcare professional. [address] any warranty or liability for your use of this information.

## 2019-08-30 NOTE — PROGRESS NOTES
ROOM # [id number]     Jalyn Courser presents today for   Chief Complaint   Patient presents with    Anemia       Jalyn Courser preferred language for health care discussion is english/other. Is someone accompanying this pt? no    Is the patient using any DME equipment during OV? no    Depression Screening:  3 most recent [facility] 36 Screens 7/9/2019 5/28/2019 4/22/2019 4/16/2019 1/8/2019 11/27/2018 6/12/2018   PHQ Not Done - - Active Diagnosis of Depression or Bipolar Disorder - - - -   Little interest or pleasure in doing things Not at all Not at all Not at all Not at all Not at all Not at all Not at all   Feeling down, depressed, irritable, or hopeless Not at all Not at all Not at all Not at all Not at all Not at all Not at all   Total Score PHQ 2 0 0 0 0 0 0 0       Learning Assessment:  Learning Assessment 5/4/2018   PRIMARY LEARNER Patient   PRIMARY LANGUAGE ENGLISH   LEARNER PREFERENCE PRIMARY LISTENING   ANSWERED BY patient   RELATIONSHIP SELF       Abuse Screening:  Abuse Screening Questionnaire 4/16/2019 2/6/2018   Do you ever feel afraid of your partner? N N   Are you in a relationship with someone who physically or mentally threatens you? N N   Is it safe for you to go home? Y Y       Fall Risk  Fall Risk Assessment, last 12 mths 4/16/2019   Able to walk? Yes   Fall in past 12 months? No       Health Maintenance reviewed and discussed per provider. Yes    Jalyn Courser is due for   Health Maintenance Due   Topic Date Due    Pneumococcal 0-64 years (1 of 1 - PPSV23) 12/19/1969    FOOT EXAM Q1  12/19/1973    EYE EXAM RETINAL OR DILATED  12/19/1973    DTaP/Tdap/Td series (1 - Tdap) 12/19/1984    PAP AKA CERVICAL CYTOLOGY  12/19/1984    Shingrix Vaccine Age 50> (1 of 2) 12/19/2013    FOBT Q 1 YEAR AGE 50-75  12/19/2013    MEDICARE YEARLY EXAM  03/14/2018    Influenza Age 9 to Adult  08/01/2019         Please order/place referral if appropriate. Advance Directive:  1.  Do you have an advance directive in place? Patient Reply: no    2. If not, would you like material regarding how to put one in place? Patient Reply: no    Coordination of Care:  1. Have you been to the ER, urgent care clinic since your last visit? Hospitalized since your last visit? yes    2. Have you seen or consulted any other health care providers outside of the [address] [place] [address] since your last visit? Include any pap smears or colon screening.  no

## 2019-08-30 NOTE — PROGRESS NOTES
Hematology/Oncology  Progress Note    Name: Vaibhav Hicks  Date: 2018  : 1965    PCP: Magdi Garnica MD     Ms. Lorna Saleem is a 52year old female who was seen for management of her rheumatoid arthritis, leukocytosis, and thrombocytosis. Current therapy: Remicade 4 mg/kg bodyweight ever 6 weeks intravenously    Subjective:     Ms. Lorna Saleem is a 52year-old Replaced by [facility] [employer] woman who has severe rheumatoid arthritis. She receives Remicade every 6 weeks. Today the patient is complaining of her back pain and knee pain. Today she reports that her right shoulder is giving her some discomfort. She recently had a steroid injection at that site. Today she is requesting a new prescription today for her pain medication. Otherwise she is essentially unchanged from prior. Past Medical History:   Diagnosis Date    Abdominal pain, unspecified site     Acute otitis media     Acute pharyngitis     Anxiety     Arthritis     Autoimmune disease ([address]     Back injury     Backache     herniated disc in lower back    Blurred vision     Chest pain 2018    Chest pain, unspecified     abnormal EKG    Chronic airway obstruction, not elsewhere classified     Chronic back pain     Chronic pain     COPD     Depression     Diabetes (HCC)     Dizziness     Dizziness and giddiness     possible orthostatic changes, vasovagal, autonomic dysfunction from diabetic neuropathy    Encounters for unspecified administrative purpose     Essential thrombocytosis ([address] 2016    Feeling anxious     Fibromyalgia     Headache(784.0)     Hemorrhoid     Herniated disc     at L5    Hypercholesterolemia     Hyperglycemia     Hypertension     Joint pain     Leukocytosis, unspecified     Major depressive disorder, single episode, mild (HCC)     Mental disorder     depression, anxiety, bipolar and PTSD    Neuropathy     Obesity, unspecified     Other chest pain     Palpitation 2018    ? arrhythmia    Phobic disorders     Rheumatoid arthritis (HCC)     Rheumatoid arthritis of foot ([facility] 2018    on treatment    Seasonal allergies     Shortness of breath     normal EF    Smoking 2018    discussed cessation    Streptococcal sore throat     Type 2 diabetes mellitus without complication, with long-term current use of insulin ([facility] 2018    Type II or unspecified type diabetes mellitus with unspecified complication, uncontrolled     Unspecified hereditary and idiopathic peripheral neuropathy     Vitamin D deficiency      Past Surgical History:   Procedure Laterality Date    HX CHOLECYSTECTOMY      HX GYN      partial Hysterectomy    HX OTHER SURGICAL  12-1-15    had ingrown toenail removed from big toe, both feet    HX TUBAL LIGATION           Social History     Socioeconomic History    Marital status:      Spouse name: Not on file    Number of children: Not on file    Years of education: Not on file    Highest education level: Not on file   Occupational History    Not on file   Social Needs    Financial resource strain: Not on file    Food insecurity:     Worry: Not on file     Inability: Not on file    Transportation needs:     Medical: Not on file     Non-medical: Not on file   Tobacco Use    Smoking status: Current Every Day Smoker     Packs/day: 0.25     Last attempt to quit: 2012     Years since quittin.1    Smokeless tobacco: Never Used    Tobacco comment: 5 cigarettes day   Substance and Sexual Activity    Alcohol use: Yes     Frequency: Monthly or less     Drinks per session: 1 or 2     Binge frequency: Never     Comment: socially    Drug use: No    Sexual activity: Yes     Partners: Male     Birth control/protection: Condom   Lifestyle    Physical activity:     Days per week: Not on file     Minutes per session: Not on file    Stress: Not on file   Relationships    Social connections:     Talks on phone: Not on file     Gets together: Not on file     Attends Bahai service: Not on file     Active member of club or organization: Not on file     Attends meetings of clubs or organizations: Not on file     Relationship status: Not on file    Intimate partner violence:     Fear of current or ex partner: Not on file     Emotionally abused: Not on file     Physically abused: Not on file     Forced sexual activity: Not on file   Other Topics Concern    Not on file   Social History Narrative    Not on file     Family History   Problem Relation Age of Onset    Diabetes Other     Alcohol abuse Mother     Arthritis-osteo Mother     Diabetes Mother     Elevated Lipids Mother     Headache Mother     Heart Disease Mother     Migraines Mother     Psychiatric Disorder Mother     Alcohol abuse Father     Arthritis-osteo Father     Cancer Father     Headache Father     Heart Disease Father     Lung Disease Father     Migraines Father     Heart Surgery Father     Alcohol abuse Sister     Headache Sister     Diabetes Sister     Heart Disease Sister     Migraines Sister     Psychiatric Disorder Sister     Headache Brother     Diabetes Brother     Elevated Lipids Brother     Heart Disease Brother     Migraines Brother     Psychiatric Disorder Brother     Coronary Artery Disease Brother     Cancer Maternal Aunt     Alcohol abuse Maternal Aunt     Diabetes Maternal Aunt     Headache Maternal Aunt     Lung Disease Maternal Aunt     Migraines Maternal Aunt     Headache Maternal Uncle     Migraines Maternal Uncle     Stroke Maternal Uncle     Psychiatric Disorder Maternal Uncle     Headache Paternal Aunt     Migraines Paternal Aunt     Headache Paternal Uncle     Hypertension Paternal Uncle     Migraines Paternal Uncle     Stroke Paternal Uncle     Headache Maternal Grandmother     Migraines Maternal Grandmother     Stroke Maternal Grandmother     Headache Maternal Grandfather     Migraines Maternal Grandfather     Stroke Maternal Grandfather     Headache Paternal Grandmother     Hypertension Paternal Grandmother     Lung Disease Paternal Grandmother     Migraines Paternal Grandmother     Cancer Paternal Grandmother     Headache Paternal Grandfather     Cancer Paternal Grandfather     Migraines Paternal Grandfather      Current Outpatient Medications   Medication Sig Dispense Refill    oxyCODONE-acetaminophen (PERCOCET 10)  mg per tablet Take 1 Tab by mouth every six (6) hours as needed for Pain for up to 14 days. Max Daily Amount: 4 Tabs. 60 Tab 0    anagrelide (AGRYLIN) 0.5 mg capsule Take 1 Cap by mouth two (2) times a day. Indications: Spontaneous Hemorrhages and Increase in Blood Platelets 60 Cap 6    nicotine (NICODERM CQ) 14 mg/24 hr patch 1 Patch by TransDERmal route daily for 30 days. 30 Patch 3    atorvastatin (LIPITOR) 40 mg tablet Take 1 Tab by mouth nightly. 30 Tab 1    losartan (COZAAR) 50 mg tablet Take 1 Tab by mouth daily. 30 Tab 1    insulin lispro (HUMALOG) 100 unit/mL injection For Blood Sugar of less than 150 = 0 units/150 -199 = 3 units/200 -249 = 6 units/250 -299 = 9 units/300 - 349 = 12 units/350+ = 15 units. 1 Vial 1    aspirin delayed-release 81 mg tablet Take 1 Tab by mouth daily. 30 Tab 1    insulin glargine (LANTUS) 100 unit/mL injection 30 Units by SubCUTAneous route daily. 1 Vial 1    naloxone (NARCAN) 4 mg/actuation nasal spray Use 1 spray intranasally, then discard. Repeat with new spray every 2 min as needed for opioid overdose symptoms, alternating nostrils. Indications: Decrease in Rate & Depth of Breathing due to Opioid Drug, opioid overdose 1 Each 1    amitriptyline (ELAVIL) 25 mg tablet 25 mg nightly.  clotrimazole-betamethasone (LOTRISONE) topical cream two (2) times a day. Indications: right hand      DULoxetine (CYMBALTA) 30 mg capsule two (2) times a day.  QUEtiapine (SEROQUEL) 25 mg tablet 25 mg two (2) times a day.       BD INSULIN SYRINGE ULTRA-FINE 1 mL 31 gauge x 15/64\" syrg       clonazePAM (KLONOPIN) 2 mg tablet Take 2 mg by mouth two (2) times a day. Indications: panic disorder      folic acid 592 mcg tablet Take 400 mcg by mouth daily.  albuterol (PROAIR HFA) 90 mcg/actuation inhaler Take 1 Puff by inhalation every six (6) hours as needed for Wheezing. 1 Inhaler 0    ondansetron hcl (ZOFRAN, AS HYDROCHLORIDE,) 4 mg tablet Take 1 Tab by mouth every eight (8) hours as needed for Nausea. 12 Tab 0    methotrexate (RHEUMATREX) 2.5 mg tablet Take 2.5 mg by mouth every fourty-eight (48) hours.  metFORMIN (GLUCOPHAGE) 1,000 mg tablet Take 500 mg by mouth two (2) times a day.  Cholecalciferol, Vitamin D3, 5,000 unit Tab Take 5,000 Units by mouth every seven (7) days.  atenolol (TENORMIN) 25 mg tablet Take 25 mg by mouth daily. Indications: HYPERTENSION      hydroxychloroquine (PLAQUENIL) 200 mg tablet Take 200 mg by mouth two (2) times a day.  furosemide (LASIX) 40 mg tablet Take  by mouth daily.  INFLIXIMAB (REMICADE IV) 344 mg by IntraVENous route once as needed. remicade will be every 6 weeks       Facility-Administered Medications Ordered in Other Visits   Medication Dose Route Frequency Provider Last Rate Last Dose    sodium chloride (NS) flush 10-40 mL  10-40 mL IntraVENous PRN Kasey Gunter MD   10 mL at 08/27/19 1045       Review of Systems  Constitutional: The patient has no acute distress or discomfort. HEENT: The patient denies recent head trauma, eye pain, blurred vision,  hearing deficit, oropharyngeal mucosal pain or lesions, and the patient denies throat pain or discomfort. Lymphatics: The patient denies palpable peripheral lymphadenopathy. Hematologic: The patient denies having bruising, bleeding, or progressive fatigue. Respiratory: Patient denies having shortness of breath, cough, sputum production, fever, or dyspnea on exertion. Cardiovascular:  The patient denies having leg pain, leg swelling, heart palpitations, chest permit, chest pain, or lightheadedness. The patient denies having dyspnea on exertion. Breast: The patient has a sebaceous cyst involving the right breast.  Gastrointestinal: The patient denies having nausea, emesis, or diarrhea. The patient denies having any hematemesis or blood in the stool. Genitourinary: Patient denies having urinary urgency, frequency, or dysuria. The patient denies having blood in the urine. Psychological: The patient denies having symptoms of nervousness, anxiety, depression, or thoughts of harming himself some of this. Skin: Patient denies having skin rashes, skin, ulcerations, or unexplained itching or pruritus. Musculoskeletal: The patient complains of generalized back pain. Objective:     Visit Vitals  /79   Pulse 85   Temp 98.1 °F (36.7 °C) (Oral)   Resp 12   Ht 5' 11\" (1.803 m)   Wt 104.7 kg (230 lb 12.8 oz)   SpO2 98%   BMI 32.19 kg/m²     ECOG PS=0 Pain score 4/10     Physical Exam:   Gen. Appearance: The patient is in no acute distress. Skin: There is no bruise or rash. HEENT: The exam is unremarkable. Neck: Supple without lymphadenopathy or thyromegaly. Lungs: Clear to auscultation and percussion; there are no wheezes or rhonchi. Heart: Regular rate and rhythm; there are no murmurs, gallops, or rubs. Anterior chest wall and breast: Patient has a sebaceous cyst that has apparently drained from the underside of the right breast abutting the right anterior chest wall. Abdomen: Bowel sounds are present and normal.  There is no guarding, tenderness, or hepatosplenomegaly. Extremities: There is no clubbing, cyanosis, or edema. Neurologic: There are no focal neurologic deficits. Lymphatics: There is no palpable peripheral lymphadenopathy. Musculoskeletal: The patient has full range of motion at all joints. However, she complains of pain on ambulation due to the severe rheumatoid arthritis.  There is  evidence of joint deformity or effusions in the hands and knees. There is no focal joint tenderness. She is using a cane for support and mobility. Psychological/psychiatric: There is no clinical evidence of anxiety, depression, or melancholy. Lab data:      Results for orders placed or performed during the hospital encounter of 08/27/19   CBC WITH 3 PART DIFF     Status: Abnormal   Result Value Ref Range Status    WBC 10.8 4.5 - 13.0 K/uL Final    RBC 4.61 4.10 - 5.10 M/uL Final    HGB 12.9 12.0 - 16.0 g/dL Final    HCT 39.2 36 - 48 % Final    MCV 85.0 78 - 102 FL Final    MCH 28.0 25.0 - 35.0 PG Final    MCHC 32.9 31 - 37 g/dL Final    RDW 13.2 11.5 - 14.5 % Final    PLATELET 767 (H) 245 - 440 K/uL Final    NEUTROPHILS 70 40 - 70 % Final    MIXED CELLS 5 0.1 - 17 % Final    LYMPHOCYTES 26 14 - 44 % Final    ABS. NEUTROPHILS 7.5 1.8 - 9.5 K/UL Final    ABS. MIXED CELLS 0.5 0.0 - 2.3 K/uL Final    ABS. LYMPHOCYTES 2.8 1.1 - 5.9 K/UL Final     Comment: Test performed at [address] [place] [address] or Outpatient Infusion Center Location. Reviewed by Medical Director. DF AUTOMATED   Final           Assessment:     1. Essential thrombocytosis ([facility]    2. Rheumatoid arthritis of multiple sites without rheumatoid factor (HCC)    3. Chronic pain syndrome    4. Fibromyalgia    5. Chronic anemia      Plan:   Leukocytosis (recurrent and persisting): I have explained to the patient that the etiology of her leukocytosis remains undefined. A previous flow cytometry did not reveal any  evidence of an immunophenotypic abnormality such as an evolving chronic lymphoproliferative disorder or myeloproliferative disorder. The CBCs will continue to be monitored every 6 weeks. The CBC from 8/27/2019 showed that her WBC count was 10.8. The neutrophils represent a 70% of the total WBC count and the lymphocytes represented 26%.   The absolute lymphocyte count was 2.8 and the absolute neutrophil count was 7.5.    Essential thrombocytosis/thrombocytosis: The current CBC shows that the platelet count is now 458,000 the platelet count is being monitored every 6 weeks. The patient was previously started on anagrelide 0.5 mg one tablet twice daily. This medication will be continued, at the current dose. I have reenforced to the patient the importance of being complaint with the treatment plan. Rheumatoid arthritis: the patient will continue to receive Remicade as a primary treatment modality for her severe rheumatoid arthritis every 6 weeks. Remicade is scheduled to be given on 9/27/2019. .  The dose of Remicade will remain 344 mg given intravenously. The patient has continued to take  Methotrexate 5 mg p.o. daily and Plaquenil 200 mg twice daily. Fibromyalgia/chronic pain syndrome: The patient  continues to have generalized pain at times related to her underlying fibromyalgia. The patient receives her Percocet  mg medication on a monthly basis as needed. A new prescription for the Percocet was provided at this time. Chronic anemia: I have explained to the patient the CBC from today shows her hemoglobin has remained normal at 12. 9.9 g/dL with hematocrit of 39.2 %. I have recommended that she continue to take ferrous sulfate 325 mg by mouth once daily. We will see the patient back in 6 weeks for a complete reassessment. Orders Placed This Encounter    METABOLIC PANEL, COMPREHENSIVE     Standing Status:   Future     Standing Expiration Date:   8/30/2020    IRON PROFILE     Standing Status:   Future     Standing Expiration Date:   8/30/2020    FERRITIN     Standing Status:   Future     Standing Expiration Date:   8/30/2020    oxyCODONE-acetaminophen (PERCOCET 10)  mg per tablet     Sig: Take 1 Tab by mouth every six (6) hours as needed for Pain for up to 14 days. Max Daily Amount: 4 Tabs. Dispense:  60 Tab     Refill:  0     Rey Vargas MD, [address]  8/30/2019

## 2019-09-03 ENCOUNTER — PATIENT OUTREACH (OUTPATIENT)
Dept: CARDIOLOGY CLINIC | Age: 54
End: 2019-09-03

## 2019-09-03 NOTE — PROGRESS NOTES
Patient attended appointments with her oncologist, Dr Jeanine Caldwell. on 8/30/19, Nurse Navigator reviewed EMR and will follow up on next scheduled outreach.

## 2019-09-04 ENCOUNTER — PATIENT OUTREACH (OUTPATIENT)
Dept: CARDIOLOGY CLINIC | Age: 54
End: 2019-09-04

## 2019-09-04 ENCOUNTER — OFFICE VISIT (OUTPATIENT)
Dept: CARDIOLOGY CLINIC | Age: 54
End: 2019-09-04

## 2019-09-04 VITALS
BODY MASS INDEX: 31.22 KG/M2 | HEART RATE: 106 BPM | DIASTOLIC BLOOD PRESSURE: 62 MMHG | SYSTOLIC BLOOD PRESSURE: 117 MMHG | HEIGHT: 71 IN | WEIGHT: 223 LBS

## 2019-09-04 DIAGNOSIS — I25.10 CORONARY ARTERY DISEASE INVOLVING NATIVE CORONARY ARTERY OF NATIVE HEART WITHOUT ANGINA PECTORIS: Primary | ICD-10-CM

## 2019-09-04 DIAGNOSIS — R00.2 PALPITATION: ICD-10-CM

## 2019-09-04 DIAGNOSIS — I10 ESSENTIAL HYPERTENSION: ICD-10-CM

## 2019-09-04 DIAGNOSIS — I51.7 LVH (LEFT VENTRICULAR HYPERTROPHY): ICD-10-CM

## 2019-09-04 DIAGNOSIS — R07.9 CHEST PAIN, UNSPECIFIED TYPE: ICD-10-CM

## 2019-09-04 NOTE — PROGRESS NOTES
HISTORY OF PRESENT ILLNESS  Oralia Paz is a 52 y.o. female. Palpitations    The history is provided by the patient. This is a recurrent problem. The current episode started more than 1 week ago. The problem has been rapidly improving. The problem occurs rarely. The problem is associated with nothing. Pertinent negatives include no fever, no chest pain, no claudication, no orthopnea, no PND, no abdominal pain, no nausea, no vomiting, no headaches, no dizziness, no weakness, no cough, no hemoptysis, no shortness of breath and no sputum production. Chest Pain (Angina)    The history is provided by the patient. This is a new problem. The problem has been resolved. The problem occurs rarely. Pertinent negatives include no abdominal pain, no claudication, no cough, no dizziness, no fever, no headaches, no hemoptysis, no nausea, no orthopnea, no palpitations, no PND, no shortness of breath, no sputum production, no vomiting and no weakness. Review of Systems   Constitutional: Negative for chills and fever. HENT: Negative for nosebleeds. Eyes: Negative for blurred vision and double vision. Respiratory: Negative for cough, hemoptysis, sputum production, shortness of breath and wheezing. Cardiovascular: Negative for chest pain, palpitations, orthopnea, claudication, leg swelling and PND. Gastrointestinal: Negative for abdominal pain, heartburn, nausea and vomiting. Musculoskeletal: Negative for myalgias. Skin: Negative for rash. Neurological: Negative for dizziness, weakness and headaches. Endo/Heme/Allergies: Does not bruise/bleed easily.      Family History   Problem Relation Age of Onset    Diabetes Other     Alcohol abuse Mother    Erle Jose Arthritis-osteo Mother     Diabetes Mother     Elevated Lipids Mother     Headache Mother     Heart Disease Mother    Erle Jose Migraines Mother     Psychiatric Disorder Mother     Alcohol abuse Father    Lexus Coleman Father     Cancer Father     Headache Father     Heart Disease Father     Lung Disease Father     Migraines Father     Heart Surgery Father     Alcohol abuse Sister     Headache Sister     Diabetes Sister     Heart Disease Sister     Migraines Sister     Psychiatric Disorder Sister     Headache Brother     Diabetes Brother     Elevated Lipids Brother     Heart Disease Brother     Migraines Brother     Psychiatric Disorder Brother     Coronary Artery Disease Brother     Cancer Maternal Aunt     Alcohol abuse Maternal Aunt     Diabetes Maternal Aunt     Headache Maternal Aunt     Lung Disease Maternal Aunt     Migraines Maternal Aunt     Headache Maternal Uncle     Migraines Maternal Uncle     Stroke Maternal Uncle     Psychiatric Disorder Maternal Uncle     Headache Paternal Aunt     Migraines Paternal Aunt     Headache Paternal Uncle     Hypertension Paternal Uncle     Migraines Paternal Uncle     Stroke Paternal Uncle     Headache Maternal Grandmother     Migraines Maternal Grandmother     Stroke Maternal Grandmother     Headache Maternal Grandfather     Migraines Maternal Grandfather     Stroke Maternal Grandfather     Headache Paternal Grandmother     Hypertension Paternal Grandmother     Lung Disease Paternal Grandmother     Migraines Paternal Grandmother     Cancer Paternal Grandmother     Headache Paternal Grandfather     Cancer Paternal Grandfather     Migraines Paternal Grandfather        Past Medical History:   Diagnosis Date    Abdominal pain, unspecified site     Acute otitis media     Acute pharyngitis     Anxiety     Arthritis     Autoimmune disease ([address]     Back injury     Backache     herniated disc in lower back    Blurred vision     Chest pain 5/4/2018    Chest pain, unspecified     abnormal EKG    Chronic airway obstruction, not elsewhere classified     Chronic back pain     Chronic pain     COPD     Depression     Diabetes ([address]     Dizziness     Dizziness and giddiness     possible orthostatic changes, vasovagal, autonomic dysfunction from diabetic neuropathy    Encounters for unspecified administrative purpose     Essential thrombocytosis ([facility] 2016    Feeling anxious     Fibromyalgia     Headache(784.0)     Hemorrhoid     Herniated disc     at L5    Hypercholesterolemia     Hyperglycemia     Hypertension     Joint pain     Leukocytosis, unspecified     Major depressive disorder, single episode, mild (HCC)     Mental disorder     depression, anxiety, bipolar and PTSD    Neuropathy     Obesity, unspecified     Other chest pain     Palpitation 2018    ?  arrhythmia    Phobic disorders     Rheumatoid arthritis (HCC)     Rheumatoid arthritis of foot ([facility] 2018    on treatment    Seasonal allergies     Shortness of breath     normal EF    Smoking 2018    discussed cessation    Streptococcal sore throat     Type 2 diabetes mellitus without complication, with long-term current use of insulin ([facility] 2018    Type II or unspecified type diabetes mellitus with unspecified complication, uncontrolled     Unspecified hereditary and idiopathic peripheral neuropathy     Vitamin D deficiency        Past Surgical History:   Procedure Laterality Date    HX CHOLECYSTECTOMY      HX GYN      partial Hysterectomy    HX OTHER SURGICAL  12-1-15    had ingrown toenail removed from big toe, both feet    HX TUBAL LIGATION             Social History     Tobacco Use    Smoking status: Current Every Day Smoker     Packs/day: 0.25     Last attempt to quit: 2012     Years since quittin.1    Smokeless tobacco: Never Used    Tobacco comment: 5 cigarettes day   Substance Use Topics    Alcohol use: Yes     Frequency: Monthly or less     Drinks per session: 1 or 2     Binge frequency: Never     Comment: socially       Allergies   Allergen Reactions    Levaquin [Levofloxacin] Anaphylaxis    Lipitor [Atorvastatin] Other (comments)     Patient denies    Pollen Extracts Unable to Obtain       Prior to Admission medications    Medication Sig Start Date End Date Taking? Authorizing Provider   oxyCODONE-acetaminophen (PERCOCET 10)  mg per tablet Take 1 Tab by mouth every six (6) hours as needed for Pain for up to 14 days. Max Daily Amount: 4 Tabs. 8/30/19 9/13/19 Yes Maribeth Richards MD   anagrelide (AGRYLIN) 0.5 mg capsule Take 1 Cap by mouth two (2) times a day. Indications: Spontaneous Hemorrhages and Increase in Blood Platelets 7/58/56  Yes Maribeth Richards MD   nicotine (NICODERM CQ) 14 mg/24 hr patch 1 Patch by TransDERmal route daily for 30 days. 8/10/19 9/9/19 Yes Familia De Jesus MD   atorvastatin (LIPITOR) 40 mg tablet Take 1 Tab by mouth nightly. 8/9/19  Yes Familia De Jesus MD   losartan (COZAAR) 50 mg tablet Take 1 Tab by mouth daily. 8/10/19  Yes Familia De Jesus MD   insulin lispro (HUMALOG) 100 unit/mL injection For Blood Sugar of less than 150 = 0 units/150 -199 = 3 units/200 -249 = 6 units/250 -299 = 9 units/300 - 349 = 12 units/350+ = 15 units. 8/9/19  Yes Familia De Jesus MD   aspirin delayed-release 81 mg tablet Take 1 Tab by mouth daily. 8/9/19  Yes Familia De Jesus MD   insulin glargine (LANTUS) 100 unit/mL injection 30 Units by SubCUTAneous route daily. 8/9/19  Yes Familia De Jesus MD   naloxone [place]) 4 mg/actuation nasal spray Use 1 spray intranasally, then discard. Repeat with new spray every 2 min as needed for opioid overdose symptoms, alternating nostrils. Indications: Decrease in Rate & Depth of Breathing due to Opioid Drug, opioid overdose 4/30/19  Yes Timbo CHASE NP   amitriptyline (ELAVIL) 25 mg tablet 25 mg nightly. 8/22/17  Yes Provider, Historical   clotrimazole-betamethasone (LOTRISONE) topical cream two (2) times a day. Indications: right hand 9/21/17  Yes Provider, Historical   DULoxetine (CYMBALTA) 30 mg capsule two (2) times a day.  9/27/17  Yes Provider, Historical   QUEtiapine (SEROQUEL) 25 mg tablet 25 mg two (2) times a day. 9/22/17  Yes Provider, Historical   BD INSULIN SYRINGE ULTRA-FINE 1 mL 31 gauge x 15/64\" syrg  9/22/17  Yes Provider, Historical   clonazePAM (KLONOPIN) 2 mg tablet Take 2 mg by mouth two (2) times a day. Indications: panic disorder   Yes Provider, Historical   folic acid 183 mcg tablet Take 400 mcg by mouth daily. Yes Provider, Historical   albuterol (PROAIR HFA) 90 mcg/actuation inhaler Take 1 Puff by inhalation every six (6) hours as needed for Wheezing. 2/6/16  Yes Tina, Marda Class, DO   ondansetron hcl (ZOFRAN, AS HYDROCHLORIDE,) 4 mg tablet Take 1 Tab by mouth every eight (8) hours as needed for Nausea. 12/9/15  Yes Tina, Marda Class, DO   methotrexate (RHEUMATREX) 2.5 mg tablet Take 2.5 mg by mouth every fourty-eight (48) hours. Yes Provider, Historical   metFORMIN (GLUCOPHAGE) 1,000 mg tablet Take 500 mg by mouth two (2) times a day. Yes Provider, Historical   Cholecalciferol, Vitamin D3, 5,000 unit Tab Take 5,000 Units by mouth every seven (7) days. Yes Other, MD Lelia   atenolol (TENORMIN) 25 mg tablet Take 25 mg by mouth daily. Indications: HYPERTENSION   Yes Provider, Historical   furosemide (LASIX) 40 mg tablet Take  by mouth daily. Yes Provider, Historical   INFLIXIMAB (REMICADE IV) 344 mg by IntraVENous route once as needed. remicade will be every 6 weeks   Yes Provider, Historical         Visit Vitals  /62   Pulse (!) 106   Ht 5' 11\" (1.803 m)   Wt 101.2 kg (223 lb)   BMI 31.10 kg/m²         Physical Exam   Constitutional: She is oriented to person, place, and time. She appears well-developed and well-nourished. HENT:   Head: Normocephalic and atraumatic. Eyes: Conjunctivae are normal.   Neck: Neck supple. No JVD present. No tracheal deviation present. No thyromegaly present. Cardiovascular: Normal rate and regular rhythm. PMI is not displaced. Exam reveals no gallop, no S3 and no decreased pulses.    No murmur heard.  Pulmonary/Chest: No respiratory distress. She has no wheezes. She has no rales. She exhibits no tenderness. Abdominal: Soft. There is no tenderness. Musculoskeletal: She exhibits no edema. Neurological: She is alert and oriented to person, place, and time. Skin: Skin is warm. Psychiatric: She has a normal mood and affect. Ms. Alysa Campbell has a reminder for a \"due or due soon\" health maintenance. I have asked that she contact her primary care provider for follow-up on this health maintenance. No flowsheet data found. 5/2018  sr,iwmi  SUMMARY:echo-5/2018  Left ventricle: Systolic function was normal. Ejection fraction was  estimated in the range of 55 % to 60 %. There were no regional wall motion  abnormalities. There was mild concentric hypertrophy. Doppler parameters  were consistent with abnormal left ventricular relaxation (grade 1  diastolic dysfunction). Impression 5/2018  NUCLEAR IMAGING:   Findings:   1. Stress images reveal normal Myoview distrubution in all the LV segments in short axis, vertical and horizontal long axis views. 2. Resting images have a normal uptake. 3. Gated images reveal normal wall motion and the ejection fraction is calculated to be 57%. Conclusion:   1. Normal perfusion scan. 2. Normal wall motion and ejection fraction is calculated at 57%. 3. No evidence of significant fixed or reversible defect suggesting ischemia or myocardial infarction noted from this nuclear study. 4. Low risk scan.   5/2018  Cardiac telemetry-sinus rhythm sinus tachycardia, no significant arrhythmia . Interpretation Summary 8/2019    · Left Ventricle: Normal cavity size and wall thickness. Low normal systolic dysfunction. Estimated left ventricular ejection fraction is 51 - 55%. Visually measured ejection fraction. No regional wall motion abnormality noted. Mild (grade 1) left ventricular diastolic dysfunction.   · Pulmonary Artery: Pulmonary arterial systolic pressure is 26 mmHg.        Coronary Findings 8/2019    Diagnostic   Dominance: Right   Left Anterior Descending   Prox LAD lesion 45% stenosed. .   Right Coronary Artery   Mid RCA lesion 30% stenosed. .           Assessment         ICD-10-CM ICD-9-CM    1. Coronary artery disease involving native coronary artery of native heart without angina pectoris I25.10 414.01 LIPID PANEL      HEPATIC FUNCTION PANEL    CAD on recent cath 40%. Continue to monitor. Recent non-STEMI asymptomatic after discharge   2. LVH (left ventricular hypertrophy) I51.7 429.3     Mild LVH. Continue treatment for hypertension monitor   3. Chest pain, unspecified type R07.9 786.50     Stable negative stress test in past.  Monitor   4. Palpitation R00.2 785.1     Symptoms are controlled. Continue to monitor clinically   5. Essential hypertension I10 401.9     Stable continue treatment   9/2019  Recent non-STEMI 8/2019. Cardiac cath with noncritical 40% LAD and 30% RCA disease. Medical management asymptomatic since discharge. Continue statin aspirin and other medication. Check labs      Orders Placed This Encounter    LIPID PANEL     Standing Status:   Future     Standing Expiration Date:   3/4/2020    HEPATIC FUNCTION PANEL     Standing Status:   Future     Standing Expiration Date:   3/4/2020       Follow-up and Dispositions    · Return in about 6 months (around 3/4/2020).

## 2019-09-10 ENCOUNTER — PATIENT OUTREACH (OUTPATIENT)
Dept: CARDIOLOGY CLINIC | Age: 54
End: 2019-09-10

## 2019-09-12 ENCOUNTER — HOSPITAL ENCOUNTER (OUTPATIENT)
Dept: LAB | Age: 54
Discharge: HOME | End: 2019-09-12
Payer: MEDICARE

## 2019-09-12 ENCOUNTER — TELEPHONE (OUTPATIENT)
Dept: CARDIOLOGY CLINIC | Age: 54
End: 2019-09-12

## 2019-09-12 DIAGNOSIS — M13.0 POLYARTHROPATHY: ICD-10-CM

## 2019-09-12 DIAGNOSIS — M06.9 ATROPHIC ARTHRITIS (HCC): ICD-10-CM

## 2019-09-12 DIAGNOSIS — D64.9 CHRONIC ANEMIA: ICD-10-CM

## 2019-09-12 DIAGNOSIS — G89.4 CHRONIC PAIN SYNDROME: ICD-10-CM

## 2019-09-12 DIAGNOSIS — I25.10 CORONARY ARTERY DISEASE INVOLVING NATIVE CORONARY ARTERY OF NATIVE HEART WITHOUT ANGINA PECTORIS: ICD-10-CM

## 2019-09-12 LAB
ALBUMIN SERPL-MCNC: 3.3 G/DL (ref 3.4–5)
ALBUMIN SERPL-MCNC: 3.5 G/DL (ref 3.4–5)
ALBUMIN SERPL-MCNC: 3.5 G/DL (ref 3.4–5)
ALBUMIN/GLOB SERPL: 0.8 {RATIO} (ref 0.8–1.7)
ALBUMIN/GLOB SERPL: 0.9 {RATIO} (ref 0.8–1.7)
ALBUMIN/GLOB SERPL: 0.9 {RATIO} (ref 0.8–1.7)
ALP SERPL-CCNC: 101 U/L (ref 45–117)
ALP SERPL-CCNC: 101 U/L (ref 45–117)
ALP SERPL-CCNC: 97 U/L (ref 45–117)
ALT SERPL-CCNC: 19 U/L (ref 13–56)
ALT SERPL-CCNC: 20 U/L (ref 13–56)
ALT SERPL-CCNC: 21 U/L (ref 13–56)
ANION GAP SERPL CALC-SCNC: 6 MMOL/L (ref 3–18)
ANION GAP SERPL CALC-SCNC: 6 MMOL/L (ref 3–18)
APPEARANCE UR: CLEAR
AST SERPL-CCNC: 10 U/L (ref 10–38)
AST SERPL-CCNC: 8 U/L (ref 10–38)
AST SERPL-CCNC: 9 U/L (ref 10–38)
BACTERIA URNS QL MICRO: NEGATIVE /HPF
BASOPHILS # BLD: 0 K/UL (ref 0–0.1)
BASOPHILS NFR BLD: 0 % (ref 0–2)
BILIRUB DIRECT SERPL-MCNC: 0.1 MG/DL (ref 0–0.2)
BILIRUB SERPL-MCNC: 0.3 MG/DL (ref 0.2–1)
BILIRUB SERPL-MCNC: 0.4 MG/DL (ref 0.2–1)
BILIRUB SERPL-MCNC: 0.9 MG/DL (ref 0.2–1)
BILIRUB UR QL: NEGATIVE
BUN SERPL-MCNC: 19 MG/DL (ref 7–18)
BUN SERPL-MCNC: 19 MG/DL (ref 7–18)
BUN/CREAT SERPL: 24 (ref 12–20)
BUN/CREAT SERPL: 26 (ref 12–20)
CALCIUM SERPL-MCNC: 9.3 MG/DL (ref 8.5–10.1)
CALCIUM SERPL-MCNC: 9.6 MG/DL (ref 8.5–10.1)
CHLORIDE SERPL-SCNC: 106 MMOL/L (ref 100–111)
CHLORIDE SERPL-SCNC: 107 MMOL/L (ref 100–111)
CHOLEST SERPL-MCNC: 158 MG/DL
CO2 SERPL-SCNC: 28 MMOL/L (ref 21–32)
CO2 SERPL-SCNC: 29 MMOL/L (ref 21–32)
COLOR UR: YELLOW
CREAT SERPL-MCNC: 0.74 MG/DL (ref 0.6–1.3)
CREAT SERPL-MCNC: 0.78 MG/DL (ref 0.6–1.3)
CRP SERPL-MCNC: 0.8 MG/DL (ref 0–0.3)
DIFFERENTIAL METHOD BLD: NORMAL
EOSINOPHIL # BLD: 0.4 K/UL (ref 0–0.4)
EOSINOPHIL NFR BLD: 4 % (ref 0–5)
EPITH CASTS URNS QL MICRO: NEGATIVE /LPF (ref 0–5)
ERYTHROCYTE [DISTWIDTH] IN BLOOD BY AUTOMATED COUNT: 14.2 % (ref 11.6–14.5)
ERYTHROCYTE [SEDIMENTATION RATE] IN BLOOD: 44 MM/HR (ref 0–30)
FERRITIN SERPL-MCNC: 79 NG/ML (ref 8–388)
GLOBULIN SER CALC-MCNC: 3.9 G/DL (ref 2–4)
GLOBULIN SER CALC-MCNC: 4 G/DL (ref 2–4)
GLOBULIN SER CALC-MCNC: 4.1 G/DL (ref 2–4)
GLUCOSE SERPL-MCNC: 173 MG/DL (ref 74–99)
GLUCOSE SERPL-MCNC: 174 MG/DL (ref 74–99)
GLUCOSE UR STRIP.AUTO-MCNC: NEGATIVE MG/DL
HCT VFR BLD AUTO: 41.7 % (ref 35–45)
HDLC SERPL-MCNC: 46 MG/DL (ref 40–60)
HDLC SERPL: 3.4 {RATIO} (ref 0–5)
HGB BLD-MCNC: 13.9 G/DL (ref 12–16)
HGB UR QL STRIP: NEGATIVE
IRON SATN MFR SERPL: 11 %
IRON SERPL-MCNC: 35 UG/DL (ref 50–175)
KETONES UR QL STRIP.AUTO: NEGATIVE MG/DL
LDLC SERPL CALC-MCNC: 89.8 MG/DL (ref 0–100)
LEUKOCYTE ESTERASE UR QL STRIP.AUTO: NEGATIVE
LIPID PROFILE,FLP: NORMAL
LYMPHOCYTES # BLD: 2.8 K/UL (ref 0.9–3.6)
LYMPHOCYTES NFR BLD: 28 % (ref 21–52)
MCH RBC QN AUTO: 27.6 PG (ref 24–34)
MCHC RBC AUTO-ENTMCNC: 33.3 G/DL (ref 31–37)
MCV RBC AUTO: 82.7 FL (ref 74–97)
MONOCYTES # BLD: 0.8 K/UL (ref 0.05–1.2)
MONOCYTES NFR BLD: 8 % (ref 3–10)
NEUTS SEG # BLD: 6 K/UL (ref 1.8–8)
NEUTS SEG NFR BLD: 60 % (ref 40–73)
NITRITE UR QL STRIP.AUTO: NEGATIVE
PH UR STRIP: 5 [PH] (ref 5–8)
PLATELET # BLD AUTO: 370 K/UL (ref 135–420)
PMV BLD AUTO: 9.6 FL (ref 9.2–11.8)
POTASSIUM SERPL-SCNC: 3.7 MMOL/L (ref 3.5–5.5)
POTASSIUM SERPL-SCNC: 3.9 MMOL/L (ref 3.5–5.5)
PROT SERPL-MCNC: 7.3 G/DL (ref 6.4–8.2)
PROT SERPL-MCNC: 7.4 G/DL (ref 6.4–8.2)
PROT SERPL-MCNC: 7.6 G/DL (ref 6.4–8.2)
PROT UR STRIP-MCNC: 100 MG/DL
RBC # BLD AUTO: 5.04 M/UL (ref 4.2–5.3)
RBC #/AREA URNS HPF: NEGATIVE /HPF (ref 0–5)
SODIUM SERPL-SCNC: 140 MMOL/L (ref 136–145)
SODIUM SERPL-SCNC: 142 MMOL/L (ref 136–145)
SP GR UR REFRACTOMETRY: 1.03 (ref 1–1.03)
TIBC SERPL-MCNC: 312 UG/DL (ref 250–450)
TRIGL SERPL-MCNC: 111 MG/DL (ref ?–150)
UROBILINOGEN UR QL STRIP.AUTO: 1 EU/DL (ref 0.2–1)
VLDLC SERPL CALC-MCNC: 22.2 MG/DL
WBC # BLD AUTO: 10 K/UL (ref 4.6–13.2)
WBC URNS QL MICRO: NORMAL /HPF (ref 0–4)

## 2019-09-12 PROCEDURE — 86140 C-REACTIVE PROTEIN: CPT

## 2019-09-12 PROCEDURE — 83540 ASSAY OF IRON: CPT

## 2019-09-12 PROCEDURE — 85025 COMPLETE CBC W/AUTO DIFF WBC: CPT

## 2019-09-12 PROCEDURE — 36415 COLL VENOUS BLD VENIPUNCTURE: CPT

## 2019-09-12 PROCEDURE — 80061 LIPID PANEL: CPT

## 2019-09-12 PROCEDURE — 80053 COMPREHEN METABOLIC PANEL: CPT

## 2019-09-12 PROCEDURE — 80076 HEPATIC FUNCTION PANEL: CPT

## 2019-09-12 PROCEDURE — 85652 RBC SED RATE AUTOMATED: CPT

## 2019-09-12 PROCEDURE — 81001 URINALYSIS AUTO W/SCOPE: CPT

## 2019-09-12 PROCEDURE — 82728 ASSAY OF FERRITIN: CPT

## 2019-09-12 RX ORDER — ATORVASTATIN CALCIUM 40 MG/1
40 TABLET, FILM COATED ORAL
Qty: 30 TAB | Refills: 6 | Status: SHIPPED | OUTPATIENT
Start: 2019-09-12 | End: 2020-03-30 | Stop reason: SDUPTHER

## 2019-09-12 RX ORDER — OXYCODONE AND ACETAMINOPHEN 10; 325 MG/1; MG/1
1 TABLET ORAL
Qty: 60 TAB | Refills: 0 | Status: SHIPPED | OUTPATIENT
Start: 2019-09-12 | End: 2019-09-26 | Stop reason: SDUPTHER

## 2019-09-12 NOTE — TELEPHONE ENCOUNTER
Patient called and stated Dr. Kieran Morocho decrease lipitor to 10 mg a day, but hasn't started taking them yet and wants to know if you want her to just take 4 of the 10 mg or take the 10 mg Dr. Kieran Morocho order.  Please advise

## 2019-09-12 NOTE — TELEPHONE ENCOUNTER
----- Message from Shavon Amin MD sent at 9/12/2019  9:58 AM EDT -----  LDL stable. High triglyceride are significantly better.   Continue treatment

## 2019-09-12 NOTE — TELEPHONE ENCOUNTER
Called and left detail message on patient voicemail regarding lab/test per Dr. Juliet Aguilar, lab was reviewed for Lipid and LDL stable. High triglyceride are significantly better. Continue treatment. If any questions to call office.

## 2019-09-26 DIAGNOSIS — G89.4 CHRONIC PAIN SYNDROME: ICD-10-CM

## 2019-09-26 RX ORDER — OXYCODONE AND ACETAMINOPHEN 10; 325 MG/1; MG/1
1 TABLET ORAL
Qty: 60 TAB | Refills: 0 | Status: SHIPPED | OUTPATIENT
Start: 2019-09-26 | End: 2019-10-08 | Stop reason: SDUPTHER

## 2019-10-01 RX ORDER — DIPHENHYDRAMINE HYDROCHLORIDE 50 MG/ML
50 INJECTION, SOLUTION INTRAMUSCULAR; INTRAVENOUS
Status: CANCELLED | OUTPATIENT
Start: 2019-10-08 | End: 2019-10-08

## 2019-10-08 ENCOUNTER — HOSPITAL ENCOUNTER (OUTPATIENT)
Dept: ONCOLOGY | Age: 54
Discharge: HOME | End: 2019-10-08

## 2019-10-08 ENCOUNTER — CLINICAL SUPPORT (OUTPATIENT)
Dept: ONCOLOGY | Age: 54
End: 2019-10-08

## 2019-10-08 ENCOUNTER — HOSPITAL ENCOUNTER (OUTPATIENT)
Dept: INFUSION THERAPY | Age: 54
Discharge: HOME | End: 2019-10-08
Payer: MEDICARE

## 2019-10-08 ENCOUNTER — OFFICE VISIT (OUTPATIENT)
Dept: ONCOLOGY | Age: 54
End: 2019-10-08

## 2019-10-08 VITALS
BODY MASS INDEX: 31.5 KG/M2 | WEIGHT: 225 LBS | OXYGEN SATURATION: 99 % | HEART RATE: 101 BPM | DIASTOLIC BLOOD PRESSURE: 81 MMHG | SYSTOLIC BLOOD PRESSURE: 133 MMHG | RESPIRATION RATE: 18 BRPM | HEIGHT: 71 IN | TEMPERATURE: 99 F

## 2019-10-08 VITALS
DIASTOLIC BLOOD PRESSURE: 81 MMHG | BODY MASS INDEX: 31.58 KG/M2 | HEART RATE: 101 BPM | HEIGHT: 71 IN | RESPIRATION RATE: 16 BRPM | WEIGHT: 225.6 LBS | SYSTOLIC BLOOD PRESSURE: 133 MMHG | TEMPERATURE: 99 F

## 2019-10-08 DIAGNOSIS — M06.09 RHEUMATOID ARTHRITIS OF MULTIPLE SITES WITHOUT RHEUMATOID FACTOR (HCC): ICD-10-CM

## 2019-10-08 DIAGNOSIS — D72.829 LEUKOCYTOSIS, UNSPECIFIED TYPE: ICD-10-CM

## 2019-10-08 DIAGNOSIS — D47.3 ESSENTIAL THROMBOCYTOSIS (HCC): Primary | ICD-10-CM

## 2019-10-08 DIAGNOSIS — G89.4 CHRONIC PAIN SYNDROME: ICD-10-CM

## 2019-10-08 DIAGNOSIS — M05.80 OTHER RHEUMATOID ARTHRITIS WITH RHEUMATOID FACTOR OF UNSPECIFIED SITE (HCC): ICD-10-CM

## 2019-10-08 DIAGNOSIS — D64.9 CHRONIC ANEMIA: ICD-10-CM

## 2019-10-08 DIAGNOSIS — M79.7 FIBROMYALGIA: ICD-10-CM

## 2019-10-08 DIAGNOSIS — M05.80 OTHER RHEUMATOID ARTHRITIS WITH RHEUMATOID FACTOR OF UNSPECIFIED SITE (HCC): Primary | ICD-10-CM

## 2019-10-08 LAB
BASO+EOS+MONOS # BLD AUTO: 0.8 K/UL (ref 0–2.3)
BASO+EOS+MONOS NFR BLD AUTO: 8 % (ref 0.1–17)
DIFFERENTIAL METHOD BLD: NORMAL
ERYTHROCYTE [DISTWIDTH] IN BLOOD BY AUTOMATED COUNT: 13.3 % (ref 11.5–14.5)
ERYTHROCYTE [SEDIMENTATION RATE] IN BLOOD: 12 MM/HR (ref 0–30)
HCT VFR BLD AUTO: 40.2 % (ref 36–48)
HGB BLD-MCNC: 13.1 G/DL (ref 12–16)
LYMPHOCYTES # BLD: 2.4 K/UL (ref 1.1–5.9)
LYMPHOCYTES NFR BLD: 23 % (ref 14–44)
MCH RBC QN AUTO: 27.4 PG (ref 25–35)
MCHC RBC AUTO-ENTMCNC: 32.6 G/DL (ref 31–37)
MCV RBC AUTO: 84.1 FL (ref 78–102)
NEUTS SEG # BLD: 7.6 K/UL (ref 1.8–9.5)
NEUTS SEG NFR BLD: 70 % (ref 40–70)
PLATELET # BLD AUTO: 421 K/UL (ref 140–440)
RBC # BLD AUTO: 4.78 M/UL (ref 4.1–5.1)
WBC # BLD AUTO: 10.8 K/UL (ref 4.5–13)

## 2019-10-08 PROCEDURE — 96375 TX/PRO/DX INJ NEW DRUG ADDON: CPT

## 2019-10-08 PROCEDURE — 96365 THER/PROPH/DIAG IV INF INIT: CPT

## 2019-10-08 PROCEDURE — 74011250636 HC RX REV CODE- 250/636: Performed by: NURSE PRACTITIONER

## 2019-10-08 PROCEDURE — 96415 CHEMO IV INFUSION ADDL HR: CPT

## 2019-10-08 PROCEDURE — 80053 COMPREHEN METABOLIC PANEL: CPT

## 2019-10-08 PROCEDURE — 85652 RBC SED RATE AUTOMATED: CPT

## 2019-10-08 PROCEDURE — 96366 THER/PROPH/DIAG IV INF ADDON: CPT

## 2019-10-08 PROCEDURE — 86140 C-REACTIVE PROTEIN: CPT

## 2019-10-08 PROCEDURE — 74011250636 HC RX REV CODE- 250/636: Performed by: INTERNAL MEDICINE

## 2019-10-08 PROCEDURE — 82728 ASSAY OF FERRITIN: CPT

## 2019-10-08 PROCEDURE — 96413 CHEMO IV INFUSION 1 HR: CPT

## 2019-10-08 PROCEDURE — 83540 ASSAY OF IRON: CPT

## 2019-10-08 RX ORDER — SODIUM CHLORIDE 0.9 % (FLUSH) 0.9 %
10-40 SYRINGE (ML) INJECTION AS NEEDED
Status: DISCONTINUED | OUTPATIENT
Start: 2019-10-08 | End: 2019-10-12 | Stop reason: HOSPADM

## 2019-10-08 RX ORDER — ACETAMINOPHEN 325 MG/1
650 TABLET ORAL
Status: ACTIVE | OUTPATIENT
Start: 2019-10-08 | End: 2019-10-08

## 2019-10-08 RX ORDER — OXYCODONE AND ACETAMINOPHEN 10; 325 MG/1; MG/1
1 TABLET ORAL
Qty: 60 TAB | Refills: 0 | Status: SHIPPED | OUTPATIENT
Start: 2019-10-08 | End: 2019-10-20 | Stop reason: SDUPTHER

## 2019-10-08 RX ORDER — SODIUM CHLORIDE 9 MG/ML
250 INJECTION, SOLUTION INTRAVENOUS CONTINUOUS
Status: DISCONTINUED | OUTPATIENT
Start: 2019-10-08 | End: 2019-10-09 | Stop reason: HOSPADM

## 2019-10-08 RX ORDER — DIPHENHYDRAMINE HYDROCHLORIDE 50 MG/ML
25 INJECTION, SOLUTION INTRAMUSCULAR; INTRAVENOUS ONCE
Status: COMPLETED | OUTPATIENT
Start: 2019-10-08 | End: 2019-10-08

## 2019-10-08 RX ADMIN — SODIUM CHLORIDE 250 ML: 9 INJECTION, SOLUTION INTRAVENOUS at 11:20

## 2019-10-08 RX ADMIN — Medication 10 ML: at 10:45

## 2019-10-08 RX ADMIN — SODIUM CHLORIDE 400 MG: 0.9 INJECTION, SOLUTION INTRAVENOUS at 13:00

## 2019-10-08 RX ADMIN — DIPHENHYDRAMINE HYDROCHLORIDE 25 MG: 50 INJECTION INTRAMUSCULAR; INTRAVENOUS at 11:25

## 2019-10-08 NOTE — PROGRESS NOTES
[facility] Progress Note    Date: 2019    Name: Chris Temple    MRN: [id number]         : 1965      Ms. Curry Anand arrived in the [facility] today at 1030, in stable condition, here for Q 6 Week, IV Remicade Infusion. She was assessed and education was provided. Ms. Trenton Smith vitals were reviewed. Visit Vitals  /76 (BP 1 Location: Left arm, BP Patient Position: Sitting)   Pulse 92   Temp 98.2 °F (36.8 °C)   Resp 16   Ht 5' 11\" (1.803 m)   Wt 102.3 kg (225 lb 9.6 oz)   Breastfeeding? No   BMI 31.46 kg/m²         Switched to Renflexis (infliximab-abda) (Remicade Biosimilar), per pharmacy. PIV (#22G) was established in her posterior right forearm at 1045, without incident, and blood was drawn for a CBC, CMP, ESR, & CRP (2 lavender top tubes & 2 SST tubes), per order. (The CBC was processed in house, and the CMP, ESR, & CRP, were sent out to be processed. )        Lab results were obtained and reviewed, and the CBC results from today listed below, as well as the most recent CMP results from 19, were all noted to be satisfactory for treatment today. Recent Results (from the past 12 hour(s))   CBC WITH 3 PART DIFF    Collection Time: 10/08/19 10:45 AM   Result Value Ref Range    WBC 10.8 4.5 - 13.0 K/uL    RBC 4.78 4.10 - 5.10 M/uL    HGB 13.1 12.0 - 16.0 g/dL    HCT 40.2 36 - 48 %    MCV 84.1 78 - 102 FL    MCH 27.4 25.0 - 35.0 PG    MCHC 32.6 31 - 37 g/dL    RDW 13.3 11.5 - 14.5 %    PLATELET 773 605 - 263 K/uL    NEUTROPHILS 70 40 - 70 %    MIXED CELLS 8 0.1 - 17 %    LYMPHOCYTES 23 14 - 44 %    ABS. NEUTROPHILS 7.6 1.8 - 9.5 K/UL    ABS. MIXED CELLS 0.8 0.0 - 2.3 K/uL    ABS. LYMPHOCYTES 2.4 1.1 - 5.9 K/UL    DF AUTOMATED             Pre-medication consisting of IV Benadryl 25 mg, was administered per order, and without incident.            Renflexis (infliximab-abda) 400 mg IV (4 mg/kg) Maintenance Dose, was administered over 2 hours, per order, and without incident.    After the completion of the IV Remicade, Ms. Lalito Conrad was monitored for 30 minutes post Remicade, per order, and also without incident.         After the completion of the 30 minute monitoring period, the PIV was removed and gauze/bandaid was applied. Ms. Lalito Conrad tolerated well, and had no complaints. Ms. Lalito Conrad was discharged from [place] [address] in stable condition at 1530. .. She is to return in 6 weeks, on Tuesday, 11-19-19,  at 1000,  for her next appointment, for her next dose of IV Renflexis.  Adrienne Cain RN  October 8, 2019  10:52 AM

## 2019-10-08 NOTE — PATIENT INSTRUCTIONS
Anemia: Care Instructions  Your Care Instructions    Anemia is a low level of red blood cells, which carry oxygen throughout your body. Many things can cause anemia. Lack of iron is one of the most common causes. Your body needs iron to make hemoglobin, a substance in red blood cells that carries oxygen from the lungs to your body's cells. Without enough iron, the body produces fewer and smaller red blood cells. As a result, your body's cells do not get enough oxygen, and you feel tired and weak. And you may have trouble concentrating. Bleeding is the most common cause of a lack of iron. You may have heavy menstrual bleeding or bleeding caused by conditions such as ulcers, hemorrhoids, or cancer. Regular use of aspirin or other anti-inflammatory medicines (such as ibuprofen) also can cause bleeding in some people. A lack of iron in your diet also can cause anemia, especially at times when the body needs more iron, such as during pregnancy, infancy, and the teen years. Your doctor may have prescribed iron pills. It may take several months of treatment for your iron levels to return to normal. Your doctor also may suggest that you eat foods that are rich in iron, such as meat and beans. There are many other causes of anemia. It is not always due to a lack of iron. Finding the specific cause of your anemia will help your doctor find the right treatment for you. Follow-up care is a key part of your treatment and safety. Be sure to make and go to all appointments, and call your doctor if you are having problems. It's also a good idea to know your test results and keep a list of the medicines you take. How can you care for yourself at home? · Take your medicines exactly as prescribed. Call your doctor if you think you are having a problem with your medicine. · If your doctor recommends iron pills, take them as directed:  ? Try to take the pills on an empty stomach about 1 hour before or 2 hours after meals. But you may need to take iron with food to avoid an upset stomach. ? Do not take antacids or drink milk or caffeine drinks (such as coffee, tea, or cola) at the same time or within 2 hours of the time that you take your iron. They can make it hard for your body to absorb the iron. ? Vitamin C (from food or supplements) helps your body absorb iron. Try taking iron pills with a glass of orange juice or some other food that is high in vitamin C, such as citrus fruits. ? Iron pills may cause stomach problems, such as heartburn, nausea, diarrhea, constipation, and cramps. Be sure to drink plenty of fluids, and include fruits, vegetables, and fiber in your diet each day. Iron pills often make your bowel movements dark or green. ? If you forget to take an iron pill, do not take a double dose of iron the next time you take a pill. ? Keep iron pills out of the reach of small children. An overdose of iron can be very dangerous. · Follow your doctor's advice about eating iron-rich foods. These include red meat, shellfish, poultry, eggs, beans, raisins, whole-grain bread, and leafy green vegetables. · Steam vegetables to help them keep their iron content. When should you call for help? Call 911 anytime you think you may need emergency care. For example, call if:    · You have symptoms of a heart attack. These may include:  ? Chest pain or pressure, or a strange feeling in the chest.  ? Sweating. ? Shortness of breath. ? Nausea or vomiting. ? Pain, pressure, or a strange feeling in the back, neck, jaw, or upper belly or in one or both shoulders or arms. ? Lightheadedness or sudden weakness. ? A fast or irregular heartbeat. After you call 911, the  may tell you to chew 1 adult-strength or 2 to 4 low-dose aspirin. Wait for an ambulance.  Do not try to drive yourself.     · You passed out (lost consciousness).    Call your doctor now or seek immediate medical care if:    · You have new or increased shortness of breath.     · You are dizzy or lightheaded, or you feel like you may faint.     · Your fatigue and weakness continue or get worse.     · You have any abnormal bleeding, such as:  ? Nosebleeds. ? Vaginal bleeding that is different (heavier, more frequent, at a different time of the month) than what you are used to.  ? Bloody or black stools, or rectal bleeding. ? Bloody or pink urine.    Watch closely for changes in your health, and be sure to contact your doctor if:    · You do not get better as expected. Where can you learn more? Go to http://swapnil-speedy.info/. Enter R301 in the search box to learn more about \"Anemia: Care Instructions. \"  Current as of: March 28, 2019  Content Version: 12.2  © 3023-6878 [employer]. Care instructions adapted under license by [employer] (which disclaims liability or warranty for this information). If you have questions about a medical condition or this instruction, always ask your healthcare professional. [address] any warranty or liability for your use of this information.

## 2019-10-08 NOTE — PROGRESS NOTES
Hematology/Oncology  Progress Note    Name: Vashti Friend  Date: 10/8/2019  : 1965    PCP: Pool Vazquez MD     Ms. Marquis Hays is a 52year old female who was seen for management of her rheumatoid arthritis, leukocytosis, and thrombocytosis. Current therapy: Remicade 4 mg/kg bodyweight ever 6 weeks intravenously    Subjective:     Ms. Sofi Pryor is a 52year-old [facility] American woman who has severe rheumatoid arthritis. She receives Remicade every 6 weeks. Today the patient is complaining of her back pain and knee pain. Today she reports that her right shoulder is giving her some discomfort. She recently had a steroid injection at that site. Today she is requesting a new prescription today for her pain medication. Otherwise she is essentially unchanged from prior. Past Medical History:   Diagnosis Date    Abdominal pain, unspecified site     Acute otitis media     Acute pharyngitis     Anxiety     Arthritis     Autoimmune disease ([address]     Back injury     Backache     herniated disc in lower back    Blurred vision     Chest pain 2018    Chest pain, unspecified     abnormal EKG    Chronic airway obstruction, not elsewhere classified     Chronic back pain     Chronic pain     COPD     Depression     Diabetes (HCC)     Dizziness     Dizziness and giddiness     possible orthostatic changes, vasovagal, autonomic dysfunction from diabetic neuropathy    Encounters for unspecified administrative purpose     Essential thrombocytosis ([address] 2016    Feeling anxious     Fibromyalgia     Headache(784.0)     Hemorrhoid     Herniated disc     at L5    Hypercholesterolemia     Hyperglycemia     Hypertension     Joint pain     Leukocytosis, unspecified     Major depressive disorder, single episode, mild (HCC)     Mental disorder     depression, anxiety, bipolar and PTSD    Neuropathy     Obesity, unspecified     Other chest pain     Palpitation 2018    ? arrhythmia    Phobic disorders     Rheumatoid arthritis (HCC)     Rheumatoid arthritis of foot ([facility] 2018    on treatment    Seasonal allergies     Shortness of breath     normal EF    Smoking 2018    discussed cessation    Streptococcal sore throat     Type 2 diabetes mellitus without complication, with long-term current use of insulin ([facility] 2018    Type II or unspecified type diabetes mellitus with unspecified complication, uncontrolled     Unspecified hereditary and idiopathic peripheral neuropathy     Vitamin D deficiency      Past Surgical History:   Procedure Laterality Date    HX CHOLECYSTECTOMY      HX GYN      partial Hysterectomy    HX OTHER SURGICAL  12-1-15    had ingrown toenail removed from big toe, both feet    HX TUBAL LIGATION           Social History     Socioeconomic History    Marital status:      Spouse name: Not on file    Number of children: Not on file    Years of education: Not on file    Highest education level: Not on file   Occupational History    Not on file   Social Needs    Financial resource strain: Not on file    Food insecurity:     Worry: Not on file     Inability: Not on file    Transportation needs:     Medical: Not on file     Non-medical: Not on file   Tobacco Use    Smoking status: Current Every Day Smoker     Packs/day: 0.25     Last attempt to quit: 2012     Years since quittin.2    Smokeless tobacco: Never Used    Tobacco comment: 5 cigarettes day   Substance and Sexual Activity    Alcohol use: Yes     Frequency: Monthly or less     Drinks per session: 1 or 2     Binge frequency: Never     Comment: socially    Drug use: No    Sexual activity: Yes     Partners: Male     Birth control/protection: Condom   Lifestyle    Physical activity:     Days per week: Not on file     Minutes per session: Not on file    Stress: Not on file   Relationships    Social connections:     Talks on phone: Not on file     Gets together: Not on file     Attends Confucianism service: Not on file     Active member of club or organization: Not on file     Attends meetings of clubs or organizations: Not on file     Relationship status: Not on file    Intimate partner violence:     Fear of current or ex partner: Not on file     Emotionally abused: Not on file     Physically abused: Not on file     Forced sexual activity: Not on file   Other Topics Concern    Not on file   Social History Narrative    Not on file     Family History   Problem Relation Age of Onset    Diabetes Other     Alcohol abuse Mother     Arthritis-osteo Mother     Diabetes Mother     Elevated Lipids Mother     Headache Mother     Heart Disease Mother     Migraines Mother     Psychiatric Disorder Mother     Alcohol abuse Father     Arthritis-osteo Father     Cancer Father     Headache Father     Heart Disease Father     Lung Disease Father     Migraines Father     Heart Surgery Father     Alcohol abuse Sister     Headache Sister     Diabetes Sister     Heart Disease Sister     Migraines Sister     Psychiatric Disorder Sister     Headache Brother     Diabetes Brother     Elevated Lipids Brother     Heart Disease Brother     Migraines Brother     Psychiatric Disorder Brother     Coronary Artery Disease Brother     Cancer Maternal Aunt     Alcohol abuse Maternal Aunt     Diabetes Maternal Aunt     Headache Maternal Aunt     Lung Disease Maternal Aunt     Migraines Maternal Aunt     Headache Maternal Uncle     Migraines Maternal Uncle     Stroke Maternal Uncle     Psychiatric Disorder Maternal Uncle     Headache Paternal Aunt     Migraines Paternal Aunt     Headache Paternal Uncle     Hypertension Paternal Uncle     Migraines Paternal Uncle     Stroke Paternal Uncle     Headache Maternal Grandmother     Migraines Maternal Grandmother     Stroke Maternal Grandmother     Headache Maternal Grandfather     Migraines Maternal Grandfather     Stroke Maternal Grandfather     Headache Paternal Grandmother     Hypertension Paternal Grandmother     Lung Disease Paternal Grandmother     Migraines Paternal Grandmother     Cancer Paternal Grandmother     Headache Paternal Grandfather     Cancer Paternal Grandfather     Migraines Paternal Grandfather      Current Outpatient Medications   Medication Sig Dispense Refill    oxyCODONE-acetaminophen (PERCOCET 10)  mg per tablet Take 1 Tab by mouth every six (6) hours as needed for Pain for up to 14 days. Max Daily Amount: 4 Tabs. 60 Tab 0    atorvastatin (LIPITOR) 40 mg tablet Take 1 Tab by mouth nightly. 30 Tab 6    anagrelide (AGRYLIN) 0.5 mg capsule Take 1 Cap by mouth two (2) times a day. Indications: Spontaneous Hemorrhages and Increase in Blood Platelets 60 Cap 6    losartan (COZAAR) 50 mg tablet Take 1 Tab by mouth daily. 30 Tab 1    insulin lispro (HUMALOG) 100 unit/mL injection For Blood Sugar of less than 150 = 0 units/150 -199 = 3 units/200 -249 = 6 units/250 -299 = 9 units/300 - 349 = 12 units/350+ = 15 units. 1 Vial 1    aspirin delayed-release 81 mg tablet Take 1 Tab by mouth daily. 30 Tab 1    insulin glargine (LANTUS) 100 unit/mL injection 30 Units by SubCUTAneous route daily. 1 Vial 1    naloxone (NARCAN) 4 mg/actuation nasal spray Use 1 spray intranasally, then discard. Repeat with new spray every 2 min as needed for opioid overdose symptoms, alternating nostrils. Indications: Decrease in Rate & Depth of Breathing due to Opioid Drug, opioid overdose 1 Each 1    amitriptyline (ELAVIL) 25 mg tablet 25 mg nightly.  clotrimazole-betamethasone (LOTRISONE) topical cream two (2) times a day. Indications: right hand      DULoxetine (CYMBALTA) 30 mg capsule two (2) times a day.  QUEtiapine (SEROQUEL) 25 mg tablet 25 mg two (2) times a day.       BD INSULIN SYRINGE ULTRA-FINE 1 mL 31 gauge x 15/64\" syrg       clonazePAM (KLONOPIN) 2 mg tablet Take 2 mg by mouth two (2) times a day. Indications: panic disorder      folic acid 236 mcg tablet Take 400 mcg by mouth daily.  albuterol (PROAIR HFA) 90 mcg/actuation inhaler Take 1 Puff by inhalation every six (6) hours as needed for Wheezing. 1 Inhaler 0    ondansetron hcl (ZOFRAN, AS HYDROCHLORIDE,) 4 mg tablet Take 1 Tab by mouth every eight (8) hours as needed for Nausea. 12 Tab 0    methotrexate (RHEUMATREX) 2.5 mg tablet Take 2.5 mg by mouth every fourty-eight (48) hours.  metFORMIN (GLUCOPHAGE) 1,000 mg tablet Take 500 mg by mouth two (2) times a day.  Cholecalciferol, Vitamin D3, 5,000 unit Tab Take 5,000 Units by mouth every seven (7) days.  atenolol (TENORMIN) 25 mg tablet Take 25 mg by mouth daily. Indications: HYPERTENSION      furosemide (LASIX) 40 mg tablet Take  by mouth daily.  INFLIXIMAB (REMICADE IV) 344 mg by IntraVENous route once as needed. remicade will be every 6 weeks       Facility-Administered Medications Ordered in Other Visits   Medication Dose Route Frequency Provider Last Rate Last Dose    acetaminophen (TYLENOL) tablet 650 mg  650 mg Oral Q4H PRN Blanquita Hernandez NP        sodium chloride (NS) flush 10-40 mL  10-40 mL IntraVENous PRN Elier Cuadra MD   10 mL at 10/08/19 1045    0.9% sodium chloride infusion 250 mL  250 mL IntraVENous CONTINUOUS Elier Cuadra MD   Stopped at 10/08/19 1525    inFLIXimab-abda (RENFLEXIS) 400 mg in 0.9% sodium chloride 250 mL infusion  400 mg IntraVENous ONCE TITR Blanquita Hernandez NP   Stopped at 10/08/19 1510       Review of Systems  Constitutional: The patient has no acute distress or discomfort. HEENT: The patient denies recent head trauma, eye pain, blurred vision,  hearing deficit, oropharyngeal mucosal pain or lesions, and the patient denies throat pain or discomfort. Lymphatics: The patient denies palpable peripheral lymphadenopathy.   Hematologic: The patient denies having bruising, bleeding, or progressive fatigue. Respiratory: Patient denies having shortness of breath, cough, sputum production, fever, or dyspnea on exertion. Cardiovascular: The patient denies having leg pain, leg swelling, heart palpitations, chest permit, chest pain, or lightheadedness. The patient denies having dyspnea on exertion. Breast: The patient has a sebaceous cyst involving the right breast.  Gastrointestinal: The patient denies having nausea, emesis, or diarrhea. The patient denies having any hematemesis or blood in the stool. Genitourinary: Patient denies having urinary urgency, frequency, or dysuria. The patient denies having blood in the urine. Psychological: The patient denies having symptoms of nervousness, anxiety, depression, or thoughts of harming himself some of this. Skin: Patient denies having skin rashes, skin, ulcerations, or unexplained itching or pruritus. Musculoskeletal: The patient complains of generalized back pain. Objective:     Visit Vitals  /81   Pulse (!) 101   Temp 99 °F (37.2 °C) (Oral)   Resp 18   Ht 5' 11\" (1.803 m)   Wt 102.1 kg (225 lb)   SpO2 99%   BMI 31.38 kg/m²     ECOG PS=0 Pain score 4/10     Physical Exam:   Gen. Appearance: The patient is in no acute distress. Skin: There is no bruise or rash. HEENT: The exam is unremarkable. Neck: Supple without lymphadenopathy or thyromegaly. Lungs: Clear to auscultation and percussion; there are no wheezes or rhonchi. Heart: Regular rate and rhythm; there are no murmurs, gallops, or rubs. Anterior chest wall and breast: Patient has a sebaceous cyst that has apparently drained from the underside of the right breast abutting the right anterior chest wall. Abdomen: Bowel sounds are present and normal.  There is no guarding, tenderness, or hepatosplenomegaly. Extremities: There is no clubbing, cyanosis, or edema. Neurologic: There are no focal neurologic deficits. Lymphatics: There is no palpable peripheral lymphadenopathy.  Musculoskeletal: The patient has full range of motion at all joints. However, she complains of pain on ambulation due to the severe rheumatoid arthritis. There is  evidence of joint deformity or effusions in the hands and knees. There is no focal joint tenderness. She is using a cane for support and mobility. Psychological/psychiatric: There is no clinical evidence of anxiety, depression, or melancholy. Lab data:      Results for orders placed or performed during the hospital encounter of 10/08/19   CBC WITH 3 PART DIFF     Status: None   Result Value Ref Range Status    WBC 10.8 4.5 - 13.0 K/uL Final    RBC 4.78 4.10 - 5.10 M/uL Final    HGB 13.1 12.0 - 16.0 g/dL Final    HCT 40.2 36 - 48 % Final    MCV 84.1 78 - 102 FL Final    MCH 27.4 25.0 - 35.0 PG Final    MCHC 32.6 31 - 37 g/dL Final    RDW 13.3 11.5 - 14.5 % Final    PLATELET 607 004 - 225 K/uL Final    NEUTROPHILS 70 40 - 70 % Final    MIXED CELLS 8 0.1 - 17 % Final    LYMPHOCYTES 23 14 - 44 % Final    ABS. NEUTROPHILS 7.6 1.8 - 9.5 K/UL Final    ABS. MIXED CELLS 0.8 0.0 - 2.3 K/uL Final    ABS. LYMPHOCYTES 2.4 1.1 - 5.9 K/UL Final     Comment: Test performed at [address] [place] [address] or Outpatient Infusion Center Location. Reviewed by Medical Director. DF AUTOMATED   Final           Assessment:     1. Essential thrombocytosis ([facility]    2. Chronic pain syndrome    3. Rheumatoid arthritis of multiple sites without rheumatoid factor ([facility]    4. Fibromyalgia    5. Chronic anemia    6. Leukocytosis, unspecified type      Plan:   Leukocytosis (recurrent and persisting): I have explained to the patient that the etiology of her leukocytosis remains undefined. A previous flow cytometry did not reveal any  evidence of an immunophenotypic abnormality such as an evolving chronic lymphoproliferative disorder or myeloproliferative disorder. The CBCs will continue to be monitored every 6 weeks.   The CBC from today shows that her WBC count is normal at 10.8, neutrophils are 70% of the total WBC count and lymphocytes are 23% of the total WBC count. Absolute neutrophil count is normal at 7.6 and absolute lymphocyte count is normal at 2.4. Essential thrombocytosis/thrombocytosis: The current CBC shows that the platelet count is now 421,000. The platelet count is being monitored every 6 weeks. The patient was previously started on anagrelide 0.5 mg one tablet twice daily. This medication will be continued, at the current dose. I have reenforced to the patient the importance of being complaint with the treatment plan. Rheumatoid arthritis: the patient will continue to receive Remicade as a primary treatment modality for her severe rheumatoid arthritis every 6 weeks. Remicade is scheduled to be given on 9/27/2019. .  The dose of Remicade will remain 344 mg given intravenously. The patient has continued to take  Methotrexate 5 mg p.o. daily and Plaquenil 200 mg twice daily. Fibromyalgia/chronic pain syndrome: The patient  continues to have generalized pain at times related to her underlying fibromyalgia. The patient receives her Percocet  mg medication on a monthly basis as needed. A new prescription for the Percocet was provided at this time. Chronic anemia: I have explained to the patient the CBC from today shows her hemoglobin has remained normal at 13.1 g/dL with hematocrit of 40.2 %. I have recommended that she continue to take ferrous sulfate 325 mg by mouth once daily. We will see the patient back in 6 weeks for a complete reassessment. Orders Placed This Encounter    IRON PROFILE     Standing Status:   Future     Standing Expiration Date:   10/8/2020    FERRITIN     Standing Status:   Future     Standing Expiration Date:   10/8/2020    oxyCODONE-acetaminophen (PERCOCET 10)  mg per tablet     Sig: Take 1 Tab by mouth every six (6) hours as needed for Pain for up to 14 days. Max Daily Amount: 4 Tabs.      Dispense:  60 Tab Refill:  0     Rey CHASE. Ana Merchant MD, Ata Marie  8/30/2019

## 2019-10-09 LAB
ALBUMIN SERPL-MCNC: 3.4 G/DL (ref 3.4–5)
ALBUMIN/GLOB SERPL: 0.9 {RATIO} (ref 0.8–1.7)
ALP SERPL-CCNC: 111 U/L (ref 45–117)
ALT SERPL-CCNC: 19 U/L (ref 13–56)
ANION GAP SERPL CALC-SCNC: 8 MMOL/L (ref 3–18)
AST SERPL-CCNC: 6 U/L (ref 10–38)
BILIRUB SERPL-MCNC: 0.3 MG/DL (ref 0.2–1)
BUN SERPL-MCNC: 18 MG/DL (ref 7–18)
BUN/CREAT SERPL: 23 (ref 12–20)
CALCIUM SERPL-MCNC: 9.4 MG/DL (ref 8.5–10.1)
CHLORIDE SERPL-SCNC: 104 MMOL/L (ref 100–111)
CO2 SERPL-SCNC: 26 MMOL/L (ref 21–32)
CREAT SERPL-MCNC: 0.8 MG/DL (ref 0.6–1.3)
CRP SERPL-MCNC: 1 MG/DL (ref 0–0.3)
FERRITIN SERPL-MCNC: 87 NG/ML (ref 8–388)
GLOBULIN SER CALC-MCNC: 3.7 G/DL (ref 2–4)
GLUCOSE SERPL-MCNC: 290 MG/DL (ref 74–99)
IRON SATN MFR SERPL: 14 %
IRON SERPL-MCNC: 39 UG/DL (ref 50–175)
POTASSIUM SERPL-SCNC: 3.8 MMOL/L (ref 3.5–5.5)
PROT SERPL-MCNC: 7.1 G/DL (ref 6.4–8.2)
SODIUM SERPL-SCNC: 138 MMOL/L (ref 136–145)
TIBC SERPL-MCNC: 281 UG/DL (ref 250–450)

## 2019-10-18 DIAGNOSIS — G89.4 CHRONIC PAIN SYNDROME: ICD-10-CM

## 2019-10-18 RX ORDER — OXYCODONE AND ACETAMINOPHEN 10; 325 MG/1; MG/1
1 TABLET ORAL
Qty: 60 TAB | Refills: 0 | Status: CANCELLED | OUTPATIENT
Start: 2019-10-18 | End: 2019-11-01

## 2019-10-20 DIAGNOSIS — G89.4 CHRONIC PAIN SYNDROME: ICD-10-CM

## 2019-10-20 RX ORDER — OXYCODONE AND ACETAMINOPHEN 10; 325 MG/1; MG/1
1 TABLET ORAL
Qty: 60 TAB | Refills: 0 | Status: SHIPPED | OUTPATIENT
Start: 2019-10-20 | End: 2019-11-04 | Stop reason: SDUPTHER

## 2019-10-23 RX ORDER — LOSARTAN POTASSIUM 50 MG/1
50 TABLET ORAL DAILY
Qty: 90 TAB | Refills: 1 | Status: SHIPPED | OUTPATIENT
Start: 2019-10-23

## 2019-11-04 DIAGNOSIS — G89.4 CHRONIC PAIN SYNDROME: ICD-10-CM

## 2019-11-04 RX ORDER — OXYCODONE AND ACETAMINOPHEN 10; 325 MG/1; MG/1
1 TABLET ORAL
Qty: 60 TAB | Refills: 0 | Status: SHIPPED | OUTPATIENT
Start: 2019-11-04 | End: 2019-11-15 | Stop reason: SDUPTHER

## 2019-11-12 RX ORDER — HYDROCORTISONE SODIUM SUCCINATE 100 MG/2ML
100 INJECTION, POWDER, FOR SOLUTION INTRAMUSCULAR; INTRAVENOUS AS NEEDED
Status: CANCELLED | OUTPATIENT
Start: 2019-11-19

## 2019-11-12 RX ORDER — ACETAMINOPHEN 325 MG/1
650 TABLET ORAL AS NEEDED
Status: CANCELLED
Start: 2019-11-19

## 2019-11-12 RX ORDER — EPINEPHRINE 1 MG/ML
0.3 INJECTION, SOLUTION, CONCENTRATE INTRAVENOUS AS NEEDED
Status: CANCELLED | OUTPATIENT
Start: 2019-11-19

## 2019-11-12 RX ORDER — HEPARIN 100 UNIT/ML
300-500 SYRINGE INTRAVENOUS AS NEEDED
Status: CANCELLED
Start: 2019-11-19

## 2019-11-12 RX ORDER — SODIUM CHLORIDE 9 MG/ML
10 INJECTION INTRAMUSCULAR; INTRAVENOUS; SUBCUTANEOUS AS NEEDED
Status: CANCELLED | OUTPATIENT
Start: 2019-11-19

## 2019-11-12 RX ORDER — DIPHENHYDRAMINE HYDROCHLORIDE 50 MG/ML
50 INJECTION, SOLUTION INTRAMUSCULAR; INTRAVENOUS AS NEEDED
Status: CANCELLED
Start: 2019-11-19

## 2019-11-12 RX ORDER — ALBUTEROL SULFATE 0.83 MG/ML
2.5 SOLUTION RESPIRATORY (INHALATION) AS NEEDED
Status: CANCELLED
Start: 2019-11-19

## 2019-11-12 RX ORDER — ONDANSETRON 2 MG/ML
8 INJECTION INTRAMUSCULAR; INTRAVENOUS AS NEEDED
Status: CANCELLED | OUTPATIENT
Start: 2019-11-19

## 2019-11-12 RX ORDER — SODIUM CHLORIDE 0.9 % (FLUSH) 0.9 %
10 SYRINGE (ML) INJECTION AS NEEDED
Status: CANCELLED
Start: 2019-11-19

## 2019-11-14 ENCOUNTER — HOSPITAL ENCOUNTER (OUTPATIENT)
Dept: MAMMOGRAPHY | Age: 54
Discharge: HOME | End: 2019-11-14
Attending: INTERNAL MEDICINE
Payer: MEDICARE

## 2019-11-14 DIAGNOSIS — Z12.31 VISIT FOR SCREENING MAMMOGRAM: ICD-10-CM

## 2019-11-14 PROCEDURE — 77063 BREAST TOMOSYNTHESIS BI: CPT

## 2019-11-15 DIAGNOSIS — G89.4 CHRONIC PAIN SYNDROME: ICD-10-CM

## 2019-11-15 RX ORDER — OXYCODONE AND ACETAMINOPHEN 10; 325 MG/1; MG/1
1 TABLET ORAL
Qty: 60 TAB | Refills: 0 | Status: SHIPPED | OUTPATIENT
Start: 2019-11-15 | End: 2019-11-30

## 2019-11-19 ENCOUNTER — HOSPITAL ENCOUNTER (OUTPATIENT)
Dept: LAB | Age: 54
Discharge: HOME | End: 2019-11-19
Payer: MEDICARE

## 2019-11-19 ENCOUNTER — HOSPITAL ENCOUNTER (OUTPATIENT)
Dept: INFUSION THERAPY | Age: 54
Discharge: HOME | End: 2019-11-19
Payer: MEDICARE

## 2019-11-19 ENCOUNTER — HOSPITAL ENCOUNTER (OUTPATIENT)
Dept: ONCOLOGY | Age: 54
Discharge: HOME | End: 2019-11-19

## 2019-11-19 ENCOUNTER — OFFICE VISIT (OUTPATIENT)
Dept: ONCOLOGY | Age: 54
End: 2019-11-19

## 2019-11-19 ENCOUNTER — CLINICAL SUPPORT (OUTPATIENT)
Dept: ONCOLOGY | Age: 54
End: 2019-11-19

## 2019-11-19 VITALS
OXYGEN SATURATION: 100 % | RESPIRATION RATE: 20 BRPM | SYSTOLIC BLOOD PRESSURE: 143 MMHG | BODY MASS INDEX: 32.11 KG/M2 | DIASTOLIC BLOOD PRESSURE: 82 MMHG | HEIGHT: 71 IN | WEIGHT: 229.4 LBS | TEMPERATURE: 99.5 F | HEART RATE: 94 BPM

## 2019-11-19 VITALS
OXYGEN SATURATION: 100 % | RESPIRATION RATE: 20 BRPM | SYSTOLIC BLOOD PRESSURE: 143 MMHG | DIASTOLIC BLOOD PRESSURE: 82 MMHG | HEART RATE: 94 BPM | BODY MASS INDEX: 32.06 KG/M2 | HEIGHT: 71 IN | TEMPERATURE: 99.5 F | WEIGHT: 229 LBS

## 2019-11-19 DIAGNOSIS — M06.9 RHEUMATOID ARTHRITIS INVOLVING MULTIPLE JOINTS (HCC): ICD-10-CM

## 2019-11-19 DIAGNOSIS — D47.3 ESSENTIAL THROMBOCYTOSIS (HCC): Primary | ICD-10-CM

## 2019-11-19 DIAGNOSIS — M06.9 RHEUMATOID ARTHRITIS OF FOOT, UNSPECIFIED LATERALITY, UNSPECIFIED RHEUMATOID FACTOR PRESENCE: Primary | ICD-10-CM

## 2019-11-19 DIAGNOSIS — M05.80 OTHER RHEUMATOID ARTHRITIS WITH RHEUMATOID FACTOR OF UNSPECIFIED SITE (HCC): Primary | ICD-10-CM

## 2019-11-19 DIAGNOSIS — G89.4 CHRONIC PAIN SYNDROME: ICD-10-CM

## 2019-11-19 DIAGNOSIS — D64.9 CHRONIC ANEMIA: ICD-10-CM

## 2019-11-19 DIAGNOSIS — M05.80 OTHER RHEUMATOID ARTHRITIS WITH RHEUMATOID FACTOR OF UNSPECIFIED SITE (HCC): ICD-10-CM

## 2019-11-19 DIAGNOSIS — M79.7 FIBROMYALGIA: ICD-10-CM

## 2019-11-19 LAB
ALBUMIN SERPL-MCNC: 3.5 G/DL (ref 3.4–5)
ALBUMIN SERPL-MCNC: 3.5 G/DL (ref 3.4–5)
ALBUMIN/GLOB SERPL: 0.9 {RATIO} (ref 0.8–1.7)
ALBUMIN/GLOB SERPL: 0.9 {RATIO} (ref 0.8–1.7)
ALP SERPL-CCNC: 123 U/L (ref 45–117)
ALP SERPL-CCNC: 125 U/L (ref 45–117)
ALT SERPL-CCNC: 23 U/L (ref 13–56)
ALT SERPL-CCNC: 23 U/L (ref 13–56)
ANION GAP SERPL CALC-SCNC: 3 MMOL/L (ref 3–18)
ANION GAP SERPL CALC-SCNC: 5 MMOL/L (ref 3–18)
AST SERPL-CCNC: 10 U/L (ref 10–38)
AST SERPL-CCNC: 5 U/L (ref 10–38)
BASO+EOS+MONOS # BLD AUTO: 0.6 K/UL (ref 0–2.3)
BASO+EOS+MONOS NFR BLD AUTO: 6 % (ref 0.1–17)
BILIRUB SERPL-MCNC: 0.3 MG/DL (ref 0.2–1)
BILIRUB SERPL-MCNC: 0.3 MG/DL (ref 0.2–1)
BUN SERPL-MCNC: 21 MG/DL (ref 7–18)
BUN SERPL-MCNC: 21 MG/DL (ref 7–18)
BUN/CREAT SERPL: 23 (ref 12–20)
BUN/CREAT SERPL: 24 (ref 12–20)
CALCIUM SERPL-MCNC: 9.2 MG/DL (ref 8.5–10.1)
CALCIUM SERPL-MCNC: 9.4 MG/DL (ref 8.5–10.1)
CHLORIDE SERPL-SCNC: 107 MMOL/L (ref 100–111)
CHLORIDE SERPL-SCNC: 108 MMOL/L (ref 100–111)
CO2 SERPL-SCNC: 27 MMOL/L (ref 21–32)
CO2 SERPL-SCNC: 27 MMOL/L (ref 21–32)
CREAT SERPL-MCNC: 0.89 MG/DL (ref 0.6–1.3)
CREAT SERPL-MCNC: 0.9 MG/DL (ref 0.6–1.3)
DIFFERENTIAL METHOD BLD: ABNORMAL
ERYTHROCYTE [DISTWIDTH] IN BLOOD BY AUTOMATED COUNT: 13.4 % (ref 11.5–14.5)
ERYTHROCYTE [SEDIMENTATION RATE] IN BLOOD: 33 MM/HR (ref 0–30)
GLOBULIN SER CALC-MCNC: 4 G/DL (ref 2–4)
GLOBULIN SER CALC-MCNC: 4 G/DL (ref 2–4)
GLUCOSE SERPL-MCNC: 383 MG/DL (ref 74–99)
GLUCOSE SERPL-MCNC: 386 MG/DL (ref 74–99)
HCT VFR BLD AUTO: 42.8 % (ref 36–48)
HGB BLD-MCNC: 13.8 G/DL (ref 12–16)
LYMPHOCYTES # BLD: 2.2 K/UL (ref 1.1–5.9)
LYMPHOCYTES NFR BLD: 23 % (ref 14–44)
MCH RBC QN AUTO: 27.2 PG (ref 25–35)
MCHC RBC AUTO-ENTMCNC: 32.2 G/DL (ref 31–37)
MCV RBC AUTO: 84.4 FL (ref 78–102)
NEUTS SEG # BLD: 6.8 K/UL (ref 1.8–9.5)
NEUTS SEG NFR BLD: 71 % (ref 40–70)
PLATELET # BLD AUTO: 384 K/UL (ref 140–440)
POTASSIUM SERPL-SCNC: 4.2 MMOL/L (ref 3.5–5.5)
POTASSIUM SERPL-SCNC: 4.3 MMOL/L (ref 3.5–5.5)
PROT SERPL-MCNC: 7.5 G/DL (ref 6.4–8.2)
PROT SERPL-MCNC: 7.5 G/DL (ref 6.4–8.2)
RBC # BLD AUTO: 5.07 M/UL (ref 4.1–5.1)
SODIUM SERPL-SCNC: 137 MMOL/L (ref 136–145)
SODIUM SERPL-SCNC: 140 MMOL/L (ref 136–145)
WBC # BLD AUTO: 9.6 K/UL (ref 4.5–13)

## 2019-11-19 PROCEDURE — 96366 THER/PROPH/DIAG IV INF ADDON: CPT

## 2019-11-19 PROCEDURE — 82728 ASSAY OF FERRITIN: CPT

## 2019-11-19 PROCEDURE — 80053 COMPREHEN METABOLIC PANEL: CPT

## 2019-11-19 PROCEDURE — 96413 CHEMO IV INFUSION 1 HR: CPT

## 2019-11-19 PROCEDURE — 96415 CHEMO IV INFUSION ADDL HR: CPT

## 2019-11-19 PROCEDURE — 74011250636 HC RX REV CODE- 250/636: Performed by: NURSE PRACTITIONER

## 2019-11-19 PROCEDURE — 96365 THER/PROPH/DIAG IV INF INIT: CPT

## 2019-11-19 PROCEDURE — 85652 RBC SED RATE AUTOMATED: CPT

## 2019-11-19 PROCEDURE — 86140 C-REACTIVE PROTEIN: CPT

## 2019-11-19 PROCEDURE — 96375 TX/PRO/DX INJ NEW DRUG ADDON: CPT

## 2019-11-19 PROCEDURE — 83540 ASSAY OF IRON: CPT

## 2019-11-19 RX ORDER — ACETAMINOPHEN 325 MG/1
650 TABLET ORAL ONCE
Status: CANCELLED
Start: 2019-12-31

## 2019-11-19 RX ORDER — SODIUM CHLORIDE 9 MG/ML
25 INJECTION, SOLUTION INTRAVENOUS CONTINUOUS
Status: CANCELLED | OUTPATIENT
Start: 2019-12-31

## 2019-11-19 RX ORDER — SODIUM CHLORIDE 9 MG/ML
10 INJECTION INTRAMUSCULAR; INTRAVENOUS; SUBCUTANEOUS AS NEEDED
Status: CANCELLED | OUTPATIENT
Start: 2019-12-31

## 2019-11-19 RX ORDER — SODIUM CHLORIDE 0.9 % (FLUSH) 0.9 %
10 SYRINGE (ML) INJECTION AS NEEDED
Status: CANCELLED
Start: 2019-12-31

## 2019-11-19 RX ORDER — ALBUTEROL SULFATE 0.83 MG/ML
2.5 SOLUTION RESPIRATORY (INHALATION) AS NEEDED
Status: CANCELLED
Start: 2019-12-31

## 2019-11-19 RX ORDER — DIPHENHYDRAMINE HYDROCHLORIDE 50 MG/ML
50 INJECTION, SOLUTION INTRAMUSCULAR; INTRAVENOUS AS NEEDED
Status: CANCELLED
Start: 2019-12-31

## 2019-11-19 RX ORDER — HYDROCORTISONE SODIUM SUCCINATE 100 MG/2ML
100 INJECTION, POWDER, FOR SOLUTION INTRAMUSCULAR; INTRAVENOUS AS NEEDED
Status: CANCELLED | OUTPATIENT
Start: 2019-12-31

## 2019-11-19 RX ORDER — ACETAMINOPHEN 325 MG/1
650 TABLET ORAL AS NEEDED
Status: CANCELLED
Start: 2019-12-31

## 2019-11-19 RX ORDER — ACETAMINOPHEN 325 MG/1
650 TABLET ORAL ONCE
Status: DISCONTINUED | OUTPATIENT
Start: 2019-11-19 | End: 2019-11-19

## 2019-11-19 RX ORDER — DIPHENHYDRAMINE HYDROCHLORIDE 50 MG/ML
25 INJECTION, SOLUTION INTRAMUSCULAR; INTRAVENOUS ONCE
Status: COMPLETED | OUTPATIENT
Start: 2019-11-19 | End: 2019-11-19

## 2019-11-19 RX ORDER — HEPARIN 100 UNIT/ML
300-500 SYRINGE INTRAVENOUS AS NEEDED
Status: CANCELLED
Start: 2019-12-31

## 2019-11-19 RX ORDER — EPINEPHRINE 1 MG/ML
0.3 INJECTION, SOLUTION, CONCENTRATE INTRAVENOUS AS NEEDED
Status: CANCELLED | OUTPATIENT
Start: 2019-12-31

## 2019-11-19 RX ORDER — ONDANSETRON 2 MG/ML
8 INJECTION INTRAMUSCULAR; INTRAVENOUS AS NEEDED
Status: CANCELLED | OUTPATIENT
Start: 2019-12-31

## 2019-11-19 RX ORDER — SODIUM CHLORIDE 9 MG/ML
25 INJECTION, SOLUTION INTRAVENOUS CONTINUOUS
Status: DISPENSED | OUTPATIENT
Start: 2019-11-19 | End: 2019-11-19

## 2019-11-19 RX ORDER — DIPHENHYDRAMINE HYDROCHLORIDE 50 MG/ML
25 INJECTION, SOLUTION INTRAMUSCULAR; INTRAVENOUS ONCE
Status: CANCELLED
Start: 2019-12-31

## 2019-11-19 RX ADMIN — DIPHENHYDRAMINE HYDROCHLORIDE 25 MG: 50 INJECTION INTRAMUSCULAR; INTRAVENOUS at 11:26

## 2019-11-19 RX ADMIN — SODIUM CHLORIDE 25 ML/HR: 9 INJECTION, SOLUTION INTRAVENOUS at 10:45

## 2019-11-19 RX ADMIN — SODIUM CHLORIDE 400 MG: 900 INJECTION, SOLUTION INTRAVENOUS at 13:50

## 2019-11-19 NOTE — PROGRESS NOTES
[facility] Progress Note    Date: 2019    Name: Melisa Varghese    MRN: [id number]         : 1965      Ms. Dipesh Napoles arrived in the [facility] today at 1020, in stable condition, here for Q 6 Week, IV Remicade Infusion. She was assessed and education was provided. Ms. Elle Duvall vitals were reviewed. Visit Vitals  /69 (BP 1 Location: Left arm, BP Patient Position: Sitting)   Pulse 90   Temp 98.1 °F (36.7 °C)   Resp 20   Ht 5' 11\" (1.803 m)   Wt 104.1 kg (229 lb 6.4 oz)   SpO2 99%   Breastfeeding? No   BMI 31.99 kg/m²         Switched to Renflexis (infliximab-abda) (Remicade Biosimilar), per pharmacy. PIV (#22G) was established in her posterior right forearm at 1035, without incident, and blood was drawn for a CBC, CMP, ESR, & CRP (2 lavender top tubes & 2 SST tubes), per order. (The CBC was processed in house, and the CMP, ESR, & CRP, were sent out to be processed. )  Also, extra tubes of blood were drawn, in preparation for her office visit later today, with Dr. Danica Burton (1 lavender top tube & 1 SST tube). Lab results were obtained and reviewed, and the CBC results from today listed below, as well as the most recent CMP results from 10-8-19, were all noted to be satisfactory for treatment today. Recent Results (from the past 12 hour(s))   CBC WITH 3 PART DIFF    Collection Time: 19 10:35 AM   Result Value Ref Range    WBC 9.6 4.5 - 13.0 K/uL    RBC 5.07 4. 10 - 5.10 M/uL    HGB 13.8 12.0 - 16.0 g/dL    HCT 42.8 36 - 48 %    MCV 84.4 78 - 102 FL    MCH 27.2 25.0 - 35.0 PG    MCHC 32.2 31 - 37 g/dL    RDW 13.4 11.5 - 14.5 %    PLATELET 066 584 - 985 K/uL    NEUTROPHILS 71 (H) 40 - 70 %    MIXED CELLS 6 0.1 - 17 %    LYMPHOCYTES 23 14 - 44 %    ABS. NEUTROPHILS 6.8 1.8 - 9.5 K/UL    ABS. MIXED CELLS 0.6 0.0 - 2.3 K/uL    ABS.  LYMPHOCYTES 2.2 1.1 - 5.9 K/UL    DF AUTOMATED             Pre-medication consisting of IV Benadryl 25 mg, was administered per order, and without incident.            Renflexis (infliximab-abda) 400 mg IV (4 mg/kg) Maintenance Dose, was administered over 2 hours, per order, and without incident.            Since there was a significant delay in obtaining the Renflexis from the [address] [place] [address] today, which extended her stay in the [facility], Ms. Avtar Hernandez was unable to be monitored for 30 minutes post Renflexis infusion today, because she had to leave the [facility], so that she could see Dr Yue Patricio for her scheduled office visit. Therefore, once the Renflexis was completed, the PIV was flushed well with NS, and the PIV was removed and gauze/bandaid was applied. Ms. Avtar Hernandez tolerated well, and had no complaints. Ms. Avtar Hernandez was discharged from [place] [address] in stable condition at [phone]. .. She is to return in 7 weeks, on Tuesday, 1-7-20,  at 1130,  for her next appointment, for her next dose of IV Renflexis.  . (Please note, that this appointment is 1 week delayed, due to the [address], RN  November 19, 2019  10:52 AM

## 2019-11-19 NOTE — PROGRESS NOTES
Hematology/Oncology  Progress Note    Name: Eli Cardoza  Date: 10/8/2019  : 1965    PCP: Gabriela Johnson MD     Ms. Cynthia Yee is a 52year old female who was seen for management of her rheumatoid arthritis, leukocytosis, and thrombocytosis. Current therapy: Remicade 4 mg/kg bodyweight ever 6 weeks intravenously    Subjective:     Ms. Johnathan Mccord is a 52year-old [facility] [employer] woman who has severe rheumatoid arthritis. She receives Remicade every 6 weeks. Today the patient is complaining of her back pain and knee pain. Today she reports that her right shoulder is giving her some discomfort. She recently had a steroid injection at that site. Today she is not requesting a new prescription today for her pain medication. Otherwise she is essentially unchanged from prior. Past Medical History:   Diagnosis Date    Abdominal pain, unspecified site     Acute otitis media     Acute pharyngitis     Anxiety     Arthritis     Autoimmune disease ([address]     Back injury     Backache     herniated disc in lower back    Blurred vision     Chest pain 2018    Chest pain, unspecified     abnormal EKG    Chronic airway obstruction, not elsewhere classified     Chronic back pain     Chronic pain     COPD     Depression     Diabetes (HCC)     Dizziness     Dizziness and giddiness     possible orthostatic changes, vasovagal, autonomic dysfunction from diabetic neuropathy    Encounters for unspecified administrative purpose     Essential thrombocytosis ([address] 2016    Feeling anxious     Fibromyalgia     Headache(784.0)     Hemorrhoid     Herniated disc     at L5    Hypercholesterolemia     Hyperglycemia     Hypertension     Joint pain     Leukocytosis, unspecified     Major depressive disorder, single episode, mild (HCC)     Mental disorder     depression, anxiety, bipolar and PTSD    Neuropathy     Obesity, unspecified     Other chest pain     Palpitation 2018    ? arrhythmia    Phobic disorders     Rheumatoid arthritis (HCC)     Rheumatoid arthritis of foot ([facility] 2018    on treatment    Seasonal allergies     Shortness of breath     normal EF    Smoking 2018    discussed cessation    Streptococcal sore throat     Type 2 diabetes mellitus without complication, with long-term current use of insulin ([facility] [address] 2018    Type II or unspecified type diabetes mellitus with unspecified complication, uncontrolled     Unspecified hereditary and idiopathic peripheral neuropathy     Vitamin D deficiency      Past Surgical History:   Procedure Laterality Date    HX CHOLECYSTECTOMY      HX GYN      partial Hysterectomy    HX OTHER SURGICAL  12-1-15    had ingrown toenail removed from big toe, both feet    HX TUBAL LIGATION           Social History     Socioeconomic History    Marital status:      Spouse name: Not on file    Number of children: Not on file    Years of education: Not on file    Highest education level: Not on file   Occupational History    Not on file   Social Needs    Financial resource strain: Not on file    Food insecurity:     Worry: Not on file     Inability: Not on file    Transportation needs:     Medical: Not on file     Non-medical: Not on file   Tobacco Use    Smoking status: Current Every Day Smoker     Packs/day: 0.25     Last attempt to quit: 2012     Years since quittin.3    Smokeless tobacco: Never Used    Tobacco comment: 5 cigarettes day   Substance and Sexual Activity    Alcohol use: Yes     Frequency: Monthly or less     Drinks per session: 1 or 2     Binge frequency: Never     Comment: socially    Drug use: No    Sexual activity: Yes     Partners: Male     Birth control/protection: Condom   Lifestyle    Physical activity:     Days per week: Not on file     Minutes per session: Not on file    Stress: Not on file   Relationships    Social connections:     Talks on phone: Not on file     Gets together: Not on file     Attends [employer] service: Not on file     Active member of club or organization: Not on file     Attends meetings of clubs or organizations: Not on file     Relationship status: Not on file    Intimate partner violence:     Fear of current or ex partner: Not on file     Emotionally abused: Not on file     Physically abused: Not on file     Forced sexual activity: Not on file   Other Topics Concern    Not on file   Social History Narrative    Not on file     Family History   Problem Relation Age of Onset    Diabetes Other     Alcohol abuse Mother     Arthritis-osteo Mother     Diabetes Mother     Elevated Lipids Mother     Headache Mother     Heart Disease Mother     Migraines Mother     Psychiatric Disorder Mother     Alcohol abuse Father     Arthritis-osteo Father     Cancer Father     Headache Father     Heart Disease Father     Lung Disease Father     Migraines Father     Heart Surgery Father     Alcohol abuse Sister     Headache Sister     Diabetes Sister     Heart Disease Sister     Migraines Sister     Psychiatric Disorder Sister     Headache Brother     Diabetes Brother     Elevated Lipids Brother     Heart Disease Brother     Migraines Brother     Psychiatric Disorder Brother     Coronary Artery Disease Brother     Cancer Maternal Aunt     Alcohol abuse Maternal Aunt     Diabetes Maternal Aunt     Headache Maternal Aunt     Lung Disease Maternal Aunt     Migraines Maternal Aunt     Headache Maternal Uncle     Migraines Maternal Uncle     Stroke Maternal Uncle     Psychiatric Disorder Maternal Uncle     Headache Paternal Aunt     Migraines Paternal Aunt     Headache Paternal Uncle     Hypertension Paternal Uncle     Migraines Paternal Uncle     Stroke Paternal Uncle     Headache Maternal Grandmother     Migraines Maternal Grandmother     Stroke Maternal Grandmother     Headache Maternal Grandfather     Migraines Maternal Grandfather     Stroke Maternal Grandfather     Headache Paternal Grandmother     Hypertension Paternal Grandmother     Lung Disease Paternal Grandmother     Migraines Paternal Grandmother     Cancer Paternal Grandmother     Headache Paternal Grandfather     Cancer Paternal Grandfather     Migraines Paternal Grandfather      Current Outpatient Medications   Medication Sig Dispense Refill    oxyCODONE-acetaminophen (PERCOCET 10)  mg per tablet Take 1 Tab by mouth every six (6) hours as needed for Pain for up to 15 days. Max Daily Amount: 4 Tabs. 60 Tab 0    losartan (COZAAR) 50 mg tablet Take 1 Tab by mouth daily. 90 Tab 1    atorvastatin (LIPITOR) 40 mg tablet Take 1 Tab by mouth nightly. 30 Tab 6    anagrelide (AGRYLIN) 0.5 mg capsule Take 1 Cap by mouth two (2) times a day. Indications: Spontaneous Hemorrhages and Increase in Blood Platelets 60 Cap 6    insulin lispro (HUMALOG) 100 unit/mL injection For Blood Sugar of less than 150 = 0 units/150 -199 = 3 units/200 -249 = 6 units/250 -299 = 9 units/300 - 349 = 12 units/350+ = 15 units. 1 Vial 1    aspirin delayed-release 81 mg tablet Take 1 Tab by mouth daily. 30 Tab 1    insulin glargine (LANTUS) 100 unit/mL injection 30 Units by SubCUTAneous route daily. 1 Vial 1    naloxone (NARCAN) 4 mg/actuation nasal spray Use 1 spray intranasally, then discard. Repeat with new spray every 2 min as needed for opioid overdose symptoms, alternating nostrils. Indications: Decrease in Rate & Depth of Breathing due to Opioid Drug, opioid overdose 1 Each 1    amitriptyline (ELAVIL) 25 mg tablet 25 mg nightly.  clotrimazole-betamethasone (LOTRISONE) topical cream two (2) times a day. Indications: right hand      DULoxetine (CYMBALTA) 30 mg capsule two (2) times a day.  QUEtiapine (SEROQUEL) 25 mg tablet 25 mg two (2) times a day.       BD INSULIN SYRINGE ULTRA-FINE 1 mL 31 gauge x 15/64\" syrg       clonazePAM (KLONOPIN) 2 mg tablet Take 2 mg by mouth two (2) times a day. Indications: panic disorder      folic acid 421 mcg tablet Take 400 mcg by mouth daily.  albuterol (PROAIR HFA) 90 mcg/actuation inhaler Take 1 Puff by inhalation every six (6) hours as needed for Wheezing. 1 Inhaler 0    ondansetron hcl (ZOFRAN, AS HYDROCHLORIDE,) 4 mg tablet Take 1 Tab by mouth every eight (8) hours as needed for Nausea. 12 Tab 0    methotrexate (RHEUMATREX) 2.5 mg tablet Take 2.5 mg by mouth every fourty-eight (48) hours.  metFORMIN (GLUCOPHAGE) 1,000 mg tablet Take 500 mg by mouth two (2) times a day.  Cholecalciferol, Vitamin D3, 5,000 unit Tab Take 5,000 Units by mouth every seven (7) days.  atenolol (TENORMIN) 25 mg tablet Take 25 mg by mouth daily. Indications: HYPERTENSION      furosemide (LASIX) 40 mg tablet Take  by mouth daily.  INFLIXIMAB (REMICADE IV) 344 mg by IntraVENous route once as needed. remicade will be every 6 weeks       Facility-Administered Medications Ordered in Other Visits   Medication Dose Route Frequency Provider Last Rate Last Dose    0.9% sodium chloride infusion  25 mL/hr IntraVENous CONTINUOUS Blanquita Hernandez NP   Stopped at 11/19/19 1600    inFLIXimab-abda (RENFLEXIS) 400 mg in 0.9% sodium chloride 250 mL infusion  400 mg IntraVENous ONCE TITR Blanquita Hernandez NP   Stopped at 11/19/19 1550       Review of Systems  Constitutional: The patient has no acute distress or discomfort. HEENT: The patient denies recent head trauma, eye pain, blurred vision,  hearing deficit, oropharyngeal mucosal pain or lesions, and the patient denies throat pain or discomfort. Lymphatics: The patient denies palpable peripheral lymphadenopathy. Hematologic: The patient denies having bruising, bleeding, or progressive fatigue. Respiratory: Patient denies having shortness of breath, cough, sputum production, fever, or dyspnea on exertion. Cardiovascular:  The patient denies having leg pain, leg swelling, heart palpitations, chest permit, chest pain, or lightheadedness. The patient denies having dyspnea on exertion. Breast: The patient has a sebaceous cyst involving the right breast.  Gastrointestinal: The patient denies having nausea, emesis, or diarrhea. The patient denies having any hematemesis or blood in the stool. Genitourinary: Patient denies having urinary urgency, frequency, or dysuria. The patient denies having blood in the urine. Psychological: The patient denies having symptoms of nervousness, anxiety, depression, or thoughts of harming himself some of this. Skin: Patient denies having skin rashes, skin, ulcerations, or unexplained itching or pruritus. Musculoskeletal: The patient complains of generalized back pain. Objective:     Visit Vitals  /82   Pulse 94   Temp 99.5 °F (37.5 °C) (Oral)   Resp 20   Ht 5' 11\" (1.803 m)   Wt 103.9 kg (229 lb)   SpO2 100%   BMI 31.94 kg/m²     ECOG PS=0 Pain score 4/10     Physical Exam:   Gen. Appearance: The patient is in no acute distress. Skin: There is no bruise or rash. HEENT: The exam is unremarkable. Neck: Supple without lymphadenopathy or thyromegaly. Lungs: Clear to auscultation and percussion; there are no wheezes or rhonchi. Heart: Regular rate and rhythm; there are no murmurs, gallops, or rubs. Anterior chest wall and breast: Patient has a sebaceous cyst that has apparently drained from the underside of the right breast abutting the right anterior chest wall. Abdomen: Bowel sounds are present and normal.  There is no guarding, tenderness, or hepatosplenomegaly. Extremities: There is no clubbing, cyanosis, or edema. Neurologic: There are no focal neurologic deficits. Lymphatics: There is no palpable peripheral lymphadenopathy. Musculoskeletal: The patient has full range of motion at all joints. However, she complains of pain on ambulation due to the severe rheumatoid arthritis. There is  evidence of joint deformity or effusions in the hands and knees. There is no focal joint tenderness. She is using a cane for support and mobility. Psychological/psychiatric: There is no clinical evidence of anxiety, depression, or melancholy. Lab data:      Results for orders placed or performed during the hospital encounter of 11/19/19   CBC WITH 3 PART DIFF     Status: Abnormal   Result Value Ref Range Status    WBC 9.6 4.5 - 13.0 K/uL Final    RBC 5.07 4. 10 - 5.10 M/uL Final    HGB 13.8 12.0 - 16.0 g/dL Final    HCT 42.8 36 - 48 % Final    MCV 84.4 78 - 102 FL Final    MCH 27.2 25.0 - 35.0 PG Final    MCHC 32.2 31 - 37 g/dL Final    RDW 13.4 11.5 - 14.5 % Final    PLATELET 377 899 - 160 K/uL Final    NEUTROPHILS 71 (H) 40 - 70 % Final    MIXED CELLS 6 0.1 - 17 % Final    LYMPHOCYTES 23 14 - 44 % Final    ABS. NEUTROPHILS 6.8 1.8 - 9.5 K/UL Final    ABS. MIXED CELLS 0.6 0.0 - 2.3 K/uL Final    ABS. LYMPHOCYTES 2.2 1.1 - 5.9 K/UL Final     Comment: Test performed at [address] [place] [address] or Outpatient [facility]fusion [facility] Location. Reviewed by Medical Director. DF AUTOMATED   Final           Assessment:     1. Essential thrombocytosis ([address]    2. Chronic pain syndrome    3. Chronic anemia    4. Rheumatoid arthritis involving multiple joints (HCC)    5. Fibromyalgia      Plan:   Leukocytosis (recurrent and persisting): I have explained to the patient that the etiology of her leukocytosis remains undefined. A previous flow cytometry did not reveal any  evidence of an immunophenotypic abnormality such as an evolving chronic lymphoproliferative disorder or myeloproliferative disorder. The CBCs will continue to be monitored every 6 weeks. The CBC from today shows that her WBC count is normal at 9.6, neutrophils are 71% of the total WBC count and lymphocytes are 23% of the total WBC count. Absolute neutrophil count is normal at 6.8 and absolute lymphocyte count is normal at 2.2.     Essential thrombocytosis/thrombocytosis: The current CBC shows that the platelet count is now 384,000. The platelet count is being monitored every 6 weeks. The patient was previously started on anagrelide 0.5 mg one tablet twice daily. This medication will be continued, at the current dose. I have reenforced to the patient the importance of being complaint with the treatment plan. Rheumatoid arthritis: the patient will continue to receive Remicade as a primary treatment modality for her severe rheumatoid arthritis every 6 weeks. Remicade is scheduled to be given on 9/27/2019. .  The dose of Remicade will remain 344 mg given intravenously. The patient has continued to take  Methotrexate 5 mg p.o. daily and Plaquenil 200 mg twice daily. Fibromyalgia/chronic pain syndrome: The patient  continues to have generalized pain at times related to her underlying fibromyalgia. The patient receives her Percocet  mg medication on a monthly basis as needed. A new prescription for the Percocet was previously provided last week. .    Chronic anemia: I have explained to the patient the CBC from today shows her hemoglobin has remained normal at 13.8 g/dL with hematocrit of 42.8 %. I have recommended that she continue to take ferrous sulfate 325 mg by mouth once daily. We will see the patient back in 6 weeks for a complete reassessment. Orders Placed This Encounter    METABOLIC PANEL, COMPREHENSIVE     Standing Status:   Future     Standing Expiration Date:   11/19/2020    IRON PROFILE     Standing Status:   Future     Standing Expiration Date:   11/19/2020    FERRITIN     Standing Status:   Future     Standing Expiration Date:   11/19/2020     Vini Chang MD, Mesfin Rajput  8/30/2019

## 2019-11-20 LAB
CRP SERPL-MCNC: 1.4 MG/DL (ref 0–0.3)
FERRITIN SERPL-MCNC: 81 NG/ML (ref 8–388)
IRON SATN MFR SERPL: 13 %
IRON SERPL-MCNC: 39 UG/DL (ref 50–175)
TIBC SERPL-MCNC: 311 UG/DL (ref 250–450)

## 2019-12-03 DIAGNOSIS — G89.4 CHRONIC PAIN SYNDROME: Primary | ICD-10-CM

## 2019-12-03 RX ORDER — OXYCODONE AND ACETAMINOPHEN 10; 325 MG/1; MG/1
1 TABLET ORAL
Qty: 60 TAB | Refills: 0 | Status: SHIPPED | OUTPATIENT
Start: 2019-12-03 | End: 2019-12-13 | Stop reason: SDUPTHER

## 2019-12-13 DIAGNOSIS — G89.4 CHRONIC PAIN SYNDROME: ICD-10-CM

## 2019-12-13 RX ORDER — OXYCODONE AND ACETAMINOPHEN 10; 325 MG/1; MG/1
1 TABLET ORAL
Qty: 60 TAB | Refills: 0 | Status: SHIPPED | OUTPATIENT
Start: 2019-12-13 | End: 2019-12-27 | Stop reason: SDUPTHER

## 2019-12-27 DIAGNOSIS — G89.4 CHRONIC PAIN SYNDROME: ICD-10-CM

## 2019-12-30 RX ORDER — OXYCODONE AND ACETAMINOPHEN 10; 325 MG/1; MG/1
1 TABLET ORAL
Qty: 60 TAB | Refills: 0 | Status: SHIPPED | OUTPATIENT
Start: 2019-12-30 | End: 2020-01-10 | Stop reason: SDUPTHER

## 2019-12-31 ENCOUNTER — APPOINTMENT (OUTPATIENT)
Dept: INFUSION THERAPY | Age: 54
End: 2019-12-31
Payer: MEDICARE

## 2019-12-31 ENCOUNTER — HOSPITAL ENCOUNTER (OUTPATIENT)
Dept: LAB | Age: 54
Discharge: HOME | End: 2019-12-31
Payer: MEDICARE

## 2019-12-31 ENCOUNTER — OFFICE VISIT (OUTPATIENT)
Dept: ONCOLOGY | Age: 54
End: 2019-12-31

## 2019-12-31 VITALS
HEIGHT: 71 IN | TEMPERATURE: 99 F | SYSTOLIC BLOOD PRESSURE: 128 MMHG | OXYGEN SATURATION: 99 % | BODY MASS INDEX: 31.64 KG/M2 | WEIGHT: 226 LBS | DIASTOLIC BLOOD PRESSURE: 72 MMHG | RESPIRATION RATE: 16 BRPM | HEART RATE: 90 BPM

## 2019-12-31 DIAGNOSIS — D47.3 ESSENTIAL THROMBOCYTOSIS (HCC): ICD-10-CM

## 2019-12-31 DIAGNOSIS — G89.4 CHRONIC PAIN SYNDROME: Primary | ICD-10-CM

## 2019-12-31 DIAGNOSIS — D64.9 CHRONIC ANEMIA: ICD-10-CM

## 2019-12-31 DIAGNOSIS — M06.9 RHEUMATOID ARTHRITIS INVOLVING MULTIPLE JOINTS (HCC): ICD-10-CM

## 2019-12-31 DIAGNOSIS — G89.4 CHRONIC PAIN SYNDROME: ICD-10-CM

## 2019-12-31 LAB
ALBUMIN SERPL-MCNC: 3.2 G/DL (ref 3.4–5)
ALBUMIN/GLOB SERPL: 0.8 {RATIO} (ref 0.8–1.7)
ALP SERPL-CCNC: 128 U/L (ref 45–117)
ALT SERPL-CCNC: 18 U/L (ref 13–56)
ANION GAP SERPL CALC-SCNC: 6 MMOL/L (ref 3–18)
AST SERPL-CCNC: 9 U/L (ref 10–38)
BASOPHILS # BLD: 0 K/UL (ref 0–0.1)
BASOPHILS NFR BLD: 0 % (ref 0–2)
BILIRUB SERPL-MCNC: 0.3 MG/DL (ref 0.2–1)
BUN SERPL-MCNC: 16 MG/DL (ref 7–18)
BUN/CREAT SERPL: 17 (ref 12–20)
CALCIUM SERPL-MCNC: 9.5 MG/DL (ref 8.5–10.1)
CHLORIDE SERPL-SCNC: 104 MMOL/L (ref 100–111)
CO2 SERPL-SCNC: 28 MMOL/L (ref 21–32)
CREAT SERPL-MCNC: 0.92 MG/DL (ref 0.6–1.3)
DIFFERENTIAL METHOD BLD: NORMAL
EOSINOPHIL # BLD: 0.4 K/UL (ref 0–0.4)
EOSINOPHIL NFR BLD: 4 % (ref 0–5)
ERYTHROCYTE [DISTWIDTH] IN BLOOD BY AUTOMATED COUNT: 14.1 % (ref 11.6–14.5)
FERRITIN SERPL-MCNC: 105 NG/ML (ref 8–388)
GLOBULIN SER CALC-MCNC: 4 G/DL (ref 2–4)
GLUCOSE SERPL-MCNC: 376 MG/DL (ref 74–99)
HCT VFR BLD AUTO: 41.6 % (ref 35–45)
HGB BLD-MCNC: 13.5 G/DL (ref 12–16)
IRON SATN MFR SERPL: 10 %
IRON SERPL-MCNC: 27 UG/DL (ref 50–175)
LYMPHOCYTES # BLD: 2.2 K/UL (ref 0.9–3.6)
LYMPHOCYTES NFR BLD: 23 % (ref 21–52)
MCH RBC QN AUTO: 26.7 PG (ref 24–34)
MCHC RBC AUTO-ENTMCNC: 32.5 G/DL (ref 31–37)
MCV RBC AUTO: 82.4 FL (ref 74–97)
MONOCYTES # BLD: 0.5 K/UL (ref 0.05–1.2)
MONOCYTES NFR BLD: 6 % (ref 3–10)
NEUTS SEG # BLD: 6.3 K/UL (ref 1.8–8)
NEUTS SEG NFR BLD: 67 % (ref 40–73)
PLATELET # BLD AUTO: 370 K/UL (ref 135–420)
PMV BLD AUTO: 10.1 FL (ref 9.2–11.8)
POTASSIUM SERPL-SCNC: 4.6 MMOL/L (ref 3.5–5.5)
PROT SERPL-MCNC: 7.2 G/DL (ref 6.4–8.2)
RBC # BLD AUTO: 5.05 M/UL (ref 4.2–5.3)
SODIUM SERPL-SCNC: 138 MMOL/L (ref 136–145)
TIBC SERPL-MCNC: 266 UG/DL (ref 250–450)
WBC # BLD AUTO: 9.4 K/UL (ref 4.6–13.2)

## 2019-12-31 PROCEDURE — 80053 COMPREHEN METABOLIC PANEL: CPT

## 2019-12-31 PROCEDURE — 82728 ASSAY OF FERRITIN: CPT

## 2019-12-31 PROCEDURE — 36415 COLL VENOUS BLD VENIPUNCTURE: CPT

## 2019-12-31 PROCEDURE — 83540 ASSAY OF IRON: CPT

## 2019-12-31 PROCEDURE — 85025 COMPLETE CBC W/AUTO DIFF WBC: CPT

## 2019-12-31 NOTE — PROGRESS NOTES
Hematology/Oncology  Progress Note    Name: Helen Ek  Date: 2019  : 1965    PCP: Amanda Ibarra MD     Ms. Pamela Culver is a 52year old female who was seen for management of her rheumatoid arthritis, leukocytosis, and thrombocytosis. Current therapy: Remicade 4 mg/kg bodyweight ever 6 weeks intravenously    Subjective:     Ms. Ondina Camacho is a 52year-old [facility] American woman who has severe rheumatoid arthritis. She receives Remicade every 6 weeks. Today the patient is complaining of BLE pain and muscle spasms. As well as some jodie hand spasms. A new prescription today for her pain medication. Otherwise she is essentially unchanged from prior. Past Medical History:   Diagnosis Date    Abdominal pain, unspecified site     Acute otitis media     Acute pharyngitis     Anxiety     Arthritis     Autoimmune disease ([address]     Back injury     Backache     herniated disc in lower back    Blurred vision     Chest pain 2018    Chest pain, unspecified     abnormal EKG    Chronic airway obstruction, not elsewhere classified     Chronic back pain     Chronic pain     COPD     Depression     Diabetes (HCC)     Dizziness     Dizziness and giddiness     possible orthostatic changes, vasovagal, autonomic dysfunction from diabetic neuropathy    Encounters for unspecified administrative purpose     Essential thrombocytosis ([address] 2016    Feeling anxious     Fibromyalgia     Headache(784.0)     Hemorrhoid     Herniated disc     at L5    Hypercholesterolemia     Hyperglycemia     Hypertension     Joint pain     Leukocytosis, unspecified     Major depressive disorder, single episode, mild (HCC)     Mental disorder     depression, anxiety, bipolar and PTSD    Neuropathy     Obesity, unspecified     Other chest pain     Palpitation 2018    ?  arrhythmia    Phobic disorders     Rheumatoid arthritis ([address]     Rheumatoid arthritis of foot ([address] 2018    on treatment    Seasonal allergies     Shortness of breath     normal EF    Smoking 2018    discussed cessation    Streptococcal sore throat     Type 2 diabetes mellitus without complication, with long-term current use of insulin ([facility] 2018    Type II or unspecified type diabetes mellitus with unspecified complication, uncontrolled     Unspecified hereditary and idiopathic peripheral neuropathy     Vitamin D deficiency      Past Surgical History:   Procedure Laterality Date    HX CHOLECYSTECTOMY      HX GYN  2005    partial Hysterectomy    HX OTHER SURGICAL  12-1-15    had ingrown toenail removed from big toe, both feet    HX TUBAL LIGATION           Social History     Socioeconomic History    Marital status:      Spouse name: Not on file    Number of children: Not on file    Years of education: Not on file    Highest education level: Not on file   Occupational History    Not on file   Social Needs    Financial resource strain: Not on file    Food insecurity:     Worry: Not on file     Inability: Not on file    Transportation needs:     Medical: Not on file     Non-medical: Not on file   Tobacco Use    Smoking status: Current Every Day Smoker     Packs/day: 0.25     Last attempt to quit: 2012     Years since quittin.4    Smokeless tobacco: Never Used    Tobacco comment: 5 cigarettes day   Substance and Sexual Activity    Alcohol use: Yes     Frequency: Monthly or less     Drinks per session: 1 or 2     Binge frequency: Never     Comment: socially    Drug use: No    Sexual activity: Yes     Partners: Male     Birth control/protection: Condom   Lifestyle    Physical activity:     Days per week: Not on file     Minutes per session: Not on file    Stress: Not on file   Relationships    Social connections:     Talks on phone: Not on file     Gets together: Not on file     Attends Congregational service: Not on file     Active member of club or organization: Not on file Attends meetings of clubs or organizations: Not on file     Relationship status: Not on file    Intimate partner violence:     Fear of current or ex partner: Not on file     Emotionally abused: Not on file     Physically abused: Not on file     Forced sexual activity: Not on file   Other Topics Concern    Not on file   Social History Narrative    Not on file     Family History   Problem Relation Age of Onset    Diabetes Other     Alcohol abuse Mother     Arthritis-osteo Mother     Diabetes Mother     Elevated Lipids Mother     Headache Mother     Heart Disease Mother     Migraines Mother     Psychiatric Disorder Mother     Alcohol abuse Father     Arthritis-osteo Father     Cancer Father     Headache Father     Heart Disease Father     Lung Disease Father     Migraines Father     Heart Surgery Father     Alcohol abuse Sister     Headache Sister     Diabetes Sister     Heart Disease Sister     Migraines Sister     Psychiatric Disorder Sister     Headache Brother     Diabetes Brother     Elevated Lipids Brother     Heart Disease Brother     Migraines Brother     Psychiatric Disorder Brother     Coronary Artery Disease Brother     Cancer Maternal Aunt     Alcohol abuse Maternal Aunt     Diabetes Maternal Aunt     Headache Maternal Aunt     Lung Disease Maternal Aunt     Migraines Maternal Aunt     Headache Maternal Uncle     Migraines Maternal Uncle     Stroke Maternal Uncle     Psychiatric Disorder Maternal Uncle     Headache Paternal Aunt     Migraines Paternal Aunt     Headache Paternal Uncle     Hypertension Paternal Uncle     Migraines Paternal Uncle     Stroke Paternal Uncle     Headache Maternal Grandmother     Migraines Maternal Grandmother     Stroke Maternal Grandmother     Headache Maternal Grandfather     Migraines Maternal Grandfather     Stroke Maternal Grandfather     Headache Paternal Grandmother     Hypertension Paternal Grandmother     Lung Disease Paternal Grandmother     Migraines Paternal Grandmother     Cancer Paternal Grandmother     Headache Paternal Grandfather     Cancer Paternal Grandfather     Migraines Paternal Grandfather      Current Outpatient Medications   Medication Sig Dispense Refill    oxyCODONE-acetaminophen (PERCOCET 10)  mg per tablet Take 1 Tab by mouth every six (6) hours as needed for Pain for up to 15 days. Max Daily Amount: 4 Tabs. 60 Tab 0    losartan (COZAAR) 50 mg tablet Take 1 Tab by mouth daily. 90 Tab 1    atorvastatin (LIPITOR) 40 mg tablet Take 1 Tab by mouth nightly. 30 Tab 6    anagrelide (AGRYLIN) 0.5 mg capsule Take 1 Cap by mouth two (2) times a day. Indications: Spontaneous Hemorrhages and Increase in Blood Platelets 60 Cap 6    insulin lispro (HUMALOG) 100 unit/mL injection For Blood Sugar of less than 150 = 0 units/150 -199 = 3 units/200 -249 = 6 units/250 -299 = 9 units/300 - 349 = 12 units/350+ = 15 units. 1 Vial 1    aspirin delayed-release 81 mg tablet Take 1 Tab by mouth daily. 30 Tab 1    insulin glargine (LANTUS) 100 unit/mL injection 30 Units by SubCUTAneous route daily. 1 Vial 1    naloxone (NARCAN) 4 mg/actuation nasal spray Use 1 spray intranasally, then discard. Repeat with new spray every 2 min as needed for opioid overdose symptoms, alternating nostrils. Indications: Decrease in Rate & Depth of Breathing due to Opioid Drug, opioid overdose 1 Each 1    amitriptyline (ELAVIL) 25 mg tablet 25 mg nightly.  clotrimazole-betamethasone (LOTRISONE) topical cream two (2) times a day. Indications: right hand      DULoxetine (CYMBALTA) 30 mg capsule two (2) times a day.  QUEtiapine (SEROQUEL) 25 mg tablet 25 mg two (2) times a day.  BD INSULIN SYRINGE ULTRA-FINE 1 mL 31 gauge x 15/64\" syrg       clonazePAM (KLONOPIN) 2 mg tablet Take 2 mg by mouth two (2) times a day. Indications: panic disorder      folic acid 718 mcg tablet Take 400 mcg by mouth daily.       albuterol (PROAIR HFA) 90 mcg/actuation inhaler Take 1 Puff by inhalation every six (6) hours as needed for Wheezing. 1 Inhaler 0    ondansetron hcl (ZOFRAN, AS HYDROCHLORIDE,) 4 mg tablet Take 1 Tab by mouth every eight (8) hours as needed for Nausea. 12 Tab 0    methotrexate (RHEUMATREX) 2.5 mg tablet Take 2.5 mg by mouth every fourty-eight (48) hours.  metFORMIN (GLUCOPHAGE) 1,000 mg tablet Take 500 mg by mouth two (2) times a day.  Cholecalciferol, Vitamin D3, 5,000 unit Tab Take 5,000 Units by mouth every seven (7) days.  atenolol (TENORMIN) 25 mg tablet Take 25 mg by mouth daily. Indications: HYPERTENSION      furosemide (LASIX) 40 mg tablet Take  by mouth daily.  INFLIXIMAB (REMICADE IV) 344 mg by IntraVENous route once as needed. remicade will be every 6 weeks         Review of Systems  Constitutional: The patient has no acute distress or discomfort. HEENT: The patient denies recent head trauma, eye pain, blurred vision,  hearing deficit, oropharyngeal mucosal pain or lesions, and the patient denies throat pain or discomfort. Lymphatics: The patient denies palpable peripheral lymphadenopathy. Hematologic: The patient denies having bruising, bleeding, or progressive fatigue. Respiratory: Patient denies having shortness of breath, cough, sputum production, fever, or dyspnea on exertion. Cardiovascular: The patient denies having leg pain, leg swelling, heart palpitations, chest permit, chest pain, or lightheadedness. The patient denies having dyspnea on exertion. Breast: The patient has a sebaceous cyst involving the right breast.  Gastrointestinal: The patient denies having nausea, emesis, or diarrhea. The patient denies having any hematemesis or blood in the stool. Genitourinary: Patient denies having urinary urgency, frequency, or dysuria. The patient denies having blood in the urine.   Psychological: The patient denies having symptoms of nervousness, anxiety, depression, or thoughts of harming himself some of this. Skin: Patient denies having skin rashes, skin, ulcerations, or unexplained itching or pruritus. Musculoskeletal: See HPI, otherwise none. Objective:     Visit Vitals  /72   Pulse 90   Temp 99 °F (37.2 °C) (Oral)   Resp 16   Ht 5' 11\" (1.803 m)   Wt 102.5 kg (226 lb)   SpO2 99%   BMI 31.52 kg/m²     ECOG PS=0 Pain score 4/10     Physical Exam:   Gen. Appearance: The patient is in no acute distress. Skin: There is no bruise or rash. HEENT: The exam is unremarkable. Neck: Supple without lymphadenopathy or thyromegaly. Lungs: Clear to auscultation and percussion; there are no wheezes or rhonchi. Heart: Regular rate and rhythm; there are no murmurs, gallops, or rubs. Anterior chest wall and breast: Patient has a sebaceous cyst that has apparently drained from the underside of the right breast abutting the right anterior chest wall. Abdomen: Bowel sounds are present and normal.  There is no guarding, tenderness, or hepatosplenomegaly. Extremities: There is no clubbing, cyanosis, or edema. Neurologic: There are no focal neurologic deficits. Lymphatics: There is no palpable peripheral lymphadenopathy. Musculoskeletal: The patient has full range of motion at all joints. However, she complains of pain on ambulation due to the severe rheumatoid arthritis. There is  evidence of joint deformity or effusions in the hands and knees. There is no focal joint tenderness. She is using a cane for support and mobility. Psychological/psychiatric: There is no clinical evidence of anxiety, depression, or melancholy. Lab data:      Results for orders placed or performed during the hospital encounter of 11/19/19   CBC WITH 3 PART DIFF     Status: Abnormal   Result Value Ref Range Status    WBC 9.6 4.5 - 13.0 K/uL Final    RBC 5.07 4. 10 - 5.10 M/uL Final    HGB 13.8 12.0 - 16.0 g/dL Final    HCT 42.8 36 - 48 % Final    MCV 84.4 78 - 102 FL Final    MCH 27.2 25.0 - 35.0 PG Final    MCHC 32.2 31 - 37 g/dL Final    RDW 13.4 11.5 - 14.5 % Final    PLATELET 865 817 - 678 K/uL Final    NEUTROPHILS 71 (H) 40 - 70 % Final    MIXED CELLS 6 0.1 - 17 % Final    LYMPHOCYTES 23 14 - 44 % Final    ABS. NEUTROPHILS 6.8 1.8 - 9.5 K/UL Final    ABS. MIXED CELLS 0.6 0.0 - 2.3 K/uL Final    ABS. LYMPHOCYTES 2.2 1.1 - 5.9 K/UL Final     Comment: Test performed at [address] [place] [address] or Outpatient Infusion Center Location. Reviewed by Medical Director. DF AUTOMATED   Final           Assessment:     1. Chronic pain syndrome    2. Essential thrombocytosis (HCC)    3. Chronic anemia    4. Rheumatoid arthritis involving multiple joints (HCC)      Plan:   Leukocytosis (recurrent and persisting): I have explained to the patient that the etiology of her leukocytosis remains undefined. A previous flow cytometry did not reveal any  evidence of an immunophenotypic abnormality such as an evolving chronic lymphoproliferative disorder or myeloproliferative disorder. The CBCs will continue to be monitored every 6 weeks. The CBC from 11/19 shows that her WBC count is normal at 9.6, neutrophils are 71%  Absolute neutrophil count is normal at 6.8 and absolute lymphocyte count is normal at 2.2. Essential thrombocytosis/thrombocytosis: The current CBC shows that the platelet count is now 384,000. The platelet count is being monitored every 6 weeks. Continue anagrelide 0.5 mg one tablet twice daily. I have reenforced to the patient the importance of being complaint with the treatment plan. Rheumatoid arthritis: the patient will continue to receive Remicade as a primary treatment modality for her severe rheumatoid arthritis every 6 weeks. Remicade is scheduled to be given on 1/2/2020. Sourav Cardoza The dose of Remicade will remain 344 mg given intravenously. The patient has continued to take  Methotrexate 5 mg p.o. weekly and no longer takes Plaquenil 200 mg twice daily. Fibromyalgia/chronic pain syndrome: The patient  continues to have generalized pain at times related to her underlying fibromyalgia. The patient receives her Percocet  mg medication on a monthly basis as needed. A new prescription for the Percocet was previously provided on yesterday. .    Chronic anemia: I have explained to the patient the CBC from today shows her hemoglobin has remained normal at 13.8 g/dL with hematocrit of 42.8 %. I have recommended that she continue to take ferrous sulfate 325 mg by mouth once daily. I will check CBC and CMP today    We will see the patient back in 6 weeks for a complete reassessment. Orders Placed This Encounter    CBC WITH AUTOMATED DIFF     Standing Status:   Future     Standing Expiration Date:   77/56/5585    METABOLIC PANEL, COMPREHENSIVE     Standing Status:   Future     Standing Expiration Date:   12/31/2020    IRON PROFILE     Standing Status:   Future     Standing Expiration Date:   12/31/2020    FERRITIN     Standing Status:   Future     Standing Expiration Date:   12/31/2020     Ashley López NP-  12/31/2019    I have assessed the patient independently and  agree with the full assessment as outlined.   Virginia Duff MD, [address]

## 2020-01-07 ENCOUNTER — HOSPITAL ENCOUNTER (OUTPATIENT)
Dept: ONCOLOGY | Age: 55
Discharge: HOME | End: 2020-01-07

## 2020-01-07 DIAGNOSIS — M05.80 OTHER RHEUMATOID ARTHRITIS WITH RHEUMATOID FACTOR OF UNSPECIFIED SITE (HCC): ICD-10-CM

## 2020-01-09 ENCOUNTER — HOSPITAL ENCOUNTER (OUTPATIENT)
Dept: LAB | Age: 55
Discharge: HOME | End: 2020-01-09
Payer: MEDICARE

## 2020-01-09 DIAGNOSIS — I25.10 CORONARY ATHEROSCLEROSIS DUE TO LIPID RICH PLAQUE: ICD-10-CM

## 2020-01-09 DIAGNOSIS — M13.0 POLYARTHROPATHY: ICD-10-CM

## 2020-01-09 DIAGNOSIS — I25.83 CORONARY ATHEROSCLEROSIS DUE TO LIPID RICH PLAQUE: ICD-10-CM

## 2020-01-09 DIAGNOSIS — M06.9 ATROPHIC ARTHRITIS (HCC): ICD-10-CM

## 2020-01-09 DIAGNOSIS — E55.9 AVITAMINOSIS D: ICD-10-CM

## 2020-01-09 DIAGNOSIS — E11.9 DIABETES MELLITUS (HCC): ICD-10-CM

## 2020-01-09 DIAGNOSIS — F33.1 MAJOR DEPRESSIVE DISORDER, RECURRENT EPISODE, MODERATE (HCC): ICD-10-CM

## 2020-01-09 DIAGNOSIS — I10 ESSENTIAL HYPERTENSION, MALIGNANT: ICD-10-CM

## 2020-01-09 LAB
25(OH)D3 SERPL-MCNC: 57.9 NG/ML (ref 30–100)
ALBUMIN SERPL-MCNC: 3 G/DL (ref 3.4–5)
ALBUMIN SERPL-MCNC: 3.1 G/DL (ref 3.4–5)
ALBUMIN/GLOB SERPL: 0.7 {RATIO} (ref 0.8–1.7)
ALBUMIN/GLOB SERPL: 0.7 {RATIO} (ref 0.8–1.7)
ALP SERPL-CCNC: 140 U/L (ref 45–117)
ALP SERPL-CCNC: 141 U/L (ref 45–117)
ALT SERPL-CCNC: 17 U/L (ref 13–56)
ALT SERPL-CCNC: 17 U/L (ref 13–56)
ANION GAP SERPL CALC-SCNC: 1 MMOL/L (ref 3–18)
ANION GAP SERPL CALC-SCNC: 6 MMOL/L (ref 3–18)
APPEARANCE UR: CLEAR
AST SERPL-CCNC: 8 U/L (ref 10–38)
AST SERPL-CCNC: 9 U/L (ref 10–38)
BACTERIA URNS QL MICRO: ABNORMAL /HPF
BASOPHILS # BLD: 0 K/UL (ref 0–0.1)
BASOPHILS # BLD: 0 K/UL (ref 0–0.1)
BASOPHILS NFR BLD: 0 % (ref 0–2)
BASOPHILS NFR BLD: 0 % (ref 0–2)
BILIRUB SERPL-MCNC: 0.2 MG/DL (ref 0.2–1)
BILIRUB SERPL-MCNC: 0.6 MG/DL (ref 0.2–1)
BILIRUB UR QL: NEGATIVE
BUN SERPL-MCNC: 24 MG/DL (ref 7–18)
BUN SERPL-MCNC: 25 MG/DL (ref 7–18)
BUN/CREAT SERPL: 24 (ref 12–20)
BUN/CREAT SERPL: 25 (ref 12–20)
CALCIUM SERPL-MCNC: 9.3 MG/DL (ref 8.5–10.1)
CALCIUM SERPL-MCNC: 9.6 MG/DL (ref 8.5–10.1)
CHLORIDE SERPL-SCNC: 105 MMOL/L (ref 100–111)
CHLORIDE SERPL-SCNC: 106 MMOL/L (ref 100–111)
CHOLEST SERPL-MCNC: 127 MG/DL
CO2 SERPL-SCNC: 27 MMOL/L (ref 21–32)
CO2 SERPL-SCNC: 31 MMOL/L (ref 21–32)
COLOR UR: YELLOW
CREAT SERPL-MCNC: 0.97 MG/DL (ref 0.6–1.3)
CREAT SERPL-MCNC: 1.03 MG/DL (ref 0.6–1.3)
CREAT UR-MCNC: 99 MG/DL (ref 30–125)
CRP SERPL-MCNC: 1.3 MG/DL (ref 0–0.3)
DIFFERENTIAL METHOD BLD: ABNORMAL
DIFFERENTIAL METHOD BLD: ABNORMAL
EOSINOPHIL # BLD: 0.5 K/UL (ref 0–0.4)
EOSINOPHIL # BLD: 0.6 K/UL (ref 0–0.4)
EOSINOPHIL NFR BLD: 5 % (ref 0–5)
EOSINOPHIL NFR BLD: 5 % (ref 0–5)
EPITH CASTS URNS QL MICRO: ABNORMAL /LPF (ref 0–5)
ERYTHROCYTE [DISTWIDTH] IN BLOOD BY AUTOMATED COUNT: 13.7 % (ref 11.6–14.5)
ERYTHROCYTE [DISTWIDTH] IN BLOOD BY AUTOMATED COUNT: 13.7 % (ref 11.6–14.5)
ERYTHROCYTE [SEDIMENTATION RATE] IN BLOOD: 46 MM/HR (ref 0–30)
GLOBULIN SER CALC-MCNC: 4.1 G/DL (ref 2–4)
GLOBULIN SER CALC-MCNC: 4.2 G/DL (ref 2–4)
GLUCOSE SERPL-MCNC: 248 MG/DL (ref 74–99)
GLUCOSE SERPL-MCNC: 251 MG/DL (ref 74–99)
GLUCOSE UR STRIP.AUTO-MCNC: 500 MG/DL
HBA1C MFR BLD: 11.6 % (ref 4.2–5.6)
HCT VFR BLD AUTO: 41.1 % (ref 35–45)
HCT VFR BLD AUTO: 41.5 % (ref 35–45)
HDLC SERPL-MCNC: 35 MG/DL (ref 40–60)
HDLC SERPL: 3.6 {RATIO} (ref 0–5)
HGB BLD-MCNC: 13.6 G/DL (ref 12–16)
HGB BLD-MCNC: 13.7 G/DL (ref 12–16)
HGB UR QL STRIP: NEGATIVE
KETONES UR QL STRIP.AUTO: NEGATIVE MG/DL
LDLC SERPL CALC-MCNC: 45 MG/DL (ref 0–100)
LEUKOCYTE ESTERASE UR QL STRIP.AUTO: NEGATIVE
LIPID PROFILE,FLP: ABNORMAL
LYMPHOCYTES # BLD: 2.6 K/UL (ref 0.9–3.6)
LYMPHOCYTES # BLD: 2.8 K/UL (ref 0.9–3.6)
LYMPHOCYTES NFR BLD: 24 % (ref 21–52)
LYMPHOCYTES NFR BLD: 24 % (ref 21–52)
MCH RBC QN AUTO: 26.3 PG (ref 24–34)
MCH RBC QN AUTO: 26.7 PG (ref 24–34)
MCHC RBC AUTO-ENTMCNC: 32.8 G/DL (ref 31–37)
MCHC RBC AUTO-ENTMCNC: 33.3 G/DL (ref 31–37)
MCV RBC AUTO: 80 FL (ref 74–97)
MCV RBC AUTO: 80.1 FL (ref 74–97)
MICROALBUMIN UR-MCNC: 27.2 MG/DL (ref 0–3)
MICROALBUMIN/CREAT UR-RTO: 275 MG/G (ref 0–30)
MONOCYTES # BLD: 0.7 K/UL (ref 0.05–1.2)
MONOCYTES # BLD: 0.8 K/UL (ref 0.05–1.2)
MONOCYTES NFR BLD: 7 % (ref 3–10)
MONOCYTES NFR BLD: 7 % (ref 3–10)
NEUTS SEG # BLD: 7.1 K/UL (ref 1.8–8)
NEUTS SEG # BLD: 7.3 K/UL (ref 1.8–8)
NEUTS SEG NFR BLD: 64 % (ref 40–73)
NEUTS SEG NFR BLD: 64 % (ref 40–73)
NITRITE UR QL STRIP.AUTO: NEGATIVE
PH UR STRIP: 5.5 [PH] (ref 5–8)
PLATELET # BLD AUTO: 391 K/UL (ref 135–420)
PLATELET # BLD AUTO: 394 K/UL (ref 135–420)
PMV BLD AUTO: 10 FL (ref 9.2–11.8)
PMV BLD AUTO: 10 FL (ref 9.2–11.8)
POTASSIUM SERPL-SCNC: 4.2 MMOL/L (ref 3.5–5.5)
POTASSIUM SERPL-SCNC: 4.3 MMOL/L (ref 3.5–5.5)
PROT SERPL-MCNC: 7.1 G/DL (ref 6.4–8.2)
PROT SERPL-MCNC: 7.3 G/DL (ref 6.4–8.2)
PROT UR STRIP-MCNC: 30 MG/DL
RBC # BLD AUTO: 5.14 M/UL (ref 4.2–5.3)
RBC # BLD AUTO: 5.18 M/UL (ref 4.2–5.3)
RBC #/AREA URNS HPF: ABNORMAL /HPF (ref 0–5)
SODIUM SERPL-SCNC: 138 MMOL/L (ref 136–145)
SODIUM SERPL-SCNC: 138 MMOL/L (ref 136–145)
SP GR UR REFRACTOMETRY: 1.02 (ref 1–1.03)
T4 SERPL-MCNC: 10.7 UG/DL (ref 4.8–13.9)
TRIGL SERPL-MCNC: 235 MG/DL (ref ?–150)
TSH SERPL DL<=0.05 MIU/L-ACNC: 1.65 UIU/ML (ref 0.36–3.74)
UROBILINOGEN UR QL STRIP.AUTO: 0.2 EU/DL (ref 0.2–1)
VLDLC SERPL CALC-MCNC: 47 MG/DL
WBC # BLD AUTO: 11 K/UL (ref 4.6–13.2)
WBC # BLD AUTO: 11.4 K/UL (ref 4.6–13.2)
WBC URNS QL MICRO: ABNORMAL /HPF (ref 0–4)

## 2020-01-09 PROCEDURE — 36415 COLL VENOUS BLD VENIPUNCTURE: CPT

## 2020-01-09 PROCEDURE — 81001 URINALYSIS AUTO W/SCOPE: CPT

## 2020-01-09 PROCEDURE — 80061 LIPID PANEL: CPT

## 2020-01-09 PROCEDURE — 84436 ASSAY OF TOTAL THYROXINE: CPT

## 2020-01-09 PROCEDURE — 86140 C-REACTIVE PROTEIN: CPT

## 2020-01-09 PROCEDURE — 84443 ASSAY THYROID STIM HORMONE: CPT

## 2020-01-09 PROCEDURE — 83036 HEMOGLOBIN GLYCOSYLATED A1C: CPT

## 2020-01-09 PROCEDURE — 85652 RBC SED RATE AUTOMATED: CPT

## 2020-01-09 PROCEDURE — 80053 COMPREHEN METABOLIC PANEL: CPT

## 2020-01-09 PROCEDURE — 85025 COMPLETE CBC W/AUTO DIFF WBC: CPT

## 2020-01-09 PROCEDURE — 82043 UR ALBUMIN QUANTITATIVE: CPT

## 2020-01-09 PROCEDURE — 82306 VITAMIN D 25 HYDROXY: CPT

## 2020-01-10 DIAGNOSIS — G89.4 CHRONIC PAIN SYNDROME: ICD-10-CM

## 2020-01-10 DIAGNOSIS — D75.839 THROMBOCYTOSIS: ICD-10-CM

## 2020-01-10 RX ORDER — ANAGRELIDE 0.5 MG/1
0.5 CAPSULE ORAL 2 TIMES DAILY
Qty: 60 CAP | Refills: 6 | Status: SHIPPED | OUTPATIENT
Start: 2020-01-10 | End: 2020-04-17 | Stop reason: SDUPTHER

## 2020-01-10 RX ORDER — OXYCODONE AND ACETAMINOPHEN 10; 325 MG/1; MG/1
1 TABLET ORAL
Qty: 60 TAB | Refills: 0 | Status: SHIPPED | OUTPATIENT
Start: 2020-01-10 | End: 2020-01-27 | Stop reason: SDUPTHER

## 2020-01-24 ENCOUNTER — HOSPITAL ENCOUNTER (OUTPATIENT)
Dept: LAB | Age: 55
Discharge: HOME | End: 2020-01-24

## 2020-01-24 ENCOUNTER — CLINICAL SUPPORT (OUTPATIENT)
Dept: ONCOLOGY | Age: 55
End: 2020-01-24

## 2020-01-24 ENCOUNTER — HOSPITAL ENCOUNTER (OUTPATIENT)
Dept: ONCOLOGY | Age: 55
Discharge: HOME | End: 2020-01-24

## 2020-01-24 ENCOUNTER — HOSPITAL ENCOUNTER (OUTPATIENT)
Dept: INFUSION THERAPY | Age: 55
Discharge: HOME | End: 2020-01-24
Payer: MEDICARE

## 2020-01-24 VITALS
BODY MASS INDEX: 31.69 KG/M2 | RESPIRATION RATE: 20 BRPM | OXYGEN SATURATION: 99 % | HEART RATE: 82 BPM | SYSTOLIC BLOOD PRESSURE: 152 MMHG | WEIGHT: 226.4 LBS | DIASTOLIC BLOOD PRESSURE: 82 MMHG | TEMPERATURE: 97.6 F | HEIGHT: 71 IN

## 2020-01-24 DIAGNOSIS — M06.09 RHEUMATOID ARTHRITIS OF MULTIPLE SITES WITHOUT RHEUMATOID FACTOR (HCC): Primary | ICD-10-CM

## 2020-01-24 DIAGNOSIS — M06.9 RHEUMATOID ARTHRITIS OF FOOT, UNSPECIFIED LATERALITY, UNSPECIFIED RHEUMATOID FACTOR PRESENCE: Primary | ICD-10-CM

## 2020-01-24 DIAGNOSIS — M06.09 RHEUMATOID ARTHRITIS OF MULTIPLE SITES WITHOUT RHEUMATOID FACTOR (HCC): ICD-10-CM

## 2020-01-24 LAB
ALBUMIN SERPL-MCNC: 3.1 G/DL (ref 3.4–5)
ALBUMIN/GLOB SERPL: 0.8 {RATIO} (ref 0.8–1.7)
ALP SERPL-CCNC: 134 U/L (ref 45–117)
ALT SERPL-CCNC: 21 U/L (ref 13–56)
ANION GAP SERPL CALC-SCNC: 7 MMOL/L (ref 3–18)
AST SERPL-CCNC: 9 U/L (ref 10–38)
BASO+EOS+MONOS # BLD AUTO: 0.7 K/UL (ref 0–2.3)
BASO+EOS+MONOS NFR BLD AUTO: 8 % (ref 0.1–17)
BILIRUB SERPL-MCNC: 0.7 MG/DL (ref 0.2–1)
BUN SERPL-MCNC: 16 MG/DL (ref 7–18)
BUN/CREAT SERPL: 19 (ref 12–20)
CALCIUM SERPL-MCNC: 9.1 MG/DL (ref 8.5–10.1)
CHLORIDE SERPL-SCNC: 103 MMOL/L (ref 100–111)
CO2 SERPL-SCNC: 25 MMOL/L (ref 21–32)
CREAT SERPL-MCNC: 0.84 MG/DL (ref 0.6–1.3)
CRP SERPL-MCNC: 1.7 MG/DL (ref 0–0.3)
DIFFERENTIAL METHOD BLD: NORMAL
ERYTHROCYTE [DISTWIDTH] IN BLOOD BY AUTOMATED COUNT: 13.5 % (ref 11.5–14.5)
ERYTHROCYTE [SEDIMENTATION RATE] IN BLOOD: 45 MM/HR (ref 0–30)
GLOBULIN SER CALC-MCNC: 4 G/DL (ref 2–4)
GLUCOSE SERPL-MCNC: 369 MG/DL (ref 74–99)
HCT VFR BLD AUTO: 41.5 % (ref 36–48)
HGB BLD-MCNC: 13.7 G/DL (ref 12–16)
LYMPHOCYTES # BLD: 2.1 K/UL (ref 1.1–5.9)
LYMPHOCYTES NFR BLD: 23 % (ref 14–44)
MCH RBC QN AUTO: 27.1 PG (ref 25–35)
MCHC RBC AUTO-ENTMCNC: 33 G/DL (ref 31–37)
MCV RBC AUTO: 82 FL (ref 78–102)
NEUTS SEG # BLD: 6.2 K/UL (ref 1.8–9.5)
NEUTS SEG NFR BLD: 69 % (ref 40–70)
PLATELET # BLD AUTO: 403 K/UL (ref 140–440)
POTASSIUM SERPL-SCNC: 4.2 MMOL/L (ref 3.5–5.5)
PROT SERPL-MCNC: 7.1 G/DL (ref 6.4–8.2)
RBC # BLD AUTO: 5.06 M/UL (ref 4.1–5.1)
SODIUM SERPL-SCNC: 135 MMOL/L (ref 136–145)
WBC # BLD AUTO: 9 K/UL (ref 4.5–13)

## 2020-01-24 PROCEDURE — 85652 RBC SED RATE AUTOMATED: CPT

## 2020-01-24 PROCEDURE — 96375 TX/PRO/DX INJ NEW DRUG ADDON: CPT

## 2020-01-24 PROCEDURE — 74011250636 HC RX REV CODE- 250/636: Performed by: NURSE PRACTITIONER

## 2020-01-24 PROCEDURE — 96413 CHEMO IV INFUSION 1 HR: CPT

## 2020-01-24 PROCEDURE — 80053 COMPREHEN METABOLIC PANEL: CPT

## 2020-01-24 PROCEDURE — 86140 C-REACTIVE PROTEIN: CPT

## 2020-01-24 PROCEDURE — 96415 CHEMO IV INFUSION ADDL HR: CPT

## 2020-01-24 RX ORDER — SODIUM CHLORIDE 9 MG/ML
10 INJECTION INTRAMUSCULAR; INTRAVENOUS; SUBCUTANEOUS AS NEEDED
Status: CANCELLED | OUTPATIENT
Start: 2020-02-14

## 2020-01-24 RX ORDER — DIPHENHYDRAMINE HYDROCHLORIDE 50 MG/ML
25 INJECTION, SOLUTION INTRAMUSCULAR; INTRAVENOUS ONCE
Status: CANCELLED
Start: 2020-02-14

## 2020-01-24 RX ORDER — EPINEPHRINE 1 MG/ML
0.3 INJECTION, SOLUTION, CONCENTRATE INTRAVENOUS AS NEEDED
Status: CANCELLED | OUTPATIENT
Start: 2020-02-14

## 2020-01-24 RX ORDER — SODIUM CHLORIDE 0.9 % (FLUSH) 0.9 %
10 SYRINGE (ML) INJECTION AS NEEDED
Status: CANCELLED
Start: 2020-02-14

## 2020-01-24 RX ORDER — DIPHENHYDRAMINE HYDROCHLORIDE 50 MG/ML
50 INJECTION, SOLUTION INTRAMUSCULAR; INTRAVENOUS AS NEEDED
Status: CANCELLED
Start: 2020-02-14

## 2020-01-24 RX ORDER — SODIUM CHLORIDE 9 MG/ML
25 INJECTION, SOLUTION INTRAVENOUS CONTINUOUS
Status: CANCELLED | OUTPATIENT
Start: 2020-02-14

## 2020-01-24 RX ORDER — HYDROCORTISONE SODIUM SUCCINATE 100 MG/2ML
100 INJECTION, POWDER, FOR SOLUTION INTRAMUSCULAR; INTRAVENOUS AS NEEDED
Status: CANCELLED | OUTPATIENT
Start: 2020-02-14

## 2020-01-24 RX ORDER — HYDROCHLOROTHIAZIDE 12.5 MG/1
12.5 TABLET ORAL DAILY
COMMUNITY

## 2020-01-24 RX ORDER — HEPARIN 100 UNIT/ML
300-500 SYRINGE INTRAVENOUS AS NEEDED
Status: CANCELLED
Start: 2020-02-14

## 2020-01-24 RX ORDER — SODIUM CHLORIDE 9 MG/ML
25 INJECTION, SOLUTION INTRAVENOUS CONTINUOUS
Status: DISPENSED | OUTPATIENT
Start: 2020-01-24 | End: 2020-01-24

## 2020-01-24 RX ORDER — ACETAMINOPHEN 325 MG/1
650 TABLET ORAL AS NEEDED
Status: CANCELLED
Start: 2020-02-14

## 2020-01-24 RX ORDER — ALBUTEROL SULFATE 0.83 MG/ML
2.5 SOLUTION RESPIRATORY (INHALATION) AS NEEDED
Status: CANCELLED
Start: 2020-02-14

## 2020-01-24 RX ORDER — ONDANSETRON 2 MG/ML
8 INJECTION INTRAMUSCULAR; INTRAVENOUS AS NEEDED
Status: CANCELLED | OUTPATIENT
Start: 2020-02-14

## 2020-01-24 RX ORDER — DIPHENHYDRAMINE HYDROCHLORIDE 50 MG/ML
25 INJECTION, SOLUTION INTRAMUSCULAR; INTRAVENOUS ONCE
Status: COMPLETED | OUTPATIENT
Start: 2020-01-24 | End: 2020-01-24

## 2020-01-24 RX ADMIN — SODIUM CHLORIDE 400 MG: 900 INJECTION, SOLUTION INTRAVENOUS at 12:20

## 2020-01-24 RX ADMIN — DIPHENHYDRAMINE HYDROCHLORIDE 25 MG: 50 INJECTION INTRAMUSCULAR; INTRAVENOUS at 11:08

## 2020-01-24 RX ADMIN — SODIUM CHLORIDE 25 ML/HR: 9 INJECTION, SOLUTION INTRAVENOUS at 11:00

## 2020-01-24 NOTE — PROGRESS NOTES
[facility] Progress Note    Date: 2020    Name: Kristian Levine    MRN: [id number]         : 1965      Ms. Rahul Dempsey arrived in the [facility] today at 0920, in stable condition, here for Q 6 Week, IV Remicade Infusion. She was assessed and education was provided. Ms. Emigdio Norman vitals were reviewed. Visit Vitals  /82 (BP 1 Location: Left arm, BP Patient Position: Sitting)   Pulse 91   Temp 99.4 °F (37.4 °C)   Resp 20   Ht 5' 11\" (1.803 m)   Wt 102.7 kg (226 lb 6.4 oz)   SpO2 99%   Breastfeeding No   BMI 31.58 kg/m²         Switched to Renflexis (infliximab-abda) (Remicade Biosimilar), per pharmacy. PIV (#22G) was established in her posterior right forearm at 1010, without incident, and blood was drawn for a CBC, CMP, ESR, & CRP (2 lavender top tubes & 2 SST tubes), per order. (The CBC was processed in house, and the CMP, ESR, & CRP, were sent out to be processed. ). Lab results were obtained and reviewed, and the CBC results from today listed below, were noted to be satisfactory for treatment today. Recent Results (from the past 12 hour(s))   CBC WITH 3 PART DIFF    Collection Time: 20 10:10 AM   Result Value Ref Range    WBC 9.0 4.5 - 13.0 K/uL    RBC 5.06 4.10 - 5.10 M/uL    HGB 13.7 12.0 - 16.0 g/dL    HCT 41.5 36 - 48 %    MCV 82.0 78 - 102 FL    MCH 27.1 25.0 - 35.0 PG    MCHC 33.0 31 - 37 g/dL    RDW 13.5 11.5 - 14.5 %    PLATELET 530 135 - 934 K/uL    NEUTROPHILS 69 40 - 70 %    MIXED CELLS 8 0.1 - 17 %    LYMPHOCYTES 23 14 - 44 %    ABS. NEUTROPHILS 6.2 1.8 - 9.5 K/UL    ABS. MIXED CELLS 0.7 0.0 - 2.3 K/uL    ABS. LYMPHOCYTES 2.1 1.1 - 5.9 K/UL    DF AUTOMATED              ml IV Bag, was initiated to infuse @ KVO PRN, throughout treatment today.        Pre-medication consisting of IV Benadryl 25 mg, was administered per order, and without incident.            Renflexis (infliximab-abda) 400 mg IV (4 mg/kg) Maintenance Dose, was administered over 2 hours, per order, and without incident. After the completion of the IV Renflexis Infusion, Ms. Tabitha Forbes was monitored for 30 minutes, per order, and also without incident. After completion of the observation period, the PIV was removed, and gauze/bandaid was applied. Ms. Tabitha Forbes tolerated well, and had no complaints. Ms. Tabitha Forbes was discharged from [place] [address] in stable condition at 1500. .. She is to return in 6 weeks, on Friday, 3-6-20, at 0900,  for her next appointment, for her next dose of IV Renflexis.     Venkat Soto RN  January 24, 2020  10:52 AM

## 2020-01-27 DIAGNOSIS — G89.4 CHRONIC PAIN SYNDROME: ICD-10-CM

## 2020-01-27 RX ORDER — OXYCODONE AND ACETAMINOPHEN 10; 325 MG/1; MG/1
1 TABLET ORAL
Qty: 60 TAB | Refills: 0 | Status: SHIPPED | OUTPATIENT
Start: 2020-01-27 | End: 2020-02-11 | Stop reason: SDUPTHER

## 2020-02-06 ENCOUNTER — APPOINTMENT (OUTPATIENT)
Dept: GENERAL RADIOLOGY | Age: 55
End: 2020-02-06
Attending: EMERGENCY MEDICINE
Payer: MEDICARE

## 2020-02-06 ENCOUNTER — HOSPITAL ENCOUNTER (EMERGENCY)
Age: 55
Discharge: HOME | End: 2020-02-06
Attending: EMERGENCY MEDICINE
Payer: MEDICARE

## 2020-02-06 VITALS
SYSTOLIC BLOOD PRESSURE: 137 MMHG | DIASTOLIC BLOOD PRESSURE: 64 MMHG | HEART RATE: 94 BPM | RESPIRATION RATE: 16 BRPM | OXYGEN SATURATION: 100 % | TEMPERATURE: 98.1 F

## 2020-02-06 DIAGNOSIS — R73.9 HYPERGLYCEMIA: Primary | ICD-10-CM

## 2020-02-06 LAB
ANION GAP SERPL CALC-SCNC: 8 MMOL/L (ref 3–18)
ATRIAL RATE: 107 BPM
BASOPHILS # BLD: 0 K/UL (ref 0–0.1)
BASOPHILS NFR BLD: 0 % (ref 0–2)
BNP SERPL-MCNC: 49 PG/ML (ref 0–900)
BUN SERPL-MCNC: 21 MG/DL (ref 7–18)
BUN/CREAT SERPL: 24 (ref 12–20)
CALCIUM SERPL-MCNC: 9.3 MG/DL (ref 8.5–10.1)
CALCULATED P AXIS, ECG09: 46 DEGREES
CALCULATED R AXIS, ECG10: 16 DEGREES
CALCULATED T AXIS, ECG11: 17 DEGREES
CHLORIDE SERPL-SCNC: 107 MMOL/L (ref 100–111)
CK MB CFR SERPL CALC: 1.8 % (ref 0–4)
CK MB SERPL-MCNC: 1.3 NG/ML (ref 5–25)
CK SERPL-CCNC: 73 U/L (ref 26–192)
CO2 SERPL-SCNC: 26 MMOL/L (ref 21–32)
CREAT SERPL-MCNC: 0.86 MG/DL (ref 0.6–1.3)
DIAGNOSIS, 93000: NORMAL
DIFFERENTIAL METHOD BLD: NORMAL
EOSINOPHIL # BLD: 0.4 K/UL (ref 0–0.4)
EOSINOPHIL NFR BLD: 4 % (ref 0–5)
ERYTHROCYTE [DISTWIDTH] IN BLOOD BY AUTOMATED COUNT: 14.1 % (ref 11.6–14.5)
GLUCOSE BLD STRIP.AUTO-MCNC: 231 MG/DL (ref 70–110)
GLUCOSE BLD STRIP.AUTO-MCNC: 295 MG/DL (ref 70–110)
GLUCOSE SERPL-MCNC: 285 MG/DL (ref 74–99)
HCT VFR BLD AUTO: 41.5 % (ref 35–45)
HGB BLD-MCNC: 13.6 G/DL (ref 12–16)
LYMPHOCYTES # BLD: 2.7 K/UL (ref 0.9–3.6)
LYMPHOCYTES NFR BLD: 27 % (ref 21–52)
MCH RBC QN AUTO: 26.5 PG (ref 24–34)
MCHC RBC AUTO-ENTMCNC: 32.8 G/DL (ref 31–37)
MCV RBC AUTO: 80.9 FL (ref 74–97)
MONOCYTES # BLD: 0.8 K/UL (ref 0.05–1.2)
MONOCYTES NFR BLD: 8 % (ref 3–10)
NEUTS SEG # BLD: 6.1 K/UL (ref 1.8–8)
NEUTS SEG NFR BLD: 61 % (ref 40–73)
P-R INTERVAL, ECG05: 170 MS
PLATELET # BLD AUTO: 359 K/UL (ref 135–420)
PMV BLD AUTO: 9.7 FL (ref 9.2–11.8)
POTASSIUM SERPL-SCNC: 4 MMOL/L (ref 3.5–5.5)
Q-T INTERVAL, ECG07: 366 MS
QRS DURATION, ECG06: 94 MS
QTC CALCULATION (BEZET), ECG08: 488 MS
RBC # BLD AUTO: 5.13 M/UL (ref 4.2–5.3)
SODIUM SERPL-SCNC: 141 MMOL/L (ref 136–145)
TROPONIN I SERPL-MCNC: <0.02 NG/ML (ref 0–0.04)
VENTRICULAR RATE, ECG03: 107 BPM
WBC # BLD AUTO: 10 K/UL (ref 4.6–13.2)

## 2020-02-06 PROCEDURE — 85025 COMPLETE CBC W/AUTO DIFF WBC: CPT

## 2020-02-06 PROCEDURE — 82550 ASSAY OF CK (CPK): CPT

## 2020-02-06 PROCEDURE — 93005 ELECTROCARDIOGRAM TRACING: CPT

## 2020-02-06 PROCEDURE — 73630 X-RAY EXAM OF FOOT: CPT

## 2020-02-06 PROCEDURE — 99284 EMERGENCY DEPT VISIT MOD MDM: CPT

## 2020-02-06 PROCEDURE — 71046 X-RAY EXAM CHEST 2 VIEWS: CPT

## 2020-02-06 PROCEDURE — 80048 BASIC METABOLIC PNL TOTAL CA: CPT

## 2020-02-06 PROCEDURE — 83880 ASSAY OF NATRIURETIC PEPTIDE: CPT

## 2020-02-06 PROCEDURE — 82962 GLUCOSE BLOOD TEST: CPT

## 2020-02-06 PROCEDURE — 74011250636 HC RX REV CODE- 250/636: Performed by: EMERGENCY MEDICINE

## 2020-02-06 RX ORDER — SODIUM CHLORIDE 9 MG/ML
1000 INJECTION, SOLUTION INTRAVENOUS ONCE
Status: COMPLETED | OUTPATIENT
Start: 2020-02-06 | End: 2020-02-06

## 2020-02-06 RX ADMIN — SODIUM CHLORIDE 1000 ML: 900 INJECTION, SOLUTION INTRAVENOUS at 06:49

## 2020-02-06 NOTE — ED NOTES
Assumed care for the patient this morning pending repeat glucose and reevaluation. Glucose went from 2 92-20 and heart rate went from 10 4-94. Patient feels significantly better and results were discussed as well as education about diet and tight glycemic control. She was also provided with primary care follow-up.

## 2020-02-06 NOTE — ED PROVIDER NOTES
Pt c/o rt foot/toe pain x one week, no known trauma, noted while walking. Worse in 4-5th toe. No bruising or rash. On weakness or numbness. No calf/thigh pain now but occas leg cramps while sleeping. freq urination, no dysuria. No swelling. Concerned bc gluc has been over 400. Says woke up w chest pain yest, last 1 hour or so, resolved, hasn't returned  No sob. Had facial tingling on waking up yest also. None now. H/o cad, but no prior stents per pt. Requests new dr, concerned her current one not treating her dm well enough. Past Medical History:   Diagnosis Date    Abdominal pain, unspecified site     Acute otitis media     Acute pharyngitis     Anxiety     Arthritis     Autoimmune disease ([facility] [address]     Back injury     Backache     herniated disc in lower back    Blurred vision     Chest pain 5/4/2018    Chest pain, unspecified     abnormal EKG    Chronic airway obstruction, not elsewhere classified     Chronic back pain     Chronic pain     COPD     Depression     Diabetes (HCC)     Dizziness     Dizziness and giddiness     possible orthostatic changes, vasovagal, autonomic dysfunction from diabetic neuropathy    Encounters for unspecified administrative purpose     Essential thrombocytosis ([address] 1/6/2016    Feeling anxious     Fibromyalgia     Headache(784.0)     Hemorrhoid     Herniated disc     at L5    Hypercholesterolemia     Hyperglycemia     Hypertension     Joint pain     Leukocytosis, unspecified     Major depressive disorder, single episode, mild (HCC)     Mental disorder     depression, anxiety, bipolar and PTSD    Neuropathy     Obesity, unspecified     Other chest pain     Palpitation 5/4/2018    ?  arrhythmia    Phobic disorders     Rheumatoid arthritis (HCC)     Rheumatoid arthritis of foot ([facility] [address] 5/4/2018    on treatment    Seasonal allergies     Shortness of breath     normal EF    Smoking 5/4/2018    discussed cessation    Streptococcal sore throat     Type 2 diabetes mellitus without complication, with long-term current use of insulin ([facility] [address] 5/4/2018    Type II or unspecified type diabetes mellitus with unspecified complication, uncontrolled     Unspecified hereditary and idiopathic peripheral neuropathy     Vitamin D deficiency        Past Surgical History:   Procedure Laterality Date    HX CHOLECYSTECTOMY  1994    HX GYN  2005    partial Hysterectomy    HX OTHER SURGICAL  12-1-15    had ingrown toenail removed from big toe, both feet    HX TUBAL LIGATION      1995         Family History:   Problem Relation Age of Onset    Diabetes Other     Alcohol abuse Mother     Arthritis-osteo Mother     Diabetes Mother     Elevated Lipids Mother     Headache Mother     Heart Disease Mother     Migraines Mother     Psychiatric Disorder Mother     Alcohol abuse Father     Arthritis-osteo Father     Cancer Father     Headache Father     Heart Disease Father     Lung Disease Father     Migraines Father     Heart Surgery Father     Alcohol abuse Sister     Headache Sister     Diabetes Sister     Heart Disease Sister     Migraines Sister     Psychiatric Disorder Sister     Headache Brother     Diabetes Brother     Elevated Lipids Brother     Heart Disease Brother     Migraines Brother     Psychiatric Disorder Brother     Coronary Artery Disease Brother     Cancer Maternal Aunt     Alcohol abuse Maternal Aunt     Diabetes Maternal Aunt     Headache Maternal Aunt     Lung Disease Maternal Aunt     Migraines Maternal Aunt     Headache Maternal Uncle     Migraines Maternal Uncle     Stroke Maternal Uncle     Psychiatric Disorder Maternal Uncle     Headache Paternal Aunt     Migraines Paternal Aunt     Headache Paternal Uncle     Hypertension Paternal Uncle     Migraines Paternal Uncle     Stroke Paternal Uncle     Headache Maternal Grandmother     Migraines Maternal Grandmother     Stroke Maternal Grandmother  Headache Maternal Grandfather     Migraines Maternal Grandfather     Stroke Maternal Grandfather     Headache Paternal Grandmother     Hypertension Paternal Grandmother     Lung Disease Paternal Grandmother     Migraines Paternal Grandmother     Cancer Paternal Grandmother     Headache Paternal Grandfather     Cancer Paternal Grandfather     Migraines Paternal Grandfather        Social History     Socioeconomic History    Marital status:      Spouse name: Not on file    Number of children: Not on file    Years of education: Not on file    Highest education level: Not on file   Occupational History    Not on file   Social Needs    Financial resource strain: Not on file    Food insecurity:     Worry: Not on file     Inability: Not on file    Transportation needs:     Medical: Not on file     Non-medical: Not on file   Tobacco Use    Smoking status: Current Every Day Smoker     Packs/day: 0.25     Last attempt to quit: 2012     Years since quittin.5    Smokeless tobacco: Never Used    Tobacco comment: 5 cigarettes day   Substance and Sexual Activity    Alcohol use: Yes     Frequency: Monthly or less     Drinks per session: 1 or 2     Binge frequency: Never     Comment: socially    Drug use: No    Sexual activity: Yes     Partners: Male     Birth control/protection: Condom   Lifestyle    Physical activity:     Days per week: Not on file     Minutes per session: Not on file    Stress: Not on file   Relationships    Social connections:     Talks on phone: Not on file     Gets together: Not on file     Attends [employer] service: Not on file     Active member of club or organization: Not on file     Attends meetings of clubs or organizations: Not on file     Relationship status: Not on file    Intimate partner violence:     Fear of current or ex partner: Not on file     Emotionally abused: Not on file     Physically abused: Not on file     Forced sexual activity: Not on file Other Topics Concern    Not on file   Social History Narrative    Not on file         ALLERGIES: Levaquin [levofloxacin]; Lipitor [atorvastatin]; and Pollen extracts    Review of Systems   Constitutional: Negative for fever. HENT: Negative for congestion. Respiratory: Negative for cough and shortness of breath. Cardiovascular: Negative for leg swelling. Gastrointestinal: Negative for abdominal pain and vomiting. Genitourinary: Positive for frequency. Musculoskeletal: Positive for myalgias. Negative for back pain. Skin: Negative for rash. Neurological: Negative for light-headedness. All other systems reviewed and are negative. Vitals:    02/06/20 0620   BP: 153/82   Pulse: (!) 105   Resp: 16   Temp: 98.1 °F (36.7 °C)   SpO2: 100%            Physical Exam  Vitals signs and nursing note reviewed. Constitutional:       Appearance: She is well-developed. She is not diaphoretic. HENT:      Head: Normocephalic and atraumatic. Eyes:      Pupils: Pupils are equal, round, and reactive to light. Neck:      Musculoskeletal: Normal range of motion. Cardiovascular:      Rate and Rhythm: Normal rate and regular rhythm. Heart sounds: No murmur. Pulmonary:      Effort: Pulmonary effort is normal.      Breath sounds: No wheezing. Abdominal:      Palpations: Abdomen is soft. Tenderness: There is no abdominal tenderness. Musculoskeletal:         General: Tenderness (rt 4-5 toe ttp, no swelling/rash. no eccymosis.  nvi.  from present. no other leg/foot ttp) present. Skin:     General: Skin is dry. Capillary Refill: Capillary refill takes less than 2 seconds. Findings: No rash. Neurological:      Mental Status: She is alert and oriented to person, place, and time.           MDM       Procedures    Vitals:  Patient Vitals for the past 12 hrs:   Temp Pulse Resp BP SpO2   02/06/20 0620 98.1 °F (36.7 °C) (!) 105 16 153/82 100 %         Medications ordered:   Medications 0.9% sodium chloride infusion 1,000 mL (1,000 mL IntraVENous New Bag 2/6/20 0649)         Lab findings:  Recent Results (from the past 12 hour(s))   GLUCOSE, POC    Collection Time: 02/06/20  6:21 AM   Result Value Ref Range    Glucose (POC) 295 (H) 70 - 110 mg/dL           X-Ray, CT or other radiology findings or impressions:  XR CHEST PA LAT    (Results Pending)   XR FOOT RT MIN 3 V    (Results Pending)       Progress notes, Consult notes or additional Procedure notes: To sign pt out at 0700, labs/x-ray pending. Diagnosis: No diagnosis found. Follow-up Information    None          Patient's Medications   Start Taking    No medications on file   Continue Taking    ALBUTEROL (PROAIR HFA) 90 MCG/ACTUATION INHALER    Take 1 Puff by inhalation every six (6) hours as needed for Wheezing. ANAGRELIDE (AGRYLIN) 0.5 MG CAPSULE    Take 1 Cap by mouth two (2) times a day. Indications: Spontaneous Hemorrhages and Increase in Blood Platelets    ASPIRIN DELAYED-RELEASE 81 MG TABLET    Take 1 Tab by mouth daily. ATENOLOL (TENORMIN) 25 MG TABLET    Take 25 mg by mouth daily. Indications: HYPERTENSION    ATORVASTATIN (LIPITOR) 40 MG TABLET    Take 1 Tab by mouth nightly. BD INSULIN SYRINGE ULTRA-FINE 1 ML 31 GAUGE X 15/64\" SYRG        CHOLECALCIFEROL, VITAMIN D3, 5,000 UNIT TAB    Take 5,000 Units by mouth every seven (7) days. CLONAZEPAM (KLONOPIN) 2 MG TABLET    Take 1 mg by mouth daily as needed. Indications: panic disorder    FOLIC ACID 748 MCG TABLET    Take 400 mcg by mouth daily. FUROSEMIDE (LASIX) 40 MG TABLET    Take  by mouth daily. HYDROCHLOROTHIAZIDE (HYDRODIURIL) 12.5 MG TABLET    Take 12.5 mg by mouth daily. INFLIXIMAB (REMICADE IV)    344 mg by IntraVENous route once as needed. remicade will be every 6 weeks    INSULIN GLARGINE (LANTUS) 100 UNIT/ML INJECTION    30 Units by SubCUTAneous route daily.     INSULIN LISPRO (HUMALOG) 100 UNIT/ML INJECTION    For Blood Sugar of less than 150 = 0 units/150 -199 = 3 units/200 -249 = 6 units/250 -299 = 9 units/300 - 349 = 12 units/350+ = 15 units. LOSARTAN (COZAAR) 50 MG TABLET    Take 1 Tab by mouth daily. METFORMIN (GLUCOPHAGE) 1,000 MG TABLET    Take 500 mg by mouth two (2) times a day. METHOTREXATE (RHEUMATREX) 2.5 MG TABLET    Take 2.5 mg by mouth every fourty-eight (48) hours. NALOXONE (NARCAN) 4 MG/ACTUATION NASAL SPRAY    Use 1 spray intranasally, then discard. Repeat with new spray every 2 min as needed for opioid overdose symptoms, alternating nostrils. Indications: Decrease in Rate & Depth of Breathing due to Opioid Drug, opioid overdose    ONDANSETRON HCL (ZOFRAN, AS HYDROCHLORIDE,) 4 MG TABLET    Take 1 Tab by mouth every eight (8) hours as needed for Nausea. OXYCODONE-ACETAMINOPHEN (PERCOCET 10)  MG PER TABLET    Take 1 Tab by mouth every six (6) hours as needed for Pain for up to 15 days. Max Daily Amount: 4 Tabs.    These Medications have changed    No medications on file   Stop Taking    No medications on file

## 2020-02-06 NOTE — ED NOTES
Edith Wong is a 62 y.o. female that was discharged in stable condition. The patients diagnosis, condition and treatment were explained to  patient and aftercare instructions were given. The patient verbalized understanding. Patient armband removed and shredded.

## 2020-02-06 NOTE — DISCHARGE INSTRUCTIONS
Patient Education        Learning About High Blood Sugar  What is high blood sugar? Your body turns the food you eat into glucose (sugar), which it uses for energy. But if your body isn't able to use the sugar right away, it can build up in your blood and lead to high blood sugar. When the amount of sugar in your blood stays too high for too much of the time, you may have diabetes. Diabetes is a disease that can cause serious health problems. The good news is that lifestyle changes may help you get your blood sugar back to normal and avoid or delay diabetes. What causes high blood sugar? Sugar (glucose) can build up in your blood if you:  · Are overweight. · Have a family history of diabetes. · Take certain medicines, such as steroids. What are the symptoms? Having high blood sugar may not cause any symptoms at all. Or it may make you feel very thirsty or very hungry. You may also urinate more often than usual, have blurry vision, or lose weight without trying. How is high blood sugar treated? You can take steps to lower your blood sugar level if you understand what makes it get higher. Your doctor may want you to learn how to test your blood sugar level at home. Then you can see how illness, stress, or different kinds of food or medicine raise or lower your blood sugar level. Other tests may be needed to see if you have diabetes. How can you prevent high blood sugar? · Watch your weight. If you're overweight, losing just a small amount of weight may help. Reducing fat around your waist is most important. · Limit the amount of calories, sweets, and unhealthy fat you eat. Ask your doctor if a dietitian can help you. A registered dietitian can help you create meal plans that fit your lifestyle. · Get at least 30 minutes of exercise on most days of the week. Exercise helps control your blood sugar. It also helps you maintain a healthy weight. Walking is a good choice.  You also may want to do other activities, such as running, swimming, cycling, or playing tennis or team sports. · If your doctor prescribed medicines, take them exactly as prescribed. Call your doctor if you think you are having a problem with your medicine. You will get more details on the specific medicines your doctor prescribes. Follow-up care is a key part of your treatment and safety. Be sure to make and go to all appointments, and call your doctor if you are having problems. It's also a good idea to know your test results and keep a list of the medicines you take. Where can you learn more? Go to http://swapnil-speedy.info/. Enter O108 in the search box to learn more about \"Learning About High Blood Sugar. \"  Current as of: April 16, 2019  Content Version: 12.2  © 0430-5101 [employer]. Care instructions adapted under license by [employer] (which disclaims liability or warranty for this information). If you have questions about a medical condition or this instruction, always ask your healthcare professional. [address] any warranty or liability for your use of this information.

## 2020-02-06 NOTE — ED TRIAGE NOTES
Patient reports to ED with complaints of right sided numbness  And elevated blood glucose x 2 weeks.

## 2020-02-06 NOTE — ED NOTES
Received report and assume care of patient. Patient's only complaint is right foot pain. Family member at bedside. Repeat BS completed.

## 2020-02-11 ENCOUNTER — HOSPITAL ENCOUNTER (OUTPATIENT)
Dept: LAB | Age: 55
Discharge: HOME | End: 2020-02-11
Payer: MEDICARE

## 2020-02-11 ENCOUNTER — OFFICE VISIT (OUTPATIENT)
Dept: ONCOLOGY | Age: 55
End: 2020-02-11

## 2020-02-11 VITALS
BODY MASS INDEX: 32.34 KG/M2 | OXYGEN SATURATION: 99 % | RESPIRATION RATE: 16 BRPM | HEIGHT: 71 IN | SYSTOLIC BLOOD PRESSURE: 130 MMHG | HEART RATE: 88 BPM | TEMPERATURE: 98.6 F | WEIGHT: 231 LBS | DIASTOLIC BLOOD PRESSURE: 76 MMHG

## 2020-02-11 DIAGNOSIS — M06.9 RHEUMATOID ARTHRITIS INVOLVING MULTIPLE JOINTS (HCC): ICD-10-CM

## 2020-02-11 DIAGNOSIS — M79.7 FIBROMYALGIA: ICD-10-CM

## 2020-02-11 DIAGNOSIS — D47.3 ESSENTIAL THROMBOCYTOSIS (HCC): ICD-10-CM

## 2020-02-11 DIAGNOSIS — D64.9 CHRONIC ANEMIA: ICD-10-CM

## 2020-02-11 DIAGNOSIS — G89.4 CHRONIC PAIN SYNDROME: ICD-10-CM

## 2020-02-11 DIAGNOSIS — D72.829 LEUKOCYTOSIS, UNSPECIFIED TYPE: Primary | ICD-10-CM

## 2020-02-11 DIAGNOSIS — D72.829 LEUKOCYTOSIS, UNSPECIFIED TYPE: ICD-10-CM

## 2020-02-11 LAB
BASOPHILS # BLD: 0 K/UL (ref 0–0.1)
BASOPHILS NFR BLD: 0 % (ref 0–2)
DIFFERENTIAL METHOD BLD: ABNORMAL
EOSINOPHIL # BLD: 0.4 K/UL (ref 0–0.4)
EOSINOPHIL NFR BLD: 5 % (ref 0–5)
ERYTHROCYTE [DISTWIDTH] IN BLOOD BY AUTOMATED COUNT: 14.7 % (ref 11.6–14.5)
FERRITIN SERPL-MCNC: 95 NG/ML (ref 8–388)
HCT VFR BLD AUTO: 40.9 % (ref 35–45)
HGB BLD-MCNC: 13 G/DL (ref 12–16)
IRON SATN MFR SERPL: 22 % (ref 20–50)
IRON SERPL-MCNC: 67 UG/DL (ref 50–175)
LYMPHOCYTES # BLD: 2.5 K/UL (ref 0.9–3.6)
LYMPHOCYTES NFR BLD: 30 % (ref 21–52)
MCH RBC QN AUTO: 26 PG (ref 24–34)
MCHC RBC AUTO-ENTMCNC: 31.8 G/DL (ref 31–37)
MCV RBC AUTO: 81.8 FL (ref 74–97)
MONOCYTES # BLD: 0.7 K/UL (ref 0.05–1.2)
MONOCYTES NFR BLD: 8 % (ref 3–10)
NEUTS SEG # BLD: 4.7 K/UL (ref 1.8–8)
NEUTS SEG NFR BLD: 57 % (ref 40–73)
PLATELET # BLD AUTO: 338 K/UL (ref 135–420)
PMV BLD AUTO: 10.1 FL (ref 9.2–11.8)
RBC # BLD AUTO: 5 M/UL (ref 4.2–5.3)
TIBC SERPL-MCNC: 304 UG/DL (ref 250–450)
WBC # BLD AUTO: 8.4 K/UL (ref 4.6–13.2)

## 2020-02-11 PROCEDURE — 83540 ASSAY OF IRON: CPT

## 2020-02-11 PROCEDURE — 82728 ASSAY OF FERRITIN: CPT

## 2020-02-11 PROCEDURE — 36415 COLL VENOUS BLD VENIPUNCTURE: CPT

## 2020-02-11 PROCEDURE — 85025 COMPLETE CBC W/AUTO DIFF WBC: CPT

## 2020-02-11 RX ORDER — OXYCODONE AND ACETAMINOPHEN 10; 325 MG/1; MG/1
1 TABLET ORAL
Qty: 60 TAB | Refills: 0 | Status: SHIPPED | OUTPATIENT
Start: 2020-02-11 | End: 2020-02-25 | Stop reason: SDUPTHER

## 2020-02-11 NOTE — PATIENT INSTRUCTIONS
Complete Blood Count (CBC): About This Test  What is it? A complete blood count (CBC) is a blood test that gives important information about your blood cells, especially red blood cells, white blood cells, and platelets. Why is this test done? A CBC may be done as part of a regular physical exam. There are many other reasons that a doctor may want this blood test, including to:  · Find the cause of symptoms such as fatigue, weakness, fever, bruising, or weight loss. · Find anemia or an infection. · See how much blood has been lost if there is bleeding. · Diagnose diseases of the blood, such as leukemia or polycythemia. How can you prepare for the test?  You do not need to do anything before having this test.  What happens during the test?  The health professional taking a sample of your blood will:  · Wrap an elastic band around your upper arm. This makes the veins below the band larger so it is easier to put a needle into the vein. · Clean the needle site with alcohol. · Put the needle into the vein. · Attach a tube to the needle to fill it with blood. · Remove the band from your arm when enough blood is collected. · Put a gauze pad or cotton ball over the needle site as the needle is removed. · Put pressure on the site and then put on a bandage. If this blood test is done on a baby, a heel stick may be done instead of a blood draw from a vein. What happens after the test?  · You will probably be able to go home right away. · You can go back to your usual activities right away. Follow-up care is a key part of your treatment and safety. Be sure to make and go to all appointments, and call your doctor if you are having problems. It's also a good idea to keep a list of the medicines you take. Ask your doctor when you can expect to have your test results. Where can you learn more? Go to http://swapnil-speedy.info/.   Enter B373 in the search box to learn more about \"Complete Blood Count (CBC): About This Test.\"  Current as of: March 28, 2019  Content Version: 12.2  © 9644-9843 [employer]. Care instructions adapted under license by [employer] (which disclaims liability or warranty for this information). If you have questions about a medical condition or this instruction, always ask your healthcare professional. [address] any warranty or liability for your use of this information.

## 2020-02-11 NOTE — PROGRESS NOTES
Hematology/Oncology  Progress Note    Name: Timmy Baron  Date: 2020  : 1965    PCP: Marco Trejo MD     Ms. Jennifer Crystal is a 52year old female who was seen for management of her rheumatoid arthritis, leukocytosis, and thrombocytosis. Current therapy: Remicade 4mg/kg bodyweight ever 6 weeks intravenously    Subjective:     Ms. Jennifer Crystal is a 62year-old [facility] American woman who has severe rheumatoid arthritis. She states she receives Remicade every 6 weeks and the last time she received her infusion was on 2020. She reports her next Remicade appointment is scheduled on 3/06/2020. She also reports that she was seen in the ED on 2020 due to toe pain and XRAY revealed acute fracture proximal phalanx right fourth toe. She states she is being followed by a foot doctor. She denies fatigue, shortness of breath, and weakness. She denies chest pain or dizziness. Aside from toe pain she denies pain or any discomfort. She is requesting for a new prescription for her Percocet. She does not have any concerns or complaints to report at this time. Past medical history, family history, and social history: these were reviewed and remain unchanged.      Past Medical History:   Diagnosis Date    Abdominal pain, unspecified site     Acute otitis media     Acute pharyngitis     Anxiety     Arthritis     Autoimmune disease ([facility] [address] [facility]     Back injury     Backache     herniated disc in lower back    Blurred vision     Chest pain 2018    Chest pain, unspecified     abnormal EKG    Chronic airway obstruction, not elsewhere classified     Chronic back pain     Chronic pain     COPD     Depression     Diabetes (HCC)     Dizziness     Dizziness and giddiness     possible orthostatic changes, vasovagal, autonomic dysfunction from diabetic neuropathy    Encounters for unspecified administrative purpose     Essential thrombocytosis ([facility] [address] [facility] 2016    Feeling anxious     Fibromyalgia  Headache(784.0)     Hemorrhoid     Herniated disc     at L5    Hypercholesterolemia     Hyperglycemia     Hypertension     Joint pain     Leukocytosis, unspecified     Major depressive disorder, single episode, mild (HCC)     Mental disorder     depression, anxiety, bipolar and PTSD    Neuropathy     Obesity, unspecified     Other chest pain     Palpitation 2018    ?  arrhythmia    Phobic disorders     Rheumatoid arthritis (HCC)     Rheumatoid arthritis of foot ([facility] [address] 2018    on treatment    Seasonal allergies     Shortness of breath     normal EF    Smoking 2018    discussed cessation    Streptococcal sore throat     Type 2 diabetes mellitus without complication, with long-term current use of insulin ([facility] [address] 2018    Type II or unspecified type diabetes mellitus with unspecified complication, uncontrolled     Unspecified hereditary and idiopathic peripheral neuropathy     Vitamin D deficiency      Past Surgical History:   Procedure Laterality Date    HX CHOLECYSTECTOMY      HX GYN  2005    partial Hysterectomy    HX OTHER SURGICAL  12-1-15    had ingrown toenail removed from big toe, both feet    HX TUBAL LIGATION           Social History     Socioeconomic History    Marital status:      Spouse name: Not on file    Number of children: Not on file    Years of education: Not on file    Highest education level: Not on file   Occupational History    Not on file   Social Needs    Financial resource strain: Not on file    Food insecurity:     Worry: Not on file     Inability: Not on file    Transportation needs:     Medical: Not on file     Non-medical: Not on file   Tobacco Use    Smoking status: Current Every Day Smoker     Packs/day: 0.25     Last attempt to quit: 2012     Years since quittin.5    Smokeless tobacco: Never Used    Tobacco comment: 5 cigarettes day   Substance and Sexual Activity    Alcohol use: Yes     Frequency: Monthly or less     Drinks per session: 1 or 2     Binge frequency: Never     Comment: socially    Drug use: No    Sexual activity: Yes     Partners: Male     Birth control/protection: Condom   Lifestyle    Physical activity:     Days per week: Not on file     Minutes per session: Not on file    Stress: Not on file   Relationships    Social connections:     Talks on phone: Not on file     Gets together: Not on file     Attends Scientologist service: Not on file     Active member of club or organization: Not on file     Attends meetings of clubs or organizations: Not on file     Relationship status: Not on file    Intimate partner violence:     Fear of current or ex partner: Not on file     Emotionally abused: Not on file     Physically abused: Not on file     Forced sexual activity: Not on file   Other Topics Concern    Not on file   Social History Narrative    Not on file     Family History   Problem Relation Age of Onset    Diabetes Other     Alcohol abuse Mother     Arthritis-osteo Mother     Diabetes Mother     Elevated Lipids Mother     Headache Mother     Heart Disease Mother     Migraines Mother     Psychiatric Disorder Mother     Alcohol abuse Father    Yvonnie Job Father     Cancer Father     Headache Father     Heart Disease Father     Lung Disease Father     Migraines Father     Heart Surgery Father     Alcohol abuse Sister     Headache Sister     Diabetes Sister     Heart Disease Sister     Migraines Sister     Psychiatric Disorder Sister     Headache Brother     Diabetes Brother     Elevated Lipids Brother     Heart Disease Brother     Migraines Brother     Psychiatric Disorder Brother     Coronary Artery Disease Brother     Cancer Maternal Aunt     Alcohol abuse Maternal Aunt     Diabetes Maternal Aunt     Headache Maternal Aunt     Lung Disease Maternal Aunt     Migraines Maternal Aunt     Headache Maternal Uncle     Migraines Maternal Uncle     Stroke Maternal Uncle     Psychiatric Disorder Maternal Uncle     Headache Paternal Aunt     Migraines Paternal Aunt     Headache Paternal Uncle     Hypertension Paternal Uncle     Migraines Paternal Uncle     Stroke Paternal Uncle     Headache Maternal Grandmother     Migraines Maternal Grandmother     Stroke Maternal Grandmother     Headache Maternal Grandfather     Migraines Maternal Grandfather     Stroke Maternal Grandfather     Headache Paternal Grandmother     Hypertension Paternal Grandmother     Lung Disease Paternal Grandmother     Migraines Paternal Grandmother     Cancer Paternal Grandmother     Headache Paternal Grandfather     Cancer Paternal Grandfather     Migraines Paternal Grandfather      Current Outpatient Medications   Medication Sig Dispense Refill    oxyCODONE-acetaminophen (PERCOCET 10)  mg per tablet Take 1 Tab by mouth every six (6) hours as needed for Pain for up to 15 days. Max Daily Amount: 4 Tabs. 60 Tab 0    hydroCHLOROthiazide (HYDRODIURIL) 12.5 mg tablet Take 12.5 mg by mouth daily.  anagrelide (AGRYLIN) 0.5 mg capsule Take 1 Cap by mouth two (2) times a day. Indications: Spontaneous Hemorrhages and Increase in Blood Platelets 60 Cap 6    losartan (COZAAR) 50 mg tablet Take 1 Tab by mouth daily. 90 Tab 1    atorvastatin (LIPITOR) 40 mg tablet Take 1 Tab by mouth nightly. 30 Tab 6    insulin lispro (HUMALOG) 100 unit/mL injection For Blood Sugar of less than 150 = 0 units/150 -199 = 3 units/200 -249 = 6 units/250 -299 = 9 units/300 - 349 = 12 units/350+ = 15 units. 1 Vial 1    aspirin delayed-release 81 mg tablet Take 1 Tab by mouth daily. 30 Tab 1    insulin glargine (LANTUS) 100 unit/mL injection 30 Units by SubCUTAneous route daily. 1 Vial 1    naloxone (NARCAN) 4 mg/actuation nasal spray Use 1 spray intranasally, then discard. Repeat with new spray every 2 min as needed for opioid overdose symptoms, alternating nostrils.   Indications: Decrease in Rate & Depth of Breathing due to Opioid Drug, opioid overdose 1 Each 1    BD INSULIN SYRINGE ULTRA-FINE 1 mL 31 gauge x 15/64\" syrg       clonazePAM (KLONOPIN) 2 mg tablet Take 1 mg by mouth daily as needed. Indications: panic disorder      folic acid 565 mcg tablet Take 400 mcg by mouth daily.  albuterol (PROAIR HFA) 90 mcg/actuation inhaler Take 1 Puff by inhalation every six (6) hours as needed for Wheezing. 1 Inhaler 0    ondansetron hcl (ZOFRAN, AS HYDROCHLORIDE,) 4 mg tablet Take 1 Tab by mouth every eight (8) hours as needed for Nausea. 12 Tab 0    methotrexate (RHEUMATREX) 2.5 mg tablet Take 2.5 mg by mouth every fourty-eight (48) hours.  metFORMIN (GLUCOPHAGE) 1,000 mg tablet Take 500 mg by mouth two (2) times a day.  Cholecalciferol, Vitamin D3, 5,000 unit Tab Take 5,000 Units by mouth every seven (7) days.  atenolol (TENORMIN) 25 mg tablet Take 25 mg by mouth daily. Indications: HYPERTENSION      furosemide (LASIX) 40 mg tablet Take  by mouth daily.  INFLIXIMAB (REMICADE IV) 344 mg by IntraVENous route once as needed. remicade will be every 6 weeks         Review of Systems  Constitutional: The patient has no acute distress or discomfort. HEENT: The patient denies recent head trauma, eye pain, blurred vision,  hearing deficit, oropharyngeal mucosal pain or lesions, and the patient denies throat pain or discomfort. Lymphatics: The patient denies palpable peripheral lymphadenopathy. Hematologic: The patient denies having bruising, bleeding, or progressive fatigue. Respiratory: Patient denies having shortness of breath, cough, sputum production, fever, or dyspnea on exertion. Cardiovascular: The patient denies having leg pain, leg swelling, heart palpitations, chest permit, chest pain, or lightheadedness. The patient denies having dyspnea on exertion.   Breast: The patient has a sebaceous cyst involving the right breast.  Gastrointestinal: The patient denies having nausea, emesis, or diarrhea. The patient denies having any hematemesis or blood in the stool. Genitourinary: Patient denies having urinary urgency, frequency, or dysuria. The patient denies having blood in the urine. Psychological: The patient denies having symptoms of nervousness, anxiety, depression, or thoughts of harming himself some of this. Skin: Patient denies having skin rashes, skin, ulcerations, or unexplained itching or pruritus. Musculoskeletal: See HPI, otherwise none. Objective:     Visit Vitals  /76   Pulse 88   Temp 98.6 °F (37 °C) (Oral)   Resp 16   Ht 5' 11\" (1.803 m)   Wt 104.8 kg (231 lb)   SpO2 99%   BMI 32.22 kg/m²     ECOG PS=0 Pain score 4/10     Physical Exam:   Gen. Appearance: The patient is in no acute distress. Skin: There is no bruise or rash. HEENT: The exam is unremarkable. Neck: Supple without lymphadenopathy or thyromegaly. Lungs: Clear to auscultation and percussion; there are no wheezes or rhonchi. Heart: Regular rate and rhythm; there are no murmurs, gallops, or rubs. Anterior chest wall and breast: Patient has a sebaceous cyst that has apparently drained from the underside of the right breast abutting the right anterior chest wall. Abdomen: Bowel sounds are present and normal.  There is no guarding, tenderness, or hepatosplenomegaly. Extremities: There is no clubbing, cyanosis, or edema. Neurologic: There are no focal neurologic deficits. Lymphatics: There is no palpable peripheral lymphadenopathy. Musculoskeletal: The patient has full range of motion at all joints. However, she complains of pain on ambulation due to the severe rheumatoid arthritis. There is  evidence of joint deformity or effusions in the hands and knees. There is no focal joint tenderness. She is using a cane for support and mobility. Psychological/psychiatric: There is no clinical evidence of anxiety, depression, or melancholy.     Lab data:      Results for orders placed or performed during the hospital encounter of 01/24/20   CBC WITH 3 PART DIFF     Status: None   Result Value Ref Range Status    WBC 9.0 4.5 - 13.0 K/uL Final    RBC 5.06 4.10 - 5.10 M/uL Final    HGB 13.7 12.0 - 16.0 g/dL Final    HCT 41.5 36 - 48 % Final    MCV 82.0 78 - 102 FL Final    MCH 27.1 25.0 - 35.0 PG Final    MCHC 33.0 31 - 37 g/dL Final    RDW 13.5 11.5 - 14.5 % Final    PLATELET 711 857 - 791 K/uL Final    NEUTROPHILS 69 40 - 70 % Final    MIXED CELLS 8 0.1 - 17 % Final    LYMPHOCYTES 23 14 - 44 % Final    ABS. NEUTROPHILS 6.2 1.8 - 9.5 K/UL Final    ABS. MIXED CELLS 0.7 0.0 - 2.3 K/uL Final    ABS. LYMPHOCYTES 2.1 1.1 - 5.9 K/UL Final     Comment: Test performed at [address] [place] [address] or Outpatient Infusion Center Location. Reviewed by Medical Director. DF AUTOMATED   Final           Assessment:     1. Leukocytosis, unspecified type    2. Essential thrombocytosis (HCC)    3. Rheumatoid arthritis involving multiple joints (HCC)    4. Fibromyalgia    5. Chronic anemia      Plan:   Leukocytosis: I have explained to the patient that the etiology of her leukocytosis remains undefined. A previous flow cytometry did not reveal any  evidence of an immunophenotypic abnormality such as an evolving chronic lymphoproliferative disorder or myeloproliferative disorder. The CBCs will continue to be monitored every 6 weeks. The CBC from /0206/2020 showed that her WBC count is normal at 10.0K/uL, neutrophils are 61%  Absolute neutrophil count is normal at 6.1K/uL and absolute lymphocyte count is normal at 2.7K/uL. Essential thrombocytosis/thrombocytosis: The current CBC shows that the platelet count is 773,008. The platelet count is being monitored every 6 weeks. She was advised to continue anagrelide 0.5mg one tablet twice daily. I have reenforced to the patient the importance of being complaint with the treatment plan.      Rheumatoid arthritis: The patient will continue to receive Remicade as a primary treatment modality for her severe rheumatoid arthritis every 6 weeks. Remicade is scheduled to be given on 03/06/2020. The patient will continued to take  Methotrexate 5mg PO weekly. She no longer takes Plaquenil 200mg twice daily. Fibromyalgia/chronic pain syndrome: The patient  continues to have generalized pain at times related to her underlying fibromyalgia. The patient receives her Percocet  mg medication on a monthly basis as needed. A new prescription for the Percocet was sent to her pharmacy per her request.    Chronic anemia: I have explained to the patient her most recent CBC showed her hemoglobin has remained normal at 13.6g/dL with hematocrit of 41.5%. Ferrous sulfate 325mg by mouth once daily will be continued. Iron Profile and Ferritin will be obtained at this time. Total time 25 minutes, greater than 50% of the time was in counseling and coordination of care. We will see the patient back in 6 weeks for a complete reassessment or sooner if indicated. Orders Placed This Encounter    CBC WITH AUTOMATED DIFF     Standing Status:   Future     Standing Expiration Date:   2/11/2021    IRON PROFILE     Standing Status:   Future     Standing Expiration Date:   2/11/2021    FERRITIN     Standing Status:   Future     Standing Expiration Date:   2/11/2021         [facility]  02/11/2020      I have assessed the patient independently and  agree with the full assessment as outlined.   Chet Fan MD, [employer]

## 2020-02-15 NOTE — PROGRESS NOTES
Attempted to call patient, but no answer. Left voicemail message for patient to return call. Fracture noted on x-ray. Patient requires Ortho follow-up in cast shoe if she does not already have one.

## 2020-02-18 ENCOUNTER — APPOINTMENT (OUTPATIENT)
Dept: INFUSION THERAPY | Age: 55
End: 2020-02-18
Payer: MEDICARE

## 2020-02-25 DIAGNOSIS — G89.4 CHRONIC PAIN SYNDROME: ICD-10-CM

## 2020-02-25 RX ORDER — OXYCODONE AND ACETAMINOPHEN 10; 325 MG/1; MG/1
1 TABLET ORAL
Qty: 60 TAB | Refills: 0 | Status: SHIPPED | OUTPATIENT
Start: 2020-02-25 | End: 2020-03-06 | Stop reason: SDUPTHER

## 2020-03-06 ENCOUNTER — CLINICAL SUPPORT (OUTPATIENT)
Dept: ONCOLOGY | Age: 55
End: 2020-03-06

## 2020-03-06 ENCOUNTER — HOSPITAL ENCOUNTER (OUTPATIENT)
Dept: INFUSION THERAPY | Age: 55
Discharge: HOME | End: 2020-03-06
Payer: MEDICARE

## 2020-03-06 ENCOUNTER — HOSPITAL ENCOUNTER (OUTPATIENT)
Dept: ONCOLOGY | Age: 55
Discharge: HOME | End: 2020-03-06

## 2020-03-06 VITALS
WEIGHT: 228.6 LBS | OXYGEN SATURATION: 99 % | RESPIRATION RATE: 18 BRPM | HEIGHT: 71 IN | TEMPERATURE: 99 F | DIASTOLIC BLOOD PRESSURE: 72 MMHG | BODY MASS INDEX: 32 KG/M2 | SYSTOLIC BLOOD PRESSURE: 133 MMHG | HEART RATE: 104 BPM

## 2020-03-06 DIAGNOSIS — M06.09 RHEUMATOID ARTHRITIS OF MULTIPLE SITES WITHOUT RHEUMATOID FACTOR (HCC): Primary | ICD-10-CM

## 2020-03-06 DIAGNOSIS — M06.09 RHEUMATOID ARTHRITIS OF MULTIPLE SITES WITHOUT RHEUMATOID FACTOR (HCC): ICD-10-CM

## 2020-03-06 DIAGNOSIS — M06.9 RHEUMATOID ARTHRITIS OF FOOT, UNSPECIFIED LATERALITY, UNSPECIFIED RHEUMATOID FACTOR PRESENCE: Primary | ICD-10-CM

## 2020-03-06 DIAGNOSIS — G89.4 CHRONIC PAIN SYNDROME: ICD-10-CM

## 2020-03-06 LAB
ALBUMIN SERPL-MCNC: 3.2 G/DL (ref 3.4–5)
ALBUMIN/GLOB SERPL: 0.8 {RATIO} (ref 0.8–1.7)
ALP SERPL-CCNC: 112 U/L (ref 45–117)
ALT SERPL-CCNC: 24 U/L (ref 13–56)
ANION GAP SERPL CALC-SCNC: 6 MMOL/L (ref 3–18)
AST SERPL-CCNC: 12 U/L (ref 10–38)
BASO+EOS+MONOS # BLD AUTO: 0.7 K/UL (ref 0–2.3)
BASO+EOS+MONOS NFR BLD AUTO: 7 % (ref 0.1–17)
BILIRUB SERPL-MCNC: 0.3 MG/DL (ref 0.2–1)
BUN SERPL-MCNC: 21 MG/DL (ref 7–18)
BUN/CREAT SERPL: 24 (ref 12–20)
CALCIUM SERPL-MCNC: 9.3 MG/DL (ref 8.5–10.1)
CHLORIDE SERPL-SCNC: 107 MMOL/L (ref 100–111)
CO2 SERPL-SCNC: 26 MMOL/L (ref 21–32)
CREAT SERPL-MCNC: 0.86 MG/DL (ref 0.6–1.3)
CRP SERPL-MCNC: 2 MG/DL (ref 0–0.3)
DIFFERENTIAL METHOD BLD: NORMAL
ERYTHROCYTE [DISTWIDTH] IN BLOOD BY AUTOMATED COUNT: 13.4 % (ref 11.5–14.5)
ERYTHROCYTE [SEDIMENTATION RATE] IN BLOOD: 47 MM/HR (ref 0–30)
GLOBULIN SER CALC-MCNC: 4.2 G/DL (ref 2–4)
GLUCOSE SERPL-MCNC: 287 MG/DL (ref 74–99)
HCT VFR BLD AUTO: 41.2 % (ref 36–48)
HGB BLD-MCNC: 13.1 G/DL (ref 12–16)
LYMPHOCYTES # BLD: 2.5 K/UL (ref 1.1–5.9)
LYMPHOCYTES NFR BLD: 27 % (ref 14–44)
MCH RBC QN AUTO: 26.1 PG (ref 25–35)
MCHC RBC AUTO-ENTMCNC: 31.8 G/DL (ref 31–37)
MCV RBC AUTO: 82.2 FL (ref 78–102)
NEUTS SEG # BLD: 6.2 K/UL (ref 1.8–9.5)
NEUTS SEG NFR BLD: 67 % (ref 40–70)
PLATELET # BLD AUTO: 353 K/UL (ref 140–440)
POTASSIUM SERPL-SCNC: 4 MMOL/L (ref 3.5–5.5)
PROT SERPL-MCNC: 7.4 G/DL (ref 6.4–8.2)
RBC # BLD AUTO: 5.01 M/UL (ref 4.1–5.1)
SODIUM SERPL-SCNC: 139 MMOL/L (ref 136–145)
WBC # BLD AUTO: 9.4 K/UL (ref 4.5–13)

## 2020-03-06 PROCEDURE — 96415 CHEMO IV INFUSION ADDL HR: CPT

## 2020-03-06 PROCEDURE — 36415 COLL VENOUS BLD VENIPUNCTURE: CPT

## 2020-03-06 PROCEDURE — 86140 C-REACTIVE PROTEIN: CPT

## 2020-03-06 PROCEDURE — 96413 CHEMO IV INFUSION 1 HR: CPT

## 2020-03-06 PROCEDURE — 74011250636 HC RX REV CODE- 250/636: Performed by: NURSE PRACTITIONER

## 2020-03-06 PROCEDURE — 96375 TX/PRO/DX INJ NEW DRUG ADDON: CPT

## 2020-03-06 PROCEDURE — 85652 RBC SED RATE AUTOMATED: CPT

## 2020-03-06 PROCEDURE — 80053 COMPREHEN METABOLIC PANEL: CPT

## 2020-03-06 RX ORDER — ALBUTEROL SULFATE 0.83 MG/ML
2.5 SOLUTION RESPIRATORY (INHALATION) AS NEEDED
Status: CANCELLED
Start: 2020-04-17

## 2020-03-06 RX ORDER — EPINEPHRINE 1 MG/ML
0.3 INJECTION, SOLUTION, CONCENTRATE INTRAVENOUS AS NEEDED
Status: CANCELLED | OUTPATIENT
Start: 2020-04-17

## 2020-03-06 RX ORDER — HEPARIN 100 UNIT/ML
300-500 SYRINGE INTRAVENOUS AS NEEDED
Status: CANCELLED
Start: 2020-04-17

## 2020-03-06 RX ORDER — DIPHENHYDRAMINE HYDROCHLORIDE 50 MG/ML
25 INJECTION, SOLUTION INTRAMUSCULAR; INTRAVENOUS ONCE
Status: CANCELLED
Start: 2020-04-17

## 2020-03-06 RX ORDER — SODIUM CHLORIDE 0.9 % (FLUSH) 0.9 %
10 SYRINGE (ML) INJECTION AS NEEDED
Status: CANCELLED
Start: 2020-04-17

## 2020-03-06 RX ORDER — SODIUM CHLORIDE 9 MG/ML
10 INJECTION INTRAMUSCULAR; INTRAVENOUS; SUBCUTANEOUS AS NEEDED
Status: CANCELLED | OUTPATIENT
Start: 2020-04-17

## 2020-03-06 RX ORDER — ONDANSETRON 2 MG/ML
8 INJECTION INTRAMUSCULAR; INTRAVENOUS AS NEEDED
Status: CANCELLED | OUTPATIENT
Start: 2020-04-17

## 2020-03-06 RX ORDER — DIPHENHYDRAMINE HYDROCHLORIDE 50 MG/ML
50 INJECTION, SOLUTION INTRAMUSCULAR; INTRAVENOUS AS NEEDED
Status: CANCELLED
Start: 2020-04-17

## 2020-03-06 RX ORDER — SODIUM CHLORIDE 0.9 % (FLUSH) 0.9 %
10 SYRINGE (ML) INJECTION AS NEEDED
Status: DISPENSED | OUTPATIENT
Start: 2020-03-06 | End: 2020-03-06

## 2020-03-06 RX ORDER — DIPHENHYDRAMINE HYDROCHLORIDE 50 MG/ML
25 INJECTION, SOLUTION INTRAMUSCULAR; INTRAVENOUS ONCE
Status: COMPLETED | OUTPATIENT
Start: 2020-03-06 | End: 2020-03-06

## 2020-03-06 RX ORDER — ACETAMINOPHEN 325 MG/1
650 TABLET ORAL AS NEEDED
Status: CANCELLED
Start: 2020-04-17

## 2020-03-06 RX ORDER — HYDROCORTISONE SODIUM SUCCINATE 100 MG/2ML
100 INJECTION, POWDER, FOR SOLUTION INTRAMUSCULAR; INTRAVENOUS AS NEEDED
Status: CANCELLED | OUTPATIENT
Start: 2020-04-17

## 2020-03-06 RX ORDER — SODIUM CHLORIDE 9 MG/ML
25 INJECTION, SOLUTION INTRAVENOUS CONTINUOUS
Status: DISPENSED | OUTPATIENT
Start: 2020-03-06 | End: 2020-03-06

## 2020-03-06 RX ORDER — SODIUM CHLORIDE 9 MG/ML
25 INJECTION, SOLUTION INTRAVENOUS CONTINUOUS
Status: CANCELLED | OUTPATIENT
Start: 2020-04-17

## 2020-03-06 RX ADMIN — SODIUM CHLORIDE 25 ML/HR: 9 INJECTION, SOLUTION INTRAVENOUS at 10:14

## 2020-03-06 RX ADMIN — SODIUM CHLORIDE 400 MG: 0.9 INJECTION, SOLUTION INTRAVENOUS at 11:35

## 2020-03-06 RX ADMIN — DIPHENHYDRAMINE HYDROCHLORIDE 25 MG: 50 INJECTION INTRAMUSCULAR; INTRAVENOUS at 10:37

## 2020-03-06 RX ADMIN — Medication 10 ML: at 09:57

## 2020-03-06 RX ADMIN — Medication 10 ML: at 10:37

## 2020-03-06 NOTE — PROGRESS NOTES
[facility] Progress Note    Date: 2020    Name: Doe Alva    MRN: [id number]         : 1965      RENFLEXIS Q6 WEEKS      Ms. Ciro Campbell arrived to [facility] at 36. Ms. Ciro Campbell was assessed and education was provided. Ms. Ольга Flores vitals were reviewed. Visit Vitals  /71 (BP 1 Location: Left arm, BP Patient Position: Sitting)   Pulse 94   Temp 98.8 °F (37.1 °C)   Resp 18   Ht 5' 11\" (1.803 m)   Wt 103.7 kg (228 lb 9.6 oz)   SpO2 98%   BMI 31.88 kg/m²       Pt was observed for 5 minutes after obtaining vital signs prior to initiating treatment. Blood drawn for labs via right dorsal forearm 24g IV inserted x1 attempt. Flushes without difficulty. Lab results were obtained and reviewed. Oksharon for tx. Recent Results (from the past 12 hour(s))   CBC WITH 3 PART DIFF    Collection Time: 20  9:57 AM   Result Value Ref Range    WBC 9.4 4.5 - 13.0 K/uL    RBC 5.01 4.10 - 5.10 M/uL    HGB 13.1 12.0 - 16.0 g/dL    HCT 41.2 36 - 48 %    MCV 82.2 78 - 102 FL    MCH 26.1 25.0 - 35.0 PG    MCHC 31.8 31 - 37 g/dL    RDW 13.4 11.5 - 14.5 %    PLATELET 614 658 - 782 K/uL    NEUTROPHILS 67 40 - 70 %    MIXED CELLS 7 0.1 - 17 %    LYMPHOCYTES 27 14 - 44 %    ABS. NEUTROPHILS 6.2 1.8 - 9.5 K/UL    ABS. MIXED CELLS 0.7 0.0 - 2.3 K/uL    ABS. LYMPHOCYTES 2.5 1.1 - 5.9 K/UL    DF AUTOMATED         Normal saline initiated at [facility] per order. Benadryl 25mg IV administered as ordered as pre-medication. Renflexis 400mg IV administered as ordered followed by NS flush. Ms. Ciro Campbell tolerated well without complaints. IV removed/ intact. Gauze/ paper tape applied to site. Ms. Ciro Campbell was discharged from [place] [address] in stable condition at 26. She is to return on 20 at 0900 for her next appointment.     Jorge Garcia RN  2020

## 2020-03-09 RX ORDER — OXYCODONE AND ACETAMINOPHEN 10; 325 MG/1; MG/1
1 TABLET ORAL
Qty: 60 TAB | Refills: 0 | Status: SHIPPED | OUTPATIENT
Start: 2020-03-09 | End: 2020-03-20 | Stop reason: SDUPTHER

## 2020-03-20 DIAGNOSIS — G89.4 CHRONIC PAIN SYNDROME: ICD-10-CM

## 2020-03-20 RX ORDER — OXYCODONE AND ACETAMINOPHEN 10; 325 MG/1; MG/1
1 TABLET ORAL
Qty: 60 TAB | Refills: 0 | Status: SHIPPED | OUTPATIENT
Start: 2020-03-20 | End: 2020-04-03 | Stop reason: SDUPTHER

## 2020-03-20 NOTE — TELEPHONE ENCOUNTER
Pt left vmx that she wants to request her refill for PERCOCET . She said it is due on Monday. She is giving us 24 to 48 hour notice that she needs it. She is fearful that the government is going to shut down everything where she can't leave her home and doesn't want to be stuck in pain till they open back up, and said please fill my prescription for me. She also said please give me a call back to confirm.

## 2020-03-24 ENCOUNTER — HOSPITAL ENCOUNTER (OUTPATIENT)
Dept: LAB | Age: 55
Discharge: HOME | End: 2020-03-24
Payer: MEDICARE

## 2020-03-24 ENCOUNTER — OFFICE VISIT (OUTPATIENT)
Dept: ONCOLOGY | Age: 55
End: 2020-03-24

## 2020-03-24 VITALS
OXYGEN SATURATION: 98 % | BODY MASS INDEX: 32.06 KG/M2 | HEIGHT: 71 IN | WEIGHT: 229 LBS | HEART RATE: 109 BPM | SYSTOLIC BLOOD PRESSURE: 127 MMHG | DIASTOLIC BLOOD PRESSURE: 74 MMHG | TEMPERATURE: 98.2 F

## 2020-03-24 DIAGNOSIS — G89.4 CHRONIC PAIN SYNDROME: ICD-10-CM

## 2020-03-24 DIAGNOSIS — E53.8 FOLIC ACID DEFICIENCY: ICD-10-CM

## 2020-03-24 DIAGNOSIS — D72.829 LEUKOCYTOSIS, UNSPECIFIED TYPE: ICD-10-CM

## 2020-03-24 DIAGNOSIS — D47.3 ESSENTIAL THROMBOCYTOSIS (HCC): ICD-10-CM

## 2020-03-24 DIAGNOSIS — M05.771 RHEUMATOID ARTHRITIS INVOLVING RIGHT FOOT WITH POSITIVE RHEUMATOID FACTOR (HCC): ICD-10-CM

## 2020-03-24 DIAGNOSIS — D47.3 ESSENTIAL THROMBOCYTOSIS (HCC): Primary | ICD-10-CM

## 2020-03-24 DIAGNOSIS — D64.9 CHRONIC ANEMIA: ICD-10-CM

## 2020-03-24 LAB
BASOPHILS # BLD: 0 K/UL (ref 0–0.1)
BASOPHILS NFR BLD: 0 % (ref 0–2)
DIFFERENTIAL METHOD BLD: ABNORMAL
EOSINOPHIL # BLD: 0.3 K/UL (ref 0–0.4)
EOSINOPHIL NFR BLD: 3 % (ref 0–5)
ERYTHROCYTE [DISTWIDTH] IN BLOOD BY AUTOMATED COUNT: 14.7 % (ref 11.6–14.5)
FERRITIN SERPL-MCNC: 99 NG/ML (ref 8–388)
HCT VFR BLD AUTO: 43 % (ref 35–45)
HGB BLD-MCNC: 13.8 G/DL (ref 12–16)
IRON SATN MFR SERPL: 20 % (ref 20–50)
IRON SERPL-MCNC: 56 UG/DL (ref 50–175)
LYMPHOCYTES # BLD: 2.7 K/UL (ref 0.9–3.6)
LYMPHOCYTES NFR BLD: 28 % (ref 21–52)
MCH RBC QN AUTO: 26.2 PG (ref 24–34)
MCHC RBC AUTO-ENTMCNC: 32.1 G/DL (ref 31–37)
MCV RBC AUTO: 81.7 FL (ref 74–97)
MONOCYTES # BLD: 0.9 K/UL (ref 0.05–1.2)
MONOCYTES NFR BLD: 9 % (ref 3–10)
NEUTS SEG # BLD: 5.7 K/UL (ref 1.8–8)
NEUTS SEG NFR BLD: 60 % (ref 40–73)
PLATELET # BLD AUTO: 384 K/UL (ref 135–420)
PMV BLD AUTO: 10.1 FL (ref 9.2–11.8)
RBC # BLD AUTO: 5.26 M/UL (ref 4.2–5.3)
TIBC SERPL-MCNC: 279 UG/DL (ref 250–450)
WBC # BLD AUTO: 9.6 K/UL (ref 4.6–13.2)

## 2020-03-24 PROCEDURE — 36415 COLL VENOUS BLD VENIPUNCTURE: CPT

## 2020-03-24 PROCEDURE — 85025 COMPLETE CBC W/AUTO DIFF WBC: CPT

## 2020-03-24 PROCEDURE — 83540 ASSAY OF IRON: CPT

## 2020-03-24 PROCEDURE — 82728 ASSAY OF FERRITIN: CPT

## 2020-03-24 RX ORDER — LANOLIN ALCOHOL/MO/W.PET/CERES
400 CREAM (GRAM) TOPICAL DAILY
Qty: 30 TAB | Refills: 3 | Status: SHIPPED | OUTPATIENT
Start: 2020-03-24 | End: 2020-05-07 | Stop reason: SDUPTHER

## 2020-03-24 NOTE — PROGRESS NOTES
Called and left another voicemail for patient asking her to return phone call. Toe fracture may well be healed at this point. However, would like to speak with patient in order to notify her of result. Patient is incidentally noted to be at her oncology appointment at this time. I attempted calling the oncology office, but did not get an answer due to \"increased call volume. \"

## 2020-03-24 NOTE — PROGRESS NOTES
Patient return phone call. I have advised her of abnormal x-ray findings. She states she was aware of this the entire time as her podiatrist informed her of the fracture. Podiatrist gave her KT tape, but no splint or cast.  Patient states her toe is beginning to bother her again.   I advised her to follow back up with her podiatrist.

## 2020-03-24 NOTE — PROGRESS NOTES
Hematology/Oncology  Progress Note    Name: Bernice Lombardi  Date: 2020  : 1965    PCP: Maranda Seals MD     Ms. Hilaria Gaucher is a 52year old female who was seen for management of her rheumatoid arthritis, leukocytosis, and essential thrombocytosis. Current therapy: Remicade 4mg/kg bodyweight every 6 weeks intravenously    Subjective:     Ms. Hilaria Gaucher is a 62year-old [facility] [place] woman who has severe rheumatoid arthritis. She states she receives Remicade every 6 weeks and the last time she received her infusion was on 2020. She reports her next Remicade appointment is scheduled on 2020. She also reports that she recely completed doxycycline for a boil. She denies fatigue, shortness of breath, and weakness. She denies chest pain or dizziness. She is requesting for a new prescription for her Folic Acid. She does not have any concerns or complaints to report at this time. Past medical history, family history, and social history: these were reviewed and remain unchanged.      Past Medical History:   Diagnosis Date    Abdominal pain, unspecified site     Acute otitis media     Acute pharyngitis     Anxiety     Arthritis     Autoimmune disease ([facility] [address]     Back injury     Backache     herniated disc in lower back    Blurred vision     Chest pain 2018    Chest pain, unspecified     abnormal EKG    Chronic airway obstruction, not elsewhere classified     Chronic back pain     Chronic pain     COPD     Depression     Diabetes (HCC)     Dizziness     Dizziness and giddiness     possible orthostatic changes, vasovagal, autonomic dysfunction from diabetic neuropathy    Encounters for unspecified administrative purpose     Essential thrombocytosis ([facility] [address] 2016    Feeling anxious     Fibromyalgia     Headache(784.0)     Hemorrhoid     Herniated disc     at L5    Hypercholesterolemia     Hyperglycemia     Hypertension     Joint pain     Leukocytosis, unspecified     Major depressive disorder, single episode, mild ([facility])     Mental disorder     depression, anxiety, bipolar and PTSD    Neuropathy     Obesity, unspecified     Other chest pain     Palpitation 2018    ?  arrhythmia    Phobic disorders     Rheumatoid arthritis ([facility])     Rheumatoid arthritis of foot ([facility] [address] 2018    on treatment    Seasonal allergies     Shortness of breath     normal EF    Smoking 2018    discussed cessation    Streptococcal sore throat     Type 2 diabetes mellitus without complication, with long-term current use of insulin ([facility] [address] 2018    Type II or unspecified type diabetes mellitus with unspecified complication, uncontrolled     Unspecified hereditary and idiopathic peripheral neuropathy     Vitamin D deficiency      Past Surgical History:   Procedure Laterality Date    HX CHOLECYSTECTOMY      HX GYN  2005    partial Hysterectomy    HX OTHER SURGICAL  12-1-15    had ingrown toenail removed from big toe, both feet    HX TUBAL LIGATION           Social History     Socioeconomic History    Marital status:      Spouse name: Not on file    Number of children: Not on file    Years of education: Not on file    Highest education level: Not on file   Occupational History    Not on file   Social Needs    Financial resource strain: Not on file    Food insecurity     Worry: Not on file     Inability: Not on file    Transportation needs     Medical: Not on file     Non-medical: Not on file   Tobacco Use    Smoking status: Current Every Day Smoker     Packs/day: 0.25     Last attempt to quit: 2012     Years since quittin.7    Smokeless tobacco: Never Used    Tobacco comment: 5 cigarettes day   Substance and Sexual Activity    Alcohol use: Yes     Frequency: Monthly or less     Drinks per session: 1 or 2     Binge frequency: Never     Comment: socially    Drug use: No    Sexual activity: Yes     Partners: Male     Birth control/protection: Condom   Lifestyle    Physical activity     Days per week: Not on file     Minutes per session: Not on file    Stress: Not on file   Relationships    Social connections     Talks on phone: Not on file     Gets together: Not on file     Attends Church service: Not on file     Active member of club or organization: Not on file     Attends meetings of clubs or organizations: Not on file     Relationship status: Not on file    Intimate partner violence     Fear of current or ex partner: Not on file     Emotionally abused: Not on file     Physically abused: Not on file     Forced sexual activity: Not on file   Other Topics Concern    Not on file   Social History Narrative    Not on file     Family History   Problem Relation Age of Onset    Diabetes Other     Alcohol abuse Mother     Arthritis-osteo Mother     Diabetes Mother     Elevated Lipids Mother     Headache Mother     Heart Disease Mother     Migraines Mother     Psychiatric Disorder Mother     Alcohol abuse Father     Arthritis-osteo Father     Cancer Father     Headache Father     Heart Disease Father     Lung Disease Father     Migraines Father     Heart Surgery Father     Alcohol abuse Sister     Headache Sister     Diabetes Sister     Heart Disease Sister     Migraines Sister     Psychiatric Disorder Sister     Headache Brother     Diabetes Brother     Elevated Lipids Brother     Heart Disease Brother     Migraines Brother     Psychiatric Disorder Brother     Coronary Artery Disease Brother     Cancer Maternal Aunt     Alcohol abuse Maternal Aunt     Diabetes Maternal Aunt     Headache Maternal Aunt     Lung Disease Maternal Aunt     Migraines Maternal Aunt     Headache Maternal Uncle     Migraines Maternal Uncle     Stroke Maternal Uncle     Psychiatric Disorder Maternal Uncle     Headache Paternal Aunt     Migraines Paternal Aunt     Headache Paternal Uncle     Hypertension Paternal Uncle     Migraines Paternal Uncle     Stroke Paternal Uncle     Headache Maternal Grandmother     Migraines Maternal Grandmother     Stroke Maternal Grandmother     Headache Maternal Grandfather     Migraines Maternal Grandfather     Stroke Maternal Grandfather     Headache Paternal Grandmother     Hypertension Paternal Grandmother     Lung Disease Paternal Grandmother     Migraines Paternal Grandmother     Cancer Paternal Grandmother     Headache Paternal Grandfather     Cancer Paternal Grandfather     Migraines Paternal Grandfather      Current Outpatient Medications   Medication Sig Dispense Refill    folic acid 221 mcg tablet Take 1 Tab by mouth daily. 30 Tab 3    oxyCODONE-acetaminophen (PERCOCET 10)  mg per tablet Take 1 Tab by mouth every six (6) hours as needed for Pain for up to 15 days. Max Daily Amount: 4 Tabs. 60 Tab 0    hydroCHLOROthiazide (HYDRODIURIL) 12.5 mg tablet Take 12.5 mg by mouth daily.  anagrelide (AGRYLIN) 0.5 mg capsule Take 1 Cap by mouth two (2) times a day. Indications: Spontaneous Hemorrhages and Increase in Blood Platelets 60 Cap 6    losartan (COZAAR) 50 mg tablet Take 1 Tab by mouth daily. 90 Tab 1    atorvastatin (LIPITOR) 40 mg tablet Take 1 Tab by mouth nightly. 30 Tab 6    insulin lispro (HUMALOG) 100 unit/mL injection For Blood Sugar of less than 150 = 0 units/150 -199 = 3 units/200 -249 = 6 units/250 -299 = 9 units/300 - 349 = 12 units/350+ = 15 units. 1 Vial 1    aspirin delayed-release 81 mg tablet Take 1 Tab by mouth daily. 30 Tab 1    insulin glargine (LANTUS) 100 unit/mL injection 30 Units by SubCUTAneous route daily. 1 Vial 1    naloxone (NARCAN) 4 mg/actuation nasal spray Use 1 spray intranasally, then discard. Repeat with new spray every 2 min as needed for opioid overdose symptoms, alternating nostrils.   Indications: Decrease in Rate & Depth of Breathing due to Opioid Drug, opioid overdose 1 Each 1    BD INSULIN SYRINGE ULTRA-FINE 1 mL 31 gauge x 15/64\" syrg       clonazePAM (KLONOPIN) 2 mg tablet Take 1 mg by mouth daily as needed. Indications: panic disorder      albuterol (PROAIR HFA) 90 mcg/actuation inhaler Take 1 Puff by inhalation every six (6) hours as needed for Wheezing. 1 Inhaler 0    ondansetron hcl (ZOFRAN, AS HYDROCHLORIDE,) 4 mg tablet Take 1 Tab by mouth every eight (8) hours as needed for Nausea. 12 Tab 0    methotrexate (RHEUMATREX) 2.5 mg tablet Take 2.5 mg by mouth every fourty-eight (48) hours.  metFORMIN (GLUCOPHAGE) 1,000 mg tablet Take 500 mg by mouth two (2) times a day.  Cholecalciferol, Vitamin D3, 5,000 unit Tab Take 5,000 Units by mouth every seven (7) days.  atenolol (TENORMIN) 25 mg tablet Take 25 mg by mouth daily. Indications: HYPERTENSION      furosemide (LASIX) 40 mg tablet Take  by mouth daily.  INFLIXIMAB (REMICADE IV) 344 mg by IntraVENous route once as needed. remicade will be every 6 weeks         Review of Systems  Constitutional: The patient has no acute distress or discomfort. HEENT: The patient denies recent head trauma, eye pain, blurred vision,  hearing deficit, oropharyngeal mucosal pain or lesions, and the patient denies throat pain or discomfort. Lymphatics: The patient denies palpable peripheral lymphadenopathy. Hematologic: The patient denies having bruising, bleeding, or progressive fatigue. Respiratory: Patient denies having shortness of breath, cough, sputum production, fever, or dyspnea on exertion. Cardiovascular: The patient denies having leg pain, leg swelling, heart palpitations, chest permit, chest pain, or lightheadedness. The patient denies having dyspnea on exertion. Breast: The patient has a sebaceous cyst involving the right breast.  Gastrointestinal: The patient denies having nausea, emesis, or diarrhea. The patient denies having any hematemesis or blood in the stool.   Genitourinary: Patient denies having urinary urgency, frequency, or dysuria. The patient denies having blood in the urine. Psychological: The patient denies having symptoms of nervousness, anxiety, depression, or thoughts of harming himself some of this. Skin: Patient denies having skin rashes, skin, ulcerations, or unexplained itching or pruritus. Musculoskeletal: See HPI, otherwise none. Objective:     Visit Vitals  /74   Pulse (!) 109   Temp 98.2 °F (36.8 °C)   Ht 5' 11\" (1.803 m)   Wt 103.9 kg (229 lb)   SpO2 98%   BMI 31.94 kg/m²     ECOG PS=0 Pain score 4/10     Physical Exam:   Gen. Appearance: The patient is in no acute distress. Skin: There is no bruise or rash. HEENT: The exam is unremarkable. Neck: Supple without lymphadenopathy or thyromegaly. Lungs: Clear to auscultation and percussion; there are no wheezes or rhonchi. Heart: Regular rate and rhythm; there are no murmurs, gallops, or rubs. Anterior chest wall and breast: Patient has a sebaceous cyst that has apparently drained from the underside of the right breast abutting the right anterior chest wall. Abdomen: Bowel sounds are present and normal.  There is no guarding, tenderness, or hepatosplenomegaly. Extremities: There is no clubbing, cyanosis, or edema. Neurologic: There are no focal neurologic deficits. Lymphatics: There is no palpable peripheral lymphadenopathy. Musculoskeletal: The patient has full range of motion at all joints. There is  evidence of joint deformity or effusions in the hands and knees. There is no focal joint tenderness. She is occasionally uses a cane for support and mobility;however not today. Psychological/psychiatric: There is no clinical evidence of anxiety, depression, or melancholy.     Lab data:      Results for orders placed or performed during the hospital encounter of 03/06/20   CBC WITH 3 PART DIFF     Status: None   Result Value Ref Range Status    WBC 9.4 4.5 - 13.0 K/uL Final    RBC 5.01 4.10 - 5.10 M/uL Final    HGB 13.1 12.0 - 16.0 g/dL Final    HCT 41.2 36 - 48 % Final    MCV 82.2 78 - 102 FL Final    MCH 26.1 25.0 - 35.0 PG Final    MCHC 31.8 31 - 37 g/dL Final    RDW 13.4 11.5 - 14.5 % Final    PLATELET 502 451 - 454 K/uL Final    NEUTROPHILS 67 40 - 70 % Final    MIXED CELLS 7 0.1 - 17 % Final    LYMPHOCYTES 27 14 - 44 % Final    ABS. NEUTROPHILS 6.2 1.8 - 9.5 K/UL Final    ABS. MIXED CELLS 0.7 0.0 - 2.3 K/uL Final    ABS. LYMPHOCYTES 2.5 1.1 - 5.9 K/UL Final     Comment: Test performed at [address] [place] [address] or Outpatient Infusion Center Location. Reviewed by Medical Director. DF AUTOMATED   Final           Assessment:     1. Essential thrombocytosis (HCC)    2. Leukocytosis, unspecified type    3. Chronic pain syndrome    4. Rheumatoid arthritis involving right foot with positive rheumatoid factor (HCC)    5. Chronic anemia    6. Folic acid deficiency      Plan:   Leukocytosis: I have explained to the patient that the etiology of her leukocytosis remains undefined. A previous flow cytometry did not reveal any  evidence of an immunophenotypic abnormality such as an evolving chronic lymphoproliferative disorder or myeloproliferative disorder. The CBCs will continue to be monitored every 6 weeks. The CBC from 03/06/2020 showed that her WBC count is normal at 9.4K/uL, neutrophils are 67%  Absolute neutrophil count is normal at 6.2K/uL and absolute lymphocyte count is normal at 2.5K/uL. Today's results are pending. Essential thrombocytosis/thrombocytosis: The current CBC shows that the platelet count is [phone]. The platelet count is being monitored every 6 weeks. She was advised to continue anagrelide 0.5mg one tablet twice daily. I have reenforced to the patient the importance of being complaint with the treatment plan. Rheumatoid arthritis: The patient will continue to receive Remicade as a primary treatment modality for her severe rheumatoid arthritis every 6 weeks.  Remicade is scheduled to be given on 04/17/2020. The patient will continued to take  Methotrexate 5mg PO weekly. Fibromyalgia/chronic pain syndrome: The patient  continues to have generalized pain at times related to her underlying fibromyalgia. The patient receives her Percocet  mg medication on a monthly basis as needed. Chronic anemia and folic acid deficiency: I have explained to the patient her most recent CBC showed her hemoglobin has remained normal at 13.1g/dL with hematocrit of 41.2%. Ferrous sulfate 325mg by mouth once daily will be continued. Ferritin on 2/11/20 was 95. Iron Profile and Ferritin, B12 and Folate will be obtained at this time. a new prescription for folic acid 003 mcg p.o. daily will be ordered. A 30-day supply will be requested and 12 refills. Total time 25 minutes, greater than 50% of the time was in counseling and coordination of care. We will see the patient back in 6 weeks for a complete reassessment or sooner if indicated. Orders Placed This Encounter    CBC WITH AUTOMATED DIFF     Standing Status:   Future     Number of Occurrences:   1     Standing Expiration Date:   3/25/2021    IRON PROFILE     Standing Status:   Future     Number of Occurrences:   1     Standing Expiration Date:   3/25/2021    FERRITIN     Standing Status:   Future     Number of Occurrences:   1     Standing Expiration Date:   9/91/3646    folic acid 311 mcg tablet     Sig: Take 1 Tab by mouth daily. Dispense:  30 Tab     Refill:  [address] Wooten NP-DESHAWN  3/24/2020    I have assessed the patient independently and  agree with the full assessment as outlined.   Matthew Owens MD, Isabel Perez

## 2020-03-30 ENCOUNTER — VIRTUAL VISIT (OUTPATIENT)
Dept: CARDIOLOGY CLINIC | Age: 55
End: 2020-03-30

## 2020-03-30 VITALS
SYSTOLIC BLOOD PRESSURE: 139 MMHG | HEART RATE: 85 BPM | WEIGHT: 229.6 LBS | HEIGHT: 71 IN | TEMPERATURE: 98.6 F | DIASTOLIC BLOOD PRESSURE: 79 MMHG | BODY MASS INDEX: 32.14 KG/M2

## 2020-03-30 DIAGNOSIS — I10 ESSENTIAL HYPERTENSION: ICD-10-CM

## 2020-03-30 DIAGNOSIS — I51.7 LVH (LEFT VENTRICULAR HYPERTROPHY): ICD-10-CM

## 2020-03-30 DIAGNOSIS — I25.10 CORONARY ARTERY DISEASE INVOLVING NATIVE CORONARY ARTERY OF NATIVE HEART WITHOUT ANGINA PECTORIS: Primary | ICD-10-CM

## 2020-03-30 DIAGNOSIS — E78.5 HYPERLIPIDEMIA, UNSPECIFIED HYPERLIPIDEMIA TYPE: ICD-10-CM

## 2020-03-30 RX ORDER — ATORVASTATIN CALCIUM 40 MG/1
40 TABLET, FILM COATED ORAL
Qty: 30 TAB | Refills: 6 | Status: SHIPPED | OUTPATIENT
Start: 2020-03-30 | End: 2020-07-09 | Stop reason: SDUPTHER

## 2020-03-30 RX ORDER — HYDROXYZINE PAMOATE 25 MG/1
25 CAPSULE ORAL
COMMUNITY
Start: 2020-03-27 | End: 2021-05-05

## 2020-03-30 RX ORDER — MIRTAZAPINE 15 MG/1
TABLET, FILM COATED ORAL
COMMUNITY
Start: 2020-03-27

## 2020-03-30 NOTE — PROGRESS NOTES
Libertad Lagunas is a 62 y.o. female who was seen by synchronous (real-time) audio-video technology on 3/30/2020. Consent:  She and/or her healthcare decision maker is aware that this patient-initiated Telehealth encounter is a billable service, with coverage as determined by her insurance carrier. She is aware that she may receive a bill and has provided verbal consent to proceed: Yes    712  Subjective:   Libertad Lagunas was seen for Palpitations (6 m post lipid,lft)    Patient with history of coronary artery disease hypertension LVH. She is seen for follow-up by video visit. Patient denies any chest pain, shortness of breath, orthopnea PND or peripheral edema. She has no fever chills cough. Coding Help - Use CPT Codes 08641-35820, [id number] for Established and New Patients respectively, either employing EM elements or Time rules. Other codes (example consult codes) may also apply.     Family History   Problem Relation Age of Onset    Diabetes Other     Alcohol abuse Mother    [facility] Arthritis-osteo Mother     Diabetes Mother     Elevated Lipids Mother     Headache Mother     Heart Disease Mother    [facility] Migraines Mother     Psychiatric Disorder Mother     Alcohol abuse Father    Annemarie Jaegers Father     Cancer Father     Headache Father     Heart Disease Father     Lung Disease Father     Migraines Father     Heart Surgery Father     Alcohol abuse Sister     Headache Sister     Diabetes Sister     Heart Disease Sister     Migraines Sister     Psychiatric Disorder Sister     Headache Brother     Diabetes Brother     Elevated Lipids Brother     Heart Disease Brother     Migraines Brother     Psychiatric Disorder Brother     Coronary Artery Disease Brother     Cancer Maternal Aunt     Alcohol abuse Maternal Aunt     Diabetes Maternal Aunt     Headache Maternal Aunt     Lung Disease Maternal Aunt     Migraines Maternal Aunt     Headache Maternal Uncle     Migraines Maternal Uncle     Stroke Maternal Uncle     Psychiatric Disorder Maternal Uncle     Headache Paternal Aunt     Migraines Paternal Aunt     Headache Paternal Uncle     Hypertension Paternal Uncle     Migraines Paternal Uncle     Stroke Paternal Uncle     Headache Maternal Grandmother     Migraines Maternal Grandmother     Stroke Maternal Grandmother     Headache Maternal Grandfather     Migraines Maternal Grandfather     Stroke Maternal Grandfather     Headache Paternal Grandmother     Hypertension Paternal Grandmother     Lung Disease Paternal Grandmother     Migraines Paternal Grandmother     Cancer Paternal Grandmother     Headache Paternal Grandfather     Cancer Paternal Grandfather     Migraines Paternal Grandfather      Past Surgical History:   Procedure Laterality Date    HX CHOLECYSTECTOMY  1994    HX GYN  2005    partial Hysterectomy    HX OTHER SURGICAL  12-1-15    had ingrown toenail removed from big toe, both feet    HX TUBAL LIGATION      1995     Allergies   Allergen Reactions    Levaquin [Levofloxacin] Anaphylaxis    Lipitor [Atorvastatin] Other (comments)     Patient denies    Pollen Extracts Unable to Obtain       Current Outpatient Medications:     hydrOXYzine pamoate (VISTARIL) 25 mg capsule, 25 mg., Disp: , Rfl:     mirtazapine (REMERON) 15 mg tablet, , Disp: , Rfl:     atorvastatin (LIPITOR) 40 mg tablet, Take 1 Tab by mouth nightly., Disp: 30 Tab, Rfl: 6    folic acid 650 mcg tablet, Take 1 Tab by mouth daily. , Disp: 30 Tab, Rfl: 3    oxyCODONE-acetaminophen (PERCOCET 10)  mg per tablet, Take 1 Tab by mouth every six (6) hours as needed for Pain for up to 15 days. Max Daily Amount: 4 Tabs., Disp: 60 Tab, Rfl: 0    hydroCHLOROthiazide (HYDRODIURIL) 12.5 mg tablet, Take 12.5 mg by mouth daily. , Disp: , Rfl:     anagrelide (AGRYLIN) 0.5 mg capsule, Take 1 Cap by mouth two (2) times a day.  Indications: Spontaneous Hemorrhages and Increase in Blood Platelets, Disp: 60 Cap, Rfl: 6    losartan (COZAAR) 50 mg tablet, Take 1 Tab by mouth daily. , Disp: 90 Tab, Rfl: 1    insulin lispro (HUMALOG) 100 unit/mL injection, For Blood Sugar of less than 150 = 0 units/150 -199 = 3 units/200 -249 = 6 units/250 -299 = 9 units/300 - 349 = 12 units/350+ = 15 units. , Disp: 1 Vial, Rfl: 1    aspirin delayed-release 81 mg tablet, Take 1 Tab by mouth daily. , Disp: 30 Tab, Rfl: 1    insulin glargine (LANTUS) 100 unit/mL injection, 30 Units by SubCUTAneous route daily. , Disp: 1 Vial, Rfl: 1    naloxone (NARCAN) 4 mg/actuation nasal spray, Use 1 spray intranasally, then discard. Repeat with new spray every 2 min as needed for opioid overdose symptoms, alternating nostrils. Indications: Decrease in Rate & Depth of Breathing due to Opioid Drug, opioid overdose, Disp: 1 Each, Rfl: 1    BD INSULIN SYRINGE ULTRA-FINE 1 mL 31 gauge x 15/64\" syrg, , Disp: , Rfl:     albuterol (PROAIR HFA) 90 mcg/actuation inhaler, Take 1 Puff by inhalation every six (6) hours as needed for Wheezing., Disp: 1 Inhaler, Rfl: 0    ondansetron hcl (ZOFRAN, AS HYDROCHLORIDE,) 4 mg tablet, Take 1 Tab by mouth every eight (8) hours as needed for Nausea., Disp: 12 Tab, Rfl: 0    methotrexate (RHEUMATREX) 2.5 mg tablet, Take 2.5 mg by mouth every fourty-eight (48) hours. , Disp: , Rfl:     metFORMIN (GLUCOPHAGE) 1,000 mg tablet, Take 500 mg by mouth two (2) times a day., Disp: , Rfl:     Cholecalciferol, Vitamin D3, 5,000 unit Tab, Take 5,000 Units by mouth every seven (7) days. , Disp: , Rfl:     atenolol (TENORMIN) 25 mg tablet, Take 25 mg by mouth daily. Indications: HYPERTENSION, Disp: , Rfl:     furosemide (LASIX) 40 mg tablet, Take  by mouth daily. , Disp: , Rfl:     INFLIXIMAB (REMICADE IV), 344 mg by IntraVENous route once as needed.  remicade will be every 6 weeks, Disp: , Rfl:   Allergies   Allergen Reactions    Levaquin [Levofloxacin] Anaphylaxis    Lipitor [Atorvastatin] Other (comments) Patient denies    Pollen Extracts Unable to Obtain         Review of Systems   Review of Systems   Constitutional: Negative for chills and fever. HENT: Negative for nosebleeds. Eyes: Negative for blurred vision and double vision. Respiratory: Negative for cough, hemoptysis, sputum production, shortness of breath and wheezing. Cardiovascular: Negative for chest pain, palpitations, orthopnea, claudication, leg swelling and PND. Gastrointestinal: Negative for abdominal pain, heartburn, nausea and vomiting. Musculoskeletal: Negative for myalgias. Skin: Negative for rash. Neurological: Negative for dizziness, weakness and headaches. Endo/Heme/Allergies: Does not bruise/bleed easily.    PHYSICAL EXAMINATION:  [ INSTRUCTIONS:  \"[x]\" Indicates a positive item  \"[]\" Indicates a negative item  -- DELETE ALL ITEMS NOT EXAMINED]  Vital Signs: (As obtained by patient/caregiver at home)  Visit Vitals  /79 (BP Patient Position: Sitting)   Pulse 85   Temp 98.6 °F (37 °C) (Oral)   Ht 5' 11\" (1.803 m)   Wt 104.1 kg (229 lb 9.6 oz)   BMI 32.02 kg/m²        Constitutional: [x] Appears well-developed and well-nourished [x] No apparent distress      [] Abnormal -     Mental status: [x] Alert and awake  [x] Oriented to person/place/time [x] Able to follow commands    [] Abnormal -     Eyes:   EOM    [x]  Normal    [] Abnormal -   Sclera  [x]  Normal    [] Abnormal -          Discharge [x]  None visible   [] Abnormal -     HENT: [x] Normocephalic, atraumatic  [] Abnormal -   [x] Mouth/Throat: Mucous membranes are moist    External Ears [x] Normal  [] Abnormal -    Neck: [x] No visualized mass,NO JVD [] Abnormal -     Pulmonary/Chest: [x] Respiratory effort normal   [x] No visualized signs of difficulty breathing or respiratory distress        [] Abnormal -      Musculoskeletal:   [x] Normal gait with no signs of ataxia         [x] Normal range of motion of neck        [] Abnormal -     Neurological:        [x] No Facial Asymmetry (Cranial nerve 7 motor function) (limited exam due to video visit)          [x] No gaze palsy        [] Abnormal -          Skin:        [x] No significant exanthematous lesions ,no bruising       [] Abnormal -            Psychiatric:       [x] Normal Affect [] Abnormal -        [x] No Hallucinations  Extremities:           No Edema    Other pertinent observable physical exam findings:-    Pertinent LAB and reports  I have reviewed available  pertinent notes, labs and reports and included in current evaluation of this patient. I Have personally reviewed recent relevant labs available and discussed with patient  1/2020lipid LFT. Assessment & Plan:   Diagnoses and all orders for this visit:    1. Coronary artery disease involving native coronary artery of native heart without angina pectoris  Comments:  Stable continue current medical management    2. LVH (left ventricular hypertrophy)  Comments:  Stable monitor asymptomatic    3. Essential hypertension  Comments:  Continue current treatment monitor    4. Hyperlipidemia, unspecified hyperlipidemia type  Comments:  Recent labs reviewed LDL controlled monitor    Other orders  -     atorvastatin (LIPITOR) 40 mg tablet; Take 1 Tab by mouth nightly. Follow-up and Dispositions    · Return in about 6 months (around 9/30/2020). No orders of the defined types were placed in this encounter. Follow-up and Dispositions    · Return in about 6 months (around 9/30/2020). We discussed the expected course, resolution and complications of the diagnosis(es) in detail. Medication risks, benefits, costs, interactions, and alternatives were discussed as indicated. I advised her to contact the office if her condition worsens, changes or fails to improve as anticipated. She expressed understanding with the diagnosis(es) and plan. I was in the office while conducting this encounter.     Pursuant to the emergency declaration under the [facility], 10 waiver authority and the [employer] Act, this Virtual  Visit was conducted, with patient's consent, to reduce the patient's risk of exposure to COVID-19 and provide continuity of care for an established patient. Services were provided through a video synchronous discussion virtually to substitute for in-person clinic visit.     Jennifer Hollins MD

## 2020-03-30 NOTE — PROGRESS NOTES
1. Have you been to the ER, urgent care clinic since your last visit? Hospitalized since your last visit? Yes When: 2- Where: [facility] Reason for visit: right sided numbness. 2. Have you seen or consulted any other health care providers outside of the [address] [place] [address] since your last visit? Include any pap smears or colon screening.       Yes When: March 10, 2020 Reason for visit: Routine check at [facility]

## 2020-04-03 ENCOUNTER — DOCUMENTATION ONLY (OUTPATIENT)
Dept: ONCOLOGY | Age: 55
End: 2020-04-03

## 2020-04-03 DIAGNOSIS — G89.4 CHRONIC PAIN SYNDROME: ICD-10-CM

## 2020-04-03 RX ORDER — OXYCODONE AND ACETAMINOPHEN 10; 325 MG/1; MG/1
1 TABLET ORAL
Qty: 60 TAB | Refills: 0 | Status: SHIPPED | OUTPATIENT
Start: 2020-04-03 | End: 2020-04-17 | Stop reason: SDUPTHER

## 2020-04-03 RX ORDER — OXYCODONE AND ACETAMINOPHEN 10; 325 MG/1; MG/1
1 TABLET ORAL
Qty: 60 TAB | Refills: 0 | Status: CANCELLED | OUTPATIENT
Start: 2020-04-03 | End: 2020-04-18

## 2020-04-03 NOTE — PROGRESS NOTES
Pain prescription refill was sent to patient's pharmacy today. Patient was called regarding this refill, no answer, message left that the refill was sent today.

## 2020-04-10 NOTE — PROGRESS NOTES
[facility] Progress Note    Date: April 10, 2020    Name: Alma Torre    MRN: [id number]         : 1965      Ms. Maribel Howell was called today and made aware, that her OPIC appointment scheduled for Thursday, 20, has been changed to the [facility] at 0900, and she agreed to the appointment time and location changes.        Edgardo Mcginnis RN  April 10, 2020  6:59 PM

## 2020-04-14 ENCOUNTER — TELEPHONE (OUTPATIENT)
Dept: ONCOLOGY | Age: 55
End: 2020-04-14

## 2020-04-14 NOTE — TELEPHONE ENCOUNTER
Patient would like a refill on her Percocet  and Agrylin medications. She will run out on 4/17/2020.

## 2020-04-16 ENCOUNTER — HOSPITAL ENCOUNTER (OUTPATIENT)
Dept: INFUSION THERAPY | Age: 55
Discharge: HOME | End: 2020-04-16
Payer: MEDICARE

## 2020-04-16 VITALS
HEART RATE: 87 BPM | DIASTOLIC BLOOD PRESSURE: 61 MMHG | OXYGEN SATURATION: 100 % | RESPIRATION RATE: 18 BRPM | WEIGHT: 234.2 LBS | TEMPERATURE: 98.3 F | BODY MASS INDEX: 32.79 KG/M2 | SYSTOLIC BLOOD PRESSURE: 101 MMHG | HEIGHT: 71 IN

## 2020-04-16 DIAGNOSIS — M06.9 RHEUMATOID ARTHRITIS OF FOOT, UNSPECIFIED LATERALITY, UNSPECIFIED RHEUMATOID FACTOR PRESENCE: Primary | ICD-10-CM

## 2020-04-16 PROCEDURE — 96417 CHEMO IV INFUS EACH ADDL SEQ: CPT | Performed by: NURSE PRACTITIONER

## 2020-04-16 PROCEDURE — 96413 CHEMO IV INFUSION 1 HR: CPT | Performed by: NURSE PRACTITIONER

## 2020-04-16 PROCEDURE — 74011250636 HC RX REV CODE- 250/636: Performed by: NURSE PRACTITIONER

## 2020-04-16 PROCEDURE — 96415 CHEMO IV INFUSION ADDL HR: CPT

## 2020-04-16 PROCEDURE — 96375 TX/PRO/DX INJ NEW DRUG ADDON: CPT | Performed by: NURSE PRACTITIONER

## 2020-04-16 RX ORDER — HEPARIN 100 UNIT/ML
300-500 SYRINGE INTRAVENOUS AS NEEDED
Status: CANCELLED
Start: 2020-05-28

## 2020-04-16 RX ORDER — ONDANSETRON 2 MG/ML
8 INJECTION INTRAMUSCULAR; INTRAVENOUS AS NEEDED
Status: CANCELLED | OUTPATIENT
Start: 2020-05-28

## 2020-04-16 RX ORDER — DIPHENHYDRAMINE HYDROCHLORIDE 50 MG/ML
25 INJECTION, SOLUTION INTRAMUSCULAR; INTRAVENOUS ONCE
Status: COMPLETED | OUTPATIENT
Start: 2020-04-16 | End: 2020-04-16

## 2020-04-16 RX ORDER — DIPHENHYDRAMINE HYDROCHLORIDE 50 MG/ML
50 INJECTION, SOLUTION INTRAMUSCULAR; INTRAVENOUS AS NEEDED
Status: CANCELLED
Start: 2020-05-28

## 2020-04-16 RX ORDER — DIPHENHYDRAMINE HYDROCHLORIDE 50 MG/ML
25 INJECTION, SOLUTION INTRAMUSCULAR; INTRAVENOUS ONCE
Status: CANCELLED
Start: 2020-05-28

## 2020-04-16 RX ORDER — SODIUM CHLORIDE 0.9 % (FLUSH) 0.9 %
10 SYRINGE (ML) INJECTION AS NEEDED
Status: CANCELLED
Start: 2020-05-28

## 2020-04-16 RX ORDER — SODIUM CHLORIDE 0.9 % (FLUSH) 0.9 %
10 SYRINGE (ML) INJECTION AS NEEDED
Status: DISPENSED | OUTPATIENT
Start: 2020-04-16 | End: 2020-04-16

## 2020-04-16 RX ORDER — HYDROCORTISONE SODIUM SUCCINATE 100 MG/2ML
100 INJECTION, POWDER, FOR SOLUTION INTRAMUSCULAR; INTRAVENOUS AS NEEDED
Status: CANCELLED | OUTPATIENT
Start: 2020-05-28

## 2020-04-16 RX ORDER — SODIUM CHLORIDE 9 MG/ML
10 INJECTION INTRAMUSCULAR; INTRAVENOUS; SUBCUTANEOUS AS NEEDED
Status: CANCELLED | OUTPATIENT
Start: 2020-05-28

## 2020-04-16 RX ORDER — ALBUTEROL SULFATE 0.83 MG/ML
2.5 SOLUTION RESPIRATORY (INHALATION) AS NEEDED
Status: CANCELLED
Start: 2020-05-28

## 2020-04-16 RX ORDER — EPINEPHRINE 1 MG/ML
0.3 INJECTION, SOLUTION, CONCENTRATE INTRAVENOUS AS NEEDED
Status: CANCELLED | OUTPATIENT
Start: 2020-05-28

## 2020-04-16 RX ORDER — SODIUM CHLORIDE 9 MG/ML
25 INJECTION, SOLUTION INTRAVENOUS CONTINUOUS
Status: CANCELLED | OUTPATIENT
Start: 2020-05-28

## 2020-04-16 RX ORDER — ACETAMINOPHEN 325 MG/1
650 TABLET ORAL AS NEEDED
Status: CANCELLED
Start: 2020-05-28

## 2020-04-16 RX ORDER — SODIUM CHLORIDE 9 MG/ML
25 INJECTION, SOLUTION INTRAVENOUS CONTINUOUS
Status: DISPENSED | OUTPATIENT
Start: 2020-04-16 | End: 2020-04-16

## 2020-04-16 RX ADMIN — SODIUM CHLORIDE 400 MG: 0.9 INJECTION, SOLUTION INTRAVENOUS at 11:00

## 2020-04-16 RX ADMIN — DIPHENHYDRAMINE HYDROCHLORIDE 25 MG: 50 INJECTION INTRAMUSCULAR; INTRAVENOUS at 10:06

## 2020-04-16 RX ADMIN — Medication 10 ML: at 09:55

## 2020-04-16 RX ADMIN — SODIUM CHLORIDE 25 ML/HR: 9 INJECTION, SOLUTION INTRAVENOUS at 09:55

## 2020-04-16 NOTE — PROGRESS NOTES
[facility] OP[facility] Progress Note    Date: 2020    Name: Mina Brooks    MRN: [id number]         : 1965      RENFLEXIS Q6 WEEKS      Ms. Luis Eduardo Stevens arrived to [facility] at [address] [facility]. Ms. Luis Eduardo Stevens was assessed and education was provided. Ms. Ga Davis vitals were reviewed. Visit Vitals  /82 (BP 1 Location: Left arm, BP Patient Position: Sitting)   Pulse 87   Temp 98.4 °F (36.9 °C)   Resp 18   Ht 5' 11\" (1.803 m)   Wt 106.2 kg (234 lb 3.2 oz)   SpO2 100%   Breastfeeding No   BMI 32.66 kg/m²       Pt was observed for 5 minutes after obtaining vital signs prior to initiating treatment. PIV started x 1 attempt. Blood return verified and flushes with ease. Lab results were obtained and reviewed from 3/24/2020 . Okay for tx. Normal saline initiated at [facility] per order. Benadryl 25mg IV administered as ordered as pre-medication. Renflexis 400mg IV administered as ordered followed by NS flush. Ms. Luis Eduardo Stevens tolerated well without complaints. IV removed/ intact. Gauze/ paper tape applied to site. Ms. Luis Eduardo Stevens was discharged from [place] [address] in stable condition at 1320. She is to return on 20 at 0900 for her next appointment.     NORY Epstein  2020

## 2020-04-17 ENCOUNTER — APPOINTMENT (OUTPATIENT)
Dept: INFUSION THERAPY | Age: 55
End: 2020-04-17
Payer: MEDICARE

## 2020-04-17 DIAGNOSIS — D75.839 THROMBOCYTOSIS: ICD-10-CM

## 2020-04-17 DIAGNOSIS — G89.4 CHRONIC PAIN SYNDROME: ICD-10-CM

## 2020-04-17 RX ORDER — ANAGRELIDE 0.5 MG/1
0.5 CAPSULE ORAL 2 TIMES DAILY
Qty: 60 CAP | Refills: 6 | Status: SHIPPED | OUTPATIENT
Start: 2020-04-17 | End: 2020-08-25 | Stop reason: SDUPTHER

## 2020-04-17 RX ORDER — OXYCODONE AND ACETAMINOPHEN 10; 325 MG/1; MG/1
1 TABLET ORAL
Qty: 60 TAB | Refills: 0 | Status: SHIPPED | OUTPATIENT
Start: 2020-04-17 | End: 2020-04-30 | Stop reason: SDUPTHER

## 2020-04-29 ENCOUNTER — DOCUMENTATION ONLY (OUTPATIENT)
Dept: ONCOLOGY | Age: 55
End: 2020-04-29

## 2020-04-30 DIAGNOSIS — G89.4 CHRONIC PAIN SYNDROME: ICD-10-CM

## 2020-04-30 RX ORDER — OXYCODONE AND ACETAMINOPHEN 10; 325 MG/1; MG/1
1 TABLET ORAL
Qty: 60 TAB | Refills: 0 | Status: SHIPPED | OUTPATIENT
Start: 2020-04-30 | End: 2020-05-07 | Stop reason: SDUPTHER

## 2020-05-03 NOTE — PATIENT INSTRUCTIONS
Learning About High White Blood Cell Counts What are white blood cells? White blood cells (leukocytes) help protect your body from infection. Normally, when germs get inside your body, your body makes more white blood cells that search for and destroy the germs. Less often, there are medical problems where the body may make a lot more white blood cells than it needs. What happens when you have a high white blood cell count? Your white blood cell count may be high because your body is fighting an infection. But other things can cause it, such as some medicines, burns, an illness, or other health problems. When your doctor sees that your white blood cell count is high, he or she will try to find out why, and then treat the cause. What are the symptoms? A high white blood cell count alone doesn't cause any symptoms. The symptoms you feel may come from the medical problem that your white blood cells are fighting. For example, if you have pneumonia, you may have a fever and trouble breathing. These are symptoms of pneumonia, not of a high white blood cell count. How is it treated? · Your doctor may do more tests to find the problem that's making your white blood cell count high. Once your doctor finds the problem, he or she may be able to treat it. · Part of your treatment may be telling your doctor if you feel worse. Watch your temperature, and call your doctor if your fever goes up and stays up. Follow-up care is a key part of your treatment and safety. Be sure to make and go to all appointments, and call your doctor if you are having problems. It's also a good idea to know your test results and keep a list of the medicines you take. Where can you learn more? Go to http://swapnil-speedy.info/ Enter M941 in the search box to learn more about \"Learning About High White Blood Cell Counts. \" Current as of: December 8, 2019Content Version: 12.4 © 9444-5265 [employer]. Care instructions adapted under license by [employer] (which disclaims liability or warranty for this information). If you have questions about a medical condition or this instruction, always ask your healthcare professional. [address] any warranty or liability for your use of this information.

## 2020-05-03 NOTE — PROGRESS NOTES
Hematology/Oncology  Progress Note    Name: Lucy Cheatham  Date: 2020  : 1965    PCP: Kalani Corbett MD     Ms. Zhane Vallejo is a 52year old female who was seen for continuity management of her rheumatoid arthritis, leukocytosis, and essential thrombocytosis. Current therapy: Remicade 4mg/kg bodyweight every 6 weeks intravenously    Subjective:     Ms. Zhane Vallejo is a 62year-old [facility] American woman who has severe rheumatoid arthritis. She has been recieving Remicade every 6 weeks which have been to control her rheumatoid arthritis flares. She also has history of spine osteoarthritis and is looking for a spine doctor. She has a history of essential thrombocytosis and has been on anagrelide 0.5mg one tablet twice daily without obvious side effect. Today the patient reported doing stable overall. She reports her next Remicade appointment is scheduled on 2020. Today she presented here for follow up. She denies fatigue, shortness of breath, and weakness. She denies chest pain or dizziness. She is requesting for a new prescription for her Folic Acid. She does not have any other concerns or complaints to report at this time. Past medical history, family history, and social history: these were reviewed and remain unchanged.      Past Medical History:   Diagnosis Date    Abdominal pain, unspecified site     Acute otitis media     Acute pharyngitis     Anxiety     Arthritis     Autoimmune disease ([facility]     Back injury     Backache     herniated disc in lower back    Blurred vision     Chest pain 2018    Chest pain, unspecified     abnormal EKG    Chronic airway obstruction, not elsewhere classified     Chronic back pain     Chronic pain     COPD     Depression     Diabetes (HCC)     Dizziness     Dizziness and giddiness     possible orthostatic changes, vasovagal, autonomic dysfunction from diabetic neuropathy    Encounters for unspecified administrative purpose  Essential thrombocytosis ([facility] 2016    Feeling anxious     Fibromyalgia     Headache(784.0)     Hemorrhoid     Herniated disc     at L5    Hypercholesterolemia     Hyperglycemia     Hypertension     Joint pain     Leukocytosis, unspecified     Major depressive disorder, single episode, mild (HCC)     Mental disorder     depression, anxiety, bipolar and PTSD    Neuropathy     Obesity, unspecified     Other chest pain     Palpitation 2018    ?  arrhythmia    Phobic disorders     Rheumatoid arthritis (HCC)     Rheumatoid arthritis of foot ([facility] [facility] 2018    on treatment    Seasonal allergies     Shortness of breath     normal EF    Smoking 2018    discussed cessation    Streptococcal sore throat     Type 2 diabetes mellitus without complication, with long-term current use of insulin ([facility] [address] 2018    Type II or unspecified type diabetes mellitus with unspecified complication, uncontrolled     Unspecified hereditary and idiopathic peripheral neuropathy     Vitamin D deficiency      Past Surgical History:   Procedure Laterality Date    HX CHOLECYSTECTOMY      HX GYN  2005    partial Hysterectomy    HX OTHER SURGICAL  12-1-15    had ingrown toenail removed from big toe, both feet    HX TUBAL LIGATION           Social History     Socioeconomic History    Marital status:      Spouse name: Not on file    Number of children: Not on file    Years of education: Not on file    Highest education level: Not on file   Occupational History    Not on file   Social Needs    Financial resource strain: Not on file    Food insecurity     Worry: Not on file     Inability: Not on file    Transportation needs     Medical: Not on file     Non-medical: Not on file   Tobacco Use    Smoking status: Current Every Day Smoker     Packs/day: 0.25     Last attempt to quit: 2012     Years since quittin.8    Smokeless tobacco: Never Used    Tobacco comment: 5 cigarettes day   Substance and Sexual Activity    Alcohol use: Yes     Frequency: Monthly or less     Drinks per session: 1 or 2     Binge frequency: Never     Comment: socially    Drug use: No    Sexual activity: Yes     Partners: Male     Birth control/protection: Condom   Lifestyle    Physical activity     Days per week: Not on file     Minutes per session: Not on file    Stress: Not on file   Relationships    Social connections     Talks on phone: Not on file     Gets together: Not on file     Attends Orthodox service: Not on file     Active member of club or organization: Not on file     Attends meetings of clubs or organizations: Not on file     Relationship status: Not on file    Intimate partner violence     Fear of current or ex partner: Not on file     Emotionally abused: Not on file     Physically abused: Not on file     Forced sexual activity: Not on file   Other Topics Concern    Not on file   Social History Narrative    Not on file     Family History   Problem Relation Age of Onset    Diabetes Other     Alcohol abuse Mother     Arthritis-osteo Mother     Diabetes Mother     Elevated Lipids Mother     Headache Mother     Heart Disease Mother     Migraines Mother     Psychiatric Disorder Mother     Alcohol abuse Father     Arthritis-osteo Father     Cancer Father     Headache Father     Heart Disease Father     Lung Disease Father     Migraines Father     Heart Surgery Father     Alcohol abuse Sister     Headache Sister     Diabetes Sister     Heart Disease Sister     Migraines Sister     Psychiatric Disorder Sister     Headache Brother     Diabetes Brother     Elevated Lipids Brother     Heart Disease Brother     Migraines Brother     Psychiatric Disorder Brother     Coronary Artery Disease Brother     Cancer Maternal Aunt     Alcohol abuse Maternal Aunt     Diabetes Maternal Aunt     Headache Maternal Aunt     Lung Disease Maternal Aunt     Migraines Maternal Aunt  Headache Maternal Uncle     Migraines Maternal Uncle     Stroke Maternal Uncle     Psychiatric Disorder Maternal Uncle     Headache Paternal Aunt     Migraines Paternal Aunt     Headache Paternal Uncle     Hypertension Paternal Uncle     Migraines Paternal Uncle     Stroke Paternal Uncle     Headache Maternal Grandmother     Migraines Maternal Grandmother     Stroke Maternal Grandmother     Headache Maternal Grandfather     Migraines Maternal Grandfather     Stroke Maternal Grandfather     Headache Paternal Grandmother     Hypertension Paternal Grandmother     Lung Disease Paternal Grandmother     Migraines Paternal Grandmother     Cancer Paternal Grandmother     Headache Paternal Grandfather     Cancer Paternal Grandfather     Migraines Paternal Grandfather      Current Outpatient Medications   Medication Sig Dispense Refill    oxyCODONE-acetaminophen (PERCOCET 10)  mg per tablet Take 1 Tab by mouth every six (6) hours as needed for Pain for up to 15 days. Max Daily Amount: 4 Tabs. 60 Tab 0    anagrelide (AGRYLIN) 0.5 mg capsule Take 1 Cap by mouth two (2) times a day. Indications: spontaneous hemorrhages and increase in blood platelets 60 Cap 6    hydrOXYzine pamoate (VISTARIL) 25 mg capsule 25 mg.      mirtazapine (REMERON) 15 mg tablet       atorvastatin (LIPITOR) 40 mg tablet Take 1 Tab by mouth nightly. 30 Tab 6    folic acid 335 mcg tablet Take 1 Tab by mouth daily. 30 Tab 3    hydroCHLOROthiazide (HYDRODIURIL) 12.5 mg tablet Take 12.5 mg by mouth daily.  losartan (COZAAR) 50 mg tablet Take 1 Tab by mouth daily. 90 Tab 1    insulin lispro (HUMALOG) 100 unit/mL injection For Blood Sugar of less than 150 = 0 units/150 -199 = 3 units/200 -249 = 6 units/250 -299 = 9 units/300 - 349 = 12 units/350+ = 15 units. 1 Vial 1    aspirin delayed-release 81 mg tablet Take 1 Tab by mouth daily.  30 Tab 1    insulin glargine (LANTUS) 100 unit/mL injection 30 Units by SubCUTAneous route daily. 1 Vial 1    naloxone (NARCAN) 4 mg/actuation nasal spray Use 1 spray intranasally, then discard. Repeat with new spray every 2 min as needed for opioid overdose symptoms, alternating nostrils. Indications: Decrease in Rate & Depth of Breathing due to Opioid Drug, opioid overdose 1 Each 1    BD INSULIN SYRINGE ULTRA-FINE 1 mL 31 gauge x 15/64\" syrg       albuterol (PROAIR HFA) 90 mcg/actuation inhaler Take 1 Puff by inhalation every six (6) hours as needed for Wheezing. 1 Inhaler 0    ondansetron hcl (ZOFRAN, AS HYDROCHLORIDE,) 4 mg tablet Take 1 Tab by mouth every eight (8) hours as needed for Nausea. 12 Tab 0    methotrexate (RHEUMATREX) 2.5 mg tablet Take 2.5 mg by mouth every fourty-eight (48) hours.  metFORMIN (GLUCOPHAGE) 1,000 mg tablet Take 500 mg by mouth two (2) times a day.  Cholecalciferol, Vitamin D3, 5,000 unit Tab Take 5,000 Units by mouth every seven (7) days.  atenolol (TENORMIN) 25 mg tablet Take 25 mg by mouth daily. Indications: HYPERTENSION      furosemide (LASIX) 40 mg tablet Take  by mouth daily.  INFLIXIMAB (REMICADE IV) 344 mg by IntraVENous route once as needed. remicade will be every 6 weeks         Review of Systems   Constitutional: Negative for chills, diaphoresis, fever, malaise/fatigue and weight loss. Respiratory: Negative for cough, hemoptysis, shortness of breath and wheezing. Cardiovascular: Negative for chest pain, palpitations and leg swelling. Gastrointestinal: Negative for abdominal pain, diarrhea, heartburn, nausea and vomiting. Genitourinary: Negative for dysuria, frequency, hematuria and urgency. Musculoskeletal: Positive for back pain, joint pain and myalgias. Skin: Negative for itching and rash. Neurological: Negative for dizziness, seizures, weakness and headaches. Psychiatric/Behavioral: Negative for depression. The patient does not have insomnia.              Objective:     Visit Vitals  /79 Pulse 83   Ht 5' 11\" (1.803 m)   Wt 105.3 kg (232 lb 3.2 oz)   SpO2 99%   BMI 32.39 kg/m²       ECOG Performance Status (grade): 0  0 - able to carry on all pre-disease activity w/out restriction  1 - restricted but able to carry out light work  2 - ambulatory and can self- care but unable to carry out work  3 - bed or chair >50% of waking hours  4 - completely disable, total care, confined to bed or chair    Physical Exam  Constitutional:       Appearance: Normal appearance. HENT:      Head: Normocephalic and atraumatic. Eyes:      Pupils: Pupils are equal, round, and reactive to light. Neck:      Musculoskeletal: Neck supple. Cardiovascular:      Rate and Rhythm: Normal rate and regular rhythm. Heart sounds: Normal heart sounds. Pulmonary:      Effort: Pulmonary effort is normal.      Breath sounds: Normal breath sounds. Abdominal:      General: Bowel sounds are normal.      Palpations: Abdomen is soft. Tenderness: There is no abdominal tenderness. There is no guarding. Musculoskeletal: Normal range of motion. Skin:     General: Skin is warm. Neurological:      General: No focal deficit present. Mental Status: She is alert and oriented to person, place, and time. Mental status is at baseline. Diagnostics:      No results found for this or any previous visit (from the past 96 hour(s)). Imaging:  Results for orders placed during the hospital encounter of 09/17/12   IR BX BONE MARROW    Narrative Fluoroscopic guided power assisted left iliac crest bone biopsy and marrow  aspiration    Attending: Dr. Graham May    Complications: None    Contrast: None    Medications: Lidocaine, 1% subcutaneous and moderate sedation. Indication: Leukocytosis    Procedure: I obtained informed consent. We brought the patient to the  interventional radiology suite and placed her prone on the table. I prepped  and draped the patient's left iliac crest in the usual sterile fashion. After  anesthetizing the skin and subcutaneous tissues with 1% lidocaine, I used a  power drill to obtain a marrow aspirate and bone core specimen, which I have  submitted for interpretation to the pathology service. The patient tolerated  the procedure well. Findings: No focal lesion in area biopsied. Impression IMPRESSION:  Successful fluoroscopic guided power assisted left iliac crest  biopsy and marrow aspiration. I performed this procedure. Results for orders placed during the hospital encounter of 02/06/20   XR FOOT RT MIN 3 V    Narrative FOOT COMPLETE:   Right    CPT CODE: 83905    INDICATIONS:Right foot pain    FINDINGS: Three-view study of the right foot is submitted. There is an acute  oblique minimally displaced fracture involving the proximal phalanx of the  fourth toe. The fracture does not appear to extend to the joint spaces. No  additional fractures are seen. Small plantar and posterior calcaneal spurs are  present. Impression Impression:    Acute fracture proximal phalanx right fourth toe. Results for orders placed during the hospital encounter of 08/07/19   CTA CHEST W OR W WO CONT    Narrative CTA CHEST PULMONARY EMBOLISM PROTOCOL      INDICATION: Mid chest pain and pressure and shortness of breath for a few months  . Question pulmonary embolism. TECHNIQUE: Thin collimation axial images obtained through the level of the  pulmonary arteries with additional imaging through the chest following the  uneventful administration of 80 cc Isovue-370 intravenous contrast.  Images  reconstructed into MIP coronal and sagittal projections for complete evaluation  of the tortuous and overlapping pulmonary vascular structures and to reduce  patient radiation dose.   All CT scans are performed using dose optimization  techniques as appropriate to the performed exam including the following:  Automated exposure control, adjustment of mA and/or kV according to patient  size, and use of iterative reconstructive technique. COMPARISON: CTA chest 3/25/2012. FINDINGS:    No filling defects are appreciated within the main, left, right, lobar or  partially visualized segmental pulmonary arteries to suggest embolism. Many  subsegmental and a few segmental pulmonary artery branches are nondiagnostic due  to motion artifact. The thoracic aorta is not aneurysmal.  No evidence for  dissection. No pericardial effusion. No pleural effusion. Mildly enlarged 12 mm AP window  lymph node. Borderline hilar and axillary lymph nodes. These are chronic since  2012 with mild improvement. No acute bone finding. Vertebral spondylosis. No consolidation. Respiratory motion. Bronchial wall thickening. Partially included upper abdomen is without acute findings. Cholecystectomy. Impression IMPRESSION:    1. No central pulmonary embolism. Many segmental and subsegmental pulmonary  artery branches are nondiagnostic due to motion artifact. No acute findings. 2. Mild bronchitis. 3. Chronic mild lymphadenopathy, improved since 2012. Assessment:     1. Essential thrombocytosis ([address]    2. Chronic pain syndrome    3. Thrombocytosis ([address]    4. Chronic anemia    5. Folic acid deficiency    6. Leukocytosis, unspecified type    7. Rheumatoid arthritis involving right foot with positive rheumatoid factor ([address]      Plan:   # Essential thrombocytosis/thrombocytosis:  -- Last CBC 3/24 reported stable PLT at 384.  -- We will obtain CBC and chemostry today. -- She was advised to continue anagrelide 0.5mg one tablet twice daily. I have reenforced to the patient the importance of being complaint with the treatment plan. # Rheumatoid arthritis:  -- The patient will continue to receive Remicade as a primary treatment modality for her severe rheumatoid arthritis every 6 weeks. Remicade is scheduled to be given on 5/28/2020.   -- The patient will continued to take Methotrexate 5mg PO weekly.    -- We will refer to a new Rheumatologist.    # Fibromyalgia/chronic pain syndrome:   --The patient  continues to have generalized pain at times related to her underlying fibromyalgia. The patient receives her Percocet  mg medication on a monthly basis as needed. -- We will refill her pain meds as requested today. Will refer to pain management for long term control. # Chronic anemia and folic acid deficiency:  -- A new prescription for folic acid 461 mcg p.o. daily will be ordered. -- We will check Iron study with ferritin, Vitamin B12/Folate      # Leukocytosis:   -- Resolved  -- Will monitor CBC    -- I will see the patient back in clinic in about 6 weeks. Always sooner if required. The patient can have lab done prior our next clinic visit. Orders Placed This Encounter    COMPLETE CBC & AUTO DIFF WBC    InHouse CBC ([employer])     Standing Status:   Future     Number of Occurrences:   1     Standing Expiration Date:   7642    METABOLIC PANEL, COMPREHENSIVE     Standing Status:   Future     Number of Occurrences:   1     Standing Expiration Date:   2021    FERRITIN     Standing Status:   Future     Number of Occurrences:   1     Standing Expiration Date:   2021    oxyCODONE-acetaminophen (PERCOCET 10)  mg per tablet     Sig: Take 1 Tab by mouth every six (6) hours as needed for Pain for up to 15 days. Max Daily Amount: 4 Tabs. Dispense:  60 Tab     Refill:  0    folic acid 760 mcg tablet     Sig: Take 1 Tab by mouth daily. Dispense:  30 Tab     Refill:  3    naloxone (NARCAN) 4 mg/actuation nasal spray     [place] by IntraNASal route once as needed for Overdose for up to 1 dose. Use 1 spray intranasally, then discard. Repeat with new spray every 2 min as needed for opioid overdose symptoms, alternating nostrils. Dispense:  1 Each     Refill:  0           Ms. Dillon Vela has a reminder for a \"due or due soon\" health maintenance.  I have asked that she contact her primary care provider for follow-up on this health maintenance. All of patient's questions answered to their apparent satisfaction. They verbally show understanding and agreement with aforementioned plan. Matthew Rollins MD  5/2/2020          About 30 minutes were spent for this encounter with more than 50% of the time spent in face-to-face counseling, discussing on diagnosis and management plan going forward, and co-ordination of care. Parts of this document has been produced using Dragon dictation system. Unrecognized errors in transcription may be present. Please do not hesitate to reach out for any questions or clarifications.       CC: Fausto Calero MD

## 2020-05-07 ENCOUNTER — HOSPITAL ENCOUNTER (OUTPATIENT)
Dept: ONCOLOGY | Age: 55
Discharge: HOME | End: 2020-05-07

## 2020-05-07 ENCOUNTER — HOSPITAL ENCOUNTER (OUTPATIENT)
Dept: LAB | Age: 55
Discharge: HOME | End: 2020-05-07
Payer: MEDICARE

## 2020-05-07 ENCOUNTER — OFFICE VISIT (OUTPATIENT)
Dept: ONCOLOGY | Age: 55
End: 2020-05-07

## 2020-05-07 VITALS
SYSTOLIC BLOOD PRESSURE: 133 MMHG | HEIGHT: 71 IN | BODY MASS INDEX: 32.51 KG/M2 | HEART RATE: 83 BPM | WEIGHT: 232.2 LBS | DIASTOLIC BLOOD PRESSURE: 79 MMHG | OXYGEN SATURATION: 99 %

## 2020-05-07 DIAGNOSIS — G89.4 CHRONIC PAIN SYNDROME: ICD-10-CM

## 2020-05-07 DIAGNOSIS — D64.9 CHRONIC ANEMIA: ICD-10-CM

## 2020-05-07 DIAGNOSIS — E53.8 FOLIC ACID DEFICIENCY: ICD-10-CM

## 2020-05-07 DIAGNOSIS — D72.829 LEUKOCYTOSIS, UNSPECIFIED TYPE: ICD-10-CM

## 2020-05-07 DIAGNOSIS — D75.839 THROMBOCYTOSIS: ICD-10-CM

## 2020-05-07 DIAGNOSIS — D47.3 ESSENTIAL THROMBOCYTOSIS (HCC): Primary | ICD-10-CM

## 2020-05-07 DIAGNOSIS — M05.771 RHEUMATOID ARTHRITIS INVOLVING RIGHT FOOT WITH POSITIVE RHEUMATOID FACTOR (HCC): ICD-10-CM

## 2020-05-07 LAB
ALBUMIN SERPL-MCNC: 3.3 G/DL (ref 3.4–5)
ALBUMIN/GLOB SERPL: 0.8 {RATIO} (ref 0.8–1.7)
ALP SERPL-CCNC: 116 U/L (ref 45–117)
ALT SERPL-CCNC: 21 U/L (ref 13–56)
ANION GAP SERPL CALC-SCNC: 7 MMOL/L (ref 3–18)
AST SERPL-CCNC: 11 U/L (ref 10–38)
BASO+EOS+MONOS # BLD AUTO: 0.4 K/UL (ref 0–2.3)
BASO+EOS+MONOS NFR BLD AUTO: 5 % (ref 0.1–17)
BILIRUB SERPL-MCNC: 0.4 MG/DL (ref 0.2–1)
BUN SERPL-MCNC: 16 MG/DL (ref 7–18)
BUN/CREAT SERPL: 21 (ref 12–20)
CALCIUM SERPL-MCNC: 8.9 MG/DL (ref 8.5–10.1)
CHLORIDE SERPL-SCNC: 109 MMOL/L (ref 100–111)
CO2 SERPL-SCNC: 24 MMOL/L (ref 21–32)
CREAT SERPL-MCNC: 0.77 MG/DL (ref 0.6–1.3)
DIFFERENTIAL METHOD BLD: ABNORMAL
ERYTHROCYTE [DISTWIDTH] IN BLOOD BY AUTOMATED COUNT: 13.5 % (ref 11.5–14.5)
FERRITIN SERPL-MCNC: 100 NG/ML (ref 8–388)
FOLATE SERPL-MCNC: >20 NG/ML (ref 3.1–17.5)
GLOBULIN SER CALC-MCNC: 4.2 G/DL (ref 2–4)
GLUCOSE SERPL-MCNC: 266 MG/DL (ref 74–99)
HCT VFR BLD AUTO: 42.4 % (ref 36–48)
HGB BLD-MCNC: 13.9 G/DL (ref 12–16)
IRON SATN MFR SERPL: 38 % (ref 20–50)
IRON SERPL-MCNC: 109 UG/DL (ref 50–175)
LYMPHOCYTES # BLD: 3.2 K/UL (ref 1.1–5.9)
LYMPHOCYTES NFR BLD: 33 % (ref 14–44)
MCH RBC QN AUTO: 26.7 PG (ref 25–35)
MCHC RBC AUTO-ENTMCNC: 32.8 G/DL (ref 31–37)
MCV RBC AUTO: 81.5 FL (ref 78–102)
NEUTS SEG # BLD: 5.9 K/UL (ref 1.8–9.5)
NEUTS SEG NFR BLD: 62 % (ref 40–70)
PLATELET # BLD AUTO: 349 K/UL (ref 140–440)
POTASSIUM SERPL-SCNC: 4.5 MMOL/L (ref 3.5–5.5)
PROT SERPL-MCNC: 7.5 G/DL (ref 6.4–8.2)
RBC # BLD AUTO: 5.2 M/UL (ref 4.1–5.1)
SODIUM SERPL-SCNC: 140 MMOL/L (ref 136–145)
TIBC SERPL-MCNC: 288 UG/DL (ref 250–450)
VIT B12 SERPL-MCNC: 779 PG/ML (ref 211–911)
WBC # BLD AUTO: 9.5 K/UL (ref 4.5–13)

## 2020-05-07 PROCEDURE — 82728 ASSAY OF FERRITIN: CPT

## 2020-05-07 PROCEDURE — 80053 COMPREHEN METABOLIC PANEL: CPT

## 2020-05-07 PROCEDURE — 82607 VITAMIN B-12: CPT

## 2020-05-07 PROCEDURE — 83540 ASSAY OF IRON: CPT

## 2020-05-07 RX ORDER — OXYCODONE AND ACETAMINOPHEN 10; 325 MG/1; MG/1
1 TABLET ORAL
Qty: 60 TAB | Refills: 0 | Status: SHIPPED | OUTPATIENT
Start: 2020-05-07 | End: 2020-05-21 | Stop reason: SDUPTHER

## 2020-05-07 RX ORDER — NALOXONE HYDROCHLORIDE 4 MG/.1ML
1 SPRAY NASAL
Qty: 1 EACH | Refills: 0 | Status: SHIPPED | OUTPATIENT
Start: 2020-05-07 | End: 2020-05-07

## 2020-05-07 RX ORDER — LANOLIN ALCOHOL/MO/W.PET/CERES
400 CREAM (GRAM) TOPICAL DAILY
Qty: 30 TAB | Refills: 3 | Status: SHIPPED | OUTPATIENT
Start: 2020-05-07 | End: 2020-07-09 | Stop reason: SDUPTHER

## 2020-05-07 NOTE — LETTER
5/8/20 Patient: Constantin Tolbert  
YOB: 1965 Date of Visit: 5/7/2020 Doreen Owens MD 
[address] [place] [address] VIA Facsimile: [phone] Dear Doreen Owens MD, Thank you for referring Ms. Esthela Arroyo to [address] [place] [address] [place] for evaluation. My notes for this consultation are attached. If you have questions, please do not hesitate to call me. I look forward to following your patient along with you. Sincerely, Lucio Huber MD

## 2020-05-21 DIAGNOSIS — G89.4 CHRONIC PAIN SYNDROME: ICD-10-CM

## 2020-05-22 RX ORDER — OXYCODONE AND ACETAMINOPHEN 10; 325 MG/1; MG/1
1 TABLET ORAL
Qty: 60 TAB | Refills: 0 | Status: SHIPPED | OUTPATIENT
Start: 2020-05-22 | End: 2020-06-08 | Stop reason: SDUPTHER

## 2020-05-28 ENCOUNTER — HOSPITAL ENCOUNTER (OUTPATIENT)
Dept: INFUSION THERAPY | Age: 55
Discharge: HOME | End: 2020-05-28
Payer: MEDICARE

## 2020-05-28 VITALS
WEIGHT: 232.1 LBS | BODY MASS INDEX: 32.49 KG/M2 | HEART RATE: 89 BPM | RESPIRATION RATE: 18 BRPM | DIASTOLIC BLOOD PRESSURE: 75 MMHG | SYSTOLIC BLOOD PRESSURE: 116 MMHG | OXYGEN SATURATION: 99 % | TEMPERATURE: 97.9 F | HEIGHT: 71 IN

## 2020-05-28 DIAGNOSIS — M06.9 RHEUMATOID ARTHRITIS OF FOOT, UNSPECIFIED LATERALITY, UNSPECIFIED RHEUMATOID FACTOR PRESENCE: Primary | ICD-10-CM

## 2020-05-28 PROCEDURE — 74011250636 HC RX REV CODE- 250/636: Performed by: NURSE PRACTITIONER

## 2020-05-28 PROCEDURE — 96375 TX/PRO/DX INJ NEW DRUG ADDON: CPT

## 2020-05-28 PROCEDURE — 96413 CHEMO IV INFUSION 1 HR: CPT

## 2020-05-28 PROCEDURE — 96415 CHEMO IV INFUSION ADDL HR: CPT

## 2020-05-28 RX ORDER — ALBUTEROL SULFATE 0.83 MG/ML
2.5 SOLUTION RESPIRATORY (INHALATION) AS NEEDED
Status: CANCELLED
Start: 2020-07-09

## 2020-05-28 RX ORDER — SODIUM CHLORIDE 9 MG/ML
25 INJECTION, SOLUTION INTRAVENOUS CONTINUOUS
Status: DISPENSED | OUTPATIENT
Start: 2020-05-28 | End: 2020-05-28

## 2020-05-28 RX ORDER — SODIUM CHLORIDE 0.9 % (FLUSH) 0.9 %
10 SYRINGE (ML) INJECTION AS NEEDED
Status: CANCELLED
Start: 2020-07-09

## 2020-05-28 RX ORDER — SODIUM CHLORIDE 9 MG/ML
25 INJECTION, SOLUTION INTRAVENOUS CONTINUOUS
Status: CANCELLED | OUTPATIENT
Start: 2020-07-09

## 2020-05-28 RX ORDER — DIPHENHYDRAMINE HYDROCHLORIDE 50 MG/ML
25 INJECTION, SOLUTION INTRAMUSCULAR; INTRAVENOUS ONCE
Status: CANCELLED
Start: 2020-07-09

## 2020-05-28 RX ORDER — DIPHENHYDRAMINE HYDROCHLORIDE 50 MG/ML
50 INJECTION, SOLUTION INTRAMUSCULAR; INTRAVENOUS AS NEEDED
Status: CANCELLED
Start: 2020-07-09

## 2020-05-28 RX ORDER — ACETAMINOPHEN 325 MG/1
650 TABLET ORAL AS NEEDED
Status: CANCELLED
Start: 2020-07-09

## 2020-05-28 RX ORDER — ONDANSETRON 2 MG/ML
8 INJECTION INTRAMUSCULAR; INTRAVENOUS AS NEEDED
Status: CANCELLED | OUTPATIENT
Start: 2020-07-09

## 2020-05-28 RX ORDER — EPINEPHRINE 1 MG/ML
0.3 INJECTION, SOLUTION, CONCENTRATE INTRAVENOUS AS NEEDED
Status: CANCELLED | OUTPATIENT
Start: 2020-07-09

## 2020-05-28 RX ORDER — DIPHENHYDRAMINE HYDROCHLORIDE 50 MG/ML
25 INJECTION, SOLUTION INTRAMUSCULAR; INTRAVENOUS ONCE
Status: COMPLETED | OUTPATIENT
Start: 2020-05-28 | End: 2020-05-28

## 2020-05-28 RX ORDER — SODIUM CHLORIDE 0.9 % (FLUSH) 0.9 %
10 SYRINGE (ML) INJECTION AS NEEDED
Status: DISPENSED | OUTPATIENT
Start: 2020-05-28 | End: 2020-05-28

## 2020-05-28 RX ORDER — HEPARIN 100 UNIT/ML
300-500 SYRINGE INTRAVENOUS AS NEEDED
Status: CANCELLED
Start: 2020-07-09

## 2020-05-28 RX ORDER — HYDROCORTISONE SODIUM SUCCINATE 100 MG/2ML
100 INJECTION, POWDER, FOR SOLUTION INTRAMUSCULAR; INTRAVENOUS AS NEEDED
Status: CANCELLED | OUTPATIENT
Start: 2020-07-09

## 2020-05-28 RX ORDER — SODIUM CHLORIDE 9 MG/ML
10 INJECTION INTRAMUSCULAR; INTRAVENOUS; SUBCUTANEOUS AS NEEDED
Status: CANCELLED | OUTPATIENT
Start: 2020-07-09

## 2020-05-28 RX ADMIN — SODIUM CHLORIDE 25 ML/HR: 0.9 INJECTION, SOLUTION INTRAVENOUS at 10:10

## 2020-05-28 RX ADMIN — Medication 10 ML: at 10:17

## 2020-05-28 RX ADMIN — SODIUM CHLORIDE 400 MG: 0.9 INJECTION, SOLUTION INTRAVENOUS at 10:41

## 2020-05-28 RX ADMIN — DIPHENHYDRAMINE HYDROCHLORIDE 25 MG: 50 INJECTION, SOLUTION INTRAMUSCULAR; INTRAVENOUS at 10:17

## 2020-05-28 RX ADMIN — Medication 10 ML: at 10:10

## 2020-05-28 NOTE — PROGRESS NOTES
[facility] Progress Note    Date: May 28, 2020    Name: Kristian Levine    MRN: [id number]         : 1965      RENFLEXIS Q6 WEEKS      Ms. Rahul Dempsey arrived to [facility] at 36. Ms. Rahul Dempsey was assessed and education was provided. Ms. Emigdio Norman vitals were reviewed. Visit Vitals  /83 (BP 1 Location: Left arm, BP Patient Position: Sitting)   Pulse 98   Temp 98.3 °F (36.8 °C)   Resp 18   Ht 5' 11\" (1.803 m)   Wt 105.3 kg (232 lb 1.6 oz)   SpO2 100%   BMI 32.37 kg/m²       Pt was observed for 5 minutes after obtaining vital signs prior to initiating treatment. 24g IV inserted in patient's R mid anterior forearm x2 attempts. Flushes without difficulty/ brisk blood return. Normal saline initiated at Mery [place] per order. Benadryl 25mg IVP administered as ordered as pre-medication. Renflexis 400mg IV administered as ordered followed by NS flush. Ms. Rahul Dempsey tolerated well without complaints. IV removed/ intact. Gauze/ paper tape applied to site. Pt armband removed & shredded. Ms. Rahul Dempsey was discharged from [place] [address] in stable condition at [phone]. She is to return on 20 at 0900 for her next appointment.     Angel Escudero RN  May 28, 2020

## 2020-05-29 ENCOUNTER — APPOINTMENT (OUTPATIENT)
Dept: INFUSION THERAPY | Age: 55
End: 2020-05-29
Payer: MEDICARE

## 2020-06-04 ENCOUNTER — OFFICE VISIT (OUTPATIENT)
Dept: ORTHOPEDIC SURGERY | Age: 55
End: 2020-06-04

## 2020-06-04 VITALS
HEART RATE: 86 BPM | TEMPERATURE: 99 F | HEIGHT: 71 IN | OXYGEN SATURATION: 98 % | SYSTOLIC BLOOD PRESSURE: 119 MMHG | BODY MASS INDEX: 32.53 KG/M2 | DIASTOLIC BLOOD PRESSURE: 67 MMHG | RESPIRATION RATE: 16 BRPM | WEIGHT: 232.4 LBS

## 2020-06-04 DIAGNOSIS — M54.16 LUMBAR RADICULOPATHY: ICD-10-CM

## 2020-06-04 DIAGNOSIS — M47.816 LUMBAR FACET ARTHROPATHY: ICD-10-CM

## 2020-06-04 DIAGNOSIS — M53.3 SACROILIAC JOINT DYSFUNCTION OF BOTH SIDES: Primary | ICD-10-CM

## 2020-06-04 DIAGNOSIS — M54.59 MECHANICAL LOW BACK PAIN: ICD-10-CM

## 2020-06-04 DIAGNOSIS — M47.816 SPONDYLOSIS OF LUMBAR REGION WITHOUT MYELOPATHY OR RADICULOPATHY: ICD-10-CM

## 2020-06-04 NOTE — PATIENT INSTRUCTIONS
Sacroiliac Joint Pain: Care Instructions Your Care Instructions The sacroiliac joints connect the spine and each side of the pelvis. These joints bear the weight and stress of your torso. This makes them easy to injure. Injury or overuse of these joints may cause low back pain. Stress on these joints can cause joint pain. Sacroiliac joint pain is more common in pregnant women. Certain kinds of arthritis also may cause this type of joint pain. Home treatment may help you feel better. So can avoiding activities that stress your back. Your doctor also may recommend physical therapy. This may include doing exercises and stretches to help with pain. You may also learn to use good posture. Follow-up care is a key part of your treatment and safety. Be sure to make and go to all appointments, and call your doctor if you are having problems. It's also a good idea to know your test results and keep a list of the medicines you take. How can you care for yourself at home? · Ask your doctor about light exercises that may help your back pain. Try to do light activity throughout the day. But make sure to take rests as needed. Find a comfortable position for rest, but don't stay in one position for too long. Avoid activities that cause pain. · To apply heat, put a warm water bottle, a heating pad set on low, or a warm cloth on your back. Do not go to sleep with a heating pad on your skin. · Put ice or a cold pack on your back for 10 to 20 minutes at a time. Put a thin cloth between the ice and your skin. · If the doctor gave you a prescription medicine for pain, take it as prescribed. · If you are not taking a prescription pain medicine, ask your doctor if you can take an over-the-counter pain medicine, such as acetaminophen (Tylenol), ibuprofen (Advil, Motrin), or naproxen (Aleve). Read and follow all instructions on the label. Take pain medicines exactly as directed. · Do not take two or more pain medicines at the same time unless the doctor told you to. Many pain medicines have acetaminophen, which is Tylenol. Too much acetaminophen (Tylenol) can be harmful. · To prevent future back pain, do exercises to stretch and strengthen your back and stomach. Learn how to use good posture, safe lifting techniques, and proper body mechanics. When should you call for help? [id number] anytime you think you may need emergency care. For example, call if: 
· You are unable to move a leg at all. Call your doctor now or seek immediate medical care if: 
· You have new or worse symptoms in your legs or buttocks. Symptoms may include: 
? Numbness or tingling. ? Weakness. ? Pain. · You lose bladder or bowel control. Watch closely for changes in your health, and be sure to contact your doctor if: 
· You are not getting better as expected. Where can you learn more? Go to http://swapnil-speedy.info/ Enter K757 in the search box to learn more about \"Sacroiliac Joint Pain: Care Instructions. \" Current as of: March 2, 2020               Content Version: 12.5 © 3043-0945 [employer]. Care instructions adapted under license by [employer] (which disclaims liability or warranty for this information). If you have questions about a medical condition or this instruction, always ask your healthcare professional. [address] any warranty or liability for your use of this information. Sacroiliac Pain: Exercises Introduction Here are some examples of exercises for you to try. The exercises may be suggested for a condition or for rehabilitation. Start each exercise slowly. Ease off the exercises if you start to have pain. You will be told when to start these exercises and which ones will work best for you. How to do the exercises Knee-to-chest stretch 1.  Do not do the knee-to-chest exercise if it causes or increases back or leg pain. 2. Lie on your back with your knees bent and your feet flat on the floor. You can put a small pillow under your head and neck if it is more comfortable. 3. Grasp your hands under one knee and bring the knee to your chest, keeping the other foot flat on the floor. 4. Keep your lower back pressed to the floor. Hold for at least 15 to 30 seconds. 5. Relax and lower the knee to the starting position. Repeat with the other leg. 6. Repeat 2 to 4 times with each leg. 7. To get more stretch, keep your other leg flat on the floor while pulling your knee to your chest.   
Bridging 1. Lie on your back with both knees bent. Your knees should be bent about 90 degrees. 2. Tighten your belly muscles by pulling in your belly button toward your spine. Then push your feet into the floor, squeeze your buttocks, and lift your hips off the floor until your shoulders, hips, and knees are all in a straight line. 3. Hold for about 6 seconds as you continue to breathe normally, and then slowly lower your hips back down to the floor and rest for up to 10 seconds. 4. Repeat 8 to 12 times. Hip extension 1. Get down on your hands and knees on the floor. 2. Keeping your back and neck straight, lift one leg straight out behind you. When you lift your leg, keep your hips level. Don't let your back twist, and don't let your hip drop toward the floor. 3. Hold for 6 seconds. Repeat 8 to 12 times with each leg. 4. If you feel steady and strong when you do this exercise, you can make it more difficult. To do this, when you lift your leg, also lift the opposite arm straight out in front of you. For example, lift the left leg and the right arm at the same time. (This is sometimes called the \"bird dog exercise. \") Hold for 6 seconds, and repeat 8 to 12 times on each side. Clamshell 1. Lie on your side with a pillow under your head. Keep your feet and knees together and your knees bent. 2. Raise your top knee, but keep your feet together. Do not let your hips roll back. Your legs should open up like a clamshell. 3. Hold for 6 seconds. 4. Slowly lower your knee back down. Rest for 10 seconds. 5. Repeat 8 to 12 times. 6. Switch to your other side and repeat steps 1 through 5. Hamstring wall stretch 1. Lie on your back in a doorway, with one leg through the open door. 2. Slide your affected leg up the wall to straighten your knee. You should feel a gentle stretch down the back of your leg. 3. Hold the stretch for at least 1 minute to begin. Then try to lengthen the time you hold the stretch to as long as 6 minutes. 4. Switch legs, and repeat steps 1 through 3. 
5. Repeat 2 to 4 times. 6. If you do not have a place to do this exercise in a doorway, there is another way to do it: 
7. Lie on your back, and bend one knee. 8. Loop a towel under the ball and toes of that foot, and hold the ends of the towel in your hands. 9. Straighten your knee, and slowly pull back on the towel. You should feel a gentle stretch down the back of your leg. 10. Switch legs, and repeat steps 1 through 3. 
11. Repeat 2 to 4 times. 1. Do not arch your back. 2. Do not bend either knee. 3. Keep one heel touching the floor and the other heel touching the wall. Do not point your toes. Lower abdominal strengthening 1. Lie on your back with your knees bent and your feet flat on the floor. 2. Tighten your belly muscles by pulling your belly button in toward your spine. 3. Lift one foot off the floor and bring your knee toward your chest, so that your knee is straight above your hip and your leg is bent like the letter \"L. \" 
4. Lift the other knee up to the same position. 5. Lower one leg at a time to the starting position. 6. Keep alternating legs until you have lifted each leg 8 to 12 times.  
7. Be sure to keep your belly muscles tight and your back still as you are moving your legs. Be sure to breathe normally. Piriformis stretch 1. Lie on your back with your legs straight. 2. Lift your affected leg, and bend your knee. With your opposite hand, reach across your body, and then gently pull your knee toward your opposite shoulder. 3. Hold the stretch for 15 to 30 seconds. 4. Switch legs and repeat steps 1 through 3. 
5. Repeat 2 to 4 times. Follow-up care is a key part of your treatment and safety. Be sure to make and go to all appointments, and call your doctor if you are having problems. It's also a good idea to know your test results and keep a list of the medicines you take. Where can you learn more? Go to http://swapnil-speedy.info/ Enter Y448 in the search box to learn more about \"Sacroiliac Pain: Exercises. \" Current as of: March 2, 2020               Content Version: 12.5 © 6196-8427 [employer]. Care instructions adapted under license by [employer] (which disclaims liability or warranty for this information). If you have questions about a medical condition or this instruction, always ask your healthcare professional. [place] [address] any warranty or liability for your use of this information.
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
## 2020-06-05 ENCOUNTER — APPOINTMENT (OUTPATIENT)
Dept: GENERAL RADIOLOGY | Age: 55
End: 2020-06-05
Attending: EMERGENCY MEDICINE
Payer: MEDICARE

## 2020-06-05 ENCOUNTER — HOSPITAL ENCOUNTER (EMERGENCY)
Age: 55
Discharge: HOME | End: 2020-06-05
Attending: EMERGENCY MEDICINE
Payer: MEDICARE

## 2020-06-05 VITALS
RESPIRATION RATE: 18 BRPM | HEART RATE: 92 BPM | SYSTOLIC BLOOD PRESSURE: 105 MMHG | WEIGHT: 232.4 LBS | DIASTOLIC BLOOD PRESSURE: 90 MMHG | TEMPERATURE: 98.3 F | HEIGHT: 71 IN | OXYGEN SATURATION: 100 % | BODY MASS INDEX: 32.53 KG/M2

## 2020-06-05 DIAGNOSIS — J18.9 COMMUNITY ACQUIRED PNEUMONIA, UNSPECIFIED LATERALITY: ICD-10-CM

## 2020-06-05 DIAGNOSIS — L30.9 DERMATITIS: ICD-10-CM

## 2020-06-05 DIAGNOSIS — Z20.822 SUSPECTED COVID-19 VIRUS INFECTION: Primary | ICD-10-CM

## 2020-06-05 PROCEDURE — 71045 X-RAY EXAM CHEST 1 VIEW: CPT

## 2020-06-05 PROCEDURE — 99284 EMERGENCY DEPT VISIT MOD MDM: CPT

## 2020-06-05 PROCEDURE — 87635 SARS-COV-2 COVID-19 AMP PRB: CPT

## 2020-06-05 RX ORDER — PREDNISONE 10 MG/1
TABLET ORAL
Qty: 21 TAB | Refills: 0 | Status: SHIPPED | OUTPATIENT
Start: 2020-06-05 | End: 2020-09-23

## 2020-06-05 RX ORDER — GABAPENTIN 100 MG/1
100 CAPSULE ORAL 3 TIMES DAILY
COMMUNITY

## 2020-06-05 RX ORDER — GUAIFENESIN 1200 MG/1
1200 TABLET, EXTENDED RELEASE ORAL
Qty: 14 TAB | Refills: 0 | Status: SHIPPED | OUTPATIENT
Start: 2020-06-05 | End: 2020-09-23

## 2020-06-05 RX ORDER — DOXYCYCLINE HYCLATE 100 MG
100 TABLET ORAL 2 TIMES DAILY
Qty: 14 TAB | Refills: 0 | Status: SHIPPED | OUTPATIENT
Start: 2020-06-05 | End: 2020-06-12

## 2020-06-05 NOTE — DISCHARGE INSTRUCTIONS
Self quarantine for 14 days      If you were prescribed any medication take as directed. Do not drive or use heavy equipment if prescribed narcotics. Follow up with your primary care physician or with specialist as directed. Return to the emergency room with any new or worsening conditions. Dermatitis: Care Instructions  Your Care Instructions  Dermatitis is the general name used for any rash or inflammation of the skin. Different kinds of dermatitis cause different kinds of rashes. Common causes of a rash include new medicines, plants (such as poison oak or poison ivy), heat, and stress. Certain illnesses can also cause a rash. An allergic reaction to something that touches your skin, such as latex, nickel, or poison ivy, is called contact dermatitis. Contact dermatitis may also be caused by something that irritates the skin, such as bleach, a chemical, or soap. These types of rashes cannot be spread from person to person. How long your rash will last depends on what caused it. Rashes may last a few days or months. Follow-up care is a key part of your treatment and safety. Be sure to make and go to all appointments, and call your doctor if you are having problems. It's also a good idea to know your test results and keep a list of the medicines you take. How can you care for yourself at home? · Do not scratch the rash. Cut your nails short, and file them smooth. Or wear gloves if this helps keep you from scratching. · Wash the area with water only. Pat dry. · Put cold, wet cloths on the rash to reduce itching. · Keep cool, and stay out of the sun. · Leave the rash open to the air as much as possible. · If the rash itches, use hydrocortisone cream. Follow the directions on the label. Calamine lotion may help for plant rashes. · Take an over-the-counter antihistamine, such as diphenhydramine (Benadryl) or loratadine (Claritin), to help calm the itching.  Read and follow all instructions on the label.  · If your doctor prescribed a cream, use it as directed. If your doctor prescribed medicine, take it exactly as directed. When should you call for help? Call your doctor now or seek immediate medical care if:  · You have symptoms of infection, such as:  ? Increased pain, swelling, warmth, or redness. ? Red streaks leading from the area. ? Pus draining from the area. ? A fever. · You have joint pain along with the rash. Watch closely for changes in your health, and be sure to contact your doctor if:  · Your rash is changing or getting worse. · You are not getting better as expected. Where can you learn more? Go to http://swapnil-speedy.info/  Enter F270 in the search box to learn more about \"Dermatitis: Care Instructions. \"  Current as of: October 31, 2019               Content Version: 12.5  © 4282-0117 [employer]. Care instructions adapted under license by [employer] (which disclaims liability or warranty for this information). If you have questions about a medical condition or this instruction, always ask your healthcare professional. [place] [address] any warranty or liability for your use of this information. Patient Education            Pneumonia: Care Instructions  Your Care Instructions     Pneumonia is an infection of the lungs. Most cases are caused by infections from bacteria or viruses. Pneumonia may be mild or very severe. If it is caused by bacteria, you will be treated with antibiotics. It may take a few weeks to a few months to recover fully from pneumonia, depending on how sick you were and whether your overall health is good. Follow-up care is a key part of your treatment and safety. Be sure to make and go to all appointments, and call your doctor if you are having problems. It's also a good idea to know your test results and keep a list of the medicines you take. How can you care for yourself at home?   · Take your antibiotics exactly as directed. Do not stop taking the medicine just because you are feeling better. You need to take the full course of antibiotics. · Take your medicines exactly as prescribed. Call your doctor if you think you are having a problem with your medicine. · Get plenty of rest and sleep. You may feel weak and tired for a while, but your energy level will improve with time. · To prevent dehydration, drink plenty of fluids, enough so that your urine is light yellow or clear like water. Choose water and other caffeine-free clear liquids until you feel better. If you have kidney, heart, or liver disease and have to limit fluids, talk with your doctor before you increase the amount of fluids you drink. · Take care of your cough so you can rest. A cough that brings up mucus from your lungs is common with pneumonia. It is one way your body gets rid of the infection. But if coughing keeps you from resting or causes severe fatigue and chest-wall pain, talk to your doctor. He or she may suggest that you take a medicine to reduce the cough. · Use a vaporizer or humidifier to add moisture to your bedroom. Follow the directions for cleaning the machine. · Do not smoke or allow others to smoke around you. Smoke will make your cough last longer. If you need help quitting, talk to your doctor about stop-smoking programs and medicines. These can increase your chances of quitting for good. · Take an over-the-counter pain medicine, such as acetaminophen (Tylenol), ibuprofen (Advil, Motrin), or naproxen (Aleve). Read and follow all instructions on the label. · Do not take two or more pain medicines at the same time unless the doctor told you to. Many pain medicines have acetaminophen, which is Tylenol. Too much acetaminophen (Tylenol) can be harmful. · If you were given a spirometer to measure how well your lungs are working, use it as instructed. This can help your doctor tell how your recovery is going.   · To prevent pneumonia in the future, talk to your doctor about getting a flu vaccine (once a year) and a pneumococcal vaccine (one time only for most people). When should you call for help? [id number] anytime you think you may need emergency care. For example, call if:  · You have severe trouble breathing. Call your doctor now or seek immediate medical care if:  · You cough up dark brown or bloody mucus (sputum). · You have new or worse trouble breathing. · You are dizzy or lightheaded, or you feel like you may faint. Watch closely for changes in your health, and be sure to contact your doctor if:  · You have a new or higher fever. · You are coughing more deeply or more often. · You are not getting better after 2 days (48 hours). · You do not get better as expected. Where can you learn more? Go to http://swapnil-speedy.info/  Enter D336 in the search box to learn more about \"Pneumonia: Care Instructions. \"  Current as of: February 24, 2020               Content Version: 12.5  © 7487-7868 [employer]. Care instructions adapted under license by [employer] (which disclaims liability or warranty for this information). If you have questions about a medical condition or this instruction, always ask your healthcare professional. [address] any warranty or liability for your use of this information.

## 2020-06-05 NOTE — ED PROVIDER NOTES
EMERGENCY DEPARTMENT HISTORY AND PHYSICAL EXAM    11:55 AM       Date: 6/5/2020  Patient Name: Alcira Acevedo    History of Presenting Illness     Chief Complaint   Patient presents with    Rash    Cough    Headache         History Provided By: Patient    Additional History (Context): Alcira Acevedo is a 62 y.o. female with Past medical history of otitis media, rheumatoid arthritis who presents with chief complaint of productive cough for the past 10 days. She also complains of a mild headache. She is concerned that she could have coronavirus. She reports that she has had a rash for couple weeks and she was seen by her PCP and started on some cream but the rash worsens and is now on her torso arms and thighs. She states that it is itchy. No one at home with a rash like this. She denies any new products. She denies shortness of breath, dizziness, nausea, chest pain, fever, body aches, weakness, sore throat, sick contacts, and no exposure to anyone with COVID-19 to her knowledge. No other complaints.     PCP: Anny Quiroz MD        Past History     Past Medical History:  Past Medical History:   Diagnosis Date    Abdominal pain, unspecified site     Acute otitis media     Acute pharyngitis     Anxiety     Arthritis     Autoimmune disease ([facility] [address]     Back injury     Backache     herniated disc in lower back    Blurred vision     Chest pain 5/4/2018    Chest pain, unspecified     abnormal EKG    Chronic airway obstruction, not elsewhere classified     Chronic back pain     Chronic pain     COPD     Depression     Diabetes (HCC)     Dizziness     Dizziness and giddiness     possible orthostatic changes, vasovagal, autonomic dysfunction from diabetic neuropathy    Encounters for unspecified administrative purpose     Essential thrombocytosis ([facility] [address] 1/6/2016    Feeling anxious     Fibromyalgia     Headache(784.0)     Hemorrhoid     Herniated disc     at L5    Hypercholesterolemia     Hyperglycemia     Hypertension     Joint pain     Leukocytosis, unspecified     Major depressive disorder, single episode, mild (HCC)     Mental disorder     depression, anxiety, bipolar and PTSD    Neuropathy     Obesity, unspecified     Other chest pain     Palpitation 5/4/2018    ?  arrhythmia    Phobic disorders     Rheumatoid arthritis (HCC)     Rheumatoid arthritis of foot ([facility] [address] 5/4/2018    on treatment    Seasonal allergies     Shortness of breath     normal EF    Smoking 5/4/2018    discussed cessation    Streptococcal sore throat     Type 2 diabetes mellitus without complication, with long-term current use of insulin ([facility] [address] 5/4/2018    Type II or unspecified type diabetes mellitus with unspecified complication, uncontrolled     Unspecified hereditary and idiopathic peripheral neuropathy     Vitamin D deficiency        Past Surgical History:  Past Surgical History:   Procedure Laterality Date    HX CHOLECYSTECTOMY  1994    HX GYN  2005    partial Hysterectomy    HX OTHER SURGICAL  12-1-15    had ingrown toenail removed from big toe, both feet    HX TUBAL LIGATION      1995       Family History:  Family History   Problem Relation Age of Onset    Diabetes Other     Alcohol abuse Mother     Arthritis-osteo Mother     Diabetes Mother     Elevated Lipids Mother     Headache Mother     Heart Disease Mother     Migraines Mother     Psychiatric Disorder Mother     Alcohol abuse Father     Arthritis-osteo Father     Cancer Father     Headache Father     Heart Disease Father     Lung Disease Father     Migraines Father     Heart Surgery Father     Alcohol abuse Sister     Headache Sister     Diabetes Sister     Heart Disease Sister    [place] Migraines Sister     Psychiatric Disorder Sister     Headache Brother     Diabetes Brother     Elevated Lipids Brother     Heart Disease Brother     Migraines Brother     Psychiatric Disorder Brother     Coronary Artery Disease Brother     Cancer Maternal Aunt     Alcohol abuse Maternal Aunt     Diabetes Maternal Aunt     Headache Maternal Aunt     Lung Disease Maternal Aunt     Migraines Maternal Aunt     Headache Maternal Uncle     Migraines Maternal Uncle     Stroke Maternal Uncle     Psychiatric Disorder Maternal Uncle     Headache Paternal Aunt     Migraines Paternal Aunt     Headache Paternal Uncle     Hypertension Paternal Uncle     Migraines Paternal Uncle     Stroke Paternal Uncle     Headache Maternal Grandmother     Migraines Maternal Grandmother     Stroke Maternal Grandmother     Headache Maternal Grandfather     Migraines Maternal Grandfather     Stroke Maternal Grandfather     Headache Paternal Grandmother     Hypertension Paternal Grandmother     Lung Disease Paternal Grandmother     Migraines Paternal Grandmother     Cancer Paternal Grandmother     Headache Paternal Grandfather     Cancer Paternal Grandfather     Migraines Paternal Grandfather        Social History:  Social History     Tobacco Use    Smoking status: Current Every Day Smoker     Packs/day: 0.25     Last attempt to quit: 2012     Years since quittin.9    Smokeless tobacco: Never Used    Tobacco comment: 5 cigarettes day   Substance Use Topics    Alcohol use: Yes     Frequency: Monthly or less     Drinks per session: 1 or 2     Binge frequency: Never     Comment: socially    Drug use: No       Allergies: Allergies   Allergen Reactions    Levaquin [Levofloxacin] Anaphylaxis    Lipitor [Atorvastatin] Other (comments)     Patient denies    Pollen Extracts Unable to Obtain         Review of Systems       Review of Systems   Constitutional: Negative for chills and fever. HENT: Positive for congestion. Negative for rhinorrhea, sore throat and trouble swallowing. Eyes: Negative for visual disturbance. Respiratory: Positive for choking. Negative for cough, shortness of breath and wheezing. Cardiovascular: Negative for chest pain. Gastrointestinal: Negative for abdominal pain, nausea and vomiting. Endocrine: Negative for polyuria. Genitourinary: Negative for dysuria. Musculoskeletal: Negative for arthralgias and neck stiffness. Skin: Positive for rash. Negative for pallor. Neurological: Positive for headaches (Minimal, almost gone). Negative for dizziness, weakness and numbness. Hematological: Does not bruise/bleed easily. Psychiatric/Behavioral: Negative for confusion and dysphoric mood. All other systems reviewed and are negative. Physical Exam     Visit Vitals  /90 (BP 1 Location: Left arm, BP Patient Position: Sitting)   Pulse 92   Temp 98.3 °F (36.8 °C)   Resp 18   Ht 5' 11\" (1.803 m)   Wt 105.4 kg (232 lb 6.4 oz)   SpO2 100%   BMI 32.41 kg/m²         Physical Exam  Vitals signs and nursing note reviewed. Constitutional:       General: She is not in acute distress. Appearance: She is well-developed. She is not diaphoretic. HENT:      Head: Normocephalic and atraumatic. Eyes:      General: No scleral icterus. Conjunctiva/sclera: Conjunctivae normal.      Pupils: Pupils are equal, round, and reactive to light. Neck:      Musculoskeletal: Normal range of motion and neck supple. Cardiovascular:      Rate and Rhythm: Normal rate. Pulses: Normal pulses. Comments: Capillary refill < 3 seconds  Pulmonary:      Effort: Pulmonary effort is normal. No respiratory distress. Breath sounds: Normal breath sounds. No wheezing. Abdominal:      General: Bowel sounds are normal. There is no distension. Palpations: Abdomen is soft. Tenderness: There is no abdominal tenderness. Musculoskeletal: Normal range of motion. Lymphadenopathy:      Cervical: No cervical adenopathy. Skin:     General: Skin is warm and dry. Comments: Maculopapular rash on arms thighs, chest and back. No vesicles noted and no pustules.        Neurological: Mental Status: She is alert and oriented to person, place, and time. Cranial Nerves: No cranial nerve deficit. Diagnostic Study Results     Labs -  No results found for this or any previous visit (from the past 12 hour(s)). Radiologic Studies -   XR CHEST PORT   Final Result   IMPRESSION: Question streaky bibasilar opacities. Medical Decision Making   I am the first provider for this patient. I reviewed the vital signs, available nursing notes, past medical history, past surgical history, family history and social history. Vital Signs-Reviewed the patient's vital signs. Pulse Oximetry Analysis -  100 on room air (Interpretation) normal        Records Reviewed: Nursing Notes and Old Medical Records (Time of Review: 2:06 PM)    Provider Notes (Medical Decision Making): DDX: Pneumonia, bronchitis, COVID-19, contact dermatitis, allergic reaction,    Cough for about 10 days, afebrile and vital signs stable  Chest x-ray, COVID testing      MDM    Medications - No data to display        ED Course: Progress Notes, Reevaluation, and Consults:  Chest x-ray shows questionable bilateral opacities. COVID test was done    We will put patient on antibiotic        I have reassessed the patient. I have discussed the workup, results and plan with the patient and patient is in agreement. Patient with no new complaint. Patient will be prescribed doxycycline, prednisone, Mucinex. Patient was discharge in stable condition. Patient was given outpatient follow up. Patient is to return to emergency department if any new or worsening condition. Diagnosis     Clinical Impression:   1. Suspected COVID-19 virus infection    2. Dermatitis    3.  Community acquired pneumonia, unspecified laterality        Disposition: Discharged    Follow-up Information     Follow up With Specialties Details Why Contact Jil Bowie MD Internal Medicine Schedule an appointment as soon as possible for a visit In 24 hours [address] [facility]  [address] [facility] [place]      Shana Morales MD Internal Medicine  Call your PCP and discuss that you have been self quarantined, with coronavirus test pending [address] [facility] [address]      [address] [facility] EMERGENCY DEPT Emergency Medicine  As needed, If symptoms worsen [address] [facility]  [phone]           Patient's Medications   Start Taking    DOXYCYCLINE (VIBRA-TABS) 100 MG TABLET    Take 1 Tab by mouth two (2) times a day for 7 days. GUAIFENESIN (MUCINEX) 1,200 MG TA12 ER TABLET    Take 1 Tab by mouth two (2) times daily as needed for Congestion. Indications: cold symptoms, cough    PREDNISONE (STERAPRED DS) 10 MG DOSE PACK    Take as written   Continue Taking    ALBUTEROL (PROAIR HFA) 90 MCG/ACTUATION INHALER    Take 1 Puff by inhalation every six (6) hours as needed for Wheezing. ANAGRELIDE (AGRYLIN) 0.5 MG CAPSULE    Take 1 Cap by mouth two (2) times a day. Indications: spontaneous hemorrhages and increase in blood platelets    ASPIRIN DELAYED-RELEASE 81 MG TABLET    Take 1 Tab by mouth daily. ATENOLOL (TENORMIN) 25 MG TABLET    Take 25 mg by mouth daily. Indications: HYPERTENSION    ATORVASTATIN (LIPITOR) 40 MG TABLET    Take 1 Tab by mouth nightly. BD INSULIN SYRINGE ULTRA-FINE 1 ML 31 GAUGE X 15/64\" SYRG        CHOLECALCIFEROL, VITAMIN D3, 5,000 UNIT TAB    Take 5,000 Units by mouth every seven (7) days. FLUTICASONE/VILANTEROL (BREO ELLIPTA IN)    Take  by inhalation. FOLIC ACID 113 MCG TABLET    Take 1 Tab by mouth daily. FUROSEMIDE (LASIX) 40 MG TABLET    Take  by mouth daily. GABAPENTIN (NEURONTIN) 100 MG CAPSULE    Take 100 mg by mouth three (3) times daily. HYDROCHLOROTHIAZIDE (HYDRODIURIL) 12.5 MG TABLET    Take 12.5 mg by mouth daily. HYDROXYZINE PAMOATE (VISTARIL) 25 MG CAPSULE    25 mg.     INFLIXIMAB (REMICADE IV)    344 mg by IntraVENous route once as needed. remicade will be every 6 weeks    INSULIN ASPART PROT/INSULN ASP (NOVOLOG MIX 70-30 SC)    by SubCUTAneous route. INSULIN GLARGINE (LANTUS) 100 UNIT/ML INJECTION    30 Units by SubCUTAneous route daily. LOSARTAN (COZAAR) 50 MG TABLET    Take 1 Tab by mouth daily. METFORMIN (GLUCOPHAGE) 1,000 MG TABLET    Take 500 mg by mouth two (2) times a day. METHOTREXATE (RHEUMATREX) 2.5 MG TABLET    Take 2.5 mg by mouth every fourty-eight (48) hours. MIRTAZAPINE (REMERON) 15 MG TABLET        NALOXONE (NARCAN) 4 MG/ACTUATION NASAL SPRAY    Use 1 spray intranasally, then discard. Repeat with new spray every 2 min as needed for opioid overdose symptoms, alternating nostrils. Indications: Decrease in Rate & Depth of Breathing due to Opioid Drug, opioid overdose    OXYCODONE-ACETAMINOPHEN (PERCOCET 10)  MG PER TABLET    Take 1 Tab by mouth every six (6) hours as needed for Pain for up to 15 days. Max Daily Amount: 4 Tabs. These Medications have changed    No medications on file   Stop Taking    INSULIN LISPRO (HUMALOG) 100 UNIT/ML INJECTION    For Blood Sugar of less than 150 = 0 units/150 -199 = 3 units/200 -249 = 6 units/250 -299 = 9 units/300 - 349 = 12 units/350+ = 15 units. ONDANSETRON HCL (ZOFRAN, AS HYDROCHLORIDE,) 4 MG TABLET    Take 1 Tab by mouth every eight (8) hours as needed for Nausea. DO Ana Aly medical dictation software was used for portions of this report. Unintended transcription errors may occur. My signature above authenticates this document and my orders, the final    diagnosis (es), discharge prescription (s), and instructions in the Epic    record.

## 2020-06-05 NOTE — ED TRIAGE NOTES
Pt presents with rash all over x 3 weeks, productive cough and headache x 1 week, states no headache currently. Reports hx of copd. Requesting covid testing at start of triage. Reports being seen for rash at [facility] recently.

## 2020-06-06 ENCOUNTER — PATIENT OUTREACH (OUTPATIENT)
Dept: CASE MANAGEMENT | Age: 55
End: 2020-06-06

## 2020-06-06 LAB — SARS-COV-2, COV2NT: NOT DETECTED

## 2020-06-06 NOTE — PROGRESS NOTES
Patient contacted regarding COVID-19  risk. Discussed COVID-19 related testing which was pending at this time. Test results were pending. Patient informed of results, if available? N/A     Care Transition Nurse/ Ambulatory Care Manager contacted the patient by telephone to perform post discharge assessment. No answer. Left message introducing self, role and reason for call. Requested return call. Contact information provided. Also left voicemail message for Fatimah Lui, emergency contact. No PHI provided. Will attempt to contact at a later time.

## 2020-06-08 ENCOUNTER — PATIENT OUTREACH (OUTPATIENT)
Dept: CASE MANAGEMENT | Age: 55
End: 2020-06-08

## 2020-06-08 DIAGNOSIS — G89.4 CHRONIC PAIN SYNDROME: ICD-10-CM

## 2020-06-08 RX ORDER — OXYCODONE AND ACETAMINOPHEN 10; 325 MG/1; MG/1
1 TABLET ORAL
Qty: 60 TAB | Refills: 0 | Status: SHIPPED | OUTPATIENT
Start: 2020-06-08 | End: 2020-06-18 | Stop reason: SDUPTHER

## 2020-06-08 NOTE — PROGRESS NOTES
Patient contacted regarding COVID-19  risk. Discussed COVID-19 related testing which was available at this time. Test results were negative. Patient informed of results, if available? yes     Care Transition Nurse/ Ambulatory Care Manager contacted the patient by telephone to perform post discharge assessment. Verified name and  with patient as identifiers. Provided introduction to self, and explanation of the CTN/ACM role, and reason for call due to risk factors for infection and/or exposure to COVID-19. Patient verbalized she is taking Mucinex, Prednisone and doxycycline. Instructed patient to contact PCP for a follow up. Patient verbalized she plans to call PCP for refills and follow up. Symptoms reviewed with patient who verbalized the following symptoms: no new symptoms and no worsening symptoms. Due to no new or worsening symptoms encounter was not routed to provider for escalation. Discussed follow-up appointments. If no appointment was previously scheduled, appointment scheduling offered: patient voiced she will contact PCP for a follow up  [facility] follow up appointment(s):   Future Appointments   Date Time Provider Louise Ordonez   2020 10:00 AM Mainor Scott  [place]   2020  9:00 AM [facility] INFUSION NURSE 2 [facility]   2020  9:00 AM MD LATOYA Loyola     [facility] follow up appointment(s): None      Patient has following risk factors of: heart failure, immunocompromised and diabetes. CTN/ACM reviewed discharge instructions, medical action plan and red flags such as increased shortness of breath, increasing fever and signs of decompensation with patient who verbalized understanding. Discussed exposure protocols and quarantine with CDC Guidelines What to do if you are sick with coronavirus disease .  Patient was given an opportunity for questions and concerns.  The patient agrees to contact the [facility] exposure line [phone], [facility] [address] [facility]  ([phone]) and PCP office for questions related to their healthcare. CTN/ACM provided contact information for future needs. Reviewed and educated patient on any new and changed medications related to discharge diagnosis. Patient/family/caregiver given information for [employer] and agrees to enroll yes  Patient's preferred e-mail:  [email or web address]  Patient's preferred phone number: [phone]  Based on Loop alert triggers, patient will be contacted by nurse care manager for worsening symptoms. Pt will be further monitored by COVID Loop Team based on severity of symptoms and risk factors.

## 2020-06-09 RX ORDER — OXYCODONE AND ACETAMINOPHEN 10; 325 MG/1; MG/1
1 TABLET ORAL
Qty: 60 TAB | Refills: 0 | OUTPATIENT
Start: 2020-06-09 | End: 2020-06-24

## 2020-06-11 ENCOUNTER — PATIENT OUTREACH (OUTPATIENT)
Dept: CASE MANAGEMENT | Age: 55
End: 2020-06-11

## 2020-06-18 ENCOUNTER — OFFICE VISIT (OUTPATIENT)
Dept: ONCOLOGY | Age: 55
End: 2020-06-18

## 2020-06-18 ENCOUNTER — HOSPITAL ENCOUNTER (OUTPATIENT)
Dept: ONCOLOGY | Age: 55
Discharge: HOME | End: 2020-06-18

## 2020-06-18 ENCOUNTER — DOCUMENTATION ONLY (OUTPATIENT)
Dept: ONCOLOGY | Age: 55
End: 2020-06-18

## 2020-06-18 ENCOUNTER — HOSPITAL ENCOUNTER (OUTPATIENT)
Dept: LAB | Age: 55
Discharge: HOME | End: 2020-06-18
Payer: MEDICARE

## 2020-06-18 VITALS
BODY MASS INDEX: 32.48 KG/M2 | OXYGEN SATURATION: 97 % | HEIGHT: 71 IN | DIASTOLIC BLOOD PRESSURE: 91 MMHG | SYSTOLIC BLOOD PRESSURE: 137 MMHG | WEIGHT: 232 LBS | TEMPERATURE: 98.5 F | HEART RATE: 89 BPM | RESPIRATION RATE: 18 BRPM

## 2020-06-18 DIAGNOSIS — M05.771 RHEUMATOID ARTHRITIS INVOLVING RIGHT FOOT WITH POSITIVE RHEUMATOID FACTOR (HCC): ICD-10-CM

## 2020-06-18 DIAGNOSIS — D47.3 ESSENTIAL THROMBOCYTOSIS (HCC): ICD-10-CM

## 2020-06-18 DIAGNOSIS — D64.9 CHRONIC ANEMIA: ICD-10-CM

## 2020-06-18 DIAGNOSIS — D72.829 LEUKOCYTOSIS, UNSPECIFIED TYPE: ICD-10-CM

## 2020-06-18 DIAGNOSIS — G89.4 CHRONIC PAIN SYNDROME: ICD-10-CM

## 2020-06-18 DIAGNOSIS — E53.8 VITAMIN B12 DEFICIENCY: ICD-10-CM

## 2020-06-18 DIAGNOSIS — D75.839 THROMBOCYTOSIS: ICD-10-CM

## 2020-06-18 DIAGNOSIS — G89.4 CHRONIC PAIN SYNDROME: Primary | ICD-10-CM

## 2020-06-18 LAB
25(OH)D3 SERPL-MCNC: 49.4 NG/ML (ref 30–100)
ALBUMIN SERPL-MCNC: 3.2 G/DL (ref 3.4–5)
ALBUMIN/GLOB SERPL: 0.8 {RATIO} (ref 0.8–1.7)
ALP SERPL-CCNC: 116 U/L (ref 45–117)
ALT SERPL-CCNC: 27 U/L (ref 13–56)
ANION GAP SERPL CALC-SCNC: 9 MMOL/L (ref 3–18)
AST SERPL-CCNC: 10 U/L (ref 10–38)
BASO+EOS+MONOS # BLD AUTO: 0.2 K/UL (ref 0–2.3)
BASO+EOS+MONOS NFR BLD AUTO: 2 % (ref 0.1–17)
BILIRUB SERPL-MCNC: 0.4 MG/DL (ref 0.2–1)
BUN SERPL-MCNC: 25 MG/DL (ref 7–18)
BUN/CREAT SERPL: 27 (ref 12–20)
CALCIUM SERPL-MCNC: 9.4 MG/DL (ref 8.5–10.1)
CHLORIDE SERPL-SCNC: 103 MMOL/L (ref 100–111)
CO2 SERPL-SCNC: 25 MMOL/L (ref 21–32)
CREAT SERPL-MCNC: 0.92 MG/DL (ref 0.6–1.3)
DIFFERENTIAL METHOD BLD: ABNORMAL
ERYTHROCYTE [DISTWIDTH] IN BLOOD BY AUTOMATED COUNT: 14 % (ref 11.5–14.5)
FERRITIN SERPL-MCNC: 90 NG/ML (ref 8–388)
GLOBULIN SER CALC-MCNC: 3.8 G/DL (ref 2–4)
GLUCOSE SERPL-MCNC: 281 MG/DL (ref 74–99)
HCT VFR BLD AUTO: 40.9 % (ref 36–48)
HGB BLD-MCNC: 13.6 G/DL (ref 12–16)
IRON SATN MFR SERPL: 37 % (ref 20–50)
IRON SERPL-MCNC: 105 UG/DL (ref 50–175)
LYMPHOCYTES # BLD: 4 K/UL (ref 1.1–5.9)
LYMPHOCYTES NFR BLD: 31 % (ref 14–44)
MCH RBC QN AUTO: 27.3 PG (ref 25–35)
MCHC RBC AUTO-ENTMCNC: 33.3 G/DL (ref 31–37)
MCV RBC AUTO: 82.1 FL (ref 78–102)
NEUTS SEG # BLD: 8.9 K/UL (ref 1.8–9.5)
NEUTS SEG NFR BLD: 67 % (ref 40–70)
PLATELET # BLD AUTO: 356 K/UL (ref 140–440)
POTASSIUM SERPL-SCNC: 4.1 MMOL/L (ref 3.5–5.5)
PROT SERPL-MCNC: 7 G/DL (ref 6.4–8.2)
RBC # BLD AUTO: 4.98 M/UL (ref 4.1–5.1)
SODIUM SERPL-SCNC: 137 MMOL/L (ref 136–145)
TIBC SERPL-MCNC: 282 UG/DL (ref 250–450)
WBC # BLD AUTO: 13.1 K/UL (ref 4.5–13)

## 2020-06-18 PROCEDURE — 80053 COMPREHEN METABOLIC PANEL: CPT

## 2020-06-18 PROCEDURE — 82306 VITAMIN D 25 HYDROXY: CPT

## 2020-06-18 PROCEDURE — 83540 ASSAY OF IRON: CPT

## 2020-06-18 PROCEDURE — 82728 ASSAY OF FERRITIN: CPT

## 2020-06-18 RX ORDER — OXYCODONE AND ACETAMINOPHEN 10; 325 MG/1; MG/1
1 TABLET ORAL
Qty: 60 TAB | Refills: 0 | Status: SHIPPED | OUTPATIENT
Start: 2020-06-18 | End: 2020-06-18 | Stop reason: SDUPTHER

## 2020-06-18 RX ORDER — OXYCODONE AND ACETAMINOPHEN 10; 325 MG/1; MG/1
1 TABLET ORAL
Qty: 60 TAB | Refills: 0 | Status: SHIPPED | OUTPATIENT
Start: 2020-06-18 | End: 2020-07-10 | Stop reason: SDUPTHER

## 2020-06-18 NOTE — PATIENT INSTRUCTIONS
Rheumatoid Arthritis: Care Instructions Your Care Instructions Arthritis is a common health problem in which the joints are inflamed. There are many types of arthritis. In rheumatoid arthritis, the body's own immune system attacks the joints. This causes pain, stiffness, and swelling in the joints, especially in the hands and feet. It can become hard to open jars, write, and do other daily tasks. Sometimes rheumatoid arthritis can also cause bumps to form under the skin. Over time, rheumatoid arthritis can damage and deform joints. Early treatment with medicines may reduce your chances of having a lasting disability. Follow-up care is a key part of your treatment and safety. Be sure to make and go to all appointments, and call your doctor if you are having problems. It's also a good idea to know your test results and keep a list of the medicines you take. How can you care for yourself at home? · If your doctor recommends it, get more exercise. Walking is a good choice. If your knees or ankles hurt, try riding a stationary bike or swimming. · Move each joint gently through its full range of motion once or twice a day. · Rest joints when they are sore or overworked. Short rest breaks may help more than staying in bed. · Reach and stay at a healthy weight. Regular exercise and a healthy diet will help you do this. Extra weight can strain the joints, especially the knees and hips, and make the pain worse. Losing even a few pounds may help. · Get enough calcium and vitamin D to help prevent osteoporosis, which causes thin bones. Talk to your doctor about how much you should take. · Protect your joints from injury. Do not overuse them. Try to limit or avoid activities that cause joint pain or swelling. Use special kitchen tools and other self-help devices as well as walkers, splints, or canes if needed. · Use heat to ease pain. Take warm showers or baths.  Use hot packs or a heating pad set on low. Sleep under a warm electric blanket. · Put ice or a cold pack on the area for 10 to 20 minutes at a time. Put a thin cloth between the ice and your skin. · Take pain medicines exactly as directed. ? If the doctor gave you a prescription medicine for pain, take it as prescribed. ? If you are not taking a prescription pain medicine, ask your doctor if you can take an over-the-counter medicine. · Take an active role in managing your condition. Set up a treatment plan with your doctor, and learn as much as you can about rheumatoid arthritis. This will help you control pain and stay active. When should you call for help? Call your doctor now or seek immediate medical care if: 
· You have a fever or a rash along with joint pain. · You have joint pain that is so severe that you cannot use the joint at all. · You have sudden swelling, redness, or pain in one or more joints, and you do not know why. · You have back or neck pain along with weakness in your arms or legs. · You have a loss of bowel or bladder control. Watch closely for changes in your health, and be sure to contact your doctor if: 
· You have joint pain that lasts for more than 6 weeks. · You have side effects from your arthritis medicines, such as stomach pain, nausea, heartburn, or dark and tarlike stools. Where can you learn more? Go to http://swapnil-speedy.info/ Enter K205 in the search box to learn more about \"Rheumatoid Arthritis: Care Instructions. \" Current as of: December 9, 2019               Content Version: 12.5 © 2458-5814 Healthwise, Incorporated. Care instructions adapted under license by [employer] (which disclaims liability or warranty for this information). If you have questions about a medical condition or this instruction, always ask your healthcare professional. [place] [address] any warranty or liability for your use of this information. Learning About High White Blood Cell Counts What are white blood cells? White blood cells (leukocytes) help protect your body from infection. Normally, when germs get inside your body, your body makes more white blood cells that search for and destroy the germs. Less often, there are medical problems where the body may make a lot more white blood cells than it needs. What happens when you have a high white blood cell count? Your white blood cell count may be high because your body is fighting an infection. But other things can cause it, such as some medicines, burns, an illness, or other health problems. When your doctor sees that your white blood cell count is high, he or she will try to find out why, and then treat the cause. What are the symptoms? A high white blood cell count alone doesn't cause any symptoms. The symptoms you feel may come from the medical problem that your white blood cells are fighting. For example, if you have pneumonia, you may have a fever and trouble breathing. These are symptoms of pneumonia, not of a high white blood cell count. How is it treated? · Your doctor may do more tests to find the problem that's making your white blood cell count high. Once your doctor finds the problem, he or she may be able to treat it. · Part of your treatment may be telling your doctor if you feel worse. Watch your temperature, and call your doctor if your fever goes up and stays up. Follow-up care is a key part of your treatment and safety. Be sure to make and go to all appointments, and call your doctor if you are having problems. It's also a good idea to know your test results and keep a list of the medicines you take. Where can you learn more? Go to http://swapnil-speedy.info/ Enter L503 in the search box to learn more about \"Learning About High White Blood Cell Counts. \" Current as of: December 9, 2019               Content Version: 12.5 © 8714-5808 Healthwise, Incorporated. Care instructions adapted under license by [employer] (which disclaims liability or warranty for this information). If you have questions about a medical condition or this instruction, always ask your healthcare professional. [address] any warranty or liability for your use of this information.

## 2020-06-18 NOTE — PROGRESS NOTES
Patient is concerned about rash on her legs, chest and stomach. Her thumb and big toe is going numb as well.

## 2020-06-18 NOTE — PROGRESS NOTES
Faxed referral to [address] [place] [address] pain [facility]. Confirmation has been received.

## 2020-06-18 NOTE — PROGRESS NOTES
Hematology/Oncology  Progress Note    Name: Kostas Curiel  Date: 2020  : 1965    Jailene Malave MD     Ms. Jean-Claude Milligan is a 62y.o. year old female who was seen for management of her rheumatoid arthritis, leukocytosis, and essential thrombocytosis .     Current therapy: Remicade 4mg/kg bodyweight every 6 weeks intravenously    Subjective:     Ms. Jean-Claude Milligan is a 62year-old [facility] American woman who has severe rheumatoid arthritis. Adria Goodrich has been recieving Remicade every 6 weeks which have been to control her rheumatoid arthritis flares.  She also has history of spine osteoarthritis and is looking for a spine doctor. Adria Goodrich has a history of essential thrombocytosis and has been on anagrelide 0.5mg one tablet twice daily without obvious side effect. Today the patient reported doing stable overall. She report having some skin rashes for which she is seeing a Dermatologist and her PCP is also follow this   She reports her next Remicade appointment is scheduled on 2020.  She denies fatigue, shortness of breath, and weakness. She denies chest pain or dizziness. She does not have any other concerns or complaints to report at this time.       Past medical history, family history, and social history: these were reviewed and remains unchanged.     Past Medical History:   Diagnosis Date    Abdominal pain, unspecified site     Acute otitis media     Acute pharyngitis     Anxiety     Arthritis     Autoimmune disease ([address]     Back injury     Backache     herniated disc in lower back    Blurred vision     Chest pain 2018    Chest pain, unspecified     abnormal EKG    Chronic airway obstruction, not elsewhere classified     Chronic back pain     Chronic pain     COPD     Depression     Diabetes (HCC)     Dizziness     Dizziness and giddiness     possible orthostatic changes, vasovagal, autonomic dysfunction from diabetic neuropathy    Encounters for unspecified administrative purpose  Essential thrombocytosis ([facility] [address] 2016    Feeling anxious     Fibromyalgia     Headache(784.0)     Hemorrhoid     Herniated disc     at L5    Hypercholesterolemia     Hyperglycemia     Hypertension     Joint pain     Leukocytosis, unspecified     Major depressive disorder, single episode, mild (HCC)     Mental disorder     depression, anxiety, bipolar and PTSD    Neuropathy     Obesity, unspecified     Other chest pain     Palpitation 2018    ?  arrhythmia    Phobic disorders     Rheumatoid arthritis (HCC)     Rheumatoid arthritis of foot ([facility] [address] 2018    on treatment    Seasonal allergies     Shortness of breath     normal EF    Smoking 2018    discussed cessation    Streptococcal sore throat     Type 2 diabetes mellitus without complication, with long-term current use of insulin ([facility] [address] 2018    Type II or unspecified type diabetes mellitus with unspecified complication, uncontrolled     Unspecified hereditary and idiopathic peripheral neuropathy     Vitamin D deficiency      Past Surgical History:   Procedure Laterality Date    HX CHOLECYSTECTOMY      HX GYN  2005    partial Hysterectomy    HX OTHER SURGICAL  12-1-15    had ingrown toenail removed from big toe, both feet    HX TUBAL LIGATION           Social History     Socioeconomic History    Marital status:      Spouse name: Not on file    Number of children: Not on file    Years of education: Not on file    Highest education level: Not on file   Occupational History    Not on file   Social Needs    Financial resource strain: Not on file    Food insecurity     Worry: Not on file     Inability: Not on file    Transportation needs     Medical: Not on file     Non-medical: Not on file   Tobacco Use    Smoking status: Current Every Day Smoker     Packs/day: 0.25     Last attempt to quit: 2012     Years since quittin.9    Smokeless tobacco: Never Used    Tobacco comment: 5 cigarettes day   Substance and Sexual Activity    Alcohol use: Yes     Frequency: Monthly or less     Drinks per session: 1 or 2     Binge frequency: Never     Comment: socially    Drug use: No    Sexual activity: Yes     Partners: Male     Birth control/protection: Condom   Lifestyle    Physical activity     Days per week: Not on file     Minutes per session: Not on file    Stress: Not on file   Relationships    Social connections     Talks on phone: Not on file     Gets together: Not on file     Attends Christianity service: Not on file     Active member of club or organization: Not on file     Attends meetings of clubs or organizations: Not on file     Relationship status: Not on file    Intimate partner violence     Fear of current or ex partner: Not on file     Emotionally abused: Not on file     Physically abused: Not on file     Forced sexual activity: Not on file   Other Topics Concern    Not on file   Social History Narrative    Not on file     Family History   Problem Relation Age of Onset    Diabetes Other     Alcohol abuse Mother     Arthritis-osteo Mother     Diabetes Mother     Elevated Lipids Mother     Headache Mother     Heart Disease Mother     Migraines Mother     Psychiatric Disorder Mother     Alcohol abuse Father     Arthritis-osteo Father     Cancer Father     Headache Father     Heart Disease Father     Lung Disease Father     Migraines Father     Heart Surgery Father     Alcohol abuse Sister     Headache Sister     Diabetes Sister     Heart Disease Sister     Migraines Sister     Psychiatric Disorder Sister     Headache Brother     Diabetes Brother     Elevated Lipids Brother     Heart Disease Brother     Migraines Brother     Psychiatric Disorder Brother     Coronary Artery Disease Brother     Cancer Maternal Aunt     Alcohol abuse Maternal Aunt     Diabetes Maternal Aunt     Headache Maternal Aunt     Lung Disease Maternal Aunt     Migraines Maternal Aunt  Headache Maternal Uncle     Migraines Maternal Uncle     Stroke Maternal Uncle     Psychiatric Disorder Maternal Uncle     Headache Paternal Aunt     Migraines Paternal Aunt     Headache Paternal Uncle     Hypertension Paternal Uncle     Migraines Paternal Uncle     Stroke Paternal Uncle     Headache Maternal Grandmother     Migraines Maternal Grandmother     Stroke Maternal Grandmother     Headache Maternal Grandfather     Migraines Maternal Grandfather     Stroke Maternal Grandfather     Headache Paternal Grandmother     Hypertension Paternal Grandmother     Lung Disease Paternal Grandmother     Migraines Paternal Grandmother     Cancer Paternal Grandmother     Headache Paternal Grandfather     Cancer Paternal Grandfather     Migraines Paternal Grandfather      Current Outpatient Medications   Medication Sig Dispense Refill    oxyCODONE-acetaminophen (PERCOCET 10)  mg per tablet Take 1 Tab by mouth every six (6) hours as needed for Pain for up to 15 days. Max Daily Amount: 4 Tabs. 60 Tab 0    fluticasone/vilanterol (BREO ELLIPTA IN) Take  by inhalation.  insulin aspart prot/insuln asp (NOVOLOG MIX 70-30 SC) by SubCUTAneous route.  gabapentin (NEURONTIN) 100 mg capsule Take 100 mg by mouth three (3) times daily.  guaiFENesin (Mucinex) 1,200 mg Ta12 ER tablet Take 1 Tab by mouth two (2) times daily as needed for Congestion. Indications: cold symptoms, cough 14 Tab 0    predniSONE (STERAPRED DS) 10 mg dose pack Take as written 21 Tab 0    folic acid 560 mcg tablet Take 1 Tab by mouth daily. 30 Tab 3    anagrelide (AGRYLIN) 0.5 mg capsule Take 1 Cap by mouth two (2) times a day. Indications: spontaneous hemorrhages and increase in blood platelets 60 Cap 6    hydrOXYzine pamoate (VISTARIL) 25 mg capsule 25 mg.      mirtazapine (REMERON) 15 mg tablet       atorvastatin (LIPITOR) 40 mg tablet Take 1 Tab by mouth nightly.  30 Tab 6    hydroCHLOROthiazide (HYDRODIURIL) 12.5 mg tablet Take 12.5 mg by mouth daily.  losartan (COZAAR) 50 mg tablet Take 1 Tab by mouth daily. 90 Tab 1    aspirin delayed-release 81 mg tablet Take 1 Tab by mouth daily. 30 Tab 1    insulin glargine (LANTUS) 100 unit/mL injection 30 Units by SubCUTAneous route daily. 1 Vial 1    naloxone (NARCAN) 4 mg/actuation nasal spray Use 1 spray intranasally, then discard. Repeat with new spray every 2 min as needed for opioid overdose symptoms, alternating nostrils. Indications: Decrease in Rate & Depth of Breathing due to Opioid Drug, opioid overdose 1 Each 1    BD INSULIN SYRINGE ULTRA-FINE 1 mL 31 gauge x 15/64\" syrg       albuterol (PROAIR HFA) 90 mcg/actuation inhaler Take 1 Puff by inhalation every six (6) hours as needed for Wheezing. 1 Inhaler 0    methotrexate (RHEUMATREX) 2.5 mg tablet Take 2.5 mg by mouth every fourty-eight (48) hours.  metFORMIN (GLUCOPHAGE) 1,000 mg tablet Take 500 mg by mouth two (2) times a day.  Cholecalciferol, Vitamin D3, 5,000 unit Tab Take 5,000 Units by mouth every seven (7) days.  atenolol (TENORMIN) 25 mg tablet Take 25 mg by mouth daily. Indications: HYPERTENSION      furosemide (LASIX) 40 mg tablet Take  by mouth daily.  INFLIXIMAB (REMICADE IV) 344 mg by IntraVENous route once as needed. remicade will be every 6 weeks         Review of Systems  Constitutional: The patient has no acute distress or discomfort. HEENT: The patient denies recent head trauma, eye pain, blurred vision,  hearing deficit, oropharyngeal mucosal pain or lesions, and the patient denies throat pain or discomfort. Lymphatics: The patient denies palpable peripheral lymphadenopathy. Hematologic: The patient denies having bruising, bleeding, or progressive fatigue. Respiratory: Patient denies having shortness of breath, cough, sputum production, fever, or dyspnea on exertion. Cardiovascular:  The patient denies having leg pain, leg swelling, heart palpitations, chest permit, chest pain, or lightheadedness. The patient denies having dyspnea on exertion. Gastrointestinal: The patient denies having nausea, emesis, or diarrhea. The patient denies having any hematemesis or blood in the stool. Genitourinary: Patient denies having urinary urgency, frequency, or dysuria. The patient denies having blood in the urine. Psychological: The patient denies having symptoms of nervousness, anxiety, depression, or thoughts of harming himself some of this. Skin: Patient report skin rashes, itching or pruritus. Musculoskeletal: The patient denies having pain in the joints or bones. Objective:     Visit Vitals  BP (!) 137/91   Pulse 89   Temp 98.5 °F (36.9 °C) (Oral)   Resp 18   Ht 5' 11\" (1.803 m)   Wt 105.2 kg (232 lb)   SpO2 97%   BMI 32.36 kg/m²     ECOG PS0  Physical Exam:   Gen. Appearance: The patient is in no acute distress. Skin: There is no bruise or rash. HEENT: The exam is unremarkable. Neck: Supple without lymphadenopathy or thyromegaly. Lungs: Clear to auscultation and percussion; there are no wheezes or rhonchi. Heart: Regular rate and rhythm; there are no murmurs, gallops, or rubs. Abdomen: Bowel sounds are present and normal.  There is no guarding, tenderness, or hepatosplenomegaly. Extremities: There is no clubbing, cyanosis, or edema. Skin: she does have some generalized body hives   Neurologic: There are no focal neurologic deficits. Lymphatics: There is no palpable peripheral lymphadenopathy. Musculoskeletal: The patient has full range of motion at all joints. There is no evidence of joint deformity or effusions. There is no focal joint tenderness. Psychological/psychiatric: There is no clinical evidence of anxiety, depression, or melancholy.     Lab data:      Results for orders placed or performed during the hospital encounter of 06/18/20   CBC WITH 3 PART DIFF     Status: Abnormal   Result Value Ref Range Status    WBC 13.1 (H) 4.5 - 13.0 K/uL Final    RBC 4.98 4.10 - 5.10 M/uL Final    HGB 13.6 12.0 - 16 g/dL Final    HCT 40.9 36 - 48 % Final    MCV 82.1 78 - 102 FL Final    MCH 27.3 25.0 - 35.0 PG Final    MCHC 33.3 31 - 37 g/dL Final    RDW 14.0 11.5 - 14.5 % Final    PLATELET 487 180 - 870 K/uL Final    NEUTROPHILS 67 40 - 70 % Final    MIXED CELLS 2 0.1 - 17 % Final    LYMPHOCYTES 31 14 - 44 % Final    ABS. NEUTROPHILS 8.9 1.8 - 9.5 K/UL Final    ABS. MIXED CELLS 0.2 0.0 - 2.3 K/uL Final    ABS. LYMPHOCYTES 4.0 1.1 - 5.9 K/UL Final     Comment: Test performed at [address] [place] [address] or Outpatient Infusion Center Location. Reviewed by Medical Director. DF AUTOMATED   Final           Assessment:     1. Chronic pain syndrome    2. Thrombocytosis ([facility] [address]    3. Chronic anemia    4. Essential thrombocytosis ([facility] [address]    5. Rheumatoid arthritis involving right foot with positive rheumatoid factor (HCC)    6. Leukocytosis, unspecified type          Plan:    Essential thrombocytosis/thrombocytosis:  I have inform patient that her  CBC for today shows reported stable PLT at 356. She was advised to continue anagrelide 0.5mg one tablet twice daily. I have reenforced to the patient the importance of being complaint with the treatment plan.       Rheumatoid arthritis: The patient will continue to receive Remicade as a primary treatment modality for her severe rheumatoid arthritis every 6 weeks. Remicade is scheduled to be given on 07/09/2020.   -- The patient will continued to take Methotrexate 5mg PO weekly. -- We will refer to a new Rheumatologist.      Fibromyalgia/chronic pain syndrome:   --The patient  continues to have generalized pain at times related to her underlying fibromyalgia. The patient receives her Percocet  mg medication on a monthly basis as needed. -- We will refill her pain meds as requested today.  Will refer to pain management for long term control.      Chronic anemia and folic acid deficiency:  -- A folic acid 617 mcg p.o. daily will be contiuned. -- We will check Iron study with ferritin, Vitamin B12/Folate         Leukocytosis:   -- CBC shows WBC of 13.1, rest of  WBC is within normal range   Will monitor CBC    Skin rash; patient completed prednisone as ordered. She will follow up with hr PCP and dermatolgy     -- I will see the patient back in clinic in about 8 weeks. Always sooner if required. The patient can have lab done prior our next clinic visit.     Orders Placed This Encounter    COMPLETE CBC & AUTO DIFF WBC    METABOLIC PANEL, COMPREHENSIVE     Standing Status:   Future     Standing Expiration Date:   6/19/2021    VITAMIN D, 25 HYDROXY     Standing Status:   Future     Standing Expiration Date:   7/18/2020    IRON PROFILE     Standing Status:   Future     Standing Expiration Date:   6/18/2021    FERRITIN     Standing Status:   Future     Standing Expiration Date:   6/18/2021    InHouse CBC ([employer])     Standing Status:   Future     Number of Occurrences:   1     Standing Expiration Date:   6/25/2020       Shaye Rivera NP  6/18/2020      Please note: This document has been produced using voice recognition software. Unrecognized errors in transcription may be present.

## 2020-06-22 ENCOUNTER — PATIENT OUTREACH (OUTPATIENT)
Dept: CASE MANAGEMENT | Age: 55
End: 2020-06-22

## 2020-06-22 NOTE — PROGRESS NOTES
Patient resolved from Transition of Care episode on 6/22/20. COVID-19 related testing which was available at this time. Test results were negative. Patient informed of results, if available? yes     Attempted to contact patient. No answer. Left message and provided the following resources and education related to COVID-19:                         Signs, symptoms and red flags related to COVID-19            CDC exposure and quarantine guidelines            [facility] exposure contact - [phone]            Contact for their local Department of Health                  No further outreach scheduled with this CTN/ACM/LPN/HC/ MA. Episode of Care resolved. Patient has this CTN/ACM/LPN/HC/MA contact information if future needs arise.

## 2020-06-23 ENCOUNTER — DOCUMENTATION ONLY (OUTPATIENT)
Dept: ONCOLOGY | Age: 55
End: 2020-06-23

## 2020-06-23 NOTE — PROGRESS NOTES
[facility] pain [facility] is able to see patient they are waiting for patient to return phone call to schedule appointment.

## 2020-06-23 NOTE — PROGRESS NOTES
Faxed came to our office today [phone] letting us know our patient Spenser Benavidez refused them. OTOO NP has been notified.

## 2020-07-02 DIAGNOSIS — M79.7 FIBROMYALGIA: ICD-10-CM

## 2020-07-02 DIAGNOSIS — G89.4 CHRONIC PAIN SYNDROME: Primary | ICD-10-CM

## 2020-07-02 DIAGNOSIS — M05.771 RHEUMATOID ARTHRITIS INVOLVING RIGHT FOOT WITH POSITIVE RHEUMATOID FACTOR (HCC): ICD-10-CM

## 2020-07-02 DIAGNOSIS — D75.839 THROMBOCYTOSIS: ICD-10-CM

## 2020-07-02 DIAGNOSIS — M06.09 RHEUMATOID ARTHRITIS OF MULTIPLE SITES WITHOUT RHEUMATOID FACTOR (HCC): ICD-10-CM

## 2020-07-06 ENCOUNTER — TELEPHONE (OUTPATIENT)
Dept: ONCOLOGY | Age: 55
End: 2020-07-06

## 2020-07-06 NOTE — TELEPHONE ENCOUNTER
Patient left voice mail about the pain management she was referred to, she is unable to get to [facility] due to transportation. She is also frustrated about her referral to a rheumatologist she has not gotten any word on it. She also stated she is in chronic pain. She is frustrated that she is not getting any return calls & she has put in several messages. She also needs refill on her percocet.

## 2020-07-08 ENCOUNTER — HOSPITAL ENCOUNTER (OUTPATIENT)
Dept: INFUSION THERAPY | Age: 55
Discharge: HOME | End: 2020-07-08
Payer: MEDICARE

## 2020-07-08 VITALS
HEIGHT: 71 IN | SYSTOLIC BLOOD PRESSURE: 130 MMHG | BODY MASS INDEX: 32.8 KG/M2 | DIASTOLIC BLOOD PRESSURE: 76 MMHG | OXYGEN SATURATION: 100 % | WEIGHT: 234.3 LBS | HEART RATE: 107 BPM | RESPIRATION RATE: 18 BRPM | TEMPERATURE: 98.7 F

## 2020-07-08 DIAGNOSIS — M06.9 RHEUMATOID ARTHRITIS OF FOOT, UNSPECIFIED LATERALITY, UNSPECIFIED RHEUMATOID FACTOR PRESENCE: Primary | ICD-10-CM

## 2020-07-08 PROCEDURE — 96413 CHEMO IV INFUSION 1 HR: CPT

## 2020-07-08 PROCEDURE — 96375 TX/PRO/DX INJ NEW DRUG ADDON: CPT

## 2020-07-08 PROCEDURE — 96415 CHEMO IV INFUSION ADDL HR: CPT

## 2020-07-08 PROCEDURE — 74011250636 HC RX REV CODE- 250/636: Performed by: NURSE PRACTITIONER

## 2020-07-08 RX ORDER — EPINEPHRINE 1 MG/ML
0.3 INJECTION, SOLUTION, CONCENTRATE INTRAVENOUS AS NEEDED
Status: CANCELLED | OUTPATIENT
Start: 2020-08-19

## 2020-07-08 RX ORDER — ALBUTEROL SULFATE 0.83 MG/ML
2.5 SOLUTION RESPIRATORY (INHALATION) AS NEEDED
Status: CANCELLED
Start: 2020-08-19

## 2020-07-08 RX ORDER — HYDROCORTISONE SODIUM SUCCINATE 100 MG/2ML
100 INJECTION, POWDER, FOR SOLUTION INTRAMUSCULAR; INTRAVENOUS AS NEEDED
Status: CANCELLED | OUTPATIENT
Start: 2020-08-19

## 2020-07-08 RX ORDER — SODIUM CHLORIDE 9 MG/ML
10 INJECTION INTRAMUSCULAR; INTRAVENOUS; SUBCUTANEOUS AS NEEDED
Status: ACTIVE | OUTPATIENT
Start: 2020-07-08 | End: 2020-07-08

## 2020-07-08 RX ORDER — SODIUM CHLORIDE 0.9 % (FLUSH) 0.9 %
10 SYRINGE (ML) INJECTION AS NEEDED
Status: CANCELLED
Start: 2020-08-19

## 2020-07-08 RX ORDER — DIPHENHYDRAMINE HYDROCHLORIDE 50 MG/ML
50 INJECTION, SOLUTION INTRAMUSCULAR; INTRAVENOUS AS NEEDED
Status: CANCELLED
Start: 2020-08-19

## 2020-07-08 RX ORDER — ACETAMINOPHEN 325 MG/1
650 TABLET ORAL AS NEEDED
Status: CANCELLED
Start: 2020-08-19

## 2020-07-08 RX ORDER — DIPHENHYDRAMINE HYDROCHLORIDE 50 MG/ML
25 INJECTION, SOLUTION INTRAMUSCULAR; INTRAVENOUS ONCE
Status: COMPLETED | OUTPATIENT
Start: 2020-07-08 | End: 2020-07-08

## 2020-07-08 RX ORDER — ONDANSETRON 2 MG/ML
8 INJECTION INTRAMUSCULAR; INTRAVENOUS AS NEEDED
Status: CANCELLED | OUTPATIENT
Start: 2020-08-19

## 2020-07-08 RX ORDER — DIPHENHYDRAMINE HYDROCHLORIDE 50 MG/ML
25 INJECTION, SOLUTION INTRAMUSCULAR; INTRAVENOUS ONCE
Status: CANCELLED
Start: 2020-08-19

## 2020-07-08 RX ORDER — SODIUM CHLORIDE 0.9 % (FLUSH) 0.9 %
10 SYRINGE (ML) INJECTION AS NEEDED
Status: DISPENSED | OUTPATIENT
Start: 2020-07-08 | End: 2020-07-08

## 2020-07-08 RX ORDER — HEPARIN 100 UNIT/ML
300-500 SYRINGE INTRAVENOUS AS NEEDED
Status: CANCELLED
Start: 2020-08-19

## 2020-07-08 RX ORDER — SODIUM CHLORIDE 9 MG/ML
10 INJECTION INTRAMUSCULAR; INTRAVENOUS; SUBCUTANEOUS AS NEEDED
Status: CANCELLED | OUTPATIENT
Start: 2020-08-19

## 2020-07-08 RX ORDER — SODIUM CHLORIDE 9 MG/ML
25 INJECTION, SOLUTION INTRAVENOUS CONTINUOUS
Status: CANCELLED | OUTPATIENT
Start: 2020-08-19

## 2020-07-08 RX ORDER — SODIUM CHLORIDE 9 MG/ML
25 INJECTION, SOLUTION INTRAVENOUS CONTINUOUS
Status: DISPENSED | OUTPATIENT
Start: 2020-07-08 | End: 2020-07-08

## 2020-07-08 RX ADMIN — Medication 10 ML: at 09:32

## 2020-07-08 RX ADMIN — DIPHENHYDRAMINE HYDROCHLORIDE 25 MG: 50 INJECTION, SOLUTION INTRAMUSCULAR; INTRAVENOUS at 09:35

## 2020-07-08 RX ADMIN — SODIUM CHLORIDE 10 ML: 9 INJECTION INTRAMUSCULAR; INTRAVENOUS; SUBCUTANEOUS at 09:39

## 2020-07-08 RX ADMIN — SODIUM CHLORIDE 400 MG: 0.9 INJECTION, SOLUTION INTRAVENOUS at 10:15

## 2020-07-08 RX ADMIN — Medication 10 ML: at 12:30

## 2020-07-09 ENCOUNTER — HOSPITAL ENCOUNTER (OUTPATIENT)
Dept: INFUSION THERAPY | Age: 55
End: 2020-07-09
Payer: MEDICARE

## 2020-07-09 DIAGNOSIS — D72.829 LEUKOCYTOSIS, UNSPECIFIED TYPE: ICD-10-CM

## 2020-07-09 DIAGNOSIS — D64.9 CHRONIC ANEMIA: ICD-10-CM

## 2020-07-09 DIAGNOSIS — G89.4 CHRONIC PAIN SYNDROME: ICD-10-CM

## 2020-07-09 DIAGNOSIS — D47.3 ESSENTIAL THROMBOCYTOSIS (HCC): ICD-10-CM

## 2020-07-09 DIAGNOSIS — E78.5 HYPERLIPIDEMIA, UNSPECIFIED HYPERLIPIDEMIA TYPE: Primary | ICD-10-CM

## 2020-07-09 DIAGNOSIS — M05.771 RHEUMATOID ARTHRITIS INVOLVING RIGHT FOOT WITH POSITIVE RHEUMATOID FACTOR (HCC): ICD-10-CM

## 2020-07-09 NOTE — TELEPHONE ENCOUNTER
Requested Prescriptions     Pending Prescriptions Disp Refills    atorvastatin (LIPITOR) 40 mg tablet 30 Tab 6     Sig: Take 1 Tab by mouth nightly.

## 2020-07-10 DIAGNOSIS — G89.4 CHRONIC PAIN SYNDROME: ICD-10-CM

## 2020-07-10 RX ORDER — ATORVASTATIN CALCIUM 40 MG/1
40 TABLET, FILM COATED ORAL
Qty: 30 TAB | Refills: 6 | Status: SHIPPED | OUTPATIENT
Start: 2020-07-10 | End: 2020-09-09 | Stop reason: SDUPTHER

## 2020-07-10 RX ORDER — LANOLIN ALCOHOL/MO/W.PET/CERES
400 CREAM (GRAM) TOPICAL DAILY
Qty: 30 TAB | Refills: 3 | Status: SHIPPED | OUTPATIENT
Start: 2020-07-10 | End: 2020-08-25 | Stop reason: SDUPTHER

## 2020-07-10 RX ORDER — OXYCODONE AND ACETAMINOPHEN 10; 325 MG/1; MG/1
1 TABLET ORAL
Qty: 60 TAB | Refills: 0 | Status: SHIPPED | OUTPATIENT
Start: 2020-07-10 | End: 2020-07-24 | Stop reason: SDUPTHER

## 2020-07-14 ENCOUNTER — HOSPITAL ENCOUNTER (OUTPATIENT)
Dept: PHYSICAL THERAPY | Age: 55
Discharge: HOME | End: 2020-07-14
Payer: MEDICARE

## 2020-07-14 PROCEDURE — 97162 PT EVAL MOD COMPLEX 30 MIN: CPT

## 2020-07-14 PROCEDURE — 97530 THERAPEUTIC ACTIVITIES: CPT

## 2020-07-14 PROCEDURE — 97110 THERAPEUTIC EXERCISES: CPT

## 2020-07-14 NOTE — PROGRESS NOTES
[facility], [address] [place] [address]  [phone] fax  Plan of Care/ Statement of Necessity for Physical Therapy Services    Patient name: Suhas Bolivar Start of Care: 2020   Referral source: Jose Luis Ruiz MD : 1965    Medical Diagnosis: Low back pain [M54.5]  Payor: Vanessa Urbano / Plan: [place] MEDICARE PART A & B / Product Type: Medicare /  Onset Date:20    Treatment Diagnosis: low back pain   Prior Hospitalization: see medical history Provider#: [id number]   Medications: Verified on Patient summary List    Comorbidities: Rheumatoid Arthritis, HTN, OA, depression, diabetes, scoliosis, COPD   Prior Level of Function: Functionally independent, used to work at [employer] prior to injury, lives with       The Plan of Care and following information is based on the information from the initial evaluation. Assessment/ key information: Patient is a pleasant 62year old female who presents with complaints of chronic low back pain that has recently increased, leading to inability to perform household tasks without assistance. Patient reports that imaging has confirmed a herniated disc in her lower lumbar spine due to a lifting injury years ago, and additionally she was diagnosed with RA in , which causes widespread pain and instability. Currently she reports very limited tolerance to sitting and standing, affecting ADLs and cooking. At evaluation Patient demonstrates impaired lumbar AROM with pain, as well as impaired Left hip strength. Tender to palpation in the lumbar paraspinals, with some reduced discomfort noted during spinal extension but no radicular symptoms reproduced during evaluation. Overall Patient is a good rehab candidate based on motivation, and will benefit from skilled physical therapy initiated in the aquatic setting in order to address the above deficits.      Evaluation Complexity History HIGH Complexity :3+ comorbidities / personal factors will impact the outcome/ POC ; Examination MEDIUM Complexity : 3 Standardized tests and measures addressing body structure, function, activity limitation and / or participation in recreation  ;Presentation LOW Complexity : Stable, uncomplicated  ;Clinical Decision Making MEDIUM Complexity : FOTO score of 26-74  Overall Complexity Rating: MEDIUM  Problem List: pain affecting function, decrease ROM, decrease strength, edema affecting function, impaired gait/ balance, decrease ADL/ functional abilitiies, decrease activity tolerance, decrease flexibility/ joint mobility and decrease transfer abilities   Treatment Plan may include any combination of the following: Therapeutic exercise, Therapeutic activities, Neuromuscular re-education, Physical agent/modality, Gait/balance training, Manual therapy, Aquatic therapy, Patient education, Self Care training, Functional mobility training, Home safety training and Stair training  Patient / Family readiness to learn indicated by: asking questions, trying to perform skills and interest  Persons(s) to be included in education: patient (P)  Barriers to Learning/Limitations: None  Patient Goal (s): less pain, more mobility  Patient Self Reported Health Status: fair  Rehabilitation Potential: good    Short Term Goals: To be accomplished in 1 weeks:  Goal: Patient will be independent and compliant with HEP in order to improve spinal mobility with daily tasks. Status at last note/certification: issued and reviewed  Goal: Patient will initiate aquatic therapy without incident or increased pain in order to progress toward long term goals. Status at last note/certification: n/a  Long Term Goals: To be accomplished in 10 treatments:  Goal: Patient will improve FOTO assessment score to 38 pts in order to indicate improved functional abilities.   Status at last note/certification: 20 pts  Goal: Patient will improve lumbar rotation and lateral flexion AROM bilaterally to full without increased pain in order to more easily perform ADLs. Status at last note/certification: rotation Right 25% of full Left 50%, lateral flexion 75% of full bilaterally, pain  Goal: Patient will report sit/stand tolerance of at least 15 minutes prior to onset of increased low back pain in order to more easily perform cooking and household tasks. Status at last note/certification: no more than 5 minutes  Goal: Patient will report worst low back pain as 6/10 or less in order to progress toward personal goals. Status at last note/certification: 13/66    Frequency / Duration: Patient to be seen 2 times per week for 10 treatments. Patient/ Caregiver education and instruction: Diagnosis, prognosis, activity modification and exercises   [x]  Plan of care has been reviewed with PTA    Certification Period: 7/14/20 to 8/12/20  Qasim Anne, PT 7/14/2020 5:20 PM  _____________________________________________________________________  I certify that the above Therapy Services are being furnished while the patient is under my care. I agree with the treatment plan and certify that this therapy is necessary.     Physician's Signature:____________Date:_________TIME:________    ** Signature, Date and Time must be completed for valid certification **    Please sign and return to [facility]  [phone] fax

## 2020-07-14 NOTE — PROGRESS NOTES
PT DAILY TREATMENT NOTE 10-18    Patient Name: Jose Navarro  Date:2020  : 1965  [x]  Patient  Verified  Payor: [place] MEDICARE / Plan: [place] MEDICARE PART A & B / Product Type: Medicare /    In time:432  Out time:512  Total Treatment Time (min): 40  Visit #: 1 of 10    Medicare/BCBS Only   Total Timed Codes (min):  25 1:1 Treatment Time:  40       Treatment Area: Low back pain [M54.5]    SUBJECTIVE  Pain Level (0-10 scale): 10  Any medication changes, allergies to medications, adverse drug reactions, diagnosis change, or new procedure performed?: [x] No    [] Yes (see summary sheet for update)  Subjective functional status/changes:   [] No changes reported      OBJECTIVE    Modality rationale:    Min Type Additional Details    [] Estim:  []Unatt       []IFC  []Premod                        []Other:  []w/ice   []w/heat  Position:  Location:    [] Estim: []Att    []TENS instruct  []NMES                    []Other:  []w/US   []w/ice   []w/heat  Position:  Location:    []  Traction: [] Cervical       []Lumbar                       [] Prone          []Supine                       []Intermittent   []Continuous Lbs:  [] before manual  [] after manual    []  Ultrasound: []Continuous   [] Pulsed                           []1MHz   []3MHz W/cm2:  Location:    []  Iontophoresis with dexamethasone         Location: [] Take home patch   [] In clinic    []  Ice     []  heat  []  Ice massage  []  Laser   []  Anodyne Position:  Location:    []  Laser with stim  []  Other:  Position:  Location:    []  Vasopneumatic Device Pressure:       [] lo [] med [] hi   Temperature: [] lo [] med [] hi   [] Skin assessment post-treatment:  []intact []redness- no adverse reaction    []redness  adverse reaction:     15 min [x]Eval                  []Re-Eval       10 min Therapeutic Exercise:  [] See flow sheet :HEP   Rationale: increase ROM and increase strength to improve the patients ability to improve ease of ADLs    15 min Therapeutic Activity:  []  See flow sheet :   Rationale: Patient education on diagnosis with model, explanation of objectives and benefits of aquatic therapy  to improve the patients ability to improve therapy outcomes       With   [] TE   [] TA   [] neuro   [] other: Patient Education: [x] Review HEP    [] Progressed/Changed HEP based on:   [] positioning   [] body mechanics   [] transfers   [] heat/ice application    [] other:      Other Objective/Functional Measures:      Pain Level (0-10 scale) post treatment: 10    ASSESSMENT/Changes in Function:     Patient will continue to benefit from skilled PT services to modify and progress therapeutic interventions, address functional mobility deficits, address ROM deficits, address strength deficits, analyze and address soft tissue restrictions, analyze and cue movement patterns, analyze and modify body mechanics/ergonomics, assess and modify postural abnormalities, address imbalance/dizziness and instruct in home and community integration to attain remaining goals.      [x]  See Plan of Care  []  See progress note/recertification  []  See Discharge Summary         Progress towards goals / Updated goals:  See POC    PLAN  []  Upgrade activities as tolerated     [x]  Continue plan of care  []  Update interventions per flow sheet       []  Discharge due to:_  []  Other:_      Hugh Escudero, PT 7/14/2020  5:18 PM    Future Appointments   Date Time Provider Louise Ordonez   7/23/2020  9:00 AM [address] [facility]   7/28/2020  9:00 [facility]   7/30/2020  9:00 AM [facility]   8/13/2020  9:30 AM Ramon Lovelace MD [facility]   8/19/2020  9:00 AM [facility]   9/14/2020  9:00 AM Myranda Hercules MD [address] [place] [address]

## 2020-07-16 ENCOUNTER — TELEPHONE (OUTPATIENT)
Dept: ONCOLOGY | Age: 55
End: 2020-07-16

## 2020-07-23 ENCOUNTER — HOSPITAL ENCOUNTER (OUTPATIENT)
Dept: PHYSICAL THERAPY | Age: 55
Discharge: HOME | End: 2020-07-23
Payer: MEDICARE

## 2020-07-23 PROCEDURE — 97113 AQUATIC THERAPY/EXERCISES: CPT

## 2020-07-24 DIAGNOSIS — G89.4 CHRONIC PAIN SYNDROME: ICD-10-CM

## 2020-07-24 RX ORDER — OXYCODONE AND ACETAMINOPHEN 10; 325 MG/1; MG/1
1 TABLET ORAL
Qty: 60 TAB | Refills: 0 | Status: SHIPPED | OUTPATIENT
Start: 2020-07-25 | End: 2020-08-07 | Stop reason: SDUPTHER

## 2020-07-28 ENCOUNTER — HOSPITAL ENCOUNTER (OUTPATIENT)
Dept: PHYSICAL THERAPY | Age: 55
Discharge: HOME | End: 2020-07-28
Payer: MEDICARE

## 2020-07-28 PROCEDURE — 97113 AQUATIC THERAPY/EXERCISES: CPT

## 2020-07-30 ENCOUNTER — APPOINTMENT (OUTPATIENT)
Dept: PHYSICAL THERAPY | Age: 55
End: 2020-07-30
Payer: MEDICARE

## 2020-07-30 NOTE — PROGRESS NOTES
[facility] [address] [place] [address]  [phone] fax    Discharge Summary    Patient name: Chris Temple Start of Care: 2020   Referral source: Ese Upton MD : 1965   Medical/Treatment Diagnosis: Low back pain [M54.5]  Payor: Sherren Gabriel / Plan: [address] / Product Type: Medicare /  Onset Date:2020     Prior Hospitalization: see medical history Provider#: [id number]   Medications: Verified on Patient Summary List     Comorbidities: Rheumatoid Arthritis, HTN, OA, depression, diabetes, scoliosis, COPD   Prior Level of Function: Functionally independent, used to work at [employer] prior to injury, lives with     Visits from [place] of Care: 3    Missed Visits: 1    Reporting Period : 20 to 20    Short Term Goals: To be accomplished in 1 weeks:  Goal: Patient will be independent and compliant with HEP in order to improve spinal mobility with daily tasks. Status at last note/certification: issued and reviewed  Status at discharge: met  Goal: Patient will initiate aquatic therapy without incident or increased pain in order to progress toward long term goals. Status at last note/certification: n/a  Status at discharge: met  Long Term Goals: To be accomplished in 10 treatments:  Goal: Patient will improve FOTO assessment score to 38 pts in order to indicate improved functional abilities. Status at last note/certification: 20 pts  Status at discharge: unable to reassess, unplanned discharge  Goal: Patient will improve lumbar rotation and lateral flexion AROM bilaterally to full without increased pain in order to more easily perform ADLs.   Status at last note/certification: rotation Right 25% of full Left 50%, lateral flexion 75% of full bilaterally, pain  Status at discharge: unable to reassess, unplanned discharge  Goal: Patient will report sit/stand tolerance of at least 15 minutes prior to onset of increased low back pain in order to more easily perform cooking and household tasks. Status at last note/certification: no more than 5 minutes  Status at discharge: unable to reassess, unplanned discharge  Goal: Patient will report worst low back pain as 6/10 or less in order to progress toward personal goals. Status at last note/certification: 45/26  Status at discharge: unable to reassess, unplanned discharge    Assessment/ Summary of Care: Patient attended evaluation and two aquatic sessions for low back pain, with reports of good decrease in pain following sessions. Unfortunately at this time she would like to discharge due to shortness of breath/difficulty breathing and fear of leonora COVID-19. Thank you for the referral of this Patient. RECOMMENDATIONS:  [x]Discontinue therapy: []Patient has reached or is progressing toward set goals      [x]Patient is non-compliant or has abdicated      []Due to lack of appreciable progress towards set goals    Tirso Moreno, PT 7/30/2020 1:50 PM    NOTE TO PHYSICIAN:  Please complete the following and fax to: [facility] [place] at [phone]  . Retain this original for your records. If you are unable to process this request in   24 hours, please contact our office.      [] I have read the above report and request that my patient continue therapy with the following changes/special instructions:  [] I have read the above report and request that my patient be discharged from therapy    Physician's Signature:____________Date:_________TIME:________    ** Signature, Date and Time must be completed for valid certification **

## 2020-08-04 ENCOUNTER — APPOINTMENT (OUTPATIENT)
Dept: PHYSICAL THERAPY | Age: 55
End: 2020-08-04

## 2020-08-06 ENCOUNTER — HOSPITAL ENCOUNTER (OUTPATIENT)
Dept: LAB | Age: 55
Discharge: HOME | End: 2020-08-06
Payer: MEDICARE

## 2020-08-06 ENCOUNTER — LAB ONLY (OUTPATIENT)
Dept: ONCOLOGY | Age: 55
End: 2020-08-06

## 2020-08-06 DIAGNOSIS — E53.8 VITAMIN B12 DEFICIENCY: ICD-10-CM

## 2020-08-06 DIAGNOSIS — D72.829 LEUKOCYTOSIS, UNSPECIFIED TYPE: ICD-10-CM

## 2020-08-06 DIAGNOSIS — D64.9 CHRONIC ANEMIA: ICD-10-CM

## 2020-08-06 DIAGNOSIS — M79.7 FIBROMYALGIA: ICD-10-CM

## 2020-08-06 DIAGNOSIS — M05.771 RHEUMATOID ARTHRITIS INVOLVING RIGHT FOOT WITH POSITIVE RHEUMATOID FACTOR (HCC): ICD-10-CM

## 2020-08-06 DIAGNOSIS — D75.839 THROMBOCYTOSIS: ICD-10-CM

## 2020-08-06 DIAGNOSIS — E53.8 FOLIC ACID DEFICIENCY: ICD-10-CM

## 2020-08-06 DIAGNOSIS — G89.4 CHRONIC PAIN SYNDROME: Primary | ICD-10-CM

## 2020-08-06 DIAGNOSIS — G89.4 CHRONIC PAIN SYNDROME: ICD-10-CM

## 2020-08-06 LAB
25(OH)D3 SERPL-MCNC: 50.1 NG/ML (ref 30–100)
ALBUMIN SERPL-MCNC: 3 G/DL (ref 3.4–5)
ALBUMIN/GLOB SERPL: 0.8 {RATIO} (ref 0.8–1.7)
ALP SERPL-CCNC: 122 U/L (ref 45–117)
ALT SERPL-CCNC: 25 U/L (ref 13–56)
ANION GAP SERPL CALC-SCNC: 8 MMOL/L (ref 3–18)
AST SERPL-CCNC: 11 U/L (ref 10–38)
BASOPHILS # BLD: 0 K/UL (ref 0–0.1)
BASOPHILS NFR BLD: 0 % (ref 0–2)
BILIRUB SERPL-MCNC: 0.4 MG/DL (ref 0.2–1)
BUN SERPL-MCNC: 21 MG/DL (ref 7–18)
BUN/CREAT SERPL: 20 (ref 12–20)
CALCIUM SERPL-MCNC: 8.9 MG/DL (ref 8.5–10.1)
CHLORIDE SERPL-SCNC: 105 MMOL/L (ref 100–111)
CO2 SERPL-SCNC: 24 MMOL/L (ref 21–32)
CREAT SERPL-MCNC: 1.06 MG/DL (ref 0.6–1.3)
DIFFERENTIAL METHOD BLD: ABNORMAL
EOSINOPHIL # BLD: 0.4 K/UL (ref 0–0.4)
EOSINOPHIL NFR BLD: 4 % (ref 0–5)
ERYTHROCYTE [DISTWIDTH] IN BLOOD BY AUTOMATED COUNT: 14.9 % (ref 11.6–14.5)
FERRITIN SERPL-MCNC: 105 NG/ML (ref 8–388)
FOLATE SERPL-MCNC: 13.6 NG/ML (ref 3.1–17.5)
GLOBULIN SER CALC-MCNC: 3.8 G/DL (ref 2–4)
GLUCOSE SERPL-MCNC: 321 MG/DL (ref 74–99)
HCT VFR BLD AUTO: 41.5 % (ref 35–45)
HGB BLD-MCNC: 13.8 G/DL (ref 12–16)
IRON SATN MFR SERPL: 25 % (ref 20–50)
IRON SERPL-MCNC: 66 UG/DL (ref 50–175)
LYMPHOCYTES # BLD: 2.7 K/UL (ref 0.9–3.6)
LYMPHOCYTES NFR BLD: 23 % (ref 21–52)
MCH RBC QN AUTO: 27.6 PG (ref 24–34)
MCHC RBC AUTO-ENTMCNC: 33.3 G/DL (ref 31–37)
MCV RBC AUTO: 83 FL (ref 74–97)
MONOCYTES # BLD: 1.1 K/UL (ref 0.05–1.2)
MONOCYTES NFR BLD: 10 % (ref 3–10)
NEUTS SEG # BLD: 7.1 K/UL (ref 1.8–8)
NEUTS SEG NFR BLD: 63 % (ref 40–73)
PLATELET # BLD AUTO: 357 K/UL (ref 135–420)
PMV BLD AUTO: 10.1 FL (ref 9.2–11.8)
POTASSIUM SERPL-SCNC: 4.3 MMOL/L (ref 3.5–5.5)
PROT SERPL-MCNC: 6.8 G/DL (ref 6.4–8.2)
RBC # BLD AUTO: 5 M/UL (ref 4.2–5.3)
SODIUM SERPL-SCNC: 137 MMOL/L (ref 136–145)
TIBC SERPL-MCNC: 259 UG/DL (ref 250–450)
VIT B12 SERPL-MCNC: 657 PG/ML (ref 211–911)
WBC # BLD AUTO: 11.3 K/UL (ref 4.6–13.2)

## 2020-08-06 PROCEDURE — 82306 VITAMIN D 25 HYDROXY: CPT

## 2020-08-06 PROCEDURE — 83540 ASSAY OF IRON: CPT

## 2020-08-06 PROCEDURE — 80053 COMPREHEN METABOLIC PANEL: CPT

## 2020-08-06 PROCEDURE — 82728 ASSAY OF FERRITIN: CPT

## 2020-08-06 PROCEDURE — 85025 COMPLETE CBC W/AUTO DIFF WBC: CPT

## 2020-08-06 PROCEDURE — 36415 COLL VENOUS BLD VENIPUNCTURE: CPT

## 2020-08-06 PROCEDURE — 82607 VITAMIN B-12: CPT

## 2020-08-07 DIAGNOSIS — G89.4 CHRONIC PAIN SYNDROME: ICD-10-CM

## 2020-08-07 RX ORDER — OXYCODONE AND ACETAMINOPHEN 10; 325 MG/1; MG/1
1 TABLET ORAL
Qty: 60 TAB | Refills: 0 | Status: SHIPPED | OUTPATIENT
Start: 2020-08-09 | End: 2020-08-25 | Stop reason: SDUPTHER

## 2020-08-11 ENCOUNTER — APPOINTMENT (OUTPATIENT)
Dept: PHYSICAL THERAPY | Age: 55
End: 2020-08-11

## 2020-08-13 ENCOUNTER — OFFICE VISIT (OUTPATIENT)
Dept: ONCOLOGY | Age: 55
End: 2020-08-13

## 2020-08-13 VITALS
HEART RATE: 87 BPM | RESPIRATION RATE: 16 BRPM | TEMPERATURE: 98.4 F | SYSTOLIC BLOOD PRESSURE: 146 MMHG | OXYGEN SATURATION: 97 % | DIASTOLIC BLOOD PRESSURE: 78 MMHG | WEIGHT: 240.8 LBS | BODY MASS INDEX: 33.58 KG/M2

## 2020-08-13 DIAGNOSIS — M05.771 RHEUMATOID ARTHRITIS INVOLVING RIGHT FOOT WITH POSITIVE RHEUMATOID FACTOR (HCC): ICD-10-CM

## 2020-08-13 DIAGNOSIS — D64.9 CHRONIC ANEMIA: ICD-10-CM

## 2020-08-13 DIAGNOSIS — D47.3 ESSENTIAL THROMBOCYTOSIS (HCC): Primary | ICD-10-CM

## 2020-08-13 DIAGNOSIS — M79.7 FIBROMYALGIA: ICD-10-CM

## 2020-08-13 DIAGNOSIS — E53.8 VITAMIN B12 DEFICIENCY: ICD-10-CM

## 2020-08-13 DIAGNOSIS — G89.4 CHRONIC PAIN SYNDROME: ICD-10-CM

## 2020-08-13 NOTE — PROGRESS NOTES
Hematology/Oncology  Progress Note    Name: Isiah Cerda  : 1965    PCP: Cherylene Bunting, MD     Ms. Avtar Hernandez is a 62year old female who was seen for continuity management of her rheumatoid arthritis, leukocytosis, and essential thrombocytosis. Current therapy: Infliximab 4mg/kg bodyweight every 6 weeks intravenously    Subjective:     Ms. Avtar Hernandez is a 62year-old [facility] woman who has severe rheumatoid arthritis. She has been recieving Infliximab every 6 weeks which have been to control her rheumatoid arthritis flares. She now is seeing two Rheumatologists for first and second opinions. She also has history of spine osteoarthritis. She has a history of essential thrombocytosis and has been on anagrelide 0.5mg one tablet twice daily without obvious side effect. Patient was being followed by Dr. Yue Patricio who retired recently. Today the patient reported doing stable overall. She denies fatigue, shortness of breath, and weakness. She denies chest pain or dizziness. She is requesting for a new prescription for her Folic Acid. She does not have any other concerns or complaints to report at this time. Past medical history, family history, and social history: these were reviewed and remain unchanged.      Past Medical History:   Diagnosis Date    Abdominal pain, unspecified site     Acute otitis media     Acute pharyngitis     Anxiety     Arthritis     Autoimmune disease ([address]     Back injury     Backache     herniated disc in lower back    Blurred vision     Chest pain 2018    Chest pain, unspecified     abnormal EKG    Chronic airway obstruction, not elsewhere classified     Chronic back pain     Chronic pain     COPD     Depression     Diabetes (HCC)     Dizziness     Dizziness and giddiness     possible orthostatic changes, vasovagal, autonomic dysfunction from diabetic neuropathy    Encounters for unspecified administrative purpose     Essential thrombocytosis ([facility] [address] 2016    Feeling anxious     Fibromyalgia     Headache(784.0)     Hemorrhoid     Herniated disc     at L5    Hypercholesterolemia     Hyperglycemia     Hypertension     Joint pain     Leukocytosis, unspecified     Major depressive disorder, single episode, mild (HCC)     Mental disorder     depression, anxiety, bipolar and PTSD    Neuropathy     Obesity, unspecified     Other chest pain     Palpitation 2018    ?  arrhythmia    Phobic disorders     Rheumatoid arthritis (HCC)     Rheumatoid arthritis of foot ([facility] [address] 2018    on treatment    Seasonal allergies     Shortness of breath     normal EF    Smoking 2018    discussed cessation    Streptococcal sore throat     Type 2 diabetes mellitus without complication, with long-term current use of insulin ([facility] [address] 2018    Type II or unspecified type diabetes mellitus with unspecified complication, uncontrolled     Unspecified hereditary and idiopathic peripheral neuropathy     Vitamin D deficiency      Past Surgical History:   Procedure Laterality Date    HX CHOLECYSTECTOMY      HX GYN  2005    partial Hysterectomy    HX OTHER SURGICAL  12-1-15    had ingrown toenail removed from big toe, both feet    HX TUBAL LIGATION           Social History     Socioeconomic History    Marital status:      Spouse name: Not on file    Number of children: Not on file    Years of education: Not on file    Highest education level: Not on file   Occupational History    Not on file   Social Needs    Financial resource strain: Not on file    Food insecurity     Worry: Not on file     Inability: Not on file    Transportation needs     Medical: Not on file     Non-medical: Not on file   Tobacco Use    Smoking status: Current Every Day Smoker     Packs/day: 0.25     Last attempt to quit: 2012     Years since quittin.0    Smokeless tobacco: Never Used    Tobacco comment: 5 cigarettes day   Substance and Sexual Activity    Alcohol use: Yes     Frequency: Monthly or less     Drinks per session: 1 or 2     Binge frequency: Never     Comment: socially    Drug use: No    Sexual activity: Yes     Partners: Male     Birth control/protection: Condom   Lifestyle    Physical activity     Days per week: Not on file     Minutes per session: Not on file    Stress: Not on file   Relationships    Social connections     Talks on phone: Not on file     Gets together: Not on file     Attends Rastafari service: Not on file     Active member of club or organization: Not on file     Attends meetings of clubs or organizations: Not on file     Relationship status: Not on file    Intimate partner violence     Fear of current or ex partner: Not on file     Emotionally abused: Not on file     Physically abused: Not on file     Forced sexual activity: Not on file   Other Topics Concern    Not on file   Social History Narrative    Not on file     Family History   Problem Relation Age of Onset    Diabetes Other     Alcohol abuse Mother     Arthritis-osteo Mother     Diabetes Mother     Elevated Lipids Mother     Headache Mother     Heart Disease Mother     Migraines Mother     Psychiatric Disorder Mother     Alcohol abuse Father    Elliott Hem Father     Cancer Father     Headache Father     Heart Disease Father     Lung Disease Father     Migraines Father     Heart Surgery Father     Alcohol abuse Sister     Headache Sister     Diabetes Sister     Heart Disease Sister     Migraines Sister     Psychiatric Disorder Sister     Headache Brother     Diabetes Brother     Elevated Lipids Brother     Heart Disease Brother     Migraines Brother     Psychiatric Disorder Brother     Coronary Artery Disease Brother     Cancer Maternal Aunt     Alcohol abuse Maternal Aunt     Diabetes Maternal Aunt     Headache Maternal Aunt     Lung Disease Maternal Aunt     Migraines Maternal Aunt     Headache Maternal Uncle  Migraines Maternal Uncle     Stroke Maternal Uncle     Psychiatric Disorder Maternal Uncle     Headache Paternal Aunt     Migraines Paternal Aunt     Headache Paternal Uncle     Hypertension Paternal Uncle     Migraines Paternal Uncle     Stroke Paternal Uncle     Headache Maternal Grandmother     Migraines Maternal Grandmother     Stroke Maternal Grandmother     Headache Maternal Grandfather     Migraines Maternal Grandfather     Stroke Maternal Grandfather     Headache Paternal Grandmother     Hypertension Paternal Grandmother     Lung Disease Paternal Grandmother     Migraines Paternal Grandmother     Cancer Paternal Grandmother     Headache Paternal Grandfather     Cancer Paternal Grandfather     Migraines Paternal Grandfather      Current Outpatient Medications   Medication Sig Dispense Refill    oxyCODONE-acetaminophen (PERCOCET 10)  mg per tablet Take 1 Tab by mouth every six (6) hours as needed for Pain for up to 15 days. Max Daily Amount: 4 Tabs. 60 Tab 0    folic acid 264 mcg tablet Take 1 Tab by mouth daily. 30 Tab 3    atorvastatin (LIPITOR) 40 mg tablet Take 1 Tab by mouth nightly. 30 Tab 6    fluticasone/vilanterol (BREO ELLIPTA IN) Take  by inhalation.  insulin aspart prot/insuln asp (NOVOLOG MIX 70-30 SC) by SubCUTAneous route.  gabapentin (NEURONTIN) 100 mg capsule Take 100 mg by mouth three (3) times daily.  guaiFENesin (Mucinex) 1,200 mg Ta12 ER tablet Take 1 Tab by mouth two (2) times daily as needed for Congestion. Indications: cold symptoms, cough 14 Tab 0    predniSONE (STERAPRED DS) 10 mg dose pack Take as written 21 Tab 0    anagrelide (AGRYLIN) 0.5 mg capsule Take 1 Cap by mouth two (2) times a day.  Indications: spontaneous hemorrhages and increase in blood platelets 60 Cap 6    hydrOXYzine pamoate (VISTARIL) 25 mg capsule 25 mg.      mirtazapine (REMERON) 15 mg tablet       hydroCHLOROthiazide (HYDRODIURIL) 12.5 mg tablet Take 12.5 mg by mouth daily.  losartan (COZAAR) 50 mg tablet Take 1 Tab by mouth daily. 90 Tab 1    aspirin delayed-release 81 mg tablet Take 1 Tab by mouth daily. 30 Tab 1    insulin glargine (LANTUS) 100 unit/mL injection 30 Units by SubCUTAneous route daily. 1 Vial 1    naloxone (NARCAN) 4 mg/actuation nasal spray Use 1 spray intranasally, then discard. Repeat with new spray every 2 min as needed for opioid overdose symptoms, alternating nostrils. Indications: Decrease in Rate & Depth of Breathing due to Opioid Drug, opioid overdose 1 Each 1    BD INSULIN SYRINGE ULTRA-FINE 1 mL 31 gauge x 15/64\" syrg       albuterol (PROAIR HFA) 90 mcg/actuation inhaler Take 1 Puff by inhalation every six (6) hours as needed for Wheezing. 1 Inhaler 0    methotrexate (RHEUMATREX) 2.5 mg tablet Take 2.5 mg by mouth every fourty-eight (48) hours.  metFORMIN (GLUCOPHAGE) 1,000 mg tablet Take 500 mg by mouth two (2) times a day.  Cholecalciferol, Vitamin D3, 5,000 unit Tab Take 5,000 Units by mouth every seven (7) days.  atenolol (TENORMIN) 25 mg tablet Take 25 mg by mouth daily. Indications: HYPERTENSION      furosemide (LASIX) 40 mg tablet Take  by mouth daily.  INFLIXIMAB (REMICADE IV) 344 mg by IntraVENous route once as needed. remicade will be every 6 weeks         Review of Systems   Constitutional: Negative for chills, diaphoresis, fever, malaise/fatigue and weight loss. Respiratory: Negative for cough, hemoptysis, shortness of breath and wheezing. Cardiovascular: Negative for chest pain, palpitations and leg swelling. Gastrointestinal: Negative for abdominal pain, diarrhea, heartburn, nausea and vomiting. Genitourinary: Negative for dysuria, frequency, hematuria and urgency. Musculoskeletal: Positive for back pain, joint pain and myalgias. Skin: Negative for itching and rash. Neurological: Negative for dizziness, seizures, weakness and headaches.    Psychiatric/Behavioral: Negative for depression. The patient does not have insomnia. Objective:     Visit Vitals  /78 (BP Patient Position: Sitting)   Pulse 87   Temp 98.4 °F (36.9 °C) (Oral)   Resp 16   Wt 109.2 kg (240 lb 12.8 oz)   SpO2 97%   BMI 33.58 kg/m²       ECOG Performance Status (grade): 0  0 - able to carry on all pre-disease activity w/out restriction  1 - restricted but able to carry out light work  2 - ambulatory and can self- care but unable to carry out work  3 - bed or chair >50% of waking hours  4 - completely disable, total care, confined to bed or chair    Physical Exam  Constitutional:       Appearance: Normal appearance. HENT:      Head: Normocephalic and atraumatic. Eyes:      Pupils: Pupils are equal, round, and reactive to light. Neck:      Musculoskeletal: Neck supple. Cardiovascular:      Rate and Rhythm: Normal rate and regular rhythm. Heart sounds: Normal heart sounds. Pulmonary:      Effort: Pulmonary effort is normal.      Breath sounds: Normal breath sounds. Abdominal:      General: Bowel sounds are normal.      Palpations: Abdomen is soft. Tenderness: There is no abdominal tenderness. There is no guarding. Musculoskeletal: Normal range of motion. Skin:     General: Skin is warm. Neurological:      General: No focal deficit present. Mental Status: She is alert and oriented to person, place, and time. Mental status is at baseline. Diagnostics:      No results found for this or any previous visit (from the past 96 hour(s)). Imaging:  Results for orders placed during the hospital encounter of 09/17/12   IR BX BONE MARROW    Narrative Fluoroscopic guided power assisted left iliac crest bone biopsy and marrow  aspiration    Attending: Dr. Alice Ferrara    Complications: None    Contrast: None    Medications: Lidocaine, 1% subcutaneous and moderate sedation. Indication: Leukocytosis    Procedure: I obtained informed consent.  We brought the patient to the  interventional radiology suite and placed her prone on the table. I prepped  and draped the patient's left iliac crest in the usual sterile fashion. After  anesthetizing the skin and subcutaneous tissues with 1% lidocaine, I used a  power drill to obtain a marrow aspirate and bone core specimen, which I have  submitted for interpretation to the pathology service. The patient tolerated  the procedure well. Findings: No focal lesion in area biopsied. Impression IMPRESSION:  Successful fluoroscopic guided power assisted left iliac crest  biopsy and marrow aspiration. I performed this procedure. Results for orders placed during the hospital encounter of 06/05/20   XR CHEST PORT    Narrative EXAM: XR CHEST PORT    INDICATION: cough    COMPARISON: February 2020    FINDINGS: A portable AP radiograph of the chest was obtained at 12:15 hours. .  Question streaky bibasilar opacities. . Stable cardiac silhouette. .  No acute  bone findings. .       Impression IMPRESSION: Question streaky bibasilar opacities. Results for orders placed during the hospital encounter of 08/07/19   CTA CHEST W OR W WO CONT    Narrative CTA CHEST PULMONARY EMBOLISM PROTOCOL      INDICATION: Mid chest pain and pressure and shortness of breath for a few months  . Question pulmonary embolism. TECHNIQUE: Thin collimation axial images obtained through the level of the  pulmonary arteries with additional imaging through the chest following the  uneventful administration of 80 cc Isovue-370 intravenous contrast.  Images  reconstructed into MIP coronal and sagittal projections for complete evaluation  of the tortuous and overlapping pulmonary vascular structures and to reduce  patient radiation dose.   All CT scans are performed using dose optimization  techniques as appropriate to the performed exam including the following:  Automated exposure control, adjustment of mA and/or kV according to patient  size, and use of iterative reconstructive technique. COMPARISON: CTA chest 3/25/2012. FINDINGS:    No filling defects are appreciated within the main, left, right, lobar or  partially visualized segmental pulmonary arteries to suggest embolism. Many  subsegmental and a few segmental pulmonary artery branches are nondiagnostic due  to motion artifact. The thoracic aorta is not aneurysmal.  No evidence for  dissection. No pericardial effusion. No pleural effusion. Mildly enlarged 12 mm AP window  lymph node. Borderline hilar and axillary lymph nodes. These are chronic since  2012 with mild improvement. No acute bone finding. Vertebral spondylosis. No consolidation. Respiratory motion. Bronchial wall thickening. Partially included upper abdomen is without acute findings. Cholecystectomy. Impression IMPRESSION:    1. No central pulmonary embolism. Many segmental and subsegmental pulmonary  artery branches are nondiagnostic due to motion artifact. No acute findings. 2. Mild bronchitis. 3. Chronic mild lymphadenopathy, improved since 2012. Assessment:     1. Essential thrombocytosis ([facility] [address]    2. Rheumatoid arthritis involving right foot with positive rheumatoid factor (HCC)    3. Chronic pain syndrome    4. Vitamin B12 deficiency    5. Fibromyalgia    6. Chronic anemia      Plan:   # Essential thrombocytosis/thrombocytosis:  -- Last CBC 8/6/2020 reported stable PLT at 357K. -- We will monitor CBC and chemistry. -- She was advised to continue anagrelide 0.5mg one tablet twice daily. I have reenforced to the patient the importance of being complaint with the treatment plan. # Rheumatoid arthritis:  -- The patient will continue to receive Infliximab as a primary treatment modality for her severe rheumatoid arthritis every 6 weeks. -- The patient will continued to take Methotrexate 5mg PO weekly. -- She now is seeing two Rheumatologists for first and second opinions.   -- I have advised her to contact us if any change in plans from her Rheumatologic stand point. # Fibromyalgia/chronic pain syndrome:   --The patient  continues to have generalized pain at times related to her underlying fibromyalgia. The patient receives her Percocet  mg medication on a monthly basis as needed. -- She was referred to pain management for long term control. # Chronic anemia and folic acid deficiency:  -- Her H/H showed improving.  -- Monitor CBC      # Leukocytosis:   -- Resolved  -- Will monitor CBC    -- I will see the patient back in clinic in about 10 weeks. Always sooner if required. The patient can have lab done prior our next clinic visit. Orders Placed This Encounter    CBC WITH AUTOMATED DIFF     Standing Status:   Future     Standing Expiration Date:   9/95/2120    METABOLIC PANEL, COMPREHENSIVE     Standing Status:   Future     Standing Expiration Date:   8/14/2021               All of patient's questions answered to their apparent satisfaction. They verbally show understanding and agreement with aforementioned plan. Amilcar Lacy MD  8/13/2020          About 25 minutes were spent for this encounter with more than 50% of the time spent in face-to-face counseling, discussing on diagnosis and management plan going forward, and co-ordination of care. Parts of this document has been produced using Dragon dictation system. Unrecognized errors in transcription may be present. Please do not hesitate to reach out for any questions or clarifications.       CC: Rosanne Cueto MD

## 2020-08-19 ENCOUNTER — HOSPITAL ENCOUNTER (OUTPATIENT)
Dept: INFUSION THERAPY | Age: 55
End: 2020-08-19
Payer: MEDICARE

## 2020-08-25 ENCOUNTER — TELEPHONE (OUTPATIENT)
Dept: ONCOLOGY | Age: 55
End: 2020-08-25

## 2020-08-25 ENCOUNTER — HOSPITAL ENCOUNTER (OUTPATIENT)
Dept: INFUSION THERAPY | Age: 55
Discharge: HOME | End: 2020-08-25
Payer: MEDICARE

## 2020-08-25 VITALS
RESPIRATION RATE: 20 BRPM | DIASTOLIC BLOOD PRESSURE: 77 MMHG | SYSTOLIC BLOOD PRESSURE: 122 MMHG | HEIGHT: 71 IN | WEIGHT: 239.6 LBS | TEMPERATURE: 98 F | HEART RATE: 95 BPM | OXYGEN SATURATION: 98 % | BODY MASS INDEX: 33.54 KG/M2

## 2020-08-25 DIAGNOSIS — D47.3 ESSENTIAL THROMBOCYTOSIS (HCC): ICD-10-CM

## 2020-08-25 DIAGNOSIS — D75.839 THROMBOCYTOSIS: ICD-10-CM

## 2020-08-25 DIAGNOSIS — D72.829 LEUKOCYTOSIS, UNSPECIFIED TYPE: ICD-10-CM

## 2020-08-25 DIAGNOSIS — M05.771 RHEUMATOID ARTHRITIS INVOLVING RIGHT FOOT WITH POSITIVE RHEUMATOID FACTOR (HCC): ICD-10-CM

## 2020-08-25 DIAGNOSIS — M05.719 RHEUMATOID ARTHRITIS INVOLVING SHOULDER WITH POSITIVE RHEUMATOID FACTOR, UNSPECIFIED LATERALITY (HCC): Primary | ICD-10-CM

## 2020-08-25 DIAGNOSIS — G89.4 CHRONIC PAIN SYNDROME: ICD-10-CM

## 2020-08-25 DIAGNOSIS — D64.9 CHRONIC ANEMIA: ICD-10-CM

## 2020-08-25 DIAGNOSIS — M06.9 RHEUMATOID ARTHRITIS OF FOOT, UNSPECIFIED LATERALITY, UNSPECIFIED RHEUMATOID FACTOR PRESENCE: ICD-10-CM

## 2020-08-25 LAB
ALBUMIN SERPL-MCNC: 3.1 G/DL (ref 3.4–5)
ALBUMIN/GLOB SERPL: 0.7 {RATIO} (ref 0.8–1.7)
ALP SERPL-CCNC: 134 U/L (ref 45–117)
ALT SERPL-CCNC: 26 U/L (ref 13–56)
ANION GAP SERPL CALC-SCNC: 4 MMOL/L (ref 3–18)
AST SERPL-CCNC: 17 U/L (ref 10–38)
BASO+EOS+MONOS # BLD AUTO: 0.6 K/UL (ref 0–2.3)
BASO+EOS+MONOS NFR BLD AUTO: 6 % (ref 0.1–17)
BILIRUB SERPL-MCNC: 0.3 MG/DL (ref 0.2–1)
BUN SERPL-MCNC: 18 MG/DL (ref 7–18)
BUN/CREAT SERPL: 20 (ref 12–20)
CALCIUM SERPL-MCNC: 9.4 MG/DL (ref 8.5–10.1)
CHLORIDE SERPL-SCNC: 106 MMOL/L (ref 100–111)
CO2 SERPL-SCNC: 27 MMOL/L (ref 21–32)
CREAT SERPL-MCNC: 0.92 MG/DL (ref 0.6–1.3)
CRP SERPL-MCNC: 1.2 MG/DL (ref 0–0.3)
DIFFERENTIAL METHOD BLD: NORMAL
ERYTHROCYTE [DISTWIDTH] IN BLOOD BY AUTOMATED COUNT: 13.5 % (ref 11.5–14.5)
ERYTHROCYTE [SEDIMENTATION RATE] IN BLOOD: 44 MM/HR (ref 0–30)
GLOBULIN SER CALC-MCNC: 4.6 G/DL (ref 2–4)
GLUCOSE SERPL-MCNC: 395 MG/DL (ref 74–99)
HCT VFR BLD AUTO: 41.4 % (ref 36–48)
HGB BLD-MCNC: 13.6 G/DL (ref 12–16)
LYMPHOCYTES # BLD: 2.8 K/UL (ref 1.1–5.9)
LYMPHOCYTES NFR BLD: 27 % (ref 14–44)
MCH RBC QN AUTO: 27 PG (ref 25–35)
MCHC RBC AUTO-ENTMCNC: 32.9 G/DL (ref 31–37)
MCV RBC AUTO: 82.1 FL (ref 78–102)
NEUTS SEG # BLD: 7 K/UL (ref 1.8–9.5)
NEUTS SEG NFR BLD: 67 % (ref 40–70)
PLATELET # BLD AUTO: 380 K/UL (ref 140–440)
POTASSIUM SERPL-SCNC: 3.8 MMOL/L (ref 3.5–5.5)
PROT SERPL-MCNC: 7.7 G/DL (ref 6.4–8.2)
RBC # BLD AUTO: 5.04 M/UL (ref 4.1–5.1)
SODIUM SERPL-SCNC: 137 MMOL/L (ref 136–145)
WBC # BLD AUTO: 10.4 K/UL (ref 4.5–13)

## 2020-08-25 PROCEDURE — 74011250636 HC RX REV CODE- 250/636: Performed by: INTERNAL MEDICINE

## 2020-08-25 PROCEDURE — 85652 RBC SED RATE AUTOMATED: CPT

## 2020-08-25 PROCEDURE — 96415 CHEMO IV INFUSION ADDL HR: CPT

## 2020-08-25 PROCEDURE — 96366 THER/PROPH/DIAG IV INF ADDON: CPT

## 2020-08-25 PROCEDURE — 96375 TX/PRO/DX INJ NEW DRUG ADDON: CPT

## 2020-08-25 PROCEDURE — 96365 THER/PROPH/DIAG IV INF INIT: CPT

## 2020-08-25 PROCEDURE — 85025 COMPLETE CBC W/AUTO DIFF WBC: CPT

## 2020-08-25 PROCEDURE — 96413 CHEMO IV INFUSION 1 HR: CPT

## 2020-08-25 PROCEDURE — 86140 C-REACTIVE PROTEIN: CPT

## 2020-08-25 PROCEDURE — 80053 COMPREHEN METABOLIC PANEL: CPT

## 2020-08-25 RX ORDER — DIPHENHYDRAMINE HYDROCHLORIDE 50 MG/ML
50 INJECTION, SOLUTION INTRAMUSCULAR; INTRAVENOUS AS NEEDED
Status: CANCELLED
Start: 2020-08-25

## 2020-08-25 RX ORDER — ACETAMINOPHEN 325 MG/1
650 TABLET ORAL AS NEEDED
Status: CANCELLED
Start: 2020-08-25

## 2020-08-25 RX ORDER — DIPHENHYDRAMINE HYDROCHLORIDE 50 MG/ML
25 INJECTION, SOLUTION INTRAMUSCULAR; INTRAVENOUS AS NEEDED
Status: CANCELLED
Start: 2020-10-06

## 2020-08-25 RX ORDER — DIPHENHYDRAMINE HYDROCHLORIDE 50 MG/ML
25 INJECTION, SOLUTION INTRAMUSCULAR; INTRAVENOUS ONCE
Status: COMPLETED | OUTPATIENT
Start: 2020-08-25 | End: 2020-08-25

## 2020-08-25 RX ORDER — HEPARIN 100 UNIT/ML
300-500 SYRINGE INTRAVENOUS AS NEEDED
Status: CANCELLED
Start: 2020-10-06

## 2020-08-25 RX ORDER — ANAGRELIDE 0.5 MG/1
0.5 CAPSULE ORAL 2 TIMES DAILY
Qty: 60 CAP | Refills: 6 | Status: SHIPPED | OUTPATIENT
Start: 2020-08-25 | End: 2020-09-09 | Stop reason: SDUPTHER

## 2020-08-25 RX ORDER — SODIUM CHLORIDE 0.9 % (FLUSH) 0.9 %
10 SYRINGE (ML) INJECTION AS NEEDED
Status: CANCELLED | OUTPATIENT
Start: 2020-10-06

## 2020-08-25 RX ORDER — HYDROCORTISONE SODIUM SUCCINATE 100 MG/2ML
100 INJECTION, POWDER, FOR SOLUTION INTRAMUSCULAR; INTRAVENOUS AS NEEDED
Status: CANCELLED | OUTPATIENT
Start: 2020-08-25

## 2020-08-25 RX ORDER — ONDANSETRON 2 MG/ML
8 INJECTION INTRAMUSCULAR; INTRAVENOUS AS NEEDED
Status: CANCELLED | OUTPATIENT
Start: 2020-10-06

## 2020-08-25 RX ORDER — ACETAMINOPHEN 325 MG/1
650 TABLET ORAL AS NEEDED
Status: CANCELLED
Start: 2020-10-06

## 2020-08-25 RX ORDER — ALBUTEROL SULFATE 0.83 MG/ML
2.5 SOLUTION RESPIRATORY (INHALATION) AS NEEDED
Status: CANCELLED
Start: 2020-08-25

## 2020-08-25 RX ORDER — SODIUM CHLORIDE 9 MG/ML
25 INJECTION, SOLUTION INTRAVENOUS CONTINUOUS
Status: DISPENSED | OUTPATIENT
Start: 2020-08-25 | End: 2020-08-25

## 2020-08-25 RX ORDER — OXYCODONE AND ACETAMINOPHEN 10; 325 MG/1; MG/1
1 TABLET ORAL
Qty: 60 TAB | Refills: 0 | Status: SHIPPED | OUTPATIENT
Start: 2020-08-25 | End: 2020-09-08 | Stop reason: SDUPTHER

## 2020-08-25 RX ORDER — SODIUM CHLORIDE 9 MG/ML
10 INJECTION INTRAMUSCULAR; INTRAVENOUS; SUBCUTANEOUS AS NEEDED
Status: CANCELLED | OUTPATIENT
Start: 2020-10-06

## 2020-08-25 RX ORDER — ACETAMINOPHEN 325 MG/1
650 TABLET ORAL ONCE
Status: CANCELLED
Start: 2020-08-25

## 2020-08-25 RX ORDER — SODIUM CHLORIDE 0.9 % (FLUSH) 0.9 %
10 SYRINGE (ML) INJECTION AS NEEDED
Status: DISPENSED | OUTPATIENT
Start: 2020-08-25 | End: 2020-08-25

## 2020-08-25 RX ORDER — HEPARIN 100 UNIT/ML
300-500 SYRINGE INTRAVENOUS AS NEEDED
Status: CANCELLED
Start: 2020-08-25

## 2020-08-25 RX ORDER — DIPHENHYDRAMINE HYDROCHLORIDE 50 MG/ML
25 INJECTION, SOLUTION INTRAMUSCULAR; INTRAVENOUS AS NEEDED
Status: CANCELLED
Start: 2020-08-25

## 2020-08-25 RX ORDER — EPINEPHRINE 1 MG/ML
0.3 INJECTION, SOLUTION, CONCENTRATE INTRAVENOUS AS NEEDED
Status: CANCELLED | OUTPATIENT
Start: 2020-08-25

## 2020-08-25 RX ORDER — EPINEPHRINE 1 MG/ML
0.3 INJECTION, SOLUTION, CONCENTRATE INTRAVENOUS AS NEEDED
Status: CANCELLED | OUTPATIENT
Start: 2020-10-06

## 2020-08-25 RX ORDER — DIPHENHYDRAMINE HCL 25 MG
50 CAPSULE ORAL ONCE
Status: CANCELLED | OUTPATIENT
Start: 2020-08-25

## 2020-08-25 RX ORDER — DIPHENHYDRAMINE HYDROCHLORIDE 50 MG/ML
50 INJECTION, SOLUTION INTRAMUSCULAR; INTRAVENOUS AS NEEDED
Status: CANCELLED
Start: 2020-10-06

## 2020-08-25 RX ORDER — LANOLIN ALCOHOL/MO/W.PET/CERES
400 CREAM (GRAM) TOPICAL DAILY
Qty: 30 TAB | Refills: 3 | Status: SHIPPED | OUTPATIENT
Start: 2020-08-25 | End: 2022-05-04

## 2020-08-25 RX ORDER — ONDANSETRON 2 MG/ML
8 INJECTION INTRAMUSCULAR; INTRAVENOUS AS NEEDED
Status: CANCELLED | OUTPATIENT
Start: 2020-08-25

## 2020-08-25 RX ORDER — HYDROCORTISONE SODIUM SUCCINATE 100 MG/2ML
100 INJECTION, POWDER, FOR SOLUTION INTRAMUSCULAR; INTRAVENOUS AS NEEDED
Status: CANCELLED | OUTPATIENT
Start: 2020-10-06

## 2020-08-25 RX ORDER — DIPHENHYDRAMINE HYDROCHLORIDE 50 MG/ML
25 INJECTION, SOLUTION INTRAMUSCULAR; INTRAVENOUS ONCE
Status: CANCELLED
Start: 2020-10-06

## 2020-08-25 RX ORDER — SODIUM CHLORIDE 9 MG/ML
25 INJECTION, SOLUTION INTRAVENOUS CONTINUOUS
Status: CANCELLED | OUTPATIENT
Start: 2020-10-06

## 2020-08-25 RX ORDER — ALBUTEROL SULFATE 0.83 MG/ML
2.5 SOLUTION RESPIRATORY (INHALATION) AS NEEDED
Status: CANCELLED
Start: 2020-10-06

## 2020-08-25 RX ORDER — SODIUM CHLORIDE 9 MG/ML
10 INJECTION INTRAMUSCULAR; INTRAVENOUS; SUBCUTANEOUS AS NEEDED
Status: CANCELLED | OUTPATIENT
Start: 2020-08-25

## 2020-08-25 RX ADMIN — SODIUM CHLORIDE 25 ML/HR: 9 INJECTION, SOLUTION INTRAVENOUS at 10:00

## 2020-08-25 RX ADMIN — Medication 10 ML: at 09:40

## 2020-08-25 RX ADMIN — SODIUM CHLORIDE 400 MG: 0.9 INJECTION, SOLUTION INTRAVENOUS at 10:45

## 2020-08-25 RX ADMIN — DIPHENHYDRAMINE HYDROCHLORIDE 25 MG: 50 INJECTION INTRAMUSCULAR; INTRAVENOUS at 10:10

## 2020-08-25 NOTE — PROGRESS NOTES
[facility]    Date: 2020    Name: Avinash Moser    MRN: [id number]         : 1965      Ms. Fermin Stuart arrived in the [facility] today at [address], in stable condition, here for Q 6 Week, IV Renflexis Infusion. She was assessed and education was provided. Ms. [facility] vitals were reviewed. Visit Vitals  /68 (BP 1 Location: Right arm, BP Patient Position: Sitting)   Pulse 97   Temp 98.7 °F (37.1 °C)   Resp 20   Ht 5' 11\" (1.803 m)   Wt 108.7 kg (239 lb 9.6 oz)   SpO2 98%   Breastfeeding No   BMI 33.42 kg/m²               PIV (#22G) was established in her posterior right forearm at 0940, without incident, and blood was drawn for a CBC, CMP, ESR, & CRP (2 lavender top tubes & 2 SST tubes), per order. (The CBC was processed in house, and the CMP, ESR, & CRP, were sent out to be processed. ). Lab results were obtained and reviewed, and the CBC results from today listed below, were noted to be satisfactory for treatment today. Recent Results (from the past 12 hour(s))   CBC WITH 3 PART DIFF    Collection Time: 20  9:40 AM   Result Value Ref Range    WBC 10.4 4.5 - 13.0 K/uL    RBC 5.04 4.10 - 5.10 M/uL    HGB 13.6 12.0 - 16 g/dL    HCT 41.4 36 - 48 %    MCV 82.1 78 - 102 FL    MCH 27.0 25.0 - 35.0 PG    MCHC 32.9 31 - 37 g/dL    RDW 13.5 11.5 - 14.5 %    PLATELET 632 343 - 698 K/uL    NEUTROPHILS 67 40 - 70 %    MIXED CELLS 6 0.1 - 17 %    LYMPHOCYTES 27 14 - 44 %    ABS. NEUTROPHILS 7.0 1.8 - 9.5 K/UL    ABS. MIXED CELLS 0.6 0.0 - 2.3 K/uL    ABS. LYMPHOCYTES 2.8 1.1 - 5.9 K/UL    DF AUTOMATED              ml IV Bag, was initiated to infuse @ KVO PRN, throughout treatment today. Pre-medication consisting of IV Benadryl 25 mg, was administered per order, and without incident.            Renflexis (infliximab-abda) (Remicade Biosimilar) 400 mg IV (4 mg/kg) Maintenance Dose, was administered over 2 hours, per order, and without incident.        After the completion of the IV Renflexis Infusion, Ms. Chris Hess was monitored for 30 minutes, per order, and also without incident. After completion of the observation period, the PIV was removed, and gauze/coban was applied. Ms. Chris Hess tolerated well, and had no complaints. Ms. Chris Hess was discharged from [place] [address] in stable condition at 1320. .. She is to return in 6 weeks, on Tuesday, 10-6-20, at 0900,  for her next appointment, for her next dose of IV Renflexis.     Alessio Mendoza RN  August 25, 2020  10:52 AM

## 2020-09-08 DIAGNOSIS — G89.4 CHRONIC PAIN SYNDROME: ICD-10-CM

## 2020-09-09 DIAGNOSIS — D75.839 THROMBOCYTOSIS: ICD-10-CM

## 2020-09-09 DIAGNOSIS — E78.5 HYPERLIPIDEMIA, UNSPECIFIED HYPERLIPIDEMIA TYPE: ICD-10-CM

## 2020-09-09 RX ORDER — OXYCODONE AND ACETAMINOPHEN 10; 325 MG/1; MG/1
1 TABLET ORAL
Qty: 60 TAB | Refills: 0 | Status: SHIPPED | OUTPATIENT
Start: 2020-09-09 | End: 2020-09-23 | Stop reason: SDUPTHER

## 2020-09-10 RX ORDER — ATORVASTATIN CALCIUM 40 MG/1
40 TABLET, FILM COATED ORAL
Qty: 30 TAB | Refills: 6 | Status: SHIPPED | OUTPATIENT
Start: 2020-09-10 | End: 2021-04-12

## 2020-09-14 RX ORDER — ANAGRELIDE 0.5 MG/1
0.5 CAPSULE ORAL 2 TIMES DAILY
Qty: 60 CAP | Refills: 6 | Status: SHIPPED | OUTPATIENT
Start: 2020-09-14 | End: 2021-10-14 | Stop reason: SDUPTHER

## 2020-09-23 ENCOUNTER — OFFICE VISIT (OUTPATIENT)
Dept: CARDIOLOGY CLINIC | Age: 55
End: 2020-09-23
Payer: MEDICARE

## 2020-09-23 VITALS
OXYGEN SATURATION: 97 % | HEART RATE: 108 BPM | BODY MASS INDEX: 33.38 KG/M2 | HEIGHT: 71 IN | SYSTOLIC BLOOD PRESSURE: 123 MMHG | WEIGHT: 238.4 LBS | DIASTOLIC BLOOD PRESSURE: 73 MMHG | TEMPERATURE: 97.3 F

## 2020-09-23 DIAGNOSIS — I51.7 LVH (LEFT VENTRICULAR HYPERTROPHY): ICD-10-CM

## 2020-09-23 DIAGNOSIS — I25.10 CORONARY ARTERY DISEASE INVOLVING NATIVE CORONARY ARTERY OF NATIVE HEART WITHOUT ANGINA PECTORIS: Primary | ICD-10-CM

## 2020-09-23 DIAGNOSIS — G89.4 CHRONIC PAIN SYNDROME: ICD-10-CM

## 2020-09-23 DIAGNOSIS — E78.5 HYPERLIPIDEMIA, UNSPECIFIED HYPERLIPIDEMIA TYPE: ICD-10-CM

## 2020-09-23 DIAGNOSIS — I10 ESSENTIAL HYPERTENSION: ICD-10-CM

## 2020-09-23 PROCEDURE — G8432 DEP SCR NOT DOC, RNG: HCPCS | Performed by: INTERNAL MEDICINE

## 2020-09-23 PROCEDURE — G8752 SYS BP LESS 140: HCPCS | Performed by: INTERNAL MEDICINE

## 2020-09-23 PROCEDURE — 3017F COLORECTAL CA SCREEN DOC REV: CPT | Performed by: INTERNAL MEDICINE

## 2020-09-23 PROCEDURE — G8754 DIAS BP LESS 90: HCPCS | Performed by: INTERNAL MEDICINE

## 2020-09-23 PROCEDURE — G8417 CALC BMI ABV UP PARAM F/U: HCPCS | Performed by: INTERNAL MEDICINE

## 2020-09-23 PROCEDURE — 99214 OFFICE O/P EST MOD 30 MIN: CPT | Performed by: INTERNAL MEDICINE

## 2020-09-23 PROCEDURE — G8427 DOCREV CUR MEDS BY ELIG CLIN: HCPCS | Performed by: INTERNAL MEDICINE

## 2020-09-23 PROCEDURE — G9899 SCRN MAM PERF RSLTS DOC: HCPCS | Performed by: INTERNAL MEDICINE

## 2020-09-23 NOTE — PROGRESS NOTES
HISTORY OF PRESENT ILLNESS  Kimberly Cid is a 62 y.o. female. Palpitations    The history is provided by the patient. This is a recurrent problem. The current episode started more than 1 week ago. The problem has been rapidly improving. The problem occurs rarely. The problem is associated with nothing. Pertinent negatives include no fever, no chest pain, no claudication, no orthopnea, no PND, no abdominal pain, no nausea, no vomiting, no headaches, no dizziness, no weakness, no cough, no hemoptysis, no shortness of breath and no sputum production. Chest Pain (Angina)    The history is provided by the patient. This is a new problem. The problem has been resolved. The problem occurs rarely. Pertinent negatives include no abdominal pain, no claudication, no cough, no dizziness, no fever, no headaches, no hemoptysis, no nausea, no orthopnea, no palpitations, no PND, no shortness of breath, no sputum production, no vomiting and no weakness. Review of Systems   Constitutional: Negative for chills and fever. HENT: Negative for nosebleeds. Eyes: Negative for blurred vision and double vision. Respiratory: Negative for cough, hemoptysis, sputum production, shortness of breath and wheezing. Cardiovascular: Negative for chest pain, palpitations, orthopnea, claudication, leg swelling and PND. Gastrointestinal: Negative for abdominal pain, heartburn, nausea and vomiting. Musculoskeletal: Negative for myalgias. Skin: Negative for rash. Neurological: Negative for dizziness, weakness and headaches. Endo/Heme/Allergies: Does not bruise/bleed easily.      Family History   Problem Relation Age of Onset    Diabetes Other     Alcohol abuse Mother    Eusebio.Meseret Arthritis-osteo Mother     Diabetes Mother     Elevated Lipids Mother     Headache Mother     Heart Disease Mother    Tosin Migraines Mother     Psychiatric Disorder Mother     Alcohol abuse Father    Marco Avendaño Father     Cancer Father  Headache Father     Heart Disease Father     Lung Disease Father     Migraines Father     Heart Surgery Father     Alcohol abuse Sister     Headache Sister     Diabetes Sister     Heart Disease Sister     Migraines Sister     Psychiatric Disorder Sister     Headache Brother     Diabetes Brother     Elevated Lipids Brother     Heart Disease Brother     Migraines Brother     Psychiatric Disorder Brother     Coronary Artery Disease Brother     Cancer Maternal Aunt     Alcohol abuse Maternal Aunt     Diabetes Maternal Aunt     Headache Maternal Aunt     Lung Disease Maternal Aunt     Migraines Maternal Aunt     Headache Maternal Uncle     Migraines Maternal Uncle     Stroke Maternal Uncle     Psychiatric Disorder Maternal Uncle     Headache Paternal Aunt     Migraines Paternal Aunt     Headache Paternal Uncle     Hypertension Paternal Uncle     Migraines Paternal Uncle     Stroke Paternal Uncle     Headache Maternal Grandmother     Migraines Maternal Grandmother     Stroke Maternal Grandmother     Headache Maternal Grandfather     Migraines Maternal Grandfather     Stroke Maternal Grandfather     Headache Paternal Grandmother     Hypertension Paternal Grandmother     Lung Disease Paternal Grandmother     Migraines Paternal Grandmother     Cancer Paternal Grandmother     Headache Paternal Grandfather     Cancer Paternal Grandfather     Migraines Paternal Grandfather        Past Medical History:   Diagnosis Date    Abdominal pain, unspecified site     Acute otitis media     Acute pharyngitis     Anxiety     Arthritis     Autoimmune disease ([address]     Back injury     Backache     herniated disc in lower back    Blurred vision     Chest pain 5/4/2018    Chest pain, unspecified     abnormal EKG    Chronic airway obstruction, not elsewhere classified     Chronic back pain     Chronic pain     COPD     Depression     Diabetes ([address]     Dizziness     Dizziness and giddiness     possible orthostatic changes, vasovagal, autonomic dysfunction from diabetic neuropathy    Encounters for unspecified administrative purpose     Essential thrombocytosis ([facility] 2016    Feeling anxious     Fibromyalgia     Headache(784.0)     Hemorrhoid     Herniated disc     at L5    Hypercholesterolemia     Hyperglycemia     Hypertension     Joint pain     Leukocytosis, unspecified     Major depressive disorder, single episode, mild (HCC)     Mental disorder     depression, anxiety, bipolar and PTSD    Neuropathy     Obesity, unspecified     Other chest pain     Palpitation 2018    ?  arrhythmia    Phobic disorders     Rheumatoid arthritis (HCC)     Rheumatoid arthritis of foot ([facility] 2018    on treatment    Seasonal allergies     Shortness of breath     normal EF    Smoking 2018    discussed cessation    Streptococcal sore throat     Type 2 diabetes mellitus without complication, with long-term current use of insulin ([facility] 2018    Type II or unspecified type diabetes mellitus with unspecified complication, uncontrolled     Unspecified hereditary and idiopathic peripheral neuropathy     Vitamin D deficiency        Past Surgical History:   Procedure Laterality Date    HX CHOLECYSTECTOMY      HX GYN      partial Hysterectomy    HX OTHER SURGICAL  12-1-15    had ingrown toenail removed from big toe, both feet    HX TUBAL LIGATION             Social History     Tobacco Use    Smoking status: Current Every Day Smoker     Packs/day: 0.25     Last attempt to quit: 2012     Years since quittin.2    Smokeless tobacco: Never Used    Tobacco comment: 5 cigarettes day   Substance Use Topics    Alcohol use: Yes     Frequency: Monthly or less     Drinks per session: 1 or 2     Binge frequency: Never     Comment: socially       Allergies   Allergen Reactions    Levaquin [Levofloxacin] Anaphylaxis    Lipitor [Atorvastatin] Other (comments)     Patient denies    Pollen Extracts Unable to Obtain       Prior to Admission medications    Medication Sig Start Date End Date Taking? Authorizing Provider   anagrelide (AGRYLIN) 0.5 mg capsule Take 1 Cap by mouth two (2) times a day. Indications: spontaneous hemorrhages and increase in blood platelets 2/58/54  Yes Blanquita Hernandez NP   atorvastatin (LIPITOR) 40 mg tablet Take 1 Tab by mouth nightly. 9/10/20  Yes Criss Bolden NP   oxyCODONE-acetaminophen (PERCOCET 10)  mg per tablet Take 1 Tab by mouth every six (6) hours as needed for Pain for up to 15 days. Max Daily Amount: 4 Tabs. 9/9/20 9/24/20 Yes Blanquita Hernandez NP   folic acid 710 mcg tablet Take 1 Tab by mouth daily. 8/25/20  Yes Madiha Hernandez NP   fluticasone/vilanterol (BREO ELLIPTA IN) Take  by inhalation. Yes Other, MD Lelia   insulin aspart prot/insuln asp (NOVOLOG MIX 70-30 SC) by SubCUTAneous route. Yes Other, MD Lelia   gabapentin (NEURONTIN) 100 mg capsule Take 100 mg by mouth three (3) times daily. Yes Other, MD Lelia   hydrOXYzine pamoate (VISTARIL) 25 mg capsule 25 mg. 3/27/20  Yes Provider, Historical   mirtazapine (REMERON) 15 mg tablet  3/27/20  Yes Provider, Historical   hydroCHLOROthiazide (HYDRODIURIL) 12.5 mg tablet Take 12.5 mg by mouth daily. Yes Provider, Historical   losartan (COZAAR) 50 mg tablet Take 1 Tab by mouth daily. 10/23/19  Yes Criss Bolden NP   aspirin delayed-release 81 mg tablet Take 1 Tab by mouth daily. 8/9/19  Yes Theresa Hickman MD   insulin glargine (LANTUS) 100 unit/mL injection 30 Units by SubCUTAneous route daily. 8/9/19  Yes Theresa Hickman MD   naloxone [place]) 4 mg/actuation nasal spray Use 1 spray intranasally, then discard. Repeat with new spray every 2 min as needed for opioid overdose symptoms, alternating nostrils.   Indications: Decrease in Rate & Depth of Breathing due to Opioid Drug, opioid overdose 4/30/19  Yes Amrita Dus, NP   BD INSULIN SYRINGE ULTRA-FINE 1 mL 31 gauge x 15/64\" syrg  9/22/17  Yes Provider, Historical   albuterol (PROAIR HFA) 90 mcg/actuation inhaler Take 1 Puff by inhalation every six (6) hours as needed for Wheezing. 2/6/16  Yes J Carlos Wilder,    methotrexate (RHEUMATREX) 2.5 mg tablet Take 2.5 mg by mouth every fourty-eight (48) hours. Yes Provider, Historical   metFORMIN (GLUCOPHAGE) 1,000 mg tablet Take 500 mg by mouth two (2) times a day. Yes Provider, Historical   Cholecalciferol, Vitamin D3, 5,000 unit Tab Take 5,000 Units by mouth every seven (7) days. Yes Other, MD Lelia   atenolol (TENORMIN) 25 mg tablet Take 25 mg by mouth daily. Indications: HYPERTENSION   Yes Provider, Historical   furosemide (LASIX) 40 mg tablet Take  by mouth daily. Yes Provider, Historical   INFLIXIMAB (REMICADE IV) 344 mg by IntraVENous route once as needed. remicade will be every 6 weeks   Yes Provider, Historical         Visit Vitals  /73 (BP 1 Location: Left arm, BP Patient Position: Sitting)   Pulse (!) 108   Temp 97.3 °F (36.3 °C) (Temporal)   Ht 5' 11\" (1.803 m)   Wt 108.1 kg (238 lb 6.4 oz)   SpO2 97%   BMI 33.25 kg/m²         Physical Exam   Constitutional: She is oriented to person, place, and time. She appears well-developed and well-nourished. HENT:   Head: Normocephalic and atraumatic. Eyes: Conjunctivae are normal.   Neck: Neck supple. No JVD present. No tracheal deviation present. No thyromegaly present. Cardiovascular: Normal rate and regular rhythm. PMI is not displaced. Exam reveals no gallop, no S3 and no decreased pulses. No murmur heard. Pulmonary/Chest: No respiratory distress. She has no wheezes. She has no rales. She exhibits no tenderness. Abdominal: Soft. There is no abdominal tenderness. Musculoskeletal:         General: No edema. Neurological: She is alert and oriented to person, place, and time. Skin: Skin is warm. Psychiatric: She has a normal mood and affect.        Ms. Chely Curtis has a reminder for a \"due or due soon\" health maintenance. I have asked that she contact her primary care provider for follow-up on this health maintenance. No flowsheet data found. 5/2018  sr,iwmi  SUMMARY:echo-5/2018  Left ventricle: Systolic function was normal. Ejection fraction was  estimated in the range of 55 % to 60 %. There were no regional wall motion  abnormalities. There was mild concentric hypertrophy. Doppler parameters  were consistent with abnormal left ventricular relaxation (grade 1  diastolic dysfunction). Impression 5/2018  NUCLEAR IMAGING:   Findings:   1. Stress images reveal normal Myoview distrubution in all the LV segments in short axis, vertical and horizontal long axis views. 2. Resting images have a normal uptake. 3. Gated images reveal normal wall motion and the ejection fraction is calculated to be 57%. Conclusion:   1. Normal perfusion scan. 2. Normal wall motion and ejection fraction is calculated at 57%. 3. No evidence of significant fixed or reversible defect suggesting ischemia or myocardial infarction noted from this nuclear study. 4. Low risk scan.   5/2018  Cardiac telemetrysinus rhythm sinus tachycardia, no significant arrhythmia . Interpretation Summary 8/2019    · Left Ventricle: Normal cavity size and wall thickness. Low normal systolic dysfunction. Estimated left ventricular ejection fraction is 51 - 55%. Visually measured ejection fraction. No regional wall motion abnormality noted. Mild (grade 1) left ventricular diastolic dysfunction. · Pulmonary Artery: Pulmonary arterial systolic pressure is 26 mmHg. Coronary Findings 8/2019    Diagnostic   Dominance: Right   Left Anterior Descending   Prox LAD lesion 45% stenosed. .   Right Coronary Artery   Mid RCA lesion 30% stenosed. .           Assessment         ICD-10-CM ICD-9-CM    1.  Coronary artery disease involving native coronary artery of native heart without angina pectoris  I25.10 414.01     Stable continue current medical management   2. LVH (left ventricular hypertrophy)  I51.7 429.3     Continue treatment monitor   3. Essential hypertension  I10 401.9     Stable continue treatment   4. Hyperlipidemia, unspecified hyperlipidemia type  E78.5 272.4     Continue treatment lab with PCP   9/2019  Recent non-STEMI 8/2019. Cardiac cath with noncritical 40% LAD and 30% RCA disease. Medical management asymptomatic since discharge. Continue statin aspirin and other medication. Check labs  9/2020  Cardiac status stable continue medical management    No orders of the defined types were placed in this encounter. Follow-up and Dispositions    · Return in about 6 months (around 3/23/2021).

## 2020-09-23 NOTE — PROGRESS NOTES
1. Have you been to the ER, urgent care clinic since your last visit? Hospitalized since your last visit? Yes When: 06/2020 Where: [facility] Reason for visit: Rash/COPD    2. Have you seen or consulted any other health care providers outside of the [address] [place] [address] since your last visit? Include any pap smears or colon screening.  Yes When: 09/2020 Where: [facility] Reason for visit: Brett Schaumann

## 2020-09-24 RX ORDER — OXYCODONE AND ACETAMINOPHEN 10; 325 MG/1; MG/1
1 TABLET ORAL
Qty: 60 TAB | Refills: 0 | Status: SHIPPED | OUTPATIENT
Start: 2020-09-24 | End: 2020-10-09

## 2020-10-04 ENCOUNTER — HOSPITAL ENCOUNTER (EMERGENCY)
Age: 55
Discharge: HOME | End: 2020-10-04
Attending: EMERGENCY MEDICINE
Payer: MEDICARE

## 2020-10-04 ENCOUNTER — APPOINTMENT (OUTPATIENT)
Dept: GENERAL RADIOLOGY | Age: 55
End: 2020-10-04
Attending: EMERGENCY MEDICINE
Payer: MEDICARE

## 2020-10-04 VITALS
HEART RATE: 86 BPM | SYSTOLIC BLOOD PRESSURE: 99 MMHG | TEMPERATURE: 98.7 F | OXYGEN SATURATION: 100 % | DIASTOLIC BLOOD PRESSURE: 73 MMHG | RESPIRATION RATE: 18 BRPM

## 2020-10-04 DIAGNOSIS — J06.9 UPPER RESPIRATORY TRACT INFECTION, UNSPECIFIED TYPE: Primary | ICD-10-CM

## 2020-10-04 DIAGNOSIS — Z20.822 SUSPECTED COVID-19 VIRUS INFECTION: ICD-10-CM

## 2020-10-04 LAB
ATRIAL RATE: 89 BPM
CALCULATED P AXIS, ECG09: 38 DEGREES
CALCULATED R AXIS, ECG10: 19 DEGREES
CALCULATED T AXIS, ECG11: 13 DEGREES
DIAGNOSIS, 93000: NORMAL
GLUCOSE BLD STRIP.AUTO-MCNC: 251 MG/DL (ref 70–110)
P-R INTERVAL, ECG05: 188 MS
Q-T INTERVAL, ECG07: 394 MS
QRS DURATION, ECG06: 100 MS
QTC CALCULATION (BEZET), ECG08: 479 MS
VENTRICULAR RATE, ECG03: 89 BPM

## 2020-10-04 PROCEDURE — 82962 GLUCOSE BLOOD TEST: CPT

## 2020-10-04 PROCEDURE — 87635 SARS-COV-2 COVID-19 AMP PRB: CPT

## 2020-10-04 PROCEDURE — 93005 ELECTROCARDIOGRAM TRACING: CPT

## 2020-10-04 PROCEDURE — 71045 X-RAY EXAM CHEST 1 VIEW: CPT

## 2020-10-04 PROCEDURE — 99284 EMERGENCY DEPT VISIT MOD MDM: CPT

## 2020-10-04 NOTE — ED PROVIDER NOTES
29-year-old female history of COPD, diabetes, asthma hyperlipidemia, hypertension, diabetes, arthritis, and fibromyalgia presents for evaluation of congestion, mild cough, headache, and shortness of breath. Has mild bilateral ear pain. No documented fever. She notes generalized malaise and fatigue. Symptom onset 2 days ago. Mild tightness in the chest.  Subjectively different than prior episodes of asthma. Cough not productive. No known COVID19 exposure. Past Medical History:   Diagnosis Date    Abdominal pain, unspecified site     Acute otitis media     Acute pharyngitis     Anxiety     Arthritis     Autoimmune disease ([facility] [address]     Back injury     Backache     herniated disc in lower back    Blurred vision     Chest pain 5/4/2018    Chest pain, unspecified     abnormal EKG    Chronic airway obstruction, not elsewhere classified     Chronic back pain     Chronic pain     COPD     Depression     Diabetes (HCC)     Dizziness     Dizziness and giddiness     possible orthostatic changes, vasovagal, autonomic dysfunction from diabetic neuropathy    Encounters for unspecified administrative purpose     Essential thrombocytosis ([facility] [address] 1/6/2016    Feeling anxious     Fibromyalgia     Headache(784.0)     Hemorrhoid     Herniated disc     at L5    Hypercholesterolemia     Hyperglycemia     Hypertension     Joint pain     Leukocytosis, unspecified     Major depressive disorder, single episode, mild (HCC)     Mental disorder     depression, anxiety, bipolar and PTSD    Neuropathy     Obesity, unspecified     Other chest pain     Palpitation 5/4/2018    ?  arrhythmia    Phobic disorders     Rheumatoid arthritis (HCC)     Rheumatoid arthritis of foot ([facility] [address] 5/4/2018    on treatment    Seasonal allergies     Shortness of breath     normal EF    Smoking 5/4/2018    discussed cessation    Streptococcal sore throat     Type 2 diabetes mellitus without complication, with long-term current use of insulin ([facility] [address] 5/4/2018    Type II or unspecified type diabetes mellitus with unspecified complication, uncontrolled     Unspecified hereditary and idiopathic peripheral neuropathy     Vitamin D deficiency        Past Surgical History:   Procedure Laterality Date    HX CHOLECYSTECTOMY  1994    HX GYN  2005    partial Hysterectomy    HX OTHER SURGICAL  12-1-15    had ingrown toenail removed from big toe, both feet    HX TUBAL LIGATION      1995         Family History:   Problem Relation Age of Onset    Diabetes Other     Alcohol abuse Mother     Arthritis-osteo Mother     Diabetes Mother     Elevated Lipids Mother     Headache Mother     Heart Disease Mother     Migraines Mother     Psychiatric Disorder Mother     Alcohol abuse Father     Arthritis-osteo Father     Cancer Father     Headache Father     Heart Disease Father     Lung Disease Father     Migraines Father     Heart Surgery Father     Alcohol abuse Sister     Headache Sister     Diabetes Sister     Heart Disease Sister     Migraines Sister     Psychiatric Disorder Sister     Headache Brother     Diabetes Brother     Elevated Lipids Brother     Heart Disease Brother     Migraines Brother     Psychiatric Disorder Brother     Coronary Artery Disease Brother     Cancer Maternal Aunt     Alcohol abuse Maternal Aunt     Diabetes Maternal Aunt     Headache Maternal Aunt     Lung Disease Maternal Aunt     Migraines Maternal Aunt     Headache Maternal Uncle     Migraines Maternal Uncle     Stroke Maternal Uncle     Psychiatric Disorder Maternal Uncle     Headache Paternal Aunt     Migraines Paternal Aunt     Headache Paternal Uncle     Hypertension Paternal Uncle     Migraines Paternal Uncle     Stroke Paternal Uncle     Headache Maternal Grandmother     Migraines Maternal Grandmother     Stroke Maternal Grandmother     Headache Maternal Grandfather     Migraines Maternal Grandfather  Stroke Maternal Grandfather     Headache Paternal Grandmother     Hypertension Paternal Grandmother     Lung Disease Paternal Grandmother     Migraines Paternal Grandmother     Cancer Paternal Grandmother     Headache Paternal Grandfather     Cancer Paternal Grandfather     Migraines Paternal Grandfather        Social History     Socioeconomic History    Marital status:      Spouse name: Not on file    Number of children: Not on file    Years of education: Not on file    Highest education level: Not on file   Occupational History    Not on file   Social Needs    Financial resource strain: Not on file    Food insecurity     Worry: Not on file     Inability: Not on file    Transportation needs     Medical: Not on file     Non-medical: Not on file   Tobacco Use    Smoking status: Current Every Day Smoker     Packs/day: 0.25     Last attempt to quit: 2012     Years since quittin.2    Smokeless tobacco: Never Used    Tobacco comment: 5 cigarettes day   Substance and Sexual Activity    Alcohol use: Yes     Frequency: Monthly or less     Drinks per session: 1 or 2     Binge frequency: Never     Comment: socially    Drug use: No    Sexual activity: Yes     Partners: Male     Birth control/protection: Condom   Lifestyle    Physical activity     Days per week: Not on file     Minutes per session: Not on file    Stress: Not on file   Relationships    Social connections     Talks on phone: Not on file     Gets together: Not on file     Attends [employer] service: Not on file     Active member of club or organization: Not on file     Attends meetings of clubs or organizations: Not on file     Relationship status: Not on file    Intimate partner violence     Fear of current or ex partner: Not on file     Emotionally abused: Not on file     Physically abused: Not on file     Forced sexual activity: Not on file   Other Topics Concern    Not on file   Social History Narrative    Not on file         ALLERGIES: Levaquin [levofloxacin]; Lipitor [atorvastatin]; and Pollen extracts    Review of Systems   Constitutional: Positive for fatigue. HENT: Positive for congestion. Respiratory: Positive for cough and shortness of breath. Gastrointestinal: Negative for abdominal pain. Vitals:    10/04/20 1645 10/04/20 1650   BP: 99/73    Pulse: 86    Resp: 18    SpO2: 100% 100%            Physical Exam  Vitals signs and nursing note reviewed. Constitutional:       General: She is not in acute distress. Appearance: She is well-developed. She is not diaphoretic. HENT:      Head: Normocephalic and atraumatic. Right Ear: Tympanic membrane normal.      Left Ear: Tympanic membrane normal.      Nose: Nose normal.   Eyes:      General: No scleral icterus. Right eye: No discharge. Left eye: No discharge. Neck:      Musculoskeletal: Neck supple. Vascular: No JVD. Cardiovascular:      Rate and Rhythm: Normal rate and regular rhythm. Heart sounds: Normal heart sounds. No murmur. No friction rub. No gallop. Pulmonary:      Effort: Pulmonary effort is normal. No respiratory distress. Breath sounds: Normal breath sounds. No wheezing or rales. Abdominal:      Palpations: Abdomen is soft. Tenderness: There is no abdominal tenderness. There is no guarding or rebound. Musculoskeletal:         General: No tenderness. Skin:     General: Skin is warm and dry. Findings: No rash. Neurological:      Mental Status: She is alert and oriented to person, place, and time. Motor: No abnormal muscle tone. Coordination: Coordination normal.        EKG interpreted for myself at 1639 hrs. demonstrates normal sinus rhythm, rate 89. Q waves in leads III and aVF consistent with prior inferior wall MI. No ST segment elevations. Compared to 2/6/2020, tracing appears unchanged.       Preliminary reading of chest x-ray per myself negative for acute infiltrate. .MDM  Number of Diagnoses or Management Options  Suspected COVID-19 virus infection:   Upper respiratory tract infection, unspecified type:   Diagnosis management comments: Impression. Multiple constitutional symptoms consistent with acute viral infection, consider COVID-19. Normal oxygen saturation with clear lungs. Afebrile. Normal respiratory rate. Screening chest x-ray and EKG reviewed. Accu-Chek 251. Procedures      Diagnosis:   1. Upper respiratory tract infection, unspecified type    2. Suspected COVID-19 virus infection      1. Luminary reading of chest x-ray negative for pneumonia. 2.  Plenty of fluids. 3.  Cool mist vaporizer in bedroom for nocturnal cough. 4.  Menthol cough drops as needed. 5.  Honey 1 teaspoon as needed for cough. 6.  Mucinex D for cough and congestion. 7.  COVID-19 testing performed today  8. Tylenol or Motrin every 4-6 hours as needed for fever and chills. 9.  Follow-up with primary care physician for recheck in 5 to 7 days if not improving. 10.  Return to the emergency department for shortness of breath at rest, cough productive of rust colored sputum, or persistent high fever. Disposition: home    Follow-up Information     Follow up With Specialties Details Why Contact Info    Spike Nguyen MD Internal Medicine In 3 days As needed, for recheck of ongoing symptoms [address] [place] [address] [address] [place] [address]      [id number] [facility] EMERGENCY DEPT Emergency Medicine  If symptoms worsen [address] [facility]  [phone]          Patient's Medications   Start Taking    No medications on file   Continue Taking    ALBUTEROL (PROAIR HFA) 90 MCG/ACTUATION INHALER    Take 1 Puff by inhalation every six (6) hours as needed for Wheezing. ANAGRELIDE (AGRYLIN) 0.5 MG CAPSULE    Take 1 Cap by mouth two (2) times a day.  Indications: spontaneous hemorrhages and increase in blood platelets    ASPIRIN DELAYED-RELEASE 81 MG TABLET    Take 1 Tab by mouth daily. ATENOLOL (TENORMIN) 25 MG TABLET    Take 25 mg by mouth daily. Indications: HYPERTENSION    ATORVASTATIN (LIPITOR) 40 MG TABLET    Take 1 Tab by mouth nightly. BD INSULIN SYRINGE ULTRA-FINE 1 ML 31 GAUGE X 15/64\" SYRG        CHOLECALCIFEROL, VITAMIN D3, 5,000 UNIT TAB    Take 5,000 Units by mouth every seven (7) days. FLUTICASONE/VILANTEROL (BREO ELLIPTA IN)    Take  by inhalation. FOLIC ACID 299 MCG TABLET    Take 1 Tab by mouth daily. FUROSEMIDE (LASIX) 40 MG TABLET    Take  by mouth daily. GABAPENTIN (NEURONTIN) 100 MG CAPSULE    Take 100 mg by mouth three (3) times daily. HYDROCHLOROTHIAZIDE (HYDRODIURIL) 12.5 MG TABLET    Take 12.5 mg by mouth daily. HYDROXYZINE PAMOATE (VISTARIL) 25 MG CAPSULE    25 mg. INFLIXIMAB (REMICADE IV)    344 mg by IntraVENous route once as needed. remicade will be every 6 weeks    INSULIN ASPART PROT/INSULN ASP (NOVOLOG MIX 70-30 SC)    by SubCUTAneous route. INSULIN GLARGINE (LANTUS) 100 UNIT/ML INJECTION    30 Units by SubCUTAneous route daily. LOSARTAN (COZAAR) 50 MG TABLET    Take 1 Tab by mouth daily. METFORMIN (GLUCOPHAGE) 1,000 MG TABLET    Take 500 mg by mouth two (2) times a day. METHOTREXATE (RHEUMATREX) 2.5 MG TABLET    Take 2.5 mg by mouth every fourty-eight (48) hours. MIRTAZAPINE (REMERON) 15 MG TABLET        NALOXONE (NARCAN) 4 MG/ACTUATION NASAL SPRAY    Use 1 spray intranasally, then discard. Repeat with new spray every 2 min as needed for opioid overdose symptoms, alternating nostrils. Indications: Decrease in Rate & Depth of Breathing due to Opioid Drug, opioid overdose    OXYCODONE-ACETAMINOPHEN (PERCOCET 10)  MG PER TABLET    Take 1 Tab by mouth every six (6) hours as needed for Pain for up to 15 days. Max Daily Amount: 4 Tabs.    These Medications have changed    No medications on file   Stop Taking    No medications on file

## 2020-10-04 NOTE — DISCHARGE INSTRUCTIONS
1. Luminary reading of chest x-ray negative for pneumonia. 2.  Plenty of fluids. 3.  Cool mist vaporizer in bedroom for nocturnal cough. 4.  Menthol cough drops as needed. 5.  Honey 1 teaspoon as needed for cough. 6.  Mucinex D for cough and congestion. 7.  COVID-19 testing performed today  8. Tylenol or Motrin every 4-6 hours as needed for fever and chills. 9.  Follow-up with primary care physician for recheck in 5 to 7 days if not improving. 10.  Return to the emergency department for shortness of breath at rest, cough productive of rust colored sputum, or persistent high fever. HOME ISOLATION PRECAUTIONS DURING COVID-19 OUTBREAK (per CDC guidelines)    1) Stay home except to get medical care:  People who are mildly ill with COVID-19 are able to isolate at home during their illness. You should restrict activities outside your home, except for getting medical care. Do not go to work, school, or public areas. Avoid using public transportation, ride-sharing, or taxis. 2) Separate yourself from other people and animals in your home  People: As much as possible, you should stay in a specific room and away from other people in your home. Also, you should use a separate bathroom, if available. 3) Animals: You should restrict contact with pets and other animals while you are sick with COVID-19, just like you would around other people. Although there have not been reports of pets or other animals becoming sick with COVID-19, it is still recommended that people sick with COVID-19 limit contact with animals until more information is known about the virus. When possible, have another member of your household care for your animals while you are sick. If you are sick with COVID-19, avoid contact with your pet, including petting, snuggling, being kissed or licked, and sharing food.  If you must care for your pet or be around animals while you are sick, wash your hands before and after you interact with pets and wear a facemask. See COVID-19 and Animals for more information. 4) Call ahead before visiting your doctor  If you have a medical appointment, call the healthcare provider and tell them that you have or may have COVID-19. This will help the healthcare providers office take steps to keep other people from getting infected or exposed. 5) Wear a facemask  You should wear a facemask when you are around other people (e.g., sharing a room or vehicle) or pets and before you enter a healthcare providers office. If you are not able to wear a facemask (for example, because it causes trouble breathing), then people who live with you should not stay in the same room with you, or they should wear a facemask if they enter your room. 6) Cover your coughs and sneezes  Cover your mouth and nose with a tissue when you cough or sneeze. Throw used tissues in a lined trash can. Immediately wash your hands with soap and water for at least 20 seconds or, if soap and water are not available, clean your hands with an alcohol-based hand  that contains at least 60% alcohol. 7) Clean your hands often  Wash your hands often with soap and water for at least 20 seconds, especially after blowing your nose, coughing, or sneezing; going to the bathroom; and before eating or preparing food. If soap and water are not readily available, use an alcohol-based hand  with at least 60% alcohol, covering all surfaces of your hands and rubbing them together until they feel dry. 8) Soap and water are the best option if hands are visibly dirty. Avoid touching your eyes, nose, and mouth with unwashed hands. 9) Avoid sharing personal household items  You should not share dishes, drinking glasses, cups, eating utensils, towels, or bedding with other people or pets in your home. After using these items, they should be washed thoroughly with soap and water.     10) Clean all high-touch surfaces everyday  High touch surfaces include counters, tabletops, doorknobs, bathroom fixtures, toilets, phones, keyboards, tablets, and bedside tables. Also, clean any surfaces that may have blood, stool, or body fluids on them. Use a household cleaning spray or wipe, according to the label instructions. Labels contain instructions for safe and effective use of the cleaning product including precautions you should take when applying the product, such as wearing gloves and making sure you have good ventilation during use of the product. 11) Monitor your symptoms  Seek prompt medical attention if your illness is worsening (e.g., difficulty breathing). Before seeking care, call your healthcare provider and tell them that you have, or are being evaluated for, COVID-19. Put on a facemask before you enter the facility. These steps will help the healthcare providers office to keep other people in the office or waiting room from getting infected or exposed. Ask your healthcare provider to call the local or state health department. Persons who are placed under active monitoring or facilitated self-monitoring should follow instructions provided by their local health department or occupational health professionals, as appropriate. When working with your local health department check their available hours. 12) If you have a medical emergency and need to call 911, notify the dispatch personnel that you have, or are being evaluated for COVID-19. If possible, put on a facemask before emergency medical services arrive. 13) Discontinuing home isolation  Patients with confirmed COVID-19 should remain under home isolation precautions until the risk of secondary transmission to others is thought to be low. The decision to discontinue home isolation precautions should be made on a case-by-case basis, in consultation with healthcare providers and state and local health departments.

## 2020-10-04 NOTE — ED NOTES
In addition. When patient arrived into room, Patient would not even answer medical questions and stated that her information is in the chart. Pt was asked about her having a cardiac cath and findings and was she stented. She said look it up. Pt remained hostile with staff.

## 2020-10-04 NOTE — ED NOTES
Patient being aggressive with staff, stating, I need to go, I have been in here and you all have not done anything. Provider was currently awaiting read of xray. Pt states she was just sitting in here with nothing being done. She states that she needed a diagnosis. Advised her that provider will be with her soon. Patient became irrate plan of care. Patient states her sugar was bottoming out. glucose is 251 POC. Patient yelling at staff. Patient states she needs to leave, I explained the provider has not finished with paper work and advised her is she needs to leave she can go, but she does not have any formal paper work.

## 2020-10-05 ENCOUNTER — PATIENT OUTREACH (OUTPATIENT)
Dept: CASE MANAGEMENT | Age: 55
End: 2020-10-05

## 2020-10-05 NOTE — ACP (ADVANCE CARE PLANNING)
Advance Care Planning:   Does patient have an Advance Directive: health care decision makers on file

## 2020-10-05 NOTE — PROGRESS NOTES
Patient contacted regarding [id number] suspect. Discussed COVID-19 related testing which was pending at this time. Test results were pending. Patient informed of results, if available? pending     Care Transition Nurse/ Ambulatory Care Manager/ LPN Care Coordinator contacted the patient by telephone to perform post discharge assessment. Verified name and  with patient as identifiers. Provided introduction to self, and explanation of the CTN/ACM/LPN role, and reason for call due to risk factors for infection and/or exposure to COVID-19. Symptoms reviewed with patient who verbalized the following symptoms: no new symptoms and no worsening symptoms. Due to no new or worsening symptoms encounter was not routed to provider for escalation. Discussed follow-up appointments. If no appointment was previously scheduled, appointment scheduling offered: [facility] follow up appointment(s):   Future Appointments   Date Time Provider Louise Ordonez   10/8/2020 10:15 AM LAB_BSMO BSMO BS AMB   10/16/2020  9:00 AM HBV INFUSION NURSE 2 [facility] HBV   10/22/2020  9:00 AM Blanquita Hernandez NP BSMO BS AMB   2020  9:00 AM HBV INFUSION NURSE 2 [facility] HBV   3/29/2021  9:00 AM Shell Oliveira MD CAP BS AMB     Non-BS follow up appointment(s): pcp as needed     Advance Care Planning:   Does patient have an Advance Directive: health care decision makers on file    Patient has following risk factors of: diabetes. CTN/ACM/LPN reviewed discharge instructions, medical action plan and red flags such as increased shortness of breath, increasing fever and signs of decompensation with patient who verbalized understanding. Discussed exposure protocols and quarantine with CDC Guidelines What to do if you are sick with coronavirus disease .  Patient was given an opportunity for questions and concerns.  The patient agrees to contact the [facility] exposure line [phone], local [facility] department [facility] ([phone]) and PCP office for questions related to their healthcare. CTN/ACM/LPN provided contact information for future needs. Reviewed and educated patient on any new and changed medications related to discharge diagnosis. Patient/family/caregiver given information for [employer] and agrees to enroll no  Patient's preferred e-mail:  n/a  Patient's preferred phone number: n/a  Based on Loop alert triggers, patient will be contacted by nurse care manager for worsening symptoms. Plan for follow-up call in 1-2 days based on severity of symptoms and risk factors.

## 2020-10-06 ENCOUNTER — HOSPITAL ENCOUNTER (OUTPATIENT)
Dept: INFUSION THERAPY | Age: 55
End: 2020-10-06

## 2020-10-07 ENCOUNTER — PATIENT OUTREACH (OUTPATIENT)
Dept: CASE MANAGEMENT | Age: 55
End: 2020-10-07

## 2020-10-07 LAB — SARS-COV-2, COV2NT: NOT DETECTED

## 2020-10-14 DIAGNOSIS — G89.4 CHRONIC PAIN SYNDROME: Primary | ICD-10-CM

## 2020-10-14 DIAGNOSIS — D64.9 CHRONIC ANEMIA: ICD-10-CM

## 2020-10-14 DIAGNOSIS — D75.839 THROMBOCYTOSIS: ICD-10-CM

## 2020-10-14 DIAGNOSIS — D47.3 ESSENTIAL THROMBOCYTOSIS (HCC): ICD-10-CM

## 2020-10-14 DIAGNOSIS — E53.8 VITAMIN B12 DEFICIENCY: ICD-10-CM

## 2020-10-16 ENCOUNTER — HOSPITAL ENCOUNTER (OUTPATIENT)
Dept: INFUSION THERAPY | Age: 55
Discharge: HOME | End: 2020-10-16
Payer: MEDICARE

## 2020-10-16 VITALS
HEART RATE: 87 BPM | BODY MASS INDEX: 33.01 KG/M2 | DIASTOLIC BLOOD PRESSURE: 60 MMHG | RESPIRATION RATE: 18 BRPM | SYSTOLIC BLOOD PRESSURE: 113 MMHG | OXYGEN SATURATION: 95 % | TEMPERATURE: 98.2 F | WEIGHT: 236.7 LBS

## 2020-10-16 DIAGNOSIS — M05.771 RHEUMATOID ARTHRITIS INVOLVING RIGHT FOOT WITH POSITIVE RHEUMATOID FACTOR (HCC): ICD-10-CM

## 2020-10-16 DIAGNOSIS — M05.719 RHEUMATOID ARTHRITIS INVOLVING SHOULDER WITH POSITIVE RHEUMATOID FACTOR, UNSPECIFIED LATERALITY (HCC): Primary | ICD-10-CM

## 2020-10-16 PROCEDURE — 96415 CHEMO IV INFUSION ADDL HR: CPT

## 2020-10-16 PROCEDURE — 96365 THER/PROPH/DIAG IV INF INIT: CPT

## 2020-10-16 PROCEDURE — 96366 THER/PROPH/DIAG IV INF ADDON: CPT

## 2020-10-16 PROCEDURE — 96375 TX/PRO/DX INJ NEW DRUG ADDON: CPT

## 2020-10-16 PROCEDURE — 96413 CHEMO IV INFUSION 1 HR: CPT

## 2020-10-16 PROCEDURE — 74011250636 HC RX REV CODE- 250/636: Performed by: INTERNAL MEDICINE

## 2020-10-16 RX ORDER — ALBUTEROL SULFATE 0.83 MG/ML
2.5 SOLUTION RESPIRATORY (INHALATION) AS NEEDED
Status: CANCELLED
Start: 2020-11-20

## 2020-10-16 RX ORDER — ACETAMINOPHEN 325 MG/1
650 TABLET ORAL AS NEEDED
Status: CANCELLED
Start: 2020-11-20

## 2020-10-16 RX ORDER — HYDROCORTISONE SODIUM SUCCINATE 100 MG/2ML
100 INJECTION, POWDER, FOR SOLUTION INTRAMUSCULAR; INTRAVENOUS AS NEEDED
Status: CANCELLED | OUTPATIENT
Start: 2020-11-20

## 2020-10-16 RX ORDER — DIPHENHYDRAMINE HYDROCHLORIDE 50 MG/ML
25 INJECTION, SOLUTION INTRAMUSCULAR; INTRAVENOUS ONCE
Status: CANCELLED
Start: 2020-11-20

## 2020-10-16 RX ORDER — ONDANSETRON 2 MG/ML
8 INJECTION INTRAMUSCULAR; INTRAVENOUS AS NEEDED
Status: CANCELLED | OUTPATIENT
Start: 2020-11-20

## 2020-10-16 RX ORDER — DIPHENHYDRAMINE HYDROCHLORIDE 50 MG/ML
25 INJECTION, SOLUTION INTRAMUSCULAR; INTRAVENOUS AS NEEDED
Status: CANCELLED
Start: 2020-11-20

## 2020-10-16 RX ORDER — HEPARIN 100 UNIT/ML
300-500 SYRINGE INTRAVENOUS AS NEEDED
Status: CANCELLED
Start: 2020-11-20

## 2020-10-16 RX ORDER — SODIUM CHLORIDE 9 MG/ML
25 INJECTION, SOLUTION INTRAVENOUS CONTINUOUS
Status: CANCELLED | OUTPATIENT
Start: 2020-11-20

## 2020-10-16 RX ORDER — SODIUM CHLORIDE 9 MG/ML
10 INJECTION INTRAMUSCULAR; INTRAVENOUS; SUBCUTANEOUS AS NEEDED
Status: CANCELLED | OUTPATIENT
Start: 2020-11-20

## 2020-10-16 RX ORDER — DIPHENHYDRAMINE HYDROCHLORIDE 50 MG/ML
25 INJECTION, SOLUTION INTRAMUSCULAR; INTRAVENOUS ONCE
Status: COMPLETED | OUTPATIENT
Start: 2020-10-16 | End: 2020-10-16

## 2020-10-16 RX ORDER — DIPHENHYDRAMINE HYDROCHLORIDE 50 MG/ML
50 INJECTION, SOLUTION INTRAMUSCULAR; INTRAVENOUS AS NEEDED
Status: CANCELLED
Start: 2020-11-20

## 2020-10-16 RX ORDER — SODIUM CHLORIDE 9 MG/ML
25 INJECTION, SOLUTION INTRAVENOUS CONTINUOUS
Status: DISPENSED | OUTPATIENT
Start: 2020-10-16 | End: 2020-10-16

## 2020-10-16 RX ORDER — EPINEPHRINE 1 MG/ML
0.3 INJECTION, SOLUTION, CONCENTRATE INTRAVENOUS AS NEEDED
Status: CANCELLED | OUTPATIENT
Start: 2020-11-20

## 2020-10-16 RX ORDER — SODIUM CHLORIDE 0.9 % (FLUSH) 0.9 %
10 SYRINGE (ML) INJECTION AS NEEDED
Status: CANCELLED | OUTPATIENT
Start: 2020-11-20

## 2020-10-16 RX ADMIN — SODIUM CHLORIDE 400 MG: 900 INJECTION, SOLUTION INTRAVENOUS at 10:15

## 2020-10-16 RX ADMIN — SODIUM CHLORIDE 25 ML/HR: 900 INJECTION, SOLUTION INTRAVENOUS at 09:37

## 2020-10-16 RX ADMIN — DIPHENHYDRAMINE HYDROCHLORIDE 25 MG: 50 INJECTION INTRAMUSCULAR; INTRAVENOUS at 09:37

## 2020-10-16 NOTE — PROGRESS NOTES
[facility] Progress Note    Date: 2020    Name: Saba Carrillo    MRN: [id number]         : 1965      RENFLEXIS Q6 WEEKS      Ms. Laura Cornejo arrived to [facility] at [phone] ambulatory    Ms. Laura Cornejo was assessed and education was provided. Ms. Kerwin Trevino vitals were reviewed. Visit Vitals  /66 (BP 1 Location: Left arm, BP Patient Position: Sitting)   Pulse 90   Temp 98.2 °F (36.8 °C)   Resp 18   Wt 107.4 kg (236 lb 11.2 oz)   SpO2 97%   Breastfeeding No   BMI 33.01 kg/m²       Pt was observed for 5 minutes after obtaining vital signs prior to initiating treatment. 24g IV inserted in patient's right forearm x 1 attempt. Flushes without difficulty/ brisk blood return. Benadryl 25mg IVP administered as ordered as pre-medication. Renflexis 400mg IV administered per protocol over 2 hours followed by NS flush. Ms. Laura Cornejo tolerated well without complaints. Patient Vitals for the past 12 hrs:   Temp Pulse Resp BP SpO2   10/16/20 1200  87 18 113/60 95 %   10/16/20 1030  82 18 (!) 109/55 96 %   10/16/20 0930 98.2 °F (36.8 °C) 90 18 108/66 97 %       IV removed/ intact. Site without hematoma or tenderness. Gauze/ paper tape applied to site. Pt armband removed & shredded. Ms. Laura Cornejo was discharged from [place] [address] in stable condition at 1220. She is to return on 20 at 0900 for her next appointment.     Osman Delacruz RN  2020

## 2020-10-19 ENCOUNTER — PATIENT OUTREACH (OUTPATIENT)
Dept: CASE MANAGEMENT | Age: 55
End: 2020-10-19

## 2020-10-19 NOTE — PROGRESS NOTES
Patient resolved from Transition of Care episode on 10/19/2020  Discussed COVID-19 related testing which was available at this time. Test results were negative. Patient informed of results, if available? yes     Patient/family has been provided the following resources and education related to COVID-19:                         Signs, symptoms and red flags related to COVID-19            CDC exposure and quarantine guidelines            [facility] exposure contact - [phone]            Contact for their local Department of Health                 Patient currently reports that the following symptoms have improved:  no symptoms. No further outreach scheduled with this CTN/ACM/LPN/HC/ MA. Episode of Care resolved. Patient has this CTN/ACM/LPN/HC/MA contact information if future needs arise.

## 2020-11-04 NOTE — PROGRESS NOTES
Hematology/Oncology  Progress Note Name: Mariangel Dawn Date: 2019 : 1965 PCP: Nehemias Salazar MD  
 
Ms. Shereen Chavez is a 52year old female who was seen for management of her rheumatoid arthritis, leukocytosis, and thrombocytosis. Current therapy: Remicade 4 mg/kg bodyweight ever 6 weeks intravenously Subjective:  
 
Ms. Shereen Chavez is a 52year-old [facility] American woman who has severe rheumatoid arthritis. She receives Remicade every 6 weeks. Remicade is currently on hold, patient being treated with clindamycin 150 mg for an abscess under the left breast and right axillary. She reports that she will be done with the antibiotics in 3 more days. She also suffers from fibromyalgia and had an elevated WBC count and platelet count of undefined etiology. Today she has no new complaints to report. She continues to complain of back pain. She is using a narcotic based regimen for pain control. She states this provides some relief, but the pain is gradually worsening. She reports that the Remicade has provided a significant degree of relief from the severe rheumatoid arthritis symptoms. The patient is continuing to use her cane for mobility support. Past Medical History:  
Diagnosis Date  Abdominal pain, unspecified site  Acute otitis media  Acute pharyngitis  Anxiety  Arthritis  Autoimmune disease ([address]  Back injury  Backache   
 herniated disc in lower back  Blurred vision  Chest pain 2018  Chest pain, unspecified   
 abnormal EKG  Chronic airway obstruction, not elsewhere classified  Chronic back pain  Chronic pain  COPD  Depression  Diabetes ([address]  Dizziness  Dizziness and giddiness   
 possible orthostatic changes, vasovagal, autonomic dysfunction from diabetic neuropathy  Encounters for unspecified administrative purpose  Essential thrombocytosis ([address] 2016  Feeling anxious  Fibromyalgia  Headache(784.0)  Hemorrhoid  Herniated disc   
 at L5  
 Hypercholesterolemia  Hyperglycemia  Hypertension  Joint pain  Leukocytosis, unspecified  Major depressive disorder, single episode, mild ([address]  Mental disorder   
 depression, anxiety, bipolar and PTSD  Neuropathy  Obesity, unspecified  Other chest pain  Palpitation 2018  
 ? arrhythmia  Phobic disorders  Rheumatoid arthritis ([address]  Rheumatoid arthritis of foot ([facility] 2018 on treatment  Seasonal allergies  Shortness of breath   
 normal EF  Smoking 2018  
 discussed cessation  Streptococcal sore throat  Type 2 diabetes mellitus without complication, with long-term current use of insulin ([facility] 2018  Type II or unspecified type diabetes mellitus with unspecified complication, uncontrolled  Unspecified hereditary and idiopathic peripheral neuropathy  Vitamin D deficiency Past Surgical History:  
Procedure Laterality Date [facility]  HX GYN  2005  
 partial Hysterectomy  HX OTHER SURGICAL  12-1-15  
 had ingrown toenail removed from big toe, both feet [address] Social History Socioeconomic History  Marital status:  Spouse name: Not on file  Number of children: Not on file  Years of education: Not on file  Highest education level: Not on file Occupational History  Not on file Social Needs  Financial resource strain: Not on file  Food insecurity:  
  Worry: Not on file Inability: Not on file  Transportation needs:  
  Medical: Not on file Non-medical: Not on file Tobacco Use  Smoking status: Current Every Day Smoker Packs/day: 0.25 Last attempt to quit: 2012 Years since quittin.7  Smokeless tobacco: Never Used  Tobacco comment: 5 cigarettes day Substance and Sexual Activity  Alcohol use: Yes Comment: socially  Drug use: No  
 Sexual activity: Yes  
  Partners: Male Birth control/protection: Condom Lifestyle  Physical activity:  
  Days per week: Not on file Minutes per session: Not on file  Stress: Not on file Relationships  Social connections:  
  Talks on phone: Not on file Gets together: Not on file Attends Yarsanism service: Not on file Active member of club or organization: Not on file Attends meetings of clubs or organizations: Not on file Relationship status: Not on file  Intimate partner violence:  
  Fear of current or ex partner: Not on file Emotionally abused: Not on file Physically abused: Not on file Forced sexual activity: Not on file Other Topics Concern  Not on file Social History Narrative  Not on file Family History Problem Relation Age of Onset  Diabetes Other  Alcohol abuse Mother [facility] Arthritis-osteo Mother  Diabetes Mother  Elevated Lipids Mother  Headache Mother  Heart Disease Mother  Migraines Mother  Psychiatric Disorder Mother  Alcohol abuse Father  Arthritis-osteo Father  Cancer Father  Headache Father  Heart Disease Father  Lung Disease Father  Migraines Father  Heart Surgery Father  Alcohol abuse Sister  Headache Sister  Diabetes Sister  Heart Disease Sister  Migraines Sister  Psychiatric Disorder Sister  Headache Brother  Diabetes Brother  Elevated Lipids Brother  Heart Disease Brother  Migraines Brother  Psychiatric Disorder Brother  Coronary Artery Disease Brother  Cancer Maternal Aunt  Alcohol abuse Maternal Aunt  Diabetes Maternal Aunt  Headache Maternal Aunt  Lung Disease Maternal Aunt  Migraines Maternal Aunt  Headache Maternal Uncle  Migraines Maternal Uncle  Stroke Maternal Uncle  Psychiatric Disorder Maternal Uncle  Headache Paternal Aunt  Migraines Paternal Aunt  Headache Paternal Uncle  Hypertension Paternal Uncle  Migraines Paternal Uncle  Stroke Paternal Uncle  Headache Maternal Grandmother  Migraines Maternal Grandmother  Stroke Maternal Grandmother  Headache Maternal Grandfather  Migraines Maternal Grandfather  Stroke Maternal Grandfather  Headache Paternal Grandmother  Hypertension Paternal Grandmother  Lung Disease Paternal Grandmother  Migraines Paternal Grandmother  Cancer Paternal Grandmother  Headache Paternal Grandfather  Cancer Paternal Grandfather  Migraines Paternal Grandfather Current Outpatient Medications Medication Sig Dispense Refill  oxyCODONE-acetaminophen (PERCOCET 10)  mg per tablet Take 1 Tab by mouth every six (6) hours as needed for Pain for up to 30 days. Max Daily Amount: 4 Tabs. 60 Tab 0  
 clindamycin (CLEOCIN) 150 mg capsule Take 1 Cap by mouth three (3) times daily for 7 days. Indications: skin infection 21 Cap 0  
 anagrelide (AGRYLIN) 0.5 mg capsule Take 1 Cap by mouth two (2) times a day. 60 Cap 6  
 amitriptyline (ELAVIL) 25 mg tablet  clotrimazole-betamethasone (LOTRISONE) topical cream     
 DULoxetine (CYMBALTA) 30 mg capsule  gabapentin (NEURONTIN) 300 mg capsule 300 mg two (2) times a day.  QUEtiapine (SEROQUEL) 25 mg tablet  BD INSULIN SYRINGE ULTRA-FINE 1 mL 31 gauge x 15/64\" syrg  albuterol sulfate (PROVENTIL;VENTOLIN) 2.5 mg/0.5 mL nebu nebulizer solution by Nebulization route once. Is now using the actual nebulizer machine, for current bronchitis episode (Aug/Sept. 2017)  insulin aspart protamine/insulin aspart (NOVOLOG MIX 70-30) 100 unit/mL (70-30) injection 10 Units by SubCUTAneous route two (2) times a day.  clonazePAM (KLONOPIN) 2 mg tablet Take 2 mg by mouth daily. Indications: PANIC DISORDER    
 folic acid 964 mcg tablet Take 400 mcg by mouth daily.  albuterol (PROAIR HFA) 90 mcg/actuation inhaler Take 1 Puff by inhalation every six (6) hours as needed for Wheezing. 1 Inhaler 0  
 ondansetron hcl (ZOFRAN, AS HYDROCHLORIDE,) 4 mg tablet Take 1 Tab by mouth every eight (8) hours as needed for Nausea. 12 Tab 0  
 methotrexate (RHEUMATREX) 2.5 mg tablet Take 2.5 mg by mouth every fourty-eight (48) hours.  metFORMIN (GLUCOPHAGE) 1,000 mg tablet Take 500 mg by mouth two (2) times a day.  Cholecalciferol, Vitamin D3, 5,000 unit Tab Take 5,000 Units by mouth every seven (7) days.  atenolol (TENORMIN) 25 mg tablet Take 25 mg by mouth daily. Indications: HYPERTENSION  hydroxychloroquine (PLAQUENIL) 200 mg tablet Take 200 mg by mouth two (2) times a day.  furosemide (LASIX) 40 mg tablet Take  by mouth daily.  INFLIXIMAB (REMICADE IV) 344 mg by IntraVENous route once as needed. remicade will be every 6 weeks  losartan-hydrochlorothiazide (HYZAAR) 50-12.5 mg per tablet Take 1 Tab by mouth daily.  INSULIN GLARGINE,HUM. REC. ANLOG (LANTUS SC) 5 Units by SubCUTAneous route daily. Review of Systems Constitutional: The patient has no acute distress or discomfort. HEENT: The patient denies recent head trauma, eye pain, blurred vision,  hearing deficit, oropharyngeal mucosal pain or lesions, and the patient denies throat pain or discomfort. Lymphatics: The patient denies palpable peripheral lymphadenopathy. Hematologic: The patient denies having bruising, bleeding, or progressive fatigue. Respiratory: Patient denies having shortness of breath, cough, sputum production, fever, or dyspnea on exertion. Cardiovascular: The patient denies having leg pain, leg swelling, heart palpitations, chest permit, chest pain, or lightheadedness. The patient denies having dyspnea on exertion. Breast: The patient has a sebaceous cyst involving the right breast. 
Gastrointestinal: The patient denies having nausea, emesis, or diarrhea. The patient denies having any hematemesis or blood in the stool. Genitourinary: Patient denies having urinary urgency, frequency, or dysuria. The patient denies having blood in the urine. Psychological: The patient denies having symptoms of nervousness, anxiety, depression, or thoughts of harming himself some of this. Skin: Patient denies having skin rashes, skin, ulcerations, or unexplained itching or pruritus. Musculoskeletal: The patient complains of generalized back pain. Objective:  
 
Visit Vitals /66 Pulse (!) 101 Temp 98.1 °F (36.7 °C) Ht 5' 11\" (1.803 m) Wt 99.1 kg (218 lb 6.4 oz) SpO2 98% BMI 30.46 kg/m² ECOG PS=0 Pain score 4/10 Physical Exam:  
Gen. Appearance: The patient is in no acute distress. Skin: There is no bruise or rash. HEENT: The exam is unremarkable. Neck: Supple without lymphadenopathy or thyromegaly. Lungs: Clear to auscultation and percussion; there are no wheezes or rhonchi. Heart: Regular rate and rhythm; there are no murmurs, gallops, or rubs. Anterior chest wall and breast: Patient has a sebaceous cyst that has apparently drained from the underside of the right breast abutting the right anterior chest wall. Abdomen: Bowel sounds are present and normal.  There is no guarding, tenderness, or hepatosplenomegaly. Extremities: There is no clubbing, cyanosis, or edema. Neurologic: There are no focal neurologic deficits. Lymphatics: There is no palpable peripheral lymphadenopathy. Musculoskeletal: The patient has full range of motion at all joints. However, she complains of pain on ambulation due to the severe rheumatoid arthritis. There is  evidence of joint deformity or effusions in the hands and knees. There is no focal joint tenderness. She is using a cane for support and mobility. Psychological/psychiatric: There is no clinical evidence of anxiety, depression, or melancholy. Lab data: Results for orders placed or performed during the hospital encounter of 04/16/19 CBC WITH 3 PART DIFF     Status: None Result Value Ref Range Status WBC 12.1 4.5 - 13.0 K/uL Final  
 RBC 4.76 4.10 - 5.10 M/uL Final  
 HGB 13.1 12.0 - 16.0 g/dL Final  
 HCT 40.0 36 - 48 % Final  
 MCV 84.0 78 - 102 FL Final  
 MCH 27.5 25.0 - 35.0 PG Final  
 MCHC 32.8 31 - 37 g/dL Final  
 RDW 13.4 11.5 - 14.5 % Final  
 PLATELET 174 893 - 907 K/uL Final  
 NEUTROPHILS 67 40 - 70 % Final  
 MIXED CELLS 8 0.1 - 17 % Final  
 LYMPHOCYTES 25 14 - 44 % Final  
 ABS. NEUTROPHILS 8.1 1.8 - 9.5 K/UL Final  
 ABS. MIXED CELLS 1.0 0.0 - 2.3 K/uL Final  
 ABS. LYMPHOCYTES 3.0 1.1 - 5.9 K/UL Final  
  Comment: Test performed at [address] [place] [address] or [facility] Location. Reviewed by Medical Director. DF AUTOMATED   Final  
 
 
   
Assessment: 1. Essential thrombocytosis ([address] 2. Rheumatoid arthritis involving multiple joints (HCC) 3. Rheumatoid arthritis of multiple sites without rheumatoid factor ([address] 4. Chronic pain syndrome 5. Chronic anemia 6. Thrombocytosis ([address] 7. Fibromyalgia Plan:  
Leukocytosis (recurrent and persisting): I have explained to the patient that the etiology of her leukocytosis remains undefined. A previous flow cytometry did not reveal any  evidence of an immunophenotypic abnormality such as an evolving chronic lymphoproliferative disorder or myeloproliferative disorder. The CBCs will continue to be monitored every 6 weeks. The CBC from today shows her WBC count is currently 12.1, and the absolute neutrophil count is 8.1 with an absolute lymphocyte count of 3.0. Essential thrombocytosis/thrombocytosis: The current CBC shows that the platelet count is now 425,000. The platelet count is being monitored every 6 weeks.   The patient was previously started on anagrelide 0.5 mg one tablet twice daily. This medication will be continued, at the current dose. I have reenforced to the patient the importance of being complaint with the treatment plan. Rheumatoid arthritis: the patient will continue to receive Remicade as a primary treatment modality for her severe rheumatoid arthritis every 6 weeks. Remicade is scheduled to resume on 4/23/2019. This medication is on hold until patient is finished antibiotic. She reports that she will be finished with clindamycin 150 mg in about 3 days. The dose of Remicade will be 344 mg given intravenously. The patient has continued to take  Methotrexate 5 mg p.o. daily and Plaquenil 200 mg twice daily. Fibromyalgia/chronic pain syndrome: The patient  continues to have generalized pain at times related to her underlying fibromyalgia. The patient receives her Percocet  mg medication on a monthly basis as needed. A new prescription for the Percocet was provided at this time. Chronic anemia: I have explained to the patient the CBC from today shows her hemoglobin has remained normal at 13.1 g/dL with hematocrit of 40 %. I have recommended that she continue to take ferrous sulfate 325 mg by mouth once daily. We will see the patient back in 6 weeks for a complete reassessment. Orders Placed This Encounter  COMPLETE CBC & AUTO DIFF WBC  InHouse CBC (Integrity Tracking) Standing Status:   Future Number of Occurrences:   1 Standing Expiration Date:   4/23/2019  METABOLIC PANEL, COMPREHENSIVE Standing Status:   Future Standing Expiration Date:   4/16/2020  SED RATE (ESR) Standing Status:   Future Standing Expiration Date:   4/16/2020 Devang Adamson NP 
4/16/2019 I have assessed the patient independently and  agree with the full assessment as outlined.  
Deion Cherry MD, [address] 
 
 
 
   
   
  
 
 
 
 
  
   
   
   
   
   
   
   
   
   
   
   
   
   
 
 
 
 
 
 
 
 
 
 
 
 
 
 
 
 
 
 
 
 
 
 Pt will perform functional transfers (I) with AD/DME as needed within 2 weeks

## 2020-11-17 ENCOUNTER — HOSPITAL ENCOUNTER (OUTPATIENT)
Dept: INFUSION THERAPY | Age: 55
End: 2020-11-17
Payer: MEDICARE

## 2020-12-01 ENCOUNTER — HOSPITAL ENCOUNTER (OUTPATIENT)
Dept: INFUSION THERAPY | Age: 55
Discharge: HOME | End: 2020-12-01
Payer: MEDICARE

## 2020-12-01 ENCOUNTER — TRANSCRIBE ORDER (OUTPATIENT)
Dept: SCHEDULING | Age: 55
End: 2020-12-01

## 2020-12-01 VITALS
TEMPERATURE: 98.5 F | OXYGEN SATURATION: 96 % | WEIGHT: 233.2 LBS | DIASTOLIC BLOOD PRESSURE: 70 MMHG | BODY MASS INDEX: 34.54 KG/M2 | HEIGHT: 69 IN | RESPIRATION RATE: 16 BRPM | SYSTOLIC BLOOD PRESSURE: 120 MMHG | HEART RATE: 90 BPM

## 2020-12-01 DIAGNOSIS — M05.771 RHEUMATOID ARTHRITIS INVOLVING RIGHT FOOT WITH POSITIVE RHEUMATOID FACTOR (HCC): ICD-10-CM

## 2020-12-01 DIAGNOSIS — M05.719 RHEUMATOID ARTHRITIS INVOLVING SHOULDER WITH POSITIVE RHEUMATOID FACTOR, UNSPECIFIED LATERALITY (HCC): Primary | ICD-10-CM

## 2020-12-01 DIAGNOSIS — Z12.31 VISIT FOR SCREENING MAMMOGRAM: Primary | ICD-10-CM

## 2020-12-01 LAB
BASOPHILS # BLD: 0 K/UL (ref 0–0.1)
BASOPHILS NFR BLD: 0 % (ref 0–2)
CRP SERPL-MCNC: 1.3 MG/DL (ref 0–0.3)
DIFFERENTIAL METHOD BLD: ABNORMAL
EOSINOPHIL # BLD: 0.4 K/UL (ref 0–0.4)
EOSINOPHIL NFR BLD: 4 % (ref 0–5)
ERYTHROCYTE [DISTWIDTH] IN BLOOD BY AUTOMATED COUNT: 13.8 % (ref 11.6–14.5)
ERYTHROCYTE [SEDIMENTATION RATE] IN BLOOD: 43 MM/HR (ref 0–30)
HCT VFR BLD AUTO: 39.9 % (ref 35–45)
HGB BLD-MCNC: 13.5 G/DL (ref 12–16)
LYMPHOCYTES # BLD: 2.2 K/UL (ref 0.9–3.6)
LYMPHOCYTES NFR BLD: 20 % (ref 21–52)
MCH RBC QN AUTO: 27.4 PG (ref 24–34)
MCHC RBC AUTO-ENTMCNC: 33.8 G/DL (ref 31–37)
MCV RBC AUTO: 80.9 FL (ref 74–97)
MONOCYTES # BLD: 0.9 K/UL (ref 0.05–1.2)
MONOCYTES NFR BLD: 8 % (ref 3–10)
NEUTS SEG # BLD: 7.2 K/UL (ref 1.8–8)
NEUTS SEG NFR BLD: 68 % (ref 40–73)
PLATELET # BLD AUTO: 402 K/UL (ref 135–420)
PMV BLD AUTO: 10.3 FL (ref 9.2–11.8)
RBC # BLD AUTO: 4.93 M/UL (ref 4.2–5.3)
WBC # BLD AUTO: 10.7 K/UL (ref 4.6–13.2)

## 2020-12-01 PROCEDURE — 96415 CHEMO IV INFUSION ADDL HR: CPT

## 2020-12-01 PROCEDURE — 74011250636 HC RX REV CODE- 250/636: Performed by: INTERNAL MEDICINE

## 2020-12-01 PROCEDURE — 96413 CHEMO IV INFUSION 1 HR: CPT

## 2020-12-01 PROCEDURE — 86140 C-REACTIVE PROTEIN: CPT

## 2020-12-01 PROCEDURE — 96366 THER/PROPH/DIAG IV INF ADDON: CPT

## 2020-12-01 PROCEDURE — 96365 THER/PROPH/DIAG IV INF INIT: CPT

## 2020-12-01 PROCEDURE — 85652 RBC SED RATE AUTOMATED: CPT

## 2020-12-01 PROCEDURE — 85025 COMPLETE CBC W/AUTO DIFF WBC: CPT

## 2020-12-01 PROCEDURE — 80053 COMPREHEN METABOLIC PANEL: CPT

## 2020-12-01 RX ORDER — HYDROCORTISONE SODIUM SUCCINATE 100 MG/2ML
100 INJECTION, POWDER, FOR SOLUTION INTRAMUSCULAR; INTRAVENOUS AS NEEDED
Status: CANCELLED | OUTPATIENT
Start: 2021-01-05

## 2020-12-01 RX ORDER — DIPHENHYDRAMINE HYDROCHLORIDE 50 MG/ML
25 INJECTION, SOLUTION INTRAMUSCULAR; INTRAVENOUS AS NEEDED
Status: CANCELLED
Start: 2021-01-05

## 2020-12-01 RX ORDER — DIPHENHYDRAMINE HYDROCHLORIDE 50 MG/ML
50 INJECTION, SOLUTION INTRAMUSCULAR; INTRAVENOUS AS NEEDED
Status: CANCELLED
Start: 2021-01-05

## 2020-12-01 RX ORDER — HEPARIN 100 UNIT/ML
300-500 SYRINGE INTRAVENOUS AS NEEDED
Status: CANCELLED
Start: 2021-01-05

## 2020-12-01 RX ORDER — ALBUTEROL SULFATE 0.83 MG/ML
2.5 SOLUTION RESPIRATORY (INHALATION) AS NEEDED
Status: CANCELLED
Start: 2021-01-05

## 2020-12-01 RX ORDER — SODIUM CHLORIDE 0.9 % (FLUSH) 0.9 %
10 SYRINGE (ML) INJECTION AS NEEDED
Status: DISPENSED | OUTPATIENT
Start: 2020-12-01 | End: 2020-12-01

## 2020-12-01 RX ORDER — SODIUM CHLORIDE 9 MG/ML
25 INJECTION, SOLUTION INTRAVENOUS CONTINUOUS
Status: CANCELLED | OUTPATIENT
Start: 2021-01-05

## 2020-12-01 RX ORDER — ONDANSETRON 2 MG/ML
8 INJECTION INTRAMUSCULAR; INTRAVENOUS AS NEEDED
Status: CANCELLED | OUTPATIENT
Start: 2021-01-05

## 2020-12-01 RX ORDER — SODIUM CHLORIDE 9 MG/ML
10 INJECTION INTRAMUSCULAR; INTRAVENOUS; SUBCUTANEOUS AS NEEDED
Status: CANCELLED | OUTPATIENT
Start: 2021-01-05

## 2020-12-01 RX ORDER — DIPHENHYDRAMINE HYDROCHLORIDE 50 MG/ML
25 INJECTION, SOLUTION INTRAMUSCULAR; INTRAVENOUS ONCE
Status: COMPLETED | OUTPATIENT
Start: 2020-12-01 | End: 2020-12-01

## 2020-12-01 RX ORDER — SODIUM CHLORIDE 0.9 % (FLUSH) 0.9 %
10 SYRINGE (ML) INJECTION AS NEEDED
Status: CANCELLED | OUTPATIENT
Start: 2021-01-05

## 2020-12-01 RX ORDER — DIPHENHYDRAMINE HYDROCHLORIDE 50 MG/ML
25 INJECTION, SOLUTION INTRAMUSCULAR; INTRAVENOUS ONCE
Status: CANCELLED
Start: 2021-01-05

## 2020-12-01 RX ORDER — SODIUM CHLORIDE 9 MG/ML
25 INJECTION, SOLUTION INTRAVENOUS CONTINUOUS
Status: DISPENSED | OUTPATIENT
Start: 2020-12-01 | End: 2020-12-01

## 2020-12-01 RX ORDER — ACETAMINOPHEN 325 MG/1
650 TABLET ORAL AS NEEDED
Status: CANCELLED
Start: 2021-01-05

## 2020-12-01 RX ORDER — EPINEPHRINE 1 MG/ML
0.3 INJECTION, SOLUTION, CONCENTRATE INTRAVENOUS AS NEEDED
Status: CANCELLED | OUTPATIENT
Start: 2021-01-05

## 2020-12-01 RX ADMIN — Medication 10 ML: at 12:38

## 2020-12-01 RX ADMIN — Medication 10 ML: at 09:25

## 2020-12-01 RX ADMIN — SODIUM CHLORIDE 400 MG: 900 INJECTION, SOLUTION INTRAVENOUS at 10:35

## 2020-12-01 RX ADMIN — DIPHENHYDRAMINE HYDROCHLORIDE 25 MG: 50 INJECTION INTRAMUSCULAR; INTRAVENOUS at 09:39

## 2020-12-01 RX ADMIN — SODIUM CHLORIDE 25 ML/HR: 9 INJECTION, SOLUTION INTRAVENOUS at 09:34

## 2020-12-01 NOTE — PROGRESS NOTES
[facility] Progress Note    Date: 2020    Name: Bibi Echols    MRN: [id number]         : 1965      RENFLEXIS Q6 weeks. Ms. Blanche Flores arrived to [facility] at 0900 ambulatory    Ms. Blanche Flores was assessed and education was provided. Ms. Justin Quiroga vitals were reviewed. Visit Vitals  /70 (BP 1 Location: Left arm, BP Patient Position: At rest;Sitting)   Pulse 90   Temp 98.5 °F (36.9 °C)   Resp 16   Ht 5' 8.5\" (1.74 m)   Wt 105.8 kg (233 lb 3.2 oz)   SpO2 96%   Breastfeeding No   BMI 34.94 kg/m²       Pt was observed for 5 minutes after obtaining vital signs prior to initiating treatment. 24g IV inserted in patient's right forearm x 1 attempt. Flushes without difficulty/ brisk blood return. Benadryl 25mg IVP administered as ordered as pre-medication. Renflexis 400mg IV administered per protocol over 2 hours followed by NS flush. Ms. Blanche Flores tolerated well without complaints. Patient Vitals for the past 12 hrs:   Temp Pulse Resp BP SpO2   20 1235 98.5 °F (36.9 °C) 90 16 120/70    20 0918 98.2 °F (36.8 °C) 90 16 (!) 144/69 96 %       IV removed/ intact. Site without hematoma or tenderness. Gauze/ paper tape applied to site. Pt armband removed & shredded. Ms. Blanche Flores was discharged from [place] [address] in stable condition at [phone]. She is to return on 20 at 0900 for her next appointment.     Shashank Freeman RN  2020

## 2020-12-02 LAB
ALBUMIN SERPL-MCNC: 3.1 G/DL (ref 3.4–5)
ALBUMIN/GLOB SERPL: 0.8 {RATIO} (ref 0.8–1.7)
ALP SERPL-CCNC: 157 U/L (ref 45–117)
ALT SERPL-CCNC: 23 U/L (ref 13–56)
ANION GAP SERPL CALC-SCNC: 8 MMOL/L (ref 3–18)
AST SERPL-CCNC: 13 U/L (ref 10–38)
BILIRUB SERPL-MCNC: 0.3 MG/DL (ref 0.2–1)
BUN SERPL-MCNC: 22 MG/DL (ref 7–18)
BUN/CREAT SERPL: 21 (ref 12–20)
CALCIUM SERPL-MCNC: 9.2 MG/DL (ref 8.5–10.1)
CHLORIDE SERPL-SCNC: 104 MMOL/L (ref 100–111)
CO2 SERPL-SCNC: 24 MMOL/L (ref 21–32)
CREAT SERPL-MCNC: 1.04 MG/DL (ref 0.6–1.3)
GLOBULIN SER CALC-MCNC: 4.1 G/DL (ref 2–4)
GLUCOSE SERPL-MCNC: 536 MG/DL (ref 74–99)
POTASSIUM SERPL-SCNC: 4.4 MMOL/L (ref 3.5–5.5)
PROT SERPL-MCNC: 7.2 G/DL (ref 6.4–8.2)
SODIUM SERPL-SCNC: 136 MMOL/L (ref 136–145)

## 2020-12-02 NOTE — PROGRESS NOTES
I was notified today at 12 by Amanda Skaggs, ,that Ms Brad Vickers had a critical glucose of 536 from blood drawn yesterday morning and sent to [facility] lab. I notified Zoraida Rivero NP today at [phone] and no orders were issued.     Ella Gipson, SHAKIRA

## 2021-01-12 ENCOUNTER — HOSPITAL ENCOUNTER (OUTPATIENT)
Dept: INFUSION THERAPY | Age: 56
Discharge: HOME | End: 2021-01-12
Payer: MEDICARE

## 2021-01-12 VITALS
HEART RATE: 90 BPM | DIASTOLIC BLOOD PRESSURE: 74 MMHG | SYSTOLIC BLOOD PRESSURE: 139 MMHG | OXYGEN SATURATION: 98 % | HEIGHT: 69 IN | BODY MASS INDEX: 34.48 KG/M2 | RESPIRATION RATE: 16 BRPM | WEIGHT: 232.8 LBS | TEMPERATURE: 97.5 F

## 2021-01-12 DIAGNOSIS — M05.719 RHEUMATOID ARTHRITIS INVOLVING SHOULDER WITH POSITIVE RHEUMATOID FACTOR, UNSPECIFIED LATERALITY (HCC): Primary | ICD-10-CM

## 2021-01-12 DIAGNOSIS — M05.771 RHEUMATOID ARTHRITIS INVOLVING RIGHT FOOT WITH POSITIVE RHEUMATOID FACTOR (HCC): ICD-10-CM

## 2021-01-12 PROCEDURE — 96365 THER/PROPH/DIAG IV INF INIT: CPT

## 2021-01-12 PROCEDURE — 74011250636 HC RX REV CODE- 250/636: Performed by: INTERNAL MEDICINE

## 2021-01-12 PROCEDURE — 96366 THER/PROPH/DIAG IV INF ADDON: CPT

## 2021-01-12 PROCEDURE — 96375 TX/PRO/DX INJ NEW DRUG ADDON: CPT

## 2021-01-12 RX ORDER — ALBUTEROL SULFATE 0.83 MG/ML
2.5 SOLUTION RESPIRATORY (INHALATION) AS NEEDED
Status: CANCELLED
Start: 2021-02-23

## 2021-01-12 RX ORDER — HEPARIN 100 UNIT/ML
300-500 SYRINGE INTRAVENOUS AS NEEDED
Status: CANCELLED
Start: 2021-02-23

## 2021-01-12 RX ORDER — DIPHENHYDRAMINE HYDROCHLORIDE 50 MG/ML
25 INJECTION, SOLUTION INTRAMUSCULAR; INTRAVENOUS ONCE
Status: CANCELLED
Start: 2021-02-23 | End: 2021-02-23

## 2021-01-12 RX ORDER — ACETAMINOPHEN 325 MG/1
650 TABLET ORAL AS NEEDED
Status: CANCELLED
Start: 2021-02-23

## 2021-01-12 RX ORDER — ONDANSETRON 2 MG/ML
8 INJECTION INTRAMUSCULAR; INTRAVENOUS AS NEEDED
Status: CANCELLED | OUTPATIENT
Start: 2021-02-23

## 2021-01-12 RX ORDER — EPINEPHRINE 1 MG/ML
0.3 INJECTION, SOLUTION, CONCENTRATE INTRAVENOUS AS NEEDED
Status: CANCELLED | OUTPATIENT
Start: 2021-02-23

## 2021-01-12 RX ORDER — SODIUM CHLORIDE 0.9 % (FLUSH) 0.9 %
10 SYRINGE (ML) INJECTION AS NEEDED
Status: CANCELLED | OUTPATIENT
Start: 2021-02-23

## 2021-01-12 RX ORDER — HYDROCORTISONE SODIUM SUCCINATE 100 MG/2ML
100 INJECTION, POWDER, FOR SOLUTION INTRAMUSCULAR; INTRAVENOUS AS NEEDED
Status: CANCELLED | OUTPATIENT
Start: 2021-02-23

## 2021-01-12 RX ORDER — DIPHENHYDRAMINE HYDROCHLORIDE 50 MG/ML
50 INJECTION, SOLUTION INTRAMUSCULAR; INTRAVENOUS AS NEEDED
Status: CANCELLED
Start: 2021-02-23

## 2021-01-12 RX ORDER — DIPHENHYDRAMINE HYDROCHLORIDE 50 MG/ML
25 INJECTION, SOLUTION INTRAMUSCULAR; INTRAVENOUS AS NEEDED
Status: CANCELLED
Start: 2021-02-23

## 2021-01-12 RX ORDER — SODIUM CHLORIDE 0.9 % (FLUSH) 0.9 %
10 SYRINGE (ML) INJECTION AS NEEDED
Status: DISPENSED | OUTPATIENT
Start: 2021-01-12 | End: 2021-01-12

## 2021-01-12 RX ORDER — SODIUM CHLORIDE 9 MG/ML
10 INJECTION INTRAMUSCULAR; INTRAVENOUS; SUBCUTANEOUS AS NEEDED
Status: CANCELLED | OUTPATIENT
Start: 2021-02-23

## 2021-01-12 RX ORDER — SODIUM CHLORIDE 9 MG/ML
25 INJECTION, SOLUTION INTRAVENOUS CONTINUOUS
Status: DISPENSED | OUTPATIENT
Start: 2021-01-12 | End: 2021-01-12

## 2021-01-12 RX ORDER — DIPHENHYDRAMINE HYDROCHLORIDE 50 MG/ML
25 INJECTION, SOLUTION INTRAMUSCULAR; INTRAVENOUS ONCE
Status: COMPLETED | OUTPATIENT
Start: 2021-01-12 | End: 2021-01-12

## 2021-01-12 RX ORDER — SODIUM CHLORIDE 9 MG/ML
25 INJECTION, SOLUTION INTRAVENOUS CONTINUOUS
Status: CANCELLED | OUTPATIENT
Start: 2021-02-23

## 2021-01-12 RX ADMIN — SODIUM CHLORIDE 400 MG: 9 INJECTION, SOLUTION INTRAVENOUS at 10:20

## 2021-01-12 RX ADMIN — Medication 10 ML: at 09:30

## 2021-01-12 RX ADMIN — SODIUM CHLORIDE 25 ML/HR: 9 INJECTION, SOLUTION INTRAVENOUS at 09:33

## 2021-01-12 RX ADMIN — DIPHENHYDRAMINE HYDROCHLORIDE 25 MG: 50 INJECTION INTRAMUSCULAR; INTRAVENOUS at 09:50

## 2021-01-12 NOTE — PROGRESS NOTES
[facility] Progress Note    Date: 2021    Name: Claribel Marina    MRN: [id number]         : 1965      RENFLEXIS Q6 weeks.      Ms. Marina arrived to [facility] at 0900 ambulatory    Ms. Marina was assessed and education was provided.     Ms. Marina's vitals were reviewed.  Visit Vitals  /74 (BP 1 Location: Right arm, BP Patient Position: At rest;Sitting)   Pulse 90   Temp 97.5 °F (36.4 °C)   Resp 16   Ht 5' 8.5\" (1.74 m)   Wt 105.6 kg (232 lb 12.8 oz)   SpO2 98%   Breastfeeding No   BMI 34.88 kg/m²       Pt was observed for 5 minutes after obtaining vital signs prior to initiating treatment.    22g IV inserted in patient's right forearm x 1 attempt. Flushes without difficulty/ brisk blood return.     Benadryl 25mg IVP administered as ordered as pre-medication.    Renflexis 400mg IV administered per protocol over 2 hours followed by NS flush.    Ms. Marina tolerated well without complaints.    Patient Vitals for the past 12 hrs:   Temp Pulse Resp BP SpO2   21 1220 97.5 °F (36.4 °C) 90 16 139/74 --   21 0921 99.1 °F (37.3 °C) (!) 101 16 113/78 98 %       IV removed/ intact. Site without hematoma or tenderness.  Gauze/ tape applied to site.    Pt armband removed & shredded.    [facility] Marina [facility] at 1230. She is to return on 20 at 0900 for her next appointment.    Keke Crenshaw RN  2021

## 2021-02-08 ENCOUNTER — HOSPITAL ENCOUNTER (OUTPATIENT)
Dept: MAMMOGRAPHY | Age: 56
Discharge: HOME | End: 2021-02-08
Attending: INTERNAL MEDICINE
Payer: MEDICARE

## 2021-02-08 DIAGNOSIS — Z12.31 VISIT FOR SCREENING MAMMOGRAM: ICD-10-CM

## 2021-02-08 PROCEDURE — 77063 BREAST TOMOSYNTHESIS BI: CPT

## 2021-02-23 ENCOUNTER — HOSPITAL ENCOUNTER (OUTPATIENT)
Dept: INFUSION THERAPY | Age: 56
End: 2021-02-23

## 2021-04-06 ENCOUNTER — APPOINTMENT (OUTPATIENT)
Dept: INFUSION THERAPY | Age: 56
End: 2021-04-06

## 2021-04-12 DIAGNOSIS — E78.5 HYPERLIPIDEMIA, UNSPECIFIED HYPERLIPIDEMIA TYPE: ICD-10-CM

## 2021-04-12 RX ORDER — ATORVASTATIN CALCIUM 40 MG/1
TABLET, FILM COATED ORAL
Qty: 30 TAB | Refills: 5 | Status: SHIPPED | OUTPATIENT
Start: 2021-04-12 | End: 2021-09-22 | Stop reason: SDUPTHER

## 2021-04-19 ENCOUNTER — TRANSCRIBE ORDER (OUTPATIENT)
Dept: REGISTRATION | Age: 56
End: 2021-04-19

## 2021-04-19 ENCOUNTER — HOSPITAL ENCOUNTER (OUTPATIENT)
Dept: LAB | Age: 56
Discharge: HOME | End: 2021-04-19
Payer: MEDICARE

## 2021-04-19 DIAGNOSIS — F33.1 MAJOR DEPRESSIVE DISORDER, RECURRENT EPISODE, MODERATE (HCC): ICD-10-CM

## 2021-04-19 DIAGNOSIS — I25.10 CORONARY ATHEROSCLEROSIS DUE TO LIPID RICH PLAQUE: ICD-10-CM

## 2021-04-19 DIAGNOSIS — E11.9 DIABETES MELLITUS (HCC): ICD-10-CM

## 2021-04-19 DIAGNOSIS — I10 ESSENTIAL HYPERTENSION, MALIGNANT: ICD-10-CM

## 2021-04-19 DIAGNOSIS — E78.00 PURE HYPERCHOLESTEROLEMIA: ICD-10-CM

## 2021-04-19 DIAGNOSIS — I25.83 CORONARY ATHEROSCLEROSIS DUE TO LIPID RICH PLAQUE: ICD-10-CM

## 2021-04-19 DIAGNOSIS — E55.9 AVITAMINOSIS D: ICD-10-CM

## 2021-04-19 DIAGNOSIS — Z12.4 SCREENING FOR MALIGNANT NEOPLASM OF THE CERVIX: Primary | ICD-10-CM

## 2021-04-19 DIAGNOSIS — E11.40 DIABETIC NEUROPATHY (HCC): ICD-10-CM

## 2021-04-19 DIAGNOSIS — Z12.4 SCREENING FOR MALIGNANT NEOPLASM OF THE CERVIX: ICD-10-CM

## 2021-04-19 LAB
25(OH)D3 SERPL-MCNC: 50 NG/ML (ref 30–100)
ALBUMIN SERPL-MCNC: 3.1 G/DL (ref 3.4–5)
ALBUMIN/GLOB SERPL: 0.8 {RATIO} (ref 0.8–1.7)
ALP SERPL-CCNC: 118 U/L (ref 45–117)
ALT SERPL-CCNC: 22 U/L (ref 13–56)
ANION GAP SERPL CALC-SCNC: 8 MMOL/L (ref 3–18)
AST SERPL-CCNC: 9 U/L (ref 10–38)
BASOPHILS # BLD: 0 K/UL (ref 0–0.1)
BASOPHILS NFR BLD: 0 % (ref 0–2)
BILIRUB SERPL-MCNC: 0.3 MG/DL (ref 0.2–1)
BUN SERPL-MCNC: 15 MG/DL (ref 7–18)
BUN/CREAT SERPL: 19 (ref 12–20)
CALCIUM SERPL-MCNC: 9.2 MG/DL (ref 8.5–10.1)
CHLORIDE SERPL-SCNC: 104 MMOL/L (ref 100–111)
CHOLEST SERPL-MCNC: 140 MG/DL
CO2 SERPL-SCNC: 26 MMOL/L (ref 21–32)
CREAT SERPL-MCNC: 0.78 MG/DL (ref 0.6–1.3)
CREAT UR-MCNC: 74 MG/DL (ref 30–125)
DIFFERENTIAL METHOD BLD: NORMAL
EOSINOPHIL # BLD: 0.4 K/UL (ref 0–0.4)
EOSINOPHIL NFR BLD: 4 % (ref 0–5)
ERYTHROCYTE [DISTWIDTH] IN BLOOD BY AUTOMATED COUNT: 14.4 % (ref 11.6–14.5)
GLOBULIN SER CALC-MCNC: 3.8 G/DL (ref 2–4)
GLUCOSE SERPL-MCNC: 308 MG/DL (ref 74–99)
HBA1C MFR BLD: 11.6 % (ref 4.2–5.6)
HCT VFR BLD AUTO: 40.9 % (ref 35–45)
HDLC SERPL-MCNC: 41 MG/DL (ref 40–60)
HDLC SERPL: 3.4 {RATIO} (ref 0–5)
HGB BLD-MCNC: 13.4 G/DL (ref 12–16)
LDLC SERPL CALC-MCNC: 71.2 MG/DL (ref 0–100)
LIPID PROFILE,FLP: NORMAL
LYMPHOCYTES # BLD: 2.9 K/UL (ref 0.9–3.6)
LYMPHOCYTES NFR BLD: 30 % (ref 21–52)
MCH RBC QN AUTO: 26.6 PG (ref 24–34)
MCHC RBC AUTO-ENTMCNC: 32.8 G/DL (ref 31–37)
MCV RBC AUTO: 81.3 FL (ref 74–97)
MICROALBUMIN UR-MCNC: 127 MG/DL (ref 0–3)
MICROALBUMIN/CREAT UR-RTO: 1716 MG/G (ref 0–30)
MONOCYTES # BLD: 0.8 K/UL (ref 0.05–1.2)
MONOCYTES NFR BLD: 8 % (ref 3–10)
NEUTS SEG # BLD: 5.7 K/UL (ref 1.8–8)
NEUTS SEG NFR BLD: 58 % (ref 40–73)
PLATELET # BLD AUTO: 365 K/UL (ref 135–420)
PMV BLD AUTO: 9.7 FL (ref 9.2–11.8)
POTASSIUM SERPL-SCNC: 4.3 MMOL/L (ref 3.5–5.5)
PROT SERPL-MCNC: 6.9 G/DL (ref 6.4–8.2)
RBC # BLD AUTO: 5.03 M/UL (ref 4.2–5.3)
SODIUM SERPL-SCNC: 138 MMOL/L (ref 136–145)
T4 SERPL-MCNC: 10 UG/DL (ref 4.8–13.9)
TRIGL SERPL-MCNC: 139 MG/DL (ref ?–150)
TSH SERPL DL<=0.05 MIU/L-ACNC: 2.86 UIU/ML (ref 0.36–3.74)
VLDLC SERPL CALC-MCNC: 27.8 MG/DL
WBC # BLD AUTO: 9.9 K/UL (ref 4.6–13.2)

## 2021-04-19 PROCEDURE — 80053 COMPREHEN METABOLIC PANEL: CPT

## 2021-04-19 PROCEDURE — 36415 COLL VENOUS BLD VENIPUNCTURE: CPT

## 2021-04-19 PROCEDURE — 82306 VITAMIN D 25 HYDROXY: CPT

## 2021-04-19 PROCEDURE — 83036 HEMOGLOBIN GLYCOSYLATED A1C: CPT

## 2021-04-19 PROCEDURE — 84436 ASSAY OF TOTAL THYROXINE: CPT

## 2021-04-19 PROCEDURE — 84443 ASSAY THYROID STIM HORMONE: CPT

## 2021-04-19 PROCEDURE — 82043 UR ALBUMIN QUANTITATIVE: CPT

## 2021-04-19 PROCEDURE — 80061 LIPID PANEL: CPT

## 2021-04-19 PROCEDURE — 85025 COMPLETE CBC W/AUTO DIFF WBC: CPT

## 2021-05-05 ENCOUNTER — OFFICE VISIT (OUTPATIENT)
Dept: CARDIOLOGY CLINIC | Age: 56
End: 2021-05-05
Payer: MEDICARE

## 2021-05-05 VITALS
BODY MASS INDEX: 33.52 KG/M2 | OXYGEN SATURATION: 95 % | TEMPERATURE: 97.2 F | DIASTOLIC BLOOD PRESSURE: 63 MMHG | SYSTOLIC BLOOD PRESSURE: 125 MMHG | HEART RATE: 93 BPM | HEIGHT: 71 IN | WEIGHT: 239.4 LBS

## 2021-05-05 DIAGNOSIS — E78.5 HYPERLIPIDEMIA, UNSPECIFIED HYPERLIPIDEMIA TYPE: ICD-10-CM

## 2021-05-05 DIAGNOSIS — F17.200 SMOKING: ICD-10-CM

## 2021-05-05 DIAGNOSIS — M06.9 RHEUMATOID ARTHRITIS INVOLVING MULTIPLE JOINTS (HCC): ICD-10-CM

## 2021-05-05 DIAGNOSIS — I25.10 CORONARY ARTERY DISEASE INVOLVING NATIVE CORONARY ARTERY OF NATIVE HEART WITHOUT ANGINA PECTORIS: Primary | ICD-10-CM

## 2021-05-05 DIAGNOSIS — R00.2 PALPITATION: ICD-10-CM

## 2021-05-05 DIAGNOSIS — I10 ESSENTIAL HYPERTENSION: ICD-10-CM

## 2021-05-05 PROCEDURE — G8417 CALC BMI ABV UP PARAM F/U: HCPCS | Performed by: INTERNAL MEDICINE

## 2021-05-05 PROCEDURE — G8432 DEP SCR NOT DOC, RNG: HCPCS | Performed by: INTERNAL MEDICINE

## 2021-05-05 PROCEDURE — G8754 DIAS BP LESS 90: HCPCS | Performed by: INTERNAL MEDICINE

## 2021-05-05 PROCEDURE — 3017F COLORECTAL CA SCREEN DOC REV: CPT | Performed by: INTERNAL MEDICINE

## 2021-05-05 PROCEDURE — 99214 OFFICE O/P EST MOD 30 MIN: CPT | Performed by: INTERNAL MEDICINE

## 2021-05-05 PROCEDURE — G8752 SYS BP LESS 140: HCPCS | Performed by: INTERNAL MEDICINE

## 2021-05-05 PROCEDURE — G9899 SCRN MAM PERF RSLTS DOC: HCPCS | Performed by: INTERNAL MEDICINE

## 2021-05-05 PROCEDURE — G8427 DOCREV CUR MEDS BY ELIG CLIN: HCPCS | Performed by: INTERNAL MEDICINE

## 2021-05-05 RX ORDER — ADALIMUMAB 40MG/0.8ML
40 KIT SUBCUTANEOUS
COMMUNITY

## 2021-05-05 NOTE — PROGRESS NOTES
HISTORY OF PRESENT ILLNESS  Jhon Jon is a 60 y.o. female. 5/5/2021  Patient is seen today for follow-up. She has a history of noncritical CAD, hypertension hyperlipidemia and rheumatoid arthritis. The chest pains are stable. Denies any shortness of breath. No palpitations stable on treatment        Review of Systems   Constitutional: Negative for chills and fever. HENT: Negative for nosebleeds. Eyes: Negative for blurred vision and double vision. Respiratory: Negative for cough, hemoptysis, sputum production, shortness of breath and wheezing. Cardiovascular: Negative for chest pain, palpitations, orthopnea, claudication, leg swelling and PND. Gastrointestinal: Negative for abdominal pain, heartburn, nausea and vomiting. Musculoskeletal: Negative for myalgias. Skin: Negative for rash. Neurological: Negative for dizziness, weakness and headaches. Endo/Heme/Allergies: Does not bruise/bleed easily.      Family History   Problem Relation Age of Onset    Diabetes Other     Alcohol abuse Mother    Stephanie Sit Arthritis-osteo Mother     Diabetes Mother     Elevated Lipids Mother     Headache Mother     Heart Disease Mother    Stephanie Sit Migraines Mother     Psychiatric Disorder Mother     Alcohol abuse Father    [place] Ditto Father     Cancer Father     Headache Father     Heart Disease Father     Lung Disease Father     Migraines Father     Heart Surgery Father     Alcohol abuse Sister     Headache Sister     Diabetes Sister     Heart Disease Sister     Migraines Sister     Psychiatric Disorder Sister     Headache Brother     Diabetes Brother     Elevated Lipids Brother     Heart Disease Brother     Migraines Brother     Psychiatric Disorder Brother     Coronary Artery Disease Brother     Cancer Maternal Aunt     Alcohol abuse Maternal Aunt     Diabetes Maternal Aunt     Headache Maternal Aunt     Lung Disease Maternal Aunt     Migraines Maternal Aunt     Headache Maternal Uncle     Migraines Maternal Uncle     Stroke Maternal Uncle     Psychiatric Disorder Maternal Uncle     Headache Paternal Aunt     Migraines Paternal Aunt     Headache Paternal Uncle     Hypertension Paternal Uncle     Migraines Paternal Uncle     Stroke Paternal Uncle     Headache Maternal Grandmother     Migraines Maternal Grandmother     Stroke Maternal Grandmother     Headache Maternal Grandfather     Migraines Maternal Grandfather     Stroke Maternal Grandfather     Headache Paternal Grandmother     Hypertension Paternal Grandmother     Lung Disease Paternal Grandmother     Migraines Paternal Grandmother     Cancer Paternal Grandmother     Headache Paternal Grandfather     Cancer Paternal Grandfather     Migraines Paternal Grandfather        Past Medical History:   Diagnosis Date    Abdominal pain, unspecified site     Acute otitis media     Acute pharyngitis     Anxiety     Arthritis     Autoimmune disease ([facility]     Back injury     Backache     herniated disc in lower back    Blurred vision     Chest pain 5/4/2018    Chest pain, unspecified     abnormal EKG    Chronic airway obstruction, not elsewhere classified     Chronic back pain     Chronic pain     COPD     Depression     Diabetes ([facility]     Dizziness     Dizziness and giddiness     possible orthostatic changes, vasovagal, autonomic dysfunction from diabetic neuropathy    Encounters for unspecified administrative purpose     Essential thrombocytosis ([facility] 1/6/2016    Feeling anxious     Fibromyalgia     Headache(784.0)     Hemorrhoid     Herniated disc     at L5    Hypercholesterolemia     Hyperglycemia     Hypertension     Joint pain     Leukocytosis, unspecified     Major depressive disorder, single episode, mild ([address]     Mental disorder     depression, anxiety, bipolar and PTSD    Neuropathy     Obesity, unspecified     Other chest pain     Palpitation 5/4/2018    ?  arrhythmia    Phobic disorders     Rheumatoid arthritis ([facility]     Rheumatoid arthritis of foot ([facility] 2018    on treatment    Seasonal allergies     Shortness of breath     normal EF    Smoking 2018    discussed cessation    Streptococcal sore throat     Type 2 diabetes mellitus without complication, with long-term current use of insulin ([facility] 2018    Type II or unspecified type diabetes mellitus with unspecified complication, uncontrolled     Unspecified hereditary and idiopathic peripheral neuropathy     Vitamin D deficiency        Past Surgical History:   Procedure Laterality Date    HX CHOLECYSTECTOMY      HX GYN      partial Hysterectomy    HX OTHER SURGICAL  12-1-15    had ingrown toenail removed from big toe, both feet    HX TUBAL LIGATION             Social History     Tobacco Use    Smoking status: Current Every Day Smoker     Packs/day: 0.25     Last attempt to quit: 2012     Years since quittin.8    Smokeless tobacco: Never Used    Tobacco comment: 5 cigarettes day   Substance Use Topics    Alcohol use: Yes     Frequency: Monthly or less     Drinks per session: 1 or 2     Binge frequency: Never     Comment: socially       Allergies   Allergen Reactions    Levaquin [Levofloxacin] Anaphylaxis    Lipitor [Atorvastatin] Other (comments)     Patient denies    Pollen Extracts Unable to Obtain       Prior to Admission medications    Medication Sig Start Date End Date Taking? Authorizing Provider   adalimumab (Humira) 40 mg/0.8 mL injection 40 mg by SubCUTAneous route every seven (7) days. Yes Provider, Historical   atorvastatin (LIPITOR) 40 mg tablet TAKE 1 TABLET BY MOUTH NIGHTLY 21  Yes Montrell Ortiz MD   insulin regular (NovoLIN R Regular U-100 Insuln) 100 unit/mL injection 100 Units by SubCUTAneous route. Insulin pump   Yes Provider, Historical   anagrelide (AGRYLIN) 0.5 mg capsule Take 1 Cap by mouth two (2) times a day.  Indications: spontaneous hemorrhages and increase in blood platelets 3/05/81  Yes Blanquita Hernandez NP   folic acid 316 mcg tablet Take 1 Tab by mouth daily. 8/25/20  Yes Tory Hernandez NP   gabapentin (NEURONTIN) 100 mg capsule Take 100 mg by mouth three (3) times daily. Yes Other, MD Lelia   mirtazapine (REMERON) 15 mg tablet  3/27/20  Yes Provider, Historical   hydroCHLOROthiazide (HYDRODIURIL) 12.5 mg tablet Take 12.5 mg by mouth daily. Yes Provider, Historical   losartan (COZAAR) 50 mg tablet Take 1 Tab by mouth daily. 10/23/19  Yes Criss Bolden NP   aspirin delayed-release 81 mg tablet Take 1 Tab by mouth daily. 8/9/19  Yes Margarita Becker MD   naloxone [facility]) 4 mg/actuation nasal spray Use 1 spray intranasally, then discard. Repeat with new spray every 2 min as needed for opioid overdose symptoms, alternating nostrils. Indications: Decrease in Rate & Depth of Breathing due to Opioid Drug, opioid overdose 4/30/19  Yes Dominique CHASE NP   albuterol (PROAIR HFA) 90 mcg/actuation inhaler Take 1 Puff by inhalation every six (6) hours as needed for Wheezing. 2/6/16  Yes Charles Widler,    methotrexate (RHEUMATREX) 2.5 mg tablet Take 2.5 mg by mouth every fourty-eight (48) hours. Yes Provider, Historical   metFORMIN (GLUCOPHAGE) 1,000 mg tablet Take 500 mg by mouth two (2) times a day. Yes Provider, Historical   Cholecalciferol, Vitamin D3, 5,000 unit Tab Take 5,000 Units by mouth every seven (7) days. Yes Other, MD Lelia   atenolol (TENORMIN) 25 mg tablet Take 25 mg by mouth daily. Indications: HYPERTENSION   Yes Provider, Historical   furosemide (LASIX) 40 mg tablet Take  by mouth daily.      Yes Provider, Historical         Visit Vitals  /63 (BP 1 Location: Left arm, BP Patient Position: Sitting, BP Cuff Size: Large adult)   Pulse 93   Temp 97.2 °F (36.2 °C) (Temporal)   Ht 5' 11\" (1.803 m)   Wt 108.6 kg (239 lb 6.4 oz)   SpO2 95%   BMI 33.39 kg/m²         Physical Exam   Constitutional: She is oriented to person, place, and time. She appears well-developed and well-nourished. HENT:   Head: Normocephalic and atraumatic. Eyes: Conjunctivae are normal.   Neck: Neck supple. No JVD present. No tracheal deviation present. No thyromegaly present. Cardiovascular: Normal rate and regular rhythm. PMI is not displaced. Exam reveals no gallop, no S3 and no decreased pulses. No murmur heard. Pulmonary/Chest: No respiratory distress. She has no wheezes. She has no rales. She exhibits no tenderness. Abdominal: Soft. There is no abdominal tenderness. Musculoskeletal:         General: No edema. Neurological: She is alert and oriented to person, place, and time. Skin: Skin is warm. Psychiatric: She has a normal mood and affect. Ms. Bravo Beckham has a reminder for a \"due or due soon\" health maintenance. I have asked that she contact her primary care provider for follow-up on this health maintenance. No flowsheet data found. 5/2018  sr,iwmi  SUMMARY:echo-5/2018  Left ventricle: Systolic function was normal. Ejection fraction was  estimated in the range of 55 % to 60 %. There were no regional wall motion  abnormalities. There was mild concentric hypertrophy. Doppler parameters  were consistent with abnormal left ventricular relaxation (grade 1  diastolic dysfunction). Impression 5/2018  NUCLEAR IMAGING:   Findings:   1. Stress images reveal normal Myoview distrubution in all the LV segments in short axis, vertical and horizontal long axis views. 2. Resting images have a normal uptake. 3. Gated images reveal normal wall motion and the ejection fraction is calculated to be 57%. Conclusion:   1. Normal perfusion scan. 2. Normal wall motion and ejection fraction is calculated at 57%. 3. No evidence of significant fixed or reversible defect suggesting ischemia or myocardial infarction noted from this nuclear study. 4.  Low risk scan.   5/2018  Cardiac telemetrysinus rhythm sinus tachycardia, no significant arrhythmia . Interpretation Summary 8/2019    · Left Ventricle: Normal cavity size and wall thickness. Low normal systolic dysfunction. Estimated left ventricular ejection fraction is 51 - 55%. Visually measured ejection fraction. No regional wall motion abnormality noted. Mild (grade 1) left ventricular diastolic dysfunction. · Pulmonary Artery: Pulmonary arterial systolic pressure is 26 mmHg. Coronary Findings 8/2019    Diagnostic   Dominance: Right   Left Anterior Descending   Prox LAD lesion 45% stenosed. .   Right Coronary Artery   Mid RCA lesion 30% stenosed. Wava Prim Results for Elizabet Cannon (MRN [id number]) as of 5/5/2021 11:23   Ref. Range 4/19/2021 08:45   Triglyceride Latest Ref Range: <150 MG/   Cholesterol, total Latest Ref Range: <200 MG/   HDL Cholesterol Latest Ref Range: 40 - 60 MG/DL 41   CHOL/HDL Ratio Latest Ref Range: 0 - 5.0   3.4   VLDL, calculated Latest Units: MG/DL 27.8   LDL, calculated Latest Ref Range: 0 - 100 MG/DL 71.2   Hemoglobin A1c, (calculated) Latest Ref Range: 4.2 - 5.6 % 11.6 (H)       Assessment         ICD-10-CM ICD-9-CM    1. Coronary artery disease involving native coronary artery of native heart without angina pectoris  I25.10 414.01     Stable continue treatment monitor   2. Essential hypertension  I10 401.9     Stable continue treatment   3. Hyperlipidemia, unspecified hyperlipidemia type  E78.5 272.4     Good control continue current treatment. LDL 71 continue to monitor   4. Palpitation  R00.2 785.1     Stable continue current medical management monitor   5. Rheumatoid arthritis involving multiple joints (HCC)  M06.9 714.0     No suggestion of pulmonary hypertension or valvular heart disease on echo in past continue to monitor   6. Smoking  F17.200 305.1     Continue with current efforts at smoking cessation. 9/2019  Recent non-STEMI 8/2019. Cardiac cath with noncritical 40% LAD and 30% RCA disease.   Medical management asymptomatic since discharge. Continue statin aspirin and other medication. Check labs  9/2020  Cardiac status stable continue medical management  5/8/2020  Cardiac status stable. Angina stable. Continue with diet and exercise. Continue efforts at smoking cessation  No orders of the defined types were placed in this encounter. Follow-up and Dispositions    · Return in about 6 months (around 11/5/2021).

## 2021-05-05 NOTE — PROGRESS NOTES
1. Have you been to the ER, urgent care clinic since your last visit? Hospitalized since your last visit? No    2. Have you seen or consulted any other health care providers outside of the [address] [place] [address] since your last visit? Include any pap smears or colon screening.  Yes Where: Rheumatology/Dermatology

## 2021-05-17 ENCOUNTER — APPOINTMENT (OUTPATIENT)
Dept: GENERAL RADIOLOGY | Age: 56
End: 2021-05-17
Attending: PHYSICIAN ASSISTANT
Payer: MEDICARE

## 2021-05-17 ENCOUNTER — HOSPITAL ENCOUNTER (EMERGENCY)
Age: 56
Discharge: HOME | End: 2021-05-17
Attending: EMERGENCY MEDICINE
Payer: MEDICARE

## 2021-05-17 VITALS
SYSTOLIC BLOOD PRESSURE: 126 MMHG | OXYGEN SATURATION: 100 % | RESPIRATION RATE: 14 BRPM | HEIGHT: 71 IN | HEART RATE: 93 BPM | TEMPERATURE: 98.9 F | BODY MASS INDEX: 33.39 KG/M2 | DIASTOLIC BLOOD PRESSURE: 90 MMHG

## 2021-05-17 DIAGNOSIS — R04.0 EPISTAXIS: Primary | ICD-10-CM

## 2021-05-17 LAB
BASOPHILS # BLD: 0.1 K/UL (ref 0–0.1)
BASOPHILS NFR BLD: 1 % (ref 0–2)
DIFFERENTIAL METHOD BLD: ABNORMAL
EOSINOPHIL # BLD: 0.4 K/UL (ref 0–0.4)
EOSINOPHIL NFR BLD: 4 % (ref 0–5)
ERYTHROCYTE [DISTWIDTH] IN BLOOD BY AUTOMATED COUNT: 13.5 % (ref 11.6–14.5)
HCT VFR BLD AUTO: 41.2 % (ref 35–45)
HGB BLD-MCNC: 13.7 G/DL (ref 12–16)
INR PPP: 0.9 (ref 0.8–1.2)
LYMPHOCYTES # BLD: 3.5 K/UL (ref 0.9–3.6)
LYMPHOCYTES NFR BLD: 29 % (ref 21–52)
MCH RBC QN AUTO: 26.9 PG (ref 24–34)
MCHC RBC AUTO-ENTMCNC: 33.3 G/DL (ref 31–37)
MCV RBC AUTO: 80.8 FL (ref 74–97)
MONOCYTES # BLD: 0.8 K/UL (ref 0.05–1.2)
MONOCYTES NFR BLD: 7 % (ref 3–10)
NEUTS SEG # BLD: 7.1 K/UL (ref 1.8–8)
NEUTS SEG NFR BLD: 60 % (ref 40–73)
PLATELET # BLD AUTO: 367 K/UL (ref 135–420)
PMV BLD AUTO: 9.1 FL (ref 9.2–11.8)
PROTHROMBIN TIME: 11.7 SEC (ref 11.5–15.2)
RBC # BLD AUTO: 5.1 M/UL (ref 4.2–5.3)
WBC # BLD AUTO: 11.8 K/UL (ref 4.6–13.2)

## 2021-05-17 PROCEDURE — 85025 COMPLETE CBC W/AUTO DIFF WBC: CPT

## 2021-05-17 PROCEDURE — 99281 EMR DPT VST MAYX REQ PHY/QHP: CPT

## 2021-05-17 PROCEDURE — 71046 X-RAY EXAM CHEST 2 VIEWS: CPT

## 2021-05-17 PROCEDURE — 85610 PROTHROMBIN TIME: CPT

## 2021-05-17 RX ORDER — OXYMETAZOLINE HCL 0.05 %
2 SPRAY, NON-AEROSOL (ML) NASAL 2 TIMES DAILY
Qty: 1 EACH | Refills: 0 | Status: SHIPPED | OUTPATIENT
Start: 2021-05-17 | End: 2021-05-20

## 2021-05-17 NOTE — ED TRIAGE NOTES
The patient presents for evaluation of epistaxis today. States, \"it's still bleeding a little. I came here today because I coughed up a blood clot. \"

## 2021-05-17 NOTE — ED PROVIDER NOTES
EMERGENCY DEPARTMENT HISTORY AND PHYSICAL EXAM    11:58 AM      Date: 5/17/2021  Patient Name: Jhon Jon    History of Presenting Illness     Chief Complaint   Patient presents with    Epistaxis       History Provided By: Patient    Additional History (Context): Jhon Jon is a 60 y.o. female with hx of CAD, essential thromboyctosis, and other noted PMH who presents with complaint of an episode of epistaxis from b/l nares that occurred today. Patient notes bleeding lasted about 15 minutes, resolved with pressure. Patient notes she coughed up a clot right after the episode of bleeding. Patient denies fever or chills, dizziness, sore throat, chest pain, shortness of breath. Notes she takes 81 mg aspirin and Agrylin daily. PCP: Josh Knott MD    Current Outpatient Medications   Medication Sig Dispense Refill    oxymetazoline (Afrin, oxymetazoline,) 0.05 % nasal spray 2 Sprays by Both Nostrils route two (2) times a day for 3 days. 1 Each 0    adalimumab (Humira) 40 mg/0.8 mL injection 40 mg by SubCUTAneous route every seven (7) days.  atorvastatin (LIPITOR) 40 mg tablet TAKE 1 TABLET BY MOUTH NIGHTLY 30 Tab 5    insulin regular (NovoLIN R Regular U-100 Insuln) 100 unit/mL injection 100 Units by SubCUTAneous route. Insulin pump      anagrelide (AGRYLIN) 0.5 mg capsule Take 1 Cap by mouth two (2) times a day. Indications: spontaneous hemorrhages and increase in blood platelets 60 Cap 6    folic acid 048 mcg tablet Take 1 Tab by mouth daily. 30 Tab 3    gabapentin (NEURONTIN) 100 mg capsule Take 100 mg by mouth three (3) times daily.  mirtazapine (REMERON) 15 mg tablet       hydroCHLOROthiazide (HYDRODIURIL) 12.5 mg tablet Take 12.5 mg by mouth daily.  losartan (COZAAR) 50 mg tablet Take 1 Tab by mouth daily. 90 Tab 1    aspirin delayed-release 81 mg tablet Take 1 Tab by mouth daily.  30 Tab 1    naloxone (NARCAN) 4 mg/actuation nasal spray Use 1 spray intranasally, then discard. Repeat with new spray every 2 min as needed for opioid overdose symptoms, alternating nostrils. Indications: Decrease in Rate & Depth of Breathing due to Opioid Drug, opioid overdose 1 Each 1    albuterol (PROAIR HFA) 90 mcg/actuation inhaler Take 1 Puff by inhalation every six (6) hours as needed for Wheezing. 1 Inhaler 0    methotrexate (RHEUMATREX) 2.5 mg tablet Take 2.5 mg by mouth every fourty-eight (48) hours.  metFORMIN (GLUCOPHAGE) 1,000 mg tablet Take 500 mg by mouth two (2) times a day.  Cholecalciferol, Vitamin D3, 5,000 unit Tab Take 5,000 Units by mouth every seven (7) days.  atenolol (TENORMIN) 25 mg tablet Take 25 mg by mouth daily. Indications: HYPERTENSION      furosemide (LASIX) 40 mg tablet Take  by mouth daily.            Past History     Past Medical History:  Past Medical History:   Diagnosis Date    Abdominal pain, unspecified site     Acute otitis media     Acute pharyngitis     Anxiety     Arthritis     Autoimmune disease ([facility]     Back injury     Backache     herniated disc in lower back    Blurred vision     Chest pain 5/4/2018    Chest pain, unspecified     abnormal EKG    Chronic airway obstruction, not elsewhere classified     Chronic back pain     Chronic pain     COPD     Depression     Diabetes (HCC)     Dizziness     Dizziness and giddiness     possible orthostatic changes, vasovagal, autonomic dysfunction from diabetic neuropathy    Encounters for unspecified administrative purpose     Essential thrombocytosis ([facility] 1/6/2016    Feeling anxious     Fibromyalgia     Headache(784.0)     Hemorrhoid     Herniated disc     at L5    Hypercholesterolemia     Hyperglycemia     Hypertension     Joint pain     Leukocytosis, unspecified     Major depressive disorder, single episode, mild (HCC)     Mental disorder     depression, anxiety, bipolar and PTSD    Neuropathy     Obesity, unspecified     Other chest pain     Palpitation 5/4/2018    ?  arrhythmia    Phobic disorders     Rheumatoid arthritis ([facility]     Rheumatoid arthritis of foot ([facility] 5/4/2018    on treatment    Seasonal allergies     Shortness of breath     normal EF    Smoking 5/4/2018    discussed cessation    Streptococcal sore throat     Type 2 diabetes mellitus without complication, with long-term current use of insulin ([facility] [address] 5/4/2018    Type II or unspecified type diabetes mellitus with unspecified complication, uncontrolled     Unspecified hereditary and idiopathic peripheral neuropathy     Vitamin D deficiency        Past Surgical History:  Past Surgical History:   Procedure Laterality Date    HX CHOLECYSTECTOMY  1994    HX GYN  2005    partial Hysterectomy    HX OTHER SURGICAL  12-1-15    had ingrown toenail removed from big toe, both feet    HX TUBAL LIGATION      1995       Family History:  Family History   Problem Relation Age of Onset    Diabetes Other     Alcohol abuse Mother     Arthritis-osteo Mother     Diabetes Mother     Elevated Lipids Mother     Headache Mother     Heart Disease Mother     Migraines Mother     Psychiatric Disorder Mother     Alcohol abuse Father     Arthritis-osteo Father     Cancer Father     Headache Father     Heart Disease Father     Lung Disease Father     Migraines Father     Heart Surgery Father     Alcohol abuse Sister     Headache Sister     Diabetes Sister     Heart Disease Sister     Migraines Sister     Psychiatric Disorder Sister     Headache Brother     Diabetes Brother     Elevated Lipids Brother     Heart Disease Brother     Migraines Brother     Psychiatric Disorder Brother     Coronary Artery Disease Brother     Cancer Maternal Aunt     Alcohol abuse Maternal Aunt     Diabetes Maternal Aunt     Headache Maternal Aunt     Lung Disease Maternal Aunt     Migraines Maternal Aunt     Headache Maternal Uncle     Migraines Maternal Uncle     Stroke Maternal Uncle     Psychiatric Disorder Maternal Uncle     Headache Paternal Aunt     Migraines Paternal Aunt     Headache Paternal Uncle     Hypertension Paternal Uncle     Migraines Paternal Uncle     Stroke Paternal Uncle     Headache Maternal Grandmother     Migraines Maternal Grandmother     Stroke Maternal Grandmother     Headache Maternal Grandfather     Migraines Maternal Grandfather     Stroke Maternal Grandfather     Headache Paternal Grandmother     Hypertension Paternal Grandmother     Lung Disease Paternal Grandmother     Migraines Paternal Grandmother     Cancer Paternal Grandmother     Headache Paternal Grandfather     Cancer Paternal Grandfather     Migraines Paternal Grandfather        Social History:  Social History     Tobacco Use    Smoking status: Current Every Day Smoker     Packs/day: 0.25     Last attempt to quit: 2012     Years since quittin.8    Smokeless tobacco: Never Used    Tobacco comment: 5 cigarettes day   Substance Use Topics    Alcohol use: Yes     Frequency: Monthly or less     Drinks per session: 1 or 2     Binge frequency: Never     Comment: socially    Drug use: No       Allergies: Allergies   Allergen Reactions    Levaquin [Levofloxacin] Anaphylaxis    Lipitor [Atorvastatin] Other (comments)     Patient denies    Pollen Extracts Unable to Obtain         Review of Systems       Review of Systems   Constitutional: Negative for chills and fever. HENT: Positive for nosebleeds. Respiratory: Negative for shortness of breath. Cardiovascular: Negative for chest pain. Gastrointestinal: Negative for abdominal pain, nausea and vomiting. Skin: Negative for rash. Neurological: Negative for weakness. All other systems reviewed and are negative.         Physical Exam     Visit Vitals  BP (!) 126/90 (BP 1 Location: Left upper arm, BP Patient Position: At rest)   Pulse 93   Temp 98.9 °F (37.2 °C)   Resp 14   Ht 5' 11\" (1.803 m)   SpO2 100%   BMI 33.39 kg/m² Physical Exam  Vitals signs and nursing note reviewed. Constitutional:       General: She is not in acute distress. Appearance: She is well-developed. She is not diaphoretic. HENT:      Head: Normocephalic and atraumatic. Right Ear: Tympanic membrane and ear canal normal.      Left Ear: Tympanic membrane and ear canal normal.      Nose: Nose normal.      Right Nostril: No foreign body or epistaxis. Left Nostril: No foreign body or epistaxis. Mouth/Throat:      Mouth: Mucous membranes are moist.      Pharynx: No oropharyngeal exudate or posterior oropharyngeal erythema. Neck:      Musculoskeletal: Normal range of motion and neck supple. Cardiovascular:      Rate and Rhythm: Normal rate and regular rhythm. Heart sounds: Normal heart sounds. No murmur. No friction rub. No gallop. Pulmonary:      Effort: Pulmonary effort is normal. No respiratory distress. Breath sounds: Normal breath sounds. No wheezing or rales. Musculoskeletal: Normal range of motion. Skin:     General: Skin is warm. Findings: No rash. Neurological:      Mental Status: She is alert. Diagnostic Study Results     Labs -  Recent Results (from the past 12 hour(s))   CBC WITH AUTOMATED DIFF    Collection Time: 05/17/21 12:00 PM   Result Value Ref Range    WBC 11.8 4.6 - 13.2 K/uL    RBC 5.10 4.20 - 5.30 M/uL    HGB 13.7 12.0 - 16.0 g/dL    HCT 41.2 35.0 - 45.0 %    MCV 80.8 74.0 - 97.0 FL    MCH 26.9 24.0 - 34.0 PG    MCHC 33.3 31.0 - 37.0 g/dL    RDW 13.5 11.6 - 14.5 %    PLATELET 983 354 - 084 K/uL    MPV 9.1 (L) 9.2 - 11.8 FL    NEUTROPHILS 60 40 - 73 %    LYMPHOCYTES 29 21 - 52 %    MONOCYTES 7 3 - 10 %    EOSINOPHILS 4 0 - 5 %    BASOPHILS 1 0 - 2 %    ABS. NEUTROPHILS 7.1 1.8 - 8.0 K/UL    ABS. LYMPHOCYTES 3.5 0.9 - 3.6 K/UL    ABS. MONOCYTES 0.8 0.05 - 1.2 K/UL    ABS. EOSINOPHILS 0.4 0.0 - 0.4 K/UL    ABS.  BASOPHILS 0.1 0.0 - 0.1 K/UL    DF AUTOMATED     PROTHROMBIN TIME + INR Collection Time: 05/17/21 12:00 PM   Result Value Ref Range    Prothrombin time 11.7 11.5 - 15.2 sec    INR 0.9 0.8 - 1.2         Radiologic Studies -   XR CHEST PA LAT   Final Result      No acute cardiopulmonary abnormalities. Medical Decision Making   I am the first provider for this patient. I reviewed the vital signs, available nursing notes, past medical history, past surgical history, family history and social history. Vital Signs-Reviewed the patient's vital signs. Records Reviewed: Nursing Notes and Old Medical Records (Time of Review: 11:58 AM)    ED Course: Progress Notes, Reevaluation, and Consults:  12:40 PM:  Reviewed results with patient. No bleeding throughout ED stay. Discussed need for close outpatient follow-up this week for reassessment. Discussed strict return precautions, including dizziness, recurrence of bleeding, or any other medical concerns. Patient in agreement with plan, ready to go home. Provider Notes (Medical Decision Making): 58-year-old female who presents to the ED due to epistaxis that resolved prior to arrival.  Afebrile, nontoxic-appearing, looks well. CBC and PT/INR within normal limits. Do not feel further labs or imaging are warranted. Stable for discharge with symptomatic management, close outpatient follow-up for further assessment. Strict return precautions provided. Diagnosis     Clinical Impression:   1.  Epistaxis        Disposition: home     Follow-up Information     Follow up With Specialties Details Why [facility]    [address] [facility] EMERGENCY DEPT Emergency Medicine  If symptoms worsen [address] [facility] [address] [facility]    Brigid Ashley MD Internal Medicine Schedule an appointment as soon as possible for a visit   [address] [place] [address] [address] [place] [address] [phone]             Discharge Medication List as of 5/17/2021 12:31 PM      START taking these medications    Details   oxymetazoline (Afrin, oxymetazoline,) 0.05 % nasal spray 2 Sprays by Both Nostrils route two (2) times a day for 3 days. , Normal, Disp-1 Each, R-0         CONTINUE these medications which have NOT CHANGED    Details   adalimumab (Humira) 40 mg/0.8 mL injection 40 mg by SubCUTAneous route every seven (7) days. , Historical Med      atorvastatin (LIPITOR) 40 mg tablet TAKE 1 TABLET BY MOUTH NIGHTLY, Normal, Disp-30 Tab, R-5      insulin regular (NovoLIN R Regular U-100 Insuln) 100 unit/mL injection 100 Units by SubCUTAneous route. Insulin pump, Historical Med      anagrelide (AGRYLIN) 0.5 mg capsule Take 1 Cap by mouth two (2) times a day. Indications: spontaneous hemorrhages and increase in blood platelets, Normal, SXUY-23 Cap,R-6      folic acid 665 mcg tablet Take 1 Tab by mouth daily. , Normal, Disp-30 Tab,R-3      gabapentin (NEURONTIN) 100 mg capsule Take 100 mg by mouth three (3) times daily. , Historical Med      mirtazapine (REMERON) 15 mg tablet Historical Med      hydroCHLOROthiazide (HYDRODIURIL) 12.5 mg tablet Take 12.5 mg by mouth daily. , Historical Med      losartan (COZAAR) 50 mg tablet Take 1 Tab by mouth daily. , Normal, Disp-90 Tab, R-1      aspirin delayed-release 81 mg tablet Take 1 Tab by mouth daily. , Normal, Disp-30 Tab, R-1      naloxone (NARCAN) 4 mg/actuation nasal spray Use 1 spray intranasally, then discard. Repeat with new spray every 2 min as needed for opioid overdose symptoms, alternating nostrils. Indications: Decrease in Rate & Depth of Breathing due to Opioid Drug, opioid overdose, Print, Disp-1 Each, R-1      albuterol (PROAIR HFA) 90 mcg/actuation inhaler Take 1 Puff by inhalation every six (6) hours as needed for Wheezing., Print, Disp-1 Inhaler, R-0      methotrexate (RHEUMATREX) 2.5 mg tablet Take 2.5 mg by mouth every fourty-eight (48) hours. , Historical Med      metFORMIN (GLUCOPHAGE) 1,000 mg tablet Take 500 mg by mouth two (2) times a day., Historical Med      Cholecalciferol, Vitamin D3, 5,000 unit Tab Take 5,000 Units by mouth every seven (7) days. , Historical Med      atenolol (TENORMIN) 25 mg tablet Take 25 mg by mouth daily. Indications: HYPERTENSION, Historical Med      furosemide (LASIX) 40 mg tablet Take  by mouth daily. , Historical Med             Dictation disclaimer:  Please note that this dictation was completed with COPsync, the computer voice recognition software. Quite often unanticipated grammatical, syntax, homophones, and other interpretive errors are inadvertently transcribed by the computer software. Please disregard these errors. Please excuse any errors that have escaped final proofreading.

## 2021-05-17 NOTE — ED NOTES
Dizziness, cough, epistaxis. I performed a brief evaluation, including history and physical, of the patient here in triage and I have determined that pt will need further treatment and evaluation from the main side ER physician. I have placed initial orders to help in expediting patients care.      May 17, 2021 at 11:48 AM - MALACHI Crane

## 2021-05-26 ENCOUNTER — HOSPITAL ENCOUNTER (EMERGENCY)
Age: 56
Discharge: STILL ADMITTED OR ADMINISTRATIVE | End: 2021-05-26
Attending: EMERGENCY MEDICINE

## 2021-09-07 ENCOUNTER — TRANSCRIBE ORDER (OUTPATIENT)
Dept: SCHEDULING | Age: 56
End: 2021-09-07

## 2021-09-07 DIAGNOSIS — M51.36 OTHER INTERVERTEBRAL DISC DEGENERATION, LUMBAR REGION: Primary | ICD-10-CM

## 2021-09-13 ENCOUNTER — HOSPITAL ENCOUNTER (OUTPATIENT)
Age: 56
Discharge: HOME | End: 2021-09-13
Attending: PHYSICAL MEDICINE & REHABILITATION
Payer: MEDICARE

## 2021-09-13 DIAGNOSIS — M51.36 OTHER INTERVERTEBRAL DISC DEGENERATION, LUMBAR REGION: ICD-10-CM

## 2021-09-13 PROCEDURE — 72148 MRI LUMBAR SPINE W/O DYE: CPT

## 2021-09-22 DIAGNOSIS — E78.5 HYPERLIPIDEMIA, UNSPECIFIED HYPERLIPIDEMIA TYPE: ICD-10-CM

## 2021-09-22 RX ORDER — ATORVASTATIN CALCIUM 40 MG/1
40 TABLET, FILM COATED ORAL
Qty: 30 TABLET | Refills: 5 | Status: SHIPPED | OUTPATIENT
Start: 2021-09-22 | End: 2021-11-10 | Stop reason: SDUPTHER

## 2021-09-22 NOTE — TELEPHONE ENCOUNTER
Requested Prescriptions     Pending Prescriptions Disp Refills    atorvastatin (LIPITOR) 40 mg tablet 30 Tablet 5     Sig: Take 1 Tablet by mouth nightly.

## 2021-09-30 ENCOUNTER — TRANSCRIBE ORDER (OUTPATIENT)
Dept: SCHEDULING | Age: 56
End: 2021-09-30

## 2021-09-30 DIAGNOSIS — N28.1 ACQUIRED CYST OF KIDNEY: Primary | ICD-10-CM

## 2021-10-11 ENCOUNTER — HOSPITAL ENCOUNTER (OUTPATIENT)
Dept: ULTRASOUND IMAGING | Age: 56
Discharge: HOME | End: 2021-10-11
Attending: INTERNAL MEDICINE
Payer: MEDICARE

## 2021-10-11 DIAGNOSIS — N28.1 ACQUIRED CYST OF KIDNEY: ICD-10-CM

## 2021-10-11 PROCEDURE — 76770 US EXAM ABDO BACK WALL COMP: CPT

## 2021-10-14 DIAGNOSIS — D75.839 THROMBOCYTOSIS: ICD-10-CM

## 2021-10-14 NOTE — TELEPHONE ENCOUNTER
From: Ehsan Guzman  To:  Office of Bhumi Lino NP  Sent: 10/13/2021 4:55 PM EDT  Subject: Medication Renewal Request    Refills have been requested for the following medications:     anagrelide (AGRYLIN) 0.5 mg capsule Bhumi Lino NP]    Preferred pharmacy: [facility] [phone] [facility]

## 2021-10-15 RX ORDER — ANAGRELIDE 0.5 MG/1
0.5 CAPSULE ORAL 2 TIMES DAILY
Qty: 60 CAPSULE | Refills: 6 | Status: SHIPPED | OUTPATIENT
Start: 2021-10-15 | End: 2022-06-01

## 2021-11-10 ENCOUNTER — OFFICE VISIT (OUTPATIENT)
Dept: CARDIOLOGY CLINIC | Age: 56
End: 2021-11-10
Payer: MEDICARE

## 2021-11-10 VITALS
HEIGHT: 71 IN | HEART RATE: 91 BPM | SYSTOLIC BLOOD PRESSURE: 143 MMHG | BODY MASS INDEX: 32.9 KG/M2 | OXYGEN SATURATION: 100 % | DIASTOLIC BLOOD PRESSURE: 63 MMHG | WEIGHT: 235 LBS

## 2021-11-10 DIAGNOSIS — E78.5 HYPERLIPIDEMIA, UNSPECIFIED HYPERLIPIDEMIA TYPE: ICD-10-CM

## 2021-11-10 DIAGNOSIS — I51.7 LVH (LEFT VENTRICULAR HYPERTROPHY): ICD-10-CM

## 2021-11-10 DIAGNOSIS — I10 ESSENTIAL HYPERTENSION: ICD-10-CM

## 2021-11-10 DIAGNOSIS — I25.10 CORONARY ARTERY DISEASE INVOLVING NATIVE CORONARY ARTERY OF NATIVE HEART WITHOUT ANGINA PECTORIS: Primary | ICD-10-CM

## 2021-11-10 PROCEDURE — G8427 DOCREV CUR MEDS BY ELIG CLIN: HCPCS | Performed by: INTERNAL MEDICINE

## 2021-11-10 PROCEDURE — G8754 DIAS BP LESS 90: HCPCS | Performed by: INTERNAL MEDICINE

## 2021-11-10 PROCEDURE — G8753 SYS BP > OR = 140: HCPCS | Performed by: INTERNAL MEDICINE

## 2021-11-10 PROCEDURE — 99214 OFFICE O/P EST MOD 30 MIN: CPT | Performed by: INTERNAL MEDICINE

## 2021-11-10 PROCEDURE — G9899 SCRN MAM PERF RSLTS DOC: HCPCS | Performed by: INTERNAL MEDICINE

## 2021-11-10 PROCEDURE — G8432 DEP SCR NOT DOC, RNG: HCPCS | Performed by: INTERNAL MEDICINE

## 2021-11-10 PROCEDURE — G8417 CALC BMI ABV UP PARAM F/U: HCPCS | Performed by: INTERNAL MEDICINE

## 2021-11-10 PROCEDURE — 3017F COLORECTAL CA SCREEN DOC REV: CPT | Performed by: INTERNAL MEDICINE

## 2021-11-10 RX ORDER — ATORVASTATIN CALCIUM 40 MG/1
40 TABLET, FILM COATED ORAL
Qty: 90 TABLET | Refills: 3 | Status: SHIPPED | OUTPATIENT
Start: 2021-11-10 | End: 2022-02-22

## 2021-11-10 RX ORDER — INSULIN ASPART 100 [IU]/ML
INJECTION, SUSPENSION SUBCUTANEOUS
COMMUNITY
Start: 2021-11-08

## 2021-11-10 NOTE — PROGRESS NOTES
HISTORY OF PRESENT ILLNESS  Ale Mclean is a 60 y.o. female. 5/5/2021  Patient is seen today for follow-up. She has a history of noncritical CAD, hypertension hyperlipidemia and rheumatoid arthritis. The chest pains are stable. Denies any shortness of breath. No palpitations stable on treatment      Follow-up  Pertinent negatives include no chest pain, no abdominal pain, no headaches and no shortness of breath. Review of Systems   Constitutional: Negative for chills and fever. HENT: Negative for nosebleeds. Eyes: Negative for blurred vision and double vision. Respiratory: Negative for cough, hemoptysis, sputum production, shortness of breath and wheezing. Cardiovascular: Negative for chest pain, palpitations, orthopnea, claudication, leg swelling and PND. Gastrointestinal: Negative for abdominal pain, heartburn, nausea and vomiting. Musculoskeletal: Negative for myalgias. Skin: Negative for rash. Neurological: Negative for dizziness, weakness and headaches. Endo/Heme/Allergies: Does not bruise/bleed easily.      Family History   Problem Relation Age of Onset    Diabetes Other     Alcohol abuse Mother    Unk Nuriswa Arthritis-osteo Mother     Diabetes Mother     Elevated Lipids Mother     Headache Mother     Heart Disease Mother    Vance Lawlerwa Migraines Mother     Psychiatric Disorder Mother     Alcohol abuse Father    Ron [place] Father     Cancer Father     Headache Father     Heart Disease Father     Lung Disease Father     Migraines Father     Heart Surgery Father     Alcohol abuse Sister     Headache Sister     Diabetes Sister     Heart Disease Sister     Migraines Sister     Psychiatric Disorder Sister     Headache Brother     Diabetes Brother     Elevated Lipids Brother     Heart Disease Brother     Migraines Brother     Psychiatric Disorder Brother     Coronary Art Dis Brother     Cancer Maternal Aunt     Alcohol abuse Maternal Aunt     Diabetes Maternal Aunt     Headache Maternal Aunt     Lung Disease Maternal Aunt     Migraines Maternal Aunt     Headache Maternal Uncle     Migraines Maternal Uncle     Stroke Maternal Uncle     Psychiatric Disorder Maternal Uncle     Headache Paternal Aunt     Migraines Paternal Aunt     Headache Paternal Uncle     Hypertension Paternal Uncle     Migraines Paternal Uncle     Stroke Paternal Uncle     Headache Maternal Grandmother     Migraines Maternal Grandmother     Stroke Maternal Grandmother     Headache Maternal Grandfather     Migraines Maternal Grandfather     Stroke Maternal Grandfather     Headache Paternal Grandmother     Hypertension Paternal Grandmother     Lung Disease Paternal Grandmother     Migraines Paternal Grandmother     Cancer Paternal Grandmother     Headache Paternal Grandfather     Cancer Paternal Grandfather     Migraines Paternal Grandfather        Past Medical History:   Diagnosis Date    Abdominal pain, unspecified site     Acute otitis media     Acute pharyngitis     Anxiety     Arthritis     Autoimmune disease ([address]     Back injury     Backache     herniated disc in lower back    Blurred vision     Chest pain 5/4/2018    Chest pain, unspecified     abnormal EKG    Chronic airway obstruction, not elsewhere classified     Chronic back pain     Chronic pain     COPD     Depression     Diabetes ([address]     Dizziness     Dizziness and giddiness     possible orthostatic changes, vasovagal, autonomic dysfunction from diabetic neuropathy    Encounters for unspecified administrative purpose     Essential thrombocytosis ([address] 1/6/2016    Feeling anxious     Fibromyalgia     Headache(784.0)     Hemorrhoid     Herniated disc     at L5    Hypercholesterolemia     Hyperglycemia     Hypertension     Joint pain     Leukocytosis, unspecified     Major depressive disorder, single episode, mild ([address]     Mental disorder     depression, anxiety, bipolar and PTSD  Neuropathy     Obesity, unspecified     Other chest pain     Palpitation 5/4/2018    ? arrhythmia    Phobic disorders     Rheumatoid arthritis (HCC)     Rheumatoid arthritis of foot ([facility] 5/4/2018    on treatment    Seasonal allergies     Shortness of breath     normal EF    Smoking 5/4/2018    discussed cessation    Streptococcal sore throat     Type 2 diabetes mellitus without complication, with long-term current use of insulin ([facility] 5/4/2018    Type II or unspecified type diabetes mellitus with unspecified complication, uncontrolled     Unspecified hereditary and idiopathic peripheral neuropathy     Vitamin D deficiency        Past Surgical History:   Procedure Laterality Date    HX CHOLECYSTECTOMY  1994    HX GYN  2005    partial Hysterectomy    HX OTHER SURGICAL  12-1-15    had ingrown toenail removed from big toe, both feet    HX TUBAL LIGATION      1995       Social History     Tobacco Use    Smoking status: Current Every Day Smoker     Packs/day: 0.25    Smokeless tobacco: Never Used    Tobacco comment: 5 cigarettes day   Substance Use Topics    Alcohol use: Yes     Comment: socially       Allergies   Allergen Reactions    Levaquin [Levofloxacin] Anaphylaxis    Pollen Extracts Unable to Obtain       Prior to Admission medications    Medication Sig Start Date End Date Taking? Authorizing Provider   NovoLOG Mix 70-30 U-100 Insuln 100 unit/mL (70-30) injection  11/8/21  Yes Provider, Historical   atorvastatin (LIPITOR) 40 mg tablet Take 1 Tablet by mouth nightly. 11/10/21  Yes Nida Guzman MD   anagrelide (AGRYLIN) 0.5 mg capsule Take 1 Capsule by mouth two (2) times a day. Indications: spontaneous hemorrhages and increase in blood platelets 44/57/21  Yes Blanquita Hernandez NP   adalimumab (Humira) 40 mg/0.8 mL injection 40 mg by SubCUTAneous route every seven (7) days. Yes Provider, Historical   folic acid 296 mcg tablet Take 1 Tab by mouth daily.  8/25/20  Yes Hamilton Hernandez, NP   gabapentin (NEURONTIN) 100 mg capsule Take 100 mg by mouth three (3) times daily. Yes Other, MD Lelia   mirtazapine (REMERON) 15 mg tablet  3/27/20  Yes Provider, Historical   hydroCHLOROthiazide (HYDRODIURIL) 12.5 mg tablet Take 12.5 mg by mouth daily. Yes Provider, Historical   losartan (COZAAR) 50 mg tablet Take 1 Tab by mouth daily. 10/23/19  Yes Criss Bolden NP   aspirin delayed-release 81 mg tablet Take 1 Tab by mouth daily. 8/9/19  Yes Jacinta Gonzalez MD   naloxone [facility]) 4 mg/actuation nasal spray Use 1 spray intranasally, then discard. Repeat with new spray every 2 min as needed for opioid overdose symptoms, alternating nostrils. Indications: Decrease in Rate & Depth of Breathing due to Opioid Drug, opioid overdose 4/30/19  Yes Christianna Osler A, NP   albuterol (PROAIR HFA) 90 mcg/actuation inhaler Take 1 Puff by inhalation every six (6) hours as needed for Wheezing. 2/6/16  Yes Brandie Wilder,    methotrexate (RHEUMATREX) 2.5 mg tablet Take 2.5 mg by mouth every fourty-eight (48) hours. Yes Provider, Historical   metFORMIN (GLUCOPHAGE) 1,000 mg tablet Take 500 mg by mouth two (2) times a day. Yes Provider, Historical   Cholecalciferol, Vitamin D3, 5,000 unit Tab Take 5,000 Units by mouth every seven (7) days. Yes Other, MD Lelia   atenolol (TENORMIN) 25 mg tablet Take 25 mg by mouth daily. Indications: HYPERTENSION   Yes Provider, Historical   furosemide (LASIX) 40 mg tablet Take  by mouth daily. Yes Provider, Historical         Visit Vitals  BP (!) 143/63 (BP 1 Location: Left upper arm, BP Patient Position: Sitting, BP Cuff Size: Adult)   Pulse 91   Ht 5' 11\" (1.803 m)   Wt 106.6 kg (235 lb)   SpO2 100%   BMI 32.78 kg/m²         Physical Exam  Constitutional:       Appearance: She is well-developed. HENT:      Head: Normocephalic and atraumatic. Eyes:      Conjunctiva/sclera: Conjunctivae normal.   Neck:      Thyroid: No thyromegaly. Vascular: No JVD.       Trachea: No tracheal deviation. Cardiovascular:      Rate and Rhythm: Normal rate and regular rhythm. Chest Wall: PMI is not displaced. Pulses: No decreased pulses. Heart sounds: No murmur heard. No gallop. No S3 sounds. Pulmonary:      Effort: No respiratory distress. Breath sounds: No wheezing or rales. Chest:      Chest wall: No tenderness. Abdominal:      Palpations: Abdomen is soft. Tenderness: There is no abdominal tenderness. Musculoskeletal:      Cervical back: Neck supple. Skin:     General: Skin is warm. Neurological:      Mental Status: She is alert and oriented to person, place, and time. Ms. Anam Oconnor has a reminder for a \"due or due soon\" health maintenance. I have asked that she contact her primary care provider for follow-up on this health maintenance. No flowsheet data found. 5/2018  sr,iwmi  SUMMARY:echo-5/2018  Left ventricle: Systolic function was normal. Ejection fraction was  estimated in the range of 55 % to 60 %. There were no regional wall motion  abnormalities. There was mild concentric hypertrophy. Doppler parameters  were consistent with abnormal left ventricular relaxation (grade 1  diastolic dysfunction). Impression 5/2018  NUCLEAR IMAGING:   Findings:   1. Stress images reveal normal Myoview distrubution in all the LV segments in short axis, vertical and horizontal long axis views. 2. Resting images have a normal uptake. 3. Gated images reveal normal wall motion and the ejection fraction is calculated to be 57%. Conclusion:   1. Normal perfusion scan. 2. Normal wall motion and ejection fraction is calculated at 57%. 3. No evidence of significant fixed or reversible defect suggesting ischemia or myocardial infarction noted from this nuclear study. 4. Low risk scan.   5/2018  Cardiac telemetrysinus rhythm sinus tachycardia, no significant arrhythmia .     Interpretation Summary 8/2019    · Left Ventricle: Normal cavity size and wall thickness. Low normal systolic dysfunction. Estimated left ventricular ejection fraction is 51 - 55%. Visually measured ejection fraction. No regional wall motion abnormality noted. Mild (grade 1) left ventricular diastolic dysfunction. · Pulmonary Artery: Pulmonary arterial systolic pressure is 26 mmHg. Coronary Findings 8/2019    Diagnostic   Dominance: Right   Left Anterior Descending   Prox LAD lesion 45% stenosed. .   Right Coronary Artery   Mid RCA lesion 30% stenosed. Henry Pb Results for Benedicto Clay (MRN [id number]) as of 5/5/2021 11:23   Ref. Range 4/19/2021 08:45   Triglyceride Latest Ref Range: <150 MG/   Cholesterol, total Latest Ref Range: <200 MG/   HDL Cholesterol Latest Ref Range: 40 - 60 MG/DL 41   CHOL/HDL Ratio Latest Ref Range: 0 - 5.0   3.4   VLDL, calculated Latest Units: MG/DL 27.8   LDL, calculated Latest Ref Range: 0 - 100 MG/DL 71.2   Hemoglobin A1c, (calculated) Latest Ref Range: 4.2 - 5.6 % 11.6 (H)       Assessment         ICD-10-CM ICD-9-CM    1. Coronary artery disease involving native coronary artery of native heart without angina pectoris  I25.10 414.01     Stable continue current treatment monitor   2. Essential hypertension  I10 401.9     Mildly elevated has not taken medication monitor   3. Hyperlipidemia, unspecified hyperlipidemia type  E78.5 272.4 atorvastatin (LIPITOR) 40 mg tablet    Continue treatment lab with PCP   4. LVH (left ventricular hypertrophy)  I51.7 429.3     Stable monitor   5. Hyperlipidemia, unspecified hyperlipidemia type  E78.5 272.4 atorvastatin (LIPITOR) 40 mg tablet   9/2019  Recent non-STEMI 8/2019. Cardiac cath with noncritical 40% LAD and 30% RCA disease. Medical management asymptomatic since discharge. Continue statin aspirin and other medication. Check labs  9/2020  Cardiac status stable continue medical management  5/8/2021  Cardiac status stable. Angina stable. Continue with diet and exercise.   Continue efforts at smoking cessation  11/2021  Cardiac status stable continue current medical management. Angina stable monitor  Orders Placed This Encounter    atorvastatin (LIPITOR) 40 mg tablet     Sig: Take 1 Tablet by mouth nightly. Dispense:  90 Tablet     Refill:  3       Follow-up and Dispositions    · Return in about 6 months (around 5/10/2022).

## 2021-11-10 NOTE — PROGRESS NOTES
1. Have you been to the ER, urgent care clinic since your last visit? Hospitalized since your last visit? Yes Where: [facility] for sinus infection    2. Have you seen or consulted any other health care providers outside of the [address] [place] [address] since your last visit? Include any pap smears or colon screening.  No

## 2022-02-22 DIAGNOSIS — E78.5 HYPERLIPIDEMIA, UNSPECIFIED HYPERLIPIDEMIA TYPE: ICD-10-CM

## 2022-02-22 RX ORDER — ATORVASTATIN CALCIUM 40 MG/1
TABLET, FILM COATED ORAL
Qty: 30 TABLET | Refills: 3 | Status: SHIPPED | OUTPATIENT
Start: 2022-02-22 | End: 2022-05-04 | Stop reason: SDUPTHER

## 2022-03-11 ENCOUNTER — TRANSCRIBE ORDER (OUTPATIENT)
Dept: SCHEDULING | Age: 57
End: 2022-03-11

## 2022-03-11 DIAGNOSIS — Z12.31 VISIT FOR SCREENING MAMMOGRAM: Primary | ICD-10-CM

## 2022-03-14 ENCOUNTER — HOSPITAL ENCOUNTER (OUTPATIENT)
Dept: MAMMOGRAPHY | Age: 57
Discharge: HOME | End: 2022-03-14
Attending: INTERNAL MEDICINE
Payer: MEDICARE

## 2022-03-14 DIAGNOSIS — Z12.31 VISIT FOR SCREENING MAMMOGRAM: ICD-10-CM

## 2022-03-14 PROCEDURE — 77063 BREAST TOMOSYNTHESIS BI: CPT

## 2022-03-18 ENCOUNTER — TRANSCRIBE ORDER (OUTPATIENT)
Dept: SCHEDULING | Age: 57
End: 2022-03-18

## 2022-03-18 PROBLEM — G89.4 CHRONIC PAIN SYNDROME: Status: ACTIVE | Noted: 2017-08-22

## 2022-03-18 PROBLEM — N60.81 SEBACEOUS CYST OF SKIN OF RIGHT BREAST: Status: ACTIVE | Noted: 2018-07-24

## 2022-03-19 PROBLEM — R07.9 CHEST PAIN: Status: ACTIVE | Noted: 2018-05-04

## 2022-03-19 PROBLEM — Z72.0 TOBACCO USE: Status: ACTIVE | Noted: 2018-05-04

## 2022-03-19 PROBLEM — E11.21 TYPE 2 DIABETES WITH NEPHROPATHY (HCC): Status: ACTIVE | Noted: 2018-05-30

## 2022-03-19 PROBLEM — I50.32 CHRONIC DIASTOLIC CONGESTIVE HEART FAILURE (HCC): Status: ACTIVE | Noted: 2019-08-08

## 2022-03-19 PROBLEM — M06.9 RHEUMATOID ARTHRITIS OF FOOT (HCC): Status: ACTIVE | Noted: 2018-05-04

## 2022-03-19 PROBLEM — Z79.4 TYPE 2 DIABETES MELLITUS WITHOUT COMPLICATION, WITH LONG-TERM CURRENT USE OF INSULIN (HCC): Status: ACTIVE | Noted: 2018-05-04

## 2022-03-19 PROBLEM — E11.9 TYPE 2 DIABETES MELLITUS WITHOUT COMPLICATION, WITH LONG-TERM CURRENT USE OF INSULIN (HCC): Status: ACTIVE | Noted: 2018-05-04

## 2022-03-19 PROBLEM — E78.5 HYPERLIPIDEMIA: Status: ACTIVE | Noted: 2020-03-30

## 2022-03-19 PROBLEM — R00.2 PALPITATION: Status: ACTIVE | Noted: 2018-05-04

## 2022-03-19 PROBLEM — M06.9 RHEUMATOID ARTHRITIS (HCC): Status: ACTIVE | Noted: 2020-08-25

## 2022-03-20 PROBLEM — I24.9 ACS (ACUTE CORONARY SYNDROME) (HCC): Status: ACTIVE | Noted: 2019-08-07

## 2022-03-20 PROBLEM — J20.9 ACUTE BRONCHITIS: Status: ACTIVE | Noted: 2019-08-08

## 2022-03-23 ENCOUNTER — TRANSCRIBE ORDER (OUTPATIENT)
Dept: SCHEDULING | Age: 57
End: 2022-03-23

## 2022-03-23 DIAGNOSIS — R92.8 ABNORMAL MAMMOGRAM: Primary | ICD-10-CM

## 2022-04-15 ENCOUNTER — HOSPITAL ENCOUNTER (OUTPATIENT)
Dept: ULTRASOUND IMAGING | Age: 57
Discharge: HOME | End: 2022-04-15
Attending: INTERNAL MEDICINE
Payer: MEDICARE

## 2022-04-15 ENCOUNTER — HOSPITAL ENCOUNTER (OUTPATIENT)
Dept: MAMMOGRAPHY | Age: 57
Discharge: HOME | End: 2022-04-15
Attending: INTERNAL MEDICINE
Payer: MEDICARE

## 2022-04-15 DIAGNOSIS — R92.8 ABNORMAL MAMMOGRAM: ICD-10-CM

## 2022-04-15 PROCEDURE — 77066 DX MAMMO INCL CAD BI: CPT

## 2022-05-03 ENCOUNTER — HOSPITAL ENCOUNTER (OUTPATIENT)
Dept: MAMMOGRAPHY | Age: 57
Discharge: HOME | End: 2022-05-03
Attending: INTERNAL MEDICINE
Payer: MEDICARE

## 2022-05-03 DIAGNOSIS — R92.0 MAMMOGRAPHIC MICROCALCIFICATION: ICD-10-CM

## 2022-05-03 DIAGNOSIS — R92.8 ABNORMAL MAMMOGRAM: ICD-10-CM

## 2022-05-03 PROCEDURE — 74011000250 HC RX REV CODE- 250

## 2022-05-03 PROCEDURE — 19081 BX BREAST 1ST LESION STRTCTC: CPT

## 2022-05-03 PROCEDURE — 88305 TISSUE EXAM BY PATHOLOGIST: CPT

## 2022-05-03 PROCEDURE — 77066 DX MAMMO INCL CAD BI: CPT

## 2022-05-03 RX ORDER — LIDOCAINE HYDROCHLORIDE AND EPINEPHRINE 10; 10 MG/ML; UG/ML
1-20 INJECTION, SOLUTION INFILTRATION; PERINEURAL
Status: COMPLETED | OUTPATIENT
Start: 2022-05-03 | End: 2022-05-03

## 2022-05-03 RX ORDER — LIDOCAINE HYDROCHLORIDE 10 MG/ML
1-5 INJECTION, SOLUTION EPIDURAL; INFILTRATION; INTRACAUDAL; PERINEURAL
Status: COMPLETED | OUTPATIENT
Start: 2022-05-03 | End: 2022-05-03

## 2022-05-03 RX ADMIN — LIDOCAINE HYDROCHLORIDE 3 ML: 10 INJECTION, SOLUTION EPIDURAL; INFILTRATION; INTRACAUDAL; PERINEURAL at 08:40

## 2022-05-03 RX ADMIN — LIDOCAINE HYDROCHLORIDE 3 ML: 10 INJECTION, SOLUTION EPIDURAL; INFILTRATION; INTRACAUDAL; PERINEURAL at 09:10

## 2022-05-03 RX ADMIN — LIDOCAINE HYDROCHLORIDE AND EPINEPHRINE 20 MG: 10; 10 INJECTION, SOLUTION INFILTRATION; PERINEURAL at 08:41

## 2022-05-03 RX ADMIN — LIDOCAINE HYDROCHLORIDE AND EPINEPHRINE 20 MG: 10; 10 INJECTION, SOLUTION INFILTRATION; PERINEURAL at 09:11

## 2022-05-04 ENCOUNTER — OFFICE VISIT (OUTPATIENT)
Dept: CARDIOLOGY CLINIC | Age: 57
End: 2022-05-04
Payer: MEDICARE

## 2022-05-04 VITALS
HEART RATE: 104 BPM | OXYGEN SATURATION: 97 % | WEIGHT: 227 LBS | HEIGHT: 71 IN | DIASTOLIC BLOOD PRESSURE: 71 MMHG | BODY MASS INDEX: 31.78 KG/M2 | SYSTOLIC BLOOD PRESSURE: 147 MMHG

## 2022-05-04 DIAGNOSIS — E78.5 HYPERLIPIDEMIA, UNSPECIFIED HYPERLIPIDEMIA TYPE: ICD-10-CM

## 2022-05-04 DIAGNOSIS — I51.7 LVH (LEFT VENTRICULAR HYPERTROPHY): ICD-10-CM

## 2022-05-04 DIAGNOSIS — I10 ESSENTIAL HYPERTENSION: ICD-10-CM

## 2022-05-04 DIAGNOSIS — I25.10 CORONARY ARTERY DISEASE INVOLVING NATIVE CORONARY ARTERY OF NATIVE HEART WITHOUT ANGINA PECTORIS: Primary | ICD-10-CM

## 2022-05-04 PROCEDURE — G8432 DEP SCR NOT DOC, RNG: HCPCS | Performed by: INTERNAL MEDICINE

## 2022-05-04 PROCEDURE — G8753 SYS BP > OR = 140: HCPCS | Performed by: INTERNAL MEDICINE

## 2022-05-04 PROCEDURE — 99214 OFFICE O/P EST MOD 30 MIN: CPT | Performed by: INTERNAL MEDICINE

## 2022-05-04 PROCEDURE — G9899 SCRN MAM PERF RSLTS DOC: HCPCS | Performed by: INTERNAL MEDICINE

## 2022-05-04 PROCEDURE — G8417 CALC BMI ABV UP PARAM F/U: HCPCS | Performed by: INTERNAL MEDICINE

## 2022-05-04 PROCEDURE — G8754 DIAS BP LESS 90: HCPCS | Performed by: INTERNAL MEDICINE

## 2022-05-04 PROCEDURE — G8427 DOCREV CUR MEDS BY ELIG CLIN: HCPCS | Performed by: INTERNAL MEDICINE

## 2022-05-04 PROCEDURE — 3017F COLORECTAL CA SCREEN DOC REV: CPT | Performed by: INTERNAL MEDICINE

## 2022-05-04 RX ORDER — CLONAZEPAM 0.5 MG/1
0.5 TABLET ORAL
COMMUNITY
Start: 2022-03-03

## 2022-05-04 RX ORDER — ATORVASTATIN CALCIUM 40 MG/1
40 TABLET, FILM COATED ORAL
Qty: 90 TABLET | Refills: 3 | Status: SHIPPED | OUTPATIENT
Start: 2022-05-04

## 2022-05-04 RX ORDER — LEFLUNOMIDE 20 MG/1
20 TABLET ORAL DAILY
COMMUNITY
Start: 2022-04-19

## 2022-05-04 NOTE — PROGRESS NOTES
HISTORY OF PRESENT ILLNESS  Karla Flanagan is a 60 y.o. female. 5/5/2021  Patient is seen today for follow-up. She has a history of noncritical CAD, hypertension hyperlipidemia and rheumatoid arthritis. The chest pains are stable. Denies any shortness of breath. No palpitations stable on treatment      Follow-up  Pertinent negatives include no chest pain, no abdominal pain, no headaches and no shortness of breath. Review of Systems   Constitutional: Negative for chills and fever. HENT: Negative for nosebleeds. Eyes: Negative for blurred vision and double vision. Respiratory: Negative for cough, hemoptysis, sputum production, shortness of breath and wheezing. Cardiovascular: Negative for chest pain, palpitations, orthopnea, claudication, leg swelling and PND. Gastrointestinal: Negative for abdominal pain, heartburn, nausea and vomiting. Musculoskeletal: Negative for myalgias. Skin: Negative for rash. Neurological: Negative for dizziness, weakness and headaches. Endo/Heme/Allergies: Does not bruise/bleed easily.      Family History   Problem Relation Age of Onset    Diabetes Other     Alcohol abuse Mother     OSTEOARTHRITIS Mother     Diabetes Mother     Elevated Lipids Mother     Headache Mother     Heart Disease Mother    [place] Migraines Mother     Psychiatric Disorder Mother     Alcohol abuse Father     OSTEOARTHRITIS Father     Cancer Father     Headache Father     Heart Disease Father     Lung Disease Father     Migraines Father     Heart Surgery Father     Alcohol abuse Sister     Headache Sister     Diabetes Sister     Heart Disease Sister     Migraines Sister     Psychiatric Disorder Sister     Headache Brother     Diabetes Brother     Elevated Lipids Brother     Heart Disease Brother     Migraines Brother     Psychiatric Disorder Brother     Coronary Art Dis Brother     Cancer Maternal Aunt     Alcohol abuse Maternal Aunt     Diabetes Maternal Aunt  Headache Maternal Aunt     Lung Disease Maternal Aunt     Migraines Maternal Aunt     Headache Maternal Uncle     Migraines Maternal Uncle     Stroke Maternal Uncle     Psychiatric Disorder Maternal Uncle     Headache Paternal Aunt     Migraines Paternal Aunt     Headache Paternal Uncle     Hypertension Paternal Uncle     Migraines Paternal Uncle     Stroke Paternal Uncle     Headache Maternal Grandmother     Migraines Maternal Grandmother     Stroke Maternal Grandmother     Headache Maternal Grandfather     Migraines Maternal Grandfather     Stroke Maternal Grandfather     Headache Paternal Grandmother     Hypertension Paternal Grandmother     Lung Disease Paternal Grandmother     Migraines Paternal Grandmother     Cancer Paternal Grandmother     Headache Paternal Grandfather     Cancer Paternal Grandfather     Migraines Paternal Grandfather        Past Medical History:   Diagnosis Date    Abdominal pain, unspecified site     Acute otitis media     Acute pharyngitis     Anxiety     Arthritis     [facility]immune disease [facility] [address]     Back injury     Backache     herniated disc in lower back    Blurred vision     Chest pain 5/4/2018    Chest pain, unspecified     abnormal EKG    Chronic airway obstruction, not elsewhere classified     Chronic back pain     Chronic pain     COPD     Depression     Diabetes ([address]     Dizziness     Dizziness and giddiness     possible orthostatic changes, vasovagal, autonomic dysfunction from diabetic neuropathy    Encounters for unspecified administrative purpose     Essential thrombocytosis ([address] 1/6/2016    Feeling anxious     Fibromyalgia     Headache(784.0)     Hemorrhoid     Herniated disc     at L5    Hypercholesterolemia     Hyperglycemia     Hypertension     Joint pain     Leukocytosis, unspecified     Major depressive disorder, single episode, mild ([address]     Mental disorder     depression, anxiety, bipolar and PTSD    Neuropathy     Obesity, unspecified     Other chest pain     Palpitation 5/4/2018    ? arrhythmia    Phobic disorders     Rheumatoid arthritis (HCC)     Rheumatoid arthritis of foot ([facility] [address] 5/4/2018    on treatment    Seasonal allergies     Shortness of breath     normal EF    Smoking 5/4/2018    discussed cessation    Streptococcal sore throat     Type 2 diabetes mellitus without complication, with long-term current use of insulin ([facility] [address] 5/4/2018    Type II or unspecified type diabetes mellitus with unspecified complication, uncontrolled     Unspecified hereditary and idiopathic peripheral neuropathy     Vitamin D deficiency        Past Surgical History:   Procedure Laterality Date    HX CHOLECYSTECTOMY  1994    HX GYN  2005    partial Hysterectomy    HX OTHER SURGICAL  12-1-15    had ingrown toenail removed from big toe, both feet    HX TUBAL LIGATION      1995       Social History     Tobacco Use    Smoking status: Current Every Day Smoker     Packs/day: 0.25    Smokeless tobacco: Never Used    Tobacco comment: 5 cigarettes day   Substance Use Topics    Alcohol use: Yes     Comment: socially       Allergies   Allergen Reactions    Levaquin [Levofloxacin] Anaphylaxis    Pollen Extracts Unable to Obtain       Prior to Admission medications    Medication Sig Start Date End Date Taking? Authorizing Provider   leflunomide (ARAVA) 20 mg tablet Take 20 mg by mouth daily. 4/19/22  Yes Provider, Historical   clonazePAM (KlonoPIN) 0.5 mg tablet Take 0.5 mg by mouth daily as needed. 3/3/22  Yes Provider, Historical   NovoLOG Mix 70-30 U-100 Insuln 100 unit/mL (70-30) injection  11/8/21  Yes Provider, Historical   anagrelide (AGRYLIN) 0.5 mg capsule Take 1 Capsule by mouth two (2) times a day. Indications: spontaneous hemorrhages and increase in blood platelets 92/48/96  Yes Blanquita Hernandez DNP   adalimumab (Humira) 40 mg/0.8 mL injection 40 mg by SubCUTAneous route every seven (7) days.    Yes Provider, Historical   gabapentin (NEURONTIN) 100 mg capsule Take 100 mg by mouth three (3) times daily. Yes Other, MD Lelia   mirtazapine (REMERON) 15 mg tablet  3/27/20  Yes Provider, Historical   hydroCHLOROthiazide (HYDRODIURIL) 12.5 mg tablet Take 12.5 mg by mouth daily. Yes Provider, Historical   losartan (COZAAR) 50 mg tablet Take 1 Tab by mouth daily. 10/23/19  Yes Criss Bolden NP   aspirin delayed-release 81 mg tablet Take 1 Tab by mouth daily. 8/9/19  Yes Nida Shipley MD   naloxone [facility]) 4 mg/actuation nasal spray Use 1 spray intranasally, then discard. Repeat with new spray every 2 min as needed for opioid overdose symptoms, alternating nostrils. Indications: Decrease in Rate & Depth of Breathing due to Opioid Drug, opioid overdose 4/30/19  Yes Loretha Ciromaite CHASE NP   albuterol (PROAIR HFA) 90 mcg/actuation inhaler Take 1 Puff by inhalation every six (6) hours as needed for Wheezing. 2/6/16  Yes Elizabeth Wilder,    metFORMIN (GLUCOPHAGE) 1,000 mg tablet Take 500 mg by mouth two (2) times a day. Yes Provider, Historical   Cholecalciferol, Vitamin D3, 5,000 unit Tab Take 5,000 Units by mouth every seven (7) days. Yes Other, MD Lelia   atenolol (TENORMIN) 25 mg tablet Take 25 mg by mouth daily. Indications: HYPERTENSION   Yes Provider, Historical   furosemide (LASIX) 40 mg tablet Take  by mouth daily. Yes Provider, Historical   atorvastatin (LIPITOR) 40 mg tablet Take 1 Tablet by mouth nightly. 5/4/22  Yes Andrew Paredes MD         Visit Vitals  BP (!) 147/71 (BP 1 Location: Left upper arm, BP Patient Position: Sitting, BP Cuff Size: Adult long)   Pulse (!) 104   Ht 5' 11\" (1.803 m)   Wt 103 kg (227 lb)   SpO2 97%   BMI 31.66 kg/m²         Physical Exam  Constitutional:       Appearance: She is well-developed. HENT:      Head: Normocephalic and atraumatic. Eyes:      Conjunctiva/sclera: Conjunctivae normal.   Neck:      Thyroid: No thyromegaly. Vascular: No JVD.       Trachea: No tracheal deviation. Cardiovascular:      Rate and Rhythm: Normal rate and regular rhythm. Chest Wall: PMI is not displaced. Pulses: No decreased pulses. Heart sounds: No murmur heard. No gallop. No S3 sounds. Pulmonary:      Effort: No respiratory distress. Breath sounds: No wheezing or rales. Chest:      Chest wall: No tenderness. Abdominal:      Palpations: Abdomen is soft. Tenderness: There is no abdominal tenderness. Musculoskeletal:      Cervical back: Neck supple. Skin:     General: Skin is warm. Neurological:      Mental Status: She is alert and oriented to person, place, and time. Ms. Yanet aMtthews has a reminder for a \"due or due soon\" health maintenance. I have asked that she contact her primary care provider for follow-up on this health maintenance. No flowsheet data found. 5/2018  sr,iwmi  SUMMARY:echo-5/2018  Left ventricle: Systolic function was normal. Ejection fraction was  estimated in the range of 55 % to 60 %. There were no regional wall motion  abnormalities. There was mild concentric hypertrophy. Doppler parameters  were consistent with abnormal left ventricular relaxation (grade 1  diastolic dysfunction). Impression 5/2018  NUCLEAR IMAGING:   Findings:   1. Stress images reveal normal Myoview distrubution in all the LV segments in short axis, vertical and horizontal long axis views. 2. Resting images have a normal uptake. 3. Gated images reveal normal wall motion and the ejection fraction is calculated to be 57%. Conclusion:   1. Normal perfusion scan. 2. Normal wall motion and ejection fraction is calculated at 57%. 3. No evidence of significant fixed or reversible defect suggesting ischemia or myocardial infarction noted from this nuclear study. 4. Low risk scan.   5/2018  Cardiac telemetrysinus rhythm sinus tachycardia, no significant arrhythmia .     Interpretation Summary 8/2019    · Left Ventricle: Normal cavity size and wall thickness. Low normal systolic dysfunction. Estimated left ventricular ejection fraction is 51 - 55%. Visually measured ejection fraction. No regional wall motion abnormality noted. Mild (grade 1) left ventricular diastolic dysfunction. · Pulmonary Artery: Pulmonary arterial systolic pressure is 26 mmHg. Coronary Findings 8/2019    Diagnostic   Dominance: Right   Left Anterior Descending   Prox LAD lesion 45% stenosed. .   Right Coronary Artery   Mid RCA lesion 30% stenosed. Thresa Abed Results for Selvin Horn (MRN [id number]) as of 5/5/2021 11:23   Ref. Range 4/19/2021 08:45   Triglyceride Latest Ref Range: <150 MG/   Cholesterol, total Latest Ref Range: <200 MG/   HDL Cholesterol Latest Ref Range: 40 - 60 MG/DL 41   CHOL/HDL Ratio Latest Ref Range: 0 - 5.0   3.4   VLDL, calculated Latest Units: MG/DL 27.8   LDL, calculated Latest Ref Range: 0 - 100 MG/DL 71.2   Hemoglobin A1c, (calculated) Latest Ref Range: 4.2 - 5.6 % 11.6 (H)       Assessment         ICD-10-CM ICD-9-CM    1. Coronary artery disease involving native coronary artery of native heart without angina pectoris  I25.10 414.01     Stable continue treatment monitor   2. Essential hypertension  I10 401.9     Elevated has not taken medication monitor at home   3. LVH (left ventricular hypertrophy)  I51.7 429.3     Stable monitor   4. Hyperlipidemia, unspecified hyperlipidemia type  E78.5 272.4 atorvastatin (LIPITOR) 40 mg tablet    Continue treatment lab with PCP   5. Hyperlipidemia, unspecified hyperlipidemia type  E78.5 272.4 atorvastatin (LIPITOR) 40 mg tablet   9/2019  Recent non-STEMI 8/2019. Cardiac cath with noncritical 40% LAD and 30% RCA disease. Medical management asymptomatic since discharge. Continue statin aspirin and other medication. Check labs  9/2020  Cardiac status stable continue medical management  5/8/2021  Cardiac status stable. Angina stable. Continue with diet and exercise.   Continue efforts at smoking cessation  11/2021  Cardiac status stable continue current medical management. Angina stable monitor  5/2022  Stable cardiac status continue medical management. Blood pressure elevated as she has not taken medication. Follow-up with PCP  Orders Placed This Encounter    atorvastatin (LIPITOR) 40 mg tablet     Sig: Take 1 Tablet by mouth nightly. Dispense:  90 Tablet     Refill:  3       Follow-up and Dispositions    · Return in about 6 months (around 11/4/2022).

## 2022-05-04 NOTE — PROGRESS NOTES
1. Have you been to the ER, urgent care clinic since your last visit? Hospitalized since your last visit? No    2. Have you seen or consulted any other health care providers outside of the [address] [place] [address] since your last visit? Include any pap smears or colon screening.       Yes When: 1 month ago Where: Dr. Khurram Ramirez Reason for visit: rheumatology

## 2022-05-06 ENCOUNTER — DOCUMENTATION ONLY (OUTPATIENT)
Dept: OTHER | Age: 57
End: 2022-05-06

## 2022-05-06 NOTE — PROGRESS NOTES
Spoke with patient regarding breast biopsy result was benign and she needs to continue regular interval mammogram. Results faxed to PCP.

## 2022-06-01 DIAGNOSIS — D75.839 THROMBOCYTOSIS: ICD-10-CM

## 2022-06-01 RX ORDER — ANAGRELIDE 0.5 MG/1
CAPSULE ORAL
Qty: 60 CAPSULE | Refills: 6 | Status: SHIPPED | OUTPATIENT
Start: 2022-06-01

## 2022-06-02 DIAGNOSIS — G89.4 CHRONIC PAIN SYNDROME: ICD-10-CM

## 2022-06-02 DIAGNOSIS — D47.3 ESSENTIAL THROMBOCYTOSIS (HCC): ICD-10-CM

## 2022-06-02 DIAGNOSIS — D72.829 LEUKOCYTOSIS, UNSPECIFIED TYPE: ICD-10-CM

## 2022-06-02 DIAGNOSIS — E53.8 VITAMIN B12 DEFICIENCY: ICD-10-CM

## 2022-06-02 DIAGNOSIS — M05.771 RHEUMATOID ARTHRITIS INVOLVING RIGHT FOOT WITH POSITIVE RHEUMATOID FACTOR (HCC): ICD-10-CM

## 2022-06-02 DIAGNOSIS — M79.7 FIBROMYALGIA: ICD-10-CM

## 2022-06-02 DIAGNOSIS — D75.839 THROMBOCYTOSIS: Primary | ICD-10-CM

## 2022-06-02 DIAGNOSIS — D64.9 CHRONIC ANEMIA: ICD-10-CM

## 2022-07-14 ENCOUNTER — HOSPITAL ENCOUNTER (OUTPATIENT)
Dept: LAB | Age: 57
Discharge: HOME | End: 2022-07-14
Payer: MEDICARE

## 2022-07-14 DIAGNOSIS — E53.8 VITAMIN B12 DEFICIENCY: ICD-10-CM

## 2022-07-14 DIAGNOSIS — D72.829 LEUKOCYTOSIS, UNSPECIFIED TYPE: ICD-10-CM

## 2022-07-14 DIAGNOSIS — M05.771 RHEUMATOID ARTHRITIS INVOLVING RIGHT FOOT WITH POSITIVE RHEUMATOID FACTOR (HCC): ICD-10-CM

## 2022-07-14 DIAGNOSIS — D75.839 THROMBOCYTOSIS: ICD-10-CM

## 2022-07-14 DIAGNOSIS — D64.9 CHRONIC ANEMIA: ICD-10-CM

## 2022-07-14 DIAGNOSIS — G89.4 CHRONIC PAIN SYNDROME: ICD-10-CM

## 2022-07-14 DIAGNOSIS — M79.7 FIBROMYALGIA: ICD-10-CM

## 2022-07-14 DIAGNOSIS — D47.3 ESSENTIAL THROMBOCYTOSIS (HCC): ICD-10-CM

## 2022-07-14 LAB
ALBUMIN SERPL-MCNC: 3.4 G/DL (ref 3.4–5)
ALBUMIN/GLOB SERPL: 0.9 {RATIO} (ref 0.8–1.7)
ALP SERPL-CCNC: 107 U/L (ref 45–117)
ALT SERPL-CCNC: 17 U/L (ref 13–56)
ANION GAP SERPL CALC-SCNC: 3 MMOL/L (ref 3–18)
AST SERPL-CCNC: 11 U/L (ref 10–38)
BASOPHILS # BLD: 0.1 K/UL (ref 0–0.1)
BASOPHILS NFR BLD: 1 % (ref 0–2)
BILIRUB SERPL-MCNC: 0.3 MG/DL (ref 0.2–1)
BUN SERPL-MCNC: 16 MG/DL (ref 7–18)
BUN/CREAT SERPL: 19 (ref 12–20)
CALCIUM SERPL-MCNC: 9.6 MG/DL (ref 8.5–10.1)
CHLORIDE SERPL-SCNC: 109 MMOL/L (ref 100–111)
CO2 SERPL-SCNC: 28 MMOL/L (ref 21–32)
CREAT SERPL-MCNC: 0.86 MG/DL (ref 0.6–1.3)
CRP SERPL-MCNC: 1 MG/DL (ref 0–0.3)
DIFFERENTIAL METHOD BLD: ABNORMAL
EOSINOPHIL # BLD: 1 K/UL (ref 0–0.4)
EOSINOPHIL NFR BLD: 8 % (ref 0–5)
ERYTHROCYTE [DISTWIDTH] IN BLOOD BY AUTOMATED COUNT: 14.1 % (ref 11.6–14.5)
ERYTHROCYTE [SEDIMENTATION RATE] IN BLOOD: 63 MM/HR (ref 0–30)
FERRITIN SERPL-MCNC: 266 NG/ML (ref 8–388)
FOLATE SERPL-MCNC: 15.8 NG/ML (ref 3.1–17.5)
GLOBULIN SER CALC-MCNC: 3.8 G/DL (ref 2–4)
GLUCOSE SERPL-MCNC: 239 MG/DL (ref 74–99)
HCT VFR BLD AUTO: 41.6 % (ref 35–45)
HGB BLD-MCNC: 13.2 G/DL (ref 12–16)
IMM GRANULOCYTES # BLD AUTO: 0 K/UL (ref 0–0.04)
IMM GRANULOCYTES NFR BLD AUTO: 0 % (ref 0–0.5)
IRON SATN MFR SERPL: 21 % (ref 20–50)
IRON SERPL-MCNC: 62 UG/DL (ref 50–175)
LYMPHOCYTES # BLD: 3.7 K/UL (ref 0.9–3.6)
LYMPHOCYTES NFR BLD: 31 % (ref 21–52)
MCH RBC QN AUTO: 25 PG (ref 24–34)
MCHC RBC AUTO-ENTMCNC: 31.7 G/DL (ref 31–37)
MCV RBC AUTO: 78.6 FL (ref 78–100)
MONOCYTES # BLD: 1.1 K/UL (ref 0.05–1.2)
MONOCYTES NFR BLD: 10 % (ref 3–10)
NEUTS SEG # BLD: 6 K/UL (ref 1.8–8)
NEUTS SEG NFR BLD: 50 % (ref 40–73)
NRBC # BLD: 0 K/UL (ref 0–0.01)
NRBC BLD-RTO: 0 PER 100 WBC
PLATELET # BLD AUTO: 435 K/UL (ref 135–420)
PMV BLD AUTO: 10.4 FL (ref 9.2–11.8)
POTASSIUM SERPL-SCNC: 4.4 MMOL/L (ref 3.5–5.5)
PROT SERPL-MCNC: 7.2 G/DL (ref 6.4–8.2)
RBC # BLD AUTO: 5.29 M/UL (ref 4.2–5.3)
SODIUM SERPL-SCNC: 140 MMOL/L (ref 136–145)
TIBC SERPL-MCNC: 300 UG/DL (ref 250–450)
VIT B12 SERPL-MCNC: 699 PG/ML (ref 211–911)
WBC # BLD AUTO: 11.9 K/UL (ref 4.6–13.2)

## 2022-07-14 PROCEDURE — 36415 COLL VENOUS BLD VENIPUNCTURE: CPT

## 2022-07-14 PROCEDURE — 82728 ASSAY OF FERRITIN: CPT

## 2022-07-14 PROCEDURE — 83540 ASSAY OF IRON: CPT

## 2022-07-14 PROCEDURE — 85652 RBC SED RATE AUTOMATED: CPT

## 2022-07-14 PROCEDURE — 86140 C-REACTIVE PROTEIN: CPT

## 2022-07-14 PROCEDURE — 82607 VITAMIN B-12: CPT

## 2022-07-14 PROCEDURE — 85025 COMPLETE CBC W/AUTO DIFF WBC: CPT

## 2022-07-14 PROCEDURE — 80053 COMPREHEN METABOLIC PANEL: CPT

## 2022-07-18 ENCOUNTER — HOSPITAL ENCOUNTER (OUTPATIENT)
Dept: LAB | Age: 57
Discharge: HOME | End: 2022-07-18
Payer: MEDICARE

## 2022-07-18 ENCOUNTER — OFFICE VISIT (OUTPATIENT)
Dept: ONCOLOGY | Age: 57
End: 2022-07-18
Payer: MEDICARE

## 2022-07-18 VITALS
DIASTOLIC BLOOD PRESSURE: 79 MMHG | HEIGHT: 71 IN | WEIGHT: 225.2 LBS | TEMPERATURE: 98.6 F | BODY MASS INDEX: 31.53 KG/M2 | HEART RATE: 68 BPM | SYSTOLIC BLOOD PRESSURE: 155 MMHG | OXYGEN SATURATION: 98 %

## 2022-07-18 DIAGNOSIS — D75.839 THROMBOCYTOSIS: Primary | ICD-10-CM

## 2022-07-18 DIAGNOSIS — D75.839 THROMBOCYTOSIS: ICD-10-CM

## 2022-07-18 DIAGNOSIS — M05.771 RHEUMATOID ARTHRITIS INVOLVING RIGHT FOOT WITH POSITIVE RHEUMATOID FACTOR (HCC): ICD-10-CM

## 2022-07-18 DIAGNOSIS — M79.7 FIBROMYALGIA: ICD-10-CM

## 2022-07-18 PROCEDURE — G8753 SYS BP > OR = 140: HCPCS | Performed by: INTERNAL MEDICINE

## 2022-07-18 PROCEDURE — 3017F COLORECTAL CA SCREEN DOC REV: CPT | Performed by: INTERNAL MEDICINE

## 2022-07-18 PROCEDURE — G8432 DEP SCR NOT DOC, RNG: HCPCS | Performed by: INTERNAL MEDICINE

## 2022-07-18 PROCEDURE — G9899 SCRN MAM PERF RSLTS DOC: HCPCS | Performed by: INTERNAL MEDICINE

## 2022-07-18 PROCEDURE — G8754 DIAS BP LESS 90: HCPCS | Performed by: INTERNAL MEDICINE

## 2022-07-18 PROCEDURE — G8417 CALC BMI ABV UP PARAM F/U: HCPCS | Performed by: INTERNAL MEDICINE

## 2022-07-18 PROCEDURE — G0463 HOSPITAL OUTPT CLINIC VISIT: HCPCS | Performed by: INTERNAL MEDICINE

## 2022-07-18 PROCEDURE — 36415 COLL VENOUS BLD VENIPUNCTURE: CPT

## 2022-07-18 PROCEDURE — 99214 OFFICE O/P EST MOD 30 MIN: CPT | Performed by: INTERNAL MEDICINE

## 2022-07-18 PROCEDURE — G8427 DOCREV CUR MEDS BY ELIG CLIN: HCPCS | Performed by: INTERNAL MEDICINE

## 2022-07-18 RX ORDER — CEPHALEXIN 500 MG/1
CAPSULE ORAL
COMMUNITY
Start: 2022-05-06

## 2022-07-18 RX ORDER — GABAPENTIN 600 MG/1
TABLET ORAL
COMMUNITY
Start: 2022-06-04

## 2022-07-18 RX ORDER — FLUCONAZOLE 150 MG/1
TABLET ORAL
COMMUNITY
Start: 2022-06-02

## 2022-07-18 RX ORDER — CLINDAMYCIN PHOSPHATE 10 UG/ML
LOTION TOPICAL
COMMUNITY
Start: 2022-05-11

## 2022-07-18 NOTE — PROGRESS NOTES
Hematology/Oncology  Progress Note    Name: Dawood Avendaño  : 1965    PCP: Elke Darden MD     Ms. Lisa Quinones is a 60year old female who was seen for continuity management of her rheumatoid arthritis, leukocytosis, and essential thrombocytosis. Current therapy: Infliximab 4mg/kg bodyweight every 6 weeks intravenously    Subjective:     Ms. Lisa Quinones is a 60year-old [facility] American woman who has severe rheumatoid arthritis. She has been recieving Infliximab every 6 weeks which have been to control her rheumatoid arthritis flares. She now is seeing two Rheumatologists for first and second opinions. She also has history of spine osteoarthritis. She has a history of thrombocytosis and has been on anagrelide 0.5mg one tablet twice daily without obvious side effect. Patient was being followed by Dr. Chevy Granger who retired recently. Today the patient reported doing stable overall. She denies fatigue, shortness of breath, and weakness. She denies chest pain or dizziness. She does not have any other concerns or complaints to report at this time. She reported active smoking, now following PCP for smoking cessation. Past medical history, family history, and social history: these were reviewed and remain unchanged.      Past Medical History:   Diagnosis Date    Abdominal pain, unspecified site     Acute otitis media     Acute pharyngitis     Anxiety     Arthritis     Autoimmune disease ([address]     Back injury     Backache     herniated disc in lower back    Blurred vision     Chest pain 2018    Chest pain, unspecified     abnormal EKG    Chronic airway obstruction, not elsewhere classified     Chronic back pain     Chronic pain     COPD     Depression     Diabetes (HCC)     Dizziness     Dizziness and giddiness     possible orthostatic changes, vasovagal, autonomic dysfunction from diabetic neuropathy    Encounters for unspecified administrative purpose     Essential thrombocytosis ([facility] [address] 2016 Feeling anxious     Fibromyalgia     Headache(784.0)     Hemorrhoid     Herniated disc     at L5    Hypercholesterolemia     Hyperglycemia     Hypertension     Joint pain     Leukocytosis, unspecified     Major depressive disorder, single episode, mild (HCC)     Mental disorder     depression, anxiety, bipolar and PTSD    Neuropathy     Obesity, unspecified     Other chest pain     Palpitation 5/4/2018    ?  arrhythmia    Phobic disorders     Rheumatoid arthritis (HCC)     Rheumatoid arthritis of foot ([facility] [address] 5/4/2018    on treatment    Seasonal allergies     Shortness of breath     normal EF    Smoking 5/4/2018    discussed cessation    Streptococcal sore throat     Type 2 diabetes mellitus without complication, with long-term current use of insulin ([facility] [address] 5/4/2018    Type II or unspecified type diabetes mellitus with unspecified complication, uncontrolled     Unspecified hereditary and idiopathic peripheral neuropathy     Vitamin D deficiency      Past Surgical History:   Procedure Laterality Date    HX CHOLECYSTECTOMY  1994    HX GYN  2005    partial Hysterectomy    HX OTHER SURGICAL  12-1-15    had ingrown toenail removed from big toe, both feet    HX TUBAL LIGATION      1995     Social History     Socioeconomic History    Marital status:      Spouse name: Not on file    Number of children: Not on file    Years of education: Not on file    Highest education level: Not on file   Occupational History    Not on file   Tobacco Use    Smoking status: Current Every Day Smoker     Packs/day: 0.25    Smokeless tobacco: Never Used    Tobacco comment: 5 cigarettes day   Substance and Sexual Activity    Alcohol use: Yes     Comment: socially    Drug use: No    Sexual activity: Yes     Partners: Male     Birth control/protection: Condom   Other Topics Concern    Not on file   Social History Narrative    Not on file     Social Determinants of Health     Financial Resource Strain:     Difficulty of Paying Living Expenses: Not on file   Food Insecurity:     Worried About [address] in the Last Year: Not on file    Ran Out of Food in the Last Year: Not on file   Transportation Needs:     Lack of Transportation (Medical): Not on file    Lack of Transportation (Non-Medical):  Not on file   Physical Activity:     Days of Exercise per Week: Not on file    Minutes of Exercise per Session: Not on file   Stress:     Feeling of Stress : Not on file   Social Connections:     Frequency of Communication with Friends and Family: Not on file    Frequency of Social Gatherings with Friends and Family: Not on file    Attends Jew Services: Not on file    Active Member of Clubs or Organizations: Not on file    Attends Club or Organization Meetings: Not on file    Marital Status: Not on file   Intimate Partner Violence:     Fear of Current or Ex-Partner: Not on file    Emotionally Abused: Not on file    Physically Abused: Not on file    Sexually Abused: Not on file   Housing Stability:     Unable to Pay for Housing in the Last Year: Not on file    Number of [place] in the Last Year: Not on file    Unstable Housing in the Last Year: Not on file     Family History   Problem Relation Age of Onset    Diabetes Other     Alcohol abuse Mother     OSTEOARTHRITIS Mother     Diabetes Mother     Elevated Lipids Mother     Headache Mother     Heart Disease Mother     Migraines Mother     Psychiatric Disorder Mother     Alcohol abuse Father     OSTEOARTHRITIS Father     Cancer Father     Headache Father     Heart Disease Father     Lung Disease Father     Migraines Father     Heart Surgery Father     Alcohol abuse Sister     Headache Sister     Diabetes Sister     Heart Disease Sister     Migraines Sister     Psychiatric Disorder Sister     Headache Brother     Diabetes Brother     Elevated Lipids Brother     Heart Disease Brother     Migraines Brother     Psychiatric Disorder Brother     Coronary Art Dis Brother     Cancer Maternal Aunt Alcohol abuse Maternal Aunt     Diabetes Maternal Aunt     Headache Maternal Aunt     Lung Disease Maternal Aunt     Migraines Maternal Aunt     Headache Maternal Uncle     Migraines Maternal Uncle     Stroke Maternal Uncle     Psychiatric Disorder Maternal Uncle     Headache Paternal Aunt     Migraines Paternal Aunt     Headache Paternal Uncle     Hypertension Paternal Uncle     Migraines Paternal Uncle     Stroke Paternal Uncle     Headache Maternal Grandmother     Migraines Maternal Grandmother     Stroke Maternal Grandmother     Headache Maternal Grandfather     Migraines Maternal Grandfather     Stroke Maternal Grandfather     Headache Paternal Grandmother     Hypertension Paternal Grandmother     Lung Disease Paternal Grandmother     Migraines Paternal Grandmother     Cancer Paternal Grandmother     Headache Paternal Grandfather     Cancer Paternal Grandfather     Migraines Paternal Grandfather      Current Outpatient Medications   Medication Sig Dispense Refill    cephALEXin (KEFLEX) 500 mg capsule TAKE 1 CAPSULE BY MOUTH FOUR TIMES DAILY      clindamycin (CLEOCIN T) 1 % lotion       fluconazole (DIFLUCAN) 150 mg tablet TAKE 1 TABLET BY MOUTH ONCE FOR 1 DOSE      gabapentin (NEURONTIN) 600 mg tablet       anagrelide (AGRYLIN) 0.5 mg capsule TAKE ONE CAPSULE BY MOUTH TWICE DAILY 60 Capsule 6    leflunomide (ARAVA) 20 mg tablet Take 20 mg by mouth daily. clonazePAM (KlonoPIN) 0.5 mg tablet Take 0.5 mg by mouth daily as needed. atorvastatin (LIPITOR) 40 mg tablet Take 1 Tablet by mouth nightly. 90 Tablet 3    NovoLOG Mix 70-30 U-100 Insuln 100 unit/mL (70-30) injection       adalimumab (Humira) 40 mg/0.8 mL injection 40 mg by SubCUTAneous route every seven (7) days. mirtazapine (REMERON) 15 mg tablet       hydroCHLOROthiazide (HYDRODIURIL) 12.5 mg tablet Take 12.5 mg by mouth daily. losartan (COZAAR) 50 mg tablet Take 1 Tab by mouth daily.  90 Tab 1    aspirin delayed-release 81 mg tablet Take 1 Tab by mouth daily. 30 Tab 1    naloxone (NARCAN) 4 mg/actuation nasal spray Use 1 spray intranasally, then discard. Repeat with new spray every 2 min as needed for opioid overdose symptoms, alternating nostrils. Indications: Decrease in Rate & Depth of Breathing due to Opioid Drug, opioid overdose 1 Each 1    albuterol (PROAIR HFA) 90 mcg/actuation inhaler Take 1 Puff by inhalation every six (6) hours as needed for Wheezing. 1 Inhaler 0    metFORMIN (GLUCOPHAGE) 1,000 mg tablet Take 500 mg by mouth two (2) times a day. Cholecalciferol, Vitamin D3, 5,000 unit Tab Take 5,000 Units by mouth every seven (7) days. furosemide (LASIX) 40 mg tablet Take  by mouth daily. gabapentin (NEURONTIN) 100 mg capsule Take 100 mg by mouth three (3) times daily. (Patient not taking: Reported on 7/18/2022)      atenolol (TENORMIN) 25 mg tablet Take 25 mg by mouth daily. Indications: HYPERTENSION         Review of Systems   Constitutional: Negative for chills, diaphoresis, fever, malaise/fatigue and weight loss. Respiratory: Negative for cough, hemoptysis, shortness of breath and wheezing. Cardiovascular: Negative for chest pain, palpitations and leg swelling. Gastrointestinal: Negative for abdominal pain, diarrhea, heartburn, nausea and vomiting. Genitourinary: Negative for dysuria, frequency, hematuria and urgency. Musculoskeletal: Positive for back pain, joint pain and myalgias. Skin: Negative for itching and rash. Neurological: Negative for dizziness, seizures, weakness and headaches. Psychiatric/Behavioral: Negative for depression. The patient does not have insomnia.              Objective:     Visit Vitals  BP (!) 155/79   Pulse 68   Temp 98.6 °F (37 °C)   Ht 5' 11\" (1.803 m)   Wt 102.2 kg (225 lb 3.2 oz)   SpO2 98%   BMI 31.41 kg/m²       ECOG Performance Status (grade): 0  0 - able to carry on all pre-disease activity w/out restriction  1 - restricted but able to carry out light work  2 - ambulatory and can self- care but unable to carry out work  3 - bed or chair >50% of waking hours  4 - completely disable, total care, confined to bed or chair    Physical Exam  Constitutional:       Appearance: Normal appearance. HENT:      Head: Normocephalic and atraumatic. Eyes:      Pupils: Pupils are equal, round, and reactive to light. Cardiovascular:      Rate and Rhythm: Normal rate and regular rhythm. Heart sounds: Normal heart sounds. Pulmonary:      Effort: Pulmonary effort is normal.      Breath sounds: Normal breath sounds. Abdominal:      General: Bowel sounds are normal.      Palpations: Abdomen is soft. Tenderness: There is no abdominal tenderness. There is no guarding. Musculoskeletal:         General: Normal range of motion. Cervical back: Neck supple. Skin:     General: Skin is warm. Neurological:      General: No focal deficit present. Mental Status: She is alert and oriented to person, place, and time. Mental status is at baseline. Diagnostics:      No results found for this or any previous visit (from the past 96 hour(s)). Imaging:  Results for orders placed during the hospital encounter of 09/17/12    IR BX BONE MARROW    Narrative  Fluoroscopic guided power assisted left iliac crest bone biopsy and marrow  aspiration    Attending: Dr. Lindsay Carballo    Complications: None    Contrast: None    Medications: Lidocaine, 1% subcutaneous and moderate sedation. Indication: Leukocytosis    Procedure: I obtained informed consent. We brought the patient to the  interventional radiology suite and placed her prone on the table. I prepped  and draped the patient's left iliac crest in the usual sterile fashion. After  anesthetizing the skin and subcutaneous tissues with 1% lidocaine, I used a  power drill to obtain a marrow aspirate and bone core specimen, which I have  submitted for interpretation to the pathology service.  The patient tolerated  the procedure well. Findings: No focal lesion in area biopsied. Impression  :  Successful fluoroscopic guided power assisted left iliac crest  biopsy and marrow aspiration. I performed this procedure. Results for orders placed during the hospital encounter of 05/17/21    XR CHEST PA LAT    Narrative  CHEST PA AND LATERAL:    CPT CODE: 85400    COMPARISON: 10/4/2020    INDICATION: Dizziness, cough and epistaxis. FINDINGS: And mediastinum are normal. Lungs are clear with no infiltrates. No  pleural effusions are seen. Surgical clips are seen in the upper abdomen likely  related to prior cholecystectomy. There are mild degenerative changes involving  the spine. Impression  No acute cardiopulmonary abnormalities. Results for orders placed during the hospital encounter of 08/07/19    CTA CHEST W OR W WO CONT    Narrative  CTA CHEST PULMONARY EMBOLISM PROTOCOL    INDICATION: Mid chest pain and pressure and shortness of breath for a few months  . Question pulmonary embolism. TECHNIQUE: Thin collimation axial images obtained through the level of the  pulmonary arteries with additional imaging through the chest following the  uneventful administration of 80 cc Isovue-370 intravenous contrast.  Images  reconstructed into MIP coronal and sagittal projections for complete evaluation  of the tortuous and overlapping pulmonary vascular structures and to reduce  patient radiation dose. All CT scans are performed using dose optimization  techniques as appropriate to the performed exam including the following:  Automated exposure control, adjustment of mA and/or kV according to patient  size, and use of iterative reconstructive technique. COMPARISON: CTA chest 3/25/2012. FINDINGS:    No filling defects are appreciated within the main, left, right, lobar or  partially visualized segmental pulmonary arteries to suggest embolism.  Many  subsegmental and a few segmental pulmonary artery branches are nondiagnostic due  to motion artifact. The thoracic aorta is not aneurysmal.  No evidence for  dissection. No pericardial effusion. No pleural effusion. Mildly enlarged 12 mm AP window  lymph node. Borderline hilar and axillary lymph nodes. These are chronic since  2012 with mild improvement. No acute bone finding. Vertebral spondylosis. No consolidation. Respiratory motion. Bronchial wall thickening. Partially included upper abdomen is without acute findings. Cholecystectomy. Impression  IMPRESSION:    1. No central pulmonary embolism. Many segmental and subsegmental pulmonary  artery branches are nondiagnostic due to motion artifact. No acute findings. 2. Mild bronchitis. 3. Chronic mild lymphadenopathy, improved since 2012. Assessment:     1. Thrombocytosis    2. Rheumatoid arthritis involving right foot with positive rheumatoid factor (HCC)    3. Fibromyalgia      Plan:   # Thrombocytosis:  -- She has been recieving Infliximab every 6 weeks which have been to control her rheumatoid arthritis flares. S  -- She has a history of thrombocytosis and has been on anagrelide 0.5mg one tablet twice daily without obvious side effect. Patient was being followed by Dr. Radames Castillo who retired recently. -- Today I have reviewed with the patient about recent lab reports. 7/14/2022 CBC reported WBC 11.9, ANC 6.0, ALC 3.7, hemoglobin 13.2, platelet 207,892.  -- It was unclear etiology of thrombocytosis, could be reactive 2/2 smoking vs MPNs. Previous work up revealed negative JAK2 mut. Plan:  -- Will obtain MPL/MOHAN/BCRABL1  -- The patient will be off anagrelide   -- We will monitor CBC in next few weeks  -- RTC in 4 weeks, always sooner if required. .      # Rheumatoid arthritis:  -- The patient will continue to receive Infliximab as a primary treatment modality for her severe rheumatoid arthritis every 6 weeks. -- The patient will continued to take Methotrexate 5mg PO weekly.    -- She now is seeing two Rheumatologists for first and second opinions. -- I have advised her to contact us if any change in plans from her Rheumatologic stand point. # Fibromyalgia/chronic pain syndrome:   --The patient  continues to have generalized pain at times related to her underlying fibromyalgia. The patient receives her Percocet  mg medication on a monthly basis as needed. -- She was referred to pain management for long term control. # Chronic anemia and folic acid deficiency:  -- Her H/H showed improving.  -- Monitor CBC      # Leukocytosis:   -- Resolved  -- Will monitor CBC        Orders Placed This Encounter    MPL MUTATION ANALYSIS     Standing Status:   Future     Standing Expiration Date:   7/18/2023    CALR MUTATION ANALYSIS     Standing Status:   Future     Standing Expiration Date:   7/18/2023    BCR-ABL1, PCR, QT     Standing Status:   Future     Standing Expiration Date:   7/18/2023    cephALEXin (KEFLEX) 500 mg capsule     Sig: TAKE 1 CAPSULE BY MOUTH FOUR TIMES DAILY    clindamycin (CLEOCIN T) 1 % lotion    fluconazole (DIFLUCAN) 150 mg tablet     Sig: TAKE 1 TABLET BY MOUTH ONCE FOR 1 DOSE    gabapentin (NEURONTIN) 600 mg tablet               All of patient's questions answered to their apparent satisfaction. They verbally show understanding and agreement with aforementioned plan. Freya Hernandez MD  7/18/2022          About 25 minutes were spent for this encounter with more than 50% of the time spent in face-to-face counseling, discussing on diagnosis and management plan going forward, and co-ordination of care. Parts of this document has been produced using Dragon dictation system. Unrecognized errors in transcription may be present. Please do not hesitate to reach out for any questions or clarifications.       CC: Dolores Long MD

## 2022-07-24 LAB
BACKGROUND, BCR6T: NORMAL
INTERPRETATION: 480488: NEGATIVE
LAB DIRECTOR NAME PROVIDER: NORMAL
T(ABL1,BCR)B2A2/CONTROL BLD/T: NORMAL %
T(ABL1,BCR)B2A2/CONTROL BLD/T: NORMAL %
T(ABL1,BCR)B3A2/CONTROL BLD/T: NORMAL %
T(ABL1,BCR)E1A2/CONTROL BLD/T: NORMAL %

## 2022-07-25 LAB
LAB DIRECTOR NAME PROVIDER: NORMAL
MPL GENE MUT TESTED BLD/T: NORMAL
MPL P.W515L+W515K+S505N BLD/T QL: NORMAL
REFERENCES, MPLM6T: NORMAL
SERVICE CMNT-IMP: NORMAL

## 2022-07-26 LAB
BACKGROUND, CALR2T: NORMAL
CALR MUTATION DETECTION RESULT, CALR1T: NORMAL
LAB DIRECTOR NAME PROVIDER: NORMAL
REF LAB TEST METHOD: NORMAL
REFERENCES, CALR4T: NORMAL

## 2022-08-19 ENCOUNTER — OFFICE VISIT (OUTPATIENT)
Dept: ONCOLOGY | Age: 57
End: 2022-08-19
Payer: MEDICARE

## 2022-08-19 VITALS
HEART RATE: 85 BPM | TEMPERATURE: 99 F | WEIGHT: 228.8 LBS | DIASTOLIC BLOOD PRESSURE: 71 MMHG | SYSTOLIC BLOOD PRESSURE: 128 MMHG | HEIGHT: 71 IN | BODY MASS INDEX: 32.03 KG/M2

## 2022-08-19 DIAGNOSIS — G89.4 CHRONIC PAIN SYNDROME: ICD-10-CM

## 2022-08-19 DIAGNOSIS — M05.771 RHEUMATOID ARTHRITIS INVOLVING RIGHT FOOT WITH POSITIVE RHEUMATOID FACTOR (HCC): ICD-10-CM

## 2022-08-19 DIAGNOSIS — D75.839 THROMBOCYTOSIS: Primary | ICD-10-CM

## 2022-08-19 DIAGNOSIS — F17.200 CURRENT SMOKER: ICD-10-CM

## 2022-08-19 DIAGNOSIS — M79.7 FIBROMYALGIA: ICD-10-CM

## 2022-08-19 PROCEDURE — G8417 CALC BMI ABV UP PARAM F/U: HCPCS | Performed by: INTERNAL MEDICINE

## 2022-08-19 PROCEDURE — 99213 OFFICE O/P EST LOW 20 MIN: CPT | Performed by: INTERNAL MEDICINE

## 2022-08-19 PROCEDURE — G8752 SYS BP LESS 140: HCPCS | Performed by: INTERNAL MEDICINE

## 2022-08-19 PROCEDURE — G8754 DIAS BP LESS 90: HCPCS | Performed by: INTERNAL MEDICINE

## 2022-08-19 PROCEDURE — G0463 HOSPITAL OUTPT CLINIC VISIT: HCPCS | Performed by: INTERNAL MEDICINE

## 2022-08-19 PROCEDURE — G8432 DEP SCR NOT DOC, RNG: HCPCS | Performed by: INTERNAL MEDICINE

## 2022-08-19 PROCEDURE — G9899 SCRN MAM PERF RSLTS DOC: HCPCS | Performed by: INTERNAL MEDICINE

## 2022-08-19 PROCEDURE — 3017F COLORECTAL CA SCREEN DOC REV: CPT | Performed by: INTERNAL MEDICINE

## 2022-08-19 PROCEDURE — G8427 DOCREV CUR MEDS BY ELIG CLIN: HCPCS | Performed by: INTERNAL MEDICINE

## 2022-08-19 RX ORDER — BUPROPION HYDROCHLORIDE 150 MG/1
150 TABLET ORAL DAILY
COMMUNITY
Start: 2022-08-03

## 2022-08-22 NOTE — PROGRESS NOTES
Hematology/Oncology  Progress Note    Name: Chester Patton  : 1965    PCP: Elpidio Juares MD     Ms. Christine Stock is a 60year old female who was seen for continuity management of her rheumatoid arthritis, leukocytosis, and thrombocytosis. Current therapy: Infliximab 4mg/kg bodyweight every 6 weeks intravenously    Subjective:     Ms. Christine Stock is a 60year-old [facility] American woman who has severe rheumatoid arthritis. Patient was being followed previously by Dr. Saravanan Gatica who retired. She has been recieving Infliximab every 6 weeks which have been to control her rheumatoid arthritis flares. She continues to follow up with her Rheumatologists. She also has history of spine osteoarthritis. Today the patient denies fatigue, shortness of breath, and weakness. She denies chest pain or dizziness. She does not have any other concerns or complaints to report at this time. She reported active smoking, now following PCP for smoking cessation. Past medical history, family history, and social history: these were reviewed and remain unchanged.      Past Medical History:   Diagnosis Date    Abdominal pain, unspecified site     Acute otitis media     Acute pharyngitis     Anxiety     Arthritis     Autoimmune disease ([address]     Back injury     Backache     herniated disc in lower back    Blurred vision     Chest pain 2018    Chest pain, unspecified     abnormal EKG    Chronic airway obstruction, not elsewhere classified     Chronic back pain     Chronic pain     COPD     Depression     Diabetes (HCC)     Dizziness     Dizziness and giddiness     possible orthostatic changes, vasovagal, autonomic dysfunction from diabetic neuropathy    Encounters for unspecified administrative purpose     Essential thrombocytosis ([address] 2016    Feeling anxious     Fibromyalgia     Headache(784.0)     Hemorrhoid     Herniated disc     at L5    Hypercholesterolemia     Hyperglycemia     Hypertension     Joint pain Leukocytosis, unspecified     Major depressive disorder, single episode, mild ([facility])     Mental disorder     depression, anxiety, bipolar and PTSD    Neuropathy     Obesity, unspecified     Other chest pain     Palpitation 5/4/2018    ?  arrhythmia    Phobic disorders     Rheumatoid arthritis ([facility])     Rheumatoid arthritis of foot ([facility] 5/4/2018    on treatment    Seasonal allergies     Shortness of breath     normal EF    Smoking 5/4/2018    discussed cessation    Streptococcal sore throat     Type 2 diabetes mellitus without complication, with long-term current use of insulin ([facility] 5/4/2018    Type II or unspecified type diabetes mellitus with unspecified complication, uncontrolled     Unspecified hereditary and idiopathic peripheral neuropathy     Vitamin D deficiency      Past Surgical History:   Procedure Laterality Date    HX CHOLECYSTECTOMY  1994    HX GYN  2005    partial Hysterectomy    HX OTHER SURGICAL  12-1-15    had ingrown toenail removed from big toe, both feet    HX TUBAL LIGATION      1995     Social History     Socioeconomic History    Marital status:      Spouse name: Not on file    Number of children: Not on file    Years of education: Not on file    Highest education level: Not on file   Occupational History    Not on file   Tobacco Use    Smoking status: Every Day     Packs/day: 0.25     Types: Cigarettes    Smokeless tobacco: Never    Tobacco comments:     5 cigarettes day   Substance and Sexual Activity    Alcohol use: Yes     Comment: socially    Drug use: No    Sexual activity: Yes     Partners: Male     Birth control/protection: Condom   Other Topics Concern    Not on file   Social History Narrative    Not on file     Social Determinants of Health     Financial Resource Strain: Not on file   Food Insecurity: Not on file   Transportation Needs: Not on file   Physical Activity: Not on file   Stress: Not on file   Social Connections: Not on file   Intimate Partner Violence: Not on file Housing Stability: Not on file     Family History   Problem Relation Age of Onset    Diabetes Other     Alcohol abuse Mother     OSTEOARTHRITIS Mother     Diabetes Mother     Elevated Lipids Mother     Headache Mother     Heart Disease Mother     Migraines Mother     Psychiatric Disorder Mother     Alcohol abuse Father     OSTEOARTHRITIS Father     Cancer Father     Headache Father     Heart Disease Father     Lung Disease Father     Migraines Father     Heart Surgery Father     Alcohol abuse Sister     Headache Sister     Diabetes Sister     Heart Disease Sister     Migraines Sister     Psychiatric Disorder Sister     Headache Brother     Diabetes Brother     Elevated Lipids Brother     Heart Disease Brother     Migraines Brother     Psychiatric Disorder Brother     Coronary Art Dis Brother     Cancer Maternal Aunt     Alcohol abuse Maternal Aunt     Diabetes Maternal Aunt     Headache Maternal Aunt     Lung Disease Maternal Aunt     Migraines Maternal Aunt     Headache Maternal Uncle     Migraines Maternal Uncle     Stroke Maternal Uncle     Psychiatric Disorder Maternal Uncle     Headache Paternal Aunt     Migraines Paternal Aunt     Headache Paternal Uncle     Hypertension Paternal Uncle     Migraines Paternal Uncle     Stroke Paternal Uncle     Headache Maternal Grandmother     Migraines Maternal Grandmother     Stroke Maternal Grandmother     Headache Maternal Grandfather     Migraines Maternal Grandfather     Stroke Maternal Grandfather     Headache Paternal Grandmother     Hypertension Paternal Grandmother     Lung Disease Paternal Grandmother     Migraines Paternal Grandmother     Cancer Paternal Grandmother     Headache Paternal Grandfather     Cancer Paternal Grandfather     Migraines Paternal Grandfather      Current Outpatient Medications   Medication Sig Dispense Refill    buPROPion XL (WELLBUTRIN XL) 150 mg tablet Take 150 mg by mouth daily.       cephALEXin (KEFLEX) 500 mg capsule TAKE 1 CAPSULE BY MOUTH FOUR TIMES DAILY      clindamycin (CLEOCIN T) 1 % lotion       fluconazole (DIFLUCAN) 150 mg tablet TAKE 1 TABLET BY MOUTH ONCE FOR 1 DOSE      gabapentin (NEURONTIN) 600 mg tablet       anagrelide (AGRYLIN) 0.5 mg capsule TAKE ONE CAPSULE BY MOUTH TWICE DAILY 60 Capsule 6    leflunomide (ARAVA) 20 mg tablet Take 20 mg by mouth daily. clonazePAM (KlonoPIN) 0.5 mg tablet Take 0.5 mg by mouth daily as needed. atorvastatin (LIPITOR) 40 mg tablet Take 1 Tablet by mouth nightly. 90 Tablet 3    NovoLOG Mix 70-30 U-100 Insuln 100 unit/mL (70-30) injection       adalimumab (Humira) 40 mg/0.8 mL injection 40 mg by SubCUTAneous route every seven (7) days. gabapentin (NEURONTIN) 100 mg capsule Take 100 mg by mouth three (3) times daily. (Patient not taking: Reported on 7/18/2022)      mirtazapine (REMERON) 15 mg tablet       hydroCHLOROthiazide (HYDRODIURIL) 12.5 mg tablet Take 12.5 mg by mouth daily. losartan (COZAAR) 50 mg tablet Take 1 Tab by mouth daily. 90 Tab 1    aspirin delayed-release 81 mg tablet Take 1 Tab by mouth daily. 30 Tab 1    naloxone (NARCAN) 4 mg/actuation nasal spray Use 1 spray intranasally, then discard. Repeat with new spray every 2 min as needed for opioid overdose symptoms, alternating nostrils. Indications: Decrease in Rate & Depth of Breathing due to Opioid Drug, opioid overdose 1 Each 1    albuterol (PROAIR HFA) 90 mcg/actuation inhaler Take 1 Puff by inhalation every six (6) hours as needed for Wheezing. 1 Inhaler 0    metFORMIN (GLUCOPHAGE) 1,000 mg tablet Take 500 mg by mouth two (2) times a day. Cholecalciferol, Vitamin D3, 5,000 unit Tab Take 5,000 Units by mouth every seven (7) days. atenolol (TENORMIN) 25 mg tablet Take 25 mg by mouth daily. Indications: HYPERTENSION      furosemide (LASIX) 40 mg tablet Take  by mouth daily. Review of Systems   Constitutional:  Negative for chills, diaphoresis, fever, malaise/fatigue and weight loss. Respiratory:  Negative for cough, hemoptysis, shortness of breath and wheezing. Cardiovascular:  Negative for chest pain, palpitations and leg swelling. Gastrointestinal:  Negative for abdominal pain, diarrhea, heartburn, nausea and vomiting. Genitourinary:  Negative for dysuria, frequency, hematuria and urgency. Musculoskeletal:  Positive for back pain, joint pain and myalgias. Skin:  Negative for itching and rash. Neurological:  Negative for dizziness, seizures, weakness and headaches. Psychiatric/Behavioral:  Negative for depression. The patient does not have insomnia. Objective:     Visit Vitals  /71   Pulse 85   Temp 99 °F (37.2 °C)   Ht 5' 11\" (1.803 m)   Wt 103.8 kg (228 lb 12.8 oz)   BMI 31.91 kg/m²       ECOG Performance Status (grade): 0  0 - able to carry on all pre-disease activity w/out restriction  1 - restricted but able to carry out light work  2 - ambulatory and can self- care but unable to carry out work  3 - bed or chair >50% of waking hours  4 - completely disable, total care, confined to bed or chair    Physical Exam  Constitutional:       Appearance: Normal appearance. HENT:      Head: Normocephalic and atraumatic. Eyes:      Pupils: Pupils are equal, round, and reactive to light. Cardiovascular:      Rate and Rhythm: Normal rate and regular rhythm. Heart sounds: Normal heart sounds. Pulmonary:      Effort: Pulmonary effort is normal.      Breath sounds: Normal breath sounds. Abdominal:      General: Bowel sounds are normal.      Palpations: Abdomen is soft. Tenderness: There is no abdominal tenderness. There is no guarding. Musculoskeletal:         General: Normal range of motion. Cervical back: Neck supple. Skin:     General: Skin is warm. Neurological:      General: No focal deficit present. Mental Status: She is alert and oriented to person, place, and time. Mental status is at baseline.         Diagnostics:      No results found for this or any previous visit (from the past 96 hour(s)). Imaging:  Results for orders placed during the hospital encounter of 09/17/12    IR BX BONE MARROW    Narrative  Fluoroscopic guided power assisted left iliac crest bone biopsy and marrow  aspiration    Attending: Dr. Mary Delgado    Complications: None    Contrast: None    Medications: Lidocaine, 1% subcutaneous and moderate sedation. Indication: Leukocytosis    Procedure: I obtained informed consent. We brought the patient to the  interventional radiology suite and placed her prone on the table. I prepped  and draped the patient's left iliac crest in the usual sterile fashion. After  anesthetizing the skin and subcutaneous tissues with 1% lidocaine, I used a  power drill to obtain a marrow aspirate and bone core specimen, which I have  submitted for interpretation to the pathology service. The patient tolerated  the procedure well. Findings: No focal lesion in area biopsied. Impression  :  Successful fluoroscopic guided power assisted left iliac crest  biopsy and marrow aspiration. I performed this procedure. Results for orders placed during the hospital encounter of 05/17/21    XR CHEST PA LAT    Narrative  CHEST PA AND LATERAL:    CPT CODE: 12438    COMPARISON: 10/4/2020    INDICATION: Dizziness, cough and epistaxis. FINDINGS: And mediastinum are normal. Lungs are clear with no infiltrates. No  pleural effusions are seen. Surgical clips are seen in the upper abdomen likely  related to prior cholecystectomy. There are mild degenerative changes involving  the spine. Impression  No acute cardiopulmonary abnormalities. Results for orders placed during the hospital encounter of 08/07/19    CTA CHEST W OR W WO CONT    Narrative  CTA CHEST PULMONARY EMBOLISM PROTOCOL    INDICATION: Mid chest pain and pressure and shortness of breath for a few months  . Question pulmonary embolism.     TECHNIQUE: Thin collimation axial images obtained through the level of the  pulmonary arteries with additional imaging through the chest following the  uneventful administration of 80 cc Isovue-370 intravenous contrast.  Images  reconstructed into MIP coronal and sagittal projections for complete evaluation  of the tortuous and overlapping pulmonary vascular structures and to reduce  patient radiation dose. All CT scans are performed using dose optimization  techniques as appropriate to the performed exam including the following:  Automated exposure control, adjustment of mA and/or kV according to patient  size, and use of iterative reconstructive technique. COMPARISON: CTA chest 3/25/2012. FINDINGS:    No filling defects are appreciated within the main, left, right, lobar or  partially visualized segmental pulmonary arteries to suggest embolism. Many  subsegmental and a few segmental pulmonary artery branches are nondiagnostic due  to motion artifact. The thoracic aorta is not aneurysmal.  No evidence for  dissection. No pericardial effusion. No pleural effusion. Mildly enlarged 12 mm AP window  lymph node. Borderline hilar and axillary lymph nodes. These are chronic since  2012 with mild improvement. No acute bone finding. Vertebral spondylosis. No consolidation. Respiratory motion. Bronchial wall thickening. Partially included upper abdomen is without acute findings. Cholecystectomy. Impression  IMPRESSION:    1. No central pulmonary embolism. Many segmental and subsegmental pulmonary  artery branches are nondiagnostic due to motion artifact. No acute findings. 2. Mild bronchitis. 3. Chronic mild lymphadenopathy, improved since 2012. Assessment:     1. Thrombocytosis    2. Rheumatoid arthritis involving right foot with positive rheumatoid factor (HCC)    3. Fibromyalgia    4.  Current smoker      Plan:   # Thrombocytosis:  -- She has been recieving Infliximab every 6 weeks which have been to control her rheumatoid arthritis flares. S  -- She has a history of thrombocytosis and was on anagrelide 0.5mg one tablet twice daily without obvious side effect. Patient was being followed by Dr. Leonard Bustillo who retired recently however this has been held since 7/2022.  -- Today I have reviewed with the patient about recent lab reports. 7/14/2022 CBC reported WBC 11.9, ANC 6.0, ALC 3.7, hemoglobin 13.2, platelet 604,895.  -- It was still unclear etiology of thrombocytosis, likely reactive 2/2 smoking vs MPNs. Previous work up revealed negative JAK2 mut, MPL/MOHAN/BCRABL1. Plan:  -- The patient will continue holding anagrelide   -- If her PLT continues to elevate will obtain bone marrow biopsy   -- We will monitor CBC in diff  -- RTC in 4 weeks, always sooner if required. .      # Rheumatoid arthritis:  -- The patient will continue to receive Infliximab as a primary treatment modality for her severe rheumatoid arthritis every 6 weeks. -- The patient will continued to take Methotrexate 5mg PO weekly. -- I have advised her to contact us if any change in plans from her heumatologic stand point. # Fibromyalgia/chronic pain syndrome:   --The patient  continues to have generalized pain at times related to her underlying fibromyalgia. The patient receives her Percocet  mg medication on a monthly basis as needed. -- She was referred to pain management for long term control. # Chronic anemia and folic acid deficiency:  -- Her H/H 13.2 g/dl and 41.6% showed improving.   -- Monitor CBC      # Leukocytosis:   -- Resolved  -- Will monitor CBC        Orders Placed This Encounter    buPROPion XL (WELLBUTRIN XL) 150 mg tablet     Sig: Take 150 mg by mouth daily. All of patient's questions answered to their apparent satisfaction. They verbally show understanding and agreement with aforementioned plan. Matthew Rollins MD  8/22/2022          Parts of this document has been produced using Dragon dictation system.  Unrecognized errors in transcription may be present. Please do not hesitate to reach out for any questions or clarifications.       CC: Bethany Can MD

## 2022-11-29 DIAGNOSIS — D75.839 THROMBOCYTOSIS: ICD-10-CM

## 2022-11-29 RX ORDER — ANAGRELIDE 0.5 MG/1
CAPSULE ORAL
Qty: 60 CAPSULE | Refills: 6 | Status: SHIPPED | OUTPATIENT
Start: 2022-11-29

## 2022-11-30 ENCOUNTER — HOSPITAL ENCOUNTER (OUTPATIENT)
Dept: INFUSION THERAPY | Age: 57
End: 2022-11-30

## 2022-11-30 ENCOUNTER — OFFICE VISIT (OUTPATIENT)
Dept: ONCOLOGY | Age: 57
End: 2022-11-30
Payer: MEDICARE

## 2022-11-30 ENCOUNTER — HOSPITAL ENCOUNTER (OUTPATIENT)
Dept: LAB | Age: 57
Discharge: HOME | End: 2022-11-30
Payer: MEDICARE

## 2022-11-30 DIAGNOSIS — M05.771 RHEUMATOID ARTHRITIS INVOLVING RIGHT FOOT WITH POSITIVE RHEUMATOID FACTOR (HCC): ICD-10-CM

## 2022-11-30 DIAGNOSIS — D75.839 THROMBOCYTOSIS: Primary | ICD-10-CM

## 2022-11-30 DIAGNOSIS — D75.839 THROMBOCYTOSIS: ICD-10-CM

## 2022-11-30 DIAGNOSIS — M79.7 FIBROMYALGIA: ICD-10-CM

## 2022-11-30 LAB
ALBUMIN SERPL-MCNC: 3.1 G/DL (ref 3.4–5)
ALBUMIN/GLOB SERPL: 0.8 {RATIO} (ref 0.8–1.7)
ALP SERPL-CCNC: 77 U/L (ref 45–117)
ALT SERPL-CCNC: 24 U/L (ref 13–56)
ANION GAP SERPL CALC-SCNC: 3 MMOL/L (ref 3–18)
AST SERPL-CCNC: 14 U/L (ref 10–38)
BASOPHILS # BLD: 0.1 K/UL (ref 0–0.1)
BASOPHILS NFR BLD: 0 % (ref 0–2)
BILIRUB SERPL-MCNC: 0.2 MG/DL (ref 0.2–1)
BUN SERPL-MCNC: 23 MG/DL (ref 7–18)
BUN/CREAT SERPL: 22 (ref 12–20)
CALCIUM SERPL-MCNC: 10.6 MG/DL (ref 8.5–10.1)
CHLORIDE SERPL-SCNC: 111 MMOL/L (ref 100–111)
CO2 SERPL-SCNC: 29 MMOL/L (ref 21–32)
CREAT SERPL-MCNC: 1.03 MG/DL (ref 0.6–1.3)
CRP SERPL-MCNC: 0.5 MG/DL (ref 0–0.3)
DIFFERENTIAL METHOD BLD: ABNORMAL
EOSINOPHIL # BLD: 0.9 K/UL (ref 0–0.4)
EOSINOPHIL NFR BLD: 7 % (ref 0–5)
ERYTHROCYTE [DISTWIDTH] IN BLOOD BY AUTOMATED COUNT: 15.5 % (ref 11.6–14.5)
FERRITIN SERPL-MCNC: 164 NG/ML (ref 8–388)
GLOBULIN SER CALC-MCNC: 4 G/DL (ref 2–4)
GLUCOSE SERPL-MCNC: 139 MG/DL (ref 74–99)
HCT VFR BLD AUTO: 40.3 % (ref 35–45)
HGB BLD-MCNC: 12.3 G/DL (ref 12–16)
IMM GRANULOCYTES # BLD AUTO: 0.1 K/UL (ref 0–0.04)
IMM GRANULOCYTES NFR BLD AUTO: 0 % (ref 0–0.5)
IRON SATN MFR SERPL: 16 % (ref 20–50)
IRON SERPL-MCNC: 49 UG/DL (ref 50–175)
LYMPHOCYTES # BLD: 4.3 K/UL (ref 0.9–3.6)
LYMPHOCYTES NFR BLD: 31 % (ref 21–52)
MCH RBC QN AUTO: 25 PG (ref 24–34)
MCHC RBC AUTO-ENTMCNC: 30.5 G/DL (ref 31–37)
MCV RBC AUTO: 81.9 FL (ref 78–100)
MONOCYTES # BLD: 1.2 K/UL (ref 0.05–1.2)
MONOCYTES NFR BLD: 9 % (ref 3–10)
NEUTS SEG # BLD: 7.4 K/UL (ref 1.8–8)
NEUTS SEG NFR BLD: 53 % (ref 40–73)
NRBC # BLD: 0 K/UL (ref 0–0.01)
NRBC BLD-RTO: 0 PER 100 WBC
PLATELET # BLD AUTO: 424 K/UL (ref 135–420)
PMV BLD AUTO: 10.1 FL (ref 9.2–11.8)
POTASSIUM SERPL-SCNC: 4.9 MMOL/L (ref 3.5–5.5)
PROT SERPL-MCNC: 7.1 G/DL (ref 6.4–8.2)
RBC # BLD AUTO: 4.92 M/UL (ref 4.2–5.3)
SODIUM SERPL-SCNC: 143 MMOL/L (ref 136–145)
TIBC SERPL-MCNC: 303 UG/DL (ref 250–450)
WBC # BLD AUTO: 13.9 K/UL (ref 4.6–13.2)

## 2022-11-30 PROCEDURE — 80053 COMPREHEN METABOLIC PANEL: CPT

## 2022-11-30 PROCEDURE — 85025 COMPLETE CBC W/AUTO DIFF WBC: CPT

## 2022-11-30 PROCEDURE — 85025 COMPLETE CBC W/AUTO DIFF WBC: CPT | Performed by: NURSE PRACTITIONER

## 2022-11-30 PROCEDURE — 36415 COLL VENOUS BLD VENIPUNCTURE: CPT

## 2022-11-30 PROCEDURE — 86140 C-REACTIVE PROTEIN: CPT

## 2022-11-30 PROCEDURE — 82728 ASSAY OF FERRITIN: CPT

## 2022-11-30 PROCEDURE — 83540 ASSAY OF IRON: CPT

## 2022-12-14 ENCOUNTER — VIRTUAL VISIT (OUTPATIENT)
Dept: ONCOLOGY | Age: 57
End: 2022-12-14
Payer: MEDICARE

## 2022-12-14 DIAGNOSIS — D75.839 THROMBOCYTOSIS: Primary | ICD-10-CM

## 2022-12-14 DIAGNOSIS — M05.771 RHEUMATOID ARTHRITIS INVOLVING RIGHT FOOT WITH POSITIVE RHEUMATOID FACTOR (HCC): ICD-10-CM

## 2022-12-14 DIAGNOSIS — D50.8 IRON DEFICIENCY ANEMIA SECONDARY TO INADEQUATE DIETARY IRON INTAKE: ICD-10-CM

## 2022-12-14 PROCEDURE — G8756 NO BP MEASURE DOC: HCPCS | Performed by: NURSE PRACTITIONER

## 2022-12-14 PROCEDURE — G9899 SCRN MAM PERF RSLTS DOC: HCPCS | Performed by: NURSE PRACTITIONER

## 2022-12-14 PROCEDURE — 99442 PR PHYS/QHP TELEPHONE EVALUATION 11-20 MIN: CPT | Performed by: NURSE PRACTITIONER

## 2022-12-14 NOTE — PROGRESS NOTES
Cara Cardoza is a 60 y.o. female, evaluated via audio-only technology on 12/14/2022 for No chief complaint on file. .    Assessment & Plan: Thrombocytosis:  --She has been recieving Infliximab every 6 weeks which have been to control her rheumatoid arthritis flares. --She has a history of thrombocytosis and was on anagrelide 0.5mg one tablet twice daily without obvious side effect. Patient was being followed by Dr. Ho Medina who retired recently however this has been held since 7/2022. --Today I have reviewed with the patient about recent lab reports.   --11/30/2022 CBC reported WBC 13.9, ANC 7.4, ALC 4.3, hemoglobin 12.3 with hematocrit of 40.3%. platelet has decreased to 424,000. --It was still unclear etiology of thrombocytosis, likely reactive 2/2 smoking vs MPNs. Previous work up revealed negative JAK2 Mutation, MPL/MOHAN/BCRABL1. Plan:  --The patient will continue holding anagrelide   --If her PLT continues to elevate will obtain bone marrow biopsy   --We will monitor CBC in diff  --RTC in 4 weeks, always sooner if required. .     Rheumatoid arthritis:  --The patient will continue to receive Infliximab as a primary treatment modality for her severe rheumatoid arthritis every 6 weeks. --The patient will continue to take Methotrexate 5mg PO weekly. --I have advised her to contact us if any change in plans from her heumatologic stand point. Fibromyalgia/chronic pain syndrome:   --The patient  continues to have generalized pain at times related to her underlying fibromyalgia. The patient receives her Percocet  mg medication on a monthly basis as needed. --She was referred to pain management for long term control. Chronic anemia and folic acid deficiency:  --11/30/2022 CBC reported WBC 13.9, ANC 7.4, ALC 4.3, hemoglobin 12.3 with hematocrit of 40.3%. Platelet has decreased to 424,000. Iron sat 16%. Ferritin 164. --Advised patient to take oral iron OTC 1 tab PO every other day. --Will continue to monitor      Leukocytosis:   --Will continue to monitor CBC     Patient agreed with plan and verbalized understanding     Subjective:   Ms. Aura Felder is a 60 y.o. female who was evaluated via audio-only technology. She has severe rheumatoid arthritis. Patient was being followed previously by Dr. Janee Mallory who retired. She has been recieving Infliximab every 6 weeks to control her rheumatoid arthritis flares. She continues to follow up with her Rheumatologist. She also has history of spine osteoarthritis. Today the patient denies fatigue, shortness of breath, and weakness. She denies chest pain or dizziness. She denies any abdominal pain, nausea, vomiting, diarrhea, or constipation. She denies any bleeding or bruising. She does not have any concerns or complaints to report at this time. She reported active smoking, now following PCP for smoking cessation. Prior to Admission medications    Medication Sig Start Date End Date Taking? Authorizing Provider   anagrelide (AGRYLIN) 0.5 mg capsule TAKE 1 CAPSULE BY MOUTH TWICE DAILY 11/29/22   Blanquita Hernandez DNP   buPROPion XL (WELLBUTRIN XL) 150 mg tablet Take 150 mg by mouth daily. 8/3/22   Provider, Historical   cephALEXin (KEFLEX) 500 mg capsule TAKE 1 CAPSULE BY MOUTH FOUR TIMES DAILY 5/6/22   Provider, Historical   clindamycin (CLEOCIN T) 1 % lotion  5/11/22   Provider, Historical   fluconazole (DIFLUCAN) 150 mg tablet TAKE 1 TABLET BY MOUTH ONCE FOR 1 DOSE 6/2/22   Provider, Historical   gabapentin (NEURONTIN) 600 mg tablet  6/4/22   Provider, Historical   leflunomide (ARAVA) 20 mg tablet Take 20 mg by mouth daily. 4/19/22   Provider, Historical   clonazePAM (KlonoPIN) 0.5 mg tablet Take 0.5 mg by mouth daily as needed. 3/3/22   Provider, Historical   atorvastatin (LIPITOR) 40 mg tablet Take 1 Tablet by mouth nightly.  5/4/22   Candy Feliz MD   NovoLOG Mix 70-30 U-100 Insuln 100 unit/mL (70-30) injection  11/8/21   Provider, Historical   adalimumab (Humira) 40 mg/0.8 mL injection 40 mg by SubCUTAneous route every seven (7) days. Provider, Historical   gabapentin (NEURONTIN) 100 mg capsule Take 100 mg by mouth three (3) times daily. Patient not taking: Reported on 7/18/2022    Other, MD Lelia   mirtazapine (REMERON) 15 mg tablet  3/27/20   Provider, Historical   hydroCHLOROthiazide (HYDRODIURIL) 12.5 mg tablet Take 12.5 mg by mouth daily. Provider, Historical   losartan (COZAAR) 50 mg tablet Take 1 Tab by mouth daily. 10/23/19   Criss Bolden NP   aspirin delayed-release 81 mg tablet Take 1 Tab by mouth daily. 8/9/19   Elizabeth Dee MD   naloxone [facility]) 4 mg/actuation nasal spray Use 1 spray intranasally, then discard. Repeat with new spray every 2 min as needed for opioid overdose symptoms, alternating nostrils. Indications: Decrease in Rate & Depth of Breathing due to Opioid Drug, opioid overdose 4/30/19   Chago CHASE NP   albuterol (PROAIR HFA) 90 mcg/actuation inhaler Take 1 Puff by inhalation every six (6) hours as needed for Wheezing. 2/6/16   Hudson Wilder,    metFORMIN (GLUCOPHAGE) 1,000 mg tablet Take 500 mg by mouth two (2) times a day. Provider, Historical   Cholecalciferol, Vitamin D3, 5,000 unit Tab Take 5,000 Units by mouth every seven (7) days. Other, MD Lelia   atenolol (TENORMIN) 25 mg tablet Take 25 mg by mouth daily. Indications: HYPERTENSION    Provider, Historical   furosemide (LASIX) 40 mg tablet Take  by mouth daily.       Provider, Historical     Patient Active Problem List   Diagnosis Code    RA (rheumatoid arthritis) ([facility]) M06.9    Fibromyalgia M79.7    Leukocytosis D72.829    Thrombocytosis D75.839    Essential thrombocytosis ([facility]) D47.3    Rheumatoid arthritis involving multiple joints ([facility]) M06.9    Chronic anemia D64.9    Chronic pain syndrome G89.4    Chest pain R07.9    Palpitation R00.2    Rheumatoid arthritis of foot ([facility]) M06.9    Tobacco use Z72.0    Type 2 diabetes mellitus without complication, with long-term current use of insulin ([facility]) E11.9, Z79.4    Type 2 diabetes with nephropathy ([facility]) E11.21    Sebaceous cyst of skin of right breast N60.81    ACS (acute coronary syndrome) ([facility]) I24.9    Acute bronchitis J20.9    Chronic diastolic congestive heart failure ([facility]) I50.32    Hyperlipidemia E78.5    Rheumatoid arthritis ([facility] M06.9     Patient Active Problem List    Diagnosis Date Noted    Rheumatoid arthritis ([facility] 08/25/2020    Hyperlipidemia 03/30/2020    Acute bronchitis 08/08/2019    Chronic diastolic congestive heart failure ([facility] 08/08/2019    ACS (acute coronary syndrome) ([facility] 08/07/2019    Sebaceous cyst of skin of right breast 07/24/2018    Type 2 diabetes with nephropathy ([facility] 05/30/2018    Chest pain 05/04/2018    Palpitation 05/04/2018    Rheumatoid arthritis of foot ([facility] 05/04/2018    Tobacco use 05/04/2018    Type 2 diabetes mellitus without complication, with long-term current use of insulin ([facility] 05/04/2018    Chronic pain syndrome 08/22/2017    Rheumatoid arthritis involving multiple joints ([facility] 11/01/2016    Chronic anemia 11/01/2016    Essential thrombocytosis ([facility] 01/06/2016    RA (rheumatoid arthritis) ([facility])     Fibromyalgia     Leukocytosis     Thrombocytosis      Current Outpatient Medications   Medication Sig Dispense Refill    anagrelide (AGRYLIN) 0.5 mg capsule TAKE 1 CAPSULE BY MOUTH TWICE DAILY 60 Capsule 6    buPROPion XL (WELLBUTRIN XL) 150 mg tablet Take 150 mg by mouth daily. cephALEXin (KEFLEX) 500 mg capsule TAKE 1 CAPSULE BY MOUTH FOUR TIMES DAILY      clindamycin (CLEOCIN T) 1 % lotion       fluconazole (DIFLUCAN) 150 mg tablet TAKE 1 TABLET BY MOUTH ONCE FOR 1 DOSE      gabapentin (NEURONTIN) 600 mg tablet       leflunomide (ARAVA) 20 mg tablet Take 20 mg by mouth daily. clonazePAM (KlonoPIN) 0.5 mg tablet Take 0.5 mg by mouth daily as needed. atorvastatin (LIPITOR) 40 mg tablet Take 1 Tablet by mouth nightly.  80 Tablet 3    NovoLOG Mix 70-30 U-100 Insuln 100 unit/mL (70-30) injection       adalimumab (Humira) 40 mg/0.8 mL injection 40 mg by SubCUTAneous route every seven (7) days. gabapentin (NEURONTIN) 100 mg capsule Take 100 mg by mouth three (3) times daily. (Patient not taking: Reported on 7/18/2022)      mirtazapine (REMERON) 15 mg tablet       hydroCHLOROthiazide (HYDRODIURIL) 12.5 mg tablet Take 12.5 mg by mouth daily. losartan (COZAAR) 50 mg tablet Take 1 Tab by mouth daily. 90 Tab 1    aspirin delayed-release 81 mg tablet Take 1 Tab by mouth daily. 30 Tab 1    naloxone (NARCAN) 4 mg/actuation nasal spray Use 1 spray intranasally, then discard. Repeat with new spray every 2 min as needed for opioid overdose symptoms, alternating nostrils. Indications: Decrease in Rate & Depth of Breathing due to Opioid Drug, opioid overdose 1 Each 1    albuterol (PROAIR HFA) 90 mcg/actuation inhaler Take 1 Puff by inhalation every six (6) hours as needed for Wheezing. 1 Inhaler 0    metFORMIN (GLUCOPHAGE) 1,000 mg tablet Take 500 mg by mouth two (2) times a day. Cholecalciferol, Vitamin D3, 5,000 unit Tab Take 5,000 Units by mouth every seven (7) days. atenolol (TENORMIN) 25 mg tablet Take 25 mg by mouth daily. Indications: HYPERTENSION      furosemide (LASIX) 40 mg tablet Take  by mouth daily.          Allergies   Allergen Reactions    Levaquin [Levofloxacin] Anaphylaxis    Pollen Extracts Unable to Obtain     Past Medical History:   Diagnosis Date    Abdominal pain, unspecified site     Acute otitis media     Acute pharyngitis     Anxiety     Arthritis     Autoimmune disease ([facility]     Back injury     Backache     herniated disc in lower back    Blurred vision     Chest pain 5/4/2018    Chest pain, unspecified     abnormal EKG    Chronic airway obstruction, not elsewhere classified     Chronic back pain     Chronic pain     COPD     Depression     Diabetes (HCC)     Dizziness     Dizziness and giddiness possible orthostatic changes, vasovagal, autonomic dysfunction from diabetic neuropathy    Encounters for unspecified administrative purpose     Essential thrombocytosis ([facility] 1/6/2016    Feeling anxious     Fibromyalgia     Headache(784.0)     Hemorrhoid     Herniated disc     at L5    Hypercholesterolemia     Hyperglycemia     Hypertension     Joint pain     Leukocytosis, unspecified     Major depressive disorder, single episode, mild (HCC)     Mental disorder     depression, anxiety, bipolar and PTSD    Neuropathy     Obesity, unspecified     Other chest pain     Palpitation 5/4/2018    ?  arrhythmia    Phobic disorders     Rheumatoid arthritis ([address]     Rheumatoid arthritis of foot ([address] 5/4/2018    on treatment    Seasonal allergies     Shortness of breath     normal EF    Smoking 5/4/2018    discussed cessation    Streptococcal sore throat     Type 2 diabetes mellitus without complication, with long-term current use of insulin ([address] 5/4/2018    Type II or unspecified type diabetes mellitus with unspecified complication, uncontrolled     Unspecified hereditary and idiopathic peripheral neuropathy     Vitamin D deficiency      Past Surgical History:   Procedure Laterality Date    HX CHOLECYSTECTOMY  1994    HX GYN  2005    partial Hysterectomy    HX OTHER SURGICAL  12-1-15    had ingrown toenail removed from big toe, both feet    HX TUBAL LIGATION      1995     Family History   Problem Relation Age of Onset    Diabetes Other     Alcohol abuse Mother     OSTEOARTHRITIS Mother     Diabetes Mother     Elevated Lipids Mother     Headache Mother     Heart Disease Mother     Migraines Mother     Psychiatric Disorder Mother     Alcohol abuse Father     OSTEOARTHRITIS Father     Cancer Father     Headache Father     Heart Disease Father     Lung Disease Father     Migraines Father     Heart Surgery Father     Alcohol abuse Sister     Headache Sister     Diabetes Sister     Heart Disease Sister     Migraines Sister Psychiatric Disorder Sister     Headache Brother     Diabetes Brother     Elevated Lipids Brother     Heart Disease Brother     Migraines Brother     Psychiatric Disorder Brother     Coronary Art Dis Brother     Cancer Maternal Aunt     Alcohol abuse Maternal Aunt     Diabetes Maternal Aunt     Headache Maternal Aunt     Lung Disease Maternal Aunt     Migraines Maternal Aunt     Headache Maternal Uncle     Migraines Maternal Uncle     Stroke Maternal Uncle     Psychiatric Disorder Maternal Uncle     Headache Paternal Aunt     Migraines Paternal Aunt     Headache Paternal Uncle     Hypertension Paternal Uncle     Migraines Paternal Uncle     Stroke Paternal Uncle     Headache Maternal Grandmother     Migraines Maternal Grandmother     Stroke Maternal Grandmother     Headache Maternal Grandfather     Migraines Maternal Grandfather     Stroke Maternal Grandfather     Headache Paternal Grandmother     Hypertension Paternal Grandmother     Lung Disease Paternal Grandmother     Migraines Paternal Grandmother     Cancer Paternal Grandmother     Headache Paternal Grandfather     Cancer Paternal Grandfather     Migraines Paternal Grandfather      Social History     Tobacco Use    Smoking status: Every Day     Packs/day: 0.25     Types: Cigarettes    Smokeless tobacco: Never    Tobacco comments:     5 cigarettes day   Substance Use Topics    Alcohol use: Yes     Comment: socially       Review of Systems   Constitutional:  Positive for malaise/fatigue. Negative for chills and fever. HENT:  Negative for congestion. Respiratory:  Negative for cough and shortness of breath. Cardiovascular:  Negative for chest pain and leg swelling. Gastrointestinal:  Negative for abdominal pain, blood in stool, constipation, diarrhea, melena, nausea and vomiting. Genitourinary:  Negative for hematuria. Musculoskeletal:  Negative for myalgias. Skin:  Negative for rash.    Neurological:  Negative for dizziness, weakness and headaches. Endo/Heme/Allergies:  Does not bruise/bleed easily. Patient-Reported Weight: 221lb    Lab Results   Component Value Date/Time    WBC 13.9 (H) 11/30/2022 01:27 PM    HGB (POC) 12.8 05/13/2014 10:06 AM    HGB 12.3 11/30/2022 01:27 PM    HCT (POC) 41.1 05/13/2014 10:06 AM    HCT 40.3 11/30/2022 01:27 PM    PLATELET 248 (H) 21/21/2068 01:27 PM    MCV 81.9 11/30/2022 01:27 PM     Lab Results   Component Value Date/Time    Sodium 143 11/30/2022 01:27 PM    Potassium 4.9 11/30/2022 01:27 PM    Chloride 111 11/30/2022 01:27 PM    CO2 29 11/30/2022 01:27 PM    Anion gap 3 11/30/2022 01:27 PM    Glucose 139 (H) 11/30/2022 01:27 PM    BUN 23 (H) 11/30/2022 01:27 PM    Creatinine 1.03 11/30/2022 01:27 PM    BUN/Creatinine ratio 22 (H) 11/30/2022 01:27 PM    GFR est AA >60 07/14/2022 08:52 AM    GFR est non-AA >60 07/14/2022 08:52 AM    Calcium 10.6 (H) 11/30/2022 01:27 PM    Bilirubin, total 0.2 11/30/2022 01:27 PM    Alk. phosphatase 77 11/30/2022 01:27 PM    Protein, total 7.1 11/30/2022 01:27 PM    Albumin 3.1 (L) 11/30/2022 01:27 PM    Globulin 4.0 11/30/2022 01:27 PM    A-G Ratio 0.8 11/30/2022 01:27 PM    ALT (SGPT) 24 11/30/2022 01:27 PM    AST (SGOT) 14 11/30/2022 01:27 PM     Lab Results   Component Value Date/Time    Iron 49 (L) 11/30/2022 01:27 PM    TIBC 303 11/30/2022 01:27 PM    Iron % saturation 16 (L) 11/30/2022 01:27 PM    Ferritin 164 11/30/2022 01:27 PM       Leon Ware was evaluated through a patient-initiated, synchronous (real-time) audio only encounter. She (or guardian if applicable) is aware that it is a billable service, which includes applicable co-pays, with coverage as determined by her insurance carrier. This visit was conducted with the patient's (and/or Padma Daigle guardian's) verbal consent. She has not had a related appointment within my department in the past 7 days or scheduled within the next 24 hours.  The patient was located in a state where the provider was licensed to provide care. The patient was located at: Home: [address] [address]  The provider was located at: Facility (Appt Department): [address]  [address] [facility] [place]      Total Time: minutes: 20    Follow up in 3 months with repeat labs or sooner if indicated. No orders of the defined types were placed in this encounter.     Kori Martinez, DNP

## 2023-02-04 DIAGNOSIS — D75.839 THROMBOCYTOSIS: Primary | ICD-10-CM

## 2023-02-05 DIAGNOSIS — D75.839 THROMBOCYTOSIS: Primary | ICD-10-CM

## 2023-02-05 DIAGNOSIS — D50.8 IRON DEFICIENCY ANEMIA SECONDARY TO INADEQUATE DIETARY IRON INTAKE: ICD-10-CM

## 2023-05-03 ENCOUNTER — HOSPITAL ENCOUNTER (OUTPATIENT)
Facility: HOSPITAL | Age: 58
Discharge: HOME | End: 2023-05-06
Payer: MEDICARE

## 2023-05-03 DIAGNOSIS — Z12.31 VISIT FOR SCREENING MAMMOGRAM: ICD-10-CM

## 2023-05-03 PROCEDURE — 77063 BREAST TOMOSYNTHESIS BI: CPT

## 2023-05-30 RX ORDER — ATORVASTATIN CALCIUM 40 MG/1
TABLET, FILM COATED ORAL
Qty: 90 TABLET | OUTPATIENT
Start: 2023-05-30

## 2023-06-06 ENCOUNTER — OFFICE VISIT (OUTPATIENT)
Age: 58
End: 2023-06-06

## 2023-06-06 VITALS — HEIGHT: 71 IN | WEIGHT: 221 LBS | TEMPERATURE: 97.3 F | BODY MASS INDEX: 30.94 KG/M2

## 2023-06-06 DIAGNOSIS — M25.811 IMPINGEMENT OF RIGHT SHOULDER: ICD-10-CM

## 2023-06-06 DIAGNOSIS — M25.511 CHRONIC RIGHT SHOULDER PAIN: Primary | ICD-10-CM

## 2023-06-06 DIAGNOSIS — G89.29 CHRONIC RIGHT SHOULDER PAIN: Primary | ICD-10-CM

## 2023-06-06 DIAGNOSIS — M48.02 CERVICAL SPINAL STENOSIS: ICD-10-CM

## 2023-06-06 RX ORDER — TRIAMCINOLONE ACETONIDE 40 MG/ML
40 INJECTION, SUSPENSION INTRA-ARTICULAR; INTRAMUSCULAR ONCE
Status: COMPLETED | OUTPATIENT
Start: 2023-06-06 | End: 2023-06-06

## 2023-06-06 RX ORDER — LIDOCAINE 50 MG/G
1 PATCH TOPICAL DAILY
Qty: 30 PATCH | Refills: 3 | Status: SHIPPED | OUTPATIENT
Start: 2023-06-06

## 2023-06-06 RX ADMIN — TRIAMCINOLONE ACETONIDE 40 MG: 40 INJECTION, SUSPENSION INTRA-ARTICULAR; INTRAMUSCULAR at 14:16

## 2023-06-06 NOTE — PROGRESS NOTES
Major depressive disorder, single episode, mild ([facility])     Mental disorder     depression, anxiety, bipolar and PTSD    Neuropathy     Obesity, unspecified     Other chest pain     Palpitation 2018    ?  arrhythmia    Phobic disorders     Rheumatoid arthritis ([facility])     Rheumatoid arthritis of foot ([facility] 2018    on treatment    Seasonal allergies     Shortness of breath     normal EF    Smoking 2018    discussed cessation    Streptococcal sore throat     Type 2 diabetes mellitus without complication, with long-term current use of insulin ([facility] 2018    Type II or unspecified type diabetes mellitus with unspecified complication, uncontrolled     Unspecified hereditary and idiopathic peripheral neuropathy     Vitamin D deficiency       Social History       Tobacco History       Smoking Status  Every Day Smoking Frequency  0.25 packs/day Smoking Tobacco Type  Cigarettes      Smokeless Tobacco Use  Never      Tobacco Comments  Quit smokin cigarettes day              Alcohol History       Alcohol Use Status  Yes              Drug Use       Drug Use Status  No              Sexual Activity       Sexually Active  Not Asked Birth Control/Protection  Condom                   Past Surgical History:   Procedure Laterality Date    CHOLECYSTECTOMY  1994    GYN  2005    partial Hysterectomy    [facility] STEROTACTIC LOC BREAST BIOPSY LEFT Left 5/3/2022    MOUNA STEROTACTIC LOC BREAST BIOPSY LEFT 5/3/2022 [facility]    MOUNA STEROTACTIC LOC BREAST BIOPSY RIGHT Right 5/3/2022    MOUNA STEROTACTIC LOC BREAST BIOPSY RIGHT 5/3/2022 [facility]    OTHER SURGICAL HISTORY  12-1-15    had ingrown toenail removed from big toe, both feet    TUBAL LIGATION            Family History   Problem Relation Age of Onset    Alcohol Abuse Father     Osteoarthritis Father     Cancer Father     Headache Father     Heart Disease Father     Lung Disease Father     Migraines Father     Heart Surgery Father     Alcohol Abuse Sister

## 2023-06-28 ENCOUNTER — OFFICE VISIT (OUTPATIENT)
Age: 58
End: 2023-06-28
Payer: MEDICARE

## 2023-06-28 VITALS — WEIGHT: 221 LBS | HEIGHT: 71 IN | BODY MASS INDEX: 30.94 KG/M2

## 2023-06-28 DIAGNOSIS — M48.02 CERVICAL SPINAL STENOSIS: Primary | ICD-10-CM

## 2023-06-28 DIAGNOSIS — M25.811 IMPINGEMENT OF RIGHT SHOULDER: ICD-10-CM

## 2023-06-28 PROCEDURE — 99214 OFFICE O/P EST MOD 30 MIN: CPT | Performed by: PHYSICIAN ASSISTANT

## 2023-06-28 RX ORDER — LORAZEPAM 1 MG/1
1-2 TABLET ORAL ONCE
Qty: 3 TABLET | Refills: 0 | Status: SHIPPED | OUTPATIENT
Start: 2023-06-28 | End: 2023-06-28

## 2023-06-28 RX ORDER — METHYLPREDNISOLONE 4 MG/1
TABLET ORAL
Qty: 1 KIT | Refills: 0 | Status: SHIPPED | OUTPATIENT
Start: 2023-06-28

## 2024-02-03 ENCOUNTER — HOSPITAL ENCOUNTER (EMERGENCY)
Facility: HOSPITAL | Age: 59
Discharge: HOME | End: 2024-02-03
Attending: EMERGENCY MEDICINE
Payer: MEDICARE

## 2024-02-03 VITALS
RESPIRATION RATE: 18 BRPM | BODY MASS INDEX: 32.48 KG/M2 | SYSTOLIC BLOOD PRESSURE: 155 MMHG | HEIGHT: 71 IN | HEART RATE: 88 BPM | TEMPERATURE: 98.2 F | OXYGEN SATURATION: 100 % | WEIGHT: 232 LBS | DIASTOLIC BLOOD PRESSURE: 55 MMHG

## 2024-02-03 DIAGNOSIS — R04.0 RECURRENT EPISTAXIS: ICD-10-CM

## 2024-02-03 DIAGNOSIS — J01.90 ACUTE SINUSITIS, RECURRENCE NOT SPECIFIED, UNSPECIFIED LOCATION: Primary | ICD-10-CM

## 2024-02-03 LAB
ALBUMIN SERPL-MCNC: 3.2 G/DL (ref 3.4–5)
ALBUMIN/GLOB SERPL: 0.8 (ref 0.8–1.7)
ALP SERPL-CCNC: 99 U/L (ref 45–117)
ALT SERPL-CCNC: 26 U/L (ref 13–56)
ANION GAP SERPL CALC-SCNC: 4 MMOL/L (ref 3–18)
APTT PPP: 26.7 SEC (ref 23–36.4)
AST SERPL-CCNC: 11 U/L (ref 10–38)
BASOPHILS # BLD: 0.1 K/UL (ref 0–0.1)
BASOPHILS NFR BLD: 1 % (ref 0–2)
BILIRUB SERPL-MCNC: 0.3 MG/DL (ref 0.2–1)
BUN SERPL-MCNC: 34 MG/DL (ref 7–18)
BUN/CREAT SERPL: 31 (ref 12–20)
CALCIUM SERPL-MCNC: 9.1 MG/DL (ref 8.5–10.1)
CHLORIDE SERPL-SCNC: 114 MMOL/L (ref 100–111)
CO2 SERPL-SCNC: 22 MMOL/L (ref 21–32)
CREAT SERPL-MCNC: 1.1 MG/DL (ref 0.6–1.3)
DIFFERENTIAL METHOD BLD: ABNORMAL
EKG ATRIAL RATE: 93 BPM
EKG DIAGNOSIS: NORMAL
EKG P AXIS: 46 DEGREES
EKG P-R INTERVAL: 184 MS
EKG Q-T INTERVAL: 378 MS
EKG QRS DURATION: 92 MS
EKG QTC CALCULATION (BAZETT): 469 MS
EKG R AXIS: 36 DEGREES
EKG T AXIS: 31 DEGREES
EKG VENTRICULAR RATE: 93 BPM
EOSINOPHIL # BLD: 0.7 K/UL (ref 0–0.4)
EOSINOPHIL NFR BLD: 5 % (ref 0–5)
ERYTHROCYTE [DISTWIDTH] IN BLOOD BY AUTOMATED COUNT: 13.9 % (ref 11.6–14.5)
GLOBULIN SER CALC-MCNC: 3.8 G/DL (ref 2–4)
GLUCOSE SERPL-MCNC: 162 MG/DL (ref 74–99)
HCT VFR BLD AUTO: 39.3 % (ref 35–45)
HGB BLD-MCNC: 12.7 G/DL (ref 12–16)
IMM GRANULOCYTES # BLD AUTO: 0 K/UL (ref 0–0.04)
IMM GRANULOCYTES NFR BLD AUTO: 0 % (ref 0–0.5)
INR PPP: 0.9 (ref 0.9–1.1)
LYMPHOCYTES # BLD: 4.8 K/UL (ref 0.9–3.6)
LYMPHOCYTES NFR BLD: 35 % (ref 21–52)
MCH RBC QN AUTO: 25.8 PG (ref 24–34)
MCHC RBC AUTO-ENTMCNC: 32.3 G/DL (ref 31–37)
MCV RBC AUTO: 79.7 FL (ref 78–100)
MONOCYTES # BLD: 1.2 K/UL (ref 0.05–1.2)
MONOCYTES NFR BLD: 9 % (ref 3–10)
NEUTS SEG # BLD: 6.8 K/UL (ref 1.8–8)
NEUTS SEG NFR BLD: 50 % (ref 40–73)
NRBC # BLD: 0 K/UL (ref 0–0.01)
NRBC BLD-RTO: 0 PER 100 WBC
PLATELET # BLD AUTO: 411 K/UL (ref 135–420)
PMV BLD AUTO: 9.3 FL (ref 9.2–11.8)
POTASSIUM SERPL-SCNC: 3.9 MMOL/L (ref 3.5–5.5)
PROT SERPL-MCNC: 7 G/DL (ref 6.4–8.2)
PROTHROMBIN TIME: 11.8 SEC (ref 11.9–14.7)
RBC # BLD AUTO: 4.93 M/UL (ref 4.2–5.3)
SODIUM SERPL-SCNC: 140 MMOL/L (ref 136–145)
WBC # BLD AUTO: 13.6 K/UL (ref 4.6–13.2)

## 2024-02-03 PROCEDURE — 80053 COMPREHEN METABOLIC PANEL: CPT

## 2024-02-03 PROCEDURE — 6370000000 HC RX 637 (ALT 250 FOR IP): Performed by: EMERGENCY MEDICINE

## 2024-02-03 PROCEDURE — 93010 ELECTROCARDIOGRAM REPORT: CPT | Performed by: INTERNAL MEDICINE

## 2024-02-03 PROCEDURE — 99284 EMERGENCY DEPT VISIT MOD MDM: CPT

## 2024-02-03 PROCEDURE — 85730 THROMBOPLASTIN TIME PARTIAL: CPT

## 2024-02-03 PROCEDURE — 85025 COMPLETE CBC W/AUTO DIFF WBC: CPT

## 2024-02-03 PROCEDURE — 93005 ELECTROCARDIOGRAM TRACING: CPT | Performed by: EMERGENCY MEDICINE

## 2024-02-03 PROCEDURE — 85610 PROTHROMBIN TIME: CPT

## 2024-02-03 RX ORDER — OXYMETAZOLINE HYDROCHLORIDE 0.05 G/100ML
2 SPRAY NASAL
Status: COMPLETED | OUTPATIENT
Start: 2024-02-03 | End: 2024-02-03

## 2024-02-03 RX ORDER — AMOXICILLIN AND CLAVULANATE POTASSIUM 875; 125 MG/1; MG/1
1 TABLET, FILM COATED ORAL 2 TIMES DAILY
Qty: 14 TABLET | Refills: 0 | Status: SHIPPED | OUTPATIENT
Start: 2024-02-03 | End: 2024-02-10

## 2024-02-03 RX ADMIN — OXYMETAZOLINE HYDROCHLORIDE 2 SPRAY: 0.05 SPRAY NASAL at 13:25

## 2024-02-03 ASSESSMENT — ENCOUNTER SYMPTOMS
ABDOMINAL PAIN: 0
EYES NEGATIVE: 1
SINUS PAIN: 1
CHEST TIGHTNESS: 0

## 2024-02-03 ASSESSMENT — PAIN - FUNCTIONAL ASSESSMENT: PAIN_FUNCTIONAL_ASSESSMENT: NONE - DENIES PAIN

## 2024-02-03 NOTE — ED TRIAGE NOTES
C/o bleeding from nose off and on x 1 month. Seen and treated at [facility]. Takes baby aspirin daily. Currently on Doxycycline for sinus infection. No active bleeding at this time.

## 2024-02-03 NOTE — ED PROVIDER NOTES
EMERGENCY DEPARTMENT HISTORY AND PHYSICAL EXAM    1:26 PM      Date: 2/3/2024  Patient Name: Halle Gonzáles    History of Presenting Illness     Chief Complaint   Patient presents with    Epistaxis       History From: Patient  Patient is a 58-year-old female with a history of chronic anemia, chronic pain, remote arthritis, thrombocytosis, congestive heart failure, heart disease, diabetes, presents emergency department with over 1 month of intermittent right nares bleeding.  Patient's been seen by an ear nose and throat doctor and was seen yesterday and had a scope done and did not find anything bleeding but said if the bleeding continues she will need to have a deeper inspection done in the operating room.  The patient was given a request to have an EKG and blood work done preoperatively and the patient this morning had another episode of bleeding so decided to come to the emergency department for reevaluation and to have her blood work and EKG done.  Patient says that the bleeding has now stopped with some pressure and takes an aspirin every day.  The patient denies any other aggravating or alleviating factors.  Patient denies being on a true blood thinner.  Patient denies any trauma to her nose but is been having some popping into her ears.  Patient is also been taking doxycycline for a sinus infection given her by another physician.             Nursing Notes were all reviewed and agreed with or any disagreements were addressed in the HPI.    PCP: Reynold Nieto MD    Current Facility-Administered Medications   Medication Dose Route Frequency Provider Last Rate Last Admin    oxymetazoline (AFRIN) 0.05 % nasal spray 2 spray  2 spray Each Nostril NOW Paul Trejo MD         Current Outpatient Medications   Medication Sig Dispense Refill    methylPREDNISolone (MEDROL DOSEPACK) 4 MG tablet Take per instructions 1 kit 0    lidocaine (LIDODERM) 5 % Place 1 patch onto the skin daily 12 hours on, 12 hours  off. 30 patch 3    adalimumab (HUMIRA) 40 MG/0.8ML injection Inject 0.8 mLs into the skin every 7 days      albuterol sulfate HFA (PROVENTIL;VENTOLIN;PROAIR) 108 (90 Base) MCG/ACT inhaler Inhale 1 puff into the lungs every 6 hours as needed      anagrelide (AGRYLIN) 0.5 MG capsule TAKE 1 CAPSULE BY MOUTH TWICE DAILY      aspirin 81 MG EC tablet Take 1 tablet by mouth daily      atenolol (TENORMIN) 25 MG tablet Take 1 tablet by mouth daily      atorvastatin (LIPITOR) 40 MG tablet Take 1 tablet by mouth      buPROPion (WELLBUTRIN XL) 150 MG extended release tablet Take 1 tablet by mouth daily      cephALEXin (KEFLEX) 500 MG capsule TAKE 1 CAPSULE BY MOUTH FOUR TIMES DAILY      vitamin D3 (CHOLECALCIFEROL) 125 MCG (5000 UT) TABS tablet Take 1 tablet by mouth every 7 days      clindamycin (CLEOCIN T) 1 % lotion ceived the following from [facility]: Outside name: clindamycin (CLEOCIN T) 1 % lotion      clonazePAM (KLONOPIN) 0.5 MG tablet Take 1 tablet by mouth daily as needed.      fluconazole (DIFLUCAN) 150 MG tablet TAKE 1 TABLET BY MOUTH ONCE FOR 1 DOSE      furosemide (LASIX) 40 MG tablet Take by mouth daily      gabapentin (NEURONTIN) 100 MG capsule Take 1 capsule by mouth 3 times daily.      gabapentin (NEURONTIN) 600 MG tablet ceived the following from [facility]: Outside name: gabapentin (NEURONTIN) 600 mg tablet      hydroCHLOROthiazide (HYDRODIURIL) 12.5 MG tablet Take 1 tablet by mouth daily      insulin aspart protamine-insulin aspart (NOVOLOG 70/30) (70-30) 100 UNIT/ML injection ceived the following from [facility]: Outside name: NovoLOG Mix 70-30 U-100 Insuln 100 unit/mL (70-30) injection      leflunomide (ARAVA) 20 MG tablet Take 1 tablet by mouth daily      losartan (COZAAR) 50 MG tablet Take 1 tablet by mouth daily      metFORMIN (GLUCOPHAGE) 1000 MG tablet Take 0.5 tablets by mouth 2 times daily      mirtazapine (REMERON) 15 MG tablet ceived the  uncontrolled     Unspecified hereditary and idiopathic peripheral neuropathy     Vitamin D deficiency        Past Surgical History:  Past Surgical History:   Procedure Laterality Date    CHOLECYSTECTOMY  1994    GYN  2005    partial Hysterectomy    MOUNA STEROTACTIC LOC BREAST BIOPSY LEFT Left 5/3/2022    MOUNA STEROTACTIC LOC BREAST BIOPSY LEFT 5/3/2022 [facility]    MOUNA STEROTACTIC LOC BREAST BIOPSY RIGHT Right 5/3/2022    MOUNA STEROTACTIC LOC BREAST BIOPSY RIGHT 5/3/2022 [facility]    OTHER SURGICAL HISTORY  12-1-15    had ingrown toenail removed from big toe, both feet    TUBAL LIGATION      1995       Family History:  Family History   Problem Relation Age of Onset    Alcohol Abuse Father     Osteoarthritis Father     Cancer Father     Headache Father     Heart Disease Father     Lung Disease Father     Migraines Father     Heart Surgery Father     Alcohol Abuse Sister     Headache Sister     Diabetes Sister     Heart Disease Sister     Migraines Sister     Psychiatric Disorder Sister     Headache Brother     Diabetes Brother     Elevated Lipids Brother     Heart Disease Brother     Migraines Brother     Psychiatric Disorder Brother     Coronary Art Dis Brother     Cancer Maternal Aunt     Alcohol Abuse Maternal Aunt     Diabetes Maternal Aunt     Headache Maternal Aunt     Lung Disease Maternal Aunt     Migraines Maternal Aunt     Headache Maternal Uncle     Migraines Maternal Uncle     Stroke Maternal Uncle     Psychiatric Disorder Maternal Uncle     Headache Paternal Aunt     Migraines Paternal Aunt     Headache Paternal Uncle     Hypertension Paternal Uncle     Migraines Paternal Uncle     Stroke Paternal Uncle     Headache Maternal Grandmother     Migraines Maternal Grandmother     Stroke Maternal Grandmother     Headache Maternal Grandfather     Migraines Maternal Grandfather     Stroke Maternal Grandfather     Headache Paternal Grandmother     Hypertension Paternal Grandmother     Lung Disease Paternal

## 2024-02-03 NOTE — DISCHARGE INSTRUCTIONS
Hold your aspirin for the next 2 days until you speak to your ear nose and throat doctor.  We have completed the blood work and EKG as requested and your blood counts are reassuring.  Use your humidified air at home, use the Afrin nasal spray if you have bleeding, and make sure to follow-up with your specialist.  Please return if you are at all worsened or concerned.

## 2024-04-03 ENCOUNTER — HOSPITAL ENCOUNTER (EMERGENCY)
Facility: HOSPITAL | Age: 59
Discharge: HOME | End: 2024-04-03
Attending: EMERGENCY MEDICINE
Payer: MEDICARE

## 2024-04-03 ENCOUNTER — APPOINTMENT (OUTPATIENT)
Facility: HOSPITAL | Age: 59
End: 2024-04-03
Payer: MEDICARE

## 2024-04-03 VITALS
OXYGEN SATURATION: 98 % | RESPIRATION RATE: 16 BRPM | BODY MASS INDEX: 33.5 KG/M2 | HEART RATE: 80 BPM | WEIGHT: 234 LBS | TEMPERATURE: 97.3 F | DIASTOLIC BLOOD PRESSURE: 88 MMHG | HEIGHT: 70 IN | SYSTOLIC BLOOD PRESSURE: 156 MMHG

## 2024-04-03 DIAGNOSIS — N28.89 NODULE OF KIDNEY: ICD-10-CM

## 2024-04-03 DIAGNOSIS — J18.9 PNEUMONIA DUE TO INFECTIOUS ORGANISM, UNSPECIFIED LATERALITY, UNSPECIFIED PART OF LUNG: ICD-10-CM

## 2024-04-03 DIAGNOSIS — R10.9 FLANK PAIN: Primary | ICD-10-CM

## 2024-04-03 LAB
ALBUMIN SERPL-MCNC: 3.2 G/DL (ref 3.4–5)
ALBUMIN/GLOB SERPL: 0.7 (ref 0.8–1.7)
ALP SERPL-CCNC: 92 U/L (ref 45–117)
ALT SERPL-CCNC: 22 U/L (ref 13–56)
ANION GAP BLD CALC-SCNC: 10 (ref 10–20)
APPEARANCE UR: ABNORMAL
AST SERPL-CCNC: 13 U/L (ref 10–38)
BACTERIA URNS QL MICRO: NEGATIVE /HPF
BASOPHILS # BLD: 0.1 K/UL (ref 0–0.1)
BASOPHILS NFR BLD: 1 % (ref 0–2)
BILIRUB DIRECT SERPL-MCNC: <0.1 MG/DL (ref 0–0.2)
BILIRUB SERPL-MCNC: 0.3 MG/DL (ref 0.2–1)
BILIRUB UR QL: NEGATIVE
CA-I BLD-MCNC: 1.17 MMOL/L (ref 1.12–1.32)
CHLORIDE BLD-SCNC: 106 MMOL/L (ref 100–108)
CO2 BLD-SCNC: 24 MMOL/L (ref 19–24)
COLOR UR: YELLOW
CREAT UR-MCNC: 0.9 MG/DL (ref 0.6–1.3)
DIFFERENTIAL METHOD BLD: ABNORMAL
EOSINOPHIL # BLD: 0.7 K/UL (ref 0–0.4)
EOSINOPHIL NFR BLD: 5 % (ref 0–5)
EPITH CASTS URNS QL MICRO: NORMAL /LPF (ref 0–5)
ERYTHROCYTE [DISTWIDTH] IN BLOOD BY AUTOMATED COUNT: 13.9 % (ref 11.6–14.5)
GLOBULIN SER CALC-MCNC: 4.3 G/DL (ref 2–4)
GLUCOSE BLD STRIP.AUTO-MCNC: 129 MG/DL (ref 74–106)
GLUCOSE UR STRIP.AUTO-MCNC: >1000 MG/DL
HCT VFR BLD AUTO: 42.4 % (ref 35–45)
HGB BLD-MCNC: 13.9 G/DL (ref 12–16)
HGB UR QL STRIP: NEGATIVE
IMM GRANULOCYTES # BLD AUTO: 0 K/UL (ref 0–0.04)
IMM GRANULOCYTES NFR BLD AUTO: 0 % (ref 0–0.5)
KETONES UR QL STRIP.AUTO: NEGATIVE MG/DL
LEUKOCYTE ESTERASE UR QL STRIP.AUTO: NEGATIVE
LIPASE SERPL-CCNC: 68 U/L (ref 13–75)
LYMPHOCYTES # BLD: 4.8 K/UL (ref 0.9–3.6)
LYMPHOCYTES NFR BLD: 38 % (ref 21–52)
MAGNESIUM SERPL-MCNC: 2.2 MG/DL (ref 1.6–2.6)
MCH RBC QN AUTO: 26 PG (ref 24–34)
MCHC RBC AUTO-ENTMCNC: 32.8 G/DL (ref 31–37)
MCV RBC AUTO: 79.4 FL (ref 78–100)
MONOCYTES # BLD: 1.1 K/UL (ref 0.05–1.2)
MONOCYTES NFR BLD: 9 % (ref 3–10)
NEUTS SEG # BLD: 6 K/UL (ref 1.8–8)
NEUTS SEG NFR BLD: 47 % (ref 40–73)
NITRITE UR QL STRIP.AUTO: NEGATIVE
NRBC # BLD: 0 K/UL (ref 0–0.01)
NRBC BLD-RTO: 0 PER 100 WBC
PH UR STRIP: 5 (ref 5–8)
PLATELET # BLD AUTO: 421 K/UL (ref 135–420)
PMV BLD AUTO: 9.4 FL (ref 9.2–11.8)
POTASSIUM BLD-SCNC: 4 MMOL/L (ref 3.5–5.5)
PROT SERPL-MCNC: 7.5 G/DL (ref 6.4–8.2)
PROT UR STRIP-MCNC: 100 MG/DL
RBC # BLD AUTO: 5.34 M/UL (ref 4.2–5.3)
RBC #/AREA URNS HPF: NEGATIVE /HPF (ref 0–5)
SODIUM BLD-SCNC: 140 MMOL/L (ref 136–145)
SP GR UR REFRACTOMETRY: 1.02 (ref 1–1.03)
UROBILINOGEN UR QL STRIP.AUTO: 0.2 EU/DL (ref 0.2–1)
WBC # BLD AUTO: 12.7 K/UL (ref 4.6–13.2)
WBC URNS QL MICRO: NEGATIVE /HPF (ref 0–4)

## 2024-04-03 PROCEDURE — 81001 URINALYSIS AUTO W/SCOPE: CPT

## 2024-04-03 PROCEDURE — 2580000003 HC RX 258: Performed by: EMERGENCY MEDICINE

## 2024-04-03 PROCEDURE — 83690 ASSAY OF LIPASE: CPT

## 2024-04-03 PROCEDURE — 83735 ASSAY OF MAGNESIUM: CPT

## 2024-04-03 PROCEDURE — 71046 X-RAY EXAM CHEST 2 VIEWS: CPT

## 2024-04-03 PROCEDURE — 99285 EMERGENCY DEPT VISIT HI MDM: CPT

## 2024-04-03 PROCEDURE — 80047 BASIC METABLC PNL IONIZED CA: CPT

## 2024-04-03 PROCEDURE — 74177 CT ABD & PELVIS W/CONTRAST: CPT

## 2024-04-03 PROCEDURE — 6370000000 HC RX 637 (ALT 250 FOR IP): Performed by: EMERGENCY MEDICINE

## 2024-04-03 PROCEDURE — 6360000004 HC RX CONTRAST MEDICATION: Performed by: EMERGENCY MEDICINE

## 2024-04-03 PROCEDURE — 80076 HEPATIC FUNCTION PANEL: CPT

## 2024-04-03 PROCEDURE — 96374 THER/PROPH/DIAG INJ IV PUSH: CPT

## 2024-04-03 PROCEDURE — 6360000002 HC RX W HCPCS: Performed by: EMERGENCY MEDICINE

## 2024-04-03 PROCEDURE — 85025 COMPLETE CBC W/AUTO DIFF WBC: CPT

## 2024-04-03 RX ORDER — HYDROCODONE BITARTRATE AND ACETAMINOPHEN 5; 325 MG/1; MG/1
1 TABLET ORAL
Status: COMPLETED | OUTPATIENT
Start: 2024-04-03 | End: 2024-04-03

## 2024-04-03 RX ORDER — DOXYCYCLINE HYCLATE 100 MG
100 TABLET ORAL 2 TIMES DAILY
Qty: 14 TABLET | Refills: 0 | Status: SHIPPED | OUTPATIENT
Start: 2024-04-03 | End: 2024-04-10

## 2024-04-03 RX ORDER — 0.9 % SODIUM CHLORIDE 0.9 %
1000 INTRAVENOUS SOLUTION INTRAVENOUS ONCE
Status: COMPLETED | OUTPATIENT
Start: 2024-04-03 | End: 2024-04-03

## 2024-04-03 RX ORDER — KETOROLAC TROMETHAMINE 15 MG/ML
30 INJECTION, SOLUTION INTRAMUSCULAR; INTRAVENOUS ONCE
Status: COMPLETED | OUTPATIENT
Start: 2024-04-03 | End: 2024-04-03

## 2024-04-03 RX ADMIN — KETOROLAC TROMETHAMINE 30 MG: 15 INJECTION, SOLUTION INTRAMUSCULAR; INTRAVENOUS at 07:43

## 2024-04-03 RX ADMIN — IOPAMIDOL 100 ML: 612 INJECTION, SOLUTION INTRAVENOUS at 08:49

## 2024-04-03 RX ADMIN — HYDROCODONE BITARTRATE AND ACETAMINOPHEN 1 TABLET: 5; 325 TABLET ORAL at 09:17

## 2024-04-03 RX ADMIN — SODIUM CHLORIDE 1000 ML: 9 INJECTION, SOLUTION INTRAVENOUS at 08:31

## 2024-04-03 ASSESSMENT — PAIN DESCRIPTION - PAIN TYPE: TYPE: ACUTE PAIN

## 2024-04-03 ASSESSMENT — PAIN DESCRIPTION - ORIENTATION: ORIENTATION: RIGHT

## 2024-04-03 ASSESSMENT — LIFESTYLE VARIABLES
HOW MANY STANDARD DRINKS CONTAINING ALCOHOL DO YOU HAVE ON A TYPICAL DAY: PATIENT DOES NOT DRINK
HOW OFTEN DO YOU HAVE A DRINK CONTAINING ALCOHOL: NEVER

## 2024-04-03 ASSESSMENT — PAIN - FUNCTIONAL ASSESSMENT: PAIN_FUNCTIONAL_ASSESSMENT: 0-10

## 2024-04-03 ASSESSMENT — PAIN SCALES - GENERAL: PAINLEVEL_OUTOF10: 8

## 2024-04-03 ASSESSMENT — PAIN DESCRIPTION - LOCATION: LOCATION: BACK

## 2024-04-03 ASSESSMENT — PAIN DESCRIPTION - DESCRIPTORS: DESCRIPTORS: ACHING

## 2024-04-03 NOTE — ED NOTES
Pt in NAD, Discussed DC with the patient and all questions fully answered. Pt denies concerns at this time.

## 2024-04-03 NOTE — ED PROVIDER NOTES
[facility] EMERGENCY DEPT  EMERGENCY DEPARTMENT ENCOUNTER    Patient Name: Halle Gonzáles  MRN: [id number]  YOB: 1965  Provider: Pierce Blevins MD  PCP: Reynold Nieto MD   Time/Date of evaluation: 7:33 AM EDT on 4/3/24    History of Presenting Illness     History Provided by: Patient  History is limited by: Nothing     HISTORY:   Halle Gonzáles is a 58 y.o. female presenting with about 3 weeks of intermittent right flank pain.  Denies any fevers chills.  No nausea vomiting or diarrhea.  Pain does wrap around to the lateral part of her abdomen/lower chest.  Mild constipation.  No urinary symptoms or hematuria.  She tells me that she is just getting over what sounds like a URI illness with cough sputum production and congestion.    Nursing Notes were all reviewed and agreed with or any disagreements were addressed in the HPI.    Past History     PAST MEDICAL HISTORY:  Past Medical History:   Diagnosis Date    Abdominal pain, unspecified site     Acute otitis media     Acute pharyngitis     Anxiety     Arthritis     Autoimmune disease (HCC)     Back injury     Backache     herniated disc in lower back    Blurred vision     Chest pain 5/4/2018    Chest pain, unspecified     abnormal EKG    Chronic airway obstruction, not elsewhere classified     Chronic back pain     Chronic pain     Depression     Diabetes (HCC)     Dizziness     Dizziness and giddiness     possible orthostatic changes, vasovagal, autonomic dysfunction from diabetic neuropathy    Encounters for unspecified administrative purpose     Essential thrombocytosis (HCC) 1/6/2016    Feeling anxious     Fibromyalgia     Headache(784.0)     Hemorrhoid     Herniated disc     at L5    Hypercholesterolemia     Hyperglycemia     Hypertension     Joint pain     Leukocytosis, unspecified     Major depressive disorder, single episode, mild (HCC)     Mental disorder     depression, anxiety, bipolar and PTSD    Neuropathy     Obesity, unspecified

## 2024-04-03 NOTE — ED TRIAGE NOTES
Patient reports right posterior back pain that started about a week ago. [facility] and [facility] told her it was potentially shingles.

## (undated) DEVICE — RADIFOCUS OPTITORQUE ANGIOGRAPHIC CATHETER: Brand: OPTITORQUE

## (undated) DEVICE — SET FLD ADMIN 3 W STPCOCK FIX FEM L BOR 1IN

## (undated) DEVICE — PRESSURE MONITORING SET: Brand: TRUWAVE

## (undated) DEVICE — BAND RADIAL COMPR ARTERY 24CM -- REG BX/10

## (undated) DEVICE — ANGIOGRAPHY KIT CUST VASC

## (undated) DEVICE — PACK PROCEDURE SURG VASC CATH 161 MMC LF

## (undated) DEVICE — PROCEDURE KIT FLUID MGMT 10 FR CUST MAINFOLD

## (undated) DEVICE — GLIDESHEATH SLENDER STAINLESS STEEL KIT: Brand: GLIDESHEATH SLENDER